# Patient Record
Sex: MALE | Race: WHITE | Employment: OTHER | URBAN - NONMETROPOLITAN AREA
[De-identification: names, ages, dates, MRNs, and addresses within clinical notes are randomized per-mention and may not be internally consistent; named-entity substitution may affect disease eponyms.]

---

## 2017-02-21 ENCOUNTER — OFFICE VISIT (OUTPATIENT)
Dept: CARDIOLOGY | Age: 67
End: 2017-02-21
Payer: COMMERCIAL

## 2017-02-21 VITALS
SYSTOLIC BLOOD PRESSURE: 102 MMHG | HEIGHT: 74 IN | WEIGHT: 244 LBS | DIASTOLIC BLOOD PRESSURE: 74 MMHG | BODY MASS INDEX: 31.32 KG/M2 | HEART RATE: 68 BPM

## 2017-02-21 DIAGNOSIS — I25.10 CORONARY ARTERY DISEASE INVOLVING NATIVE CORONARY ARTERY OF NATIVE HEART WITHOUT ANGINA PECTORIS: Primary | ICD-10-CM

## 2017-02-21 DIAGNOSIS — M79.604 PAIN IN BOTH LOWER EXTREMITIES: Primary | ICD-10-CM

## 2017-02-21 DIAGNOSIS — E78.2 MIXED HYPERLIPIDEMIA: ICD-10-CM

## 2017-02-21 DIAGNOSIS — M79.605 PAIN IN BOTH LOWER EXTREMITIES: Primary | ICD-10-CM

## 2017-02-21 DIAGNOSIS — I10 ESSENTIAL HYPERTENSION: ICD-10-CM

## 2017-02-21 PROCEDURE — 99212 OFFICE O/P EST SF 10 MIN: CPT | Performed by: INTERNAL MEDICINE

## 2017-02-21 RX ORDER — PREGABALIN 100 MG/1
100 CAPSULE ORAL DAILY
COMMUNITY
End: 2017-03-06 | Stop reason: ALTCHOICE

## 2017-03-06 ENCOUNTER — OFFICE VISIT (OUTPATIENT)
Dept: VASCULAR SURGERY | Age: 67
End: 2017-03-06
Payer: COMMERCIAL

## 2017-03-06 VITALS
TEMPERATURE: 98.2 F | OXYGEN SATURATION: 97 % | DIASTOLIC BLOOD PRESSURE: 75 MMHG | SYSTOLIC BLOOD PRESSURE: 110 MMHG | HEART RATE: 61 BPM | RESPIRATION RATE: 18 BRPM

## 2017-03-06 DIAGNOSIS — M79.605 PAIN IN BOTH LOWER EXTREMITIES: ICD-10-CM

## 2017-03-06 DIAGNOSIS — I83.813 VARICOSE VEINS OF BILATERAL LOWER EXTREMITIES WITH PAIN: Primary | ICD-10-CM

## 2017-03-06 DIAGNOSIS — M79.89 LEG SWELLING: ICD-10-CM

## 2017-03-06 DIAGNOSIS — M79.604 PAIN IN BOTH LOWER EXTREMITIES: ICD-10-CM

## 2017-03-06 DIAGNOSIS — I83.813 VARICOSE VEINS OF LEG WITH PAIN, BILATERAL: Primary | ICD-10-CM

## 2017-03-06 PROCEDURE — 99203 OFFICE O/P NEW LOW 30 MIN: CPT | Performed by: SURGERY

## 2017-03-06 RX ORDER — GABAPENTIN 300 MG/1
900 CAPSULE ORAL 2 TIMES DAILY
COMMUNITY
End: 2019-04-30 | Stop reason: DRUGHIGH

## 2017-03-22 ENCOUNTER — OFFICE VISIT (OUTPATIENT)
Dept: UROLOGY | Facility: CLINIC | Age: 67
End: 2017-03-22

## 2017-03-22 VITALS
DIASTOLIC BLOOD PRESSURE: 78 MMHG | BODY MASS INDEX: 31.06 KG/M2 | TEMPERATURE: 97.6 F | HEIGHT: 74 IN | WEIGHT: 242 LBS | SYSTOLIC BLOOD PRESSURE: 134 MMHG

## 2017-03-22 DIAGNOSIS — N13.8 BPH (BENIGN PROSTATIC HYPERTROPHY) WITH URINARY OBSTRUCTION: ICD-10-CM

## 2017-03-22 DIAGNOSIS — N52.9 IMPOTENCE OF ORGANIC ORIGIN: ICD-10-CM

## 2017-03-22 DIAGNOSIS — N40.1 BPH (BENIGN PROSTATIC HYPERTROPHY) WITH URINARY OBSTRUCTION: ICD-10-CM

## 2017-03-22 DIAGNOSIS — N50.819 TESTALGIA: Primary | ICD-10-CM

## 2017-03-22 LAB
BILIRUB BLD-MCNC: NEGATIVE MG/DL
CLARITY, POC: CLEAR
COLOR UR: YELLOW
GLUCOSE UR STRIP-MCNC: ABNORMAL MG/DL
KETONES UR QL: NEGATIVE
LEUKOCYTE EST, POC: NEGATIVE
NITRITE UR-MCNC: NEGATIVE MG/ML
PH UR: 7 [PH] (ref 5–8)
PROT UR STRIP-MCNC: NEGATIVE MG/DL
RBC # UR STRIP: NEGATIVE /UL
SP GR UR: 1.02 (ref 1–1.03)
UROBILINOGEN UR QL: NORMAL

## 2017-03-22 PROCEDURE — 99214 OFFICE O/P EST MOD 30 MIN: CPT | Performed by: UROLOGY

## 2017-03-22 PROCEDURE — 81003 URINALYSIS AUTO W/O SCOPE: CPT | Performed by: UROLOGY

## 2017-03-22 PROCEDURE — 51798 US URINE CAPACITY MEASURE: CPT | Performed by: UROLOGY

## 2017-03-22 NOTE — PROGRESS NOTES
Subjective    Mr. Kam is 66 y.o. male    Chief Complaint: BPH/ED    History of Present Illness     Benign Prostatic Hypertrophy  Patient complains of lower urinary tract symptoms. He reports frequency, incomplete emptying, intermittency, nocturia four times a night, straining and urgency. He denies none. Patient states symptoms are of severe severity. Onset of symptoms was several years ago and was gradual in onset. His AUA Symptom Score is, 14/35.He reports a history of no complicating symptoms. He denies flank pain, gross hematuria, kidney stones and recurrent UTI. Patient has tried TURP with improvement. Last PSA was unknown .       Erectile Dysfunction  Patient complains of erectile dysfunction. Onset of dysfunction was several years ago and was gradual in onset. Patient states the nature of difficulty is maintaining erection. Full erections occur never. Partial erections occur never. Libido is not affected. Risk factors for ED include cardiovascular disease. Patient denies history of pelvic radiation. Previous treatment of ED includes none.     Testalgia  Patient is here for evaluation of testalgia.  The onset of the testicular pain is gradual.  It has been occurring for several months.  Pt. Describes the pain as sharp.  Patient rates the pain as 8/10.  Pain is located in the right testicle.  Course has been worsening.  Aggravating factors include none.  Alleviating factors include none. Previous urologic workup includes scrotal us 10/16.  Previous treatment includes TURP.  Pt. did not experience relief from treatment.        The following portions of the patient's history were reviewed and updated as appropriate: allergies, current medications, past family history, past medical history, past social history, past surgical history and problem list.    Review of Systems   Constitutional: Negative for appetite change and fever.   HENT: Negative for hearing loss and sore throat.    Eyes: Negative for pain and  redness.   Respiratory: Negative for cough and shortness of breath.    Cardiovascular: Negative for chest pain and leg swelling.   Gastrointestinal: Negative for anal bleeding, nausea and vomiting.   Endocrine: Negative for cold intolerance and heat intolerance.   Genitourinary: Positive for decreased urine volume, scrotal swelling and testicular pain. Negative for dysuria, flank pain and hematuria.   Musculoskeletal: Negative for joint swelling and myalgias.   Skin: Negative for color change and rash.   Allergic/Immunologic: Negative for immunocompromised state.   Neurological: Negative for dizziness and speech difficulty.   Hematological: Negative for adenopathy. Does not bruise/bleed easily.   Psychiatric/Behavioral: Negative for dysphoric mood and suicidal ideas.         Current Outpatient Prescriptions:   •  albuterol (PROAIR HFA) 108 (90 BASE) MCG/ACT inhaler, Inhale 2 puffs Every 4 (Four) Hours As Needed for shortness of air., Disp: , Rfl:   •  aspirin 325 MG tablet, Take 325 mg by mouth daily, Disp: , Rfl:   •  atorvastatin (LIPITOR) 40 MG tablet, Take 1 tablet by mouth daily, Disp: , Rfl:   •  beclomethasone (QVAR) 80 MCG/ACT inhaler, Inhale 1 puff into the lungs 2 times daily., Disp: , Rfl:   •  esomeprazole (NexIUM) 10 MG packet, Take 40 mg by mouth daily , Disp: , Rfl:   •  fluticasone-salmeterol (ADVAIR DISKUS) 500-50 MCG/DOSE DISKUS, Every 12 (Twelve) Hours., Disp: , Rfl:   •  gabapentin (NEURONTIN) 300 MG capsule, take 3,000 mg of gabapentin daily, Disp: , Rfl:   •  HYDROcodone-acetaminophen (NORCO) 5-325 MG per tablet, Take 1 tablet by mouth Every 6 (Six) Hours As Needed for moderate pain (4-6) (LELG AND FEET PAIN)., Disp: 40 tablet, Rfl: 0  •  isosorbide mononitrate (IMDUR) 60 MG 24 hr tablet, Take 1 tablet by mouth daily, Disp: , Rfl:   •  metoprolol succinate XL (TOPROL XL) 25 MG 24 hr tablet, Take 1 tablet by mouth 2 times daily, Disp: , Rfl:   •  nitroglycerin (NITROLINGUAL) 0.4 MG/SPRAY spray,  Place 1 spray under the tongue every 5 minutes as needed for Chest pain, Disp: , Rfl:   •  phenazopyridine (PYRIDIUM) 100 MG tablet, Take 1 tablet by mouth 3 (Three) Times a Day As Needed for bladder spasms., Disp: 21 tablet, Rfl: 1  •  ranolazine (RANEXA) 500 MG 12 hr tablet, Take 1 tablet by mouth 2 times daily, Disp: , Rfl:   •  tiotropium (SPIRIVA HANDIHALER) 18 MCG per inhalation capsule, Inhale 18 mcg into the lungs daily., Disp: , Rfl:   •  topiramate (TOPAMAX) 100 MG tablet, Take 125 mg by mouth Daily. TAKES 125 MG DAILY DIVIDED OVER SEVERAL DOSES PER DAY, Disp: , Rfl:     Past Medical History:   Diagnosis Date   • Arthritis    • COPD (chronic obstructive pulmonary disease)    • Coronary artery disease    • Enlarged prostate    • Full dentures    • GERD (gastroesophageal reflux disease)    • Hearing loss    • Heart abnormality    • Hypertension    • Lung nodule     3 IN 1 LUNG AND 4 IN THE OTHER AND STATES IT IS SLOW GROWING   • Neuropathic pain    • Sleep apnea     DOES NOT WEAR C PAP       Past Surgical History:   Procedure Laterality Date   • APPENDECTOMY     • CATARACT EXTRACTION     • CYSTOSCOPY TRANSURETHRAL RESECTION OF PROSTATE N/A 11/8/2016    Procedure: CYSTOSCOPY TRANSURETHRAL RESECTION OF PROSTATE;  Surgeon: Brad Bal MD;  [facility]: [facility];  Service:    • SINUS SURGERY     • THYROIDECTOMY         Social History     Social History   • Marital status:      Spouse name: N/A   • Number of children: N/A   • Years of education: N/A     Social History Main Topics   • Smoking status: Current Some Day Smoker     Packs/day: 0.25     Types: Cigars   • Smokeless tobacco: Never Used   • Alcohol use 0.6 oz/week     1 Cans of beer per week   • Drug use: No   • Sexual activity: Defer     Other Topics Concern   • None     Social History Narrative       Family History   Problem Relation Age of Onset   • No Known Problems Father    • No Known Problems Mother        Objective    /78   "Temp 97.6 °F (36.4 °C)  Ht 74\" (188 cm)  Wt 242 lb (110 kg)  BMI 31.07 kg/m2    Physical Exam   Constitutional: He is oriented to person, place, and time. He appears well-developed and well-nourished.   Pulmonary/Chest: Effort normal.   Abdominal: Soft. He exhibits no distension and no mass. There is no tenderness. There is no rebound and no guarding. No hernia.   Genitourinary: Penis normal. Rectal exam shows no mass, no tenderness and anal tone normal. Enlarged: for the age of the patient. Right testis shows no mass, no swelling and no tenderness. Left testis shows no mass, no swelling and no tenderness. No hypospadias. No discharge found.   Genitourinary Comments:  The urethral meatus normal in position without evidence of stricture. Epididymis without mass or tenderness. Vas Deferens is palpably normal. Right testicle larger than left testicle.     Neurological: He is alert and oriented to person, place, and time.   Vitals reviewed.          Results for orders placed or performed in visit on 03/22/17   POC Urinalysis Dipstick, Automated   Result Value Ref Range    Color Yellow Yellow, Straw, Dark Yellow, Bing    Clarity, UA Clear Clear    Glucose,  mg/dL (A) Negative, 1000 mg/dL (3+) mg/dL    Bilirubin Negative Negative    Ketones, UA Negative Negative    Specific Gravity  1.025 1.005 - 1.030    Blood, UA Negative Negative    pH, Urine 7.0 5.0 - 8.0    Protein, POC Negative Negative mg/dL    Urobilinogen, UA Normal Normal    Leukocytes Negative Negative    Nitrite, UA Negative Negative     Microscopic Urinalysis  I inspected the urine myself based on the clinical situation including the dipstick urine. The urine is spun in a centrifuge for three minutes. The spun urine shows 3-6 rbc/hpf, 3-6 wbc/hpf, none epi/hpf, negative bacteria, negative crystals, and negative casts.     Estimation of residual urine via abdominal ultrasound  Residual Urine: 15 ml  Indication: weak stream  Position: " Supine  Examination: Incremental scanning of the suprapubic area using 3 MHz transducer using copious amounts of acoustic gel.   Findings: An anechoic area was demonstrated which represented the bladder, with measurement of residual urine as noted. I inspected this myself. In that the residual urine was stable or insignificant, no treatment will be necessary at this time.         Assessment and Plan    Luis was seen today for testicle pain and weak stream.    Diagnoses and all orders for this visit:    Testalgia  -     POC Urinalysis Dipstick, Automated  -     US Scrotum & Testicles; Future    BPH (benign prostatic hypertrophy) with urinary obstruction    Impotence of organic origin        Patient with continued testalgia he had imaging back in October 2016.  He has had worsening of his symptoms and repeat a scrotal ultrasound in 1 week and he will return to see me.

## 2017-06-07 ENCOUNTER — TELEPHONE (OUTPATIENT)
Dept: UROLOGY | Facility: CLINIC | Age: 67
End: 2017-06-07

## 2017-06-12 ENCOUNTER — HOSPITAL ENCOUNTER (OUTPATIENT)
Dept: VASCULAR LAB | Age: 67
Discharge: HOME | End: 2017-06-12
Payer: MEDICARE

## 2017-06-12 ENCOUNTER — OFFICE VISIT (OUTPATIENT)
Dept: VASCULAR SURGERY | Age: 67
End: 2017-06-12
Payer: MEDICARE

## 2017-06-12 VITALS
TEMPERATURE: 97.4 F | SYSTOLIC BLOOD PRESSURE: 109 MMHG | HEART RATE: 67 BPM | DIASTOLIC BLOOD PRESSURE: 65 MMHG | RESPIRATION RATE: 18 BRPM

## 2017-06-12 DIAGNOSIS — M79.89 LEG SWELLING: ICD-10-CM

## 2017-06-12 DIAGNOSIS — M79.605 PAIN IN BOTH LOWER EXTREMITIES: ICD-10-CM

## 2017-06-12 DIAGNOSIS — M79.604 PAIN IN BOTH LOWER EXTREMITIES: ICD-10-CM

## 2017-06-12 DIAGNOSIS — I83.813 VARICOSE VEINS OF LEG WITH PAIN, BILATERAL: ICD-10-CM

## 2017-06-12 DIAGNOSIS — I83.893 VARICOSE VEINS OF LEG WITH SWELLING, BILATERAL: Primary | ICD-10-CM

## 2017-06-12 PROCEDURE — 99213 OFFICE O/P EST LOW 20 MIN: CPT | Performed by: PHYSICIAN ASSISTANT

## 2017-06-12 PROCEDURE — 93970 EXTREMITY STUDY: CPT

## 2017-06-15 ENCOUNTER — HOSPITAL ENCOUNTER (OUTPATIENT)
Dept: GENERAL RADIOLOGY | Age: 67
Discharge: HOME | End: 2017-06-15
Payer: MEDICARE

## 2017-06-15 ENCOUNTER — OFFICE VISIT (OUTPATIENT)
Dept: URGENT CARE | Age: 67
End: 2017-06-15
Payer: COMMERCIAL

## 2017-06-15 VITALS
SYSTOLIC BLOOD PRESSURE: 121 MMHG | TEMPERATURE: 98.1 F | BODY MASS INDEX: 29.13 KG/M2 | OXYGEN SATURATION: 98 % | DIASTOLIC BLOOD PRESSURE: 74 MMHG | HEART RATE: 72 BPM | HEIGHT: 74 IN | WEIGHT: 227 LBS | RESPIRATION RATE: 18 BRPM

## 2017-06-15 DIAGNOSIS — R60.9 SWOLLEN: Primary | ICD-10-CM

## 2017-06-15 DIAGNOSIS — L03.113 CELLULITIS OF RIGHT UPPER EXTREMITY: ICD-10-CM

## 2017-06-15 DIAGNOSIS — R60.9 SWOLLEN: ICD-10-CM

## 2017-06-15 PROCEDURE — G8417 CALC BMI ABV UP PARAM F/U: HCPCS | Performed by: NURSE PRACTITIONER

## 2017-06-15 PROCEDURE — 4040F PNEUMOC VAC/ADMIN/RCVD: CPT | Performed by: NURSE PRACTITIONER

## 2017-06-15 PROCEDURE — 1036F TOBACCO NON-USER: CPT | Performed by: NURSE PRACTITIONER

## 2017-06-15 PROCEDURE — 3017F COLORECTAL CA SCREEN DOC REV: CPT | Performed by: NURSE PRACTITIONER

## 2017-06-15 PROCEDURE — G8428 CUR MEDS NOT DOCUMENT: HCPCS | Performed by: NURSE PRACTITIONER

## 2017-06-15 PROCEDURE — 99213 OFFICE O/P EST LOW 20 MIN: CPT | Performed by: NURSE PRACTITIONER

## 2017-06-15 PROCEDURE — 1123F ACP DISCUSS/DSCN MKR DOCD: CPT | Performed by: NURSE PRACTITIONER

## 2017-06-15 PROCEDURE — 73130 X-RAY EXAM OF HAND: CPT

## 2017-06-15 PROCEDURE — G8598 ASA/ANTIPLAT THER USED: HCPCS | Performed by: NURSE PRACTITIONER

## 2017-06-15 RX ORDER — PREDNISONE 10 MG/1
TABLET ORAL
Qty: 42 TABLET | Refills: 0 | Status: SHIPPED | OUTPATIENT
Start: 2017-06-15 | End: 2017-07-16

## 2017-06-15 RX ORDER — CLINDAMYCIN HYDROCHLORIDE 300 MG/1
300 CAPSULE ORAL 3 TIMES DAILY
Qty: 30 CAPSULE | Refills: 0 | Status: SHIPPED | OUTPATIENT
Start: 2017-06-15 | End: 2017-06-25

## 2017-06-15 ASSESSMENT — ENCOUNTER SYMPTOMS
COLOR CHANGE: 1
VOMITING: 0
DIARRHEA: 0

## 2017-06-16 RX ORDER — METOPROLOL SUCCINATE 25 MG/1
TABLET, EXTENDED RELEASE ORAL
Qty: 180 TABLET | Refills: 3 | Status: SHIPPED | OUTPATIENT
Start: 2017-06-16 | End: 2018-09-11 | Stop reason: SDUPTHER

## 2017-06-19 ENCOUNTER — RESULTS ENCOUNTER (OUTPATIENT)
Dept: UROLOGY | Facility: CLINIC | Age: 67
End: 2017-06-19

## 2017-06-19 DIAGNOSIS — N13.8 BPH (BENIGN PROSTATIC HYPERTROPHY) WITH URINARY OBSTRUCTION: ICD-10-CM

## 2017-06-19 DIAGNOSIS — N40.1 BPH (BENIGN PROSTATIC HYPERTROPHY) WITH URINARY OBSTRUCTION: ICD-10-CM

## 2017-07-03 ENCOUNTER — TELEPHONE (OUTPATIENT)
Dept: VASCULAR SURGERY | Age: 67
End: 2017-07-03

## 2017-07-05 ENCOUNTER — TELEPHONE (OUTPATIENT)
Dept: VASCULAR SURGERY | Age: 67
End: 2017-07-05

## 2017-07-05 RX ORDER — DIPHENHYDRAMINE HCL 50 MG
CAPSULE ORAL
Qty: 1 CAPSULE | Refills: 0 | Status: ON HOLD | OUTPATIENT
Start: 2017-07-05 | End: 2017-10-19 | Stop reason: ALTCHOICE

## 2017-07-05 RX ORDER — PREDNISONE 50 MG/1
TABLET ORAL
Qty: 3 TABLET | Refills: 0 | OUTPATIENT
Start: 2017-07-05 | End: 2017-07-16

## 2017-07-10 ENCOUNTER — PREP FOR PROCEDURE (OUTPATIENT)
Dept: VASCULAR SURGERY | Age: 67
End: 2017-07-10

## 2017-07-10 RX ORDER — SODIUM CHLORIDE 9 MG/ML
INJECTION, SOLUTION INTRAVENOUS CONTINUOUS
Status: CANCELLED | OUTPATIENT
Start: 2017-07-10

## 2017-07-10 RX ORDER — SODIUM CHLORIDE 0.9 % (FLUSH) 0.9 %
10 SYRINGE (ML) INJECTION PRN
Status: CANCELLED | OUTPATIENT
Start: 2017-07-10

## 2017-07-11 ENCOUNTER — HOSPITAL ENCOUNTER (OUTPATIENT)
Dept: INTERVENTIONAL RADIOLOGY/VASCULAR | Age: 67
Discharge: HOME | End: 2017-07-11
Payer: MEDICARE

## 2017-07-11 VITALS
OXYGEN SATURATION: 100 % | DIASTOLIC BLOOD PRESSURE: 64 MMHG | HEART RATE: 45 BPM | WEIGHT: 216 LBS | BODY MASS INDEX: 28.63 KG/M2 | RESPIRATION RATE: 14 BRPM | SYSTOLIC BLOOD PRESSURE: 101 MMHG | TEMPERATURE: 98 F | HEIGHT: 73 IN

## 2017-07-11 DIAGNOSIS — I87.2 VENOUS INSUFFICIENCY OF BOTH LOWER EXTREMITIES: ICD-10-CM

## 2017-07-11 DIAGNOSIS — M79.89 PAIN AND SWELLING OF RIGHT LOWER LEG: ICD-10-CM

## 2017-07-11 DIAGNOSIS — I73.9 PVD (PERIPHERAL VASCULAR DISEASE) (HCC): ICD-10-CM

## 2017-07-11 DIAGNOSIS — M79.661 PAIN AND SWELLING OF RIGHT LOWER LEG: ICD-10-CM

## 2017-07-11 DIAGNOSIS — M79.89 SWELLING OF BOTH LOWER EXTREMITIES: ICD-10-CM

## 2017-07-11 DIAGNOSIS — I83.813 VARICOSE VEINS OF BILATERAL LOWER EXTREMITIES WITH PAIN: ICD-10-CM

## 2017-07-11 LAB
ANION GAP SERPL CALCULATED.3IONS-SCNC: 9 MMOL/L (ref 7–19)
BUN BLDV-MCNC: 17 MG/DL (ref 8–23)
CALCIUM SERPL-MCNC: 8.9 MG/DL (ref 8.8–10.2)
CHLORIDE BLD-SCNC: 109 MMOL/L (ref 98–111)
CO2: 23 MMOL/L (ref 22–29)
CREAT SERPL-MCNC: 1 MG/DL (ref 0.5–1.2)
GFR NON-AFRICAN AMERICAN: >60
GLUCOSE BLD-MCNC: 121 MG/DL (ref 74–109)
HCT VFR BLD CALC: 38.8 % (ref 42–52)
HEMOGLOBIN: 13.1 G/DL (ref 14–18)
MCH RBC QN AUTO: 33.7 PG (ref 27–31)
MCHC RBC AUTO-ENTMCNC: 33.8 G/DL (ref 33–37)
MCV RBC AUTO: 99.7 FL (ref 80–94)
PDW BLD-RTO: 13.8 % (ref 11.5–14.5)
PLATELET # BLD: 186 K/UL (ref 130–400)
PMV BLD AUTO: 9.8 FL (ref 9.4–12.4)
POTASSIUM SERPL-SCNC: 4.3 MMOL/L (ref 3.5–5)
RBC # BLD: 3.89 M/UL (ref 4.7–6.1)
SODIUM BLD-SCNC: 141 MMOL/L (ref 136–145)
WBC # BLD: 7.3 K/UL (ref 4.8–10.8)

## 2017-07-11 PROCEDURE — 85027 COMPLETE CBC AUTOMATED: CPT

## 2017-07-11 PROCEDURE — 75822 VEIN X-RAY ARMS/LEGS: CPT | Performed by: SURGERY

## 2017-07-11 PROCEDURE — 2580000003 HC RX 258: Performed by: SURGERY

## 2017-07-11 PROCEDURE — 36005 INJECTION EXT VENOGRAPHY: CPT | Performed by: SURGERY

## 2017-07-11 PROCEDURE — 80048 BASIC METABOLIC PNL TOTAL CA: CPT

## 2017-07-11 PROCEDURE — C1769 GUIDE WIRE: HCPCS

## 2017-07-11 PROCEDURE — 6360000004 HC RX CONTRAST MEDICATION: Performed by: SURGERY

## 2017-07-11 PROCEDURE — 37252 INTRVASC US NONCORONARY 1ST: CPT | Performed by: SURGERY

## 2017-07-11 PROCEDURE — 6360000002 HC RX W HCPCS: Performed by: SURGERY

## 2017-07-11 PROCEDURE — 2500000003 HC RX 250 WO HCPCS: Performed by: SURGERY

## 2017-07-11 PROCEDURE — 37253 INTRVASC US NONCORONARY ADDL: CPT | Performed by: SURGERY

## 2017-07-11 PROCEDURE — 36415 COLL VENOUS BLD VENIPUNCTURE: CPT

## 2017-07-11 PROCEDURE — 36012 PLACE CATHETER IN VEIN: CPT | Performed by: SURGERY

## 2017-07-11 RX ORDER — ONDANSETRON 2 MG/ML
4 INJECTION INTRAMUSCULAR; INTRAVENOUS EVERY 8 HOURS PRN
Status: DISCONTINUED | OUTPATIENT
Start: 2017-07-11 | End: 2017-07-13 | Stop reason: HOSPADM

## 2017-07-11 RX ORDER — IODIXANOL 320 MG/ML
INJECTION, SOLUTION INTRAVASCULAR
Status: COMPLETED | OUTPATIENT
Start: 2017-07-11 | End: 2017-07-11

## 2017-07-11 RX ORDER — MIDAZOLAM HYDROCHLORIDE 1 MG/ML
INJECTION INTRAMUSCULAR; INTRAVENOUS
Status: COMPLETED | OUTPATIENT
Start: 2017-07-11 | End: 2017-07-11

## 2017-07-11 RX ORDER — HEPARIN SODIUM 5000 [USP'U]/ML
INJECTION, SOLUTION INTRAVENOUS; SUBCUTANEOUS
Status: COMPLETED | OUTPATIENT
Start: 2017-07-11 | End: 2017-07-11

## 2017-07-11 RX ORDER — METHYLPREDNISOLONE SODIUM SUCCINATE 125 MG/2ML
250 INJECTION, POWDER, LYOPHILIZED, FOR SOLUTION INTRAMUSCULAR; INTRAVENOUS ONCE
Status: COMPLETED | OUTPATIENT
Start: 2017-07-11 | End: 2017-07-11

## 2017-07-11 RX ORDER — DIPHENHYDRAMINE HYDROCHLORIDE 50 MG/ML
50 INJECTION INTRAMUSCULAR; INTRAVENOUS ONCE
Status: COMPLETED | OUTPATIENT
Start: 2017-07-11 | End: 2017-07-11

## 2017-07-11 RX ORDER — SODIUM CHLORIDE 9 MG/ML
INJECTION, SOLUTION INTRAVENOUS CONTINUOUS
Status: DISCONTINUED | OUTPATIENT
Start: 2017-07-11 | End: 2017-07-11 | Stop reason: SDUPTHER

## 2017-07-11 RX ORDER — SODIUM CHLORIDE 0.9 % (FLUSH) 0.9 %
10 SYRINGE (ML) INJECTION PRN
Status: DISCONTINUED | OUTPATIENT
Start: 2017-07-11 | End: 2017-07-13 | Stop reason: HOSPADM

## 2017-07-11 RX ORDER — LIDOCAINE HYDROCHLORIDE 20 MG/ML
INJECTION, SOLUTION INFILTRATION; PERINEURAL
Status: COMPLETED | OUTPATIENT
Start: 2017-07-11 | End: 2017-07-11

## 2017-07-11 RX ORDER — FENTANYL CITRATE 50 UG/ML
INJECTION, SOLUTION INTRAMUSCULAR; INTRAVENOUS
Status: COMPLETED | OUTPATIENT
Start: 2017-07-11 | End: 2017-07-11

## 2017-07-11 RX ORDER — SODIUM CHLORIDE 9 MG/ML
INJECTION, SOLUTION INTRAVENOUS CONTINUOUS
Status: DISCONTINUED | OUTPATIENT
Start: 2017-07-11 | End: 2017-07-13 | Stop reason: HOSPADM

## 2017-07-11 RX ORDER — HYDROCODONE BITARTRATE AND ACETAMINOPHEN 5; 325 MG/1; MG/1
1 TABLET ORAL EVERY 4 HOURS PRN
Status: DISCONTINUED | OUTPATIENT
Start: 2017-07-11 | End: 2017-07-13 | Stop reason: HOSPADM

## 2017-07-11 RX ORDER — ACETAMINOPHEN 325 MG/1
650 TABLET ORAL EVERY 4 HOURS PRN
Status: DISCONTINUED | OUTPATIENT
Start: 2017-07-11 | End: 2017-07-13 | Stop reason: HOSPADM

## 2017-07-11 RX ORDER — HYDROCODONE BITARTRATE AND ACETAMINOPHEN 5; 325 MG/1; MG/1
2 TABLET ORAL EVERY 4 HOURS PRN
Status: DISCONTINUED | OUTPATIENT
Start: 2017-07-11 | End: 2017-07-13 | Stop reason: HOSPADM

## 2017-07-11 RX ADMIN — MIDAZOLAM HYDROCHLORIDE 1 MG: 1 INJECTION, SOLUTION INTRAMUSCULAR; INTRAVENOUS at 09:46

## 2017-07-11 RX ADMIN — METHYLPREDNISOLONE SODIUM SUCCINATE 250 MG: 125 INJECTION, POWDER, FOR SOLUTION INTRAMUSCULAR; INTRAVENOUS at 09:30

## 2017-07-11 RX ADMIN — HEPARIN SODIUM 5000 UNITS: 5000 INJECTION, SOLUTION INTRAVENOUS; SUBCUTANEOUS at 09:45

## 2017-07-11 RX ADMIN — IODIXANOL 72 ML: 320 INJECTION, SOLUTION INTRAVASCULAR at 10:08

## 2017-07-11 RX ADMIN — FENTANYL CITRATE 25 MCG: 50 INJECTION INTRAMUSCULAR; INTRAVENOUS at 09:46

## 2017-07-11 RX ADMIN — DIPHENHYDRAMINE HYDROCHLORIDE 50 MG: 50 INJECTION, SOLUTION INTRAMUSCULAR; INTRAVENOUS at 09:30

## 2017-07-11 RX ADMIN — HEPARIN SODIUM 4000 UNITS: 5000 INJECTION, SOLUTION INTRAVENOUS; SUBCUTANEOUS at 09:53

## 2017-07-11 RX ADMIN — SODIUM CHLORIDE: 9 INJECTION, SOLUTION INTRAVENOUS at 08:37

## 2017-07-11 RX ADMIN — LIDOCAINE HYDROCHLORIDE 15 ML: 20 INJECTION, SOLUTION INFILTRATION; PERINEURAL at 09:51

## 2017-07-11 RX ADMIN — Medication 2 G: at 09:31

## 2017-07-13 ENCOUNTER — OFFICE VISIT (OUTPATIENT)
Dept: VASCULAR SURGERY | Age: 67
End: 2017-07-13
Payer: MEDICARE

## 2017-07-13 VITALS
TEMPERATURE: 98.4 F | DIASTOLIC BLOOD PRESSURE: 62 MMHG | RESPIRATION RATE: 18 BRPM | OXYGEN SATURATION: 98 % | SYSTOLIC BLOOD PRESSURE: 98 MMHG | HEART RATE: 58 BPM

## 2017-07-13 DIAGNOSIS — M79.89 SWELLING OF BOTH LOWER EXTREMITIES: ICD-10-CM

## 2017-07-13 DIAGNOSIS — I87.2 VENOUS INSUFFICIENCY OF BOTH LOWER EXTREMITIES: ICD-10-CM

## 2017-07-13 DIAGNOSIS — I83.813 VARICOSE VEINS OF BILATERAL LOWER EXTREMITIES WITH PAIN: Primary | ICD-10-CM

## 2017-07-13 PROCEDURE — 4040F PNEUMOC VAC/ADMIN/RCVD: CPT | Performed by: PHYSICIAN ASSISTANT

## 2017-07-13 PROCEDURE — G8427 DOCREV CUR MEDS BY ELIG CLIN: HCPCS | Performed by: PHYSICIAN ASSISTANT

## 2017-07-13 PROCEDURE — 1036F TOBACCO NON-USER: CPT | Performed by: PHYSICIAN ASSISTANT

## 2017-07-13 PROCEDURE — G8598 ASA/ANTIPLAT THER USED: HCPCS | Performed by: PHYSICIAN ASSISTANT

## 2017-07-13 PROCEDURE — 3017F COLORECTAL CA SCREEN DOC REV: CPT | Performed by: PHYSICIAN ASSISTANT

## 2017-07-13 PROCEDURE — 99212 OFFICE O/P EST SF 10 MIN: CPT | Performed by: PHYSICIAN ASSISTANT

## 2017-07-13 PROCEDURE — G8417 CALC BMI ABV UP PARAM F/U: HCPCS | Performed by: PHYSICIAN ASSISTANT

## 2017-07-13 PROCEDURE — 1123F ACP DISCUSS/DSCN MKR DOCD: CPT | Performed by: PHYSICIAN ASSISTANT

## 2017-07-16 ENCOUNTER — APPOINTMENT (OUTPATIENT)
Dept: GENERAL RADIOLOGY | Age: 67
End: 2017-07-16
Payer: MEDICARE

## 2017-07-16 ENCOUNTER — HOSPITAL ENCOUNTER (EMERGENCY)
Age: 67
Discharge: HOME | End: 2017-07-16
Attending: EMERGENCY MEDICINE
Payer: MEDICARE

## 2017-07-16 VITALS
HEART RATE: 76 BPM | HEIGHT: 73 IN | OXYGEN SATURATION: 98 % | TEMPERATURE: 98.2 F | BODY MASS INDEX: 28.49 KG/M2 | DIASTOLIC BLOOD PRESSURE: 72 MMHG | SYSTOLIC BLOOD PRESSURE: 100 MMHG | WEIGHT: 215 LBS | RESPIRATION RATE: 18 BRPM

## 2017-07-16 DIAGNOSIS — S61.411A HAND LACERATION, RIGHT, INITIAL ENCOUNTER: Primary | ICD-10-CM

## 2017-07-16 PROCEDURE — 96365 THER/PROPH/DIAG IV INF INIT: CPT

## 2017-07-16 PROCEDURE — 90471 IMMUNIZATION ADMIN: CPT | Performed by: EMERGENCY MEDICINE

## 2017-07-16 PROCEDURE — 90715 TDAP VACCINE 7 YRS/> IM: CPT | Performed by: EMERGENCY MEDICINE

## 2017-07-16 PROCEDURE — 29125 APPL SHORT ARM SPLINT STATIC: CPT | Performed by: NURSE PRACTITIONER

## 2017-07-16 PROCEDURE — 73130 X-RAY EXAM OF HAND: CPT

## 2017-07-16 PROCEDURE — 12002 RPR S/N/AX/GEN/TRNK2.6-7.5CM: CPT

## 2017-07-16 PROCEDURE — 12002 RPR S/N/AX/GEN/TRNK2.6-7.5CM: CPT | Performed by: NURSE PRACTITIONER

## 2017-07-16 PROCEDURE — 99283 EMERGENCY DEPT VISIT LOW MDM: CPT

## 2017-07-16 PROCEDURE — 96375 TX/PRO/DX INJ NEW DRUG ADDON: CPT

## 2017-07-16 PROCEDURE — 6360000002 HC RX W HCPCS: Performed by: EMERGENCY MEDICINE

## 2017-07-16 RX ORDER — LIDOCAINE HYDROCHLORIDE 10 MG/ML
INJECTION, SOLUTION INFILTRATION; PERINEURAL
Status: DISCONTINUED
Start: 2017-07-16 | End: 2017-07-16 | Stop reason: HOSPADM

## 2017-07-16 RX ORDER — HYDROCODONE BITARTRATE AND ACETAMINOPHEN 5; 325 MG/1; MG/1
2 TABLET ORAL EVERY 6 HOURS PRN
Qty: 10 TABLET | Refills: 0 | Status: SHIPPED | OUTPATIENT
Start: 2017-07-16 | End: 2017-07-23

## 2017-07-16 RX ORDER — CEPHALEXIN 500 MG/1
500 CAPSULE ORAL 4 TIMES DAILY
Qty: 20 CAPSULE | Refills: 0 | Status: SHIPPED | OUTPATIENT
Start: 2017-07-16 | End: 2017-07-21

## 2017-07-16 RX ADMIN — CEFAZOLIN SODIUM 1 G: 1 INJECTION, SOLUTION INTRAVENOUS at 18:04

## 2017-07-16 RX ADMIN — HYDROMORPHONE HYDROCHLORIDE 1 MG: 1 INJECTION, SOLUTION INTRAMUSCULAR; INTRAVENOUS; SUBCUTANEOUS at 17:46

## 2017-07-16 RX ADMIN — TETANUS TOXOID, REDUCED DIPHTHERIA TOXOID AND ACELLULAR PERTUSSIS VACCINE, ADSORBED 0.5 ML: 5; 2.5; 8; 8; 2.5 SUSPENSION INTRAMUSCULAR at 17:46

## 2017-07-16 ASSESSMENT — PAIN SCALES - GENERAL
PAINLEVEL_OUTOF10: 2
PAINLEVEL_OUTOF10: 2

## 2017-08-15 RX ORDER — DIAZEPAM 5 MG/1
TABLET ORAL
Qty: 1 TABLET | Refills: 0 | Status: ON HOLD | OUTPATIENT
Start: 2017-08-15 | End: 2017-09-21

## 2017-08-16 DIAGNOSIS — M79.605 LEFT LEG PAIN: ICD-10-CM

## 2017-08-16 DIAGNOSIS — I87.2 VENOUS INSUFFICIENCY OF BOTH LOWER EXTREMITIES: Primary | ICD-10-CM

## 2017-08-16 DIAGNOSIS — M79.89 LEFT LEG SWELLING: ICD-10-CM

## 2017-08-17 ENCOUNTER — PROCEDURE VISIT (OUTPATIENT)
Dept: VASCULAR SURGERY | Age: 67
End: 2017-08-17
Payer: MEDICARE

## 2017-08-17 VITALS
OXYGEN SATURATION: 99 % | SYSTOLIC BLOOD PRESSURE: 108 MMHG | DIASTOLIC BLOOD PRESSURE: 64 MMHG | RESPIRATION RATE: 18 BRPM | HEART RATE: 58 BPM | TEMPERATURE: 97.6 F

## 2017-08-17 DIAGNOSIS — M79.605 LEFT LEG PAIN: Primary | ICD-10-CM

## 2017-08-17 DIAGNOSIS — I87.2 CHRONIC VENOUS INSUFFICIENCY: ICD-10-CM

## 2017-08-17 DIAGNOSIS — M79.89 LEFT LEG SWELLING: ICD-10-CM

## 2017-08-17 PROCEDURE — 1036F TOBACCO NON-USER: CPT | Performed by: SURGERY

## 2017-08-17 PROCEDURE — 36475 ENDOVENOUS RF 1ST VEIN: CPT | Performed by: SURGERY

## 2017-08-17 RX ORDER — HYDROCODONE BITARTRATE AND ACETAMINOPHEN 5; 325 MG/1; MG/1
1 TABLET ORAL EVERY 6 HOURS PRN
COMMUNITY
End: 2018-01-23 | Stop reason: SDUPTHER

## 2017-08-22 ENCOUNTER — HOSPITAL ENCOUNTER (OUTPATIENT)
Dept: VASCULAR LAB | Age: 67
Discharge: HOME | End: 2017-08-22
Payer: MEDICARE

## 2017-08-22 ENCOUNTER — OFFICE VISIT (OUTPATIENT)
Dept: VASCULAR SURGERY | Age: 67
End: 2017-08-22
Payer: MEDICARE

## 2017-08-22 VITALS
DIASTOLIC BLOOD PRESSURE: 67 MMHG | HEART RATE: 48 BPM | BODY MASS INDEX: 26.82 KG/M2 | WEIGHT: 209 LBS | SYSTOLIC BLOOD PRESSURE: 100 MMHG | HEIGHT: 74 IN

## 2017-08-22 DIAGNOSIS — I87.2 VENOUS INSUFFICIENCY OF BOTH LOWER EXTREMITIES: ICD-10-CM

## 2017-08-22 DIAGNOSIS — M79.605 LEFT LEG PAIN: ICD-10-CM

## 2017-08-22 DIAGNOSIS — M79.89 SWELLING OF BOTH LOWER EXTREMITIES: ICD-10-CM

## 2017-08-22 DIAGNOSIS — M79.89 LEFT LEG SWELLING: ICD-10-CM

## 2017-08-22 DIAGNOSIS — I87.2 VENOUS INSUFFICIENCY OF BOTH LOWER EXTREMITIES: Primary | ICD-10-CM

## 2017-08-22 DIAGNOSIS — I83.813 VARICOSE VEINS OF BILATERAL LOWER EXTREMITIES WITH PAIN: ICD-10-CM

## 2017-08-22 PROCEDURE — G8598 ASA/ANTIPLAT THER USED: HCPCS | Performed by: PHYSICIAN ASSISTANT

## 2017-08-22 PROCEDURE — 3017F COLORECTAL CA SCREEN DOC REV: CPT | Performed by: PHYSICIAN ASSISTANT

## 2017-08-22 PROCEDURE — 4040F PNEUMOC VAC/ADMIN/RCVD: CPT | Performed by: PHYSICIAN ASSISTANT

## 2017-08-22 PROCEDURE — 93971 EXTREMITY STUDY: CPT

## 2017-08-22 PROCEDURE — 1036F TOBACCO NON-USER: CPT | Performed by: PHYSICIAN ASSISTANT

## 2017-08-22 PROCEDURE — 1123F ACP DISCUSS/DSCN MKR DOCD: CPT | Performed by: PHYSICIAN ASSISTANT

## 2017-08-22 PROCEDURE — G8417 CALC BMI ABV UP PARAM F/U: HCPCS | Performed by: PHYSICIAN ASSISTANT

## 2017-08-22 PROCEDURE — G8428 CUR MEDS NOT DOCUMENT: HCPCS | Performed by: PHYSICIAN ASSISTANT

## 2017-08-22 PROCEDURE — 99212 OFFICE O/P EST SF 10 MIN: CPT | Performed by: PHYSICIAN ASSISTANT

## 2017-08-23 ENCOUNTER — TRANSCRIBE ORDERS (OUTPATIENT)
Dept: ADMINISTRATIVE | Facility: HOSPITAL | Age: 67
End: 2017-08-23

## 2017-08-23 DIAGNOSIS — R63.4 LOSS OF WEIGHT: ICD-10-CM

## 2017-08-23 DIAGNOSIS — R10.10 UPPER ABDOMINAL PAIN: ICD-10-CM

## 2017-08-23 DIAGNOSIS — R91.1 PULMONARY NODULE: Primary | ICD-10-CM

## 2017-08-28 ENCOUNTER — HOSPITAL ENCOUNTER (OUTPATIENT)
Dept: CT IMAGING | Facility: HOSPITAL | Age: 67
Discharge: HOME | End: 2017-08-28
Attending: INTERNAL MEDICINE | Admitting: INTERNAL MEDICINE

## 2017-08-28 DIAGNOSIS — R10.10 UPPER ABDOMINAL PAIN: ICD-10-CM

## 2017-08-28 DIAGNOSIS — R91.1 PULMONARY NODULE: ICD-10-CM

## 2017-08-28 DIAGNOSIS — R63.4 LOSS OF WEIGHT: ICD-10-CM

## 2017-08-28 LAB — CREAT BLDA-MCNC: 1.1 MG/DL (ref 0.6–1.3)

## 2017-08-28 PROCEDURE — 74177 CT ABD & PELVIS W/CONTRAST: CPT

## 2017-08-28 PROCEDURE — 71260 CT THORAX DX C+: CPT

## 2017-08-28 PROCEDURE — 0 IOPAMIDOL 61 % SOLUTION: Performed by: INTERNAL MEDICINE

## 2017-08-28 PROCEDURE — 82565 ASSAY OF CREATININE: CPT

## 2017-08-28 RX ADMIN — IOPAMIDOL 100 ML: 612 INJECTION, SOLUTION INTRAVENOUS at 09:00

## 2017-08-29 ENCOUNTER — OFFICE VISIT (OUTPATIENT)
Dept: GASTROENTEROLOGY | Age: 67
End: 2017-08-29
Payer: MEDICARE

## 2017-08-29 VITALS
BODY MASS INDEX: 27.85 KG/M2 | SYSTOLIC BLOOD PRESSURE: 124 MMHG | HEIGHT: 75 IN | OXYGEN SATURATION: 98 % | DIASTOLIC BLOOD PRESSURE: 82 MMHG | HEART RATE: 57 BPM | WEIGHT: 224 LBS

## 2017-08-29 DIAGNOSIS — Z86.010 HISTORY OF ADENOMATOUS POLYP OF COLON: ICD-10-CM

## 2017-08-29 DIAGNOSIS — D64.9 ANEMIA, UNSPECIFIED: ICD-10-CM

## 2017-08-29 DIAGNOSIS — R19.4 CHANGE IN BOWEL HABIT: ICD-10-CM

## 2017-08-29 DIAGNOSIS — K62.5 RECTAL BLEEDING: Primary | ICD-10-CM

## 2017-08-29 DIAGNOSIS — R63.4 UNEXPLAINED WEIGHT LOSS: ICD-10-CM

## 2017-08-29 DIAGNOSIS — R14.3 EXCESSIVE GAS: ICD-10-CM

## 2017-08-29 DIAGNOSIS — R10.84 GENERALIZED ABDOMINAL PAIN: ICD-10-CM

## 2017-08-29 PROBLEM — Z86.0101 HISTORY OF ADENOMATOUS POLYP OF COLON: Status: ACTIVE | Noted: 2017-08-29

## 2017-08-29 PROCEDURE — G8417 CALC BMI ABV UP PARAM F/U: HCPCS | Performed by: NURSE PRACTITIONER

## 2017-08-29 PROCEDURE — 99214 OFFICE O/P EST MOD 30 MIN: CPT | Performed by: NURSE PRACTITIONER

## 2017-08-29 PROCEDURE — 3017F COLORECTAL CA SCREEN DOC REV: CPT | Performed by: NURSE PRACTITIONER

## 2017-08-29 PROCEDURE — 4040F PNEUMOC VAC/ADMIN/RCVD: CPT | Performed by: NURSE PRACTITIONER

## 2017-08-29 PROCEDURE — G8427 DOCREV CUR MEDS BY ELIG CLIN: HCPCS | Performed by: NURSE PRACTITIONER

## 2017-08-29 PROCEDURE — 1123F ACP DISCUSS/DSCN MKR DOCD: CPT | Performed by: NURSE PRACTITIONER

## 2017-08-29 PROCEDURE — 4004F PT TOBACCO SCREEN RCVD TLK: CPT | Performed by: NURSE PRACTITIONER

## 2017-08-29 PROCEDURE — G8598 ASA/ANTIPLAT THER USED: HCPCS | Performed by: NURSE PRACTITIONER

## 2017-08-29 ASSESSMENT — ENCOUNTER SYMPTOMS
SHORTNESS OF BREATH: 1
VOICE CHANGE: 0
COUGH: 0
BACK PAIN: 1
VOMITING: 0
SORE THROAT: 0
NAUSEA: 0
CONSTIPATION: 1
ABDOMINAL DISTENTION: 0
DIARRHEA: 0
CHEST TIGHTNESS: 0
BLOOD IN STOOL: 1
ABDOMINAL PAIN: 1
RECTAL PAIN: 0

## 2017-09-07 ENCOUNTER — OFFICE VISIT (OUTPATIENT)
Dept: CARDIOLOGY | Age: 67
End: 2017-09-07
Payer: MEDICARE

## 2017-09-07 VITALS
WEIGHT: 217 LBS | HEIGHT: 74 IN | BODY MASS INDEX: 27.85 KG/M2 | DIASTOLIC BLOOD PRESSURE: 60 MMHG | HEART RATE: 60 BPM | SYSTOLIC BLOOD PRESSURE: 100 MMHG

## 2017-09-07 DIAGNOSIS — E78.2 MIXED HYPERLIPIDEMIA: ICD-10-CM

## 2017-09-07 DIAGNOSIS — I10 ESSENTIAL HYPERTENSION: ICD-10-CM

## 2017-09-07 DIAGNOSIS — I25.10 CORONARY ARTERY DISEASE INVOLVING NATIVE CORONARY ARTERY OF NATIVE HEART WITHOUT ANGINA PECTORIS: Primary | ICD-10-CM

## 2017-09-07 PROCEDURE — 4040F PNEUMOC VAC/ADMIN/RCVD: CPT | Performed by: INTERNAL MEDICINE

## 2017-09-07 PROCEDURE — G8427 DOCREV CUR MEDS BY ELIG CLIN: HCPCS | Performed by: INTERNAL MEDICINE

## 2017-09-07 PROCEDURE — 3017F COLORECTAL CA SCREEN DOC REV: CPT | Performed by: INTERNAL MEDICINE

## 2017-09-07 PROCEDURE — 4004F PT TOBACCO SCREEN RCVD TLK: CPT | Performed by: INTERNAL MEDICINE

## 2017-09-07 PROCEDURE — 99213 OFFICE O/P EST LOW 20 MIN: CPT | Performed by: INTERNAL MEDICINE

## 2017-09-07 PROCEDURE — 1123F ACP DISCUSS/DSCN MKR DOCD: CPT | Performed by: INTERNAL MEDICINE

## 2017-09-07 PROCEDURE — G8598 ASA/ANTIPLAT THER USED: HCPCS | Performed by: INTERNAL MEDICINE

## 2017-09-07 PROCEDURE — G8417 CALC BMI ABV UP PARAM F/U: HCPCS | Performed by: INTERNAL MEDICINE

## 2017-09-21 ENCOUNTER — HOSPITAL ENCOUNTER (OUTPATIENT)
Age: 67
Setting detail: OUTPATIENT SURGERY
Discharge: HOME | End: 2017-09-21
Attending: INTERNAL MEDICINE | Admitting: INTERNAL MEDICINE
Payer: MEDICARE

## 2017-09-21 ENCOUNTER — ANESTHESIA EVENT (OUTPATIENT)
Dept: ENDOSCOPY | Age: 67
End: 2017-09-21
Payer: MEDICARE

## 2017-09-21 ENCOUNTER — ANESTHESIA (OUTPATIENT)
Dept: ENDOSCOPY | Age: 67
End: 2017-09-21
Payer: MEDICARE

## 2017-09-21 VITALS
OXYGEN SATURATION: 100 % | HEART RATE: 63 BPM | BODY MASS INDEX: 27.59 KG/M2 | RESPIRATION RATE: 16 BRPM | DIASTOLIC BLOOD PRESSURE: 55 MMHG | WEIGHT: 215 LBS | SYSTOLIC BLOOD PRESSURE: 107 MMHG | TEMPERATURE: 97.4 F | HEIGHT: 74 IN

## 2017-09-21 VITALS
RESPIRATION RATE: 16 BRPM | OXYGEN SATURATION: 98 % | DIASTOLIC BLOOD PRESSURE: 62 MMHG | SYSTOLIC BLOOD PRESSURE: 103 MMHG

## 2017-09-21 PROCEDURE — 7100000010 HC PHASE II RECOVERY - FIRST 15 MIN: Performed by: INTERNAL MEDICINE

## 2017-09-21 PROCEDURE — 88305 TISSUE EXAM BY PATHOLOGIST: CPT

## 2017-09-21 PROCEDURE — 2500000003 HC RX 250 WO HCPCS: Performed by: NURSE ANESTHETIST, CERTIFIED REGISTERED

## 2017-09-21 PROCEDURE — 45384 COLONOSCOPY W/LESION REMOVAL: CPT | Performed by: INTERNAL MEDICINE

## 2017-09-21 PROCEDURE — 3700000000 HC ANESTHESIA ATTENDED CARE: Performed by: INTERNAL MEDICINE

## 2017-09-21 PROCEDURE — 7100000011 HC PHASE II RECOVERY - ADDTL 15 MIN: Performed by: INTERNAL MEDICINE

## 2017-09-21 PROCEDURE — 6360000002 HC RX W HCPCS: Performed by: NURSE ANESTHETIST, CERTIFIED REGISTERED

## 2017-09-21 PROCEDURE — 3700000001 HC ADD 15 MINUTES (ANESTHESIA): Performed by: INTERNAL MEDICINE

## 2017-09-21 PROCEDURE — 3609010400 HC COLONOSCOPY POLYPECTOMY HOT BIOPSY: Performed by: INTERNAL MEDICINE

## 2017-09-21 PROCEDURE — 2580000003 HC RX 258: Performed by: INTERNAL MEDICINE

## 2017-09-21 RX ORDER — LIDOCAINE HYDROCHLORIDE 10 MG/ML
1 INJECTION, SOLUTION EPIDURAL; INFILTRATION; INTRACAUDAL; PERINEURAL ONCE
Status: DISCONTINUED | OUTPATIENT
Start: 2017-09-21 | End: 2017-09-22 | Stop reason: HOSPADM

## 2017-09-21 RX ORDER — LIDOCAINE HYDROCHLORIDE 20 MG/ML
INJECTION, SOLUTION INFILTRATION; PERINEURAL PRN
Status: DISCONTINUED | OUTPATIENT
Start: 2017-09-21 | End: 2017-09-21 | Stop reason: SDUPTHER

## 2017-09-21 RX ORDER — SODIUM CHLORIDE, SODIUM LACTATE, POTASSIUM CHLORIDE, CALCIUM CHLORIDE 600; 310; 30; 20 MG/100ML; MG/100ML; MG/100ML; MG/100ML
INJECTION, SOLUTION INTRAVENOUS CONTINUOUS
Status: DISCONTINUED | OUTPATIENT
Start: 2017-09-21 | End: 2017-09-22 | Stop reason: HOSPADM

## 2017-09-21 RX ORDER — PROPOFOL 10 MG/ML
INJECTION, EMULSION INTRAVENOUS PRN
Status: DISCONTINUED | OUTPATIENT
Start: 2017-09-21 | End: 2017-09-21 | Stop reason: SDUPTHER

## 2017-09-21 RX ORDER — MIDAZOLAM HYDROCHLORIDE 1 MG/ML
INJECTION INTRAMUSCULAR; INTRAVENOUS PRN
Status: DISCONTINUED | OUTPATIENT
Start: 2017-09-21 | End: 2017-09-21 | Stop reason: SDUPTHER

## 2017-09-21 RX ADMIN — PROPOFOL 440 MG: 10 INJECTION, EMULSION INTRAVENOUS at 12:51

## 2017-09-21 RX ADMIN — MIDAZOLAM HYDROCHLORIDE 2 MG: 1 INJECTION, SOLUTION INTRAMUSCULAR; INTRAVENOUS at 12:51

## 2017-09-21 RX ADMIN — SODIUM CHLORIDE, SODIUM LACTATE, POTASSIUM CHLORIDE, AND CALCIUM CHLORIDE: 600; 310; 30; 20 INJECTION, SOLUTION INTRAVENOUS at 11:28

## 2017-09-21 RX ADMIN — LIDOCAINE HYDROCHLORIDE 40 MG: 20 INJECTION, SOLUTION INFILTRATION; PERINEURAL at 12:51

## 2017-09-25 ENCOUNTER — OFFICE VISIT (OUTPATIENT)
Dept: GASTROENTEROLOGY | Age: 67
End: 2017-09-25
Payer: MEDICARE

## 2017-09-25 VITALS
HEART RATE: 68 BPM | DIASTOLIC BLOOD PRESSURE: 72 MMHG | WEIGHT: 224 LBS | BODY MASS INDEX: 28.75 KG/M2 | HEIGHT: 74 IN | SYSTOLIC BLOOD PRESSURE: 120 MMHG | OXYGEN SATURATION: 98 %

## 2017-09-25 DIAGNOSIS — K62.5 RECTAL BLEEDING: Primary | ICD-10-CM

## 2017-09-25 DIAGNOSIS — K64.8 INTERNAL HEMORRHOIDS: ICD-10-CM

## 2017-09-25 PROCEDURE — 4004F PT TOBACCO SCREEN RCVD TLK: CPT | Performed by: INTERNAL MEDICINE

## 2017-09-25 PROCEDURE — 46221 LIGATION OF HEMORRHOID(S): CPT | Performed by: INTERNAL MEDICINE

## 2017-10-03 DIAGNOSIS — M79.89 SWELLING OF BOTH LOWER EXTREMITIES: ICD-10-CM

## 2017-10-03 DIAGNOSIS — I83.813 VARICOSE VEINS OF BILATERAL LOWER EXTREMITIES WITH PAIN: Primary | ICD-10-CM

## 2017-10-03 DIAGNOSIS — I87.2 VENOUS INSUFFICIENCY OF BOTH LOWER EXTREMITIES: ICD-10-CM

## 2017-10-14 PROBLEM — R19.8 ALTERED BOWEL FUNCTION: Status: ACTIVE | Noted: 2017-08-29

## 2017-10-16 ENCOUNTER — OFFICE VISIT (OUTPATIENT)
Dept: GASTROENTEROLOGY | Age: 67
End: 2017-10-16
Payer: MEDICARE

## 2017-10-16 VITALS
HEART RATE: 59 BPM | OXYGEN SATURATION: 98 % | SYSTOLIC BLOOD PRESSURE: 110 MMHG | WEIGHT: 229.6 LBS | BODY MASS INDEX: 28.55 KG/M2 | DIASTOLIC BLOOD PRESSURE: 62 MMHG | HEIGHT: 75 IN

## 2017-10-16 DIAGNOSIS — K64.8 INTERNAL HEMORRHOIDS: Primary | ICD-10-CM

## 2017-10-16 PROCEDURE — 46221 LIGATION OF HEMORRHOID(S): CPT | Performed by: INTERNAL MEDICINE

## 2017-10-16 PROCEDURE — 4004F PT TOBACCO SCREEN RCVD TLK: CPT | Performed by: INTERNAL MEDICINE

## 2017-10-17 ENCOUNTER — HOSPITAL ENCOUNTER (OUTPATIENT)
Dept: VASCULAR LAB | Age: 67
Discharge: HOME | End: 2017-10-17
Payer: MEDICARE

## 2017-10-17 DIAGNOSIS — I83.813 VARICOSE VEINS OF BILATERAL LOWER EXTREMITIES WITH PAIN: ICD-10-CM

## 2017-10-17 DIAGNOSIS — I87.2 VENOUS INSUFFICIENCY OF BOTH LOWER EXTREMITIES: ICD-10-CM

## 2017-10-17 DIAGNOSIS — M79.89 SWELLING OF BOTH LOWER EXTREMITIES: ICD-10-CM

## 2017-10-17 PROCEDURE — 93970 EXTREMITY STUDY: CPT

## 2017-10-19 ENCOUNTER — ANESTHESIA EVENT (OUTPATIENT)
Dept: ENDOSCOPY | Age: 67
End: 2017-10-19
Payer: MEDICARE

## 2017-10-19 ENCOUNTER — ANESTHESIA (OUTPATIENT)
Dept: ENDOSCOPY | Age: 67
End: 2017-10-19
Payer: MEDICARE

## 2017-10-19 ENCOUNTER — APPOINTMENT (OUTPATIENT)
Dept: GENERAL RADIOLOGY | Age: 67
End: 2017-10-19
Attending: INTERNAL MEDICINE
Payer: MEDICARE

## 2017-10-19 ENCOUNTER — HOSPITAL ENCOUNTER (OUTPATIENT)
Age: 67
Setting detail: OUTPATIENT SURGERY
Discharge: HOME | End: 2017-10-19
Attending: INTERNAL MEDICINE | Admitting: INTERNAL MEDICINE
Payer: MEDICARE

## 2017-10-19 VITALS
SYSTOLIC BLOOD PRESSURE: 95 MMHG | HEIGHT: 75 IN | RESPIRATION RATE: 18 BRPM | OXYGEN SATURATION: 100 % | TEMPERATURE: 97 F | DIASTOLIC BLOOD PRESSURE: 61 MMHG | HEART RATE: 50 BPM

## 2017-10-19 VITALS
DIASTOLIC BLOOD PRESSURE: 66 MMHG | OXYGEN SATURATION: 99 % | RESPIRATION RATE: 19 BRPM | SYSTOLIC BLOOD PRESSURE: 97 MMHG

## 2017-10-19 PROCEDURE — 7100000010 HC PHASE II RECOVERY - FIRST 15 MIN: Performed by: INTERNAL MEDICINE

## 2017-10-19 PROCEDURE — 6360000002 HC RX W HCPCS: Performed by: NURSE ANESTHETIST, CERTIFIED REGISTERED

## 2017-10-19 PROCEDURE — 43239 EGD BIOPSY SINGLE/MULTIPLE: CPT | Performed by: INTERNAL MEDICINE

## 2017-10-19 PROCEDURE — 2500000003 HC RX 250 WO HCPCS: Performed by: NURSE ANESTHETIST, CERTIFIED REGISTERED

## 2017-10-19 PROCEDURE — 2580000003 HC RX 258: Performed by: INTERNAL MEDICINE

## 2017-10-19 PROCEDURE — 87077 CULTURE AEROBIC IDENTIFY: CPT

## 2017-10-19 PROCEDURE — 74220 X-RAY XM ESOPHAGUS 1CNTRST: CPT

## 2017-10-19 PROCEDURE — 7100000011 HC PHASE II RECOVERY - ADDTL 15 MIN: Performed by: INTERNAL MEDICINE

## 2017-10-19 PROCEDURE — 88305 TISSUE EXAM BY PATHOLOGIST: CPT

## 2017-10-19 PROCEDURE — 3609012400 HC EGD TRANSORAL BIOPSY SINGLE/MULTIPLE: Performed by: INTERNAL MEDICINE

## 2017-10-19 PROCEDURE — 2500000003 HC RX 250 WO HCPCS: Performed by: INTERNAL MEDICINE

## 2017-10-19 PROCEDURE — 3700000000 HC ANESTHESIA ATTENDED CARE: Performed by: INTERNAL MEDICINE

## 2017-10-19 RX ORDER — PROPOFOL 10 MG/ML
INJECTION, EMULSION INTRAVENOUS PRN
Status: DISCONTINUED | OUTPATIENT
Start: 2017-10-19 | End: 2017-10-19 | Stop reason: SDUPTHER

## 2017-10-19 RX ORDER — LIDOCAINE HYDROCHLORIDE 10 MG/ML
1 INJECTION, SOLUTION EPIDURAL; INFILTRATION; INTRACAUDAL; PERINEURAL ONCE
Status: COMPLETED | OUTPATIENT
Start: 2017-10-19 | End: 2017-10-19

## 2017-10-19 RX ORDER — SODIUM CHLORIDE, SODIUM LACTATE, POTASSIUM CHLORIDE, CALCIUM CHLORIDE 600; 310; 30; 20 MG/100ML; MG/100ML; MG/100ML; MG/100ML
INJECTION, SOLUTION INTRAVENOUS CONTINUOUS
Status: DISCONTINUED | OUTPATIENT
Start: 2017-10-19 | End: 2017-10-19 | Stop reason: HOSPADM

## 2017-10-19 RX ORDER — LIDOCAINE HYDROCHLORIDE 10 MG/ML
INJECTION, SOLUTION EPIDURAL; INFILTRATION; INTRACAUDAL; PERINEURAL PRN
Status: DISCONTINUED | OUTPATIENT
Start: 2017-10-19 | End: 2017-10-19 | Stop reason: SDUPTHER

## 2017-10-19 RX ADMIN — LIDOCAINE HYDROCHLORIDE 5 ML: 10 INJECTION, SOLUTION EPIDURAL; INFILTRATION; INTRACAUDAL; PERINEURAL at 10:05

## 2017-10-19 RX ADMIN — LIDOCAINE HYDROCHLORIDE 1 ML: 10 INJECTION, SOLUTION EPIDURAL; INFILTRATION; INTRACAUDAL; PERINEURAL at 09:14

## 2017-10-19 RX ADMIN — PROPOFOL 140 MG: 10 INJECTION, EMULSION INTRAVENOUS at 10:05

## 2017-10-19 RX ADMIN — SODIUM CHLORIDE, POTASSIUM CHLORIDE, SODIUM LACTATE AND CALCIUM CHLORIDE: 600; 310; 30; 20 INJECTION, SOLUTION INTRAVENOUS at 09:14

## 2017-10-19 NOTE — ANESTHESIA POSTPROCEDURE EVALUATION
Department of Anesthesiology  Postprocedure Note    Patient: Dakota Vail  MRN: [id number]  [place]: 1950  Date of evaluation: 10/19/2017  Time:  10:14 AM     Procedure Summary     Date:  10/19/17 Room / Location:  [facility] ENDO 11 / [facility]    Anesthesia Start:  1005 Anesthesia Stop:      Procedure:  EGD BIOPSY (N/A ) Diagnosis:  (WT LOSS, ANEMIA, GEN ABD PAIN, CHR GERD)    Surgeon:  Kirstin Gray DO Responsible Provider:  Ghulam Luevano CRNA    Anesthesia Type:  general, TIVA ASA Status:  3          Anesthesia Type: general, TIVA    Inderjit Phase I: Inderjit Score: 10    Inderjit Phase II:      Last vitals: Reviewed and per EMR flowsheets.        Anesthesia Post Evaluation    Patient location during evaluation: bedside  Patient participation: complete - patient participated  Level of consciousness: sleepy but conscious  Pain score: 0  Airway patency: patent  Nausea & Vomiting: no nausea and no vomiting  Complications: no  Cardiovascular status: hemodynamically stable and blood pressure returned to baseline  Respiratory status: acceptable and nasal cannula  Hydration status: stable

## 2017-10-19 NOTE — ANESTHESIA PRE PROCEDURE
Department of Anesthesiology  Preprocedure Note       Name:  Nicholas Mcclain   Age:  74 y.o.  :  1950                                          MRN:  [id number]         Date:  10/19/2017      Surgeon: Landon Parks):  Hal Shelton DO    Procedure: Procedure(s):  EGD    Medications prior to admission:   Prior to Admission medications    Medication Sig Start Date End Date Taking? Authorizing Provider   HYDROcodone-acetaminophen (NORCO) 5-325 MG per tablet Take 1 tablet by mouth every 6 hours as needed for Pain . Historical Provider, MD   diphenhydrAMINE (BENADRYL) 50 MG capsule Procedure scheduled for 17 at 9:30am. Benadryl 50mg one capsule by mouth 1 hour before procedure. 17   Rose Olivo PA-C   metoprolol succinate (TOPROL XL) 25 MG extended release tablet TAKE ONE TABLET BY MOUTH 2 TIMES A DAY 17   Chloe Burt MD   gabapentin (NEURONTIN) 300 MG capsule Take 300 mg by mouth Indications: 5 tab in the morining/ 5 tab at night    Historical Provider, MD   Esomeprazole Magnesium (NEXIUM PO) Take 40 mg by mouth daily  16   Historical Provider, MD   nitroGLYCERIN (NITROLINGUAL) 0.4 MG/SPRAY spray Place 1 spray under the tongue every 5 minutes as needed for Chest pain 12/8/15   MARGY Ogden   aspirin 325 MG tablet Take 325 mg by mouth daily    Historical Provider, MD   isosorbide mononitrate (IMDUR) 60 MG CR tablet Take 1 tablet by mouth daily 12/8/15   MARGY Ogden   ranolazine (RANEXA) 500 MG SR tablet Take 1 tablet by mouth 2 times daily 5/21/15   MARGY Ogden   Topiramate (TOPAMAX PO) Take 100 mg by mouth daily     Historical Provider, MD   beclomethasone (QVAR) 80 MCG/ACT inhaler Inhale 1 puff into the lungs 2 times daily. Historical Provider, MD   albuterol (PROAIR HFA) 108 (90 BASE) MCG/ACT inhaler Inhale 2 puffs into the lungs every 4 hours as needed.     Historical Provider, MD   tiotropium (SPIRIVA HANDIHALER) 18 MCG inhalation capsule Inhale 18 mcg into the Internal hemorrhoids K64.8       Past Medical History:        Diagnosis Date    Allergic rhinitis     Anemia     Asthma     CAD (coronary artery disease) 4/11/2014    Chest tightness, discomfort, or pressure 6/17/2013 6/17/2013  lexiscan Positive for inferior myocardial ischemia, EF 47%, 9% ischemic myocardium on stress, intermediate risk findings, AUC indication 16, AUC score 7     Chronic back pain     COPD (chronic obstructive pulmonary disease) ([facility] [address]     Ex-cigarette smoker 10/27/2015    Family history of early CAD 6/24/2013    Family history of premature CAD     GERD (gastroesophageal reflux disease)     History of hernia repair     Hyperlipidemia     [facility]    Hypertension     Multiple lung nodules     Neuropathy (HCC)     Nicotine abuse     Osteoarthritis     Peripheral vascular disease (HCC)     Restless legs syndrome     SOB (shortness of breath)     Thyroid nodule     Unspecified sleep apnea     can't wear cpap       Past Surgical History:        Procedure Laterality Date    APPENDECTOMY      CARDIAC CATHETERIZATION  6/24/2013  JDT    EF 50%    CARDIAC CATHETERIZATION  10/27/15  JDT    EF 50%    COLONOSCOPY  20 yrs ago    not sure who, thinks @ [facility]    COLONOSCOPY  09/2014    TAx2, HP, intern hemorrhoids (3 yr)   [facility]      with mesh-Dr Jessika Martínez    [place] COLSC FLX W/REMOVAL LESION BY HOT BX FORCEPS  9/21/2017    Dr Shelton-internal hemorrhoids, tubular AP (-) dysplasia--5 yr recall    THYROIDECTOMY      UPPER GASTROINTESTINAL ENDOSCOPY      thinks so, but a long time ago if so    UPPER GASTROINTESTINAL ENDOSCOPY  02/11/2013    Cat: Grade A esophagitis and esophageal stricture. Dilated 54fr    VASCULAR SURGERY  07/11/2017    SJS. Right IJV US 9f sheath.     VASCULAR SURGERY Left 08/17/2017    SJS-L GSV RFA       Social History:    Social History   Substance Use Topics    Smoking status: Current Every

## 2017-10-19 NOTE — H&P
Patient Name: Tiffany Hernandez  : 1950  MRN: [id number]    Allergies: Allergies   Allergen Reactions    Bactrim [Sulfamethoxazole-Trimethoprim] Nausea And Vomiting    Nsaids Anaphylaxis    Succinylcholine Other (See Comments)     Paralyze waist down     Betadine [Povidone Iodine] Hives         ENDOSCOPY / COLONOSCOPY / BRONCHOSCOPY      PRE-SEDATION ASSESSMENT      Procedure:    [] Colonoscopy     [x] Endoscopy      [] ERCP      [] Bronchoscopy      [] Other  [] History and Physical completed in chart for Inpatient or within 30 daysfrom office. I have examined the patient's status immediately prior to the procedure and:    [x] No interval change in patient status since H&P completed  [] Interval change in patient status (explained below)           BRIEF H&P    HPI/changes/indicators/diagnosis  Active Hospital Problems    Diagnosis Date Noted    Excessive gas [R14.3] 2017    Generalized abdominal pain [R10.84] 2017    Weight loss [R63.4] 2013       Medications:   Prior to Admission medications    Medication Sig Start Date End Date Taking? Authorizing Provider   beclomethasone (QVAR) 80 MCG/ACT inhaler Inhale 1 puff into the lungs 2 times daily    Yes Historical Provider, MD   albuterol (PROAIR HFA) 108 (90 BASE) MCG/ACT inhaler Inhale 2 puffs into the lungs every 4 hours as needed. Yes Historical Provider, MD   fluticasone-salmeterol (ADVAIR DISKUS) 500-50 MCG/DOSE diskus inhaler Inhale 1 puff into the lungs every 12 hours    Yes Historical Provider, MD   HYDROcodone-acetaminophen (NORCO) 5-325 MG per tablet Take 1 tablet by mouth every 6 hours as needed for Pain .     Historical Provider, MD   metoprolol succinate (TOPROL XL) 25 MG extended release tablet TAKE ONE TABLET BY MOUTH 2 TIMES A DAY 17   Neetu Garcia MD   gabapentin (NEURONTIN) 300 MG capsule Take 300 mg by mouth Indications: 5 tab in the morining/ 5 tab at night    Historical Provider, MD   Esomeprazole Magnesium (NEXIUM PO) Take 40 mg by mouth daily  9/29/16   Historical Provider, MD   nitroGLYCERIN (NITROLINGUAL) 0.4 MG/SPRAY spray Place 1 spray under the tongue every 5 minutes as needed for Chest pain 12/8/15   MARGY Sena   aspirin 325 MG tablet Take 325 mg by mouth daily    Historical Provider, MD   isosorbide mononitrate (IMDUR) 60 MG CR tablet Take 1 tablet by mouth daily 12/8/15   MARGY Sena   ranolazine (RANEXA) 500 MG SR tablet Take 1 tablet by mouth 2 times daily 5/21/15   MARGY Sena   Topiramate (TOPAMAX PO) Take 100 mg by mouth daily     Historical Provider, MD   tiotropium (SPIRIVA HANDIHALER) 18 MCG inhalation capsule Inhale 18 mcg into the lungs daily. Historical Provider, MD       Allergies:   is allergic to bactrim [sulfamethoxazole-trimethoprim]; nsaids; succinylcholine; and betadine [povidone iodine]. Vital Signs:   Vitals:    10/19/17 0903   BP: (!) 148/50   Pulse: 57   Resp: 18   Temp: 97.4 °F (36.3 °C)   SpO2: 97%       ROS:  Cardiac:  [x]WNL  []Comments:  Pulmonary:  [x]WNL   []Comments:  Neuro/Mental Status:  [x]WNL  []Comments:  Abdominal:                   Active Hospital Problems    Diagnosis Date Noted    Excessive gas [R14.3] 08/29/2017    Generalized abdominal pain [R10.84] 08/29/2017    Weight loss [R63.4] 01/25/2013        All other pertinent GI symptoms negative. Physical Exam:  Cardiac:  [x]WNL  []Comments:  Pulmonary:  [x]WNL   []Comments:  Neuro/Mental Status:  [x]WNL  []Comments:  Abdominal:  [x]WNL    []Comments:  Other:   []WNL  []Comments:    Informed Consent:  The risks and benefits of the procedure have been discussed with either the patient or if they cannot consent, their representative. Assessment:  Patient examined and appropriate for planned sedation and procedure. Plan:  Proceed with planned sedation and procedure as above.     Mason Shelton DO  10:06 AM

## 2017-10-20 LAB — CLOTEST: NEGATIVE

## 2017-10-23 ENCOUNTER — TELEPHONE (OUTPATIENT)
Dept: VASCULAR SURGERY | Age: 67
End: 2017-10-23

## 2017-10-24 DIAGNOSIS — R13.10 DYSPHAGIA, UNSPECIFIED TYPE: Primary | ICD-10-CM

## 2017-10-30 ENCOUNTER — OFFICE VISIT (OUTPATIENT)
Dept: GASTROENTEROLOGY | Age: 67
End: 2017-10-30
Payer: MEDICARE

## 2017-10-30 VITALS
HEIGHT: 74 IN | BODY MASS INDEX: 28.95 KG/M2 | SYSTOLIC BLOOD PRESSURE: 120 MMHG | OXYGEN SATURATION: 96 % | WEIGHT: 225.6 LBS | DIASTOLIC BLOOD PRESSURE: 72 MMHG | HEART RATE: 57 BPM

## 2017-10-30 DIAGNOSIS — K62.89 RECTAL PAIN: Primary | ICD-10-CM

## 2017-10-30 DIAGNOSIS — K64.8 INTERNAL HEMORRHOIDS: ICD-10-CM

## 2017-10-30 PROCEDURE — 4040F PNEUMOC VAC/ADMIN/RCVD: CPT | Performed by: INTERNAL MEDICINE

## 2017-10-30 PROCEDURE — G8598 ASA/ANTIPLAT THER USED: HCPCS | Performed by: INTERNAL MEDICINE

## 2017-10-30 PROCEDURE — 4004F PT TOBACCO SCREEN RCVD TLK: CPT | Performed by: INTERNAL MEDICINE

## 2017-10-30 PROCEDURE — 99212 OFFICE O/P EST SF 10 MIN: CPT | Performed by: INTERNAL MEDICINE

## 2017-10-30 PROCEDURE — G8484 FLU IMMUNIZE NO ADMIN: HCPCS | Performed by: INTERNAL MEDICINE

## 2017-10-30 PROCEDURE — G8417 CALC BMI ABV UP PARAM F/U: HCPCS | Performed by: INTERNAL MEDICINE

## 2017-10-30 PROCEDURE — G8427 DOCREV CUR MEDS BY ELIG CLIN: HCPCS | Performed by: INTERNAL MEDICINE

## 2017-10-30 PROCEDURE — 1123F ACP DISCUSS/DSCN MKR DOCD: CPT | Performed by: INTERNAL MEDICINE

## 2017-10-30 PROCEDURE — 3017F COLORECTAL CA SCREEN DOC REV: CPT | Performed by: INTERNAL MEDICINE

## 2017-10-30 NOTE — PROGRESS NOTES
Santiago Fregoso is a 74 y.o. male. HPI:  Patient with rectal bleeding. +Colonoscopy with evidence of internal hemorrhoids. male comes in today for f/u from hemorrhoidal banding. He had discomfort after his last banding. Resolved in 24 hours. Now feels better. OBJECTIVE:  /72   Pulse 57   Ht 6' 2\" (1.88 m)   Wt 225 lb 9.6 oz (102.3 kg)   SpO2 96%   BMI 28.97 kg/m²     PHYSICAL EXAM  Genitourinary:  Deferred. ASSESSMENT:  History of internal hemorrhoids    PLAN:  He is feeling better. No plans for banding today. F/u and repeat as needed. Discussion:  F/U as needed for repeat exam and banding.

## 2017-12-29 ENCOUNTER — IP HISTORICAL/CONVERTED ENCOUNTER (OUTPATIENT)
Dept: OTHER | Age: 67
End: 2017-12-29

## 2018-01-23 ENCOUNTER — HOSPITAL ENCOUNTER (EMERGENCY)
Age: 68
Discharge: HOME | End: 2018-01-23
Attending: EMERGENCY MEDICINE
Payer: MEDICARE

## 2018-01-23 ENCOUNTER — APPOINTMENT (OUTPATIENT)
Dept: GENERAL RADIOLOGY | Age: 68
End: 2018-01-23
Payer: MEDICARE

## 2018-01-23 VITALS
DIASTOLIC BLOOD PRESSURE: 70 MMHG | HEIGHT: 74 IN | OXYGEN SATURATION: 98 % | SYSTOLIC BLOOD PRESSURE: 110 MMHG | TEMPERATURE: 98.7 F | WEIGHT: 220 LBS | BODY MASS INDEX: 28.23 KG/M2 | RESPIRATION RATE: 18 BRPM | HEART RATE: 60 BPM

## 2018-01-23 DIAGNOSIS — M62.838 MUSCLE SPASM OF RIGHT LOWER EXTREMITY: Primary | ICD-10-CM

## 2018-01-23 DIAGNOSIS — M25.851 FEMOROACETABULAR IMPINGEMENT OF RIGHT HIP: ICD-10-CM

## 2018-01-23 LAB
ALBUMIN SERPL-MCNC: 3.9 G/DL (ref 3.5–5.2)
ALP BLD-CCNC: 63 U/L (ref 40–130)
ALT SERPL-CCNC: 12 U/L (ref 5–41)
ANION GAP SERPL CALCULATED.3IONS-SCNC: 11 MMOL/L (ref 7–19)
AST SERPL-CCNC: 18 U/L (ref 5–40)
BASOPHILS ABSOLUTE: 0 K/UL (ref 0–0.2)
BASOPHILS RELATIVE PERCENT: 0.6 % (ref 0–1)
BILIRUB SERPL-MCNC: <0.2 MG/DL (ref 0.2–1.2)
BUN BLDV-MCNC: 13 MG/DL (ref 8–23)
CALCIUM SERPL-MCNC: 8.7 MG/DL (ref 8.8–10.2)
CHLORIDE BLD-SCNC: 103 MMOL/L (ref 98–111)
CO2: 25 MMOL/L (ref 22–29)
CREAT SERPL-MCNC: 0.9 MG/DL (ref 0.5–1.2)
EOSINOPHILS ABSOLUTE: 0.3 K/UL (ref 0–0.6)
EOSINOPHILS RELATIVE PERCENT: 4.6 % (ref 0–5)
GFR NON-AFRICAN AMERICAN: >60
GLUCOSE BLD-MCNC: 95 MG/DL (ref 74–109)
HCT VFR BLD CALC: 41.3 % (ref 42–52)
HEMOGLOBIN: 13.9 G/DL (ref 14–18)
INR BLD: 0.92 (ref 0.88–1.18)
LYMPHOCYTES ABSOLUTE: 1.7 K/UL (ref 1.1–4.5)
LYMPHOCYTES RELATIVE PERCENT: 24.6 % (ref 20–40)
MCH RBC QN AUTO: 31.9 PG (ref 27–31)
MCHC RBC AUTO-ENTMCNC: 33.7 G/DL (ref 33–37)
MCV RBC AUTO: 94.7 FL (ref 80–94)
MONOCYTES ABSOLUTE: 0.7 K/UL (ref 0–0.9)
MONOCYTES RELATIVE PERCENT: 10 % (ref 0–10)
NEUTROPHILS ABSOLUTE: 4.2 K/UL (ref 1.5–7.5)
NEUTROPHILS RELATIVE PERCENT: 59.9 % (ref 50–65)
PDW BLD-RTO: 13.1 % (ref 11.5–14.5)
PLATELET # BLD: 191 K/UL (ref 130–400)
PMV BLD AUTO: 9.9 FL (ref 9.4–12.4)
POTASSIUM SERPL-SCNC: 4.2 MMOL/L (ref 3.5–5)
PROTHROMBIN TIME: 12.3 SEC (ref 12–14.6)
RBC # BLD: 4.36 M/UL (ref 4.7–6.1)
SODIUM BLD-SCNC: 139 MMOL/L (ref 136–145)
TOTAL CK: 237 U/L (ref 39–308)
TOTAL PROTEIN: 6.2 G/DL (ref 6.6–8.7)
WBC # BLD: 7 K/UL (ref 4.8–10.8)

## 2018-01-23 PROCEDURE — 6360000002 HC RX W HCPCS: Performed by: EMERGENCY MEDICINE

## 2018-01-23 PROCEDURE — 73552 X-RAY EXAM OF FEMUR 2/>: CPT

## 2018-01-23 PROCEDURE — 85610 PROTHROMBIN TIME: CPT

## 2018-01-23 PROCEDURE — 82550 ASSAY OF CK (CPK): CPT

## 2018-01-23 PROCEDURE — 85025 COMPLETE CBC W/AUTO DIFF WBC: CPT

## 2018-01-23 PROCEDURE — 6370000000 HC RX 637 (ALT 250 FOR IP): Performed by: EMERGENCY MEDICINE

## 2018-01-23 PROCEDURE — 99284 EMERGENCY DEPT VISIT MOD MDM: CPT

## 2018-01-23 PROCEDURE — 80053 COMPREHEN METABOLIC PANEL: CPT

## 2018-01-23 PROCEDURE — 93971 EXTREMITY STUDY: CPT

## 2018-01-23 PROCEDURE — 99283 EMERGENCY DEPT VISIT LOW MDM: CPT | Performed by: EMERGENCY MEDICINE

## 2018-01-23 PROCEDURE — 96374 THER/PROPH/DIAG INJ IV PUSH: CPT

## 2018-01-23 PROCEDURE — 96375 TX/PRO/DX INJ NEW DRUG ADDON: CPT

## 2018-01-23 PROCEDURE — 72170 X-RAY EXAM OF PELVIS: CPT

## 2018-01-23 PROCEDURE — 36415 COLL VENOUS BLD VENIPUNCTURE: CPT

## 2018-01-23 RX ORDER — LIDOCAINE 50 MG/G
1 PATCH TOPICAL DAILY
Status: DISCONTINUED | OUTPATIENT
Start: 2018-01-23 | End: 2018-01-23 | Stop reason: HOSPADM

## 2018-01-23 RX ORDER — DIAZEPAM 5 MG/1
5 TABLET ORAL EVERY 8 HOURS PRN
Qty: 10 TABLET | Refills: 0 | Status: SHIPPED | OUTPATIENT
Start: 2018-01-23 | End: 2018-02-02

## 2018-01-23 RX ORDER — HYDROCODONE BITARTRATE AND ACETAMINOPHEN 5; 325 MG/1; MG/1
1 TABLET ORAL EVERY 6 HOURS PRN
Qty: 12 TABLET | Refills: 0 | Status: SHIPPED | OUTPATIENT
Start: 2018-01-23 | End: 2018-01-26

## 2018-01-23 RX ORDER — MORPHINE SULFATE 4 MG/ML
4 INJECTION, SOLUTION INTRAMUSCULAR; INTRAVENOUS ONCE
Status: COMPLETED | OUTPATIENT
Start: 2018-01-23 | End: 2018-01-23

## 2018-01-23 RX ORDER — LIDOCAINE 50 MG/G
1 PATCH TOPICAL DAILY
Qty: 30 PATCH | Refills: 0 | Status: ON HOLD | OUTPATIENT
Start: 2018-01-23 | End: 2022-03-04 | Stop reason: HOSPADM

## 2018-01-23 RX ORDER — ONDANSETRON 2 MG/ML
4 INJECTION INTRAMUSCULAR; INTRAVENOUS ONCE
Status: COMPLETED | OUTPATIENT
Start: 2018-01-23 | End: 2018-01-23

## 2018-01-23 RX ORDER — DIAZEPAM 5 MG/1
5 TABLET ORAL ONCE
Status: COMPLETED | OUTPATIENT
Start: 2018-01-23 | End: 2018-01-23

## 2018-01-23 RX ADMIN — ONDANSETRON 4 MG: 2 INJECTION INTRAMUSCULAR; INTRAVENOUS at 17:44

## 2018-01-23 RX ADMIN — DIAZEPAM 5 MG: 5 TABLET ORAL at 18:41

## 2018-01-23 RX ADMIN — Medication 4 MG: at 17:44

## 2018-01-23 ASSESSMENT — PAIN SCALES - GENERAL
PAINLEVEL_OUTOF10: 10
PAINLEVEL_OUTOF10: 5
PAINLEVEL_OUTOF10: 10
PAINLEVEL_OUTOF10: 3

## 2018-01-23 ASSESSMENT — PAIN DESCRIPTION - PAIN TYPE: TYPE: ACUTE PAIN

## 2018-01-25 ASSESSMENT — ENCOUNTER SYMPTOMS
ABDOMINAL PAIN: 0
SHORTNESS OF BREATH: 0
VOMITING: 0
BACK PAIN: 0

## 2018-01-25 NOTE — ED PROVIDER NOTES
CAD (coronary artery disease) 4/11/2014    Chest tightness, discomfort, or pressure 6/17/2013 6/17/2013  lexiscan Positive for inferior myocardial ischemia, EF 47%, 9% ischemic myocardium on stress, intermediate risk findings, AUC indication 16, AUC score 7     Chronic back pain     COPD (chronic obstructive pulmonary disease) ([facility] [address]     Ex-cigarette smoker 10/27/2015    Family history of early CAD 6/24/2013    Family history of premature CAD     GERD (gastroesophageal reflux disease)     History of hernia repair     [facility]    Hypertension     Multiple lung nodules     Neuropathy (HCC)     Nicotine abuse     Osteoarthritis     Peripheral vascular disease (HCC)     Restless legs syndrome     SOB (shortness of breath)     Thyroid nodule     Unspecified sleep apnea     can't wear cpap         SURGICAL HISTORY       Past Surgical History:   Procedure Laterality Date    APPENDECTOMY      CARDIAC CATHETERIZATION  6/24/2013  JDT    EF 50%    CARDIAC CATHETERIZATION  10/27/15  JDT    EF 50%    COLONOSCOPY  20 yrs ago    not sure who, thinks @ [facility]    COLONOSCOPY  09/2014    TAx2, HP, intern hemorrhoids (3 yr)   [place]      with mesh-Dr Teri Gaston    [place] COLSC FLX W/REMOVAL LESION BY HOT BX FORCEPS  9/21/2017    Dr Shelton-internal hemorrhoids, tubular AP (-) dysplasia--5 yr recall    MA EGD TRANSORAL BIOPSY SINGLE/MULTIPLE N/A 10/19/2017    Dr Shelton-normal-Esme (-) chronic nonspecific inflammation    THYROIDECTOMY      UPPER GASTROINTESTINAL ENDOSCOPY      thinks so, but a long time ago if so    UPPER GASTROINTESTINAL ENDOSCOPY  02/11/2013    Cat: Grade A esophagitis and esophageal stricture. Dilated 54fr    VASCULAR SURGERY  07/11/2017    SJS. Right IJV US 9f sheath.     VASCULAR SURGERY Left 08/17/2017    SJS-L GSV RFA         CURRENT MEDICATIONS       Discharge Medication List as of 1/23/2018  7:48 PM dry. He is not diaphoretic. Psychiatric: He has a normal mood and affect. His behavior is normal.   Nursing note and vitals reviewed. DIAGNOSTIC RESULTS     EKG: All EKG's are interpreted by the Emergency Department Physician who either signs or Co-signs this chart in the absence of a cardiologist.        RADIOLOGY:   Non-plain film images such as CT, Ultrasound and MRI are read by the radiologist. Plain radiographic images are visualized and preliminarily interpreted by the emergency physician with the below findings:        Interpretation per the Radiologist below, if available at the time of this note:    VL Extremity Venous Right   Final Result      XR FEMUR RIGHT (MIN 2 VIEWS)   Final Result   1.. Osteoarthrosis of the right hip. 2. No evidence of fracture. Signed by Dr Mega Caro on 1/23/2018 6:56 PM      XR PELVIS (1-2 VIEWS)   Final Result   . 1. Morphology of the hips which has been described as associated with   femoral acetabular impingement. 2. No evidence of acute fracture. Signed by Dr Mega Caro on 1/23/2018 6:55 PM            ED BEDSIDE ULTRASOUND:   Performed by ED Physician - none    LABS:  Labs Reviewed   CBC WITH AUTO DIFFERENTIAL - Abnormal; Notable for the following:        Result Value    RBC 4.36 (*)     Hemoglobin 13.9 (*)     Hematocrit 41.3 (*)     MCV 94.7 (*)     MCH 31.9 (*)     All other components within normal limits   COMPREHENSIVE METABOLIC PANEL - Abnormal; Notable for the following:     Calcium 8.7 (*)     Total Protein 6.2 (*)     All other components within normal limits   PROTIME-INR   CK   URINALYSIS       All other labs were within normal range or not returned as of this dictation.     EMERGENCY DEPARTMENT COURSE and DIFFERENTIAL DIAGNOSIS/MDM:   Vitals:    Vitals:    01/23/18 1716 01/23/18 1802 01/23/18 1831 01/23/18 1956   BP: 130/86 107/67 (!) 102/57 110/70   Pulse:   50 60   Resp:   16 18   Temp:    98.7 °F (37.1 °C)   SpO2: 95% 96% 97% 98% Weight:       Height:           MDM  Number of Diagnoses or Management Options  Femoroacetabular impingement of right hip:   Muscle spasm of right lower extremity:   Diagnosis management comments: Ultrasound ruled out DVT this is not ischemic foot either clinically. The patient has muscle spasms. He got the most relief from placing lidocaine patch on his anterior thigh. I don't what he did to it but I think he partially tore something. And he has some active spasming. Discharge home on pain medication muscle relaxants. Anti-prematures. Follow up outpatient with or so as on the x-ray this possible impingement syndrome as well. No fracture seen on x-rays. Abdomen soft nontender. Patient comfortable a plan so his wife. Reassurance given no DVT. Amount and/or Complexity of Data Reviewed  Clinical lab tests: ordered and reviewed  Tests in the radiology section of CPT®: reviewed and ordered    Patient Progress  Patient progress: improved        CONSULTS:  None    PROCEDURES:  Unless otherwise noted below, none     Procedures    FINAL IMPRESSION      1. Muscle spasm of right lower extremity    2. Femoroacetabular impingement of right hip          DISPOSITION/PLAN   DISPOSITION Decision To Discharge 01/23/2018 07:43:08 PM      PATIENT REFERRED TO:  Daniel Olsen MD  [address] [address]  [phone]    Schedule an appointment as soon as possible for a visit in 1 week        DISCHARGE MEDICATIONS:  Discharge Medication List as of 1/23/2018  7:48 PM      START taking these medications    Details   lidocaine (LIDODERM) 5 % Place 1 patch onto the skin daily 12 hours on, 12 hours off., Disp-30 patch, R-0Print      diazepam (VALIUM) 5 MG tablet Take 1 tablet by mouth every 8 hours as needed for Anxiety for up to 10 days. , Disp-10 tablet, R-0Print           Attestation: The Prescription Monitoring Report for this patient was reviewed today.  ([id number]) Quoc Cantrell MD)  Documentation: Possible medication side effects, risk of tolerance and/or dependence, and alternative treatments discussed., No signs of potential drug abuse or diversion identified.  Jaqueline Ordoñez MD)    (Please note that portions of this note were completed with a voice recognition program.  Efforts were made to edit the dictations but occasionally words are mis-transcribed.)    Jaqueline Ordoñez MD (electronically signed)  Attending Emergency Physician         Jaqueline Ordoñez MD  01/25/18 1922

## 2018-05-29 ENCOUNTER — TELEPHONE (OUTPATIENT)
Dept: CARDIOLOGY | Age: 68
End: 2018-05-29

## 2018-05-29 ENCOUNTER — APPOINTMENT (OUTPATIENT)
Dept: GENERAL RADIOLOGY | Age: 68
End: 2018-05-29
Payer: MEDICARE

## 2018-05-29 ENCOUNTER — HOSPITAL ENCOUNTER (OUTPATIENT)
Age: 68
Setting detail: OBSERVATION
Discharge: HOME | End: 2018-05-30
Attending: EMERGENCY MEDICINE | Admitting: INTERNAL MEDICINE
Payer: MEDICARE

## 2018-05-29 DIAGNOSIS — R07.9 CHEST PAIN, UNSPECIFIED TYPE: Primary | ICD-10-CM

## 2018-05-29 LAB
ANION GAP SERPL CALCULATED.3IONS-SCNC: 10 MMOL/L (ref 7–19)
BUN BLDV-MCNC: 20 MG/DL (ref 8–23)
CALCIUM SERPL-MCNC: 8.7 MG/DL (ref 8.8–10.2)
CHLORIDE BLD-SCNC: 103 MMOL/L (ref 98–111)
CO2: 24 MMOL/L (ref 22–29)
CREAT SERPL-MCNC: 1.1 MG/DL (ref 0.5–1.2)
GFR NON-AFRICAN AMERICAN: >60
GLUCOSE BLD-MCNC: 96 MG/DL (ref 74–109)
HCT VFR BLD CALC: 39.6 % (ref 42–52)
HEMOGLOBIN: 13.3 G/DL (ref 14–18)
INR BLD: 0.96 (ref 0.88–1.18)
MCH RBC QN AUTO: 31.9 PG (ref 27–31)
MCHC RBC AUTO-ENTMCNC: 33.6 G/DL (ref 33–37)
MCV RBC AUTO: 95 FL (ref 80–94)
PDW BLD-RTO: 13.7 % (ref 11.5–14.5)
PERFORMED ON: NORMAL
PERFORMED ON: NORMAL
PLATELET # BLD: 204 K/UL (ref 130–400)
PMV BLD AUTO: 10 FL (ref 9.4–12.4)
POC TROPONIN I: 0 NG/ML (ref 0–0.08)
POC TROPONIN I: 0.01 NG/ML (ref 0–0.08)
POTASSIUM SERPL-SCNC: 4.6 MMOL/L (ref 3.5–5)
PROTHROMBIN TIME: 12.7 SEC (ref 12–14.6)
RBC # BLD: 4.17 M/UL (ref 4.7–6.1)
SODIUM BLD-SCNC: 137 MMOL/L (ref 136–145)
WBC # BLD: 7.3 K/UL (ref 4.8–10.8)

## 2018-05-29 PROCEDURE — 80048 BASIC METABOLIC PNL TOTAL CA: CPT

## 2018-05-29 PROCEDURE — 93005 ELECTROCARDIOGRAM TRACING: CPT

## 2018-05-29 PROCEDURE — 99285 EMERGENCY DEPT VISIT HI MDM: CPT | Performed by: EMERGENCY MEDICINE

## 2018-05-29 PROCEDURE — 85610 PROTHROMBIN TIME: CPT

## 2018-05-29 PROCEDURE — 84484 ASSAY OF TROPONIN QUANT: CPT

## 2018-05-29 PROCEDURE — 71046 X-RAY EXAM CHEST 2 VIEWS: CPT

## 2018-05-29 PROCEDURE — 36415 COLL VENOUS BLD VENIPUNCTURE: CPT

## 2018-05-29 PROCEDURE — 99220 PR INITIAL OBSERVATION CARE/DAY 70 MINUTES: CPT | Performed by: INTERNAL MEDICINE

## 2018-05-29 PROCEDURE — 2580000003 HC RX 258: Performed by: NURSE PRACTITIONER

## 2018-05-29 PROCEDURE — 85027 COMPLETE CBC AUTOMATED: CPT

## 2018-05-29 PROCEDURE — G0378 HOSPITAL OBSERVATION PER HR: HCPCS

## 2018-05-29 PROCEDURE — 99285 EMERGENCY DEPT VISIT HI MDM: CPT

## 2018-05-29 RX ORDER — ASPIRIN 325 MG
325 TABLET ORAL ONCE
Status: DISCONTINUED | OUTPATIENT
Start: 2018-05-29 | End: 2018-05-30 | Stop reason: HOSPADM

## 2018-05-29 RX ORDER — SODIUM CHLORIDE 9 MG/ML
INJECTION, SOLUTION INTRAVENOUS CONTINUOUS
Status: DISCONTINUED | OUTPATIENT
Start: 2018-05-29 | End: 2018-05-30 | Stop reason: HOSPADM

## 2018-05-29 RX ORDER — SODIUM CHLORIDE 0.9 % (FLUSH) 0.9 %
10 SYRINGE (ML) INJECTION EVERY 12 HOURS SCHEDULED
Status: DISCONTINUED | OUTPATIENT
Start: 2018-05-29 | End: 2018-05-30 | Stop reason: HOSPADM

## 2018-05-29 RX ORDER — SODIUM CHLORIDE 0.9 % (FLUSH) 0.9 %
10 SYRINGE (ML) INJECTION PRN
Status: DISCONTINUED | OUTPATIENT
Start: 2018-05-29 | End: 2018-05-30 | Stop reason: HOSPADM

## 2018-05-29 RX ADMIN — SODIUM CHLORIDE: 9 INJECTION, SOLUTION INTRAVENOUS at 19:14

## 2018-05-29 ASSESSMENT — PAIN SCALES - GENERAL
PAINLEVEL_OUTOF10: 0
PAINLEVEL_OUTOF10: 5
PAINLEVEL_OUTOF10: 0

## 2018-05-29 ASSESSMENT — ENCOUNTER SYMPTOMS
ABDOMINAL PAIN: 0
VOMITING: 0
SHORTNESS OF BREATH: 1
EYE PAIN: 0
DIARRHEA: 0

## 2018-05-30 VITALS
HEART RATE: 53 BPM | WEIGHT: 221.2 LBS | BODY MASS INDEX: 27.5 KG/M2 | HEIGHT: 75 IN | DIASTOLIC BLOOD PRESSURE: 62 MMHG | SYSTOLIC BLOOD PRESSURE: 105 MMHG | RESPIRATION RATE: 16 BRPM | TEMPERATURE: 97.9 F | OXYGEN SATURATION: 96 %

## 2018-05-30 LAB
EKG P AXIS: 43 DEGREES
EKG P AXIS: 64 DEGREES
EKG P-R INTERVAL: 144 MS
EKG P-R INTERVAL: 148 MS
EKG Q-T INTERVAL: 448 MS
EKG Q-T INTERVAL: 488 MS
EKG QRS DURATION: 118 MS
EKG QRS DURATION: 122 MS
EKG QTC CALCULATION (BAZETT): 434 MS
EKG QTC CALCULATION (BAZETT): 474 MS
EKG T AXIS: -6 DEGREES
EKG T AXIS: 10 DEGREES
TROPONIN: <0.01 NG/ML (ref 0–0.03)
TROPONIN: <0.01 NG/ML (ref 0–0.03)

## 2018-05-30 PROCEDURE — 93458 L HRT ARTERY/VENTRICLE ANGIO: CPT | Performed by: INTERNAL MEDICINE

## 2018-05-30 PROCEDURE — 93005 ELECTROCARDIOGRAM TRACING: CPT

## 2018-05-30 PROCEDURE — G0378 HOSPITAL OBSERVATION PER HR: HCPCS

## 2018-05-30 PROCEDURE — 36415 COLL VENOUS BLD VENIPUNCTURE: CPT

## 2018-05-30 PROCEDURE — 2709999900 HC NON-CHARGEABLE SUPPLY

## 2018-05-30 PROCEDURE — 6370000000 HC RX 637 (ALT 250 FOR IP): Performed by: NURSE PRACTITIONER

## 2018-05-30 PROCEDURE — 2720000001 HC MISC SURG SUPPLY STERILE $51-500

## 2018-05-30 PROCEDURE — 84484 ASSAY OF TROPONIN QUANT: CPT

## 2018-05-30 PROCEDURE — 6360000002 HC RX W HCPCS

## 2018-05-30 PROCEDURE — 99024 POSTOP FOLLOW-UP VISIT: CPT | Performed by: INTERNAL MEDICINE

## 2018-05-30 PROCEDURE — C1760 CLOSURE DEV, VASC: HCPCS

## 2018-05-30 PROCEDURE — C1894 INTRO/SHEATH, NON-LASER: HCPCS

## 2018-05-30 RX ORDER — SODIUM CHLORIDE 0.9 % (FLUSH) 0.9 %
10 SYRINGE (ML) INJECTION EVERY 12 HOURS SCHEDULED
Status: DISCONTINUED | OUTPATIENT
Start: 2018-05-30 | End: 2018-05-30 | Stop reason: HOSPADM

## 2018-05-30 RX ORDER — GABAPENTIN 300 MG/1
900 CAPSULE ORAL 2 TIMES DAILY
Status: DISCONTINUED | OUTPATIENT
Start: 2018-05-30 | End: 2018-05-30 | Stop reason: HOSPADM

## 2018-05-30 RX ORDER — ALBUTEROL SULFATE 90 UG/1
2 AEROSOL, METERED RESPIRATORY (INHALATION) EVERY 4 HOURS PRN
Status: DISCONTINUED | OUTPATIENT
Start: 2018-05-30 | End: 2018-05-30 | Stop reason: HOSPADM

## 2018-05-30 RX ORDER — RANOLAZINE 500 MG/1
500 TABLET, EXTENDED RELEASE ORAL 2 TIMES DAILY
Status: DISCONTINUED | OUTPATIENT
Start: 2018-05-30 | End: 2018-05-30 | Stop reason: HOSPADM

## 2018-05-30 RX ORDER — PANTOPRAZOLE SODIUM 40 MG/1
40 TABLET, DELAYED RELEASE ORAL DAILY
Status: DISCONTINUED | OUTPATIENT
Start: 2018-05-30 | End: 2018-05-30 | Stop reason: HOSPADM

## 2018-05-30 RX ORDER — GABAPENTIN 300 MG/1
600 CAPSULE ORAL 3 TIMES DAILY
Status: DISCONTINUED | OUTPATIENT
Start: 2018-05-30 | End: 2018-05-30

## 2018-05-30 RX ORDER — ISOSORBIDE MONONITRATE 60 MG/1
60 TABLET, EXTENDED RELEASE ORAL DAILY
Status: DISCONTINUED | OUTPATIENT
Start: 2018-05-30 | End: 2018-05-30 | Stop reason: HOSPADM

## 2018-05-30 RX ORDER — LIDOCAINE 50 MG/G
1 PATCH TOPICAL DAILY
Status: DISCONTINUED | OUTPATIENT
Start: 2018-05-30 | End: 2018-05-30 | Stop reason: HOSPADM

## 2018-05-30 RX ORDER — NITROGLYCERIN 0.4 MG/1
0.4 TABLET SUBLINGUAL EVERY 5 MIN PRN
Status: DISCONTINUED | OUTPATIENT
Start: 2018-05-30 | End: 2018-05-30 | Stop reason: HOSPADM

## 2018-05-30 RX ORDER — METOPROLOL SUCCINATE 25 MG/1
25 TABLET, EXTENDED RELEASE ORAL 2 TIMES DAILY
Status: DISCONTINUED | OUTPATIENT
Start: 2018-05-30 | End: 2018-05-30 | Stop reason: HOSPADM

## 2018-05-30 RX ORDER — SODIUM CHLORIDE 0.9 % (FLUSH) 0.9 %
10 SYRINGE (ML) INJECTION PRN
Status: DISCONTINUED | OUTPATIENT
Start: 2018-05-30 | End: 2018-05-30 | Stop reason: HOSPADM

## 2018-05-30 RX ORDER — TOPIRAMATE 25 MG/1
100 TABLET ORAL DAILY
Status: DISCONTINUED | OUTPATIENT
Start: 2018-05-30 | End: 2018-05-30 | Stop reason: HOSPADM

## 2018-05-30 RX ADMIN — METOPROLOL SUCCINATE 25 MG: 25 TABLET, EXTENDED RELEASE ORAL at 12:48

## 2018-05-30 RX ADMIN — ISOSORBIDE MONONITRATE 60 MG: 60 TABLET, EXTENDED RELEASE ORAL at 12:48

## 2018-05-30 RX ADMIN — RANOLAZINE 500 MG: 500 TABLET, FILM COATED, EXTENDED RELEASE ORAL at 12:48

## 2018-05-30 RX ADMIN — TIOTROPIUM BROMIDE 18 MCG: 18 CAPSULE ORAL; RESPIRATORY (INHALATION) at 12:48

## 2018-05-30 RX ADMIN — TOPIRAMATE 100 MG: 25 TABLET, FILM COATED ORAL at 12:47

## 2018-05-30 RX ADMIN — GABAPENTIN 900 MG: 300 CAPSULE ORAL at 15:42

## 2018-05-30 RX ADMIN — PANTOPRAZOLE SODIUM 40 MG: 40 TABLET, DELAYED RELEASE ORAL at 12:47

## 2018-05-30 ASSESSMENT — PAIN SCALES - GENERAL: PAINLEVEL_OUTOF10: 0

## 2018-05-31 ENCOUNTER — CARE COORDINATION (OUTPATIENT)
Dept: CASE MANAGEMENT | Age: 68
End: 2018-05-31

## 2018-05-31 LAB
EKG P AXIS: 55 DEGREES
EKG P-R INTERVAL: 152 MS
EKG Q-T INTERVAL: 434 MS
EKG QRS DURATION: 116 MS
EKG QTC CALCULATION (BAZETT): 433 MS
EKG T AXIS: 9 DEGREES
LV EF: 50 %
LVEF MODALITY: NORMAL

## 2018-06-01 ENCOUNTER — CARE COORDINATION (OUTPATIENT)
Dept: CASE MANAGEMENT | Age: 68
End: 2018-06-01

## 2018-06-04 ENCOUNTER — CARE COORDINATION (OUTPATIENT)
Dept: CASE MANAGEMENT | Age: 68
End: 2018-06-04

## 2018-07-02 ENCOUNTER — IP HISTORICAL/CONVERTED ENCOUNTER (OUTPATIENT)
Dept: OTHER | Age: 68
End: 2018-07-02

## 2018-07-16 ENCOUNTER — IP HISTORICAL/CONVERTED ENCOUNTER (OUTPATIENT)
Dept: OTHER | Age: 68
End: 2018-07-16

## 2018-09-11 ENCOUNTER — OFFICE VISIT (OUTPATIENT)
Dept: CARDIOLOGY | Age: 68
End: 2018-09-11
Payer: MEDICARE

## 2018-09-11 VITALS
WEIGHT: 246 LBS | BODY MASS INDEX: 30.59 KG/M2 | DIASTOLIC BLOOD PRESSURE: 66 MMHG | HEART RATE: 60 BPM | HEIGHT: 75 IN | SYSTOLIC BLOOD PRESSURE: 118 MMHG

## 2018-09-11 DIAGNOSIS — I10 ESSENTIAL HYPERTENSION: ICD-10-CM

## 2018-09-11 DIAGNOSIS — I25.10 MILD CAD: Primary | ICD-10-CM

## 2018-09-11 DIAGNOSIS — E78.2 MIXED HYPERLIPIDEMIA: ICD-10-CM

## 2018-09-11 DIAGNOSIS — F17.200 SMOKER: ICD-10-CM

## 2018-09-11 PROCEDURE — 3017F COLORECTAL CA SCREEN DOC REV: CPT | Performed by: NURSE PRACTITIONER

## 2018-09-11 PROCEDURE — G8427 DOCREV CUR MEDS BY ELIG CLIN: HCPCS | Performed by: NURSE PRACTITIONER

## 2018-09-11 PROCEDURE — 99214 OFFICE O/P EST MOD 30 MIN: CPT | Performed by: NURSE PRACTITIONER

## 2018-09-11 PROCEDURE — G8417 CALC BMI ABV UP PARAM F/U: HCPCS | Performed by: NURSE PRACTITIONER

## 2018-09-11 PROCEDURE — 1101F PT FALLS ASSESS-DOCD LE1/YR: CPT | Performed by: NURSE PRACTITIONER

## 2018-09-11 RX ORDER — METOPROLOL SUCCINATE 25 MG/1
TABLET, EXTENDED RELEASE ORAL
Qty: 180 TABLET | Refills: 3 | Status: SHIPPED | OUTPATIENT
Start: 2018-09-11 | End: 2019-03-01 | Stop reason: SDUPTHER

## 2018-09-11 NOTE — PROGRESS NOTES
insufficiency of both lower extremities I87.2    Swelling of both lower extremities M79.89    Rectal bleeding K62.5    Altered bowel function R19.8    Unexplained weight loss R63.4    Generalized abdominal pain R10.84    Excessive gas R14.3    History of adenomatous polyp of colon Z86.010    Anemia D64.9    Internal hemorrhoids K64.8    Chest pain R07.9    Smoker F17.200     Past Medical History:   Diagnosis Date    Allergic rhinitis     Anemia     Asthma     CAD (coronary artery disease) 4/11/2014    Chest tightness, discomfort, or pressure 6/17/2013 6/17/2013  lexiscan Positive for inferior myocardial ischemia, EF 47%, 9% ischemic myocardium on stress, intermediate risk findings, AUC indication 16, AUC score 7     Chronic back pain     COPD [facility]chronic obstructive pulmonary disease) ([facility] [address]     Ex-cigarette smoker 10/27/2015    Family history of early CAD 6/24/2013    Family history of premature CAD     GERD (gastroesophageal reflux disease)     History of hernia repair     [facility]    Hypertension     Multiple lung nodules     Neuropathy     Nicotine abuse     Nicotine abuse     Osteoarthritis     Peripheral vascular disease (HCC)     Restless legs syndrome     SOB (shortness of breath)     Thyroid nodule     Unspecified sleep apnea     can't wear cpap     Past Surgical History:   Procedure Laterality Date    APPENDECTOMY      CARDIAC CATHETERIZATION  6/24/2013  JDT    EF 50%    CARDIAC CATHETERIZATION  10/27/15  JDT    EF 50%    COLONOSCOPY  20 yrs ago    not sure who, thinks @ [facility]    COLONOSCOPY  09/2014    TAx2, HP, intern hemorrhoids (3 yr)   [place]      with mesh-Dr Saad Griffin    [place] COLSC FLX W/REMOVAL LESION BY HOT BX FORCEPS  9/21/2017    Dr Shelton-internal hemorrhoids, tubular AP (-) dysplasia--5 yr recall    [place] EGD TRANSORAL BIOPSY SINGLE/MULTIPLE N/A 10/19/2017     albuterol (PROAIR HFA) 108 (90 BASE) MCG/ACT inhaler Inhale 2 puffs into the lungs every 4 hours as needed.  tiotropium (SPIRIVA HANDIHALER) 18 MCG inhalation capsule Inhale 18 mcg into the lungs daily.  fluticasone-salmeterol (ADVAIR DISKUS) 500-50 MCG/DOSE diskus inhaler Inhale 1 puff into the lungs every 12 hours        No current facility-administered medications for this visit. Allergies: Bactrim [sulfamethoxazole-trimethoprim]; Nsaids; Succinylcholine; and Betadine [povidone iodine]    Review of Systems  Constitutional  no appetite change, or unexpected weight change. No fever, chills or diaphoresis. No significant change in activity level or new onset of fatigue. HEENT  no significant rhinorrhea or epistaxis. No tinnitus or significant hearing loss. Eyes  no sudden vision change or amaurosis. No corneal arcus, xantholasma, subconjunctival hemorrhage or discharge. Respiratory  no significant wheezing, stridor, apnea or cough. No dyspnea on exertion or shortness of air. Cardiovascular  no exertional chest pain to suggest myocardial ischemia. No orthopnea or PND. No sensation of sustained arrythmia. No occurrence of slow heart rate. No palpitations. No claudication. No leg edema. Gastrointestinal  no abdominal swelling or pain. No blood in stool. No severe constipation, diarrhea, nausea, or vomiting. Genitourinary  no dysuria, frequency, or urgency. No flank pain or hematuria. Musculoskeletal  no back pain or myalgia. No problems with gait. Extremities - no clubbing, cyanosis or edema. Skin  no color change or rash. No pallor. No new surgical incision. Neurologic  no speech difficulty, facial asymmetry or lateralizing weakness. No seizures, presyncope or syncope. No significant dizziness. Hematologic  no easy bruising or excessive bleeding. Psychiatric  no severe anxiety or insomnia. No confusion.    All other review of systems are Readings from Last 3 Encounters:   09/11/18 60   05/30/18 53   01/23/18 60        Wt Readings from Last 3 Encounters:   09/11/18 246 lb (111.6 kg)   05/30/18 221 lb 3.2 oz (100.3 kg)   01/23/18 220 lb (99.8 kg)     Plan  Previous cardiac history and records reviewed. Continue current medications as prescribed. Continue to follow up with primary care provider for non cardiac medical problems. Call the office with any problems, questions or concerns at [phone]. Follow up as scheduled with your cardiologist - Dr. Nila Rdz 6 months. The following educational material has been included in this after visit summary for your review: heart health; health benefits of smoking cessaton; QUIT NOW [place].     Additional instructions:  Coronary artery disease risk factors you can control: Smoking, high blood pressure, high cholesterol, diabetes, being overweight, lack of exercise and stress. Continue heart healthy diet. Take medications as directed. Exercise as tolerated. Strive for 15 minutes of exercise most days of the week. If asked to keep a blood pressure log, do so for 2 weeks. Call the office to report readings at [phone]. Blood pressure goal is 140/90 or less. If you are a diabetic, the goal is 130/80 or less. If you are taking cholesterol lowering medications, it is recommended that lab work be checked annually. Always keep a current medication list. Bring your medications to every office visit.       MARGY Ludwig

## 2018-09-11 NOTE — PATIENT INSTRUCTIONS
Continue current medications as prescribed. Continue to follow up with primary care provider for non cardiac medical problems. Call the office with any problems, questions or concerns at [phone]. Follow up as scheduled with your cardiologist - Dr. Jacqueline Guajardo 6 months. The following educational material has been included in this after visit summary for your review: heart health.     Additional instructions:  Coronary artery disease risk factors you can control: Smoking, high blood pressure, high cholesterol, diabetes, being overweight, lack of exercise and stress. Continue heart healthy diet. Take medications as directed. Exercise as tolerated. Strive for 15 minutes of exercise most days of the week. If asked to keep a blood pressure log, do so for 2 weeks. Call the office to report readings at [phone]. Blood pressure goal is 140/90 or less. If you are a diabetic, the goal is 130/80 or less. If you are taking cholesterol lowering medications, it is recommended that lab work be checked annually. Always keep a current medication list. Bring your medications to every office visit.        Patient Education        A Healthy Heart: Care Instructions  Your Care Instructions    Heart disease occurs when a substance called plaque builds up in the vessels that supply oxygen-rich blood to your heart. This can narrow the blood vessels and reduce blood flow. A heart attack happens when blood flow is completely blocked. A high-fat diet, smoking, and other factors increase the risk of heart disease. Your doctor has found that you have a chance of having heart disease. You can do lots of things to keep your heart healthy. It may not be easy, but you can change your diet, exercise more, and quit smoking. These steps really work to lower your chance of heart disease. Follow-up care is a key part of your treatment and safety.  Be sure to make and go to all appointments, and call your doctor if you are having may be the most important step you can take to protect your heart. It is never too late to quit. You will get health benefits right away.     · Limit alcohol to 2 drinks a day for men and 1 drink a day for women. Too much alcohol can cause health problems. Medicines    · Take your medicines exactly as prescribed. Call your doctor if you think you are having a problem with your medicine.     · If your doctor recommends aspirin, take the amount directed each day. Make sure you take aspirin and not another kind of pain reliever, such as acetaminophen (Tylenol). If you take ibuprofen (such as Advil or Motrin) for other problems, take aspirin at least 2 hours before taking ibuprofen. When should you call for help? Call 911 if you have symptoms of a heart attack. These may include:    · Chest pain or pressure, or a strange feeling in the chest.     · Sweating.     · Shortness of breath.     · Pain, pressure, or a strange feeling in the back, neck, jaw, or upper belly or in one or both shoulders or arms.     · Lightheadedness or sudden weakness.     · A fast or irregular heartbeat.    After you call 911, the  may tell you to chew 1 adult-strength or 2 to 4 low-dose aspirin. Wait for an ambulance. Do not try to drive yourself.   Watch closely for changes in your health, and be sure to contact your doctor if you have any problems. Where can you learn more? Go to https://CBIT A/SpemercedezewCoachMePlus.OneTwoSee. org and sign in to your [employer] account. Enter E482 in the [facility] box to learn more about \"A Healthy Heart: Care Instructions. \"     If you do not have an account, please click on the \"Sign Up Now\" link. Current as of: December 6, 2017  Content Version: 11.7  © 7573-0879 [employer], [employer]. Care instructions adapted under license by [facility] ([facility]).  If you have questions about a medical condition or this instruction, always ask your healthcare professional. [address] any warranty or liability for your use of this information. Patient Education        Learning About Benefits From Quitting Smoking  How does quitting smoking make you healthier? If you're thinking about quitting smoking, you may have a few reasons to be smoke-free. Your health may be one of them. · When you quit smoking, you lower your risks for cancer, lung disease, heart attack, stroke, blood vessel disease, and blindness from macular degeneration. · When you're smoke-free, you get sick less often, and you heal faster. You are less likely to get colds, flu, bronchitis, and pneumonia. · As a nonsmoker, you may find that your mood is better and you are less stressed. When and how will you feel healthier? Quitting has real health benefits that start from day 1 of being smoke-free. And the longer you stay smoke-free, the healthier you get and the better you feel. The first hours  · After just 20 minutes, your blood pressure and heart rate go down. That means there's less stress on your heart and blood vessels. · Within 12 hours, the level of carbon monoxide in your blood drops back to normal. That makes room for more oxygen. With more oxygen in your body, you may notice that you have more energy than when you smoked. After 2 weeks  · Your lungs start to work better. · Your risk of heart attack starts to drop. After 1 month  · When your lungs are clear, you cough less and breathe deeper, so it's easier to be active. · Your sense of taste and smell return. That means you can enjoy food more than you have since you started smoking. Over the years  · After 1 year, your risk of heart disease is half what it would be if you kept smoking. · After 5 years, your risk of stroke starts to shrink. Within a few years after that, it's about the same as if you'd never smoked. · After 10 years, your risk of dying from lung cancer is cut by about half. And your risk for many other types of cancer is lower too.   How

## 2018-09-25 ENCOUNTER — TELEPHONE (OUTPATIENT)
Dept: CARDIOLOGY | Age: 68
End: 2018-09-25

## 2018-10-17 ENCOUNTER — TELEPHONE (OUTPATIENT)
Dept: CARDIOLOGY | Age: 68
End: 2018-10-17

## 2019-01-10 NOTE — PROGRESS NOTES
Subjective    Mr. Kam is 68 y.o. male    Chief Complaint: BPH/ED    History of Present Illness     Benign Prostatic Hypertrophy  Patient complains of lower urinary tract symptoms. He reports frequency, incomplete emptying, intermittency, nocturia four times a night, straining and urgency. He denies none. Patient states symptoms are of severe severity. Onset of symptoms was several years ago and was gradual in onset. His AUA Symptom Score is, 14/35.He reports a history of no complicating symptoms. He denies flank pain, gross hematuria, kidney stones and recurrent UTI. Patient has tried TURP with improvement. Last PSA was unknown .       Erectile Dysfunction  Patient complains of erectile dysfunction. Onset of dysfunction was several years ago and was gradual in onset. Patient states the nature of difficulty is maintaining erection. Full erections occur never. Partial erections occur never. Libido is not affected. Risk factors for ED include cardiovascular disease. Patient denies history of pelvic radiation. Previous treatment of ED includes none.     Testalgia  Patient is here for evaluation of testalgia.  The onset of the testicular pain is gradual.  It has been occurring for several years.  Pt. Describes the pain as sharp.  Patient rates the pain as 8/10.  Pain is located in the right testicle.  Course has been worsening.  Aggravating factors include none.  Alleviating factors include none. Previous urologic workup includes scrotal us 10/16.  Previous treatment includes TURP.  Pt. did not experience relief from treatment.      The following portions of the patient's history were reviewed and updated as appropriate: allergies, current medications, past family history, past medical history, past social history, past surgical history and problem list.    Review of Systems   Constitutional: Negative for chills and fever.   Gastrointestinal: Negative for abdominal pain, anal bleeding and blood in stool.    Genitourinary: Positive for frequency, scrotal swelling and testicular pain. Negative for decreased urine volume, difficulty urinating, discharge, dysuria, enuresis, flank pain, genital sores, hematuria, penile pain, penile swelling and urgency.         Current Outpatient Medications:   •  albuterol (PROAIR HFA) 108 (90 BASE) MCG/ACT inhaler, Inhale 2 puffs Every 4 (Four) Hours As Needed for shortness of air., Disp: , Rfl:   •  aspirin 325 MG tablet, Take 325 mg by mouth daily, Disp: , Rfl:   •  atorvastatin (LIPITOR) 40 MG tablet, Take 1 tablet by mouth daily, Disp: , Rfl:   •  beclomethasone (QVAR) 80 MCG/ACT inhaler, Inhale 1 puff into the lungs 2 times daily., Disp: , Rfl:   •  esomeprazole (NexIUM) 10 MG packet, Take 40 mg by mouth daily , Disp: , Rfl:   •  fluticasone-salmeterol (ADVAIR DISKUS) 500-50 MCG/DOSE DISKUS, Every 12 (Twelve) Hours., Disp: , Rfl:   •  gabapentin (NEURONTIN) 300 MG capsule, take 3,000 mg of gabapentin daily, Disp: , Rfl:   •  HYDROcodone-acetaminophen (NORCO) 5-325 MG per tablet, Take 1 tablet by mouth Every 6 (Six) Hours As Needed for moderate pain (4-6) (LELG AND FEET PAIN)., Disp: 40 tablet, Rfl: 0  •  isosorbide mononitrate (IMDUR) 60 MG 24 hr tablet, Take 1 tablet by mouth daily, Disp: , Rfl:   •  metoprolol succinate XL (TOPROL XL) 25 MG 24 hr tablet, Take 1 tablet by mouth 2 times daily, Disp: , Rfl:   •  nitroglycerin (NITROLINGUAL) 0.4 MG/SPRAY spray, Place 1 spray under the tongue every 5 minutes as needed for Chest pain, Disp: , Rfl:   •  phenazopyridine (PYRIDIUM) 100 MG tablet, Take 1 tablet by mouth 3 (Three) Times a Day As Needed for bladder spasms., Disp: 21 tablet, Rfl: 1  •  ranolazine (RANEXA) 500 MG 12 hr tablet, Take 1 tablet by mouth 2 times daily, Disp: , Rfl:   •  tiotropium (SPIRIVA HANDIHALER) 18 MCG per inhalation capsule, Inhale 18 mcg into the lungs daily., Disp: , Rfl:   •  topiramate (TOPAMAX) 100 MG tablet, Take 125 mg by mouth Daily. TAKES 125 MG  "DAILY DIVIDED OVER SEVERAL DOSES PER DAY, Disp: , Rfl:   •  oxybutynin XL (DITROPAN-XL) 5 MG 24 hr tablet, Take 1 tablet by mouth Daily., Disp: 90 tablet, Rfl: 3    Past Medical History:   Diagnosis Date   • Arthritis    • COPD (chronic obstructive pulmonary disease) (CMS/HCC)    • Coronary artery disease    • Enlarged prostate    • Full dentures    • GERD (gastroesophageal reflux disease)    • Hearing loss    • Heart abnormality    • Hypertension    • Lung nodule     3 IN 1 LUNG AND 4 IN THE OTHER AND STATES IT IS SLOW GROWING   • Neuropathic pain    • Sleep apnea     DOES NOT WEAR C PAP       Past Surgical History:   Procedure Laterality Date   • APPENDECTOMY     • CATARACT EXTRACTION     • CYSTOSCOPY TRANSURETHRAL RESECTION OF PROSTATE N/A 11/8/2016    Procedure: CYSTOSCOPY TRANSURETHRAL RESECTION OF PROSTATE;  Surgeon: Brad Bal MD;  Location: [facility] OR;  Service:    • SINUS SURGERY     • THYROIDECTOMY         Social History     Socioeconomic History   • Marital status:      Spouse name: Not on file   • Number of children: Not on file   • Years of education: Not on file   • Highest education level: Not on file   Tobacco Use   • Smoking status: Current Some Day Smoker     Packs/day: 0.25     Types: Cigars   • Smokeless tobacco: Never Used   Substance and Sexual Activity   • Alcohol use: Yes     Alcohol/week: 0.6 oz     Types: 1 Cans of beer per week   • Drug use: No   • Sexual activity: Defer       Family History   Problem Relation Age of Onset   • No Known Problems Father    • No Known Problems Mother        Objective    Temp 97 °F (36.1 °C)   Ht 188 cm (74\")   Wt 112 kg (246 lb)   BMI 31.58 kg/m²     Physical Exam   Constitutional: He is oriented to person, place, and time. He appears well-developed and well-nourished.   Pulmonary/Chest: Effort normal.   Abdominal: Soft. He exhibits no distension and no mass. There is no tenderness. There is no rebound and no guarding. No hernia. "   Genitourinary: Penis normal. Rectal exam shows no mass, no tenderness and anal tone normal. Enlarged: for the age of the patient. Right testis shows no mass, no swelling and no tenderness. Left testis shows no mass, no swelling and no tenderness. No hypospadias. No discharge found.   Genitourinary Comments:  The urethral meatus normal in position without evidence of stricture. Epididymis without mass or tenderness.  Left testicle atrophied and tender on examination no testicular masses Vas Deferens is palpably normal.Anus and perineum without mass or tenderness. The prostate is approximately 30 ml. It is Symmetric, with a Soft consistency. There are no nodules present. . The seminal vesicles are Not palpable due to the size of the prostate.     Neurological: He is alert and oriented to person, place, and time.   Vitals reviewed.      Scrotal ultrasound independent review    The scrotal ultrasound is available for me to review.  Treatment recommendations require an independent review.  This film has been reviewed by the radiologist to determine any non urologic abnormalities that are presents. Results are as follows:    RIGHT    Testis:  Normal in size and echotexture, without focal lesion    Epididymis:  Normal in size and echotexture, without focal lesion    Hydrocele:  No evidence of hydrocele    Varicocele:  No evidence of varicocele      Left    Testis:  Atrophic testicle with heterogeneous echotexture some microcalcifications    Epididymis:  Normal in size and echotexture, without focal lesion    Hydrocele:  No evidence of hydrocele    Varicocele:  No evidence of varicocele          Results for orders placed or performed in visit on 01/11/19   POC Urinalysis Dipstick, Multipro   Result Value Ref Range    Color Yellow Yellow, Straw, Dark Yellow, Bing    Clarity, UA Clear Clear    Glucose, UA Negative Negative, 1000 mg/dL (3+) mg/dL    Bilirubin Negative Negative    Ketones, UA Negative Negative    Specific  Gravity  1.015 1.005 - 1.030    Blood, UA Negative Negative    pH, Urine 8.5 (A) 5.0 - 8.0    Protein, POC Negative Negative mg/dL    Urobilinogen, UA Normal Normal    Nitrite, UA Negative Negative    Leukocytes Negative Negative   Patient's Body mass index is 31.58 kg/m². BMI is above normal parameters. Recommendations include: educational material.    Assessment and Plan    Diagnoses and all orders for this visit:    Testalgia  -     POC Urinalysis Dipstick, Multipro    Urge incontinence of urine  -     oxybutynin XL (DITROPAN-XL) 5 MG 24 hr tablet; Take 1 tablet by mouth Daily.    BPH with urinary obstruction    Testicular lesion      Patient with continued testalgia he had imaging back in October 2016.  He had a [facility]  I personally reviewed these does have a heterogeneous left testicle.  Difficult to tell as this is a chronic finding in him.  I would not expect him to have a nonseminomatous germ cell tumor in this age group.  Certainly, there is a possibility of a seminomatous tumor that is slow growing.    I would have my partner review the images will come up with the plan.    Regarding his voiding symptoms he is undergone TURP in the past.  He is having urgency incontinence.  I will place   him on oxybutynin.

## 2019-01-11 ENCOUNTER — OFFICE VISIT (OUTPATIENT)
Dept: UROLOGY | Facility: CLINIC | Age: 69
End: 2019-01-11

## 2019-01-11 VITALS — WEIGHT: 246 LBS | BODY MASS INDEX: 31.57 KG/M2 | HEIGHT: 74 IN | TEMPERATURE: 97 F

## 2019-01-11 DIAGNOSIS — N39.41 URGE INCONTINENCE OF URINE: ICD-10-CM

## 2019-01-11 DIAGNOSIS — N40.1 BPH WITH URINARY OBSTRUCTION: ICD-10-CM

## 2019-01-11 DIAGNOSIS — N13.8 BPH WITH URINARY OBSTRUCTION: ICD-10-CM

## 2019-01-11 DIAGNOSIS — N50.9 TESTICULAR LESION: ICD-10-CM

## 2019-01-11 DIAGNOSIS — N50.819 TESTALGIA: Primary | ICD-10-CM

## 2019-01-11 LAB
BILIRUB BLD-MCNC: NEGATIVE MG/DL
CLARITY, POC: CLEAR
COLOR UR: YELLOW
GLUCOSE UR STRIP-MCNC: NEGATIVE MG/DL
KETONES UR QL: NEGATIVE
LEUKOCYTE EST, POC: NEGATIVE
NITRITE UR-MCNC: NEGATIVE MG/ML
PH UR: 8.5 [PH] (ref 5–8)
PROT UR STRIP-MCNC: NEGATIVE MG/DL
RBC # UR STRIP: NEGATIVE /UL
SP GR UR: 1.01 (ref 1–1.03)
UROBILINOGEN UR QL: NORMAL

## 2019-01-11 PROCEDURE — 81001 URINALYSIS AUTO W/SCOPE: CPT | Performed by: UROLOGY

## 2019-01-11 PROCEDURE — 99214 OFFICE O/P EST MOD 30 MIN: CPT | Performed by: UROLOGY

## 2019-01-11 RX ORDER — OXYBUTYNIN CHLORIDE 5 MG/1
5 TABLET, EXTENDED RELEASE ORAL DAILY
Qty: 90 TABLET | Refills: 3 | Status: SHIPPED | OUTPATIENT
Start: 2019-01-11

## 2019-01-11 NOTE — PATIENT INSTRUCTIONS

## 2019-01-25 ENCOUNTER — TELEPHONE (OUTPATIENT)
Dept: UROLOGY | Facility: CLINIC | Age: 69
End: 2019-01-25

## 2019-01-25 NOTE — TELEPHONE ENCOUNTER
Patient was calling to see if he needed to schedule a follow up. Patient was told that he would receive a phone letting him know if he needed to follow up.

## 2019-01-25 NOTE — TELEPHONE ENCOUNTER
DR. Bal didn't put it in his last note when he would like him to follow up will have to ask him when back in office.

## 2019-01-28 DIAGNOSIS — N50.819 TESTALGIA: Primary | ICD-10-CM

## 2019-02-11 ENCOUNTER — HOSPITAL ENCOUNTER (OUTPATIENT)
Dept: ULTRASOUND IMAGING | Facility: HOSPITAL | Age: 69
Discharge: HOME | End: 2019-02-11
Admitting: UROLOGY

## 2019-02-11 DIAGNOSIS — N50.819 TESTALGIA: ICD-10-CM

## 2019-02-11 PROCEDURE — 76870 US EXAM SCROTUM: CPT

## 2019-03-01 ENCOUNTER — TELEPHONE (OUTPATIENT)
Dept: CARDIOLOGY | Age: 69
End: 2019-03-01

## 2019-03-01 RX ORDER — METOPROLOL SUCCINATE 25 MG/1
TABLET, EXTENDED RELEASE ORAL
Qty: 180 TABLET | Refills: 3 | Status: SHIPPED | OUTPATIENT
Start: 2019-03-01 | End: 2019-05-15 | Stop reason: SDUPTHER

## 2019-03-22 NOTE — PROGRESS NOTES
Subjective    Mr. Kam is 68 y.o. male    Chief Complaint: BPH/ED    History of Present Illness       Benign Prostatic Hypertrophy  Patient complains of lower urinary tract symptoms. He reports frequency, incomplete emptying, intermittency, nocturia four times a night, straining and urgency. He denies none. Patient states symptoms are of severe severity. Onset of symptoms was several years ago and was gradual in onset. His AUA Symptom Score is, 28/35.He reports a history of no complicating symptoms. He denies flank pain, gross hematuria, kidney stones and recurrent UTI. Patient has tried TURP with improvement. Last PSA was unknown .       Erectile Dysfunction  Patient complains of erectile dysfunction. Onset of dysfunction was several years ago and was gradual in onset. Patient states the nature of difficulty is maintaining erection. Full erections occur never. Partial erections occur never. Libido is not affected. Risk factors for ED include cardiovascular disease. Patient denies history of pelvic radiation. Previous treatment of ED includes none.     Testalgia  Patient is here for evaluation of testalgia.  The onset of the testicular pain is gradual.  It has been occurring for several years.  Pt. Describes the pain as sharp.  Patient rates the pain as 8/10.  Pain is located in the right testicle.  Course has been worsening.  Aggravating factors include none.  Alleviating factors include none. Previous urologic workup includes scrotal us 10/16.  Previous treatment includes TURP.  Pt. did not experience relief from treatment.    The following portions of the patient's history were reviewed and updated as appropriate: allergies, current medications, past family history, past medical history, past social history, past surgical history and problem list.    Review of Systems   Constitutional: Negative for chills and fever.   Gastrointestinal: Negative for abdominal pain, anal bleeding and blood in stool.    Genitourinary: Positive for difficulty urinating (incontinence ). Negative for decreased urine volume, discharge, dysuria, enuresis, flank pain, frequency, genital sores, hematuria, penile pain, penile swelling, scrotal swelling, testicular pain and urgency.         Current Outpatient Medications:   •  albuterol (PROAIR HFA) 108 (90 BASE) MCG/ACT inhaler, Inhale 2 puffs Every 4 (Four) Hours As Needed for shortness of air., Disp: , Rfl:   •  aspirin 325 MG tablet, Take 325 mg by mouth daily, Disp: , Rfl:   •  atorvastatin (LIPITOR) 40 MG tablet, Take 1 tablet by mouth daily, Disp: , Rfl:   •  beclomethasone (QVAR) 80 MCG/ACT inhaler, Inhale 1 puff into the lungs 2 times daily., Disp: , Rfl:   •  esomeprazole (NexIUM) 10 MG packet, Take 40 mg by mouth daily , Disp: , Rfl:   •  fluticasone-salmeterol (ADVAIR DISKUS) 500-50 MCG/DOSE DISKUS, Every 12 (Twelve) Hours., Disp: , Rfl:   •  gabapentin (NEURONTIN) 300 MG capsule, take 3,000 mg of gabapentin daily, Disp: , Rfl:   •  HYDROcodone-acetaminophen (NORCO) 5-325 MG per tablet, Take 1 tablet by mouth Every 6 (Six) Hours As Needed for moderate pain (4-6) (LELG AND FEET PAIN)., Disp: 40 tablet, Rfl: 0  •  isosorbide mononitrate (IMDUR) 60 MG 24 hr tablet, Take 1 tablet by mouth daily, Disp: , Rfl:   •  metoprolol succinate XL (TOPROL XL) 25 MG 24 hr tablet, Take 1 tablet by mouth 2 times daily, Disp: , Rfl:   •  nitroglycerin (NITROLINGUAL) 0.4 MG/SPRAY spray, Place 1 spray under the tongue every 5 minutes as needed for Chest pain, Disp: , Rfl:   •  oxybutynin XL (DITROPAN-XL) 5 MG 24 hr tablet, Take 1 tablet by mouth Daily., Disp: 90 tablet, Rfl: 3  •  phenazopyridine (PYRIDIUM) 100 MG tablet, Take 1 tablet by mouth 3 (Three) Times a Day As Needed for bladder spasms., Disp: 21 tablet, Rfl: 1  •  ranolazine (RANEXA) 500 MG 12 hr tablet, Take 1 tablet by mouth 2 times daily, Disp: , Rfl:   •  tiotropium (SPIRIVA HANDIHALER) 18 MCG per inhalation capsule, Inhale 18 mcg  "into the lungs daily., Disp: , Rfl:   •  topiramate (TOPAMAX) 100 MG tablet, Take 125 mg by mouth Daily. TAKES 125 MG DAILY DIVIDED OVER SEVERAL DOSES PER DAY, Disp: , Rfl:     Past Medical History:   Diagnosis Date   • Arthritis    • COPD (chronic obstructive pulmonary disease) (CMS/HCC)    • Coronary artery disease    • Enlarged prostate    • Full dentures    • GERD (gastroesophageal reflux disease)    • Hearing loss    • Heart abnormality    • Hypertension    • Lung nodule     3 IN 1 LUNG AND 4 IN THE OTHER AND STATES IT IS SLOW GROWING   • Neuropathic pain    • Sleep apnea     DOES NOT WEAR C PAP       Past Surgical History:   Procedure Laterality Date   • APPENDECTOMY     • CATARACT EXTRACTION     • CYSTOSCOPY TRANSURETHRAL RESECTION OF PROSTATE N/A 11/8/2016    Procedure: CYSTOSCOPY TRANSURETHRAL RESECTION OF PROSTATE;  Surgeon: Brad Bal MD;  Location: [facility] OR;  Service:    • SINUS SURGERY     • THYROIDECTOMY         Social History     Socioeconomic History   • Marital status:      Spouse name: Not on file   • Number of children: Not on file   • Years of education: Not on file   • Highest education level: Not on file   Tobacco Use   • Smoking status: Current Some Day Smoker     Packs/day: 0.25     Types: Cigars   • Smokeless tobacco: Never Used   Substance and Sexual Activity   • Alcohol use: Yes     Alcohol/week: 0.6 oz     Types: 1 Cans of beer per week   • Drug use: No   • Sexual activity: Defer       Family History   Problem Relation Age of Onset   • No Known Problems Father    • No Known Problems Mother        Objective    Temp 97 °F (36.1 °C)   Ht 188 cm (74\")   Wt 109 kg (240 lb)   BMI 30.81 kg/m²     Physical Exam   Constitutional: He is oriented to person, place, and time. He appears well-developed and well-nourished. No distress.   Pulmonary/Chest: Effort normal.   Abdominal: Soft. He exhibits no distension and no mass. There is no tenderness. There is no rebound and no " guarding. No hernia.   Neurological: He is alert and oriented to person, place, and time.   Skin: Skin is warm and dry. He is not diaphoretic.   Psychiatric: He has a normal mood and affect.   Vitals reviewed.          Results for orders placed or performed in visit on 03/25/19   POC Urinalysis Dipstick, Multipro   Result Value Ref Range    Color Yellow Yellow, Straw, Dark Yellow, Bing    Clarity, UA Clear Clear    Glucose, UA Negative Negative, 1000 mg/dL (3+) mg/dL    Bilirubin Negative Negative    Ketones, UA Negative Negative    Specific Gravity  1.025 1.005 - 1.030    Blood, UA Negative Negative    pH, Urine 6.0 5.0 - 8.0    Protein, POC Negative Negative mg/dL    Urobilinogen, UA Normal Normal    Nitrite, UA Negative Negative    Leukocytes Negative Negative   Patient's Body mass index is 30.81 kg/m². BMI is above normal parameters. Recommendations include: educational material.    Assessment and Plan    Diagnoses and all orders for this visit:    BPH with urinary obstruction  -     POC Urinalysis Dipstick, Multipro    Urge incontinence of urine    Testalgia    Patient with continued testalgia he had imaging back in October 2016.  He had a [facility]  Repeat scrotal ultrasound shows stability which I think is consistent with previous trauma.    His voiding symptoms have worsened despite TURP and anticholinergic therapy.  Plan for urodynamics and cysto in near future.

## 2019-03-25 ENCOUNTER — OFFICE VISIT (OUTPATIENT)
Dept: UROLOGY | Facility: CLINIC | Age: 69
End: 2019-03-25

## 2019-03-25 VITALS — HEIGHT: 74 IN | BODY MASS INDEX: 30.8 KG/M2 | WEIGHT: 240 LBS | TEMPERATURE: 97 F

## 2019-03-25 DIAGNOSIS — N39.41 URGE INCONTINENCE OF URINE: ICD-10-CM

## 2019-03-25 DIAGNOSIS — N50.819 TESTALGIA: ICD-10-CM

## 2019-03-25 DIAGNOSIS — N40.1 BPH WITH URINARY OBSTRUCTION: Primary | ICD-10-CM

## 2019-03-25 DIAGNOSIS — N13.8 BPH WITH URINARY OBSTRUCTION: Primary | ICD-10-CM

## 2019-03-25 LAB
BILIRUB BLD-MCNC: NEGATIVE MG/DL
CLARITY, POC: CLEAR
COLOR UR: YELLOW
GLUCOSE UR STRIP-MCNC: NEGATIVE MG/DL
KETONES UR QL: NEGATIVE
LEUKOCYTE EST, POC: NEGATIVE
NITRITE UR-MCNC: NEGATIVE MG/ML
PH UR: 6 [PH] (ref 5–8)
PROT UR STRIP-MCNC: NEGATIVE MG/DL
RBC # UR STRIP: NEGATIVE /UL
SP GR UR: 1.02 (ref 1–1.03)
UROBILINOGEN UR QL: NORMAL

## 2019-03-25 PROCEDURE — 81001 URINALYSIS AUTO W/SCOPE: CPT | Performed by: UROLOGY

## 2019-03-25 PROCEDURE — 99214 OFFICE O/P EST MOD 30 MIN: CPT | Performed by: UROLOGY

## 2019-03-25 NOTE — PATIENT INSTRUCTIONS

## 2019-04-12 ENCOUNTER — PROCEDURE VISIT (OUTPATIENT)
Dept: UROLOGY | Facility: CLINIC | Age: 69
End: 2019-04-12

## 2019-04-12 DIAGNOSIS — N13.8 BPH WITH URINARY OBSTRUCTION: Primary | ICD-10-CM

## 2019-04-12 DIAGNOSIS — N40.1 BPH WITH URINARY OBSTRUCTION: Primary | ICD-10-CM

## 2019-04-12 LAB
BILIRUB BLD-MCNC: NEGATIVE MG/DL
CLARITY, POC: CLEAR
COLOR UR: YELLOW
GLUCOSE UR STRIP-MCNC: NEGATIVE MG/DL
KETONES UR QL: NEGATIVE
LEUKOCYTE EST, POC: NEGATIVE
NITRITE UR-MCNC: NEGATIVE MG/ML
PH UR: 5.5 [PH] (ref 5–8)
PROT UR STRIP-MCNC: NEGATIVE MG/DL
RBC # UR STRIP: NEGATIVE /UL
SP GR UR: 1.02 (ref 1–1.03)
UROBILINOGEN UR QL: NORMAL

## 2019-04-12 PROCEDURE — 81003 URINALYSIS AUTO W/O SCOPE: CPT | Performed by: UROLOGY

## 2019-04-12 PROCEDURE — 52000 CYSTOURETHROSCOPY: CPT | Performed by: UROLOGY

## 2019-04-12 NOTE — PROGRESS NOTES
Pre- operative diagnosis:  incontinence    Post operative diagnosis:  Same    Procedure:  The patient was prepped and draped in a normal sterile fashion.  The urethra was anesthetized with 2% lidocaine jelly.  A flexible cystoscope was introduced per urethra.      Urethra:  Normal    Bladder:  There is no evidence of a stone, foreign body or mass within the bladder.  The bladder is minimally trabeculated.  The bladder neck is without contracture.    Ureteral orifices:  Normal position bilaterally and Clear efflux bilaterally    Prostate:  Well resected prostatic urethra with some remaining apical tissue    Patient tolerated the procedure well    Complications: none    Blood loss: minimal    Follow up:    Schedule for urodynamics    Follow up after one week

## 2019-04-16 ENCOUNTER — PROCEDURE VISIT (OUTPATIENT)
Dept: UROLOGY | Facility: CLINIC | Age: 69
End: 2019-04-16

## 2019-04-16 DIAGNOSIS — N13.8 BPH WITH URINARY OBSTRUCTION: Primary | ICD-10-CM

## 2019-04-16 DIAGNOSIS — N39.41 URGE INCONTINENCE OF URINE: ICD-10-CM

## 2019-04-16 DIAGNOSIS — N40.1 BPH WITH URINARY OBSTRUCTION: Primary | ICD-10-CM

## 2019-04-16 PROCEDURE — 51784 ANAL/URINARY MUSCLE STUDY: CPT | Performed by: UROLOGY

## 2019-04-16 PROCEDURE — 51728 CYSTOMETROGRAM W/VP: CPT | Performed by: UROLOGY

## 2019-04-16 PROCEDURE — 51797 INTRAABDOMINAL PRESSURE TEST: CPT | Performed by: UROLOGY

## 2019-04-23 NOTE — PROGRESS NOTES
Subjective    Mr. Kam is 69 y.o. male    Chief Complaint: BPH/ED    History of Present Illness   Benign Prostatic Hypertrophy  Patient complains of lower urinary tract symptoms. He reports frequency, incomplete emptying, intermittency, nocturia four times a night, straining and urgency. He denies none. Patient states symptoms are of severe severity. Onset of symptoms was several years ago and was gradual in onset. His AUA Symptom Score is, 28/35.He reports a history of no complicating symptoms. He denies flank pain, gross hematuria, kidney stones and recurrent UTI. Patient has tried TURP with improvement. Last PSA was unknown .       Erectile Dysfunction  Patient complains of erectile dysfunction. Onset of dysfunction was several years ago and was gradual in onset. Patient states the nature of difficulty is maintaining erection. Full erections occur never. Partial erections occur never. Libido is not affected. Risk factors for ED include cardiovascular disease. Patient denies history of pelvic radiation. Previous treatment of ED includes none.     Testalgia  Patient is here for evaluation of testalgia.  The onset of the testicular pain is gradual.  It has been occurring for several years.  Pt. Describes the pain as sharp.  Patient rates the pain as 8/10.  Pain is located in the right testicle.  Course has been worsening.  Aggravating factors include none.  Alleviating factors include none. Previous urologic workup includes scrotal us 10/16.  Previous treatment includes TURP.  Pt. did not experience relief from treatment.        The following portions of the patient's history were reviewed and updated as appropriate: allergies, current medications, past family history, past medical history, past social history, past surgical history and problem list.    Review of Systems   Constitutional: Negative for chills and fever.   Gastrointestinal: Negative for abdominal pain, anal bleeding and blood in stool.    Genitourinary: Negative for decreased urine volume, difficulty urinating, discharge, dysuria, enuresis, flank pain, frequency, genital sores, hematuria, penile pain, penile swelling, scrotal swelling, testicular pain and urgency.         Current Outpatient Medications:   •  albuterol (PROAIR HFA) 108 (90 BASE) MCG/ACT inhaler, Inhale 2 puffs Every 4 (Four) Hours As Needed for shortness of air., Disp: , Rfl:   •  aspirin 325 MG tablet, Take 325 mg by mouth daily, Disp: , Rfl:   •  atorvastatin (LIPITOR) 40 MG tablet, Take 1 tablet by mouth daily, Disp: , Rfl:   •  beclomethasone (QVAR) 80 MCG/ACT inhaler, Inhale 1 puff into the lungs 2 times daily., Disp: , Rfl:   •  esomeprazole (NexIUM) 10 MG packet, Take 40 mg by mouth daily , Disp: , Rfl:   •  fluticasone-salmeterol (ADVAIR DISKUS) 500-50 MCG/DOSE DISKUS, Every 12 (Twelve) Hours., Disp: , Rfl:   •  gabapentin (NEURONTIN) 300 MG capsule, take 3,000 mg of gabapentin daily, Disp: , Rfl:   •  HYDROcodone-acetaminophen (NORCO) 5-325 MG per tablet, Take 1 tablet by mouth Every 6 (Six) Hours As Needed for moderate pain (4-6) (LELG AND FEET PAIN)., Disp: 40 tablet, Rfl: 0  •  isosorbide mononitrate (IMDUR) 60 MG 24 hr tablet, Take 1 tablet by mouth daily, Disp: , Rfl:   •  metoprolol succinate XL (TOPROL XL) 25 MG 24 hr tablet, Take 1 tablet by mouth 2 times daily, Disp: , Rfl:   •  nitroglycerin (NITROLINGUAL) 0.4 MG/SPRAY spray, Place 1 spray under the tongue every 5 minutes as needed for Chest pain, Disp: , Rfl:   •  oxybutynin XL (DITROPAN-XL) 5 MG 24 hr tablet, Take 1 tablet by mouth Daily., Disp: 90 tablet, Rfl: 3  •  phenazopyridine (PYRIDIUM) 100 MG tablet, Take 1 tablet by mouth 3 (Three) Times a Day As Needed for bladder spasms., Disp: 21 tablet, Rfl: 1  •  ranolazine (RANEXA) 500 MG 12 hr tablet, Take 1 tablet by mouth 2 times daily, Disp: , Rfl:   •  tiotropium (SPIRIVA HANDIHALER) 18 MCG per inhalation capsule, Inhale 18 mcg into the lungs daily., Disp: ,  "Rfl:   •  topiramate (TOPAMAX) 100 MG tablet, Take 125 mg by mouth Daily. TAKES 125 MG DAILY DIVIDED OVER SEVERAL DOSES PER DAY, Disp: , Rfl:     Past Medical History:   Diagnosis Date   • Arthritis    • COPD (chronic obstructive pulmonary disease) (CMS/HCC)    • Coronary artery disease    • Enlarged prostate    • Full dentures    • GERD (gastroesophageal reflux disease)    • Hearing loss    • Heart abnormality    • Hypertension    • Lung nodule     3 IN 1 LUNG AND 4 IN THE OTHER AND STATES IT IS SLOW GROWING   • Neuropathic pain    • Sleep apnea     DOES NOT WEAR C PAP       Past Surgical History:   Procedure Laterality Date   • APPENDECTOMY     • CATARACT EXTRACTION     • CYSTOSCOPY TRANSURETHRAL RESECTION OF PROSTATE N/A 11/8/2016    Procedure: CYSTOSCOPY TRANSURETHRAL RESECTION OF PROSTATE;  Surgeon: Brad Bal MD;  Location: [facility] OR;  Service:    • SINUS SURGERY     • THYROIDECTOMY         Social History     Socioeconomic History   • Marital status:      Spouse name: Not on file   • Number of children: Not on file   • Years of education: Not on file   • Highest education level: Not on file   Tobacco Use   • Smoking status: Current Some Day Smoker     Packs/day: 0.25     Types: Cigars   • Smokeless tobacco: Never Used   Substance and Sexual Activity   • Alcohol use: Yes     Alcohol/week: 0.6 oz     Types: 1 Cans of beer per week   • Drug use: No   • Sexual activity: Defer       Family History   Problem Relation Age of Onset   • No Known Problems Father    • No Known Problems Mother        Objective    Temp 97 °F (36.1 °C)   Ht 188 cm (74.02\")   Wt 109 kg (240 lb)   BMI 30.80 kg/m²     Physical Exam   Constitutional: He is oriented to person, place, and time. He appears well-developed and well-nourished. No distress.   Pulmonary/Chest: Effort normal.   Abdominal: Soft. He exhibits no distension and no mass. There is no tenderness. There is no rebound and no guarding. No hernia. "   Neurological: He is alert and oriented to person, place, and time.   Skin: Skin is warm and dry. He is not diaphoretic.   Psychiatric: He has a normal mood and affect.   Vitals reviewed.          Results for orders placed or performed in visit on 04/25/19   POC Urinalysis Dipstick, Multipro   Result Value Ref Range    Color Yellow Yellow, Straw, Dark Yellow, Bing    Clarity, UA Clear Clear    Glucose, UA Negative Negative, 1000 mg/dL (3+) mg/dL    Bilirubin Negative Negative    Ketones, UA Negative Negative    Specific Gravity  1.025 1.005 - 1.030    Blood, UA Trace (A) Negative    pH, Urine 5.5 5.0 - 8.0    Protein, POC Negative Negative mg/dL    Urobilinogen, UA Normal Normal    Nitrite, UA Negative Negative    Leukocytes Negative Negative   Patient's Body mass index is 30.8 kg/m². BMI is above normal parameters. Recommendations include: educational material.    Assessment and Plan    Diagnoses and all orders for this visit:    BPH with urinary obstruction  -     POC Urinalysis Dipstick, Multipro  -     Case Request; Standing  -     sodium chloride 0.9 % infusion  -     levoFLOXacin (LEVAQUIN) 500 mg in sodium chloride 0.9 % 150 mL IVPB  -     Case Request    Urge incontinence of urine    Testalgia    Other orders  -     Follow Anesthesia Guidelines / Standing Orders; Future  -     Obtain informed consent  -     Provide NPO Instructions to Patient; Future  -     Follow Anesthesia Guidelines / Standing Orders; Standing  -     Verify NPO Status; Standing  -     SCD (sequential compression device)- to be placed on patient in Pre-op; Standing  -     Verify / Perform Chlorhexidine Skin Prep if Indicated (If Not Already Completed); Standing       His voiding symptoms have worsened despite TURP and anticholinergic therapy.     I am going to schedule him for TURP based on the fact that he has some remaining apical tissue on cystoscopy.  Also he has obstruction on his voiding cystometrogram which I have reviewed with  him.  He has a maximum detrusor pressure of 80 cm of water with a mean flow of only 4 mL/s.    TURP of apical tissue in the near future.

## 2019-04-25 ENCOUNTER — OFFICE VISIT (OUTPATIENT)
Dept: UROLOGY | Facility: CLINIC | Age: 69
End: 2019-04-25

## 2019-04-25 VITALS — WEIGHT: 240 LBS | HEIGHT: 74 IN | TEMPERATURE: 97 F | BODY MASS INDEX: 30.8 KG/M2

## 2019-04-25 DIAGNOSIS — N50.819 TESTALGIA: ICD-10-CM

## 2019-04-25 DIAGNOSIS — N13.8 BPH WITH URINARY OBSTRUCTION: Primary | ICD-10-CM

## 2019-04-25 DIAGNOSIS — N40.1 BPH WITH URINARY OBSTRUCTION: Primary | ICD-10-CM

## 2019-04-25 DIAGNOSIS — N39.41 URGE INCONTINENCE OF URINE: ICD-10-CM

## 2019-04-25 LAB
BILIRUB BLD-MCNC: NEGATIVE MG/DL
CLARITY, POC: CLEAR
COLOR UR: YELLOW
GLUCOSE UR STRIP-MCNC: NEGATIVE MG/DL
KETONES UR QL: NEGATIVE
LEUKOCYTE EST, POC: NEGATIVE
NITRITE UR-MCNC: NEGATIVE MG/ML
PH UR: 5.5 [PH] (ref 5–8)
PROT UR STRIP-MCNC: NEGATIVE MG/DL
RBC # UR STRIP: ABNORMAL /UL
SP GR UR: 1.02 (ref 1–1.03)
UROBILINOGEN UR QL: NORMAL

## 2019-04-25 PROCEDURE — 99214 OFFICE O/P EST MOD 30 MIN: CPT | Performed by: UROLOGY

## 2019-04-25 PROCEDURE — 81001 URINALYSIS AUTO W/SCOPE: CPT | Performed by: UROLOGY

## 2019-04-25 RX ORDER — SODIUM CHLORIDE 9 MG/ML
100 INJECTION, SOLUTION INTRAVENOUS CONTINUOUS
Status: CANCELLED | OUTPATIENT
Start: 2019-04-25

## 2019-04-25 NOTE — PATIENT INSTRUCTIONS

## 2019-04-30 ENCOUNTER — OFFICE VISIT (OUTPATIENT)
Dept: CARDIOLOGY | Age: 69
End: 2019-04-30
Payer: COMMERCIAL

## 2019-04-30 VITALS
SYSTOLIC BLOOD PRESSURE: 118 MMHG | WEIGHT: 238 LBS | HEART RATE: 62 BPM | DIASTOLIC BLOOD PRESSURE: 84 MMHG | HEIGHT: 75 IN | BODY MASS INDEX: 29.59 KG/M2

## 2019-04-30 DIAGNOSIS — R07.9 CHEST PAIN, UNSPECIFIED TYPE: Primary | ICD-10-CM

## 2019-04-30 DIAGNOSIS — I25.10 MILD CAD: ICD-10-CM

## 2019-04-30 DIAGNOSIS — E78.2 MIXED HYPERLIPIDEMIA: ICD-10-CM

## 2019-04-30 DIAGNOSIS — I10 ESSENTIAL HYPERTENSION: ICD-10-CM

## 2019-04-30 PROCEDURE — 3017F COLORECTAL CA SCREEN DOC REV: CPT | Performed by: NURSE PRACTITIONER

## 2019-04-30 PROCEDURE — 4040F PNEUMOC VAC/ADMIN/RCVD: CPT | Performed by: NURSE PRACTITIONER

## 2019-04-30 PROCEDURE — 4004F PT TOBACCO SCREEN RCVD TLK: CPT | Performed by: NURSE PRACTITIONER

## 2019-04-30 PROCEDURE — G8417 CALC BMI ABV UP PARAM F/U: HCPCS | Performed by: NURSE PRACTITIONER

## 2019-04-30 PROCEDURE — G8427 DOCREV CUR MEDS BY ELIG CLIN: HCPCS | Performed by: NURSE PRACTITIONER

## 2019-04-30 PROCEDURE — 99214 OFFICE O/P EST MOD 30 MIN: CPT | Performed by: NURSE PRACTITIONER

## 2019-04-30 PROCEDURE — G8598 ASA/ANTIPLAT THER USED: HCPCS | Performed by: NURSE PRACTITIONER

## 2019-04-30 PROCEDURE — 93000 ELECTROCARDIOGRAM COMPLETE: CPT | Performed by: NURSE PRACTITIONER

## 2019-04-30 PROCEDURE — 1123F ACP DISCUSS/DSCN MKR DOCD: CPT | Performed by: NURSE PRACTITIONER

## 2019-04-30 RX ORDER — GABAPENTIN 300 MG/1
300 CAPSULE ORAL 2 TIMES DAILY
COMMUNITY

## 2019-04-30 RX ORDER — ISOSORBIDE MONONITRATE 30 MG/1
30 TABLET, EXTENDED RELEASE ORAL NIGHTLY
Qty: 30 TABLET | Refills: 5 | Status: SHIPPED | OUTPATIENT
Start: 2019-04-30 | End: 2019-05-15 | Stop reason: SDUPTHER

## 2019-04-30 NOTE — PROGRESS NOTES
Urodynamic Study Interpretation    Indications:    Urinary frequency, Urinary urgency and BPH with obstruction    Urinalysis:    Dip negative, patient denies symptoms    Patient and/or family expresses an understanding of need for procedure as well as risks.  Denies new symptoms.  Feeling reasonably well today    Technique    5 F dual lumen catheter placed per urethra  Rectal balloon catheter for abdominal pressure measurement  Gel Patch electrodes placed in perineal area  Connected to 1000cc sterile water and connected to both transducers for simultaneous rectal and detrussor pressure.  Equipment calibrated    Position    Sitting    Infusion rate    80 ml/min    Supervising physician    Dr. Brad Bal    Complex Uroflow    Voided volume :  99 ml  Average flow:  5 ml/sec  Maxium flox:  6 ml/sec  Shape of curve:  NR  Conclusion:  Difficult to determine due to low voided volume    Complex CMG    First sensation: 250 ml    First Urge: 285 ml    Strong Urge: 472 ml    Maximum Cystometric Capacity: 627 ml    Involuntary Bladder Contraction    none    Conclusion    Normal filling curve, bladder capacity, without involuntary bladder contractions     Leak point pressures    Abdominal leak point pressure (ALPP)    Patient did not leak during this study with cough/valsalva    None    Detrusor leak point pressure (DLPP)    Detrusor leak point pressure was not reached during this study    Leak point pressure conclusion    Normal    Pressure Flow Voiding Study    Patient was able to void during this study    Maximum flow rate: 7.3 ml/sec  Pressure at max flow: 71.5 cm H20  Voided volume: 494 ml  Post void residual: 129 ml  Ricci Buckner nomogram : obstructed    EMG    Relaxation of striated sphincter prior to voiding      Diagnosis    Bladder outlet obstruction

## 2019-04-30 NOTE — PROGRESS NOTES
[facility], [place]. [address] [address] [address] [facility]  [phone] office  [phone] fax      OFFICE VISIT:  2019    Corey Zimmerman - : 1950    Reason For Visit:  Jermaine Centeno is a 81 y.o. male who is here for 6 Month Follow-Up (Patient presents for cardiology follow up.); Coronary Artery Disease (mild); Hypertension; and Hyperlipidemia    The patient presents today for cardiology follow up. The patient reports exertional chest pain 1-2 times per week. He obtains relief with rest and takes NTG sl on occasion. He had cardiac cath last May which showed mild non occlusive CAD. He reports that chest pain has not changed whatsoever since that time. BP is well controlled on current regimen. The patient's PCP monitors cholesterol. The patient reports \"I light cigarettes but don't smoke them. \"    Subjective  Jermaine Centeno denies shortness of breath, orthopnea, paroxysmal nocturnal dyspnea, syncope, presyncope, sustained arrythmia, edema and fatigue. The patient denies numbness or weakness to suggest cerebrovascular accident or transient ischemic attack. + exertional chest pain 1-2 times per week. He obtains relief with rest and takes NTG sl on occasion. He had cardiac cath last May which showed mild non occlusive CAD.     Corey Zimmerman has the following history as recorded in [facility]:    Patient Active Problem List   Diagnosis Code    Abdominal pain, other specified site R10.9    Nausea R11.0    Dysphagia R13.10    Weight loss R63.4    Fatigue R53.83    Heartburn R12    Hoarseness R49.0    Regurgitation [id number]    Hyperlipidemia E78.5    Nicotine abuse Z72.0    Family history of premature CAD Z80.55    Other chest pain R07.89    Hypertension I10    Mild CAD I25.10    Change in bowel habits R19.4    Fecal incontinence R15.9    Rectal pain K62.89    Pelvic pain in male R10.2    Family history of colon cancer Z80.0    Ex-cigarette smoker Z87.891    Chest pressure R07.89    Varicose veins of bilateral lower extremities with pain I83.813    Venous insufficiency of both lower extremities I87.2    Swelling of both lower extremities M79.89    Rectal bleeding K62.5    Altered bowel function R19.8    Unexplained weight loss R63.4    Generalized abdominal pain R10.84    Excessive gas R14.3    History of adenomatous polyp of colon Z86.010    Anemia D64.9    Internal hemorrhoids K64.8    Chest pain R07.9    Smoker F17.200     Past Medical History:   Diagnosis Date    Allergic rhinitis     Anemia     Asthma     CAD (coronary artery disease) 4/11/2014    Chest tightness, discomfort, or pressure 6/17/2013 6/17/2013  lexiscan Positive for inferior myocardial ischemia, EF 47%, 9% ischemic myocardium on stress, intermediate risk findings, AUC indication 16, AUC score 7     Chronic back pain     COPD (chronic obstructive pulmonary disease) ([facility] [address]     Ex-cigarette smoker 10/27/2015    Family history of early CAD 6/24/2013    Family history of premature CAD     GERD (gastroesophageal reflux disease)     History of hernia repair     Hyperlipidemia     [facility]    Hypertension     Multiple lung nodules     Neuropathy     Nicotine abuse     Nicotine abuse     Osteoarthritis     Peripheral vascular disease (HCC)     Restless legs syndrome     SOB (shortness of breath)     Thyroid nodule     Unspecified sleep apnea     can't wear cpap     Past Surgical History:   Procedure Laterality Date    APPENDECTOMY      CARDIAC CATHETERIZATION  6/24/2013  JDT    EF 50%    CARDIAC CATHETERIZATION  10/27/15  JDT    EF 50%    COLONOSCOPY  20 yrs ago    not sure who, thinks @ [facility]    COLONOSCOPY  09/2014    TAx2, HP, intern hemorrhoids (3 yr)   [place]      with mesh-Dr Odalys Ackerman    AK COLSC FLX W/REMOVAL LESION BY HOT BX FORCEPS  9/21/2017 Dr Shelton-internal hemorrhoids, tubular AP (-) dysplasia--5 yr recall    LA EGD TRANSORAL BIOPSY SINGLE/MULTIPLE N/A 10/19/2017    Dr Yamileth Giles (-) chronic nonspecific inflammation    THYROIDECTOMY      UPPER GASTROINTESTINAL ENDOSCOPY      thinks so, but a long time ago if so    UPPER GASTROINTESTINAL ENDOSCOPY  02/11/2013    Cat: Grade A esophagitis and esophageal stricture. Dilated 54fr    VASCULAR SURGERY  07/11/2017    SJS. Right IJV US 9f sheath.  VASCULAR SURGERY Left 08/17/2017    SJS-L GSV RFA     Family History   Problem Relation Age of Onset    Cancer Mother         lung and brain    Coronary Art Dis Mother     Coronary Art Dis Sister     Colon Cancer Sister     Colon Polyps Sister     Coronary Art Dis Father     Colon Cancer Maternal Grandmother     Colon Polyps Maternal Grandmother     Colon Cancer Sister     Colon Polyps Sister     Cancer Sister         \"female cancer\"   Erle Baldev Cancer Sister         thryoid    Coronary Art Dis Brother     Heart Failure Brother     Esophageal Cancer Neg Hx     Liver Cancer Neg Hx     Stomach Cancer Neg Hx      Social History     Tobacco Use    Smoking status: Current Every Day Smoker     Packs/day: 0.50     Types: Cigarettes    Smokeless tobacco: Never Used   Substance Use Topics    Alcohol use: No     Alcohol/week: 0.0 oz      Current Outpatient Medications   Medication Sig Dispense Refill    gabapentin (NEURONTIN) 300 MG capsule Take 3 tablets twice daily      metoprolol succinate (TOPROL XL) 25 MG extended release tablet TAKE ONE TABLET BY MOUTH 2 TIMES A  tablet 3    lidocaine (LIDODERM) 5 % Place 1 patch onto the skin daily 12 hours on, 12 hours off.  30 patch 0    Esomeprazole Magnesium (NEXIUM PO) Take 40 mg by mouth daily       nitroGLYCERIN (NITROLINGUAL) 0.4 MG/SPRAY spray Place 1 spray under the tongue every 5 minutes as needed for Chest pain 1 Bottle 3    aspirin 325 MG tablet Take 325 mg by mouth daily      Topiramate (TOPAMAX PO) Take 100 mg by mouth daily       beclomethasone (QVAR) 80 MCG/ACT inhaler Inhale 1 puff into the lungs 2 times daily       albuterol (PROAIR HFA) 108 (90 BASE) MCG/ACT inhaler Inhale 2 puffs into the lungs every 4 hours as needed.  tiotropium (SPIRIVA HANDIHALER) 18 MCG inhalation capsule Inhale 18 mcg into the lungs daily.  fluticasone-salmeterol (ADVAIR DISKUS) 500-50 MCG/DOSE diskus inhaler Inhale 1 puff into the lungs every 12 hours        No current facility-administered medications for this visit. Allergies: Bactrim [sulfamethoxazole-trimethoprim]; Nsaids; Succinylcholine; and Betadine [povidone iodine]    Review of Systems  Constitutional - no appetite change, or unexpected weight change. No fever, chills or diaphoresis. No significant change in activity level or new onset of fatigue. HEENT - no significant rhinorrhea or epistaxis. No tinnitus or significant hearing loss. Eyes - no sudden vision change or amaurosis. No corneal arcus, xantholasma, subconjunctival hemorrhage or discharge. Respiratory - no significant wheezing, stridor, apnea or cough. No dyspnea on exertion or shortness of air. Cardiovascular -  No orthopnea or PND. No sensation of sustained arrythmia. No occurrence of slow heart rate. No palpitations. No claudication. No leg edema.+ exertional chest pain 1-2 times per week. He obtains relief with rest and takes NTG sl on occasion. He had cardiac cath last May which showed mild non occlusive CAD. Gastrointestinal - no abdominal swelling or pain. No blood in stool. No severe constipation, diarrhea, nausea, or vomiting. Genitourinary - no dysuria, frequency, or urgency. No flank pain or hematuria. Musculoskeletal - no back pain or myalgia. No problems with gait. Extremities - no clubbing, cyanosis or edema. Skin - no color change or rash. No pallor. No new surgical incision.    Neurologic - no speech difficulty, facial asymmetry or lateralizing weakness. No seizures, presyncope or syncope. No significant dizziness. Hematologic - no easy bruising or excessive bleeding. Psychiatric - no severe anxiety or insomnia. No confusion. All other review of systems are negative. Objective  Vital Signs - /84   Pulse 62   Ht 6' 3\" (1.905 m)   Wt 238 lb (108 kg)   BMI 29.75 kg/m²   General - Tono Simon is alert, cooperative, and pleasant. Well groomed. No acute distress. Body habitus - Body mass index is 29.75 kg/m². HEENT - Head is normocephalic. No circumoral cyanosis. Dentition is normal.  EYES -   Lids normal without ptosis. No discharge, edema or subconjunctival hemorrhage. Neck - Symmetrical without apparent mass or lymphadenopathy. Respiratory - Normal respiratory effort without use of accessory muscles. Ausculatation reveals vesicular breath sounds without crackles, wheezes, rub or rhonchi. Cardiovascular - No jugular venous distention. Auscultation reveals regular rate and rhythm. No audible clicks, gallop or rub. No murmur. No lower extremity varicosities. No carotid bruits. Abdominal -  No visible distention, mass or pulsations. Extremities - No clubbing or cyanosis. No statis dermatitis or ulcers. No edema. Musculoskeletal -   No Osler's nodes. No kyphosis or scoliosis. Gait is even and regular without limp or shuffle. Ambulates without assistance. Skin -  Warm and dry; no rash or pallor. No new surgical wound. Neurological - No focal neurological deficits. Thought processes coherent. No apparent tremor. Oriented to person, place and time. Psychiatric -  Appropriate affect and mood. Assessment:     Diagnosis Orders   1. Chest pain, unspecified type  EKG 12 lead   2. Mild CAD     3. Essential hypertension     4.  Mixed hyperlipidemia       Data reviewed:  5/29/18 cath  Angiography:  Coronary Arteries:  Left Main Coronary Artery:  A large vessel which arises from the left sinus of Valsalva.  It divides into the left anterior   descending coronary artery and the left circumflex.  There is   mild diffuse disease throughout the entire length of the vessel. Left Anterior Coronary Artery:  The LAD is a moderate sized   vessel with several diagonal branches.   There is mild diffuse   disease throughout the entire length of the vessel. Left Circumflex Coronary Artery:  The LCx is a moderate sized   vessel with several marginal branches.   There is mild diffuse   disease throughout the entire length of the vessel. Right Coronary Artery:  The RCA is a moderate sized dominant   vessel, with an anterior takeoff, which arises from the right   sinus of Valsalva.   There is mild diffuse disease throughout the   entire length of the vessel. Left Ventriculogram:  The left ventriculogram is obtained in the   right oblique projection.  There is mild anterior lateral   hypokinesis.  All other regional wall segments move   appropriately. The estimated visual ejection fraction is   approximately 50%.  There is no mitral regurgitation nor is there   a pull back gradient seen across the aortic valve.   Summary:    1.  Successful femoral artery ultrasound  2.  Successful femoral artery arteriogram  3.  Mild coronary artery disease  4.  Left ventricular function is slightly impaired in the   anterior lateral wall with a visually estimated ejection fraction   of approximately 50%  RECOMMENDATIONS:  1.  Reassurance  2.  Risk factor modification  3.  Evaluation of etiologies of non cardiac chest discomfort   should symptoms persist or progress  4.  Follow  Up with Primary care provider as arranged  5.  Follow up with Cardiology \"prn\"   Electronically signed by Maria T Urban MD on 5/30/18     Lab Results   Component Value Date    CHOL 113 (L) 09/29/2016    CHOL 167 10/26/2015    CHOL 166 10/26/2015     Lab Results   Component Value Date    TRIG 103 (L) 09/29/2016    TRIG 107 (L) 10/26/2015    TRIG 99 (L) the office to report readings at [phone]. Blood pressure goal  is less than 120/70. Elevated blood pressure at 120-129/80 or less. High blood pressure at 130-139/80-89. If you are taking cholesterol lowering medications, it is recommended that lab work be checked annually. Always keep a current medication list. Bring your medications to every office visit. Life simple 7  1) Manage blood pressure - high blood pressure is a major risk factor for heart disease and stroke. Keeping blood pressure in health range reduces strain on your heart, arteries and kidneys. 2) Control cholesterol - contributes to plaque, which can clog arteries and lead to heart disease and stroke. When you control your cholesterol you are giving your arteries their best chance to remain clear. 3) Reduce blood sugar - most of the food we eat is turning into glucose or blood sugar that our body uses for energy. Over time, high levels of blood sugar can damage your heart, kidneys, eyes and nerves. 4) Get active - living an active life is one of the most rewarding gifts you can give yourself and those you love. Simply put, daily physical activity increases your length and quality of life. 5)  Eat better - A healthy diet is one of your best weapons for fighting cardiovascular disease. When you eat a heart healthy diet, you improve your chances for feeling good and staying healthy for life. 6)  Lose weight - when you shed extra fat an unnecessary pounds, you reduce the burden on your hear, lungs, blood vessels and skeleton. You give yourself the gift of active living, you lower your blood pressure and help yourself feel better. 7) Stop smoking - cigarette smokers have a higher risk of developing cardiovascular disease. If  You smoke, quitting is the best thing you can do for your health.   Check American Heart Association on line for more information on Life's Simple 7 and tips for healthy living.     How to take: NITROGLYCERIN (Nitrostat) 0.4 mg tablets, sublingual.  Nitroglycerin is in a group of drugs called nitrates. Nitroglycerin dilates (widens) blood vessels, making it easier for blood to flow through them and easier for the heart to pump. Dosing Guidelines for Nitroglycerin Tablets  · At the start of an angina (chest pain) attack, place one tablet under the tongue or between the cheek and gum. Do not swallow or chew the tablet; let it dissolve on its own. If necessary, a second and third tablet may be used, with five minutes between using each tablet. If you use a third tablet and your chest pain continues, it is time to seek immediate medical attention. Call 911 immediately and have someone drive you to the emergency room. You may be having a heart attack or other serious heart problem. · To prevent angina from exercise or stress, use 1 tablet 5 to 10 minutes before the activity.      MARGY Clark

## 2019-04-30 NOTE — PATIENT INSTRUCTIONS
Start on Imdur - isosorbide 30 mg one tab at bedtime for chest pain. Continue current medications as prescribed. Continue to follow up with primary care provider for non cardiac medical problems. Call the office with any problems, questions or concerns at [phone]. Follow up as scheduled with your cardiologist. 2 week follow up after starting Imdur. The following educational material has been included in this after visit summary for your review: Life simple 7. Heart health.     Additional instructions:  Coronary artery disease risk factors you can control: Smoking, high blood pressure, high cholesterol, diabetes, being overweight, lack of exercise and stress. Continue heart healthy diet. Take medications as directed. Exercise as tolerated. Strive for 15 minutes of exercise most days of the week. If asked to keep a blood pressure log, do so for 2 weeks. Call the office to report readings at [phone]. Blood pressure goal  is less than 120/70. Elevated blood pressure at 120-129/80 or less. High blood pressure at 130-139/80-89. If you are taking cholesterol lowering medications, it is recommended that lab work be checked annually. Always keep a current medication list. Bring your medications to every office visit. Life simple 7  1) Manage blood pressure - high blood pressure is a major risk factor for heart disease and stroke. Keeping blood pressure in health range reduces strain on your heart, arteries and kidneys. 2) Control cholesterol - contributes to plaque, which can clog arteries and lead to heart disease and stroke. When you control your cholesterol you are giving your arteries their best chance to remain clear. 3) Reduce blood sugar - most of the food we eat is turning into glucose or blood sugar that our body uses for energy. Over time, high levels of blood sugar can damage your heart, kidneys, eyes and nerves.   4) Get active - living an active life is one of the most rewarding gifts you can give yourself and those you love. Simply put, daily physical activity increases your length and quality of life. 5)  Eat better - A healthy diet is one of your best weapons for fighting cardiovascular disease. When you eat a heart healthy diet, you improve your chances for feeling good and staying healthy for life. 6)  Lose weight - when you shed extra fat an unnecessary pounds, you reduce the burden on your hear, lungs, blood vessels and skeleton. You give yourself the gift of active living, you lower your blood pressure and help yourself feel better. 7) Stop smoking - cigarette smokers have a higher risk of developing cardiovascular disease. If  You smoke, quitting is the best thing you can do for your health. Check American Heart Association on line for more information on Life's Simple 7 and tips for healthy living.     How to take:  NITROGLYCERIN (Nitrostat) 0.4 mg tablets, sublingual.  Nitroglycerin is in a group of drugs called nitrates. Nitroglycerin dilates (widens) blood vessels, making it easier for blood to flow through them and easier for the heart to pump. Dosing Guidelines for Nitroglycerin Tablets  · At the start of an angina (chest pain) attack, place one tablet under the tongue or between the cheek and gum. Do not swallow or chew the tablet; let it dissolve on its own. If necessary, a second and third tablet may be used, with five minutes between using each tablet. If you use a third tablet and your chest pain continues, it is time to seek immediate medical attention. Call 911 immediately and have someone drive you to the emergency room. You may be having a heart attack or other serious heart problem. · To prevent angina from exercise or stress, use 1 tablet 5 to 10 minutes before the activity.           Patient Education        A Healthy Heart: Care Instructions  Your Care Instructions    Heart disease occurs when a substance called plaque builds up in the vessels that supply oxygen-rich blood to your heart. This can narrow the blood vessels and reduce blood flow. A heart attack happens when blood flow is completely blocked. A high-fat diet, smoking, and other factors increase the risk of heart disease. Your doctor has found that you have a chance of having heart disease. You can do lots of things to keep your heart healthy. It may not be easy, but you can change your diet, exercise more, and quit smoking. These steps really work to lower your chance of heart disease. Follow-up care is a key part of your treatment and safety. Be sure to make and go to all appointments, and call your doctor if you are having problems. It's also a good idea to know your test results and keep a list of the medicines you take. How can you care for yourself at home? Diet    · Use less salt when you cook and eat. This helps lower your blood pressure. Taste food before salting. Add only a little salt when you think you need it. With time, your taste buds will adjust to less salt.     · Eat fewer snack items, fast foods, canned soups, and other high-salt, high-fat, processed foods.     · Read food labels and try to avoid saturated and trans fats. They increase your risk of heart disease by raising cholesterol levels.     · Limit the amount of solid fat-butter, margarine, and shortening-you eat. Use olive, peanut, or canola oil when you cook. Bake, broil, and steam foods instead of frying them.     · Eating fish can lower your risk for heart disease. Eat at least 2 servings of fish a week. [place], mackerel, herring, sardines, and chunk light tuna are very good choices. These fish contain omega-3 fatty acids.     · Eat a variety of fruit and vegetables every day. Dark green, deep orange, red, or yellow fruits and vegetables are especially good for you.  Examples include spinach, carrots, peaches, and berries.     · Foods high in fiber can reduce your cholesterol and provide important vitamins and minerals. High-fiber foods include whole-grain cereals and breads, oatmeal, beans, brown rice, citrus fruits, and apples.     · Limit drinks and foods with added sugar. These include candy, desserts, and soda pop.    Lifestyle changes    · If your doctor recommends it, get more exercise. Walking is a good choice. Bit by bit, increase the amount you walk every day. Try for at least 30 minutes on most days of the week. You also may want to swim, bike, or do other activities.     · Do not smoke. If you need help quitting, talk to your doctor about stop-smoking programs and medicines. These can increase your chances of quitting for good. Quitting smoking may be the most important step you can take to protect your heart. It is never too late to quit. You will get health benefits right away.     · Limit alcohol to 2 drinks a day for men and 1 drink a day for women. Too much alcohol can cause health problems. Medicines    · Take your medicines exactly as prescribed. Call your doctor if you think you are having a problem with your medicine.     · If your doctor recommends aspirin, take the amount directed each day. Make sure you take aspirin and not another kind of pain reliever, such as acetaminophen (Tylenol). If you take ibuprofen (such as Advil or Motrin) for other problems, take aspirin at least 2 hours before taking ibuprofen. When should you call for help? Call 911 if you have symptoms of a heart attack. These may include:    · Chest pain or pressure, or a strange feeling in the chest.     · Sweating.     · Shortness of breath.     · Pain, pressure, or a strange feeling in the back, neck, jaw, or upper belly or in one or both shoulders or arms.     · Lightheadedness or sudden weakness.     · A fast or irregular heartbeat.    After you call 911, the  may tell you to chew 1 adult-strength or 2 to 4 low-dose aspirin. Wait for an ambulance.  Do not try to drive yourself.   Watch closely for changes in your health, and be sure to contact your doctor if you have any problems. Where can you learn more? Go to [email or web address] and sign in to your [employer] account. Enter N270 in the [employer] box to learn more about \"A Healthy Heart: Care Instructions. \"     If you do not have an account, please click on the \"Sign Up Now\" link. Current as of: July 22, 2018  Content Version: 11.9  © 5545-2803 [employer]. Care instructions adapted under license by [facility] ([facility]). If you have questions about a medical condition or this instruction, always ask your healthcare professional. [address] any warranty or liability for your use of this information.

## 2019-05-06 ENCOUNTER — TELEPHONE (OUTPATIENT)
Dept: UROLOGY | Facility: CLINIC | Age: 69
End: 2019-05-06

## 2019-05-07 ENCOUNTER — APPOINTMENT (OUTPATIENT)
Dept: PREADMISSION TESTING | Facility: HOSPITAL | Age: 69
End: 2019-05-07

## 2019-05-15 ENCOUNTER — OFFICE VISIT (OUTPATIENT)
Dept: CARDIOLOGY | Age: 69
End: 2019-05-15
Payer: MEDICARE

## 2019-05-15 VITALS
BODY MASS INDEX: 32.34 KG/M2 | DIASTOLIC BLOOD PRESSURE: 68 MMHG | WEIGHT: 252 LBS | HEIGHT: 74 IN | HEART RATE: 68 BPM | SYSTOLIC BLOOD PRESSURE: 110 MMHG

## 2019-05-15 DIAGNOSIS — I10 ESSENTIAL HYPERTENSION: ICD-10-CM

## 2019-05-15 DIAGNOSIS — I25.10 MILD CAD: Primary | ICD-10-CM

## 2019-05-15 PROCEDURE — 4004F PT TOBACCO SCREEN RCVD TLK: CPT | Performed by: NURSE PRACTITIONER

## 2019-05-15 PROCEDURE — 4040F PNEUMOC VAC/ADMIN/RCVD: CPT | Performed by: NURSE PRACTITIONER

## 2019-05-15 PROCEDURE — 1123F ACP DISCUSS/DSCN MKR DOCD: CPT | Performed by: NURSE PRACTITIONER

## 2019-05-15 PROCEDURE — G8417 CALC BMI ABV UP PARAM F/U: HCPCS | Performed by: NURSE PRACTITIONER

## 2019-05-15 PROCEDURE — 3017F COLORECTAL CA SCREEN DOC REV: CPT | Performed by: NURSE PRACTITIONER

## 2019-05-15 PROCEDURE — G8598 ASA/ANTIPLAT THER USED: HCPCS | Performed by: NURSE PRACTITIONER

## 2019-05-15 PROCEDURE — 99213 OFFICE O/P EST LOW 20 MIN: CPT | Performed by: NURSE PRACTITIONER

## 2019-05-15 PROCEDURE — G8427 DOCREV CUR MEDS BY ELIG CLIN: HCPCS | Performed by: NURSE PRACTITIONER

## 2019-05-15 RX ORDER — ISOSORBIDE MONONITRATE 30 MG/1
30 TABLET, EXTENDED RELEASE ORAL NIGHTLY
Qty: 90 TABLET | Refills: 3 | Status: SHIPPED | OUTPATIENT
Start: 2019-05-15 | End: 2022-06-15

## 2019-05-15 RX ORDER — METOPROLOL SUCCINATE 25 MG/1
TABLET, EXTENDED RELEASE ORAL
Qty: 180 TABLET | Refills: 3 | Status: SHIPPED | OUTPATIENT
Start: 2019-05-15

## 2019-05-15 RX ORDER — METOPROLOL SUCCINATE 25 MG/1
TABLET, EXTENDED RELEASE ORAL
Qty: 180 TABLET | Refills: 3 | Status: SHIPPED | OUTPATIENT
Start: 2019-05-15 | End: 2019-05-15 | Stop reason: SDUPTHER

## 2019-05-15 RX ORDER — ISOSORBIDE MONONITRATE 30 MG/1
30 TABLET, EXTENDED RELEASE ORAL NIGHTLY
Qty: 90 TABLET | Refills: 3 | Status: SHIPPED | OUTPATIENT
Start: 2019-05-15 | End: 2019-05-15 | Stop reason: SDUPTHER

## 2019-05-15 NOTE — PATIENT INSTRUCTIONS
increases your length and quality of life. 5)  Eat better - A healthy diet is one of your best weapons for fighting cardiovascular disease. When you eat a heart healthy diet, you improve your chances for feeling good and staying healthy for life. 6)  Lose weight - when you shed extra fat an unnecessary pounds, you reduce the burden on your hear, lungs, blood vessels and skeleton. You give yourself the gift of active living, you lower your blood pressure and help yourself feel better. 7) Stop smoking - cigarette smokers have a higher risk of developing cardiovascular disease. If  You smoke, quitting is the best thing you can do for your health. Check American Heart Association on line for more information on Life's Simple 7 and tips for healthy living.     How to take:  NITROGLYCERIN (Nitrostat) 0.4 mg tablets, sublingual.  Nitroglycerin is in a group of drugs called nitrates. Nitroglycerin dilates (widens) blood vessels, making it easier for blood to flow through them and easier for the heart to pump. Dosing Guidelines for Nitroglycerin Tablets  · At the start of an angina (chest pain) attack, place one tablet under the tongue or between the cheek and gum. Do not swallow or chew the tablet; let it dissolve on its own. If necessary, a second and third tablet may be used, with five minutes between using each tablet. If you use a third tablet and your chest pain continues, it is time to seek immediate medical attention. Call 911 immediately and have someone drive you to the emergency room. You may be having a heart attack or other serious heart problem. · To prevent angina from exercise or stress, use 1 tablet 5 to 10 minutes before the activity. Patient Education        A Healthy Heart: Care Instructions  Your Care Instructions    Heart disease occurs when a substance called plaque builds up in the vessels that supply oxygen-rich blood to your heart.  This can narrow the blood vessels and reduce blood flow. A heart attack happens when blood flow is completely blocked. A high-fat diet, smoking, and other factors increase the risk of heart disease. Your doctor has found that you have a chance of having heart disease. You can do lots of things to keep your heart healthy. It may not be easy, but you can change your diet, exercise more, and quit smoking. These steps really work to lower your chance of heart disease. Follow-up care is a key part of your treatment and safety. Be sure to make and go to all appointments, and call your doctor if you are having problems. It's also a good idea to know your test results and keep a list of the medicines you take. How can you care for yourself at home? Diet    · Use less salt when you cook and eat. This helps lower your blood pressure. Taste food before salting. Add only a little salt when you think you need it. With time, your taste buds will adjust to less salt.     · Eat fewer snack items, fast foods, canned soups, and other high-salt, high-fat, processed foods.     · Read food labels and try to avoid saturated and trans fats. They increase your risk of heart disease by raising cholesterol levels.     · Limit the amount of solid fat-butter, margarine, and shortening-you eat. Use olive, peanut, or canola oil when you cook. Bake, broil, and steam foods instead of frying them.     · Eating fish can lower your risk for heart disease. Eat at least 2 servings of fish a week. Jeff, mackerel, herring, sardines, and chunk light tuna are very good choices. These fish contain omega-3 fatty acids.     · Eat a variety of fruit and vegetables every day. Dark green, deep orange, red, or yellow fruits and vegetables are especially good for you. Examples include spinach, carrots, peaches, and berries.     · Foods high in fiber can reduce your cholesterol and provide important vitamins and minerals.  High-fiber foods include whole-grain cereals and breads, oatmeal, beans, learn more? Go to [email or web address].[email or web address] org and sign in to your [employer] account. Enter F496 in the [facility] box to learn more about \"A Healthy Heart: Care Instructions. \"     If you do not have an account, please click on the \"Sign Up Now\" link. Current as of: July 22, 2018  Content Version: 12.0  © 4817-4016 Healthwise, [employer]. Care instructions adapted under license by [facility] ([facility]). If you have questions about a medical condition or this instruction, always ask your healthcare professional. [place] [address] any warranty or liability for your use of this information.

## 2019-05-15 NOTE — PROGRESS NOTES
[facility], [place]. [address], [address] [facility]  [phone] office  [phone] fax      OFFICE VISIT:  5/15/2019    [address]: 1950    Reason For Visit:  Magdalena Croft is a 81 y.o. male who is here for Follow-up (no cardiac symptoms); Chest Pain; and Coronary Artery Disease    The patient presents today for cardiology follow up after starting Imdur 2 weeks ago for chest pain. Patient reports no additional chest pain. He continues to work construction. BP is well controlled on current regimen. The patient's PCP monitors cholesterol. Subjective  Magdalena Croft denies exertional chest pain, shortness of breath, orthopnea, paroxysmal nocturnal dyspnea, syncope, presyncope, sustained arrythmia, edema and fatigue. The patient denies numbness or weakness to suggest cerebrovascular accident or transient ischemic attack.       Lakeshia Abbott has the following history as recorded in [facility]:    Patient Active Problem List   Diagnosis Code    Abdominal pain, other specified site R10.9    Nausea R11.0    Dysphagia R13.10    Weight loss R63.4    Fatigue R53.83    Heartburn R12    Hoarseness R49.0    Regurgitation [id number]    Hyperlipidemia E78.5    Nicotine abuse Z72.0    Family history of premature CAD Z80.55    Other chest pain R07.89    Hypertension I10    Mild CAD I25.10    Change in bowel habits R19.4    Fecal incontinence R15.9    Rectal pain K62.89    Pelvic pain in male R10.2    Family history of colon cancer Z80.0    Ex-cigarette smoker Z87.891    Chest pressure R07.89    Varicose veins of bilateral lower extremities with pain I83.813    Venous insufficiency of both lower extremities I87.2    Swelling of both lower extremities M79.89    Rectal bleeding K62.5    Altered bowel function R19.8    Unexplained weight loss R63.4    Generalized abdominal pain R10.84    Excessive gas R14.3    History of adenomatous polyp of colon Z86.010    Anemia D64.9    Internal hemorrhoids K64.8    Chest pain R07.9    Smoker F17.200     Past Medical History:   Diagnosis Date    Allergic rhinitis     Anemia     Asthma     CAD (coronary artery disease) 4/11/2014    Chest tightness, discomfort, or pressure 6/17/2013 6/17/2013  lexiscan Positive for inferior myocardial ischemia, EF 47%, 9% ischemic myocardium on stress, intermediate risk findings, AUC indication 16, AUC score 7     Chronic back pain     COPD (chronic obstructive pulmonary disease) ([facility] [address]     Ex-cigarette smoker 10/27/2015    Family history of early CAD 6/24/2013    Family history of premature CAD     GERD (gastroesophageal reflux disease)     History of hernia repair     Hyperlipidemia     [facility]    Hypertension     Multiple lung nodules     Neuropathy     Nicotine abuse     Nicotine abuse     Osteoarthritis     Peripheral vascular disease (HCC)     Restless legs syndrome     SOB (shortness of breath)     Thyroid nodule     Unspecified sleep apnea     can't wear cpap     Past Surgical History:   Procedure Laterality Date    APPENDECTOMY      CARDIAC CATHETERIZATION  6/24/2013  JDT    EF 50%    CARDIAC CATHETERIZATION  10/27/15  JDT    EF 50%    COLONOSCOPY  20 yrs ago    not sure who, [facility] @ [facility]    COLONOSCOPY  09/2014    TAx2, HP, intern hemorrhoids (3 yr)   [place]      with mesh-Dr Pendleton Blades    [place] COLSC FLX W/REMOVAL LESION BY HOT BX FORCEPS  9/21/2017    Dr Shelton-internal hemorrhoids, tubular AP (-) dysplasia--5 yr recall    [place] EGD TRANSORAL BIOPSY SINGLE/MULTIPLE N/A 10/19/2017    Dr Shelton-normal-Esme (-) chronic nonspecific inflammation    THYROIDECTOMY      UPPER GASTROINTESTINAL ENDOSCOPY      thinks so, but a long time ago if so    UPPER GASTROINTESTINAL ENDOSCOPY  02/11/2013    Cat: Grade A esophagitis and esophageal stricture.  Dilated 54fr    VASCULAR SURGERY  07/11/2017    SJS. Right IJV US 9f sheath.  VASCULAR SURGERY Left 08/17/2017    SJS-L GSV RFA     Family History   Problem Relation Age of Onset    Cancer Mother         lung and brain    Coronary Art Dis Mother     Coronary Art Dis Sister     Colon Cancer Sister     Colon Polyps Sister     Coronary Art Dis Father     Colon Cancer Maternal Grandmother     Colon Polyps Maternal Grandmother     Colon Cancer Sister     Colon Polyps Sister    Aranza Willa Cancer Sister         \"female cancer\"   Aranza Willa Cancer Sister         thryoid    Coronary Art Dis Brother     Heart Failure Brother     Esophageal Cancer Neg Hx     Liver Cancer Neg Hx     Stomach Cancer Neg Hx      Social History     Tobacco Use    Smoking status: Current Some Day Smoker     Packs/day: 0.00     Types: Cigarettes    Smokeless tobacco: Never Used   Substance Use Topics    Alcohol use: No     Alcohol/week: 0.0 oz      Current Outpatient Medications   Medication Sig Dispense Refill    isosorbide mononitrate (IMDUR) 30 MG extended release tablet Take 1 tablet by mouth nightly 90 tablet 3    metoprolol succinate (TOPROL XL) 25 MG extended release tablet TAKE ONE TABLET BY MOUTH 2 TIMES A  tablet 3    gabapentin (NEURONTIN) 300 MG capsule Take 3 tablets twice daily      lidocaine (LIDODERM) 5 % Place 1 patch onto the skin daily 12 hours on, 12 hours off. 30 patch 0    esomeprazole (NEXIUM) 40 MG delayed release capsule Take 40 mg by mouth daily       nitroGLYCERIN (NITROLINGUAL) 0.4 MG/SPRAY spray Place 1 spray under the tongue every 5 minutes as needed for Chest pain 1 Bottle 3    aspirin 325 MG tablet Take 325 mg by mouth daily      topiramate (TOPAMAX) 100 MG tablet Take 100 mg by mouth daily       beclomethasone (QVAR) 80 MCG/ACT inhaler Inhale 1 puff into the lungs 2 times daily       albuterol (PROAIR HFA) 108 (90 BASE) MCG/ACT inhaler Inhale 2 puffs into the lungs every 4 hours as needed.       tiotropium (SPIRIVA HANDIHALER) 18 MCG inhalation capsule Inhale 18 mcg into the lungs daily.  fluticasone-salmeterol (ADVAIR DISKUS) 500-50 MCG/DOSE diskus inhaler Inhale 1 puff into the lungs every 12 hours        No current facility-administered medications for this visit. Allergies: Bactrim [sulfamethoxazole-trimethoprim]; Nsaids; Succinylcholine; and Betadine [povidone iodine]    Review of Systems  Constitutional - no appetite change, or unexpected weight change. No fever, chills or diaphoresis. No significant change in activity level or new onset of fatigue. HEENT - no significant rhinorrhea or epistaxis. No tinnitus or significant hearing loss. Eyes - no sudden vision change or amaurosis. No corneal arcus, xantholasma, subconjunctival hemorrhage or discharge. Respiratory - no significant wheezing, stridor, apnea or cough. No dyspnea on exertion or shortness of air. Cardiovascular - no exertional chest pain to suggest myocardial ischemia. No orthopnea or PND. No sensation of sustained arrythmia. No occurrence of slow heart rate. No palpitations. No claudication. No leg edema. Gastrointestinal - no abdominal swelling or pain. No blood in stool. No severe constipation, diarrhea, nausea, or vomiting. Genitourinary - no dysuria, frequency, or urgency. No flank pain or hematuria. Musculoskeletal - no back pain or myalgia. No problems with gait. Extremities - no clubbing, cyanosis or edema. Skin - no color change or rash. No pallor. No new surgical incision. Neurologic - no speech difficulty, facial asymmetry or lateralizing weakness. No seizures, presyncope or syncope. No significant dizziness. Hematologic - no easy bruising or excessive bleeding. Psychiatric - no severe anxiety or insomnia. No confusion. All other review of systems are negative.     Objective  Vital Signs - /68   Pulse 68   Ht 6' 2\" (1.88 m)   Wt 252 lb (114.3 kg)   BMI 32.35 kg/m²   General - Cate Godinez is as prescribed. Continue to follow up with primary care provider for non cardiac medical problems. Call the office with any problems, questions or concerns at [phone]. Follow up as scheduled with your cardiologist. Dr. Cloyde Spurling 2 months. The following educational material has been included in this after visit summary for your review: Life simple 7. Heart health.     Additional instructions:  Coronary artery disease risk factors you can control: Smoking, high blood pressure, high cholesterol, diabetes, being overweight, lack of exercise and stress. Continue heart healthy diet. Take medications as directed. Exercise as tolerated. Strive for 15 minutes of exercise most days of the week. If asked to keep a blood pressure log, do so for 2 weeks. Call the office to report readings at [phone]. Blood pressure goal  is less than 120/70. Elevated blood pressure at 120-129/80 or less. High blood pressure at 130-139/80-89. If you are taking cholesterol lowering medications, it is recommended that lab work be checked annually. Always keep a current medication list. Bring your medications to every office visit. Life simple 7  1) Manage blood pressure - high blood pressure is a major risk factor for heart disease and stroke. Keeping blood pressure in health range reduces strain on your heart, arteries and kidneys. 2) Control cholesterol - contributes to plaque, which can clog arteries and lead to heart disease and stroke. When you control your cholesterol you are giving your arteries their best chance to remain clear. 3) Reduce blood sugar - most of the food we eat is turning into glucose or blood sugar that our body uses for energy. Over time, high levels of blood sugar can damage your heart, kidneys, eyes and nerves. 4) Get active - living an active life is one of the most rewarding gifts you can give yourself and those you love.   Simply put, daily physical activity increases your length and quality of life. 5)  Eat better - A healthy diet is one of your best weapons for fighting cardiovascular disease. When you eat a heart healthy diet, you improve your chances for feeling good and staying healthy for life. 6)  Lose weight - when you shed extra fat an unnecessary pounds, you reduce the burden on your hear, lungs, blood vessels and skeleton. You give yourself the gift of active living, you lower your blood pressure and help yourself feel better. 7) Stop smoking - cigarette smokers have a higher risk of developing cardiovascular disease. If  You smoke, quitting is the best thing you can do for your health. Check American Heart Association on line for more information on Life's Simple 7 and tips for healthy living.     How to take:  NITROGLYCERIN (Nitrostat) 0.4 mg tablets, sublingual.  Nitroglycerin is in a group of drugs called nitrates. Nitroglycerin dilates (widens) blood vessels, making it easier for blood to flow through them and easier for the heart to pump. Dosing Guidelines for Nitroglycerin Tablets  · At the start of an angina (chest pain) attack, place one tablet under the tongue or between the cheek and gum. Do not swallow or chew the tablet; let it dissolve on its own. If necessary, a second and third tablet may be used, with five minutes between using each tablet. If you use a third tablet and your chest pain continues, it is time to seek immediate medical attention. Call 911 immediately and have someone drive you to the emergency room. You may be having a heart attack or other serious heart problem. · To prevent angina from exercise or stress, use 1 tablet 5 to 10 minutes before the activity.      MARGY Melvin

## 2019-10-31 ENCOUNTER — HOSPITAL ENCOUNTER (OUTPATIENT)
Dept: ULTRASOUND IMAGING | Age: 69
Discharge: HOME | End: 2019-10-31
Payer: COMMERCIAL

## 2019-10-31 DIAGNOSIS — R10.11 ABDOMINAL PAIN, RIGHT UPPER QUADRANT: ICD-10-CM

## 2019-10-31 LAB
ALBUMIN SERPL-MCNC: 4.1 G/DL (ref 3.5–5.2)
ALP BLD-CCNC: 65 U/L (ref 40–130)
ALT SERPL-CCNC: 11 U/L (ref 5–41)
AMYLASE: 37 U/L (ref 28–100)
ANION GAP SERPL CALCULATED.3IONS-SCNC: 14 MMOL/L (ref 7–19)
AST SERPL-CCNC: 16 U/L (ref 5–40)
BASOPHILS ABSOLUTE: 0.1 K/UL (ref 0–0.2)
BASOPHILS RELATIVE PERCENT: 0.7 % (ref 0–1)
BILIRUB SERPL-MCNC: 0.4 MG/DL (ref 0.2–1.2)
BUN BLDV-MCNC: 18 MG/DL (ref 8–23)
C-REACTIVE PROTEIN: 0.2 MG/DL (ref 0–0.5)
CALCIUM SERPL-MCNC: 9.5 MG/DL (ref 8.8–10.2)
CHLORIDE BLD-SCNC: 101 MMOL/L (ref 98–111)
CHOLESTEROL, TOTAL: 189 MG/DL (ref 160–199)
CO2: 25 MMOL/L (ref 22–29)
CREAT SERPL-MCNC: 0.8 MG/DL (ref 0.5–1.2)
EOSINOPHILS ABSOLUTE: 0.2 K/UL (ref 0–0.6)
EOSINOPHILS RELATIVE PERCENT: 2.4 % (ref 0–5)
GFR NON-AFRICAN AMERICAN: >60
GLUCOSE BLD-MCNC: 100 MG/DL (ref 74–109)
HCT VFR BLD CALC: 44.1 % (ref 42–52)
HDLC SERPL-MCNC: 46 MG/DL (ref 55–121)
HEMOGLOBIN: 14.4 G/DL (ref 14–18)
IMMATURE GRANULOCYTES #: 0 K/UL
LDL CHOLESTEROL CALCULATED: 117 MG/DL
LIPASE: 23 U/L (ref 13–60)
LYMPHOCYTES ABSOLUTE: 1.8 K/UL (ref 1.1–4.5)
LYMPHOCYTES RELATIVE PERCENT: 24.3 % (ref 20–40)
MCH RBC QN AUTO: 31.7 PG (ref 27–31)
MCHC RBC AUTO-ENTMCNC: 32.7 G/DL (ref 33–37)
MCV RBC AUTO: 97.1 FL (ref 80–94)
MONOCYTES ABSOLUTE: 0.9 K/UL (ref 0–0.9)
MONOCYTES RELATIVE PERCENT: 11.8 % (ref 0–10)
NEUTROPHILS ABSOLUTE: 4.5 K/UL (ref 1.5–7.5)
NEUTROPHILS RELATIVE PERCENT: 60.3 % (ref 50–65)
PDW BLD-RTO: 12.9 % (ref 11.5–14.5)
PLATELET # BLD: 243 K/UL (ref 130–400)
PMV BLD AUTO: 10.1 FL (ref 9.4–12.4)
POTASSIUM SERPL-SCNC: 4.5 MMOL/L (ref 3.5–5)
PROSTATE SPECIFIC ANTIGEN: 1.15 NG/ML (ref 0–4)
RBC # BLD: 4.54 M/UL (ref 4.7–6.1)
SEDIMENTATION RATE, ERYTHROCYTE: 4 MM/HR (ref 0–15)
SODIUM BLD-SCNC: 140 MMOL/L (ref 136–145)
TOTAL CK: 156 U/L (ref 39–308)
TOTAL PROTEIN: 6.7 G/DL (ref 6.6–8.7)
TRIGL SERPL-MCNC: 132 MG/DL (ref 0–149)
TSH SERPL DL<=0.05 MIU/L-ACNC: 1.22 UIU/ML (ref 0.27–4.2)
URIC ACID, SERUM: 3.5 MG/DL (ref 3.4–7)
WBC # BLD: 7.4 K/UL (ref 4.8–10.8)

## 2019-10-31 PROCEDURE — 76705 ECHO EXAM OF ABDOMEN: CPT

## 2019-11-18 ENCOUNTER — OFFICE VISIT (OUTPATIENT)
Dept: CARDIOLOGY | Age: 69
End: 2019-11-18
Payer: COMMERCIAL

## 2019-11-18 VITALS
HEART RATE: 80 BPM | HEIGHT: 74 IN | SYSTOLIC BLOOD PRESSURE: 116 MMHG | WEIGHT: 238 LBS | DIASTOLIC BLOOD PRESSURE: 76 MMHG | BODY MASS INDEX: 30.54 KG/M2

## 2019-11-18 DIAGNOSIS — I25.10 CORONARY ARTERY DISEASE INVOLVING NATIVE CORONARY ARTERY OF NATIVE HEART WITHOUT ANGINA PECTORIS: Primary | ICD-10-CM

## 2019-11-18 DIAGNOSIS — R07.89 ATYPICAL CHEST PAIN: ICD-10-CM

## 2019-11-18 DIAGNOSIS — I10 ESSENTIAL HYPERTENSION: ICD-10-CM

## 2019-11-18 DIAGNOSIS — F17.210 CIGARETTE NICOTINE DEPENDENCE WITHOUT COMPLICATION: ICD-10-CM

## 2019-11-18 PROCEDURE — G8427 DOCREV CUR MEDS BY ELIG CLIN: HCPCS | Performed by: CLINICAL NURSE SPECIALIST

## 2019-11-18 PROCEDURE — 99213 OFFICE O/P EST LOW 20 MIN: CPT | Performed by: CLINICAL NURSE SPECIALIST

## 2019-11-18 PROCEDURE — 3017F COLORECTAL CA SCREEN DOC REV: CPT | Performed by: CLINICAL NURSE SPECIALIST

## 2019-11-18 PROCEDURE — 4004F PT TOBACCO SCREEN RCVD TLK: CPT | Performed by: CLINICAL NURSE SPECIALIST

## 2019-11-18 PROCEDURE — 1123F ACP DISCUSS/DSCN MKR DOCD: CPT | Performed by: CLINICAL NURSE SPECIALIST

## 2019-11-18 PROCEDURE — 99406 BEHAV CHNG SMOKING 3-10 MIN: CPT | Performed by: CLINICAL NURSE SPECIALIST

## 2019-11-18 PROCEDURE — G8484 FLU IMMUNIZE NO ADMIN: HCPCS | Performed by: CLINICAL NURSE SPECIALIST

## 2019-11-18 PROCEDURE — G8598 ASA/ANTIPLAT THER USED: HCPCS | Performed by: CLINICAL NURSE SPECIALIST

## 2019-11-18 PROCEDURE — 4040F PNEUMOC VAC/ADMIN/RCVD: CPT | Performed by: CLINICAL NURSE SPECIALIST

## 2019-11-18 PROCEDURE — G8417 CALC BMI ABV UP PARAM F/U: HCPCS | Performed by: CLINICAL NURSE SPECIALIST

## 2019-11-18 ASSESSMENT — ENCOUNTER SYMPTOMS
CHEST TIGHTNESS: 1
FACIAL SWELLING: 0
COUGH: 0
SHORTNESS OF BREATH: 0
VOMITING: 0
ABDOMINAL PAIN: 0
NAUSEA: 0
WHEEZING: 0
EYE REDNESS: 0

## 2020-02-19 ENCOUNTER — OFFICE VISIT (OUTPATIENT)
Dept: NEUROLOGY | Facility: CLINIC | Age: 70
End: 2020-02-19

## 2020-02-19 ENCOUNTER — LAB (OUTPATIENT)
Dept: LAB | Facility: HOSPITAL | Age: 70
End: 2020-02-19

## 2020-02-19 VITALS
HEIGHT: 74 IN | HEART RATE: 61 BPM | OXYGEN SATURATION: 98 % | SYSTOLIC BLOOD PRESSURE: 98 MMHG | DIASTOLIC BLOOD PRESSURE: 60 MMHG | WEIGHT: 234 LBS | BODY MASS INDEX: 30.03 KG/M2

## 2020-02-19 DIAGNOSIS — M25.50 POLYARTHRALGIA: ICD-10-CM

## 2020-02-19 DIAGNOSIS — G95.9 MYELOPATHY (HCC): ICD-10-CM

## 2020-02-19 DIAGNOSIS — Z74.09 IMPAIRED FUNCTIONAL MOBILITY, BALANCE, GAIT, AND ENDURANCE: Primary | ICD-10-CM

## 2020-02-19 DIAGNOSIS — M62.522 ATROPHY OF MUSCLE OF LEFT UPPER ARM: ICD-10-CM

## 2020-02-19 DIAGNOSIS — G25.0 ESSENTIAL TREMOR: ICD-10-CM

## 2020-02-19 PROCEDURE — 85652 RBC SED RATE AUTOMATED: CPT | Performed by: PHYSICIAN ASSISTANT

## 2020-02-19 PROCEDURE — 86200 CCP ANTIBODY: CPT | Performed by: PHYSICIAN ASSISTANT

## 2020-02-19 PROCEDURE — 99204 OFFICE O/P NEW MOD 45 MIN: CPT | Performed by: PHYSICIAN ASSISTANT

## 2020-02-19 PROCEDURE — 36415 COLL VENOUS BLD VENIPUNCTURE: CPT

## 2020-02-19 PROCEDURE — 86431 RHEUMATOID FACTOR QUANT: CPT | Performed by: PHYSICIAN ASSISTANT

## 2020-02-19 RX ORDER — PRIMIDONE 50 MG/1
25 TABLET ORAL NIGHTLY
Qty: 30 TABLET | Refills: 1 | Status: SHIPPED | OUTPATIENT
Start: 2020-02-19 | End: 2021-02-18

## 2020-02-19 RX ORDER — TAMSULOSIN HYDROCHLORIDE 0.4 MG/1
1 CAPSULE ORAL DAILY
COMMUNITY
End: 2022-09-30

## 2020-02-19 NOTE — PROGRESS NOTES
Neurology Consult Note    Referring Provider: Josef Mac MD    Reason for Consultation:    Tremor  Frequent falls    Subjective     History of Present Illness:  This is a very pleasant 69-year-old right-hand-dominant male routinely cared for by Josef House MD and Orlando Patel MD, who is accompanied to the office today by his wife.  The patient has a longstanding history of tremor affecting primarily the right greater than left upper extremity.  The patient is retired from concrete construction work.  However, over the last several years, he has noticed increasing weakness of both upper extremities more so in the left shoulder region than anywhere else.  He has significant deformity of the hands, as detailed below, consistent with rheumatoid deformity, however, states he has never been evaluated for rheumatologic disease.    The patient states that he has difficulty with balance and coordination.  He has frequent falls because of weakness in his legs and inability to coordinate his gait.  Additionally, he has urinary incontinence, however, has been seeing a urological specialist in [place] for what was felt to be prostate disease.    The patient has never tried any medication for the tremor.  He has never had any other evaluation or any other neuroimaging.      Past Medical History:   Diagnosis Date   • Arthritis    • COPD (chronic obstructive pulmonary disease) (CMS/[facility])    • Coronary artery disease    • Enlarged prostate    • Full dentures    • GERD (gastroesophageal reflux disease)    • Hearing loss    • Heart abnormality    • Hypertension    • Lung nodule     3 IN 1 LUNG AND 4 IN THE OTHER AND STATES IT IS SLOW GROWING   • Neuropathic pain    • Sleep apnea     DOES NOT WEAR C PAP       Allergies   Allergen Reactions   • Bactrim [Sulfamethoxazole-Trimethoprim] GI Intolerance   • Nsaids Shortness Of Breath   • Betadine [Povidone Iodine] Hives   • Succinylcholine Other (See Comments)     PARALYSIS  STARTING AT FEET TO WAIST.HECTOR IN HOLDING NOTIFIED, KRISTIN IN SURGERY NOTIFIED     Current Outpatient Medications on File Prior to Visit   Medication Sig   • albuterol (PROAIR HFA) 108 (90 BASE) MCG/ACT inhaler Inhale 2 puffs Every 4 (Four) Hours As Needed for shortness of air.   • aspirin 325 MG tablet Take 325 mg by mouth daily   • atorvastatin (LIPITOR) 40 MG tablet Take 1 tablet by mouth daily   • beclomethasone (QVAR) 80 MCG/ACT inhaler Inhale 1 puff into the lungs 2 times daily.   • fluticasone-salmeterol (ADVAIR DISKUS) 500-50 MCG/DOSE DISKUS Every 12 (Twelve) Hours.   • gabapentin (NEURONTIN) 300 MG capsule Take 600 mg by mouth 3 (Three) Times a Day. 2 capsules BID and 3 capsules at bedtime   • isosorbide mononitrate (IMDUR) 60 MG 24 hr tablet Take 1 tablet by mouth daily   • metoprolol succinate XL (TOPROL XL) 50 MG 24 hr tablet Take 25 mg by mouth Daily.   • nitroglycerin (NITROLINGUAL) 0.4 MG/SPRAY spray Place 1 spray under the tongue every 5 minutes as needed for Chest pain   • tamsulosin (FLOMAX) 0.4 MG capsule 24 hr capsule Take 1 capsule by mouth Daily.   • tiotropium (SPIRIVA HANDIHALER) 18 MCG per inhalation capsule Inhale 18 mcg into the lungs daily.   • topiramate (TOPAMAX) 100 MG tablet Take 125 mg by mouth Daily. TAKES 125 MG DAILY DIVIDED OVER SEVERAL DOSES PER DAY   • HYDROcodone-acetaminophen (NORCO) 5-325 MG per tablet Take 1 tablet by mouth Every 6 (Six) Hours As Needed for moderate pain (4-6) (LELG AND FEET PAIN).   • oxybutynin XL (DITROPAN-XL) 5 MG 24 hr tablet Take 1 tablet by mouth Daily.   • [DISCONTINUED] esomeprazole (NexIUM) 10 MG packet Take 40 mg by mouth daily    • [DISCONTINUED] phenazopyridine (PYRIDIUM) 100 MG tablet Take 1 tablet by mouth 3 (Three) Times a Day As Needed for bladder spasms.   • [DISCONTINUED] ranolazine (RANEXA) 500 MG 12 hr tablet Take 1 tablet by mouth 2 times daily     No current facility-administered medications on file prior to visit.        Social  History     Socioeconomic History   • Marital status:      Spouse name: Not on file   • Number of children: Not on file   • Years of education: Not on file   • Highest education level: Not on file   Tobacco Use   • Smoking status: Current Some Day Smoker     Packs/day: 0.25     Types: Cigars   • Smokeless tobacco: Never Used   Substance and Sexual Activity   • Alcohol use: Yes     Alcohol/week: 1.0 standard drinks     Types: 1 Cans of beer per week   • Drug use: No   • Sexual activity: Defer     Family History   Problem Relation Age of Onset   • No Known Problems Father    • No Known Problems Mother        Review of Systems  A 14 point review of systems was reviewed and was negative.    Objective      Vital Signs  Heart Rate:  [61] 61  BP: (98)/(60) 98/60    General Exam:  Head:  Normocephalic, atraumatic.  HEENT: PERRLA.  Full EOM.  Neck:  No lymphadenopathy, thyromegaly or bruit.  Cardiac:  Regular rate and rhythm.  Normal S1, S2.  No murmur, rub or gallop.  Lungs:  Clear to auscultation bilaterally.  No wheeze, rales or rhonchi.  Abdomen:  Non-tender, Non-distended.  Bowel sounds normoactive.  Extremities: Full peripheral pulses.  Significant ulnar deviation of the digits of the right hand with what appears to be erosive deformity of the MCP joints of the 2nd-4th digits.  Significant deformity of the left hand as well in the fifth digit, however, some of this is from previous trauma.  Significant arthritic deformity of the bilateral wrists.  Fine essential tremor of both upper extremities more so on the right than the left.  It is relatively minimal and with little intentional component.  Skin: No ulceration, breakdown or rash.      Neurologic Exam:  Mental Status:    -Awake. Alert. Oriented to person, place & time.  -No word finding difficulties.  -No aphasia.  -No dysarthria.  -Follows simple commands.     CN II:  Full visual fields with confrontation.  Pupils equally reactive to light.  CN III, IV, VI:   Extraocular muscles function intact with no nystagmus.  CN V:  Facial sensory is symmetric.  CN VII:  Facial motor symmetric.  CN VIII:  Gross hearing intact bilaterally.  CN IX/X:  Palate elevates symmetrically.  CN XI:  Shoulder shrug symmetric.  CN XII:  Tongue is midline on protrusion.     Motor: (strength out of 5:  1= minimal movement, 2 = movement in plane of gravity, 3 = movement against gravity, 4 = movement against some resistance, 5 = full strength)     -4-/5 in bilateral biceps, triceps, brachioradialis, wrist extensors and intrinsic muscles of the hand.  Significant atrophy of the right deltoid and right pectoralis major and minor musculature.    -4-/5 in bilateral hip flexors, quadriceps, hamstrings, gastrocsoleus complex, anterior tibialis and extensor hallucis longus.       Deep Tendon Reflexes:  Deep tendon reflexes are diminished throughout the bilateral upper and lower extremities.  They are essentially imperceptible.  There is no Seo's or clonus in both upper or lower extremities, respectively.    Tone (Modified Ruth Scale):  No appreciable increase in tone or rigidity noted.     Sensory:  -Intact to light touch, pinprick BUE (C5-T1) and BLE (L2-S1).     Coordination:  -Finger to nose intact BUEs  -Heel to shin intact BLEs  -No ataxia     Gait  -No signs of ataxia  -Patient cannot walk heel-to-toe.  He is unable to balance.  His gait appears to be markedly myelopathic.  He walks in an awkward wide-stance gait with almost steppage of each extremity due to uncertainty of where his lower extremity is planted.  He stands with his arm spread outwards and in preparation of losing his balance or falling so that he can grab onto objects.    Results Review:  No components found for: A1C  Lab Results   Component Value Date    HDL 46 (L) 10/31/2019     10/31/2019     No components found for: B12  Lab Results   Component Value Date    TSH 1.220 10/31/2019       Assessment/Plan      Impression:  1.  Essential tremor  2.  Impaired gait & mobility  3.  Polyarthralgia (likely rheumatoid arthritis, undiagnosed)  4.  Myelopathic gait  5.  Left upper extremity atrophy  6.  Urinary incontinence      Plan:  1.  Mysoline 50 mg, half tablet nightly for tremor.  Follow-up with me in the next few weeks to evaluate initial tolerability and clinical response.  Discussed potential side effects of medication.  2.  To help save the patient a few steps, I am arranging for MRI of the cervical spine to rule out cervical spondylotic myelopathy as the patient has atrophy of the left upper extremity diffuse 4 extremity weakness as well as impaired gait, balance and what appears to be a myelopathic gait.  Although patient has no hyperreflexia, he has markedly diminished reflexes throughout and this may be due to cervical spine disease.  Also in the setting of rheumatoid arthritis which is presumed, a pannus deformity can also occur high in the cervical spine causing this to occur.  3.  Rheumatoid factor, anti-CCP, sed rate and CRP obtained.  4.  Referral for outpatient physical therapy for gait and balance training, decrease fall risk, upper and lower extremity strengthening, etc.  5.  Patient will follow-up with me after the above studies are completed which time we will discuss further available treatment options.  Most likely, the patient needs to be evaluated by rheumatology.  I do not see any signs that appear parkinsonian with the patient in terms of his tremor and falls.  I believe they are separate issues as detailed above.  His tremor should respond favorably with the Mysoline (primidone).  Hopefully we will see some minimal improvement with physical therapy and I will be anxious to see the findings of his MRI of the cervical spine.    We reviewed pertinent diagnostic studies and the potential risks and benefits of the prescribed regimen of treatment.    We discussed compliance of the prescribed treatment  regimen and instructions on medication, therapy, physical activity, etc. and potential for improvement and impact these have on their healing/recovery and risk reduction in the future.    I spent greater than 45 minutes in direct face to face contact with the patient with greater than 50% of the time being spent in education and counseling.    Thank you, Dr. Mac, for the referral and opportunity to participate the care of your patient.  Please call with any concerns or questions.    Hero Henderson PA-C  02/19/20  4:26 PM

## 2020-02-20 LAB
CHROMATIN AB SERPL-ACNC: 33.8 IU/ML (ref 0–14)
ERYTHROCYTE [SEDIMENTATION RATE] IN BLOOD: 3 MM/HR (ref 0–20)

## 2020-02-21 LAB — CCP IGA+IGG SERPL IA-ACNC: 8 UNITS (ref 0–19)

## 2020-03-02 ENCOUNTER — HOSPITAL ENCOUNTER (OUTPATIENT)
Dept: MRI IMAGING | Facility: HOSPITAL | Age: 70
Discharge: HOME | End: 2020-03-02
Admitting: PHYSICIAN ASSISTANT

## 2020-03-02 DIAGNOSIS — G25.0 ESSENTIAL TREMOR: ICD-10-CM

## 2020-03-02 DIAGNOSIS — Z74.09 IMPAIRED FUNCTIONAL MOBILITY, BALANCE, GAIT, AND ENDURANCE: ICD-10-CM

## 2020-03-02 DIAGNOSIS — M25.50 POLYARTHRALGIA: ICD-10-CM

## 2020-03-02 DIAGNOSIS — G95.9 MYELOPATHY (HCC): ICD-10-CM

## 2020-03-02 DIAGNOSIS — M62.522 ATROPHY OF MUSCLE OF LEFT UPPER ARM: ICD-10-CM

## 2020-03-02 PROCEDURE — 72141 MRI NECK SPINE W/O DYE: CPT

## 2020-03-03 ENCOUNTER — TELEPHONE (OUTPATIENT)
Dept: NEUROLOGY | Facility: CLINIC | Age: 70
End: 2020-03-03

## 2020-03-03 DIAGNOSIS — M50.30 DDD (DEGENERATIVE DISC DISEASE), CERVICAL: Primary | ICD-10-CM

## 2020-03-03 NOTE — TELEPHONE ENCOUNTER
Spoke with pt wife and advised of test results and referral to neurosurgery. Pt wife verbalizes understanding.

## 2020-03-03 NOTE — TELEPHONE ENCOUNTER
----- Message from Hero Henderson PA-C sent at 3/3/2020 11:33 AM CST -----  Please call the patient regarding his abnormal result.  Recommend referral to neurosurgery for consultation.  This demonstrates multilevel cervical degenerative disc disease which may be contributory to his upper extremity atrophy and weakness.  Also is likely contributory to his bilateral arm pain.  I do not believe it is paramount to have an EMG of the upper extremities at this time.  I doubt that he is going to be a surgical candidate given his other medical comorbidities, but would still like surgical opinion.

## 2020-05-08 ENCOUNTER — TELEPHONE (OUTPATIENT)
Dept: NEUROSURGERY | Facility: CLINIC | Age: 70
End: 2020-05-08

## 2020-05-11 NOTE — TELEPHONE ENCOUNTER
Dr Snowden reviewed MRI and states nothing emergent on the scan.  Please give patient the next available appointment with Dr Snowden and place him on the cancellation list.      zack saldaña CMA

## 2020-05-11 NOTE — TELEPHONE ENCOUNTER
Pt called back and is scheduled to see Dr Snowden on 7/16. Told him that is also on the cancellation list

## 2020-06-10 ENCOUNTER — TRANSCRIBE ORDERS (OUTPATIENT)
Dept: ADMINISTRATIVE | Facility: HOSPITAL | Age: 70
End: 2020-06-10

## 2020-06-10 DIAGNOSIS — R91.1 PULMONARY NODULE: Primary | ICD-10-CM

## 2020-06-15 ENCOUNTER — HOSPITAL ENCOUNTER (OUTPATIENT)
Dept: CT IMAGING | Facility: HOSPITAL | Age: 70
Discharge: HOME | End: 2020-06-15
Admitting: FAMILY MEDICINE

## 2020-06-15 ENCOUNTER — HOSPITAL ENCOUNTER (OUTPATIENT)
Dept: CT IMAGING | Facility: HOSPITAL | Age: 70
Discharge: HOME | End: 2020-06-15

## 2020-06-15 DIAGNOSIS — R91.1 PULMONARY NODULE: ICD-10-CM

## 2020-06-15 PROCEDURE — 0 FLUDEOXYGLUCOSE F18 SOLUTION: Performed by: FAMILY MEDICINE

## 2020-06-15 PROCEDURE — A9552 F18 FDG: HCPCS | Performed by: FAMILY MEDICINE

## 2020-06-15 PROCEDURE — 78815 PET IMAGE W/CT SKULL-THIGH: CPT

## 2020-06-15 RX ADMIN — FLUDEOXYGLUCOSE F18 1 DOSE: 300 INJECTION INTRAVENOUS at 08:41

## 2020-06-19 ENCOUNTER — OFFICE VISIT (OUTPATIENT)
Dept: NEUROLOGY | Facility: CLINIC | Age: 70
End: 2020-06-19

## 2020-06-19 VITALS
OXYGEN SATURATION: 99 % | SYSTOLIC BLOOD PRESSURE: 118 MMHG | BODY MASS INDEX: 27.85 KG/M2 | WEIGHT: 217 LBS | HEIGHT: 74 IN | HEART RATE: 64 BPM | DIASTOLIC BLOOD PRESSURE: 70 MMHG

## 2020-06-19 DIAGNOSIS — M54.42 CHRONIC BILATERAL LOW BACK PAIN WITH BILATERAL SCIATICA: ICD-10-CM

## 2020-06-19 DIAGNOSIS — G89.29 CHRONIC BILATERAL LOW BACK PAIN WITH BILATERAL SCIATICA: ICD-10-CM

## 2020-06-19 DIAGNOSIS — M50.30 DDD (DEGENERATIVE DISC DISEASE), CERVICAL: ICD-10-CM

## 2020-06-19 DIAGNOSIS — M54.41 CHRONIC BILATERAL LOW BACK PAIN WITH BILATERAL SCIATICA: ICD-10-CM

## 2020-06-19 DIAGNOSIS — M05.79 RHEUMATOID ARTHRITIS INVOLVING MULTIPLE SITES WITH POSITIVE RHEUMATOID FACTOR (HCC): Primary | ICD-10-CM

## 2020-06-19 DIAGNOSIS — M54.12 CERVICAL RADICULOPATHY: ICD-10-CM

## 2020-06-19 PROCEDURE — 99214 OFFICE O/P EST MOD 30 MIN: CPT | Performed by: PHYSICIAN ASSISTANT

## 2020-06-19 RX ORDER — DULOXETIN HYDROCHLORIDE 30 MG/1
30 CAPSULE, DELAYED RELEASE ORAL 2 TIMES DAILY
Qty: 60 CAPSULE | Refills: 1 | Status: SHIPPED | OUTPATIENT
Start: 2020-06-19 | End: 2021-06-19

## 2020-06-19 NOTE — PROGRESS NOTES
Neurology Progress Note      Chief Complaint:    Rheumatoid arthritis  Bilateral arm and leg pain  Essential tremor  Cervical radiculopathy  Lung nodules  Bladder cancer    Subjective     Subjective:  Patient returns to clinic today for follow-up evaluation after finally undergoing MRI of the cervical spine which demonstrated multilevel cervical degenerative disc disease with varying degrees of central and foraminal stenosis throughout the cervical spine.  Patient continues to have severe bilateral arm pain that is crippling.  He has bilateral arm weakness.  He also has severe joint pains and was found to have a positive rheumatoid factor at the previous encounter.  We referred him to rheumatology, however, patient never heard anything from any office for follow-up, but did not contact me to let me know.  The patient continues to have severe pain throughout his body in his joints as well as radicular pain into bilateral upper extremities and primarily into the right lower extremity.  I did not evaluate her images lumbar spine as he did not report this so much at the previous encounter.  Since last being seen, the patient was diagnosed with bladder cancer and found to have 2 lung nodules.  PET scan done recently did not demonstrate any enhancement of the lung nodules, however.  The patient is awaiting further evaluation and treatment options regarding these 2 issues.      Past Medical History:   Diagnosis Date   • Arthritis    • COPD (chronic obstructive pulmonary disease) (CMS/HCC)    • Coronary artery disease    • Enlarged prostate    • Full dentures    • GERD (gastroesophageal reflux disease)    • Hearing loss    • Heart abnormality    • Hypertension    • Lung nodule     3 IN 1 LUNG AND 4 IN THE OTHER AND STATES IT IS SLOW GROWING   • Neuropathic pain    • Sleep apnea     DOES NOT WEAR C PAP     Past Surgical History:   Procedure Laterality Date   • APPENDECTOMY     • CATARACT EXTRACTION     • CYSTOSCOPY  TRANSURETHRAL RESECTION OF PROSTATE N/A 11/8/2016    Procedure: CYSTOSCOPY TRANSURETHRAL RESECTION OF PROSTATE;  Surgeon: Brad Bal MD;  Location: [facility] OR;  Service:    • SINUS SURGERY     • THYROIDECTOMY       Family History   Problem Relation Age of Onset   • No Known Problems Father    • No Known Problems Mother      Social History     Tobacco Use   • Smoking status: Current Some Day Smoker     Packs/day: 0.25     Types: Cigars   • Smokeless tobacco: Never Used   Substance Use Topics   • Alcohol use: Yes     Alcohol/week: 1.0 standard drinks     Types: 1 Cans of beer per week   • Drug use: No       Medications:  Current Outpatient Medications   Medication Sig Dispense Refill   • albuterol (PROAIR HFA) 108 (90 BASE) MCG/ACT inhaler Inhale 2 puffs Every 4 (Four) Hours As Needed for shortness of air.     • aspirin 325 MG tablet Take 325 mg by mouth daily     • atorvastatin (LIPITOR) 40 MG tablet Take 1 tablet by mouth daily     • beclomethasone (QVAR) 80 MCG/ACT inhaler Inhale 1 puff into the lungs 2 times daily.     • fluticasone-salmeterol (ADVAIR DISKUS) 500-50 MCG/DOSE DISKUS Every 12 (Twelve) Hours.     • gabapentin (NEURONTIN) 300 MG capsule Take 600 mg by mouth 3 (Three) Times a Day. 2 capsules BID and 3 capsules at bedtime     • HYDROcodone-acetaminophen (NORCO) 5-325 MG per tablet Take 1 tablet by mouth Every 6 (Six) Hours As Needed for moderate pain (4-6) (LELG AND FEET PAIN). 40 tablet 0   • isosorbide mononitrate (IMDUR) 60 MG 24 hr tablet Take 1 tablet by mouth daily     • metoprolol succinate XL (TOPROL XL) 50 MG 24 hr tablet Take 25 mg by mouth Daily.     • nitroglycerin (NITROLINGUAL) 0.4 MG/SPRAY spray Place 1 spray under the tongue every 5 minutes as needed for Chest pain     • oxybutynin XL (DITROPAN-XL) 5 MG 24 hr tablet Take 1 tablet by mouth Daily. 90 tablet 3   • primidone (MYSOLINE) 50 MG tablet Take 0.5 tablets by mouth Every Night. 30 tablet 1   • tamsulosin (FLOMAX) 0.4 MG  capsule 24 hr capsule Take 1 capsule by mouth Daily.     • tiotropium (SPIRIVA HANDIHALER) 18 MCG per inhalation capsule Inhale 18 mcg into the lungs daily.     • topiramate (TOPAMAX) 100 MG tablet Take 125 mg by mouth Daily. TAKES 125 MG DAILY DIVIDED OVER SEVERAL DOSES PER DAY     • DULoxetine (Cymbalta) 30 MG capsule Take 1 capsule by mouth 2 (Two) Times a Day. Begin with 30 mg daily for the first week and then increase to 60 mg daily, thereafter. 60 capsule 1     No current facility-administered medications for this visit.        Allergies:    Bactrim [sulfamethoxazole-trimethoprim]; Nsaids; Betadine [povidone iodine]; Iodinated diagnostic agents; and Succinylcholine    Review of Systems:   -A 14 point review of systems is completed and is negative.      Objective      Vital Signs  Heart Rate:  [64] 64  BP: (118)/(70) 118/70     General Exam:  Head:  Normocephalic, atraumatic.  HEENT: PERRLA.  Full EOM.  Neck:  No lymphadenopathy, thyromegaly or bruit.  Cardiac:  Regular rate and rhythm.  Normal S1, S2.  No murmur, rub or gallop.  Lungs:  Clear to auscultation bilaterally.  No wheeze, rales or rhonchi.  Abdomen:  Non-tender, Non-distended.  Bowel sounds normoactive.  Extremities: Full peripheral pulses.  Significant ulnar deviation of the digits of the right hand with what appears to be erosive deformity of the MCP joints of the 2nd-4th digits.  Significant deformity of the left hand as well in the fifth digit, however, some of this is from previous trauma.  Significant arthritic deformity of the bilateral wrists.  Fine essential tremor of both upper extremities more so on the right than the left.  It is relatively minimal and with little intentional component.  Skin: No ulceration, breakdown or rash.      Neurologic Exam:  Mental Status:    -Awake. Alert. Oriented to person, place & time.  -No word finding difficulties.  -No aphasia.  -No dysarthria.  -Follows simple commands.     CN II:  Full visual fields  with confrontation.  Pupils equally reactive to light.  CN III, IV, VI:  Extraocular muscles function intact with no nystagmus.  CN V:  Facial sensory is symmetric.  CN VII:  Facial motor symmetric.  CN VIII:  Gross hearing intact bilaterally.  CN IX/X:  Palate elevates symmetrically.  CN XI:  Shoulder shrug symmetric.  CN XII:  Tongue is midline on protrusion.     Motor: (strength out of 5:  1= minimal movement, 2 = movement in plane of gravity, 3 = movement against gravity, 4 = movement against some resistance, 5 = full strength)     -4-/5 in bilateral biceps, triceps, brachioradialis, wrist extensors and intrinsic muscles of the hand.  Significant atrophy of the right deltoid and right pectoralis major and minor musculature.     -4-/5 in bilateral hip flexors, quadriceps, hamstrings, gastrocsoleus complex, anterior tibialis and extensor hallucis longus.        Deep Tendon Reflexes:  Deep tendon reflexes are diminished throughout the bilateral upper and lower extremities.  They are essentially imperceptible.  There is no Seo's or clonus in both upper or lower extremities, respectively.     Tone (Modified Ruth Scale):  No appreciable increase in tone or rigidity noted.     Sensory:  -Intact to light touch, pinprick BUE (C5-T1) and BLE (L2-S1).     Coordination:  -Finger to nose intact BUEs  -Heel to shin intact BLEs  -No ataxia     Gait  -No signs of ataxia  -Patient cannot walk heel-to-toe.  He is unable to balance.  His gait appears to be markedly myelopathic.  He walks in an awkward wide-stance gait with almost steppage of each extremity due to uncertainty of where his lower extremity is planted.  He stands with his arm spread outwards and in preparation of losing his balance or falling so that he can grab onto objects.       Results Review:    I reviewed the patient's new clinical results and findings.      No components found for: A1C  Lab Results   Component Value Date    HDL 46 (L) 10/31/2019    LDL  117 10/31/2019     No components found for: B12  Lab Results   Component Value Date    TSH 1.220 10/31/2019       Assessment/Plan     Impression:  1.  Essential tremor  2.  Rheumatoid arthritis with positive rheumatoid factor  3.  Impaired gait & mobility  4.  Myelopathic and antalgic gait  5.  Left upper extremity atrophy  6.  Urinary incontinence  7.  Bladder cancer  8.  Multiple lung nodules      Plan:  1.  Continue Mysoline 50 mg nightly for tremor.  We will transition management of this to primary care, hereafter.    2.  Referral to neurosurgery for evaluation of cervical radiculopathy and multilevel cervical degenerative disc disease with varying levels of mild to moderate central and foraminal stenosis.  He is likely not an operative candidate, however, I would make sure that he does not become severely myelopathic given his gait, imbalance and history of urinary incontinence.  For some reason, I could not access the images today for review and am only able to go off of the radiology report.    3.  Referral to rheumatology.  I made a referral several months ago, however, the patient never heard anything.  We will work on this today to confirm an appointment for him for evaluation treatment of his rheumatoid arthritis.  He may be a candidate for treatment such as methotrexate to start.    4.  Start duloxetine 30 mg daily for 1 week and increase to 60 mg daily, thereafter.  I will then have him follow-up with primary care to titrate this further.  We will use this as a non-narcotic means to help reduce his pain and possible radicular pain as well.    5.  I asked that he speak with his physician at the [facility] and consider titration of the gabapentin upward to 800 mg 3 or 4 times daily to see if this further reduces some of his radicular pain.    6.  At this time, the patient is in too much pain to benefit greatly from physical therapy.  He remains physically active and working as he has a disabled,  autistic child and feels that he did continue to work and provide for his family.    From a neurology standpoint, the patient will follow-up with me on an as-needed basis.  I believe that he has much more pressing issues and most of his issues other than tremor are non-neurologic.  He needs to continue to follow for surveillance of his lung nodules and bladder cancer and his essential tremor is less of an issue.  The patient can call at any time and I be happy to reevaluate in the future, however, he will see me as needed.  The patient voices understanding and agreement with the plan of care.  We spent greater than 25 minutes of a 30-minute encounter in direct face-to-face consultation and counseling with education regarding his neuroimaging, medications, mechanism of action, side effects and follow-up plan of care.          Hero Henderson PA-C  06/19/20  10:03

## 2020-07-15 ENCOUNTER — TELEPHONE (OUTPATIENT)
Dept: NEUROSURGERY | Facility: CLINIC | Age: 70
End: 2020-07-15

## 2020-07-15 NOTE — TELEPHONE ENCOUNTER
Called to confirm patient's appt with Dr Snowden on Thursday 7/16/20 - no answer so left a message to call me back (mobile); patient's home # would not allow a call to go through.    Called patient's wife's #, had to leave a voicemail asking for a call back      zack saldaña CMA

## 2020-07-16 ENCOUNTER — OFFICE VISIT (OUTPATIENT)
Dept: NEUROSURGERY | Facility: CLINIC | Age: 70
End: 2020-07-16

## 2020-07-16 VITALS
HEIGHT: 74 IN | WEIGHT: 222.2 LBS | SYSTOLIC BLOOD PRESSURE: 134 MMHG | BODY MASS INDEX: 28.52 KG/M2 | DIASTOLIC BLOOD PRESSURE: 72 MMHG

## 2020-07-16 DIAGNOSIS — F17.210 CIGARETTE SMOKER: ICD-10-CM

## 2020-07-16 DIAGNOSIS — R26.9 ABNORMALITY OF GAIT: ICD-10-CM

## 2020-07-16 DIAGNOSIS — M50.30 DEGENERATION OF CERVICAL INTERVERTEBRAL DISC: Primary | ICD-10-CM

## 2020-07-16 DIAGNOSIS — M79.604 RIGHT LEG PAIN: ICD-10-CM

## 2020-07-16 DIAGNOSIS — M54.9 MULTILEVEL SPINE PAIN: ICD-10-CM

## 2020-07-16 PROCEDURE — 99203 OFFICE O/P NEW LOW 30 MIN: CPT | Performed by: NEUROLOGICAL SURGERY

## 2020-07-16 NOTE — PROGRESS NOTES
Patient: Luis Kam  : 1950    Primary Care Provider: Orlando Patel MD    Requesting Provider: Hero Henderson PA-C        History    Chief Complaint: Spine pain  Chief Complaint   Patient presents with   • NECK & ARM PAIN     patient had imaging @ [facility]; patient having some gait abnormality.  Patient states he has pain everywhere.  Patient states he has pain for several years.       History of Present Illness: 70-year-old gentleman presents with, nonspecific complaint of total spine pain.  He also is complaining of right hip pain and right leg pain.  He is a very difficult historian.  He has been seen at the orthopedic [facility] who is treating him for the pain in his hip and his right knee.  He does admit to neck pain but he also admits to thoracic spine pain and lumbar spine pain.  He does not admit to any upper extremity pain numbness or tingling or radicular symptoms.  He is recently been worked up for bladder cancer but he cannot offer a lot of information regarding that.  He does not admit to any physical therapy chiropractic care injection therapy or pain medication.    Review of Systems   Musculoskeletal: Positive for arthralgias, back pain, gait problem, myalgias and neck pain.   All other systems reviewed and are negative.      Past Medical History:   Past Medical History:   Diagnosis Date   • Arthritis    • COPD (chronic obstructive pulmonary disease) (CMS/HCC)    • Coronary artery disease    • Enlarged prostate    • Full dentures    • GERD (gastroesophageal reflux disease)    • Hearing loss    • Heart abnormality    • Hypertension    • Lung nodule     3 IN 1 LUNG AND 4 IN THE OTHER AND STATES IT IS SLOW GROWING   • Neuropathic pain    • Sleep apnea     DOES NOT WEAR C PAP       Past Surgical History:   Past Surgical History:   Procedure Laterality Date   • APPENDECTOMY     • CATARACT EXTRACTION     • CYSTOSCOPY TRANSURETHRAL RESECTION OF PROSTATE N/A 2016    Procedure: CYSTOSCOPY  TRANSURETHRAL RESECTION OF PROSTATE;  Surgeon: Brad Bal MD;  Location: [facility];  Service:    • SINUS SURGERY     • THYROIDECTOMY         Family History: family history includes No Known Problems in his father and mother.    Social History:  reports that he has been smoking cigars. He has been smoking about 0.25 packs per day. He has never used smokeless tobacco. He reports that he drinks about 1.0 standard drinks of alcohol per week. He reports that he does not use drugs.    Medications:    (Not in a hospital admission)    Allergies:  Bactrim [sulfamethoxazole-trimethoprim]; Betadine [povidone iodine]; Nsaids; Succinylcholine; and Iodinated diagnostic agents    Physical Exam:     Physical Exam   Constitutional: He is oriented to person, place, and time. He appears well-developed and well-nourished.   Cardiovascular: Normal rate and regular rhythm.   Pulmonary/Chest: No respiratory distress.   Abdominal: Soft. He exhibits no distension.   Neurological: He is oriented to person, place, and time. He has normal strength. He has a normal Finger-Nose-Finger Test.   Psychiatric: His speech is normal.       Neurologic Exam     Mental Status   Oriented to person, place, and time.   Attention: normal.   Speech: speech is normal   Level of consciousness: alert  Knowledge: good.     Cranial Nerves   Cranial nerves II through XII intact.     Motor Exam   Muscle bulk: normal  Overall muscle tone: normal  Right arm pronator drift: absent  Left arm pronator drift: absent    Strength   Strength 5/5 throughout.     Sensory Exam   Light touch normal.   Pinprick normal.     Gait, Coordination, and Reflexes     Gait  Gait: (Walks with a limp due to right hip pain and at times is difficult with balance and coordination.  Not obviously myelopathic.)    Coordination   Finger to nose coordination: normal    Tremor   Resting tremor: absent  Intention tremor: absent  Action tremor: absent    Reflexes   Reflexes 2+ except as  noted.         Independent Review of Radiographic Studies:   MRI of the cervical spine shows moderate spondylosis throughout with foraminal stenosis extending from C3 4-5 6.  No cord compression no cord signal change.    ASSESSMENT/PLAN: The patient is a difficult historian and complains of total spine pain.  He does have some gait and coordination issues which could be related to a neurologic condition.  I would recommend an MRI of the thoracic and lumbar spine with and without contrast given his history of recent cancer diagnosis.  We would be happy to see him in follow-up once that is completed.  1. Degeneration of cervical intervertebral disc    2. Multilevel spine pain    3. Right leg pain          No follow-ups on file.      Eddy Snowden MD

## 2020-07-29 ENCOUNTER — APPOINTMENT (OUTPATIENT)
Dept: MRI IMAGING | Facility: HOSPITAL | Age: 70
End: 2020-07-29

## 2020-08-06 ENCOUNTER — TELEPHONE (OUTPATIENT)
Dept: CARDIOLOGY | Age: 70
End: 2020-08-06

## 2020-08-07 ENCOUNTER — HOSPITAL ENCOUNTER (OUTPATIENT)
Dept: MRI IMAGING | Facility: HOSPITAL | Age: 70
Discharge: HOME | End: 2020-08-07

## 2020-08-07 ENCOUNTER — HOSPITAL ENCOUNTER (OUTPATIENT)
Dept: MRI IMAGING | Facility: HOSPITAL | Age: 70
Discharge: HOME | End: 2020-08-07
Admitting: NEUROLOGICAL SURGERY

## 2020-08-07 DIAGNOSIS — M79.604 RIGHT LEG PAIN: ICD-10-CM

## 2020-08-07 DIAGNOSIS — R26.9 ABNORMALITY OF GAIT: ICD-10-CM

## 2020-08-07 DIAGNOSIS — M54.9 MULTILEVEL SPINE PAIN: ICD-10-CM

## 2020-08-07 LAB — CREAT BLDA-MCNC: 0.9 MG/DL (ref 0.6–1.3)

## 2020-08-07 PROCEDURE — 82565 ASSAY OF CREATININE: CPT

## 2020-08-07 PROCEDURE — 72158 MRI LUMBAR SPINE W/O & W/DYE: CPT

## 2020-08-07 PROCEDURE — 0 GADOBENATE DIMEGLUMINE 529 MG/ML SOLUTION: Performed by: NEUROLOGICAL SURGERY

## 2020-08-07 PROCEDURE — 72157 MRI CHEST SPINE W/O & W/DYE: CPT

## 2020-08-07 PROCEDURE — A9577 INJ MULTIHANCE: HCPCS | Performed by: NEUROLOGICAL SURGERY

## 2020-08-07 RX ADMIN — GADOBENATE DIMEGLUMINE 20 ML: 529 INJECTION, SOLUTION INTRAVENOUS at 10:05

## 2020-08-10 ENCOUNTER — TELEPHONE (OUTPATIENT)
Dept: CARDIOLOGY | Age: 70
End: 2020-08-10

## 2020-10-16 ENCOUNTER — OFFICE VISIT (OUTPATIENT)
Dept: NEUROSURGERY | Facility: CLINIC | Age: 70
End: 2020-10-16

## 2020-10-16 ENCOUNTER — TELEPHONE (OUTPATIENT)
Dept: NEUROSURGERY | Facility: CLINIC | Age: 70
End: 2020-10-16

## 2020-10-16 VITALS
DIASTOLIC BLOOD PRESSURE: 74 MMHG | HEIGHT: 74 IN | SYSTOLIC BLOOD PRESSURE: 130 MMHG | BODY MASS INDEX: 30.21 KG/M2 | WEIGHT: 235.4 LBS

## 2020-10-16 DIAGNOSIS — F17.210 CIGARETTE SMOKER: ICD-10-CM

## 2020-10-16 DIAGNOSIS — M51.37 DEGENERATION OF LUMBAR OR LUMBOSACRAL INTERVERTEBRAL DISC: ICD-10-CM

## 2020-10-16 DIAGNOSIS — R26.9 ABNORMALITY OF GAIT: ICD-10-CM

## 2020-10-16 DIAGNOSIS — M50.30 DEGENERATION OF CERVICAL INTERVERTEBRAL DISC: Primary | ICD-10-CM

## 2020-10-16 PROBLEM — M51.379 DEGENERATION OF LUMBAR OR LUMBOSACRAL INTERVERTEBRAL DISC: Status: ACTIVE | Noted: 2020-10-16

## 2020-10-16 PROCEDURE — 99214 OFFICE O/P EST MOD 30 MIN: CPT | Performed by: NEUROLOGICAL SURGERY

## 2020-10-16 NOTE — TELEPHONE ENCOUNTER
Patient was seen by Dr Snowden in clinictoday and advised he needs to be referred to pain management to discuss injections to help control his pain since he does not require a surgery at this time.    I called the [facility] office, spoke w/Pepper and she requested I fax something stating this to their office.    zack Snowden would like for the patient to see Dr Nathan Galvan @   [address] [place] [address], [address]  [facility]  [id number]  P: [phone]

## 2020-10-16 NOTE — PROGRESS NOTES
"SUBJECTIVE:  Patient ID: Luis Kam is a 70 y.o. male is here today for follow-up.    Chief Complaint: Back pain  Chief Complaint   Patient presents with   • NECK & ARM PAIN     patient is here to discuss his imaging results @ [facility] (MRI T & L spine); patient has gait abnormality and \"pain everywhere\".         HPI  70-year-old gentleman was seen in July for complaint of cervical thoracic and lumbar spine pain.  We sent him for imaging of his thoracic and lumbar spine is here to discuss the results.  The patient has  had  no physical therapy or chiropractic care for his spine and neck.  He does receive injections from the orthopedic [facility] for hip and knee pain.  He has worked with the eye [facility] in the past with good results for a variety of orthopedic issues.    The following portions of the patient's history were reviewed and updated as appropriate: allergies, current medications, past family history, past medical history, past social history, past surgical history and problem list.    OBJECTIVE:    Review of Systems   Musculoskeletal: Positive for arthralgias, back pain, gait problem, myalgias and neck pain.   All other systems reviewed and are negative.         Physical Exam  Eyes:      Extraocular Movements: EOM normal.      Pupils: Pupils are equal, round, and reactive to light.   Neurological:      Mental Status: He is oriented to person, place, and time.      Coordination: Finger-Nose-Finger Test normal.      Gait: Gait is intact.      Deep Tendon Reflexes: Strength normal.   Psychiatric:         Speech: Speech normal.         Neurologic Exam     Mental Status   Oriented to person, place, and time.   Attention: normal.   Speech: speech is normal   Level of consciousness: alert  Knowledge: good.     Cranial Nerves     CN II   Visual fields full to confrontation.     CN III, IV, VI   Pupils are equal, round, and reactive to light.  Extraocular motions are normal.     CN V   Facial sensation intact. "     CN VII   Facial expression full, symmetric.     CN VIII   CN VIII normal.     CN IX, X   CN IX normal.   CN X normal.     CN XI   CN XI normal.     CN XII   CN XII normal.     Motor Exam   Muscle bulk: normal  Overall muscle tone: normal  Right arm pronator drift: absent  Left arm pronator drift: absent    Strength   Strength 5/5 throughout.     Sensory Exam   Light touch normal.   Pinprick normal.     Gait, Coordination, and Reflexes     Gait  Gait: normal    Coordination   Finger to nose coordination: normal    Tremor   Resting tremor: absent  Intention tremor: absent  Action tremor: absent    Reflexes   Reflexes 2+ except as noted.       Independent Review of Radiographic Studies:   MRI of the cervical thoracic and lumbar spine shows mild to moderate spondylosis throughout with no significant neuroforaminal compromise or compression no significant degenerative changes    ASSESSMENT/PLAN:  The patient is very likely suffering from a diffuse myofascial pain syndrome.  Does not require any surgical intervention either his neck thoracic spine or lumbar spine.  I would recommend physical therapy followed by or concurrently with pain management treatments to control symptoms.      No diagnosis found.        No follow-ups on file.      Eddy Snowden MD

## 2020-10-16 NOTE — PATIENT INSTRUCTIONS
PATIENT TO CONTINUE TO FOLLOW UP WITH HIS PRIMARY CARE PROVIDER FOR YEARLY PHYSICAL EXAMS TO ENSURE COMPLETE HEALTH MAINTENANCE        Steps to Quit Smoking  Smoking tobacco is the leading cause of preventable death. It can affect almost every organ in the body. Smoking puts you and those around you at risk for developing many serious chronic diseases. Quitting smoking can be difficult, but it is one of the best things that you can do for your health. It is never too late to quit.  How do I get ready to quit?  When you decide to quit smoking, create a plan to help you succeed. Before you quit:  · Pick a date to quit. Set a date within the next 2 weeks to give you time to prepare.  · Write down the reasons why you are quitting. Keep this list in places where you will see it often.  · Tell your family, friends, and co-workers that you are quitting. Support from your loved ones can make quitting easier.  · Talk with your health care provider about your options for quitting smoking.  · Find out what treatment options are covered by your health insurance.  · Identify people, places, things, and activities that make you want to smoke (triggers). Avoid them.  What first steps can I take to quit smoking?  · Throw away all cigarettes at home, at work, and in your car.  · Throw away smoking accessories, such as ashtrays and lighters.  · Clean your car. Make sure to empty the ashtray.  · Clean your home, including curtains and carpets.  What strategies can I use to quit smoking?  Talk with your health care provider about combining strategies, such as taking medicines while you are also receiving in-person counseling. Using these two strategies together makes you more likely to succeed in quitting than if you used either strategy on its own.  · If you are pregnant or breastfeeding, talk with your health care provider about finding counseling or other support strategies to quit smoking. Do not take medicine to help you quit  smoking unless your health care provider tells you to do so.  To quit smoking:  Quit right away  · Quit smoking completely, instead of gradually reducing how much you smoke over a period of time. Research shows that stopping smoking right away is more successful than gradually quitting.  · Attend in-person counseling to help you build problem-solving skills. You are more likely to succeed in quitting if you attend counseling sessions regularly. Even short sessions of 10 minutes can be effective.  Take medicine  You may take medicines to help you quit smoking. Some medicines require a prescription and some you can purchase over-the-counter. Medicines may have nicotine in them to replace the nicotine in cigarettes. Medicines may:  · Help to stop cravings.  · Help to relieve withdrawal symptoms.  Your health care provider may recommend:  · Nicotine patches, gum, or lozenges.  · Nicotine inhalers or sprays.  · Non-nicotine medicine that is taken by mouth.  Find resources  Find resources and support systems that can help you to quit smoking and remain smoke-free after you quit. These resources are most helpful when you use them often. They include:  · Online chats with a counselor.  · Telephone quitlines.  · Printed self-help materials.  · Support groups or group counseling.  · Text messaging programs.  · Mobile phone apps or applications. Use apps that can help you stick to your quit plan by providing reminders, tips, and encouragement. There are many free apps for mobile devices as well as websites. Examples include Quit Guide from the CDC and smokefree.gov  What things can I do to make it easier to quit?    · Reach out to your family and friends for support and encouragement. Call telephone quitlines (1-800-QUIT-NOW), reach out to support groups, or work with a counselor for support.  · Ask people who smoke to avoid smoking around you.  · Avoid places that trigger you to smoke, such as bars, parties, or smoke-break  areas at work.  · Spend time with people who do not smoke.  · Lessen the stress in your life. Stress can be a smoking trigger for some people. To lessen stress, try:  ? Exercising regularly.  ? Doing deep-breathing exercises.  ? Doing yoga.  ? Meditating.  ? Performing a body scan. This involves closing your eyes, scanning your body from head to toe, and noticing which parts of your body are particularly tense. Try to relax the muscles in those areas.  How will I feel when I quit smoking?  Day 1 to 3 weeks  Within the first 24 hours of quitting smoking, you may start to feel withdrawal symptoms. These symptoms are usually most noticeable 2-3 days after quitting, but they usually do not last for more than 2-3 weeks. You may experience these symptoms:  · Mood swings.  · Restlessness, anxiety, or irritability.  · Trouble concentrating.  · Dizziness.  · Strong cravings for sugary foods and nicotine.  · Mild weight gain.  · Constipation.  · Nausea.  · Coughing or a sore throat.  · Changes in how the medicines that you take for unrelated issues work in your body.  · Depression.  · Trouble sleeping (insomnia).  Week 3 and afterward  After the first 2-3 weeks of quitting, you may start to notice more positive results, such as:  · Improved sense of smell and taste.  · Decreased coughing and sore throat.  · Slower heart rate.  · Lower blood pressure.  · Clearer skin.  · The ability to breathe more easily.  · Fewer sick days.  Quitting smoking can be very challenging. Do not get discouraged if you are not successful the first time. Some people need to make many attempts to quit before they achieve long-term success. Do your best to stick to your quit plan, and talk with your health care provider if you have any questions or concerns.  Summary  · Smoking tobacco is the leading cause of preventable death. Quitting smoking is one of the best things that you can do for your health.  · When you decide to quit smoking, create a plan  to help you succeed.  · Quit smoking right away, not slowly over a period of time.  · When you start quitting, seek help from your health care provider, family, or friends.  This information is not intended to replace advice given to you by your health care provider. Make sure you discuss any questions you have with your health care provider.  Document Released: 12/12/2002 Document Revised: 09/11/2020 Document Reviewed: 03/07/2020  [employer] Patient Education © 2020 [employer]        BMI for Adults  What is BMI?  Body mass index (BMI) is a number that is calculated from a person's weight and height. BMI can help estimate how much of a person's weight is composed of fat. BMI does not measure body fat directly. Rather, it is an alternative to procedures that directly measure body fat, which can be difficult and expensive.  BMI can help identify people who may be at higher risk for certain medical problems.  What are BMI measurements used for?  BMI is used as a screening tool to identify possible weight problems. It helps determine whether a person is obese, overweight, a healthy weight, or underweight.  BMI is useful for:  · Identifying a weight problem that may be related to a medical condition or may increase the risk for medical problems.  · Promoting changes, such as changes in diet and exercise, to help reach a healthy weight. BMI screening can be repeated to see if these changes are working.  How is BMI calculated?  BMI involves measuring your weight in relation to your height. Both height and weight are measured, and the BMI is calculated from those numbers. This can be done either in English (U.S[place] or metric measurements. Note that charts and online BMI calculators are available to help you find your BMI quickly and easily without having to do these calculations yourself.  To calculate your BMI in English (U.S[place] measurements:    1. Measure your weight in pounds (lb).  2. Multiply the number of pounds by  "703.  ? For example, for a person who weighs 180 lb, multiply that number by 703, which equals 126,540.  3. Measure your height in inches. Then multiply that number by itself to get a measurement called \"inches squared.\"  ? For example, for a person who is 70 inches tall, the \"inches squared\" measurement is 70 inches x 70 inches, which equals 4,900 inches squared.  4. Divide the total from step 2 (number of lb x 703) by the total from step 3 (inches squared): 126,540 ÷ 4,900 = 25.8. This is your BMI.  To calculate your BMI in metric measurements:  1. Measure your weight in kilograms (kg).  2. Measure your height in meters (m). Then multiply that number by itself to get a measurement called \"meters squared.\"  ? For example, for a person who is 1.75 m tall, the \"meters squared\" measurement is 1.75 m x 1.75 m, which is equal to 3.1 meters squared.  3. Divide the number of kilograms (your weight) by the meters squared number. In this example: 70 ÷ 3.1 = 22.6. This is your BMI.  What do the results mean?  BMI charts are used to identify whether you are underweight, normal weight, overweight, or obese. The following guidelines will be used:  · Underweight: BMI less than 18.5.  · Normal weight: BMI between 18.5 and 24.9.  · Overweight: BMI between 25 and 29.9.  · Obese: BMI of 30 or above.  Keep these notes in mind:  · Weight includes both fat and muscle, so someone with a muscular build, such as an athlete, may have a BMI that is higher than 24.9. In cases like these, BMI is not an accurate measure of body fat.  · To determine if excess body fat is the cause of a BMI of 25 or higher, further assessments may need to be done by a health care provider.  · BMI is usually interpreted in the same way for men and women.  Where to find more information  For more information about BMI, including tools to quickly calculate your BMI, go to these websites:  · Centers for Disease Control and Prevention: www.cdc.gov  · American Heart " Association: www.heart.org  · National Heart, Lung, and Blood [place]: www.nhlbi.nih.gov  Summary  · Body mass index (BMI) is a number that is calculated from a person's weight and height.  · BMI may help estimate how much of a person's weight is composed of fat. BMI can help identify those who may be at higher risk for certain medical problems.  · BMI can be measured using English measurements or metric measurements.  · BMI charts are used to identify whether you are underweight, normal weight, overweight, or obese.  This information is not intended to replace advice given to you by your health care provider. Make sure you discuss any questions you have with your health care provider.  Document Released: 08/29/2005 Document Revised: 09/09/2020 Document Reviewed: 07/17/2020  [employer] Patient Education © 2020 [employer]

## 2020-10-29 ENCOUNTER — APPOINTMENT (OUTPATIENT)
Dept: GENERAL RADIOLOGY | Age: 70
DRG: 870 | End: 2020-10-29
Attending: EMERGENCY MEDICINE
Payer: MEDICARE

## 2020-10-29 ENCOUNTER — HOSPITAL ENCOUNTER (INPATIENT)
Age: 70
LOS: 10 days | Discharge: NURSING, REHAB OR LONG-TERM CARE | DRG: 870 | End: 2020-11-09
Attending: EMERGENCY MEDICINE | Admitting: HOSPITALIST
Payer: MEDICARE

## 2020-10-29 ENCOUNTER — APPOINTMENT (OUTPATIENT)
Dept: CT IMAGING | Age: 70
DRG: 870 | End: 2020-10-29
Attending: EMERGENCY MEDICINE
Payer: MEDICARE

## 2020-10-29 DIAGNOSIS — I77.810 ASCENDING AORTA DILATATION (HCC): ICD-10-CM

## 2020-10-29 DIAGNOSIS — I48.91 RAPID ATRIAL FIBRILLATION (HCC): Primary | ICD-10-CM

## 2020-10-29 DIAGNOSIS — I21.4 NSTEMI (NON-ST ELEVATED MYOCARDIAL INFARCTION) (HCC): ICD-10-CM

## 2020-10-29 DIAGNOSIS — R09.02 HYPOXIA: ICD-10-CM

## 2020-10-29 DIAGNOSIS — E87.29 INCREASED ANION GAP METABOLIC ACIDOSIS: ICD-10-CM

## 2020-10-29 LAB
ALBUMIN SERPL-MCNC: 3.8 G/DL (ref 3.5–5)
ALBUMIN/GLOB SERPL: 0.8 {RATIO} (ref 1.1–2.2)
ALP SERPL-CCNC: 107 U/L (ref 45–117)
ALT SERPL-CCNC: 48 U/L (ref 12–78)
ANION GAP SERPL CALC-SCNC: 18 MMOL/L (ref 5–15)
AST SERPL W P-5'-P-CCNC: 73 U/L (ref 15–37)
BASOPHILS # BLD: 0 K/UL (ref 0–0.1)
BASOPHILS NFR BLD: 0 % (ref 0–1)
BILIRUB SERPL-MCNC: 2 MG/DL (ref 0.2–1)
BNP SERPL-MCNC: 1281 PG/ML
BUN SERPL-MCNC: 11 MG/DL (ref 6–20)
BUN/CREAT SERPL: 12 (ref 12–20)
CA-I BLD-MCNC: 9.4 MG/DL (ref 8.5–10.1)
CHLORIDE SERPL-SCNC: 102 MMOL/L (ref 97–108)
CO2 SERPL-SCNC: 16 MMOL/L (ref 21–32)
CREAT SERPL-MCNC: 0.91 MG/DL (ref 0.7–1.3)
DIFFERENTIAL METHOD BLD: ABNORMAL
EOSINOPHIL # BLD: 0 K/UL (ref 0–0.4)
EOSINOPHIL NFR BLD: 0 % (ref 0–7)
ERYTHROCYTE [DISTWIDTH] IN BLOOD BY AUTOMATED COUNT: 14.3 % (ref 11.5–14.5)
FLUAV AG NPH QL IA: NEGATIVE
FLUBV AG NOSE QL IA: NEGATIVE
GLOBULIN SER CALC-MCNC: 4.7 G/DL (ref 2–4)
GLUCOSE SERPL-MCNC: 117 MG/DL (ref 65–100)
HCT VFR BLD AUTO: 38.9 % (ref 36.6–50.3)
HGB BLD-MCNC: 13.4 G/DL (ref 12.1–17)
IMM GRANULOCYTES # BLD AUTO: 0 K/UL (ref 0–0.04)
IMM GRANULOCYTES NFR BLD AUTO: 0 % (ref 0–0.5)
LYMPHOCYTES # BLD: 0.2 K/UL (ref 0.8–3.5)
LYMPHOCYTES NFR BLD: 3 % (ref 12–49)
MCH RBC QN AUTO: 35.1 PG (ref 26–34)
MCHC RBC AUTO-ENTMCNC: 34.4 G/DL (ref 30–36.5)
MCV RBC AUTO: 101.8 FL (ref 80–99)
MONOCYTES # BLD: 0.4 K/UL (ref 0–1)
MONOCYTES NFR BLD: 5 % (ref 5–13)
NEUTS SEG # BLD: 6.4 K/UL (ref 1.8–8)
NEUTS SEG NFR BLD: 92 % (ref 32–75)
PLATELET # BLD AUTO: 72 K/UL (ref 150–400)
PMV BLD AUTO: 12.4 FL (ref 8.9–12.9)
POTASSIUM SERPL-SCNC: 3.4 MMOL/L (ref 3.5–5.1)
PROT SERPL-MCNC: 8.5 G/DL (ref 6.4–8.2)
RBC # BLD AUTO: 3.82 M/UL (ref 4.1–5.7)
SODIUM SERPL-SCNC: 136 MMOL/L (ref 136–145)
TROPONIN I SERPL-MCNC: 0.13 NG/ML
WBC # BLD AUTO: 6.9 K/UL (ref 4.1–11.1)

## 2020-10-29 PROCEDURE — 74177 CT ABD & PELVIS W/CONTRAST: CPT

## 2020-10-29 PROCEDURE — 96366 THER/PROPH/DIAG IV INF ADDON: CPT

## 2020-10-29 PROCEDURE — 87804 INFLUENZA ASSAY W/OPTIC: CPT

## 2020-10-29 PROCEDURE — 71275 CT ANGIOGRAPHY CHEST: CPT

## 2020-10-29 PROCEDURE — 96375 TX/PRO/DX INJ NEW DRUG ADDON: CPT

## 2020-10-29 PROCEDURE — 74011250636 HC RX REV CODE- 250/636: Performed by: EMERGENCY MEDICINE

## 2020-10-29 PROCEDURE — 94762 N-INVAS EAR/PLS OXIMTRY CONT: CPT

## 2020-10-29 PROCEDURE — 84484 ASSAY OF TROPONIN QUANT: CPT

## 2020-10-29 PROCEDURE — 80053 COMPREHEN METABOLIC PANEL: CPT

## 2020-10-29 PROCEDURE — 74011000250 HC RX REV CODE- 250: Performed by: EMERGENCY MEDICINE

## 2020-10-29 PROCEDURE — 31500 INSERT EMERGENCY AIRWAY: CPT

## 2020-10-29 PROCEDURE — 83880 ASSAY OF NATRIURETIC PEPTIDE: CPT

## 2020-10-29 PROCEDURE — 36415 COLL VENOUS BLD VENIPUNCTURE: CPT

## 2020-10-29 PROCEDURE — 75810000455 HC PLCMT CENT VENOUS CATH LVL 2 5182

## 2020-10-29 PROCEDURE — 93005 ELECTROCARDIOGRAM TRACING: CPT

## 2020-10-29 PROCEDURE — 99285 EMERGENCY DEPT VISIT HI MDM: CPT

## 2020-10-29 PROCEDURE — 96365 THER/PROPH/DIAG IV INF INIT: CPT

## 2020-10-29 PROCEDURE — 74011250637 HC RX REV CODE- 250/637: Performed by: EMERGENCY MEDICINE

## 2020-10-29 PROCEDURE — 71045 X-RAY EXAM CHEST 1 VIEW: CPT

## 2020-10-29 PROCEDURE — 87635 SARS-COV-2 COVID-19 AMP PRB: CPT

## 2020-10-29 PROCEDURE — 85025 COMPLETE CBC W/AUTO DIFF WBC: CPT

## 2020-10-29 PROCEDURE — 74011000258 HC RX REV CODE- 258: Performed by: EMERGENCY MEDICINE

## 2020-10-29 PROCEDURE — 84443 ASSAY THYROID STIM HORMONE: CPT

## 2020-10-29 RX ORDER — ACETAMINOPHEN 500 MG
1000 TABLET ORAL ONCE
Status: COMPLETED | OUTPATIENT
Start: 2020-10-29 | End: 2020-10-29

## 2020-10-29 RX ORDER — DILTIAZEM HYDROCHLORIDE 5 MG/ML
10 INJECTION INTRAVENOUS
Status: COMPLETED | OUTPATIENT
Start: 2020-10-29 | End: 2020-10-29

## 2020-10-29 RX ADMIN — SODIUM CHLORIDE 1000 ML: 9 INJECTION, SOLUTION INTRAVENOUS at 22:03

## 2020-10-29 RX ADMIN — DILTIAZEM HYDROCHLORIDE 10 MG: 5 INJECTION INTRAVENOUS at 22:03

## 2020-10-29 RX ADMIN — AZITHROMYCIN MONOHYDRATE 500 MG: 500 INJECTION, POWDER, LYOPHILIZED, FOR SOLUTION INTRAVENOUS at 22:02

## 2020-10-29 RX ADMIN — CEFTRIAXONE 1 G: 1 INJECTION, POWDER, FOR SOLUTION INTRAMUSCULAR; INTRAVENOUS at 21:36

## 2020-10-29 RX ADMIN — DILTIAZEM HYDROCHLORIDE 10 MG: 5 INJECTION INTRAVENOUS at 21:35

## 2020-10-29 RX ADMIN — ACETAMINOPHEN 1000 MG: 500 TABLET, FILM COATED ORAL at 20:38

## 2020-10-30 ENCOUNTER — APPOINTMENT (OUTPATIENT)
Dept: NON INVASIVE DIAGNOSTICS | Age: 70
DRG: 870 | End: 2020-10-30
Attending: HOSPITALIST
Payer: MEDICARE

## 2020-10-30 ENCOUNTER — APPOINTMENT (OUTPATIENT)
Dept: GENERAL RADIOLOGY | Age: 70
DRG: 870 | End: 2020-10-30
Attending: HOSPITALIST
Payer: MEDICARE

## 2020-10-30 PROBLEM — I48.91 ATRIAL FIBRILLATION (HCC): Status: ACTIVE | Noted: 2020-10-30

## 2020-10-30 LAB
ALBUMIN SERPL-MCNC: 3.6 G/DL (ref 3.5–5)
ANION GAP SERPL CALC-SCNC: 12 MMOL/L (ref 5–15)
APPEARANCE UR: CLEAR
ARTERIAL PATENCY WRIST A: ABNORMAL
ATRIAL RATE: 110 BPM
ATRIAL RATE: 120 BPM
ATRIAL RATE: 141 BPM
BACTERIA URNS QL MICRO: NEGATIVE /HPF
BASE DEFICIT BLDA-SCNC: 5 MMOL/L (ref 0–2)
BDY SITE: ABNORMAL
BILIRUB UR QL: NEGATIVE
BUN SERPL-MCNC: 16 MG/DL (ref 6–20)
BUN/CREAT SERPL: 10 (ref 12–20)
CA-I BLD-MCNC: 8.6 MG/DL (ref 8.5–10.1)
CALCULATED R AXIS, ECG10: 53 DEGREES
CALCULATED R AXIS, ECG10: 75 DEGREES
CALCULATED R AXIS, ECG10: 86 DEGREES
CALCULATED T AXIS, ECG11: 108 DEGREES
CALCULATED T AXIS, ECG11: 115 DEGREES
CALCULATED T AXIS, ECG11: 149 DEGREES
CHLORIDE SERPL-SCNC: 101 MMOL/L (ref 97–108)
CO2 SERPL-SCNC: 22 MMOL/L (ref 21–32)
COLOR UR: ABNORMAL
CREAT SERPL-MCNC: 1.61 MG/DL (ref 0.7–1.3)
CRP SERPL-MCNC: 7.9 MG/DL (ref 0–0.6)
DIAGNOSIS, 93000: NORMAL
ECHO AV AREA PEAK VELOCITY: 2.07 CM2
ECHO AV AREA/BSA PEAK VELOCITY: 0.9 CM2/M2
ECHO AV PEAK GRADIENT: 2 MMHG
ECHO EST RA PRESSURE: 15 MMHG
ECHO LV E' SEPTAL VELOCITY: 10.9 CM/S
ECHO LV EDV A2C: 245 CM3
ECHO LV EJECTION FRACTION A2C: 11 %
ECHO LV EJECTION FRACTION A2C: 16 %
ECHO LV EJECTION FRACTION BIPLANE: 23.8 % (ref 55–100)
ECHO LV EJECTION FRACTION BIPLANE: 39.6 % (ref 55–100)
ECHO LV ESV A2C: 148 CM3
ECHO LV ESV A2C: 157 CM3
ECHO LV INTERNAL DIMENSION DIASTOLIC MMODE: 6.36 CM
ECHO LV INTERNAL DIMENSION DIASTOLIC: 6.26 CM (ref 4.2–5.9)
ECHO LV INTERNAL DIMENSION SYSTOLIC: 5.29 CM
ECHO LV INTERNAL DIMENSION SYSTOLIC: 5.65 CM
ECHO LV IVSD: 0.73 CM (ref 0.6–1)
ECHO LV IVSD: 0.85 CM (ref 0.6–1)
ECHO LV POSTERIOR WALL DIASTOLIC: 0.65 CM (ref 0.6–1)
ECHO LV POSTERIOR WALL DIASTOLIC: 1.03 CM (ref 0.6–1)
ECHO LVOT DIAM: 1.8 CM
ECHO LVOT PEAK GRADIENT: 1 MMHG
ECHO LVOT SV: 77 CM3
ECHO MV A VELOCITY: 30.8 CM/S
ECHO MV E DECELERATION TIME (DT): 109 MS
ECHO MV E VELOCITY: 119 CM/S
ECHO MV E/A RATIO: 3.86
ECHO MV E/E' SEPTAL: 10.92
ECHO PV PEAK INSTANTANEOUS GRADIENT SYSTOLIC: 1 MMHG
ECHO PV PEAK INSTANTANEOUS GRADIENT SYSTOLIC: 2 MMHG
ECHO PV REGURGITANT MAX VELOCITY: 52.8 CM/S
ECHO PV REGURGITANT MAX VELOCITY: 55 CM/S
ECHO PV REGURGITANT MAX VELOCITY: 67.6 CM/S
ECHO PV REGURGITANT MAX VELOCITY: 72.4 CM/S
ECHO RIGHT VENTRICULAR SYSTOLIC PRESSURE (RVSP): 42 MMHG
ECHO RV INTERNAL DIMENSION: 3.45 CM
ECHO RV INTERNAL DIMENSION: 3.91 CM
ECHO RV TAPSE: 0.5 CM (ref 1.5–2)
ECHO TV MAX VELOCITY: 261 CM/S
ECHO TV REGURGITANT PEAK GRADIENT: 27 MMHG
EPAP/CPAP/PEEP, PAPEEP: 8
FERRITIN SERPL-MCNC: 9350 NG/ML (ref 26–388)
FIO2 ON VENT: 100 %
GAS FLOW.O2 SETTING OXYMISER: 16 L/MIN
GLUCOSE BLD STRIP.AUTO-MCNC: 124 MG/DL (ref 65–100)
GLUCOSE SERPL-MCNC: 169 MG/DL (ref 65–100)
GLUCOSE UR STRIP.AUTO-MCNC: NEGATIVE MG/DL
HCO3 BLDA-SCNC: 20 MMOL/L (ref 22–26)
HGB UR QL STRIP: ABNORMAL
KETONES UR QL STRIP.AUTO: 5 MG/DL
LDH SERPL L TO P-CCNC: 1285 U/L (ref 85–241)
LEUKOCYTE ESTERASE UR QL STRIP.AUTO: NEGATIVE
MUCOUS THREADS URNS QL MICRO: ABNORMAL /LPF
NITRITE UR QL STRIP.AUTO: NEGATIVE
PCO2 BLDA: 25 MMHG (ref 35–45)
PERFORMED BY, TECHID: ABNORMAL
PH BLDA: 7.44 [PH] (ref 7.35–7.45)
PH UR STRIP: 5 [PH] (ref 5–8)
PHOSPHATE SERPL-MCNC: 5.7 MG/DL (ref 2.6–4.7)
PO2 BLDA: 219 MMHG (ref 75–100)
POTASSIUM SERPL-SCNC: 5.3 MMOL/L (ref 3.5–5.1)
PROCALCITONIN SERPL-MCNC: 6.28 NG/ML
PROT UR STRIP-MCNC: 30 MG/DL
Q-T INTERVAL, ECG07: 344 MS
Q-T INTERVAL, ECG07: 378 MS
Q-T INTERVAL, ECG07: 438 MS
QRS DURATION, ECG06: 94 MS
QRS DURATION, ECG06: 94 MS
QRS DURATION, ECG06: 98 MS
QTC CALCULATION (BEZET), ECG08: 517 MS
QTC CALCULATION (BEZET), ECG08: 531 MS
QTC CALCULATION (BEZET), ECG08: 538 MS
RBC #/AREA URNS HPF: ABNORMAL /HPF (ref 0–5)
SAO2 % BLD: 101 %
SAO2% DEVICE SAO2% SENSOR NAME: ABNORMAL
SARS-COV-2, COV2: NORMAL
SODIUM SERPL-SCNC: 135 MMOL/L (ref 136–145)
SP GR UR REFRACTOMETRY: >1.03 (ref 1–1.03)
TSH SERPL DL<=0.05 MIU/L-ACNC: 11.8 UIU/ML (ref 0.36–3.74)
UA: UC IF INDICATED,UAUC: ABNORMAL
UROBILINOGEN UR QL STRIP.AUTO: 0.1 EU/DL (ref 0.1–1)
VENTILATION MODE VENT: ABNORMAL
VENTRICULAR RATE, ECG03: 119 BPM
VENTRICULAR RATE, ECG03: 147 BPM
VENTRICULAR RATE, ECG03: 84 BPM
WBC URNS QL MICRO: ABNORMAL /HPF (ref 0–4)

## 2020-10-30 PROCEDURE — 74011250637 HC RX REV CODE- 250/637: Performed by: EMERGENCY MEDICINE

## 2020-10-30 PROCEDURE — 71045 X-RAY EXAM CHEST 1 VIEW: CPT

## 2020-10-30 PROCEDURE — 74011000250 HC RX REV CODE- 250: Performed by: HOSPITALIST

## 2020-10-30 PROCEDURE — 82728 ASSAY OF FERRITIN: CPT

## 2020-10-30 PROCEDURE — 74011250637 HC RX REV CODE- 250/637: Performed by: HOSPITALIST

## 2020-10-30 PROCEDURE — 31500 INSERT EMERGENCY AIRWAY: CPT

## 2020-10-30 PROCEDURE — 94003 VENT MGMT INPAT SUBQ DAY: CPT

## 2020-10-30 PROCEDURE — 82962 GLUCOSE BLOOD TEST: CPT

## 2020-10-30 PROCEDURE — 93306 TTE W/DOPPLER COMPLETE: CPT

## 2020-10-30 PROCEDURE — 83615 LACTATE (LD) (LDH) ENZYME: CPT

## 2020-10-30 PROCEDURE — 94002 VENT MGMT INPAT INIT DAY: CPT

## 2020-10-30 PROCEDURE — 65610000006 HC RM INTENSIVE CARE

## 2020-10-30 PROCEDURE — 0BH17EZ INSERTION OF ENDOTRACHEAL AIRWAY INTO TRACHEA, VIA NATURAL OR ARTIFICIAL OPENING: ICD-10-PCS | Performed by: INTERNAL MEDICINE

## 2020-10-30 PROCEDURE — 84145 PROCALCITONIN (PCT): CPT

## 2020-10-30 PROCEDURE — 36415 COLL VENOUS BLD VENIPUNCTURE: CPT

## 2020-10-30 PROCEDURE — 94400 HC END TIDAL CO2 RESPONSE CURVE: CPT

## 2020-10-30 PROCEDURE — 80069 RENAL FUNCTION PANEL: CPT

## 2020-10-30 PROCEDURE — 74011250636 HC RX REV CODE- 250/636: Performed by: INTERNAL MEDICINE

## 2020-10-30 PROCEDURE — 87040 BLOOD CULTURE FOR BACTERIA: CPT

## 2020-10-30 PROCEDURE — 74011000258 HC RX REV CODE- 258: Performed by: HOSPITALIST

## 2020-10-30 PROCEDURE — 86140 C-REACTIVE PROTEIN: CPT

## 2020-10-30 PROCEDURE — 82803 BLOOD GASES ANY COMBINATION: CPT

## 2020-10-30 PROCEDURE — 5A1955Z RESPIRATORY VENTILATION, GREATER THAN 96 CONSECUTIVE HOURS: ICD-10-PCS | Performed by: INTERNAL MEDICINE

## 2020-10-30 PROCEDURE — 75810000455 HC PLCMT CENT VENOUS CATH LVL 2 5182

## 2020-10-30 PROCEDURE — 81001 URINALYSIS AUTO W/SCOPE: CPT

## 2020-10-30 PROCEDURE — 74011000258 HC RX REV CODE- 258: Performed by: INTERNAL MEDICINE

## 2020-10-30 PROCEDURE — 96375 TX/PRO/DX INJ NEW DRUG ADDON: CPT

## 2020-10-30 PROCEDURE — 74011250636 HC RX REV CODE- 250/636: Performed by: EMERGENCY MEDICINE

## 2020-10-30 PROCEDURE — 93005 ELECTROCARDIOGRAM TRACING: CPT

## 2020-10-30 PROCEDURE — 74011000636 HC RX REV CODE- 636: Performed by: EMERGENCY MEDICINE

## 2020-10-30 PROCEDURE — 74011250637 HC RX REV CODE- 250/637: Performed by: INTERNAL MEDICINE

## 2020-10-30 PROCEDURE — 74011250636 HC RX REV CODE- 250/636: Performed by: HOSPITALIST

## 2020-10-30 RX ORDER — METOPROLOL TARTRATE 5 MG/5ML
5 INJECTION INTRAVENOUS ONCE
Status: COMPLETED | OUTPATIENT
Start: 2020-10-30 | End: 2020-10-30

## 2020-10-30 RX ORDER — MIDAZOLAM HYDROCHLORIDE 1 MG/ML
INJECTION, SOLUTION INTRAMUSCULAR; INTRAVENOUS
Status: DISPENSED
Start: 2020-10-30 | End: 2020-10-30

## 2020-10-30 RX ORDER — FUROSEMIDE 10 MG/ML
20 INJECTION INTRAMUSCULAR; INTRAVENOUS
Status: COMPLETED | OUTPATIENT
Start: 2020-10-30 | End: 2020-10-30

## 2020-10-30 RX ORDER — METOPROLOL TARTRATE 5 MG/5ML
5 INJECTION INTRAVENOUS ONCE
Status: DISPENSED | OUTPATIENT
Start: 2020-10-30 | End: 2020-10-30

## 2020-10-30 RX ORDER — SODIUM CHLORIDE 0.9 % (FLUSH) 0.9 %
5-40 SYRINGE (ML) INJECTION EVERY 8 HOURS
Status: DISCONTINUED | OUTPATIENT
Start: 2020-10-30 | End: 2020-10-30

## 2020-10-30 RX ORDER — FUROSEMIDE 10 MG/ML
20 INJECTION INTRAMUSCULAR; INTRAVENOUS ONCE
Status: COMPLETED | OUTPATIENT
Start: 2020-10-30 | End: 2020-10-30

## 2020-10-30 RX ORDER — PROPOFOL 10 MG/ML
20 INJECTION, EMULSION INTRAVENOUS
Status: COMPLETED | OUTPATIENT
Start: 2020-10-30 | End: 2020-10-30

## 2020-10-30 RX ORDER — MIDAZOLAM IN 0.9 % SOD.CHLORID 1 MG/ML
0-10 PLASTIC BAG, INJECTION (ML) INTRAVENOUS
Status: DISCONTINUED | OUTPATIENT
Start: 2020-10-30 | End: 2020-11-03

## 2020-10-30 RX ORDER — MIDAZOLAM HYDROCHLORIDE 5 MG/ML
4 INJECTION, SOLUTION INTRAMUSCULAR; INTRAVENOUS ONCE
Status: COMPLETED | OUTPATIENT
Start: 2020-10-30 | End: 2020-10-30

## 2020-10-30 RX ORDER — SODIUM CHLORIDE 0.9 % (FLUSH) 0.9 %
5-40 SYRINGE (ML) INJECTION AS NEEDED
Status: DISCONTINUED | OUTPATIENT
Start: 2020-10-30 | End: 2020-10-30

## 2020-10-30 RX ORDER — DOPAMINE HYDROCHLORIDE 160 MG/100ML
INJECTION, SOLUTION INTRAVENOUS
Status: DISCONTINUED
Start: 2020-10-30 | End: 2020-10-30 | Stop reason: WASHOUT

## 2020-10-30 RX ORDER — SUCCINYLCHOLINE CHLORIDE 20 MG/ML INJECTION SOLUTION
150 SOLUTION
Status: COMPLETED | OUTPATIENT
Start: 2020-10-30 | End: 2020-10-30

## 2020-10-30 RX ORDER — MAGNESIUM SULFATE HEPTAHYDRATE 40 MG/ML
2 INJECTION, SOLUTION INTRAVENOUS
Status: COMPLETED | OUTPATIENT
Start: 2020-10-30 | End: 2020-10-30

## 2020-10-30 RX ORDER — POTASSIUM CHLORIDE 1.5 G/1.77G
40 POWDER, FOR SOLUTION ORAL
Status: COMPLETED | OUTPATIENT
Start: 2020-10-30 | End: 2020-10-30

## 2020-10-30 RX ORDER — ETOMIDATE 2 MG/ML
150 INJECTION INTRAVENOUS ONCE
Status: DISCONTINUED | OUTPATIENT
Start: 2020-10-30 | End: 2020-10-30

## 2020-10-30 RX ORDER — ETOMIDATE 2 MG/ML
20 INJECTION INTRAVENOUS ONCE
Status: COMPLETED | OUTPATIENT
Start: 2020-10-30 | End: 2020-10-30

## 2020-10-30 RX ORDER — METOPROLOL TARTRATE 50 MG/1
50 TABLET ORAL EVERY 12 HOURS
Status: DISCONTINUED | OUTPATIENT
Start: 2020-10-30 | End: 2020-11-04

## 2020-10-30 RX ORDER — ETOMIDATE 2 MG/ML
INJECTION INTRAVENOUS
Status: DISPENSED
Start: 2020-10-30 | End: 2020-10-30

## 2020-10-30 RX ORDER — ACETAMINOPHEN 325 MG/1
650 TABLET ORAL
Status: DISCONTINUED | OUTPATIENT
Start: 2020-10-30 | End: 2020-10-30

## 2020-10-30 RX ORDER — ENOXAPARIN SODIUM 100 MG/ML
40 INJECTION SUBCUTANEOUS EVERY 24 HOURS
Status: DISCONTINUED | OUTPATIENT
Start: 2020-10-30 | End: 2020-11-02

## 2020-10-30 RX ORDER — NOREPINEPHRINE BITARTRATE/D5W 8 MG/250ML
.5-16 PLASTIC BAG, INJECTION (ML) INTRAVENOUS
Status: DISCONTINUED | OUTPATIENT
Start: 2020-10-30 | End: 2020-11-03

## 2020-10-30 RX ORDER — SUCCINYLCHOLINE CHLORIDE 20 MG/ML INJECTION SOLUTION
20 SOLUTION
Status: DISCONTINUED | OUTPATIENT
Start: 2020-10-30 | End: 2020-10-30

## 2020-10-30 RX ADMIN — METOPROLOL TARTRATE 5 MG: 1 INJECTION, SOLUTION INTRAVENOUS at 02:30

## 2020-10-30 RX ADMIN — MIDAZOLAM 1 MG/HR: 5 INJECTION INTRAMUSCULAR; INTRAVENOUS at 21:31

## 2020-10-30 RX ADMIN — Medication 25 MCG/MIN: at 10:44

## 2020-10-30 RX ADMIN — FUROSEMIDE 20 MG: 10 INJECTION, SOLUTION INTRAMUSCULAR; INTRAVENOUS at 00:29

## 2020-10-30 RX ADMIN — MAGNESIUM SULFATE HEPTAHYDRATE 2 G: 40 INJECTION, SOLUTION INTRAVENOUS at 04:41

## 2020-10-30 RX ADMIN — MIDAZOLAM 2 MG/HR: 5 INJECTION INTRAMUSCULAR; INTRAVENOUS at 06:42

## 2020-10-30 RX ADMIN — SUCCINYLCHOLINE CHLORIDE 150 MG: 20 INJECTION, SOLUTION INTRAMUSCULAR; INTRAVENOUS at 04:29

## 2020-10-30 RX ADMIN — DEXMEDETOMIDINE HYDROCHLORIDE 0.2 MCG/KG/HR: 100 INJECTION, SOLUTION, CONCENTRATE INTRAVENOUS at 05:41

## 2020-10-30 RX ADMIN — METOPROLOL TARTRATE 50 MG: 50 TABLET, FILM COATED ORAL at 09:00

## 2020-10-30 RX ADMIN — ENOXAPARIN SODIUM 40 MG: 40 INJECTION SUBCUTANEOUS at 02:31

## 2020-10-30 RX ADMIN — POTASSIUM CHLORIDE 40 MEQ: 1.5 FOR SOLUTION ORAL at 02:30

## 2020-10-30 RX ADMIN — PHENYLEPHRINE HYDROCHLORIDE 100 MCG/MIN: 10 INJECTION INTRAVENOUS at 10:44

## 2020-10-30 RX ADMIN — PIPERACILLIN SODIUM AND TAZOBACTAM SODIUM 3.38 G: 3; .375 INJECTION, POWDER, LYOPHILIZED, FOR SOLUTION INTRAVENOUS at 17:00

## 2020-10-30 RX ADMIN — PROPOFOL 20 MG: 10 INJECTION, EMULSION INTRAVENOUS at 05:40

## 2020-10-30 RX ADMIN — PIPERACILLIN SODIUM AND TAZOBACTAM SODIUM 3.38 G: 3; .375 INJECTION, POWDER, LYOPHILIZED, FOR SOLUTION INTRAVENOUS at 10:43

## 2020-10-30 RX ADMIN — DEXMEDETOMIDINE HYDROCHLORIDE 1 MCG/KG/HR: 100 INJECTION, SOLUTION, CONCENTRATE INTRAVENOUS at 16:33

## 2020-10-30 RX ADMIN — PROPOFOL 20 MG: 10 INJECTION, EMULSION INTRAVENOUS at 06:01

## 2020-10-30 RX ADMIN — DEXMEDETOMIDINE HYDROCHLORIDE 1 MCG/KG/HR: 100 INJECTION, SOLUTION, CONCENTRATE INTRAVENOUS at 10:43

## 2020-10-30 RX ADMIN — IOPAMIDOL 100 ML: 755 INJECTION, SOLUTION INTRAVENOUS at 00:11

## 2020-10-30 RX ADMIN — ETOMIDATE 20 MG: 2 INJECTION INTRAVENOUS at 04:29

## 2020-10-30 RX ADMIN — FUROSEMIDE 20 MG: 10 INJECTION, SOLUTION INTRAMUSCULAR; INTRAVENOUS at 02:30

## 2020-10-30 RX ADMIN — Medication 7.5 MCG/MIN: at 21:32

## 2020-10-30 RX ADMIN — METOPROLOL TARTRATE 50 MG: 50 TABLET, FILM COATED ORAL at 21:33

## 2020-10-30 RX ADMIN — Medication 7.5 MCG/MIN: at 06:43

## 2020-10-30 RX ADMIN — METOPROLOL TARTRATE 50 MG: 50 TABLET, FILM COATED ORAL at 02:30

## 2020-10-30 RX ADMIN — DEXMEDETOMIDINE HYDROCHLORIDE 0.6 MCG/KG/HR: 100 INJECTION, SOLUTION, CONCENTRATE INTRAVENOUS at 06:41

## 2020-10-30 RX ADMIN — MIDAZOLAM HYDROCHLORIDE 4 MG: 5 INJECTION, SOLUTION INTRAMUSCULAR; INTRAVENOUS at 04:51

## 2020-10-30 RX ADMIN — Medication 5 MCG/MIN: at 04:20

## 2020-10-30 RX ADMIN — ACETAMINOPHEN ORAL SOLUTION 650 MG: 650 SOLUTION ORAL at 12:41

## 2020-10-30 RX ADMIN — ACETAMINOPHEN ORAL SOLUTION 650 MG: 650 SOLUTION ORAL at 16:52

## 2020-10-30 RX ADMIN — PHENYLEPHRINE HYDROCHLORIDE 50 MCG/MIN: 10 INJECTION INTRAVENOUS at 07:55

## 2020-10-30 RX ADMIN — DEXMEDETOMIDINE HYDROCHLORIDE 1 MCG/KG/HR: 100 INJECTION, SOLUTION, CONCENTRATE INTRAVENOUS at 21:19

## 2020-10-30 NOTE — ED NOTES
Spoke with dr Collette Fountain about nursing concerns of coarse crackles and HR in the 140's still in Afib RVR. Pt tachypnic. Order received will continue to monitor.

## 2020-10-30 NOTE — ROUTINE PROCESS
TRANSFER - OUT REPORT: 
 
Verbal report given to flor(name) on Alejandro Chatman  being transferred to [facility](unit) for routine progression of care Report consisted of patients Situation, Background, Assessment and  
Recommendations(SBAR). Information from the following report(s) SBAR, ED Summary and MAR was reviewed with the receiving nurse. Lines:  
Triple Lumen 10/30/20 Left Femoral (Active) Site Assessment Clean, dry, & intact 10/30/20 0448 Infiltration Assessment 0 10/30/20 0448 Dressing Status Clean, dry, & intact 10/30/20 0448 Dressing Type Transparent 10/30/20 0448 Positive Blood Return (Medial Site) Yes 10/30/20 0448 Positive Blood Return (Lateral Site) Yes 10/30/20 0448 Positive Blood Return (Site #3) Yes 10/30/20 0448 Peripheral IV 10/29/20 Left Antecubital (Active) Site Assessment Clean, dry, & intact 10/29/20 2342 Phlebitis Assessment 0 10/29/20 2342 Infiltration Assessment 0 10/29/20 2342 Dressing Status Clean, dry, & intact 10/29/20 2342 Dressing Type Transparent 10/29/20 2342 Hub Color/Line Status Green;Flushed;Patent 10/29/20 2342 Peripheral IV 10/30/20 Posterior;Right Hand (Active) Site Assessment Clean, dry, & intact 10/30/20 0325 Phlebitis Assessment 0 10/30/20 0325 Infiltration Assessment 0 10/30/20 0325 Dressing Status Clean, dry, & intact 10/30/20 0325 Dressing Type Transparent; Other (comments) 10/30/20 3690 Hub Color/Line Status Pink;Flushed;Patent 10/30/20 0325 Opportunity for questions and clarification was provided. Patient transported with: 
 Registered Nurse

## 2020-10-30 NOTE — PROGRESS NOTES
Patient arrived to  at [phone] with cardiac monitoring and ED RN's. ICU monitor attached to patient and four eyes skin assessment completed. Patient has mottling on toes and fingers and scattered bruising on bilateral arms. Sacrum is intact, mepilex applied for prophylaxis.

## 2020-10-30 NOTE — PROGRESS NOTES
Hospitalist Progress Note               Daily Progress Note: 10/30/2020      Subjective: The patient is seen for follow  up. 19-year-old male with a history of atrial fibrillation and hypertension who presented ED last night with shortness of breath for 48 hours. He had a nonproductive cough but denied any fever. Although he has a history of atrial fibrillation, he stopped all his medications because he was feeling well. In the ED he had rapid atrial fibrillation. Labs Showed a BNP of 1200, troponin 0.1, WBC 6.9 with lymphocytopenia. Platelets were 72    CT of the chest and abdomen demonstrated small focal lucencies throughout the axial and appendicular spine and were worrisome for metastatic disease or metabolic bone disease. Chest showed pulmonary edema with interstitial facial edema. No PE was identified. Patient has a history of melanoma 18 years ago. He was admitted with acute heart failure and rapid atrial fibrillation. During the night he decompensated, went into respiratory distress and was intubated around 4 AM    He became hypotensive and was started on pressors.   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      Problem List:  Problem List as of 10/30/2020 Date Reviewed: 10/30/2020          Codes Class Noted - Resolved    Atrial fibrillation [facility]) ICD-10-CM: I48.91  ICD-9-CM: 427.31  10/30/2020 - Present              Medications reviewed  Current Facility-Administered Medications   Medication Dose Route Frequency    metoprolol tartrate (LOPRESSOR) tablet 50 mg  50 mg Oral Q12H    sodium chloride (NS) flush 5-40 mL  5-40 mL IntraVENous Q8H    sodium chloride (NS) flush 5-40 mL  5-40 mL IntraVENous PRN    acetaminophen (TYLENOL) tablet 650 mg  650 mg Oral Q4H PRN    enoxaparin (LOVENOX) injection 40 mg  40 mg SubCUTAneous Q24H    metoprolol (LOPRESSOR) injection 5 mg  5 mg IntraVENous ONCE    NOREPINephrine (LEVOPHED) 8 mg in 5% dextrose 250mL (32 mcg/mL) infusion  0.5-16 mcg/min IntraVENous TITRATE    dexmedeTOMidine (PRECEDEX) 400 mcg in 0.9% sodium chloride 104 mL infusion  0.1-1.5 mcg/kg/hr IntraVENous TITRATE    midazolam (PF) (VERSED) 1 mg/mL injection        midazolam in normal saline (VERSED) 1 mg/mL infusion  0-10 mg/hr IntraVENous TITRATE    PHENYLephrine (BUBBA-SYNEPHRINE) 30 mg in 0.9% sodium chloride 250 mL infusion   mcg/min IntraVENous TITRATE       Review of Systems:   Review of systems not obtained due to patient factors. Objective:   Physical Exam:     Visit Vitals  BP (!) (P) 77/65   Pulse (P) 84   Temp 99.1 °F (37.3 °C)   Resp 17   Ht 6' 2\" (1.88 m)   Wt 102.1 kg (225 lb)   SpO2 98%   BMI 28.89 kg/m²    O2 Flow Rate (L/min): 10 l/min O2 Device: Ventilator    Temp (24hrs), Av.4 °F (37.4 °C), Min:98.7 °F (37.1 °C), Max:100.5 °F (38.1 °C)    No intake/output data recorded. 10/28 1901 - 10/30 0700  In: 3216.7 [I.V.:3216.7]  Out: -     General:   Intubated, unresponsive. Right pupil pinpoint, left pupil 5 mm and reactive   Lungs:   Clear to auscultation bilaterally. Chest wall:  No tenderness or deformity. Heart:  Regular rate and rhythm, S1, S2 normal, no murmur, click, rub or gallop. Abdomen:   Soft, non-tender. Bowel sounds normal. No masses,  No organomegaly. Extremities: Extremities normal, atraumatic, no cyanosis or edema. Pulses: 2+ and symmetric all extremities. Skin: Skin color, texture, turgor normal. No rashes or lesions   Neurologic: CNII-XII intact.  No obvious motor deficits     Data Review:       Recent Days:  Recent Labs     10/29/20  2040   WBC 6.9   HGB 13.4   HCT 38.9   PLT 72*     Recent Labs     10/30/20  0600 10/29/20  2040   * 136   K 5.3* 3.4*    102   CO2 22 16*   * 117*   BUN 16 11   CREA 1.61* 0.91   CA 8.6 9.4   PHOS 5.7*  --    ALB 3.6 3.8 TBILI  --  2.0*   ALT  --  48     Recent Labs     10/30/20  0715   PH 7.444   PCO2 25*   PO2 219*   HCO3 20*   FIO2 100.0       24 Hour Results:  Recent Results (from the past 24 hour(s))   EKG, 12 LEAD, INITIAL    Collection Time: 10/29/20  8:24 PM   Result Value Ref Range    Ventricular Rate 147 BPM    Atrial Rate 141 BPM    QRS Duration 94 ms    Q-T Interval 344 ms    QTC Calculation (Bezet) 538 ms    Calculated R Axis 75 degrees    Calculated T Axis 149 degrees    Diagnosis       Atrial fibrillation with rapid ventricular response  Anterior infarct , age undetermined  Abnormal ECG  When compared with ECG of 19-JUL-2018 09:49,  Atrial fibrillation has replaced Sinus rhythm  Vent. rate has increased BY  73 BPM  Anterior infarct is now Present  Nonspecific T wave abnormality now evident in Lateral leads  Confirmed by Sampson Roldan ([id number]) on 10/30/2020 7:35:57 AM     INFLUENZA A & B AG (RAPID TEST)    Collection Time: 10/29/20  8:40 PM   Result Value Ref Range    Influenza A Antigen Negative Negative      Influenza B Antigen Negative Negative     TROPONIN I    Collection Time: 10/29/20  8:40 PM   Result Value Ref Range    Troponin-I, Qt. 0.13 (H) <0.05 ng/mL   CBC WITH AUTOMATED DIFF    Collection Time: 10/29/20  8:40 PM   Result Value Ref Range    WBC 6.9 4.1 - 11.1 K/uL    RBC 3.82 (L) 4.10 - 5.70 M/uL    HGB 13.4 12.1 - 17.0 g/dL    HCT 38.9 36.6 - 50.3 %    .8 (H) 80.0 - 99.0 FL    MCH 35.1 (H) 26.0 - 34.0 PG    MCHC 34.4 30.0 - 36.5 g/dL    RDW 14.3 11.5 - 14.5 %    PLATELET 72 (L) 969 - 400 K/uL    MPV 12.4 8.9 - 12.9 FL    NEUTROPHILS 92 (H) 32 - 75 %    LYMPHOCYTES 3 (L) 12 - 49 %    MONOCYTES 5 5 - 13 %    EOSINOPHILS 0 0 - 7 %    BASOPHILS 0 0 - 1 %    IMMATURE GRANULOCYTES 0 0.0 - 0.5 %    ABS. NEUTROPHILS 6.4 1.8 - 8.0 K/UL    ABS. LYMPHOCYTES 0.2 (L) 0.8 - 3.5 K/UL    ABS. MONOCYTES 0.4 0.0 - 1.0 K/UL    ABS. EOSINOPHILS 0.0 0.0 - 0.4 K/UL    ABS. BASOPHILS 0.0 0.0 - 0.1 K/UL    ABS. IMM. GRANS. 0.0 0.00 - 0.04 K/UL    DF AUTOMATED     METABOLIC PANEL, COMPREHENSIVE    Collection Time: 10/29/20  8:40 PM   Result Value Ref Range    Sodium 136 136 - 145 mmol/L    Potassium 3.4 (L) 3.5 - 5.1 mmol/L    Chloride 102 97 - 108 mmol/L    CO2 16 (L) 21 - 32 mmol/L    Anion gap 18 (H) 5 - 15 mmol/L    Glucose 117 (H) 65 - 100 mg/dL    BUN 11 6 - 20 mg/dL    Creatinine 0.91 0.70 - 1.30 mg/dL    BUN/Creatinine ratio 12 12 - 20      GFR est AA >60 >60 ml/min/1.73m2    GFR est non-AA >60 >60 ml/min/1.73m2    Calcium 9.4 8.5 - 10.1 mg/dL    Bilirubin, total 2.0 (H) 0.2 - 1.0 mg/dL    AST (SGOT) 73 (H) 15 - 37 U/L    ALT (SGPT) 48 12 - 78 U/L    Alk.  phosphatase 107 45 - 117 U/L    Protein, total 8.5 (H) 6.4 - 8.2 g/dL    Albumin 3.8 3.5 - 5.0 g/dL    Globulin 4.7 (H) 2.0 - 4.0 g/dL    A-G Ratio 0.8 (L) 1.1 - 2.2     BNP    Collection Time: 10/29/20  8:40 PM   Result Value Ref Range    NT pro-BNP 1,281 (H) <125 pg/mL   SARS-COV-2    Collection Time: 10/29/20  9:45 PM   Result Value Ref Range    SARS-CoV-2 Please find results under separate order     EKG, 12 LEAD, SUBSEQUENT    Collection Time: 10/29/20 10:48 PM   Result Value Ref Range    Ventricular Rate 119 BPM    Atrial Rate 120 BPM    QRS Duration 98 ms    Q-T Interval 378 ms    QTC Calculation (Bezet) 531 ms    Calculated R Axis 53 degrees    Calculated T Axis 108 degrees    Diagnosis       Atrial fibrillation with rapid ventricular response  Possible Anterior infarct (cited on or before 29-OCT-2020)  Prolonged QT  Abnormal ECG  When compared with ECG of 29-OCT-2020 20:24, (Unconfirmed)  No significant change was found  Confirmed by Sampson Roldan ([id number]) on 10/30/2020 7:35:53 AM     URINALYSIS W/ REFLEX CULTURE    Collection Time: 10/30/20  6:00 AM    Specimen: Urine   Result Value Ref Range    Color Yellow/Straw      Appearance Clear Clear      Specific gravity >1.030 (H) 1.003 - 1.030    pH (UA) 5.0 5.0 - 8.0      Protein 30 (A) Negative mg/dL    Glucose Negative Negative mg/dL    Ketone 5 (A) Negative mg/dL    Bilirubin Negative Negative      Blood Small (A) Negative      Urobilinogen 0.1 0.1 - 1.0 EU/dL    Nitrites Negative Negative      Leukocyte Esterase Negative Negative      UA:UC IF INDICATED Culture not indicated by UA result Culture not indicated by UA result      WBC 0-4 0 - 4 /hpf    RBC 0-5 0 - 5 /hpf    Bacteria Negative Negative /hpf    Mucus Trace /lpf   RENAL FUNCTION PANEL    Collection Time: 10/30/20  6:00 AM   Result Value Ref Range    Sodium 135 (L) 136 - 145 mmol/L    Potassium 5.3 (H) 3.5 - 5.1 mmol/L    Chloride 101 97 - 108 mmol/L    CO2 22 21 - 32 mmol/L    Anion gap 12 5 - 15 mmol/L    Glucose 169 (H) 65 - 100 mg/dL    BUN 16 6 - 20 mg/dL    Creatinine 1.61 (H) 0.70 - 1.30 mg/dL    BUN/Creatinine ratio 10 (L) 12 - 20      GFR est AA 52 (L) >60 ml/min/1.73m2    GFR est non-AA 43 (L) >60 ml/min/1.73m2    Calcium 8.6 8.5 - 10.1 mg/dL    Phosphorus 5.7 (H) 2.6 - 4.7 mg/dL    Albumin 3.6 3.5 - 5.0 g/dL   BLOOD GAS, ARTERIAL    Collection Time: 10/30/20  7:15 AM   Result Value Ref Range    pH 7.444 7.35 - 7.45      PCO2 25 (L) 35 - 45 mmHg    PO2 219 (H) 75 - 100 mmHg    O2  >95 %    BICARBONATE 20 (L) 22 - 26 mmol/L    BASE DEFICIT 5.0 (H) 0 - 2 mmol/L    O2 METHOD VENT      FIO2 100.0 %    MODE PCV      SET RATE 16      EPAP/CPAP/PEEP 8      SITE Right Radial      SONA'S TEST PASS         XR CHEST PORT   Final Result   IMPRESSION: Findings would suggest early changes of CHF/pulmonary edema with   vascular congestion, axial interstitial edema and mild perihilar parenchymal   interstitial edema. CTA CHEST W OR W WO CONT   Final Result   IMPRESSION: Normal CTA of the chest with no pulmonary embolism or acute aortic   abnormalities identified; dilated ascending aorta is noted and will require   continued surveillance.  Findings suggestive of CHF/pulmonary edema with   bronchial wall thickening that may suggest axial interstitial edema and with   diffuse parenchymal edema throughout and with associated small bilateral pleural   effusions. HISTORY:  Abdominal distention, fever. Dose reduction technique: All CT scans at this facility are performed using dose reduction optimization   technique as appropriate on the exam including the following: Automated exposure   control, adjustment of the MA and/or KV according to patient size of use of   iterative reconstructive technique. .      TECHNIQUE:  CT ABD PELV W CONT                            100 cc Isovue-370 injected. COMPARISON: None   LIMITATIONS: None      CHEST: See above      LIVER: Normal   GALLBLADDER: Normal   BILIARY TREE: Normal   PANCREAS: Normal   SPLEEN: Normal   ADRENAL GLANDS: Normal   KIDNEYS/URETERS/BLADDER: Kidneys and ureters appear normal. Bladder wall   thickening   AORTA/RETROPERITONEUM: Normal   BOWEL/MESENTERY: Normal   APPENDIX: Identified and normal   PERITONEAL CAVITY: Mild diffuse abdominal pelvic ascites. No free   intraperitoneal air or abdominal pelvic abscess. REPRODUCTIVE ORGANS: Normal   BONE/TISSUES: Heterogeneous mineralization of the osseous structures bodies with   multiple areas of lucency identified throughout throughout spine, pelvis and   femurs. Moderate disc protrusions L2-3, L3-4, L4-5       OTHER: None      IMPRESSION: Bladder wall thickening is noted and may indicate cystitis and/or   chronic change. Abdominopelvic ascites. Gastrohepatic adenopathy is noted and   could be due to increased fluid resorption due to ascites but is nonspecific and   may require follow-up. Minimal cholelithiasis. . Abnormal mineralization:   Multiple focal lucencies throughout the axial and appendicular spine as   described and of uncertain etiology; evaluation for metastatic disease, myeloma,   metabolic bone disease recommended. Disc disease as described. The ER physician was unable to take my call at the time of this interpretation.    The findings were conveyed to the ER staff and they will have the physician call   me back with any questions. Findings were conveyed to staff member Kerline Bazan. CT ABD PELV W CONT   Final Result   IMPRESSION: Normal CTA of the chest with no pulmonary embolism or acute aortic   abnormalities identified; dilated ascending aorta is noted and will require   continued surveillance. Findings suggestive of CHF/pulmonary edema with   bronchial wall thickening that may suggest axial interstitial edema and with   diffuse parenchymal edema throughout and with associated small bilateral pleural   effusions. HISTORY:  Abdominal distention, fever. Dose reduction technique: All CT scans at this facility are performed using dose reduction optimization   technique as appropriate on the exam including the following: Automated exposure   control, adjustment of the MA and/or KV according to patient size of use of   iterative reconstructive technique. .      TECHNIQUE:  CT ABD PELV W CONT                            100 cc Isovue-370 injected. COMPARISON: None   LIMITATIONS: None      CHEST: See above      LIVER: Normal   GALLBLADDER: Normal   BILIARY TREE: Normal   PANCREAS: Normal   SPLEEN: Normal   ADRENAL GLANDS: Normal   KIDNEYS/URETERS/BLADDER: Kidneys and ureters appear normal. Bladder wall   thickening   AORTA/RETROPERITONEUM: Normal   BOWEL/MESENTERY: Normal   APPENDIX: Identified and normal   PERITONEAL CAVITY: Mild diffuse abdominal pelvic ascites. No free   intraperitoneal air or abdominal pelvic abscess. REPRODUCTIVE ORGANS: Normal   BONE/TISSUES: Heterogeneous mineralization of the osseous structures bodies with   multiple areas of lucency identified throughout throughout spine, pelvis and   femurs. Moderate disc protrusions L2-3, L3-4, L4-5       OTHER: None      IMPRESSION: Bladder wall thickening is noted and may indicate cystitis and/or   chronic change. Abdominopelvic ascites.  Gastrohepatic adenopathy is noted and   could be due to increased fluid resorption due to ascites but is nonspecific and   may require follow-up. Minimal cholelithiasis. . Abnormal mineralization:   Multiple focal lucencies throughout the axial and appendicular spine as   described and of uncertain etiology; evaluation for metastatic disease, myeloma,   metabolic bone disease recommended. Disc disease as described. The ER physician was unable to take my call at the time of this interpretation. The findings were conveyed to the ER staff and they will have the physician call   me back with any questions. Findings were conveyed to staff member Kerline Bazan. XR CHEST SNGL V   Final Result   IMPRESSION: No significant interval change. Cardiomegaly which may be   contributing to the left lower lobe opacity although a component of retrocardiac   atelectasis, airspace disease and/or effusion could cause a similar appearance. XR CHEST SNGL V   Final Result   IMPRESSION:   1. Suboptimal patient positioning without definite acute cardiopulmonary   abnormality evident. 2. Cardiac silhouette is enlarged, more prominent than prior, though likely at   least partially accentuated by technique. Assessment:  Acute respiratory failure with hypoxia, requiring mechanical ventilation    Cough, lymphocytopenia, hypoxia, rule out COVID-19    Acute congestive heart failure, unspecified    Paroxysmal atrial fibrillation with RVR, now with controlled rate    Acute kidney injury possible ATN. Creat 0.91 -->1.61    Likely severe sepsis/septic shock, possibly due to aspiration    Blindness right eye with pinpoint pupil    Suspected metastatic disease versus myeloma versus metabolic bone disease on CT.     History of melanoma 18 years ago    Thrombocytopenia      Plan:  Continue mechanical ventilation and pressors  Add IV Zosyn  Check procalcitonin, blood cultures, CRP, ferritin, LDH  COVID-19 testing has been sent  Consult pulmonologist and oncology  Cardiology has already evaluated the patient  Critical care time 30 minutes    Care Plan discussed with: Patient/Family and Nurse    Total time spent with patient: 30 minutes.     Ale Laguerre MD

## 2020-10-30 NOTE — ED NOTES
Nurse to bedside and noted pt bilateral feet were purple with + 2 pulses and  pt was extremely diaphoretic nurse notified dr Akila Covarrubias.  Dr. Akila Covarrubias at bedside with Dr. Brenda Guevara

## 2020-10-30 NOTE — CONSULTS
Consult    Patient: Juan Manuel Miramontes MRN: [id number]  SSN: [id number]    YOB: 1950  Age: 78 y.o. Sex: male      Subjective:      Juan Manuel Miramontes is a 78 y.o. male who is being seen for atrial fibrillation. Patient with history of atrial fibrillation, nonischemic cardiomyopathy, status post pacemaker for sick sinus syndrome, who was seen by EP several years ago and is being managed medically. Patient currently intubated and seen in the ICU. He is currently on Levophed and Jian-Synephrine. Consult was requested for atrial fibrillation with RVR. Presented to the emergency room with worsening shortness of breath for the last 2 days. History is taken from chart review and further discussion with other healthcare personnel. Patient is currently intubated as mentioned above. Complaining of cough. Found in atrial fibrillation with RVR received IV Cardizem. There is reports that of some chest pain and palpitation. Temperature 100.5.   He is being ruled out for COVID-19    Past Medical History:   Diagnosis Date    A-fib ([address]     HTN (hypertension)     Pacemaker     Skin cancer      Past Surgical History:   Procedure Laterality Date    HX PACEMAKER PLACEMENT        Family History   Problem Relation Age of Onset    Asthma Mother     Heart Disease Mother      Social History     Tobacco Use    Smoking status: Never Smoker    Smokeless tobacco: Never Used   Substance Use Topics    Alcohol use: Yes     Frequency: 2-3 times a week      Current Facility-Administered Medications   Medication Dose Route Frequency Provider Last Rate Last Dose    metoprolol tartrate (LOPRESSOR) tablet 50 mg  50 mg Oral Q12H Gabriela Beverly MD   50 mg at 10/30/20 0230    sodium chloride (NS) flush 5-40 mL  5-40 mL IntraVENous Q8H Gabriela Beverly MD        sodium chloride (NS) flush 5-40 mL  5-40 mL IntraVENous PRN Gabriela Beverly MD        acetaminophen (TYLENOL) tablet 650 mg  650 mg Oral Q4H PRN Horacio Crowell MD        enoxaparin (LOVENOX) injection 40 mg  40 mg SubCUTAneous Q24H Horacio Crowell MD   40 mg at 10/30/20 0231    metoprolol (LOPRESSOR) injection 5 mg  5 mg IntraVENous Amber Del Cid MD        NOREPINephrine (LEVOPHED) 8 mg in 5% dextrose 250mL (32 mcg/mL) infusion  0.5-16 mcg/min IntraVENous TITRATE Horacio Crowell MD 14.1 mL/hr at 10/30/20 0643 7.5 mcg/min at 10/30/20 0643    dexmedeTOMidine (PRECEDEX) 400 mcg in 0.9% sodium chloride 104 mL infusion  0.1-1.5 mcg/kg/hr IntraVENous TITRATE Horacio Crowell MD 15.9 mL/hr at 10/30/20 0641 0.6 mcg/kg/hr at 10/30/20 0641    midazolam (PF) (VERSED) 1 mg/mL injection        Stopped at 10/30/20 0454    midazolam in normal saline (VERSED) 1 mg/mL infusion  0-10 mg/hr IntraVENous TITRATE Horacio Crowell MD 2 mL/hr at 10/30/20 0642 2 mg/hr at 10/30/20 0642    PHENYLephrine (BUBBA-SYNEPHRINE) 30 mg in 0.9% sodium chloride 250 mL infusion   mcg/min IntraVENous TITRATE Monie VERMA MD 25 mL/hr at 10/30/20 0755 50 mcg/min at 10/30/20 0755        No Known Allergies    Review of Systems:  Unable to obtain. Currently intubated    Objective:     Vitals:    10/30/20 0730 10/30/20 0754 10/30/20 0756 10/30/20 0757   BP: (!) 64/55 (!) 45/19 (!) 67/52 (!) (P) 77/65   Pulse:    (P) 84   Resp:       Temp:       SpO2:  98% 98%    Weight:       Height:            Physical Exam:  General:   Intubated. Eyes:  Conjunctivae/corneas clear. PERRL, EOMs intact. Fundi benign   Ears:  Normal TMs and external ear canals both ears. Nose: Nares normal. Septum midline. Mucosa normal. No drainage or sinus tenderness. Mouth/Throat: Lips, mucosa, and tongue normal. Teeth and gums normal.   Neck: Supple, symmetrical, trachea midline, no adenopathy, thyroid: no enlargment/tenderness/nodules, no carotid bruit and no JVD. Back:   Symmetric, no curvature. ROM normal. No CVA tenderness.    Lungs:    Coarse breath sounds   Heart:   irRegular, variable S1   Abdomen:   Soft, non-tender. Bowel sounds normal. No masses,  No organomegaly. Extremities: Extremities normal, atraumatic, no cyanosis or edema. Pulses: 2+ and symmetric all extremities. Skin: Skin color, texture, turgor normal. No rashes or lesions   Lymph nodes: Cervical, supraclavicular, and axillary nodes normal.   Neurologic:  Intubated         Assessment:     Hospital Problems  Date Reviewed: 10/30/2020          Codes Class Noted POA    Atrial fibrillation [facility]) ICD-10-CM: I48.91  ICD-9-CM: 427.31  10/30/2020 Yes          Mr. Talia Ledezma is a 22-year-old white male with:  1. Heart failure decompensation  2. Atrial fibrillation with RVR  3.  Status post pacemaker  4. Mild to moderate MR  5. Heart failure with reduced ejection fraction, EF 45 to 50%  6. Hypertension  7. Hyperlipidemia  8. Previous history of alcohol abuse  9. Cholelithiasis  10. Ascites  11. Rule out multiple myeloma versus metastatic disease due to abnormal mineralization of multiple focal loose entities of the spine. 12.  Dilated ascending aorta 4.4 cm. 13.  Acute kidney injury  14. Hyperkalemia  15. Thrombocytopenia  16. History of melanoma  Plan:   Per our records patient had an echo in 2019 that showed EF of 45 to 50% with mild biatrial enlargement, mild to moderate MR. Troponin were in the indeterminate range. CT scan of the abdomen showed possible cystitis, ascites, gastrohepatic adenopathy, cholelithiasis. His potassium has increased to 5.3 and his creatinine has increased from 0.9-1.6. Will repeat BMP stat. Patient's platelet count is 77,976. We will repeat echocardiogram.  Currently on prophylactic dose of enoxaparin. He is currently on metoprolol. Currently intubated. We will obtain another echocardiogram when his results of COVID-19 come back. Levophed and Jian-Synephrine for blood pressure support.   If needed we will consider digoxin versus amiodarone if his blood pressure continue to be on the lower side. His acute kidney injury is probably related to the hypotension. Kidney function closely. Thank you  For involving me in Mr. Eduin fuentes. I will follow.     Signed By: Octavio Foreman MD     October 30, 2020

## 2020-10-30 NOTE — CONSULTS
PULMONARY CONSULT  [facility]    Name: Kamar Tee MRN: [id number]   : 1950 Hospital: [address] [place] [address]   Date: 10/30/2020  Admission date: 10/29/2020 Hospital Day: 2       HPI:     Hospital Problems  Date Reviewed: 10/30/2020          Codes Class Noted POA    Atrial fibrillation [facility]) ICD-10-CM: I48.91  ICD-9-CM: 427.31  10/30/2020 Yes                   [x] High complexity decision making was performed  [x] See my orders for details      Subjective/Initial History:     I was asked by Severo Montane, MD to see Kamar Tee  a 78 y.o.  male in consultation     Excerpts from admission 10/29/2020 or consult notes as follows:   22-year-old male came in because of shortness of breath,. Patient condition got worse in the emergency room he was found to be in atrial fibrillation started on IV Cardizem did not seem to help his condition got worse he was put on BiPAP machine he continues to have increased work of breathing become diaphoretic pressure was low in 70s subsequently got intubated now he is on a ventilator, he is also hypotensive hemodynamically unstable on 2 vasopressors. He is a history of atrial fibrillation but he is not taking any medication for it he took it off by himself because he does not feel like taking them.   He is not on any anticoagulation unable to get much history out of the patient so admitted and critical care consult was called for further evaluation      No Known Allergies     MAR reviewed and pertinent medications noted or modified as needed     Current Facility-Administered Medications   Medication    metoprolol tartrate (LOPRESSOR) tablet 50 mg    sodium chloride (NS) flush 5-40 mL    sodium chloride (NS) flush 5-40 mL    acetaminophen (TYLENOL) tablet 650 mg    enoxaparin (LOVENOX) injection 40 mg    metoprolol (LOPRESSOR) injection 5 mg    NOREPINephrine (LEVOPHED) 8 mg in 5% dextrose 250mL (32 mcg/mL) infusion    dexmedeTOMidine (PRECEDEX) 400 mcg in 0.9% sodium chloride 104 mL infusion    midazolam (PF) (VERSED) 1 mg/mL injection    midazolam in normal saline (VERSED) 1 mg/mL infusion    PHENYLephrine (BUBBA-SYNEPHRINE) 30 mg in 0.9% sodium chloride 250 mL infusion      Patient PCP: Dashawn Muller MD  PMH:  has a past medical history of A-fib ([address] HTN (hypertension), Pacemaker, and Skin cancer. PSH:   has a past surgical history that includes hx pacemaker placement. FHX: family history includes Asthma in his mother; Heart Disease in his mother. SHX:  reports that he has never smoked. He has never used smokeless tobacco. He reports current alcohol use. He reports that he does not use drugs. ROS:  Unable to obtain      Objective:     Vital Signs: Telemetry:    AFIB Intake/Output:   Visit Vitals  BP (!) (P) 77/65   Pulse (P) 84   Temp 99.1 °F (37.3 °C)   Resp 17   Ht 6' 2\" (1.88 m)   Wt 102.1 kg (225 lb)   SpO2 98%   BMI 28.89 kg/m²       Temp (24hrs), Av.4 °F (37.4 °C), Min:98.7 °F (37.1 °C), Max:100.5 °F (38.1 °C)        O2 Device: Ventilator O2 Flow Rate (L/min): 10 l/min       Wt Readings from Last 4 Encounters:   10/29/20 102.1 kg (225 lb)          Intake/Output Summary (Last 24 hours) at 10/30/2020 0852  Last data filed at 10/29/2020 2338  Gross per 24 hour   Intake 3216.67 ml   Output    Net 3216.67 ml       Last shift:      No intake/output data recorded. Last 3 shifts: 10/28 1901 - 10/30 0700  In: 3216.7 [I.V.:3216.7]  Out: -        Physical Exam:     Physical Exam   HENT:   Head: Normocephalic and atraumatic. Eyes: Pupils are equal, round, and reactive to light. Neck: Normal range of motion. Neck supple. Cardiovascular: Normal rate. Pulmonary/Chest: He is in respiratory distress. He has rales. Abdominal: Soft. Musculoskeletal: Normal range of motion. Neurological:   Sedated intubated on ventilator   Skin: He is diaphoretic.         Labs:    Recent Labs     10/29/20  2040   WBC 6.9   HGB 13.4   PLT 72*     Recent Labs     10/30/20  0600 10/29/20  2040   * 136   K 5.3* 3.4*    102   CO2 22 16*   * 117*   BUN 16 11   CREA 1.61* 0.91   CA 8.6 9.4   PHOS 5.7*  --    ALB 3.6 3.8   ALT  --  48     Recent Labs     10/30/20  0715   PH 7.444   PCO2 25*   PO2 219*   HCO3 20*   FIO2 100.0     Recent Labs     10/29/20  2040   TROIQ 0.13*     No results found for: BNPP, BNP   No results found for: CULTNo results found for: TSH, TSHEXT    Imaging:    CXR Results  (Last 48 hours)               10/30/20 0501  XR CHEST PORT Final result    Impression:  IMPRESSION: Findings would suggest early changes of CHF/pulmonary edema with   vascular congestion, axial interstitial edema and mild perihilar parenchymal   interstitial edema. Narrative:  HISTORY:  intubation       TECHNIQUE:  AP chest radiograph       COMPARISON: 10/29/2020   LIMITATIONS: None       TUBES/LINES: Endotracheal tube tip 2.7 cm above kenisha left chest wall   single-lead ventricular AICD identified       LUNG PARENCHYMA: Mild perihilar predominant interstitial marking prominence   which has increased since the prior exam. Left lung base opacity with   nonvisualization of the left hemidiaphragm persists   TRACHEA/BRONCHI: Bronchial wall thickening   PULMONARY VESSELS: Prominent and indistinct margins   PLEURA: Cannot exclude left pleural effusion. HEART: Enlarged   AORTIC SHADOW:Normal.     MEDIASTINUM: Normal   BONE/SOFT TISSUES: No acute abnormality. OTHER: None           10/29/20 2313  XR CHEST SNGL V Final result    Impression:  IMPRESSION: No significant interval change. Cardiomegaly which may be   contributing to the left lower lobe opacity although a component of retrocardiac   atelectasis, airspace disease and/or effusion could cause a similar appearance.        Narrative:  HISTORY:  pt having more resp distress       TECHNIQUE:  AP chest radiograph       COMPARISON: 10/29/2020   LIMITATIONS: None TUBES/LINES: Single lead left chest wall AICD remains in place       LUNG PARENCHYMA: Left hemidiaphragm not visualized. Suspect mild right lung base   atelectasis. The lungs remain hypoinflated   TRACHEA/BRONCHI: Normal   PULMONARY VESSELS: Normal   PLEURA: Cannot exclude left pleural effusion   HEART: Cardiomegaly is noted and probably contributes to obscuration of left   hemidiaphragm which is stable compared to prior AORTIC SHADOW:Normal.     MEDIASTINUM: Normal   BONE/SOFT TISSUES: No acute abnormality. OTHER: None           10/29/20 2106  XR CHEST SNGL V Final result    Impression:  IMPRESSION:   1. Suboptimal patient positioning without definite acute cardiopulmonary   abnormality evident. 2. Cardiac silhouette is enlarged, more prominent than prior, though likely at   least partially accentuated by technique. Narrative:  Examination: XR CHEST SNGL V        History: shortness of breath       Comparison: Chest radiograph July 16, 2018       FINDINGS:       Single frontal portable view of the chest. Please note that the left   costophrenic angle is excluded from view. Within exam limitations there is no   definite focal airspace consolidation or pneumothorax. No large pleural effusion   is evident. A single lead cardiac pacemaker is in place. The cardiac silhouette   appears enlarged, likely accentuated by technique. There is atherosclerosis of   the aorta. No findings of acute or aggressive osseous abnormality.                Results from [place] encounter on 10/29/20   XR CHEST PORT    Narrative HISTORY:  intubation    TECHNIQUE:  AP chest radiograph    COMPARISON: 10/29/2020  LIMITATIONS: None    TUBES/LINES: Endotracheal tube tip 2.7 cm above kenisha left chest wall  single-lead ventricular AICD identified    LUNG PARENCHYMA: Mild perihilar predominant interstitial marking prominence  which has increased since the prior exam. Left lung base opacity with  nonvisualization of the left hemidiaphragm persists  TRACHEA/BRONCHI: Bronchial wall thickening  PULMONARY VESSELS: Prominent and indistinct margins  PLEURA: Cannot exclude left pleural effusion. HEART: Enlarged  AORTIC SHADOW:Normal.    MEDIASTINUM: Normal  BONE/SOFT TISSUES: No acute abnormality. OTHER: None      Impression IMPRESSION: Findings would suggest early changes of CHF/pulmonary edema with  vascular congestion, axial interstitial edema and mild perihilar parenchymal  interstitial edema. XR CHEST SNGL V    Narrative HISTORY:  pt having more resp distress    TECHNIQUE:  AP chest radiograph    COMPARISON: 10/29/2020  LIMITATIONS: None    TUBES/LINES: Single lead left chest wall AICD remains in place    LUNG PARENCHYMA: Left hemidiaphragm not visualized. Suspect mild right lung base  atelectasis. The lungs remain hypoinflated  TRACHEA/BRONCHI: Normal  PULMONARY VESSELS: Normal  PLEURA: Cannot exclude left pleural effusion  HEART: Cardiomegaly is noted and probably contributes to obscuration of left  hemidiaphragm which is stable compared to prior AORTIC SHADOW:Normal.    MEDIASTINUM: Normal  BONE/SOFT TISSUES: No acute abnormality. OTHER: None      Impression IMPRESSION: No significant interval change. Cardiomegaly which may be  contributing to the left lower lobe opacity although a component of retrocardiac  atelectasis, airspace disease and/or effusion could cause a similar appearance. XR CHEST SNGL V    Narrative Examination: XR CHEST SNGL V     History: shortness of breath    Comparison: Chest radiograph July 16, 2018    FINDINGS:    Single frontal portable view of the chest. Please note that the left  costophrenic angle is excluded from view. Within exam limitations there is no  definite focal airspace consolidation or pneumothorax. No large pleural effusion  is evident. A single lead cardiac pacemaker is in place. The cardiac silhouette  appears enlarged, likely accentuated by technique.  There is atherosclerosis of  the aorta. No findings of acute or aggressive osseous abnormality. Impression IMPRESSION:  1. Suboptimal patient positioning without definite acute cardiopulmonary  abnormality evident. 2. Cardiac silhouette is enlarged, more prominent than prior, though likely at  least partially accentuated by technique. Results from [place] encounter on 10/29/20   CT ABD PELV W CONT    Narrative HISTORY:  SOB, afib, eval for PE  Dose reduction technique: All CT scans at this facility are performed using dose reduction optimization  technique as appropriate on the exam including the following: Automated exposure  control, adjustment of the MA and/or KV according to patient size of use of  iterative reconstructive technique. .    TECHNIQUE: Postcontrast CT angiogram of the chest is performed with maximum  intensity projection images (MIPS) generated. 100 cc Isovue-370 injected. COMPARISON: 1/at 7/1/2018 CTA the chest available by report only; not all images  were available. LIMITATIONS: None    LINES/TUBES/MEDICAL SUPPORT DEVICES:  Left chest wall AICD    LUNG PARENCHYMA: Mild diffuse septal thickening  TRACHEA/BRONCHI: Bronchial wall thickening throughout. PULMONARY VESSELS: Normal. No definitive persistent central filling defects  identified on multiple contiguous images to diagnose pulmonary embolism. PLEURA: Small bilateral pleural effusions  MEDIASTINUM: Normal  HEART: Multichamber cardiomegaly with persistent prominent  AORTA/GREAT VESSELS: Dilated ascending aorta 4.4 cm. No darrell aortic aneurysm or  aortic dissection  ESOPHAGUS: Normal  AXILLAE: Normal  BONES/TISSUES: No acute abnormality    UPPER ABDOMEN: See below  OTHER: None      Impression IMPRESSION: Normal CTA of the chest with no pulmonary embolism or acute aortic  abnormalities identified; dilated ascending aorta is noted and will require  continued surveillance.  Findings suggestive of CHF/pulmonary edema with  bronchial wall thickening that may suggest axial interstitial edema and with  diffuse parenchymal edema throughout and with associated small bilateral pleural  effusions. HISTORY:  Abdominal distention, fever. Dose reduction technique: All CT scans at this facility are performed using dose reduction optimization  technique as appropriate on the exam including the following: Automated exposure  control, adjustment of the MA and/or KV according to patient size of use of  iterative reconstructive technique. .    TECHNIQUE:  CT ABD PELV W CONT                           100 cc Isovue-370 injected. COMPARISON: None  LIMITATIONS: None    CHEST: See above    LIVER: Normal  GALLBLADDER: Normal  BILIARY TREE: Normal  PANCREAS: Normal  SPLEEN: Normal  ADRENAL GLANDS: Normal  KIDNEYS/URETERS/BLADDER: Kidneys and ureters appear normal. Bladder wall  thickening  AORTA/RETROPERITONEUM: Normal  BOWEL/MESENTERY: Normal  APPENDIX: Identified and normal  PERITONEAL CAVITY: Mild diffuse abdominal pelvic ascites. No free  intraperitoneal air or abdominal pelvic abscess. REPRODUCTIVE ORGANS: Normal  BONE/TISSUES: Heterogeneous mineralization of the osseous structures bodies with  multiple areas of lucency identified throughout throughout spine, pelvis and  femurs. Moderate disc protrusions L2-3, L3-4, L4-5     OTHER: None    IMPRESSION: Bladder wall thickening is noted and may indicate cystitis and/or  chronic change. Abdominopelvic ascites. Gastrohepatic adenopathy is noted and  could be due to increased fluid resorption due to ascites but is nonspecific and  may require follow-up. Minimal cholelithiasis. . Abnormal mineralization:  Multiple focal lucencies throughout the axial and appendicular spine as  described and of uncertain etiology; evaluation for metastatic disease, myeloma,  metabolic bone disease recommended. Disc disease as described.     The ER physician was unable to take my call at the time of this interpretation. The findings were conveyed to the ER staff and they will have the physician call  me back with any questions. Findings were conveyed to staff member Kerline Bazan. IMPRESSION:   1. Acute hypoxic respiratory failure  2. Decompensated congestive heart failure. Pacemaker in place  3. Atrial fibrillation with RVR  4. Shock most likely cardiogenic  5. Rule out non-STEMI  6. History of melanoma possible metastatic disease to spine  7. Thrombocytopenia  8. Acute kidney injury  9. Hyperkalemia  10. Pt is requiring Drug therapy requiring intensive monitoring for toxicity  11. Pt is unstable, unpredictable needing inpatient monitoring; is acutely ill and at high risk of sudden decline and decompensation with severe consequenses and continued end organ dysfunction and failure  12. Prognosis guarded       RECOMMENDATIONS/PLAN:     1. Patient is intubated on ventilator ABG acceptable will change to vent setting as PO2 is 215 PCO2 is 25 we will decrease the rate he is on 50% FiO2, patient is on 2 sedation will discontinue Precedex  2. White count is normal no fever we will continue to watch  3. Suspected COVID-19 pneumonia which I doubted  4. Cardiology consult elevated troponin 2D echo  5. CAT scan shows prominent vascular marking congestive changes pleural effusion once stable will start patient on Lasix watch BUN and creatinine,  6. Supplemental O2 to keep sats > 93%  7. Aspiration precautions  8. Labs to follow electrolytes, renal function and and blood counts  9. Glucose monitoring and SSI  10. Bronchial hygiene with respiratory therapy techniques, bronchodilators  11.  DVT, SUP prophylaxis         This care involved high complexity medical decision making: I personally:  · Reviewed the flowsheet and previous days notes  · Reviewed and summarized records or history from previous days note or discussions with staff, family  · High Risk Drug therapy requiring intensive monitoring for toxicity: eg steroids, pressors, antibiotics  · Reviewed and/or ordered Clinical lab tests  · Reviewed images and/or ordered Radiology tests  · Reviewed the patients ECG / Telemetry  · Reviewed and/or adjusted NiPPV settings  · Called and arranged for Radiologic procedures or interventions  · performed or ordered Diagnostic endoscopies with identified risk factors.   · discussed my assessment/management with : Nursing, Hospitalist of coordination of care        Time spent 55 min        Jolene Galvez MD

## 2020-10-30 NOTE — ED NOTES
Called to bedside by the hospitalist dr Ez Wolff who is admitting the patient. Patient is tachypneic mottled failing modified BiPAP with increased work of breathing diaphoresis. Initial EKG is a paced rhythm. Patient intubated by ER physician. Please see note. Patient's blood pressure still remains low initially 90s then 70s. Central line placed. Please see note below. Patient started on a pressor. Hospitalist at the bedside who is reviewing scans lab work. Patient has a bed in the ICU    Critical care time32 min  Time spent at the bedside discussing the patient with the hospitalist.  Reviewing blood pressure O2 saturation. Reviewing hypotension sedation pressors. Intubation note  Patient intubated by ER physician. Etomidate and succinylcholine utilized. Initial O2 saturation 99%. Postintubation sats 96%. Glide scope with a 7.0 ET tube utilized. Immediately visualized the cords. Condensation in the tube. Good capnography. Bilateral breath sounds wet but present. Patient tolerated well and with appropriate vent settings O2 saturation improved    Central line placement  Patient is hypotensive requiring a pressor. Left femoral artery palpated the medial approach using landmarks first puncture showed venous flow. Guidewire advanced. Triple-lumen placed. Patient tolerated the procedure well. Minimal bleeding. Good flow.   No complication    Laney Tran MD

## 2020-10-30 NOTE — ED PROVIDER NOTES
HPI   Chief Complaint   Patient presents with    Irregular Heart Beat    Shortness of Breath   70yoM with hx Afib presents with SOB and palpitations. Patient reports that he has had years of paroxysmal A. Fib, but earlier this year he took himself off his rate control medicine because he did not feel like taking them. He also is not on any anticoagulation. Patient reports today acute onset shortness of breath, palpitations, no chest pain. He also reports a couple days of cough with small phlegm. He does not know if he has fevers or Covid contacts. Denies alcohol, caffeine and drug use. Past Medical History:   Diagnosis Date    A-fib [facility])     Pacemaker        No past surgical history on file. No family history on file.     Social History     Socioeconomic History    Marital status:      Spouse name: Not on file    Number of children: Not on file    Years of education: Not on file    Highest education level: Not on file   Occupational History    Not on file   Social Needs    Financial resource strain: Not on file    Food insecurity     Worry: Not on file     Inability: Not on file    Transportation needs     Medical: Not on file     Non-medical: Not on file   Tobacco Use    Smoking status: Not on file   Substance and Sexual Activity    Alcohol use: Not on file    Drug use: Not on file    Sexual activity: Not on file   Lifestyle    Physical activity     Days per week: Not on file     Minutes per session: Not on file    Stress: Not on file   Relationships    Social connections     Talks on phone: Not on file     Gets together: Not on file     Attends Islam service: Not on file     Active member of club or organization: Not on file     Attends meetings of clubs or organizations: Not on file     Relationship status: Not on file    Intimate partner violence     Fear of current or ex partner: Not on file     Emotionally abused: Not on file     Physically abused: Not on file Forced sexual activity: Not on file   Other Topics Concern    Not on file   Social History Narrative    Not on file         ALLERGIES: Patient has no known allergies. Review of Systems   Respiratory: Positive for cough and shortness of breath. Cardiovascular: Positive for palpitations. All other systems reviewed and are negative. Vitals:    10/29/20 2023 10/29/20 2025   BP:  (!) 122/95   Pulse: (!) 154    Resp: 22    Temp: (!) 100.5 °F (38.1 °C)    SpO2: 97%    Weight: 102.1 kg (225 lb)    Height: 6' 2\" (1.88 m)             Physical Exam   Patient Vitals for the past 8 hrs:   Temp Pulse Resp BP SpO2   10/29/20 2149  (!) 126 27 126/80    10/29/20 2140  (!) 109      10/29/20 2139 98.7 °F (37.1 °C) (!) 120 24 (!) 132/95 97 %   10/29/20 2135  (!) 152  (!) 132/95    10/29/20 2025    (!) 122/95    10/29/20 2023 (!) 100.5 °F (38.1 °C) (!) 154 22  97 %        Nursing note and vitals reviewed. Constitutional: Moderate distress. Diaphoretic. Head: Normocephalic and atraumatic. Mouth/Throat: Airway patent. Dry mucous membranes. Eyes: EOMI. No scleral icterus. Neck: Neck supple. Cardiovascular: Rapid rate, irregular rhythm. Normal heart sounds. No murmur, rub, or gallop. Good pulses throughout. Pulmonary/Chest: Mildly tachypenic. Clear to auscultation bilaterally. No wheezes, rales, or rhonchi. Abdominal/GI: BS normal, Soft, non-tender, mildly distended. No rebound or guarding. Musculoskeletal: No gross injuries or deformities. No edema. Neurological: Alert and oriented to person, place, and time. Cranial Nerves 2-12 intact. Moving all extremities. No gross deficits. Psych: Pleasant, cooperative. Anxious affect. Skin: Skin is warm and dry. No rash noted. MDM   Ddx = sepsis (pna, covid-19, UTI), afib RVR, heart failure, ACS, PE, thyroid abnormality. EKG read by me at 20:24 showing afib rapid rate 147, no STEMI. QTc 538.   Pt given tylenol, 1L IVF bolus, ceftriaxone and azithromycin. Given 20mg diltiazem with HR going down from 150s to 120s. Flu negative. CXR clear. On re-assessment at 22:30pm hemodynamically stable however now requiring 3L NC, still c/o SOB, very diaphoretic, mild resp distress, now c/o abdominal distension, diffuse mild abdominal tenderness on exam.   Repeat EKG read by me at 22:48 showing rapid afib rate 119, no STEMI, QTc 531. Ordered CTA chest to eval PE, CT a/p with contrast to eval for infectious etiology in abdomen. These studies reveal no PE, do reveal fluid overload and incidentally found 4.4cm ascending aorta. Giving lasix IV and potassium repletions. I discussed with Dr Marquis Rojas, admitting to her for further workup. Labs Reviewed   TROPONIN I - Abnormal; Notable for the following components:       Result Value    Troponin-I, Qt. 0.13 (*)     All other components within normal limits   CBC WITH AUTOMATED DIFF - Abnormal; Notable for the following components:    RBC 3.82 (*)     .8 (*)     MCH 35.1 (*)     PLATELET 72 (*)     NEUTROPHILS 92 (*)     LYMPHOCYTES 3 (*)     ABS. LYMPHOCYTES 0.2 (*)     All other components within normal limits   METABOLIC PANEL, COMPREHENSIVE - Abnormal; Notable for the following components:    Potassium 3.4 (*)     CO2 16 (*)     Anion gap 18 (*)     Glucose 117 (*)     Bilirubin, total 2.0 (*)     AST (SGOT) 73 (*)     Protein, total 8.5 (*)     Globulin 4.7 (*)     A-G Ratio 0.8 (*)     All other components within normal limits   BNP - Abnormal; Notable for the following components:    NT pro-BNP 1,281 (*)     All other components within normal limits   INFLUENZA A & B AG (RAPID TEST)   TSH 3RD GENERATION   SARS-COV-2     XR CHEST SNGL V   Final Result   IMPRESSION:   1. Suboptimal patient positioning without definite acute cardiopulmonary   abnormality evident. 2. Cardiac silhouette is enlarged, more prominent than prior, though likely at   least partially accentuated by technique. Medications   azithromycin (ZITHROMAX) 500 mg in 0.9% sodium chloride 250 mL (VIAL-MATE) (500 mg IntraVENous Given 10/29/20 2202)   acetaminophen (TYLENOL) tablet 1,000 mg (1,000 mg Oral Given 10/29/20 2038)   cefTRIAXone (ROCEPHIN) 1 g in 0.9% sodium chloride (MBP/ADV) 50 mL MBP (1 g IntraVENous New Bag 10/29/20 2136)   sodium chloride 0.9 % bolus infusion 1,000 mL (1,000 mL IntraVENous New Bag 10/29/20 2203)   dilTIAZem (CARDIZEM) injection 10 mg (10 mg IntraVENous Given 10/29/20 2135)   dilTIAZem (CARDIZEM) injection 10 mg (10 mg IntraVENous Given 10/29/20 2203)     I, Chestine Cranker, MD, am  the first and primary ED provider for this patient. Critical Care  Performed by: Ranjeet Cramer MD  Authorized by: Ranjeet Cramer MD     Critical care provider statement:     Critical care time (minutes):  45    Critical care time was exclusive of:  Separately billable procedures and treating other patients and teaching time    Critical care was necessary to treat or prevent imminent or life-threatening deterioration of the following conditions: rapid Afib, hypoxia.     Critical care was time spent personally by me on the following activities:  Development of treatment plan with patient or surrogate, discussions with consultants, evaluation of patient's response to treatment, examination of patient, obtaining history from patient or surrogate, re-evaluation of patient's condition, pulse oximetry, ordering and review of radiographic studies, ordering and performing treatments and interventions and ordering and review of laboratory studies

## 2020-10-30 NOTE — PROGRESS NOTES
Comprehensive Nutrition Assessment    Type and Reason for Visit: Initial    Nutrition Recommendations/Plan:   As g-tube available for use and patient stable, rec'd initiation of TF of TwoCal HN at 30mL/hr  Increase by 20mL q4hr to goal rate 50mL/hr, continuous  Add 2 pkt Prosource daily (120kcal, 30g protein)  Flush with 175mL free water q4hr  Goal feeds provide 2560kcal, 130g protein, 1890mL fluid (107%kcal, 99%pro, 92% fluid needs)    Document TF rate, protein modular provision, water flushes, and GRVs in EMR. Obtain updated BW; document in EMR    Nutrition Assessment:  Worsening SOB pta. CT chest showing pulm vasc congestion. COVID r/o pending. Noted bilat feet purple, +2 pulses. Intubated and pressors initiated in ED. Remains intubated, sedated on precedex, on 2x pressors. No G-tube documentation available, RD to provide TF recs for when appropriate. Noted current BW stated, and borderline obese. RD advised RN to obtain updated BW as appropriate, as pt may be obese and would be overfed on current TF recommendations. PO diet ordered, RD to d/c. Labs: Na 35, K 5.3, BUN 16, Cr 1.61, Phos 5.7. Meds: precedex, norepi, phenylepi, midazolam, metroprolol. Malnutrition Assessment:  Malnutrition Status:  No malnutrition      Estimated Daily Nutrient Needs:  Energy (kcal): 2375kcal(PSU 2003b); Weight Used for Energy Requirements: Current(stated)  Protein (g): 133g(1.3g/kg); Weight Used for Protein Requirements: Current(stated)  Fluid (ml/day): 2050mL; Weight Used for Fluid Requirements: Current(stated)      Nutrition Related Findings:  Unable to perform NFPE 2/2 COVID r/o. Pt appears nourished on visual assessment. No G tube yet in place. No BM documented. No edema documented, however pt 'does not appear fluid overloaded' per H&P note.       Wounds:    None(no documentation)       Current Nutrition Therapies:  DIET CARDIAC Regular  Current Tube Feeding (TF) Orders:   · Goal TF & Flush Orders Provides: As g-tube available for use and patient stable, rec'd initiation of TwoCal HN at 50mL/hr continuous, Add 1 pkt Prosource daily, Flush with 175mL free water q4hr; Providing 2560kcal, 130g protein, 1890mL fluid      Anthropometric Measures:  · Height:  6' 2\" (188 cm)  · Current Body Wt:  102.1 kg (225 lb 1.4 oz)(10/30- stated)   · Ideal Body Wt:  190 lbs:  118.5 %   · BMI Category: Overweight (BMI 25.0-29. 9)     Current BW stated; rec'd obtain updated wt. Nutrition Diagnosis:   · Inadequate oral intake related to impaired respiratory function as evidenced by intubation      Nutrition Interventions:   Food and/or Nutrient Delivery: Continue NPO, Start tube feeding  Nutrition Education and Counseling: No recommendations at this time  Coordination of Nutrition Care: Continue to monitor while inpatient    Goals:  Meet >75% EENs in 7 days, Wt maintenance +/- 0.5kg per week, Lytes wnl       Nutrition Monitoring and Evaluation:   Behavioral-Environmental Outcomes: None identified  Food/Nutrient Intake Outcomes: Enteral nutrition intake/tolerance  Physical Signs/Symptoms Outcomes: Biochemical data, Weight    Discharge Planning:     Too soon to determine     Electronically signed by Beny Lugo on 10/30/2020 at 9:57 AM    Contact:

## 2020-10-30 NOTE — H&P
History and Physical              Subjective :   Chief Complaint : Shortness of breath    Source of information : Patient, previous admission records. History of present illness:   78 y.o. male with history of atrial fibrillation and hypertension presents to the emergency room complaining of significant shortness of breath started 2 days ago. Got worse and inability to breathe today so presented to the emergency room. Denies anything in particular trigger. Rest is little improvement even though he still very short of breath but activity make it worse. Having trouble breathing, mild nonproductive cough. Denies any fever or chills. States stopped taking medications as he felt he is improved from his atrial fibrillation. Currently he is not on any medications. Found with atrial fibrillation with rapid ventricular rate, given IV diltiazem twice in the emergency room with not much improvement. Patient appears to be in heart failure for CT scan of the chest, but the urine he is making looks very dark, also he does not look fluid overloaded general body. Her chest x-ray report and CT report shows pulmonary vascular congestion. He still with the tachycardia not controlled heart rate. Past Medical History:   Diagnosis Date    A-fib ([address]     HTN (hypertension)     Pacemaker     Skin cancer      Past Surgical History:   Procedure Laterality Date    HX PACEMAKER PLACEMENT       Family History   Problem Relation Age of Onset    Asthma Mother     Heart Disease Mother       Social History     Tobacco Use    Smoking status: Never Smoker    Smokeless tobacco: Never Used   Substance Use Topics    Alcohol use: Yes     Frequency: 2-3 times a week       Prior to Admission medications    Not on File     No Known Allergies          Review of Systems:  Constitutional: Usually appetite is good, denies weight loss, no fever, no chills, no night sweats.   Eye: No recent visual disturbances, no discharge, no double vision. Ear/nose/mouth/throat : No hearing disturbance, no ear pain, no nasal congestion, no sore throat, no trouble swallowing. Respiratory : +++ trouble breathing, no cough, ++++ shortness of breath, no hemoptysis, no wheezing. Cardiovascular : ++ chest pain, ++++ palpitation, no racing of heart, ++ orthopnea, no paroxysmal nocturnal dyspnea, no peripheral edema. Gastrointestinal : No nausea, no vomiting, no diarrhea, No constipation, No heartburn, No abdominal pain. Genitourinary : Denies any weak stream, no urinary retention, no nocturia. Denies dysuria. Lymphatics : No swollen glands -Neck, axillary, inguinal.  Endocrine : No excessive thirst, No polyuria No cold intolerance, No heat intolerance. Immunologic : No hives, urticaria, No seasonal allergies. Musculoskeletal : No joint swelling, No pain, No effusion,  No back pain, No neck pain. Integumentary : No rash, No pruritus, No ecchymosis. Hematology : No petechiae, No easy bruising,  No tendency to bleed easy. Neurology : Denies change in mental status, No abnormal balance, No headache, No confusion, No numbness or tingling. Psychiatric : No mood swings, + anxiety, No depression. Vitals:     Patient Vitals for the past 12 hrs:   Temp Pulse Resp BP SpO2   10/30/20 0029  (!) 132  (!) 125/103    10/29/20 2336  (!) 139 23 (!) 103/90 99 %   10/29/20 2307  (!) 123 28 110/82 99 %   10/29/20 2217  (!) 111   97 %   10/29/20 2206  (!) 117 24 109/86 97 %   10/29/20 2149  (!) 126 27 126/80    10/29/20 2140  (!) 109      10/29/20 2139 98.7 °F (37.1 °C) (!) 120 24 (!) 132/95 97 %   10/29/20 2135  (!) 152  (!) 132/95    10/29/20 2025    (!) 122/95    10/29/20 2023 (!) 100.5 °F (38.1 °C) (!) 154 22  97 %       Physical Exam:   General : Looks very uncomfortable and in significant respiratory distress. Has he was becoming tachypneic started on BiPAP. Looks very uncomfortable.   Monitor showing atrial fibrillation with rapid ventricular rate  HEENT : PERRLA, normal oral mucosa, atraumatic normocephalic, Normal ear and nose. Neck : Supple, no JVD, no masses noted, no carotid bruit. Lungs : Breath sounds with moderate air entry bilaterally, no expiratory wheezes. Rales present throughout the chest.  Significant accessory muscle use with tachypnea. CVS : Rhythm rate regular, monitor showing atrial fibrillation. Tachycardia. .  Abdomen : Soft, nontender, obese, bowel sounds active. Extremities : No edema noted,  pedal pulses palpable. Musculoskeletal : Fair range of motion, no joint swelling or effusion, muscle tone and power appears normal.   Skin : Moist, warm, skin turgor, no pathological rash. Lymphatic : No cervical lymphadenopathy. Neurological : Awake, alert, oriented to time place person. No neurological deficits. Psychiatric : Mood and affect appears appropriate to the situation. Data Review:   Recent Results (from the past 24 hour(s))   INFLUENZA A & B AG (RAPID TEST)    Collection Time: 10/29/20  8:40 PM   Result Value Ref Range    Influenza A Antigen Negative Negative      Influenza B Antigen Negative Negative     TROPONIN I    Collection Time: 10/29/20  8:40 PM   Result Value Ref Range    Troponin-I, Qt. 0.13 (H) <0.05 ng/mL   CBC WITH AUTOMATED DIFF    Collection Time: 10/29/20  8:40 PM   Result Value Ref Range    WBC 6.9 4.1 - 11.1 K/uL    RBC 3.82 (L) 4.10 - 5.70 M/uL    HGB 13.4 12.1 - 17.0 g/dL    HCT 38.9 36.6 - 50.3 %    .8 (H) 80.0 - 99.0 FL    MCH 35.1 (H) 26.0 - 34.0 PG    MCHC 34.4 30.0 - 36.5 g/dL    RDW 14.3 11.5 - 14.5 %    PLATELET 72 (L) 515 - 400 K/uL    MPV 12.4 8.9 - 12.9 FL    NEUTROPHILS 92 (H) 32 - 75 %    LYMPHOCYTES 3 (L) 12 - 49 %    MONOCYTES 5 5 - 13 %    EOSINOPHILS 0 0 - 7 %    BASOPHILS 0 0 - 1 %    IMMATURE GRANULOCYTES 0 0.0 - 0.5 %    ABS. NEUTROPHILS 6.4 1.8 - 8.0 K/UL    ABS. LYMPHOCYTES 0.2 (L) 0.8 - 3.5 K/UL    ABS. MONOCYTES 0.4 0.0 - 1.0 K/UL    ABS.  EOSINOPHILS 0.0 0.0 - 0.4 K/UL    ABS. BASOPHILS 0.0 0.0 - 0.1 K/UL    ABS. IMM. GRANS. 0.0 0.00 - 0.04 K/UL    DF AUTOMATED     METABOLIC PANEL, COMPREHENSIVE    Collection Time: 10/29/20  8:40 PM   Result Value Ref Range    Sodium 136 136 - 145 mmol/L    Potassium 3.4 (L) 3.5 - 5.1 mmol/L    Chloride 102 97 - 108 mmol/L    CO2 16 (L) 21 - 32 mmol/L    Anion gap 18 (H) 5 - 15 mmol/L    Glucose 117 (H) 65 - 100 mg/dL    BUN 11 6 - 20 mg/dL    Creatinine 0.91 0.70 - 1.30 mg/dL    BUN/Creatinine ratio 12 12 - 20      GFR est AA >60 >60 ml/min/1.73m2    GFR est non-AA >60 >60 ml/min/1.73m2    Calcium 9.4 8.5 - 10.1 mg/dL    Bilirubin, total 2.0 (H) 0.2 - 1.0 mg/dL    AST (SGOT) 73 (H) 15 - 37 U/L    ALT (SGPT) 48 12 - 78 U/L    Alk. phosphatase 107 45 - 117 U/L    Protein, total 8.5 (H) 6.4 - 8.2 g/dL    Albumin 3.8 3.5 - 5.0 g/dL    Globulin 4.7 (H) 2.0 - 4.0 g/dL    A-G Ratio 0.8 (L) 1.1 - 2.2     BNP    Collection Time: 10/29/20  8:40 PM   Result Value Ref Range    NT pro-BNP 1,281 (H) <125 pg/mL       Radiologic Studies :   CXR :   IMPRESSION:   1. Suboptimal patient positioning without definite acute cardiopulmonary   abnormality evident. 2. Cardiac silhouette is enlarged, more prominent than prior, though likely at   least partially accentuated by technique. CTA Chest : IMPRESSION  IMPRESSION: Normal CTA of the chest with no pulmonary embolism or acute aortic  abnormalities identified; dilated ascending aorta is noted and will require  continued surveillance. Findings suggestive of CHF/pulmonary edema with  bronchial wall thickening that may suggest axial interstitial edema and with  diffuse parenchymal edema throughout and with associated small bilateral pleural  Effusions. CT abdomen : IMPRESSION: Bladder wall thickening is noted and may indicate cystitis and/or chronic change. Abdominopelvic ascites.  Gastrohepatic adenopathy is noted and could be due to increased fluid resorption due to ascites but is nonspecific and  may require follow-up. Minimal cholelithiasis. . Abnormal mineralization:  Multiple focal lucencies throughout the axial and appendicular spine as  described and of uncertain etiology; evaluation for metastatic disease, myeloma,  metabolic bone disease recommended. Disc disease as described. Assessment and Plan :   Acute respiratory failure: Secondary to fluid overload from atrial fibrillation    Acute heart failure: It looks secondary to his atrial fibrillation with rapid ventricular rate rather than primary heart failure. Paroxysmal atrial fibrillation: Has history and treatment but stopped taking medications. Now with exacerbation and tachycardia. We will start on metoprolol IV and also oral medication, and will adjust dose as heart rate controlled. Benign essential hypertension: Not taking any medications, we will continue and adjust medications    History of melanoma 18 years ago, one spot and he had surgery done. States he is on regular follow-up with the oncology    Suspected metastatic disease versus myeloma with metabolic bone disease and CT of the abdomen and chest.  We will consult oncology for further evaluation and management of this. Admitted to ICU, full CODE STATUS, he is not taking any home medications at this time. Has no advance medical directives. CC : Raymond Harris MD  Signed By: Augustus Lesches, MD     October 30, 2020      This dictation was done by dragon, computer voice recognition software. Often unanticipated grammatical, syntax, [place] phones and other interpretive errors are inadvertently transcribed. Please excuse errors that have escaped final proofreading.

## 2020-10-31 ENCOUNTER — APPOINTMENT (OUTPATIENT)
Dept: GENERAL RADIOLOGY | Age: 70
DRG: 870 | End: 2020-10-31
Attending: INTERNAL MEDICINE
Payer: MEDICARE

## 2020-10-31 LAB
ALBUMIN SERPL-MCNC: 2.6 G/DL (ref 3.5–5)
ALBUMIN SERPL-MCNC: 2.9 G/DL (ref 3.5–5)
ALBUMIN SERPL-MCNC: 3 G/DL (ref 3.5–5)
ALBUMIN/GLOB SERPL: 0.6 {RATIO} (ref 1.1–2.2)
ALBUMIN/GLOB SERPL: 0.7 {RATIO} (ref 1.1–2.2)
ALP SERPL-CCNC: 66 U/L (ref 45–117)
ALP SERPL-CCNC: 73 U/L (ref 45–117)
ALT SERPL-CCNC: 1318 U/L (ref 12–78)
ALT SERPL-CCNC: 1463 U/L (ref 12–78)
ANION GAP SERPL CALC-SCNC: 8 MMOL/L (ref 5–15)
ANION GAP SERPL CALC-SCNC: 8 MMOL/L (ref 5–15)
APPEARANCE UR: ABNORMAL
ARTERIAL PATENCY WRIST A: ABNORMAL
AST SERPL W P-5'-P-CCNC: 1642 U/L (ref 15–37)
AST SERPL W P-5'-P-CCNC: 1968 U/L (ref 15–37)
BACTERIA URNS QL MICRO: ABNORMAL /HPF
BASE DEFICIT BLDA-SCNC: 3.4 MMOL/L (ref 0–2)
BASOPHILS # BLD: 0 K/UL (ref 0–0.1)
BASOPHILS NFR BLD: 1 % (ref 0–1)
BDY SITE: ABNORMAL
BILIRUB DIRECT SERPL-MCNC: 1.2 MG/DL (ref 0–0.2)
BILIRUB SERPL-MCNC: 2 MG/DL (ref 0.2–1)
BILIRUB SERPL-MCNC: 2.2 MG/DL (ref 0.2–1)
BILIRUB UR QL: NEGATIVE
BUN SERPL-MCNC: 24 MG/DL (ref 6–20)
BUN SERPL-MCNC: 25 MG/DL (ref 6–20)
BUN/CREAT SERPL: 22 (ref 12–20)
BUN/CREAT SERPL: 25 (ref 12–20)
CA-I BLD-MCNC: 8.8 MG/DL (ref 8.5–10.1)
CA-I BLD-MCNC: 8.9 MG/DL (ref 8.5–10.1)
CEA SERPL-MCNC: 2.8 NG/ML
CHLORIDE SERPL-SCNC: 107 MMOL/L (ref 97–108)
CHLORIDE SERPL-SCNC: 108 MMOL/L (ref 97–108)
CK SERPL-CCNC: 205 U/L (ref 39–308)
CO2 SERPL-SCNC: 21 MMOL/L (ref 21–32)
CO2 SERPL-SCNC: 25 MMOL/L (ref 21–32)
COLOR UR: ABNORMAL
CREAT SERPL-MCNC: 1.02 MG/DL (ref 0.7–1.3)
CREAT SERPL-MCNC: 1.09 MG/DL (ref 0.7–1.3)
DIFFERENTIAL METHOD BLD: ABNORMAL
EOSINOPHIL # BLD: 0 K/UL (ref 0–0.4)
EOSINOPHIL NFR BLD: 1 % (ref 0–7)
EPAP/CPAP/PEEP, PAPEEP: 8
ERYTHROCYTE [DISTWIDTH] IN BLOOD BY AUTOMATED COUNT: 15.4 % (ref 11.5–14.5)
ERYTHROCYTE [SEDIMENTATION RATE] IN BLOOD: 108 MM/HR
FIO2 ON VENT: 50 %
FOLATE SERPL-MCNC: 2 NG/ML (ref 5–21)
GAS FLOW.O2 SETTING OXYMISER: 16 L/MIN
GLOBULIN SER CALC-MCNC: 4.2 G/DL (ref 2–4)
GLOBULIN SER CALC-MCNC: 4.5 G/DL (ref 2–4)
GLUCOSE SERPL-MCNC: 122 MG/DL (ref 65–100)
GLUCOSE SERPL-MCNC: 126 MG/DL (ref 65–100)
GLUCOSE UR STRIP.AUTO-MCNC: NEGATIVE MG/DL
HCO3 BLDA-SCNC: 22 MMOL/L (ref 22–26)
HCT VFR BLD AUTO: 41.8 % (ref 36.6–50.3)
HGB BLD-MCNC: 14.7 G/DL (ref 12.1–17)
HGB UR QL STRIP: ABNORMAL
IMM GRANULOCYTES # BLD AUTO: 0 K/UL (ref 0–0.04)
IMM GRANULOCYTES NFR BLD AUTO: 0 % (ref 0–0.5)
IPAP/PIP, IPAPIP: 15
KETONES UR QL STRIP.AUTO: NEGATIVE MG/DL
LDH SERPL L TO P-CCNC: 632 U/L (ref 85–241)
LEUKOCYTE ESTERASE UR QL STRIP.AUTO: NEGATIVE
LYMPHOCYTES # BLD: 0.5 K/UL (ref 0.8–3.5)
LYMPHOCYTES NFR BLD: 11 % (ref 12–49)
MCH RBC QN AUTO: 35.9 PG (ref 26–34)
MCHC RBC AUTO-ENTMCNC: 35.2 G/DL (ref 30–36.5)
MCV RBC AUTO: 102 FL (ref 80–99)
MONOCYTES # BLD: 0.2 K/UL (ref 0–1)
MONOCYTES NFR BLD: 4 % (ref 5–13)
NEUTS SEG # BLD: 3.7 K/UL (ref 1.8–8)
NEUTS SEG NFR BLD: 83 % (ref 32–75)
NITRITE UR QL STRIP.AUTO: NEGATIVE
PCO2 BLDA: 34 MMHG (ref 35–45)
PH BLDA: 7.39 [PH] (ref 7.35–7.45)
PH UR STRIP: 5 [PH] (ref 5–8)
PHOSPHATE SERPL-MCNC: 2.6 MG/DL (ref 2.6–4.7)
PLATELET # BLD AUTO: 172 K/UL (ref 150–400)
PO2 BLDA: 139 MMHG (ref 75–100)
POTASSIUM SERPL-SCNC: 3.9 MMOL/L (ref 3.5–5.1)
POTASSIUM SERPL-SCNC: 4.2 MMOL/L (ref 3.5–5.1)
PROT SERPL-MCNC: 6.8 G/DL (ref 6.4–8.2)
PROT SERPL-MCNC: 7.5 G/DL (ref 6.4–8.2)
PROT UR STRIP-MCNC: 100 MG/DL
PSA SERPL-MCNC: 46 NG/ML (ref 0.01–4)
RBC # BLD AUTO: 4.1 M/UL (ref 4.1–5.7)
RBC #/AREA URNS HPF: ABNORMAL /HPF (ref 0–5)
RETICS # AUTO: 0.07 M/UL (ref 0.06–0.1)
RETICS/RBC NFR AUTO: 1.9 % (ref 0.7–2.1)
SAO2 % BLD: 100 %
SAO2% DEVICE SAO2% SENSOR NAME: ABNORMAL
SODIUM SERPL-SCNC: 137 MMOL/L (ref 136–145)
SODIUM SERPL-SCNC: 140 MMOL/L (ref 136–145)
SP GR UR REFRACTOMETRY: >1.03 (ref 1–1.03)
UROBILINOGEN UR QL STRIP.AUTO: 0.1 EU/DL (ref 0.1–1)
VENTILATION MODE VENT: ABNORMAL
VIT B12 SERPL-MCNC: 520 PG/ML (ref 193–986)
WBC # BLD AUTO: 4.4 K/UL (ref 4.1–11.1)
WBC URNS QL MICRO: ABNORMAL /HPF (ref 0–4)

## 2020-10-31 PROCEDURE — 84153 ASSAY OF PSA TOTAL: CPT

## 2020-10-31 PROCEDURE — 36600 WITHDRAWAL OF ARTERIAL BLOOD: CPT

## 2020-10-31 PROCEDURE — 94400 HC END TIDAL CO2 RESPONSE CURVE: CPT

## 2020-10-31 PROCEDURE — 80076 HEPATIC FUNCTION PANEL: CPT

## 2020-10-31 PROCEDURE — 80053 COMPREHEN METABOLIC PANEL: CPT

## 2020-10-31 PROCEDURE — 85045 AUTOMATED RETICULOCYTE COUNT: CPT

## 2020-10-31 PROCEDURE — 36415 COLL VENOUS BLD VENIPUNCTURE: CPT

## 2020-10-31 PROCEDURE — 74011000258 HC RX REV CODE- 258: Performed by: INTERNAL MEDICINE

## 2020-10-31 PROCEDURE — 82607 VITAMIN B-12: CPT

## 2020-10-31 PROCEDURE — 74011250636 HC RX REV CODE- 250/636: Performed by: INTERNAL MEDICINE

## 2020-10-31 PROCEDURE — 82803 BLOOD GASES ANY COMBINATION: CPT

## 2020-10-31 PROCEDURE — 84165 PROTEIN E-PHORESIS SERUM: CPT

## 2020-10-31 PROCEDURE — 74011250636 HC RX REV CODE- 250/636: Performed by: HOSPITALIST

## 2020-10-31 PROCEDURE — 77010033678 HC OXYGEN DAILY

## 2020-10-31 PROCEDURE — 82306 VITAMIN D 25 HYDROXY: CPT

## 2020-10-31 PROCEDURE — 86335 IMMUNFIX E-PHORSIS/URINE/CSF: CPT

## 2020-10-31 PROCEDURE — 85025 COMPLETE CBC W/AUTO DIFF WBC: CPT

## 2020-10-31 PROCEDURE — 74011000258 HC RX REV CODE- 258: Performed by: HOSPITALIST

## 2020-10-31 PROCEDURE — 65610000006 HC RM INTENSIVE CARE

## 2020-10-31 PROCEDURE — 82378 CARCINOEMBRYONIC ANTIGEN: CPT

## 2020-10-31 PROCEDURE — 82550 ASSAY OF CK (CPK): CPT

## 2020-10-31 PROCEDURE — 74011000250 HC RX REV CODE- 250: Performed by: HOSPITALIST

## 2020-10-31 PROCEDURE — 80069 RENAL FUNCTION PANEL: CPT

## 2020-10-31 PROCEDURE — 83615 LACTATE (LD) (LDH) ENZYME: CPT

## 2020-10-31 PROCEDURE — 74011250637 HC RX REV CODE- 250/637: Performed by: INTERNAL MEDICINE

## 2020-10-31 PROCEDURE — 85652 RBC SED RATE AUTOMATED: CPT

## 2020-10-31 PROCEDURE — 81001 URINALYSIS AUTO W/SCOPE: CPT

## 2020-10-31 PROCEDURE — 94003 VENT MGMT INPAT SUBQ DAY: CPT

## 2020-10-31 PROCEDURE — 74011250637 HC RX REV CODE- 250/637: Performed by: HOSPITALIST

## 2020-10-31 PROCEDURE — 71045 X-RAY EXAM CHEST 1 VIEW: CPT

## 2020-10-31 PROCEDURE — 83883 ASSAY NEPHELOMETRY NOT SPEC: CPT

## 2020-10-31 RX ADMIN — PIPERACILLIN SODIUM AND TAZOBACTAM SODIUM 3.38 G: 3; .375 INJECTION, POWDER, LYOPHILIZED, FOR SOLUTION INTRAVENOUS at 15:50

## 2020-10-31 RX ADMIN — DEXMEDETOMIDINE HYDROCHLORIDE 1 MCG/KG/HR: 100 INJECTION, SOLUTION, CONCENTRATE INTRAVENOUS at 03:42

## 2020-10-31 RX ADMIN — MIDAZOLAM 4 MG/HR: 5 INJECTION INTRAMUSCULAR; INTRAVENOUS at 15:50

## 2020-10-31 RX ADMIN — DEXMEDETOMIDINE HYDROCHLORIDE 0.8 MCG/KG/HR: 100 INJECTION, SOLUTION, CONCENTRATE INTRAVENOUS at 20:43

## 2020-10-31 RX ADMIN — Medication 5 MCG/MIN: at 15:52

## 2020-10-31 RX ADMIN — PIPERACILLIN SODIUM AND TAZOBACTAM SODIUM 3.38 G: 3; .375 INJECTION, POWDER, LYOPHILIZED, FOR SOLUTION INTRAVENOUS at 08:39

## 2020-10-31 RX ADMIN — METOPROLOL TARTRATE 50 MG: 50 TABLET, FILM COATED ORAL at 15:52

## 2020-10-31 RX ADMIN — Medication 2.5 MCG/MIN: at 07:53

## 2020-10-31 RX ADMIN — METOPROLOL TARTRATE 50 MG: 50 TABLET, FILM COATED ORAL at 21:07

## 2020-10-31 RX ADMIN — ACETAMINOPHEN ORAL SOLUTION 650 MG: 650 SOLUTION ORAL at 19:21

## 2020-10-31 RX ADMIN — DEXMEDETOMIDINE HYDROCHLORIDE 0.2 MCG/KG/HR: 100 INJECTION, SOLUTION, CONCENTRATE INTRAVENOUS at 16:28

## 2020-10-31 NOTE — CONSULTS
Consult    Subjective:     Chichi Tiwari is a 78 y.o.  male who is being seen for multiple bone lucency of spine,femurs and pelvis. Pt admitted with increased SOB and afib. Pt  Intubated and on ventilator. H/o melanoma many yrs back. pt looks comfortable.     Past Medical History:   Diagnosis Date    A-fib ([address]     HTN (hypertension)     Pacemaker     Skin cancer       Past Surgical History:   Procedure Laterality Date    HX PACEMAKER PLACEMENT       Family History   Problem Relation Age of Onset    Asthma Mother     Heart Disease Mother       Social History     Tobacco Use    Smoking status: Never Smoker    Smokeless tobacco: Never Used   Substance Use Topics    Alcohol use: Yes     Frequency: 2-3 times a week       Current Facility-Administered Medications   Medication Dose Route Frequency Provider Last Rate Last Dose    influenza vaccine 2020-21 (4 yrs+)(PF) (FLUCELVAX QUAD) injection 0.5 mL  0.5 mL IntraMUSCular PRIOR TO DISCHARGE Adi Harris MD        metoprolol tartrate (LOPRESSOR) tablet 50 mg  50 mg Oral Q12H Ana Keith MD   50 mg at 10/30/20 2133    enoxaparin (LOVENOX) injection 40 mg  40 mg SubCUTAneous Q24H Ana Keith MD   40 mg at 10/30/20 0231    NOREPINephrine (LEVOPHED) 8 mg in 5% dextrose 250mL (32 mcg/mL) infusion  0.5-16 mcg/min IntraVENous TITRATE Ana Keith MD 4.7 mL/hr at 10/31/20 0753 2.5 mcg/min at 10/31/20 0753    dexmedeTOMidine (PRECEDEX) 400 mcg in 0.9% sodium chloride 104 mL infusion  0.1-1.5 mcg/kg/hr IntraVENous TITRATE Ana Keith MD   Stopped at 10/31/20 0700    midazolam in normal saline (VERSED) 1 mg/mL infusion  0-10 mg/hr IntraVENous TITRATE Ana Keith MD 2 mL/hr at 10/31/20 0343 2 mg/hr at 10/31/20 0343    PHENYLephrine (BUBBA-SYNEPHRINE) 30 mg in 0.9% sodium chloride 250 mL infusion   mcg/min IntraVENous TITRATE Gloria VEMRA MD 50 mL/hr at 10/30/20 1044 100 mcg/min at 10/30/20 1044    piperacillin-tazobactam (ZOSYN) 3.375 g in 0.9% sodium chloride (MBP/ADV) 100 mL MBP  3.375 g IntraVENous Q8H Mckayla Navarro MD 25 mL/hr at 10/31/20 0839 3.375 g at 10/31/20 0839    acetaminophen (TYLENOL) solution 650 mg  650 mg Per NG tube Q4H PRN Mckayla Navarro MD   650 mg at 10/30/20 1652        No Known Allergies     Review of Systems:  Not obtainable    Objective: Intake and Output:    10/31 0701 - 10/31 1900  In: -   Out: 93 [Urine:93]  10/29 1901 - 10/31 0700  In: 3216.7 [I.V.:3216.7]  Out: 350 [Urine:350]  Blood pressure 90/71, pulse 67, temperature 98.1 °F (36.7 °C), resp. rate 15, height 6' 2.02\" (1.88 m), weight 102.1 kg (225 lb 1.4 oz), SpO2 100 %. Physical Exam:   General:  Sedated. Pt on ventilator. .   Lungs: Few rhonchi bl   Chest wall:  No tenderness or deformity. Heart:  irregular   Abdomen:   Soft, non-tender. Bowel sounds normal. No masses,  No organomegaly. Extremities: Extremities normal, atraumatic, no cyanosis or edema. Pulses: 2+ and symmetric all extremities. Skin: Skin color, texture, turgor normal. No rashes or lesions   Neurologic: CNII-XII intact. No gross sensory or motor deficits       Images:   XR CHEST PORT   Final Result   FINDINGS/IMPRESSION:   Radiograph is limited by patient's body habitus and AP portable technique. Support lines and tubes are appropriate in radiographic position. Lungs remain hypoinflated with bibasilar atelectasis and bilateral pleural   effusions, left greater than right. Cardiac silhouette is enlarged, stable. Interval reduction of patchy bilateral   airspace opacity, possibly resolving alveolar edema. No radiographically apparent pneumothorax. XR CHEST PORT   Final Result   IMPRESSION: Findings would suggest early changes of CHF/pulmonary edema with   vascular congestion, axial interstitial edema and mild perihilar parenchymal   interstitial edema.       CTA CHEST W OR W WO CONT   Final Result   IMPRESSION: Normal CTA of the chest with no pulmonary embolism or acute aortic   abnormalities identified; dilated ascending aorta is noted and will require   continued surveillance. Findings suggestive of CHF/pulmonary edema with   bronchial wall thickening that may suggest axial interstitial edema and with   diffuse parenchymal edema throughout and with associated small bilateral pleural   effusions. HISTORY:  Abdominal distention, fever. Dose reduction technique: All CT scans at this facility are performed using dose reduction optimization   technique as appropriate on the exam including the following: Automated exposure   control, adjustment of the MA and/or KV according to patient size of use of   iterative reconstructive technique. .      TECHNIQUE:  CT ABD PELV W CONT                            100 cc Isovue-370 injected. COMPARISON: None   LIMITATIONS: None      CHEST: See above      LIVER: Normal   GALLBLADDER: Normal   BILIARY TREE: Normal   PANCREAS: Normal   SPLEEN: Normal   ADRENAL GLANDS: Normal   KIDNEYS/URETERS/BLADDER: Kidneys and ureters appear normal. Bladder wall   thickening   AORTA/RETROPERITONEUM: Normal   BOWEL/MESENTERY: Normal   APPENDIX: Identified and normal   PERITONEAL CAVITY: Mild diffuse abdominal pelvic ascites. No free   intraperitoneal air or abdominal pelvic abscess. REPRODUCTIVE ORGANS: Normal   BONE/TISSUES: Heterogeneous mineralization of the osseous structures bodies with   multiple areas of lucency identified throughout throughout spine, pelvis and   femurs. Moderate disc protrusions L2-3, L3-4, L4-5       OTHER: None      IMPRESSION: Bladder wall thickening is noted and may indicate cystitis and/or   chronic change. Abdominopelvic ascites. Gastrohepatic adenopathy is noted and   could be due to increased fluid resorption due to ascites but is nonspecific and   may require follow-up. Minimal cholelithiasis. . Abnormal mineralization:   Multiple focal lucencies throughout the axial and appendicular spine as   described and of uncertain etiology; evaluation for metastatic disease, myeloma,   metabolic bone disease recommended. Disc disease as described. The ER physician was unable to take my call at the time of this interpretation. The findings were conveyed to the ER staff and they will have the physician call   me back with any questions. Findings were conveyed to staff member Kerline Bazan. CT ABD PELV W CONT   Final Result   IMPRESSION: Normal CTA of the chest with no pulmonary embolism or acute aortic   abnormalities identified; dilated ascending aorta is noted and will require   continued surveillance. Findings suggestive of CHF/pulmonary edema with   bronchial wall thickening that may suggest axial interstitial edema and with   diffuse parenchymal edema throughout and with associated small bilateral pleural   effusions. HISTORY:  Abdominal distention, fever. Dose reduction technique: All CT scans at this facility are performed using dose reduction optimization   technique as appropriate on the exam including the following: Automated exposure   control, adjustment of the MA and/or KV according to patient size of use of   iterative reconstructive technique. .      TECHNIQUE:  CT ABD PELV W CONT                            100 cc Isovue-370 injected. COMPARISON: None   LIMITATIONS: None      CHEST: See above      LIVER: Normal   GALLBLADDER: Normal   BILIARY TREE: Normal   PANCREAS: Normal   SPLEEN: Normal   ADRENAL GLANDS: Normal   KIDNEYS/URETERS/BLADDER: Kidneys and ureters appear normal. Bladder wall   thickening   AORTA/RETROPERITONEUM: Normal   BOWEL/MESENTERY: Normal   APPENDIX: Identified and normal   PERITONEAL CAVITY: Mild diffuse abdominal pelvic ascites. No free   intraperitoneal air or abdominal pelvic abscess.    REPRODUCTIVE ORGANS: Normal   BONE/TISSUES: Heterogeneous mineralization of the osseous structures bodies with multiple areas of lucency identified throughout throughout spine, pelvis and   femurs. Moderate disc protrusions L2-3, L3-4, L4-5       OTHER: None      IMPRESSION: Bladder wall thickening is noted and may indicate cystitis and/or   chronic change. Abdominopelvic ascites. Gastrohepatic adenopathy is noted and   could be due to increased fluid resorption due to ascites but is nonspecific and   may require follow-up. Minimal cholelithiasis. . Abnormal mineralization:   Multiple focal lucencies throughout the axial and appendicular spine as   described and of uncertain etiology; evaluation for metastatic disease, myeloma,   metabolic bone disease recommended. Disc disease as described. The ER physician was unable to take my call at the time of this interpretation. The findings were conveyed to the ER staff and they will have the physician call   me back with any questions. Findings were conveyed to staff member Kerline Bazan. XR CHEST SNGL V   Final Result   IMPRESSION: No significant interval change. Cardiomegaly which may be   contributing to the left lower lobe opacity although a component of retrocardiac   atelectasis, airspace disease and/or effusion could cause a similar appearance. XR CHEST SNGL V   Final Result   IMPRESSION:   1. Suboptimal patient positioning without definite acute cardiopulmonary   abnormality evident. 2. Cardiac silhouette is enlarged, more prominent than prior, though likely at   least partially accentuated by technique.       XR CHEST PORT    (Results Pending)            Data Review:   Recent Results (from the past 24 hour(s))   BLOOD GAS, ARTERIAL    Collection Time: 10/31/20  3:00 AM   Result Value Ref Range    pH 7.395 7.35 - 7.45      PCO2 34 (L) 35 - 45 mmHg    PO2 139 (H) 75 - 100 mmHg    O2  >95 %    BICARBONATE 22 22 - 26 mmol/L    BASE DEFICIT 3.4 (H) 0 - 2 mmol/L    O2 METHOD VENT      FIO2 50.0 %    MODE AssistControl/Pressure Control      SET RATE 16 IPAP/PIP 15      EPAP/CPAP/PEEP 8.0      SITE Right Brachial      SONA'S TEST PASS     CK    Collection Time: 10/31/20  6:30 AM   Result Value Ref Range     39 - 025 U/L   METABOLIC PANEL, COMPREHENSIVE    Collection Time: 10/31/20  6:30 AM   Result Value Ref Range    Sodium 140 136 - 145 mmol/L    Potassium 3.9 3.5 - 5.1 mmol/L    Chloride 107 97 - 108 mmol/L    CO2 25 21 - 32 mmol/L    Anion gap 8 5 - 15 mmol/L    Glucose 126 (H) 65 - 100 mg/dL    BUN 24 (H) 6 - 20 mg/dL    Creatinine 1.09 0.70 - 1.30 mg/dL    BUN/Creatinine ratio 22 (H) 12 - 20      GFR est AA >60 >60 ml/min/1.73m2    GFR est non-AA >60 >60 ml/min/1.73m2    Calcium 8.9 8.5 - 10.1 mg/dL    Bilirubin, total 2.2 (H) 0.2 - 1.0 mg/dL    AST (SGOT) 1,968 (H) 15 - 37 U/L    ALT (SGPT) 1,463 (H) 12 - 78 U/L    Alk.  phosphatase 73 45 - 117 U/L    Protein, total 7.5 6.4 - 8.2 g/dL    Albumin 3.0 (L) 3.5 - 5.0 g/dL    Globulin 4.5 (H) 2.0 - 4.0 g/dL    A-G Ratio 0.7 (L) 1.1 - 2.2     RENAL FUNCTION PANEL    Collection Time: 10/31/20  6:30 AM   Result Value Ref Range    Sodium 137 136 - 145 mmol/L    Potassium 4.2 3.5 - 5.1 mmol/L    Chloride 108 97 - 108 mmol/L    CO2 21 21 - 32 mmol/L    Anion gap 8 5 - 15 mmol/L    Glucose 122 (H) 65 - 100 mg/dL    BUN 25 (H) 6 - 20 mg/dL    Creatinine 1.02 0.70 - 1.30 mg/dL    BUN/Creatinine ratio 25 (H) 12 - 20      GFR est AA >60 >60 ml/min/1.73m2    GFR est non-AA >60 >60 ml/min/1.73m2    Calcium 8.8 8.5 - 10.1 mg/dL    Phosphorus 2.6 2.6 - 4.7 mg/dL    Albumin 2.9 (L) 3.5 - 5.0 g/dL   URINALYSIS W/MICROSCOPIC    Collection Time: 10/31/20  7:10 AM   Result Value Ref Range    Color Yellow/Straw      Appearance Turbid (A) Clear      Specific gravity >1.030 (H) 1.003 - 1.030    pH (UA) 5.0 5.0 - 8.0      Protein 100 (A) Negative mg/dL    Glucose Negative Negative mg/dL    Ketone Negative Negative mg/dL    Bilirubin Negative Negative      Blood Moderate (A) Negative      Urobilinogen 0.1 0.1 - 1.0 EU/dL Nitrites Negative Negative      Leukocyte Esterase Negative Negative      WBC 5-10 0 - 4 /hpf    RBC 10-20 0 - 5 /hpf    Bacteria 1+ (A) Negative /hpf   HEPATIC FUNCTION PANEL    Collection Time: 10/31/20  9:29 AM   Result Value Ref Range    Protein, total 6.8 6.4 - 8.2 g/dL    Albumin 2.6 (L) 3.5 - 5.0 g/dL    Globulin 4.2 (H) 2.0 - 4.0 g/dL    A-G Ratio 0.6 (L) 1.1 - 2.2      Bilirubin, total 2.0 (H) 0.2 - 1.0 mg/dL    Bilirubin, direct 1.2 (H) 0.0 - 0.2 mg/dL    Alk. phosphatase 66 45 - 117 U/L    AST (SGOT) 1,642 (H) 15 - 37 U/L    ALT (SGPT) 1,318 (H) 12 - 78 U/L        Assessment:     Multiple bone lucencies of spine,femurs and pelvis:Non specific. R/o bone metastases. R/o multiple myeloma. R/o paget's disease. H/o melanoma 18 yrs back. Gastrohepatic LAD:nonspecific. Monitor. Thrombocytopenia:R/o due to sepsis. Check peripheral smear. Repeat platelets normal.  ? lab error. No active bleeding. Respiratory failure; Pt on ventilator. pulmonary following. Atrial fibrillation:cardiology following/  Elevated LFTS:due to possible shock liver. LDH high most likely due to liver dysfunction. LDH improving. Plan:   Check bone scan after pt out of ICU. Check SPEP,PSA,CEA and Hb electrophoresis. Will discuss with pt family. Thank you for the consult. Thank you for the consult.

## 2020-10-31 NOTE — CONSULTS
PULMONARY NOTE  [facility]    Name: Aidan Kelley MRN: [id number]   : 1950 Hospital: [address] [place] [address]   Date: 10/31/2020  Admission date: 10/29/2020 Hospital Day: 3       HPI:     Hospital Problems  Date Reviewed: 10/30/2020          Codes Class Noted POA    Atrial fibrillation [facility]) ICD-10-CM: I48.91  ICD-9-CM: 427.31  10/30/2020 Yes                   [x] High complexity decision making was performed  [x] See my orders for details      Subjective/Initial History:     I was asked by Benoit Navarro MD to see Aidan Kelley  a 78 y.o.  male in consultation     Excerpts from admission 10/29/2020 or consult notes as follows:   62-year-old male came in because of shortness of breath,. Patient condition got worse in the emergency room he was found to be in atrial fibrillation started on IV Cardizem did not seem to help his condition got worse he was put on BiPAP machine he continues to have increased work of breathing become diaphoretic pressure was low in 70s subsequently got intubated now he is on a ventilator, he is also hypotensive hemodynamically unstable on 2 vasopressors. He is a history of atrial fibrillation but he is not taking any medication for it he took it off by himself because he does not feel like taking them.   He is not on any anticoagulation unable to get much history out of the patient so admitted and critical care consult was called for further evaluation      No Known Allergies     MAR reviewed and pertinent medications noted or modified as needed     Current Facility-Administered Medications   Medication    metoprolol tartrate (LOPRESSOR) tablet 50 mg    enoxaparin (LOVENOX) injection 40 mg    NOREPINephrine (LEVOPHED) 8 mg in 5% dextrose 250mL (32 mcg/mL) infusion    dexmedeTOMidine (PRECEDEX) 400 mcg in 0.9% sodium chloride 104 mL infusion    midazolam in normal saline (VERSED) 1 mg/mL infusion    PHENYLephrine (BUBBA-SYNEPHRINE) 30 mg in 0.9% sodium chloride 250 mL infusion    piperacillin-tazobactam (ZOSYN) 3.375 g in 0.9% sodium chloride (MBP/ADV) 100 mL MBP    acetaminophen (TYLENOL) solution 650 mg      Patient PCP: Eloisa Beasley MD  PMH:  has a past medical history of A-fib ([address] HTN (hypertension), Pacemaker, and Skin cancer. PSH:   has a past surgical history that includes hx pacemaker placement. FHX: family history includes Asthma in his mother; Heart Disease in his mother. SHX:  reports that he has never smoked. He has never used smokeless tobacco. He reports current alcohol use. He reports that he does not use drugs. ROS:  Unable to obtain      Objective:     Vital Signs: Telemetry:    AFIB Intake/Output:   Visit Vitals  BP (!) 121/96 (BP 1 Location: Right arm, BP Patient Position: At rest)   Pulse 71   Temp (!) 95.4 °F (35.2 °C)   Resp 22   Ht 6' 2.02\" (1.88 m)   Wt 102.1 kg (225 lb 1.4 oz)   SpO2 100%   BMI 28.89 kg/m²       Temp (24hrs), Av.5 °F (38.6 °C), Min:95.4 °F (35.2 °C), Max:103.5 °F (39.7 °C)        O2 Device: Ventilator O2 Flow Rate (L/min): 10 l/min       Wt Readings from Last 4 Encounters:   10/30/20 102.1 kg (225 lb 1.4 oz)          Intake/Output Summary (Last 24 hours) at 10/31/2020 0901  Last data filed at 10/31/2020 0700  Gross per 24 hour   Intake    Output 350 ml   Net -350 ml       Last shift:      No intake/output data recorded. Last 3 shifts: 10/29 190 - 10/31 0700  In: 3216.7 [I.V.:3216.7]  Out: 350 [Urine:350]       Physical Exam:     Physical Exam   HENT:   Head: Normocephalic and atraumatic. Eyes: Pupils are equal, round, and reactive to light. Neck: Normal range of motion. Neck supple. Cardiovascular: Normal rate. Pulmonary/Chest: He is in respiratory distress. He has rales. Abdominal: Soft. Musculoskeletal: Normal range of motion. Neurological:   Sedated intubated on ventilator   Skin: He is diaphoretic.         Labs:    Recent Labs     10/29/20  2040   WBC 6.9   HGB 13.4   PLT 72*     Recent Labs     10/31/20  0630 10/30/20  0600 10/29/20  2040     137 135* 136   K 3.9  4.2 5.3* 3.4*     108 101 102   CO2 25  21 22 16*   *  122* 169* 117*   BUN 24*  25* 16 11   CREA 1.09  1.02 1.61* 0.91   CA 8.9  8.8 8.6 9.4   PHOS 2.6 5.7*  --    ALB 3.0*  2.9* 3.6 3.8   ALT 1,463*  --  48     Recent Labs     10/31/20  0300 10/30/20  0715   PH 7.395 7.444   PCO2 34* 25*   PO2 139* 219*   HCO3 22 20*   FIO2 50.0 100.0     Recent Labs     10/31/20  0630 10/29/20  2040     --    TROIQ  --  0.13*     No results found for: BNPP, BNP   No results found for: CULT  Lab Results   Component Value Date/Time    TSH 11.80 (H) 10/29/2020 06:30 AM       Imaging:    CXR Results  (Last 48 hours)               10/31/20 0350  XR CHEST PORT Final result    Impression:  FINDINGS/IMPRESSION:   Radiograph is limited by patient's body habitus and AP portable technique. Support lines and tubes are appropriate in radiographic position. Lungs remain hypoinflated with bibasilar atelectasis and bilateral pleural   effusions, left greater than right. Cardiac silhouette is enlarged, stable. Interval reduction of patchy bilateral   airspace opacity, possibly resolving alveolar edema. No radiographically apparent pneumothorax. Narrative:  XR CHEST PORT       Comparison: Chest radiograph dated October 30, 2020           10/30/20 0501  XR CHEST PORT Final result    Impression:  IMPRESSION: Findings would suggest early changes of CHF/pulmonary edema with   vascular congestion, axial interstitial edema and mild perihilar parenchymal   interstitial edema.        Narrative:  HISTORY:  intubation       TECHNIQUE:  AP chest radiograph       COMPARISON: 10/29/2020   LIMITATIONS: None       TUBES/LINES: Endotracheal tube tip 2.7 cm above kenisha left chest wall   single-lead ventricular AICD identified       LUNG PARENCHYMA: Mild perihilar predominant interstitial marking prominence which has increased since the prior exam. Left lung base opacity with   nonvisualization of the left hemidiaphragm persists   TRACHEA/BRONCHI: Bronchial wall thickening   PULMONARY VESSELS: Prominent and indistinct margins   PLEURA: Cannot exclude left pleural effusion. HEART: Enlarged   AORTIC SHADOW:Normal.     MEDIASTINUM: Normal   BONE/SOFT TISSUES: No acute abnormality. OTHER: None           10/29/20 2313  XR CHEST SNGL V Final result    Impression:  IMPRESSION: No significant interval change. Cardiomegaly which may be   contributing to the left lower lobe opacity although a component of retrocardiac   atelectasis, airspace disease and/or effusion could cause a similar appearance. Narrative:  HISTORY:  pt having more resp distress       TECHNIQUE:  AP chest radiograph       COMPARISON: 10/29/2020   LIMITATIONS: None       TUBES/LINES: Single lead left chest wall AICD remains in place       LUNG PARENCHYMA: Left hemidiaphragm not visualized. Suspect mild right lung base   atelectasis. The lungs remain hypoinflated   TRACHEA/BRONCHI: Normal   PULMONARY VESSELS: Normal   PLEURA: Cannot exclude left pleural effusion   HEART: Cardiomegaly is noted and probably contributes to obscuration of left   hemidiaphragm which is stable compared to prior AORTIC SHADOW:Normal.     MEDIASTINUM: Normal   BONE/SOFT TISSUES: No acute abnormality. OTHER: None           10/29/20 2106  XR CHEST SNGL V Final result    Impression:  IMPRESSION:   1. Suboptimal patient positioning without definite acute cardiopulmonary   abnormality evident. 2. Cardiac silhouette is enlarged, more prominent than prior, though likely at   least partially accentuated by technique.        Narrative:  Examination: XR CHEST SNGL V        History: shortness of breath       Comparison: Chest radiograph July 16, 2018       FINDINGS:       Single frontal portable view of the chest. Please note that the left   costophrenic angle is excluded from view. Within exam limitations there is no   definite focal airspace consolidation or pneumothorax. No large pleural effusion   is evident. A single lead cardiac pacemaker is in place. The cardiac silhouette   appears enlarged, likely accentuated by technique. There is atherosclerosis of   the aorta. No findings of acute or aggressive osseous abnormality. Results from Hospital Encounter encounter on 10/29/20   XR CHEST PORT    Narrative XR CHEST PORT    Comparison: Chest radiograph dated October 30, 2020      Impression FINDINGS/IMPRESSION:  Radiograph is limited by patient's body habitus and AP portable technique. Support lines and tubes are appropriate in radiographic position. Lungs remain hypoinflated with bibasilar atelectasis and bilateral pleural  effusions, left greater than right. Cardiac silhouette is enlarged, stable. Interval reduction of patchy bilateral  airspace opacity, possibly resolving alveolar edema. No radiographically apparent pneumothorax. XR CHEST PORT    Narrative HISTORY:  intubation    TECHNIQUE:  AP chest radiograph    COMPARISON: 10/29/2020  LIMITATIONS: None    TUBES/LINES: Endotracheal tube tip 2.7 cm above kenisha left chest wall  single-lead ventricular AICD identified    LUNG PARENCHYMA: Mild perihilar predominant interstitial marking prominence  which has increased since the prior exam. Left lung base opacity with  nonvisualization of the left hemidiaphragm persists  TRACHEA/BRONCHI: Bronchial wall thickening  PULMONARY VESSELS: Prominent and indistinct margins  PLEURA: Cannot exclude left pleural effusion. HEART: Enlarged  AORTIC SHADOW:Normal.    MEDIASTINUM: Normal  BONE/SOFT TISSUES: No acute abnormality. OTHER: None      Impression IMPRESSION: Findings would suggest early changes of CHF/pulmonary edema with  vascular congestion, axial interstitial edema and mild perihilar parenchymal  interstitial edema.    XR CHEST SNGL V Narrative HISTORY:  pt having more resp distress    TECHNIQUE:  AP chest radiograph    COMPARISON: 10/29/2020  LIMITATIONS: None    TUBES/LINES: Single lead left chest wall AICD remains in place    LUNG PARENCHYMA: Left hemidiaphragm not visualized. Suspect mild right lung base  atelectasis. The lungs remain hypoinflated  TRACHEA/BRONCHI: Normal  PULMONARY VESSELS: Normal  PLEURA: Cannot exclude left pleural effusion  HEART: Cardiomegaly is noted and probably contributes to obscuration of left  hemidiaphragm which is stable compared to prior AORTIC SHADOW:Normal.    MEDIASTINUM: Normal  BONE/SOFT TISSUES: No acute abnormality. OTHER: None      Impression IMPRESSION: No significant interval change. Cardiomegaly which may be  contributing to the left lower lobe opacity although a component of retrocardiac  atelectasis, airspace disease and/or effusion could cause a similar appearance. Results from [place] encounter on 10/29/20   CT ABD PELV W CONT    Narrative HISTORY:  SOB, afib, eval for PE  Dose reduction technique: All CT scans at this facility are performed using dose reduction optimization  technique as appropriate on the exam including the following: Automated exposure  control, adjustment of the MA and/or KV according to patient size of use of  iterative reconstructive technique. .    TECHNIQUE: Postcontrast CT angiogram of the chest is performed with maximum  intensity projection images (MIPS) generated. 100 cc Isovue-370 injected. COMPARISON: 1/at 7/1/2018 CTA the chest available by report only; not all images  were available. LIMITATIONS: None    LINES/TUBES/MEDICAL SUPPORT DEVICES:  Left chest wall AICD    LUNG PARENCHYMA: Mild diffuse septal thickening  TRACHEA/BRONCHI: Bronchial wall thickening throughout.   PULMONARY VESSELS: Normal. No definitive persistent central filling defects  identified on multiple contiguous images to diagnose pulmonary embolism. PLEURA: Small bilateral pleural effusions  MEDIASTINUM: Normal  HEART: Multichamber cardiomegaly with persistent prominent  AORTA/GREAT VESSELS: Dilated ascending aorta 4.4 cm. No darrell aortic aneurysm or  aortic dissection  ESOPHAGUS: Normal  AXILLAE: Normal  BONES/TISSUES: No acute abnormality    UPPER ABDOMEN: See below  OTHER: None      Impression IMPRESSION: Normal CTA of the chest with no pulmonary embolism or acute aortic  abnormalities identified; dilated ascending aorta is noted and will require  continued surveillance. Findings suggestive of CHF/pulmonary edema with  bronchial wall thickening that may suggest axial interstitial edema and with  diffuse parenchymal edema throughout and with associated small bilateral pleural  effusions. HISTORY:  Abdominal distention, fever. Dose reduction technique: All CT scans at this facility are performed using dose reduction optimization  technique as appropriate on the exam including the following: Automated exposure  control, adjustment of the MA and/or KV according to patient size of use of  iterative reconstructive technique. .    TECHNIQUE:  CT ABD PELV W CONT                           100 cc Isovue-370 injected. COMPARISON: None  LIMITATIONS: None    CHEST: See above    LIVER: Normal  GALLBLADDER: Normal  BILIARY TREE: Normal  PANCREAS: Normal  SPLEEN: Normal  ADRENAL GLANDS: Normal  KIDNEYS/URETERS/BLADDER: Kidneys and ureters appear normal. Bladder wall  thickening  AORTA/RETROPERITONEUM: Normal  BOWEL/MESENTERY: Normal  APPENDIX: Identified and normal  PERITONEAL CAVITY: Mild diffuse abdominal pelvic ascites. No free  intraperitoneal air or abdominal pelvic abscess. REPRODUCTIVE ORGANS: Normal  BONE/TISSUES: Heterogeneous mineralization of the osseous structures bodies with  multiple areas of lucency identified throughout throughout spine, pelvis and  femurs.  Moderate disc protrusions L2-3, L3-4, L4-5     OTHER: None    IMPRESSION: Bladder wall thickening is noted and may indicate cystitis and/or  chronic change. Abdominopelvic ascites. Gastrohepatic adenopathy is noted and  could be due to increased fluid resorption due to ascites but is nonspecific and  may require follow-up. Minimal cholelithiasis. . Abnormal mineralization:  Multiple focal lucencies throughout the axial and appendicular spine as  described and of uncertain etiology; evaluation for metastatic disease, myeloma,  metabolic bone disease recommended. Disc disease as described. The ER physician was unable to take my call at the time of this interpretation. The findings were conveyed to the ER staff and they will have the physician call  me back with any questions. Findings were conveyed to staff member Kerline Bazan. IMPRESSION:   1. Acute hypoxic respiratory failure  2. Decompensated congestive heart failure. Pacemaker in place  3. Atrial fibrillation with RVR  4. Shock cardiogenic shock  5. Rule out non-STEMI  6. Moderate pulmonary hypertension  7. History of melanoma possible metastatic disease to spine  8. Thrombocytopenia  9. Acute kidney injury  10. Hyperkalemia  11. Pt is requiring Drug therapy requiring intensive monitoring for toxicity  12. Pt is unstable, unpredictable needing inpatient monitoring; is acutely ill and at high risk of sudden decline and decompensation with severe consequenses and continued end organ dysfunction and failure  13. Prognosis guarded       RECOMMENDATIONS/PLAN:     1. Patient is intubated on ventilator ABG acceptable will change to vent setting as PO2 is 139 PCO2 is 34 we will decrease the rate he is on 50% FiO2, will give a drug holiday start weaning when more hemodynamically stable  2. White count is normal no fever we will continue to watch on Zosyn  3. Suspected COVID-19 pneumonia which I doubted  4. Severe cardiomyopathy EF about 20%  5. Pulmonary hypertension WHO group 2 secondary to left heart failure  6.  CAT scan shows prominent vascular marking congestive changes pleural effusion once stable  patient on Lasix watch BUN and creatinine,  7. 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 Supplemental O2 to keep sats > 93%  9. Aspiration precautions  10. Labs to follow electrolytes, renal function and and blood counts  11. Glucose monitoring and SSI  12. Bronchial hygiene with respiratory therapy techniques, bronchodilators  13. DVT, SUP prophylaxis         This care involved high complexity medical decision making: I personally:  · Reviewed the flowsheet and previous days notes  · Reviewed and summarized records or history from previous days note or discussions with staff, family  · High Risk Drug therapy requiring intensive monitoring for toxicity: eg steroids, pressors, antibiotics  · Reviewed and/or ordered Clinical lab tests  · Reviewed images and/or ordered Radiology tests  · Reviewed the patients ECG / Telemetry  · Called and arranged for Radiologic procedures or interventions  · performed or ordered Diagnostic endoscopies with identified risk factors.   · discussed my assessment/management with : Nursing, Hospitalist of coordination of care        Time spent 55 min        Daryl Dewitt MD

## 2020-10-31 NOTE — PROGRESS NOTES
Hospitalist Progress Note               Daily Progress Note: 10/31/2020      Subjective: The patient is seen for follow up. He continues on mechanical ventilation. Transaminases are in the 152000 range consistent with shock liver. LDH is 1200, ferritin greater than 9000. CRP 7.9. Norepinephrine has been weaned down to 2.5 mcg    Echo shows an EF of only 20% and moderate pulmonary hypertension, moderate mitral insufficiency and moderate tricuspid insufficiency    Problem List:  Problem List as of 10/31/2020 Date Reviewed: 10/30/2020          Codes Class Noted - Resolved    Atrial fibrillation [facility]) ICD-10-CM: I48.91  ICD-9-CM: 427.31  10/30/2020 - Present              Medications reviewed  Current Facility-Administered Medications   Medication Dose Route Frequency    metoprolol tartrate (LOPRESSOR) tablet 50 mg  50 mg Oral Q12H    enoxaparin (LOVENOX) injection 40 mg  40 mg SubCUTAneous Q24H    NOREPINephrine (LEVOPHED) 8 mg in 5% dextrose 250mL (32 mcg/mL) infusion  0.5-16 mcg/min IntraVENous TITRATE    dexmedeTOMidine (PRECEDEX) 400 mcg in 0.9% sodium chloride 104 mL infusion  0.1-1.5 mcg/kg/hr IntraVENous TITRATE    midazolam in normal saline (VERSED) 1 mg/mL infusion  0-10 mg/hr IntraVENous TITRATE    PHENYLephrine (BUBBA-SYNEPHRINE) 30 mg in 0.9% sodium chloride 250 mL infusion   mcg/min IntraVENous TITRATE    piperacillin-tazobactam (ZOSYN) 3.375 g in 0.9% sodium chloride (MBP/ADV) 100 mL MBP  3.375 g IntraVENous Q8H    acetaminophen (TYLENOL) solution 650 mg  650 mg Per NG tube Q4H PRN       Review of Systems:   Review of systems not obtained due to patient factors.     Objective:   Physical Exam:     Visit Vitals  BP (!) 121/96 (BP 1 Location: Right arm, BP Patient Position: At rest)   Pulse 80   Temp (!) 95.4 °F (35.2 °C)   Resp 22   Ht 6' 2.02\" (1.88 m)   Wt 102.1 kg (225 lb 1.4 oz)   SpO2 100%   BMI 28.89 kg/m²    O2 Flow Rate (L/min): 10 l/min O2 Device: Ventilator    Temp (24hrs), Av.9 °F (38.3 °C), Min:95.4 °F (35.2 °C), Max:103.5 °F (39.7 °C)    No intake/output data recorded. 10/29 1901 - 10/31 0700  In: 3216.7 [I.V.:3216.7]  Out: 350 [Urine:350]    General:   Intubated, unresponsive. Right pupil pinpoint, left pupil 5 mm and reactive   Lungs:   Clear to auscultation bilaterally. Chest wall:  No tenderness or deformity. Heart:  Regular rate and rhythm, S1, S2 normal, no murmur, click, rub or gallop. Abdomen:   Soft, non-tender. Bowel sounds normal. No masses,  No organomegaly. Extremities: Extremities normal, atraumatic, no cyanosis or edema. Pulses: 2+ and symmetric all extremities. Skin: Skin color, texture, turgor normal. No rashes or lesions   Neurologic: CNII-XII intact.  No obvious motor deficits     Data Review:       Recent Days:  Recent Labs     10/29/20  2040   WBC 6.9   HGB 13.4   HCT 38.9   PLT 72*     Recent Labs     10/31/20  0630 10/30/20  0600 10/29/20  2040     137 135* 136   K 3.9  4.2 5.3* 3.4*     108 101 102   CO2 25  21 22 16*   *  122* 169* 117*   BUN 24*  25* 16 11   CREA 1.09  1.02 1.61* 0.91   CA 8.9  8.8 8.6 9.4   PHOS 2.6 5.7*  --    ALB 3.0*  2.9* 3.6 3.8   TBILI 2.2*  --  2.0*   ALT 1,463*  --  48     Recent Labs     10/31/20  0300 10/30/20  0715   PH 7.395 7.444   PCO2 34* 25*   PO2 139* 219*   HCO3 22 20*   FIO2 50.0 100.0       24 Hour Results:  Recent Results (from the past 24 hour(s))   EKG, 12 LEAD, INITIAL    Collection Time: 10/30/20  9:18 AM   Result Value Ref Range    Ventricular Rate 84 BPM    Atrial Rate 110 BPM    QRS Duration 94 ms    Q-T Interval 438 ms    QTC Calculation (Bezet) 517 ms    Calculated R Axis 86 degrees    Calculated T Axis 115 degrees    Diagnosis       Atrial fibrillation  Prolonged QT  Abnormal ECG  When compared with ECG of 29-OCT-2020 22:48,  Inverted T waves have replaced nonspecific T wave abnormality in Lateral   leads  Confirmed by Sampson Roldan ([id number]) on 10/30/2020 1:59:40 PM     PROCALCITONIN    Collection Time: 10/30/20  9:30 AM   Result Value Ref Range    Procalcitonin 6.28 (H) 0 ng/mL   LD    Collection Time: 10/30/20  9:30 AM   Result Value Ref Range    LD 1,285 (H) 85 - 241 U/L   FERRITIN    Collection Time: 10/30/20  9:30 AM   Result Value Ref Range    Ferritin 9,350 (H) 26 - 388 ng/mL   C REACTIVE PROTEIN, QT    Collection Time: 10/30/20  9:30 AM   Result Value Ref Range    C-Reactive protein 7.90 (H) 0.00 - 0.60 mg/dL   GLUCOSE, POC    Collection Time: 10/30/20  9:31 AM   Result Value Ref Range    Glucose (POC) 124 (H) 65 - 100 mg/dL    Performed by MIGUEL ANGEL MARCUM    ECHO ADULT COMPLETE    Collection Time: 10/30/20 11:40 AM   Result Value Ref Range    Pulmonic Regurgitant End Max Velocity 67.60 cm/s    AoV PG 2.00 mmHg    Aortic Valve Area by Continuity of Peak Velocity 2.07 cm2    IVSd 0.73 0.6 - 1.0 cm    IVSd 0.85 0.6 - 1.0 cm    LVIDd 6.26 (A) 4.2 - 5.9 cm    LVIDd (M-mode) 6.36 (A) cm    LVIDs 5.29 cm    LVIDs 5.65 cm    LVOT d 1.80 cm    Pulmonic Regurgitant End Max Velocity 55.00 cm/s    LVOT Peak Gradient 1.00 mmHg    LVPWd 0.65 0.6 - 1.0 cm    LVPWd 1.03 (A) 0.6 - 1.0 cm    LV E' Septal Velocity 10.90 cm/s    LV ED Vol A2C 245.00 cm3    BP EF 39.6 (A) 55 - 100 %    BP EF 23.8 (A) 55 - 100 %    LV ES Vol A2C 148.00 cm3    LV ES Vol A2C 157.00 cm3    E/E' septal 10.92     LV Ejection Fraction MOD 2C 16 %    LV Ejection Fraction MOD 2C 11 %    LVOT SV 77.0 cm3    Mitral Valve E Wave Deceleration Time 109.00 ms    MV A Cody 30.80 cm/s    MV E Cody 119.00 cm/s    MV E/A 3.86     Pulmonic Regurgitant End Max Velocity 72.40 cm/s    Pulmonic Valve Systolic Peak Instantaneous Gradient 2.00 mmHg    Pulmonic Regurgitant End Max Velocity 52.80 cm/s    Pulmonic Valve Systolic Peak Instantaneous Gradient 1.00 mmHg    Est. RA Pressure 15.00 mmHg    RVIDd 3.91 cm    RVIDd 3.45 cm    RVSP 42.00 mmHg    Tricuspid Valve Max Velocity 261.00 cm/s    Triscuspid Valve Regurgitation Peak Gradient 27.00 mmHg    Tapse 0.50 (A) 1.5 - 2.0 cm    KELTON/BSA Pk Cody 0.9 cm2/m2   BLOOD GAS, ARTERIAL    Collection Time: 10/31/20  3:00 AM   Result Value Ref Range    pH 7.395 7.35 - 7.45      PCO2 34 (L) 35 - 45 mmHg    PO2 139 (H) 75 - 100 mmHg    O2  >95 %    BICARBONATE 22 22 - 26 mmol/L    BASE DEFICIT 3.4 (H) 0 - 2 mmol/L    O2 METHOD VENT      FIO2 50.0 %    MODE AssistControl/Pressure Control      SET RATE 16      IPAP/PIP 15      EPAP/CPAP/PEEP 8.0      SITE Right Brachial      SONA'S TEST PASS     CK    Collection Time: 10/31/20  6:30 AM   Result Value Ref Range     39 - 551 U/L   METABOLIC PANEL, COMPREHENSIVE    Collection Time: 10/31/20  6:30 AM   Result Value Ref Range    Sodium 140 136 - 145 mmol/L    Potassium 3.9 3.5 - 5.1 mmol/L    Chloride 107 97 - 108 mmol/L    CO2 25 21 - 32 mmol/L    Anion gap 8 5 - 15 mmol/L    Glucose 126 (H) 65 - 100 mg/dL    BUN 24 (H) 6 - 20 mg/dL    Creatinine 1.09 0.70 - 1.30 mg/dL    BUN/Creatinine ratio 22 (H) 12 - 20      GFR est AA >60 >60 ml/min/1.73m2    GFR est non-AA >60 >60 ml/min/1.73m2    Calcium 8.9 8.5 - 10.1 mg/dL    Bilirubin, total 2.2 (H) 0.2 - 1.0 mg/dL    AST (SGOT) 1,968 (H) 15 - 37 U/L    ALT (SGPT) 1,463 (H) 12 - 78 U/L    Alk.  phosphatase 73 45 - 117 U/L    Protein, total 7.5 6.4 - 8.2 g/dL    Albumin 3.0 (L) 3.5 - 5.0 g/dL    Globulin 4.5 (H) 2.0 - 4.0 g/dL    A-G Ratio 0.7 (L) 1.1 - 2.2     RENAL FUNCTION PANEL    Collection Time: 10/31/20  6:30 AM   Result Value Ref Range    Sodium 137 136 - 145 mmol/L    Potassium 4.2 3.5 - 5.1 mmol/L    Chloride 108 97 - 108 mmol/L    CO2 21 21 - 32 mmol/L    Anion gap 8 5 - 15 mmol/L    Glucose 122 (H) 65 - 100 mg/dL    BUN 25 (H) 6 - 20 mg/dL    Creatinine 1.02 0.70 - 1.30 mg/dL    BUN/Creatinine ratio 25 (H) 12 - 20      GFR est AA >60 >60 ml/min/1.73m2    GFR est non-AA >60 >60 ml/min/1.73m2    Calcium 8.8 8.5 - 10.1 mg/dL    Phosphorus 2.6 2.6 - 4.7 mg/dL    Albumin 2.9 (L) 3.5 - 5.0 g/dL       XR CHEST PORT   Final Result   IMPRESSION: Findings would suggest early changes of CHF/pulmonary edema with   vascular congestion, axial interstitial edema and mild perihilar parenchymal   interstitial edema. CTA CHEST W OR W WO CONT   Final Result   IMPRESSION: Normal CTA of the chest with no pulmonary embolism or acute aortic   abnormalities identified; dilated ascending aorta is noted and will require   continued surveillance. Findings suggestive of CHF/pulmonary edema with   bronchial wall thickening that may suggest axial interstitial edema and with   diffuse parenchymal edema throughout and with associated small bilateral pleural   effusions. HISTORY:  Abdominal distention, fever. Dose reduction technique: All CT scans at this facility are performed using dose reduction optimization   technique as appropriate on the exam including the following: Automated exposure   control, adjustment of the MA and/or KV according to patient size of use of   iterative reconstructive technique. .      TECHNIQUE:  CT ABD PELV W CONT                            100 cc Isovue-370 injected. COMPARISON: None   LIMITATIONS: None      CHEST: See above      LIVER: Normal   GALLBLADDER: Normal   BILIARY TREE: Normal   PANCREAS: Normal   SPLEEN: Normal   ADRENAL GLANDS: Normal   KIDNEYS/URETERS/BLADDER: Kidneys and ureters appear normal. Bladder wall   thickening   AORTA/RETROPERITONEUM: Normal   BOWEL/MESENTERY: Normal   APPENDIX: Identified and normal   PERITONEAL CAVITY: Mild diffuse abdominal pelvic ascites. No free   intraperitoneal air or abdominal pelvic abscess. REPRODUCTIVE ORGANS: Normal   BONE/TISSUES: Heterogeneous mineralization of the osseous structures bodies with   multiple areas of lucency identified throughout throughout spine, pelvis and   femurs.  Moderate disc protrusions L2-3, L3-4, L4-5       OTHER: None      IMPRESSION: Bladder wall thickening is noted and may indicate cystitis and/or   chronic change. Abdominopelvic ascites. Gastrohepatic adenopathy is noted and   could be due to increased fluid resorption due to ascites but is nonspecific and   may require follow-up. Minimal cholelithiasis. . Abnormal mineralization:   Multiple focal lucencies throughout the axial and appendicular spine as   described and of uncertain etiology; evaluation for metastatic disease, myeloma,   metabolic bone disease recommended. Disc disease as described. The ER physician was unable to take my call at the time of this interpretation. The findings were conveyed to the ER staff and they will have the physician call   me back with any questions. Findings were conveyed to staff member Kerline Bazan. CT ABD PELV W CONT   Final Result   IMPRESSION: Normal CTA of the chest with no pulmonary embolism or acute aortic   abnormalities identified; dilated ascending aorta is noted and will require   continued surveillance. Findings suggestive of CHF/pulmonary edema with   bronchial wall thickening that may suggest axial interstitial edema and with   diffuse parenchymal edema throughout and with associated small bilateral pleural   effusions. HISTORY:  Abdominal distention, fever. Dose reduction technique: All CT scans at this facility are performed using dose reduction optimization   technique as appropriate on the exam including the following: Automated exposure   control, adjustment of the MA and/or KV according to patient size of use of   iterative reconstructive technique. .      TECHNIQUE:  CT ABD PELV W CONT                            100 cc Isovue-370 injected.        COMPARISON: None   LIMITATIONS: None      CHEST: See above      LIVER: Normal   GALLBLADDER: Normal   BILIARY TREE: Normal   PANCREAS: Normal   SPLEEN: Normal   ADRENAL GLANDS: Normal   KIDNEYS/URETERS/BLADDER: Kidneys and ureters appear normal. Bladder wall   thickening   AORTA/RETROPERITONEUM: Normal BOWEL/MESENTERY: Normal   APPENDIX: Identified and normal   PERITONEAL CAVITY: Mild diffuse abdominal pelvic ascites. No free   intraperitoneal air or abdominal pelvic abscess. REPRODUCTIVE ORGANS: Normal   BONE/TISSUES: Heterogeneous mineralization of the osseous structures bodies with   multiple areas of lucency identified throughout throughout spine, pelvis and   femurs. Moderate disc protrusions L2-3, L3-4, L4-5       OTHER: None      IMPRESSION: Bladder wall thickening is noted and may indicate cystitis and/or   chronic change. Abdominopelvic ascites. Gastrohepatic adenopathy is noted and   could be due to increased fluid resorption due to ascites but is nonspecific and   may require follow-up. Minimal cholelithiasis. . Abnormal mineralization:   Multiple focal lucencies throughout the axial and appendicular spine as   described and of uncertain etiology; evaluation for metastatic disease, myeloma,   metabolic bone disease recommended. Disc disease as described. The ER physician was unable to take my call at the time of this interpretation. The findings were conveyed to the ER staff and they will have the physician call   me back with any questions. Findings were conveyed to staff member Kerline Bazan. XR CHEST SNGL V   Final Result   IMPRESSION: No significant interval change. Cardiomegaly which may be   contributing to the left lower lobe opacity although a component of retrocardiac   atelectasis, airspace disease and/or effusion could cause a similar appearance. XR CHEST SNGL V   Final Result   IMPRESSION:   1. Suboptimal patient positioning without definite acute cardiopulmonary   abnormality evident. 2. Cardiac silhouette is enlarged, more prominent than prior, though likely at   least partially accentuated by technique.       XR CHEST PORT    (Results Pending)        Assessment:  Acute respiratory failure with hypoxia, requiring mechanical ventilation    Cough, lymphocytopenia, hypoxia, currently elevated ferritin and LDH. Possible COVID-19 infection    Acute systolic heart failure, EF 20%    Paroxysmal atrial fibrillation with RVR, now with controlled rate    Acute kidney injury possible ATN. Creat 0.91 -->1.61    Moderate pulmonary hypertension    Likely severe sepsis/septic shock, possibly due to aspiration    Blindness right eye with pinpoint pupil    Suspected metastatic disease versus myeloma versus metabolic bone disease on CT. History of melanoma 18 years ago    Thrombocytopenia      Plan:  Continue IV antibiotics and diuresis  Await COVID-19 test  Continue mechanical ventilation  Start tube feeds   check PSA, CEA, Serum protein electrophoresis, urine electrophoresis and light chain assay  Awaiting oncology input    Care Plan discussed with: Patient/Family and Nurse    Total time spent with patient: 30 minutes.     Vickie Gill MD

## 2020-10-31 NOTE — PROGRESS NOTES
Progress Note      10/31/2020 6:03 AM  NAME: Socorro Contreras   MRN:  [id number]   Admit Diagnosis: Atrial fibrillation ([facility] [address] [I48.91]      Problem List:   1. COVID-19 status unknown  2. Acute hypoxic respiratory failure requiring intubation  3. Severe sepsis/septic shock  4. Possible aspiration pneumonia  5. Suspected metastatic disease versus myeloma, versus metabolic bone disease on CT  6. History of melanoma  7. Previous history of alcohol abuse  8. Cholelithiasis  9. Ascites  10. Dilated ascending aorta (4.4 cm)    11. Heart failure with reduced ejection fraction (EF 45-50%)  12. Paroxysmal atrial fibrillation with rapid ventricular response  13. Status post pacemaker  14. Mild to moderate mitral regurgitation (MR)  15. Hypertension  16. Hyperlipidemia  17. Abnormal cardiac enzymes in the indeterminate range  18. Acute kidney injury  19. Hyperkalemia  20. Thrombocytopenia       Subjective: The patient is seen and examined in room 284. There were no acute cardiovascular events reported overnight. He remains intubated/sedated. His inotropic support is decreasing.     Medications Personally Reviewed:    Current Facility-Administered Medications   Medication Dose Route Frequency    metoprolol tartrate (LOPRESSOR) tablet 50 mg  50 mg Oral Q12H    enoxaparin (LOVENOX) injection 40 mg  40 mg SubCUTAneous Q24H    NOREPINephrine (LEVOPHED) 8 mg in 5% dextrose 250mL (32 mcg/mL) infusion  0.5-16 mcg/min IntraVENous TITRATE    dexmedeTOMidine (PRECEDEX) 400 mcg in 0.9% sodium chloride 104 mL infusion  0.1-1.5 mcg/kg/hr IntraVENous TITRATE    midazolam in normal saline (VERSED) 1 mg/mL infusion  0-10 mg/hr IntraVENous TITRATE    PHENYLephrine (BUBBA-SYNEPHRINE) 30 mg in 0.9% sodium chloride 250 mL infusion   mcg/min IntraVENous TITRATE    piperacillin-tazobactam (ZOSYN) 3.375 g in 0.9% sodium chloride (MBP/ADV) 100 mL MBP  3.375 g IntraVENous Q8H    acetaminophen (TYLENOL) solution 650 mg  650 mg Per NG tube Q4H PRN           Objective:      Physical Exam:  Last 24hrs VS reviewed since prior progress note. Most recent are:    Visit Vitals  /85 (BP 1 Location: Right arm, BP Patient Position: At rest)   Pulse 77   Temp (!) 102 °F (38.9 °C)   Resp 20   Ht 6' 2.02\" (1.88 m)   Wt 102.1 kg (225 lb 1.4 oz)   SpO2 100%   BMI 28.89 kg/m²       Intake/Output Summary (Last 24 hours) at 10/31/2020 0603  Last data filed at 10/30/2020 1824  Gross per 24 hour   Intake    Output 300 ml   Net -300 ml        General Appearance: Well developed, obese, intubated. Chest: Lungs clear to auscultation bilaterally. Cardiovascular: JVP is not elevated, PMI is not displaced, normal intensity S1, with variable S2, and occasional ectopic beats. Abdomen: Soft, non-tender, bowel sounds are active. Extremities: No edema bilaterally. Data Review    Telemetry: Atrial fibrillation with controlled ventricular response, and occasional premature ventricular complex    EKG:   [x]  No new EKG for review    Lab Data Personally Reviewed:    Recent Labs     10/29/20  2040   WBC 6.9   HGB 13.4   HCT 38.9   PLT 72*     No results for input(s): INR, PTP, APTT, INREXT in the last 72 hours. Recent Labs     10/30/20  0600 10/29/20  2040   * 136   K 5.3* 3.4*    102   CO2 22 16*   BUN 16 11   CREA 1.61* 0.91   * 117*   CA 8.6 9.4     Recent Labs     10/29/20  2040   TROIQ 0.13*     No results found for: CHOL, CHOLX, CHLST, CHOLV, HDL, HDLP, LDL, LDLC, DLDLP, TGLX, TRIGL, TRIGP, CHHD, CHHDX    Recent Labs     10/30/20  0600 10/29/20  2040   AP  --  107   TP  --  8.5*   ALB 3.6 3.8   GLOB  --  4.7*     Recent Labs     10/31/20  0300 10/30/20  0715   PH 7.395 7.444   PCO2 34* 25*   PO2 139* 219*           Assessment/Plan:   1. Continue ICU care  2. Continue to monitor serum electrolytes, and renal function  3. Continue to monitor hemoglobin/hematocrit  4.   Continue current cardiovascular medications including an occipital, and metoprolol  5.   Continue to wean IV norepinephrine     Higinio Sandifer, MD

## 2020-11-01 ENCOUNTER — APPOINTMENT (OUTPATIENT)
Dept: GENERAL RADIOLOGY | Age: 70
DRG: 870 | End: 2020-11-01
Attending: INTERNAL MEDICINE
Payer: MEDICARE

## 2020-11-01 LAB
25(OH)D3 SERPL-MCNC: 16.6 NG/ML (ref 30–100)
ABO + RH BLD: NORMAL
ALBUMIN SERPL-MCNC: 2.8 G/DL (ref 3.5–5)
ANION GAP SERPL CALC-SCNC: 6 MMOL/L (ref 5–15)
ARTERIAL PATENCY WRIST A: YES
BASE DEFICIT BLDA-SCNC: 0.3 MMOL/L (ref 0–2)
BASOPHILS # BLD: 0 K/UL (ref 0–0.1)
BASOPHILS NFR BLD: 1 % (ref 0–1)
BDY SITE: ABNORMAL
BLOOD GROUP ANTIBODIES SERPL: NORMAL
BLOOD GROUP ANTIBODIES SERPL: POSITIVE
BUN SERPL-MCNC: 22 MG/DL (ref 6–20)
BUN/CREAT SERPL: 23 (ref 12–20)
CA-I BLD-MCNC: 8.8 MG/DL (ref 8.5–10.1)
CHLORIDE SERPL-SCNC: 108 MMOL/L (ref 97–108)
CO2 SERPL-SCNC: 26 MMOL/L (ref 21–32)
CREAT SERPL-MCNC: 0.95 MG/DL (ref 0.7–1.3)
DIFFERENTIAL METHOD BLD: ABNORMAL
EOSINOPHIL # BLD: 0 K/UL (ref 0–0.4)
EOSINOPHIL NFR BLD: 1 % (ref 0–7)
EPAP/CPAP/PEEP, PAPEEP: 8
ERYTHROCYTE [DISTWIDTH] IN BLOOD BY AUTOMATED COUNT: 14.7 % (ref 11.5–14.5)
FIO2 ON VENT: 40 %
GAS FLOW.O2 SETTING OXYMISER: 16 L/MIN
GLUCOSE BLD STRIP.AUTO-MCNC: 114 MG/DL (ref 65–100)
GLUCOSE SERPL-MCNC: 134 MG/DL (ref 65–100)
HCO3 BLDA-SCNC: 24 MMOL/L (ref 22–26)
HCT VFR BLD AUTO: 40 % (ref 36.6–50.3)
HCT VFR BLD AUTO: 42.3 % (ref 36.6–50.3)
HCT VFR BLD AUTO: 42.7 % (ref 36.6–50.3)
HGB BLD-MCNC: 14.1 G/DL (ref 12.1–17)
HGB BLD-MCNC: 14.4 G/DL (ref 12.1–17)
HGB BLD-MCNC: 14.7 G/DL (ref 12.1–17)
IMM GRANULOCYTES # BLD AUTO: 0 K/UL (ref 0–0.04)
IMM GRANULOCYTES NFR BLD AUTO: 0 % (ref 0–0.5)
LDH SERPL L TO P-CCNC: 406 U/L (ref 85–241)
LYMPHOCYTES # BLD: 0.9 K/UL (ref 0.8–3.5)
LYMPHOCYTES NFR BLD: 19 % (ref 12–49)
MCH RBC QN AUTO: 35.3 PG (ref 26–34)
MCHC RBC AUTO-ENTMCNC: 33.7 G/DL (ref 30–36.5)
MCV RBC AUTO: 104.7 FL (ref 80–99)
MONOCYTES # BLD: 0.3 K/UL (ref 0–1)
MONOCYTES NFR BLD: 6 % (ref 5–13)
NEUTS SEG # BLD: 3.6 K/UL (ref 1.8–8)
NEUTS SEG NFR BLD: 73 % (ref 32–75)
PCO2 BLDA: 35 MMHG (ref 35–45)
PERFORMED BY, TECHID: ABNORMAL
PH BLDA: 7.43 [PH] (ref 7.35–7.45)
PHOSPHATE SERPL-MCNC: 1.7 MG/DL (ref 2.6–4.7)
PLATELET # BLD AUTO: 91 K/UL (ref 150–400)
PMV BLD AUTO: 13 FL (ref 8.9–12.9)
PO2 BLDA: 126 MMHG (ref 75–100)
POTASSIUM SERPL-SCNC: 3.8 MMOL/L (ref 3.5–5.1)
PRESSURE SUPPORT SETTING VENT: 15 CM[H2O]
PROCALCITONIN SERPL-MCNC: 4.13 NG/ML
RBC # BLD AUTO: 4.08 M/UL (ref 4.1–5.7)
RETICS # AUTO: 0.05 M/UL (ref 0.06–0.1)
RETICS/RBC NFR AUTO: 1.1 % (ref 0.7–2.1)
SAO2 % BLD: 99 %
SAO2% DEVICE SAO2% SENSOR NAME: ABNORMAL
SARS-COV-2, COV2NT: NOT DETECTED
SODIUM SERPL-SCNC: 140 MMOL/L (ref 136–145)
SPECIMEN EXP DATE BLD: NORMAL
SPECIMEN SITE: ABNORMAL
VENTILATION MODE VENT: ABNORMAL
WBC # BLD AUTO: 4.9 K/UL (ref 4.1–11.1)
XXAUTO CONTROL: NEGATIVE

## 2020-11-01 PROCEDURE — 74011250637 HC RX REV CODE- 250/637: Performed by: INTERNAL MEDICINE

## 2020-11-01 PROCEDURE — 85018 HEMOGLOBIN: CPT

## 2020-11-01 PROCEDURE — 74011000258 HC RX REV CODE- 258: Performed by: INTERNAL MEDICINE

## 2020-11-01 PROCEDURE — 74011250637 HC RX REV CODE- 250/637: Performed by: HOSPITALIST

## 2020-11-01 PROCEDURE — 80069 RENAL FUNCTION PANEL: CPT

## 2020-11-01 PROCEDURE — 87070 CULTURE OTHR SPECIMN AEROBIC: CPT

## 2020-11-01 PROCEDURE — 84165 PROTEIN E-PHORESIS SERUM: CPT

## 2020-11-01 PROCEDURE — 94003 VENT MGMT INPAT SUBQ DAY: CPT

## 2020-11-01 PROCEDURE — 74011250636 HC RX REV CODE- 250/636: Performed by: INTERNAL MEDICINE

## 2020-11-01 PROCEDURE — 82607 VITAMIN B-12: CPT

## 2020-11-01 PROCEDURE — 86870 RBC ANTIBODY IDENTIFICATION: CPT

## 2020-11-01 PROCEDURE — 85025 COMPLETE CBC W/AUTO DIFF WBC: CPT

## 2020-11-01 PROCEDURE — 74011250636 HC RX REV CODE- 250/636: Performed by: HOSPITALIST

## 2020-11-01 PROCEDURE — 82962 GLUCOSE BLOOD TEST: CPT

## 2020-11-01 PROCEDURE — 83021 HEMOGLOBIN CHROMOTOGRAPHY: CPT

## 2020-11-01 PROCEDURE — 83615 LACTATE (LD) (LDH) ENZYME: CPT

## 2020-11-01 PROCEDURE — 74011000250 HC RX REV CODE- 250: Performed by: HOSPITALIST

## 2020-11-01 PROCEDURE — 84145 PROCALCITONIN (PCT): CPT

## 2020-11-01 PROCEDURE — 71045 X-RAY EXAM CHEST 1 VIEW: CPT

## 2020-11-01 PROCEDURE — 86900 BLOOD TYPING SEROLOGIC ABO: CPT

## 2020-11-01 PROCEDURE — 85045 AUTOMATED RETICULOCYTE COUNT: CPT

## 2020-11-01 PROCEDURE — 82803 BLOOD GASES ANY COMBINATION: CPT

## 2020-11-01 PROCEDURE — 36415 COLL VENOUS BLD VENIPUNCTURE: CPT

## 2020-11-01 PROCEDURE — 74011000258 HC RX REV CODE- 258: Performed by: HOSPITALIST

## 2020-11-01 PROCEDURE — 65610000006 HC RM INTENSIVE CARE

## 2020-11-01 PROCEDURE — 74011000250 HC RX REV CODE- 250: Performed by: INTERNAL MEDICINE

## 2020-11-01 PROCEDURE — 77010033678 HC OXYGEN DAILY

## 2020-11-01 RX ORDER — LEVOFLOXACIN 5 MG/ML
750 INJECTION, SOLUTION INTRAVENOUS EVERY 24 HOURS
Status: DISCONTINUED | OUTPATIENT
Start: 2020-11-01 | End: 2020-11-04

## 2020-11-01 RX ADMIN — MIDAZOLAM 4 MG/HR: 5 INJECTION INTRAMUSCULAR; INTRAVENOUS at 21:12

## 2020-11-01 RX ADMIN — METOPROLOL TARTRATE 50 MG: 50 TABLET, FILM COATED ORAL at 09:45

## 2020-11-01 RX ADMIN — LEVOFLOXACIN 750 MG: 5 INJECTION, SOLUTION INTRAVENOUS at 09:44

## 2020-11-01 RX ADMIN — FAMOTIDINE 20 MG: 10 INJECTION, SOLUTION INTRAVENOUS at 09:45

## 2020-11-01 RX ADMIN — PIPERACILLIN SODIUM AND TAZOBACTAM SODIUM 3.38 G: 3; .375 INJECTION, POWDER, LYOPHILIZED, FOR SOLUTION INTRAVENOUS at 00:00

## 2020-11-01 RX ADMIN — ACETAMINOPHEN ORAL SOLUTION 650 MG: 650 SOLUTION ORAL at 05:46

## 2020-11-01 RX ADMIN — MIDAZOLAM 4 MG/HR: 5 INJECTION INTRAMUSCULAR; INTRAVENOUS at 06:00

## 2020-11-01 RX ADMIN — DEXMEDETOMIDINE HYDROCHLORIDE 1 MCG/KG/HR: 100 INJECTION, SOLUTION, CONCENTRATE INTRAVENOUS at 06:12

## 2020-11-01 RX ADMIN — METOPROLOL TARTRATE 50 MG: 50 TABLET, FILM COATED ORAL at 21:12

## 2020-11-01 RX ADMIN — FAMOTIDINE 20 MG: 10 INJECTION, SOLUTION INTRAVENOUS at 05:46

## 2020-11-01 RX ADMIN — PIPERACILLIN SODIUM AND TAZOBACTAM SODIUM 3.38 G: 3; .375 INJECTION, POWDER, LYOPHILIZED, FOR SOLUTION INTRAVENOUS at 15:13

## 2020-11-01 RX ADMIN — PIPERACILLIN SODIUM AND TAZOBACTAM SODIUM 3.38 G: 3; .375 INJECTION, POWDER, LYOPHILIZED, FOR SOLUTION INTRAVENOUS at 09:44

## 2020-11-01 RX ADMIN — PIPERACILLIN SODIUM AND TAZOBACTAM SODIUM 3.38 G: 3; .375 INJECTION, POWDER, LYOPHILIZED, FOR SOLUTION INTRAVENOUS at 23:17

## 2020-11-01 RX ADMIN — DEXMEDETOMIDINE HYDROCHLORIDE 0.8 MCG/KG/HR: 100 INJECTION, SOLUTION, CONCENTRATE INTRAVENOUS at 01:50

## 2020-11-01 NOTE — PROGRESS NOTES
Progress Note      11/1/2020 5:29 AM  NAME: Anamaria Prescott   MRN:  [id number]   Admit Diagnosis: Atrial fibrillation ([facility] [address] [I48.91]      Problem List: .  1. COVID-19 status unknown  2. Acute hypoxic respiratory failure requiring intubation  3. Severe sepsis/septic shock  4. Possible aspiration pneumonia  5. Suspected metastatic disease versus myeloma, versus metabolic bone disease on CT  6.  History of melanoma  7.  Previous history of alcohol abuse  8.  Cholelithiasis  9.  Ascites  10.  Dilated ascending aorta (4.4 cm)     11.  Heart failure with reduced ejection fraction (EF 20-25%)   12. Mild pulmonary hypertension (RVSP =41 mmHg)  13. Paroxysmal atrial fibrillation with rapid ventricular response  14.  Status post pacemaker  15. Valvular heart disease        15a. Moderate tricuspid regurgitation        15b. Moderate mitral regurgitation  16.  Hypertension  17.  Hyperlipidemia  18. Abnormal cardiac enzymes in the indeterminate range  19.  Acute kidney injury  20.  Hyperkalemia  21.  Thrombocytopenia       Subjective: The patient is seen and examined in room 284. There were no acute cardiovascular events reported overnight.       Medications Personally Reviewed:    Current Facility-Administered Medications   Medication Dose Route Frequency    famotidine (PF) (PEPCID) 20 mg in 0.9% sodium chloride 10 mL injection  20 mg IntraVENous ACB&D    influenza vaccine 2020-21 (4 yrs+)(PF) (FLUCELVAX QUAD) injection 0.5 mL  0.5 mL IntraMUSCular PRIOR TO DISCHARGE    metoprolol tartrate (LOPRESSOR) tablet 50 mg  50 mg Oral Q12H    enoxaparin (LOVENOX) injection 40 mg  40 mg SubCUTAneous Q24H    NOREPINephrine (LEVOPHED) 8 mg in 5% dextrose 250mL (32 mcg/mL) infusion  0.5-16 mcg/min IntraVENous TITRATE    dexmedeTOMidine (PRECEDEX) 400 mcg in 0.9% sodium chloride 104 mL infusion  0.1-1.5 mcg/kg/hr IntraVENous TITRATE    midazolam in normal saline (VERSED) 1 mg/mL infusion  0-10 mg/hr IntraVENous TITRATE    PHENYLephrine (BUBBA-SYNEPHRINE) 30 mg in 0.9% sodium chloride 250 mL infusion   mcg/min IntraVENous TITRATE    piperacillin-tazobactam (ZOSYN) 3.375 g in 0.9% sodium chloride (MBP/ADV) 100 mL MBP  3.375 g IntraVENous Q8H    acetaminophen (TYLENOL) solution 650 mg  650 mg Per NG tube Q4H PRN           Objective:     Physical Exam:  Last 24hrs VS reviewed since prior progress note. Most recent are:    Visit Vitals  BP (!) 130/94 (BP 1 Location: Right arm, BP Patient Position: At rest)   Pulse 96   Temp (!) 100.8 °F (38.2 °C)   Resp 16   Ht 6' 2.02\" (1.88 m)   Wt 102.1 kg (225 lb 1.4 oz)   SpO2 100%   BMI 28.89 kg/m²       Intake/Output Summary (Last 24 hours) at 11/1/2020 0529  Last data filed at 10/31/2020 2330  Gross per 24 hour   Intake    Output 1368 ml   Net -1368 ml        General Appearance: Well developed, intubated. Chest: Lungs clear to auscultation bilaterally. Cardiovascular: JVP is not elevated, PMI is not displaced, normal intensity S1 and S2, without S3. Abdomen: Soft, non-tender, abdominal distention. Extremities: No edema bilaterally. Data Review    Telemetry: Atrial fibrillation with controlled ventricular response, and occasional premature ventricular complexes  EKG:   [x]  No new EKG for review    Lab Data Personally Reviewed:    Recent Labs     11/01/20  0400 11/01/20  0000 10/31/20  1325   WBC 4.9  --  4.4   HGB 14.4 14.7 14.7   HCT 42.7 40.0 41.8   PLT 91*  --  172     No results for input(s): INR, PTP, APTT, INREXT in the last 72 hours.    Recent Labs     11/01/20  0405 10/31/20  0630 10/30/20  0600    140  137 135*   K 3.8 3.9  4.2 5.3*    107  108 101   CO2 26 25  21 22   BUN 22* 24*  25* 16   CREA 0.95 1.09  1.02 1.61*   * 126*  122* 169*   CA 8.8 8.9  8.8 8.6     Recent Labs     10/31/20  0630 10/29/20  2040     --    TROIQ  --  0.13*     No results found for: CHOL, CHOLX, CHLST, CHOLV, HDL, HDLP, LDL, LDLC, DLDLP, TGLX, TRIGL, Kaley Lassiter, [facility]    Recent Labs     11/01/20  0405 10/31/20  0929 10/31/20  0630  10/29/20  2040   AP  --  66 73  --  107   TP  --  6.8 7.5  --  8.5*   ALB 2.8* 2.6* 3.0*  2.9*   < > 3.8   GLOB  --  4.2* 4.5*  --  4.7*    < > = values in this interval not displayed. Recent Labs     11/01/20  0345 10/31/20  0300   PH 7.43 7.395   PCO2 35 34*   PO2 126* 139*           Assessment/Plan:   1. Continue ICU care  2. Continue to monitor serum electrolytes, and renal function  3. Continue to monitor hemoglobin/hematocrit  4. Continue current cardiovascular medications including an enoxaparin, and metoprolol  5.   Continue to wean IV norepinephrine     Jamari Wahl MD

## 2020-11-01 NOTE — PROGRESS NOTES
Hospitalist Progress Note               Daily Progress Note: 11/1/2020      Subjective: The patient is seen for follow up. He continues on mechanical ventilation. Patient was hypothermic yesterday afternoon but overnight has been febrile up to 101.8    Chest x-ray shows left basilar atelectasis/infiltrate    PSA elevated at 46. CEA is 2.8. Covid testing is negative. Problem List:  Problem List as of 11/1/2020 Date Reviewed: 10/30/2020          Codes Class Noted - Resolved    Atrial fibrillation [facility]) ICD-10-CM: I48.91  ICD-9-CM: 427.31  10/30/2020 - Present              Medications reviewed  Current Facility-Administered Medications   Medication Dose Route Frequency    famotidine (PF) (PEPCID) 20 mg in 0.9% sodium chloride 10 mL injection  20 mg IntraVENous ACB&D    influenza vaccine 2020-21 (4 yrs+)(PF) (FLUCELVAX QUAD) injection 0.5 mL  0.5 mL IntraMUSCular PRIOR TO DISCHARGE    metoprolol tartrate (LOPRESSOR) tablet 50 mg  50 mg Oral Q12H    enoxaparin (LOVENOX) injection 40 mg  40 mg SubCUTAneous Q24H    NOREPINephrine (LEVOPHED) 8 mg in 5% dextrose 250mL (32 mcg/mL) infusion  0.5-16 mcg/min IntraVENous TITRATE    dexmedeTOMidine (PRECEDEX) 400 mcg in 0.9% sodium chloride 104 mL infusion  0.1-1.5 mcg/kg/hr IntraVENous TITRATE    midazolam in normal saline (VERSED) 1 mg/mL infusion  0-10 mg/hr IntraVENous TITRATE    PHENYLephrine (BUBBA-SYNEPHRINE) 30 mg in 0.9% sodium chloride 250 mL infusion   mcg/min IntraVENous TITRATE    piperacillin-tazobactam (ZOSYN) 3.375 g in 0.9% sodium chloride (MBP/ADV) 100 mL MBP  3.375 g IntraVENous Q8H    acetaminophen (TYLENOL) solution 650 mg  650 mg Per NG tube Q4H PRN       Review of Systems:   Review of systems not obtained due to patient factors.     Objective:   Physical Exam:     Visit Vitals  BP (!) 130/94 (BP 1 Location: Right arm, BP Patient Position: At rest)   Pulse 96   Temp (!) 101.8 °F (38.8 °C)   Resp 16   Ht 6' 2.02\" (1.88 m)   Wt 102.1 kg (225 lb 1.4 oz)   SpO2 100%   BMI 28.89 kg/m²    O2 Flow Rate (L/min): 10 l/min O2 Device: Ventilator    Temp (24hrs), Av.3 °F (37.4 °C), Min:96.4 °F (35.8 °C), Max:101.8 °F (38.8 °C)    No intake/output data recorded. 10/30 190 -  0700  In: -   Out: 6513 [Urine:568]    General:   Intubated, unresponsive. Right pupil pinpoint, left pupil 5 mm and reactive   Lungs:   Clear to auscultation bilaterally. Chest wall:  No tenderness or deformity. Heart:  Regular rate and rhythm, S1, S2 normal, no murmur, click, rub or gallop. Abdomen:   Soft, non-tender. Bowel sounds normal. No masses,  No organomegaly. Extremities: Extremities normal, atraumatic, no cyanosis or edema. Pulses: 2+ and symmetric all extremities. Skin: Skin color, texture, turgor normal. No rashes or lesions   Neurologic: CNII-XII intact.  No obvious motor deficits     Data Review:       Recent Days:  Recent Labs     20  0400 20  0000 10/31/20  1325 10/29/20  2040   WBC 4.9  --  4.4 6.9   HGB 14.4 14.7 14.7 13.4   HCT 42.7 40.0 41.8 38.9   PLT 91*  --  172 72*     Recent Labs     20  0405 10/31/20  0929 10/31/20  0630 10/30/20  0600 10/29/20  2040     --  140  137 135* 136   K 3.8  --  3.9  4.2 5.3* 3.4*     --  107  108 101 102   CO2 26  --  25  21 22 16*   *  --  126*  122* 169* 117*   BUN 22*  --  24*  25* 16 11   CREA 0.95  --  1.09  1.02 1.61* 0.91   CA 8.8  --  8.9  8.8 8.6 9.4   PHOS 1.7*  --  2.6 5.7*  --    ALB 2.8* 2.6* 3.0*  2.9* 3.6 3.8   TBILI  --  2.0* 2.2*  --  2.0*   ALT  --  1,318* 1,463*  --  48     Recent Labs     20  0345 10/31/20  0300 10/30/20  0715   PH 7.43 7.395 7.444   PCO2 35 34* 25*   PO2 126* 139* 219*   HCO3 24 22 20*   FIO2 40 50.0 100.0       24 Hour Results:  Recent Results (from the past 24 hour(s))   PSA SCREENING (SCREENING)    Collection Time: 10/31/20  9:29 AM   Result Value Ref Range    Prostate Specific Ag 46.0 (H) 0.01 - 4.0 ng/mL CEA    Collection Time: 10/31/20  9:29 AM   Result Value Ref Range    CEA 2.8 ng/mL   HEPATIC FUNCTION PANEL    Collection Time: 10/31/20  9:29 AM   Result Value Ref Range    Protein, total 6.8 6.4 - 8.2 g/dL    Albumin 2.6 (L) 3.5 - 5.0 g/dL    Globulin 4.2 (H) 2.0 - 4.0 g/dL    A-G Ratio 0.6 (L) 1.1 - 2.2      Bilirubin, total 2.0 (H) 0.2 - 1.0 mg/dL    Bilirubin, direct 1.2 (H) 0.0 - 0.2 mg/dL    Alk. phosphatase 66 45 - 117 U/L    AST (SGOT) 1,642 (H) 15 - 37 U/L    ALT (SGPT) 1,318 (H) 12 - 78 U/L   CBC WITH AUTOMATED DIFF    Collection Time: 10/31/20  1:25 PM   Result Value Ref Range    WBC 4.4 4.1 - 11.1 K/uL    RBC 4.10 4. 10 - 5.70 M/uL    HGB 14.7 12.1 - 17.0 g/dL    HCT 41.8 36.6 - 50.3 %    .0 (H) 80.0 - 99.0 FL    MCH 35.9 (H) 26.0 - 34.0 PG    MCHC 35.2 30.0 - 36.5 g/dL    RDW 15.4 (H) 11.5 - 14.5 %    PLATELET 176 926 - 326 K/uL    NEUTROPHILS 83 (H) 32 - 75 %    LYMPHOCYTES 11 (L) 12 - 49 %    MONOCYTES 4 (L) 5 - 13 %    EOSINOPHILS 1 0 - 7 %    BASOPHILS 1 0 - 1 %    IMMATURE GRANULOCYTES 0 0.0 - 0.5 %    ABS. NEUTROPHILS 3.7 1.8 - 8.0 K/UL    ABS. LYMPHOCYTES 0.5 (L) 0.8 - 3.5 K/UL    ABS. MONOCYTES 0.2 0.0 - 1.0 K/UL    ABS. EOSINOPHILS 0.0 0.0 - 0.4 K/UL    ABS. BASOPHILS 0.0 0.0 - 0.1 K/UL    ABS. IMM.  GRANS. 0.0 0.00 - 0.04 K/UL    DF AUTOMATED     RETICULOCYTE COUNT    Collection Time: 10/31/20  1:25 PM   Result Value Ref Range    Reticulocyte count 1.9 0.7 - 2.1 %    Absolute Retic Cnt. 0.0664 0.0620 - 0.0950 M/ul   LD    Collection Time: 10/31/20  1:25 PM   Result Value Ref Range     (H) 85 - 241 U/L   SED RATE, AUTOMATED    Collection Time: 10/31/20  1:25 PM   Result Value Ref Range    Sed rate, automated 108 mm/hr   HGB & HCT    Collection Time: 11/01/20 12:00 AM   Result Value Ref Range    HGB 14.7 12.1 - 17.0 g/dL    HCT 40.0 36.6 - 50.3 %   BLOOD GAS, ARTERIAL    Collection Time: 11/01/20  3:45 AM   Result Value Ref Range    pH 7.43 7.35 - 7.45      PCO2 35 35 - 45 mmHg PO2 126 (H) 75 - 100 mmHg    O2 SAT 99 >95 %    BICARBONATE 24 22 - 26 mmol/L    BASE DEFICIT 0.3 0 - 2 mmol/L    O2 METHOD VENT      FIO2 40 %    MODE AssistControl/Pressure Control      SET RATE 16      PRESSURE SUPPORT 15      EPAP/CPAP/PEEP 8      Sample source Arterial      SITE Right Radial      SONA'S TEST YES     CBC WITH AUTOMATED DIFF    Collection Time: 11/01/20  4:00 AM   Result Value Ref Range    WBC 4.9 4.1 - 11.1 K/uL    RBC 4.08 (L) 4.10 - 5.70 M/uL    HGB 14.4 12.1 - 17.0 g/dL    HCT 42.7 36.6 - 50.3 %    .7 (H) 80.0 - 99.0 FL    MCH 35.3 (H) 26.0 - 34.0 PG    MCHC 33.7 30.0 - 36.5 g/dL    RDW 14.7 (H) 11.5 - 14.5 %    PLATELET 91 (L) 976 - 400 K/uL    MPV 13.0 (H) 8.9 - 12.9 FL    NEUTROPHILS 73 32 - 75 %    LYMPHOCYTES 19 12 - 49 %    MONOCYTES 6 5 - 13 %    EOSINOPHILS 1 0 - 7 %    BASOPHILS 1 0 - 1 %    IMMATURE GRANULOCYTES 0 0.0 - 0.5 %    ABS. NEUTROPHILS 3.6 1.8 - 8.0 K/UL    ABS. LYMPHOCYTES 0.9 0.8 - 3.5 K/UL    ABS. MONOCYTES 0.3 0.0 - 1.0 K/UL    ABS. EOSINOPHILS 0.0 0.0 - 0.4 K/UL    ABS. BASOPHILS 0.0 0.0 - 0.1 K/UL    ABS. IMM. GRANS. 0.0 0.00 - 0.04 K/UL    DF AUTOMATED     RENAL FUNCTION PANEL    Collection Time: 11/01/20  4:05 AM   Result Value Ref Range    Sodium 140 136 - 145 mmol/L    Potassium 3.8 3.5 - 5.1 mmol/L    Chloride 108 97 - 108 mmol/L    CO2 26 21 - 32 mmol/L    Anion gap 6 5 - 15 mmol/L    Glucose 134 (H) 65 - 100 mg/dL    BUN 22 (H) 6 - 20 mg/dL    Creatinine 0.95 0.70 - 1.30 mg/dL    BUN/Creatinine ratio 23 (H) 12 - 20      GFR est AA >60 >60 ml/min/1.73m2    GFR est non-AA >60 >60 ml/min/1.73m2    Calcium 8.8 8.5 - 10.1 mg/dL    Phosphorus 1.7 (L) 2.6 - 4.7 mg/dL    Albumin 2.8 (L) 3.5 - 5.0 g/dL       XR CHEST PORT   Final Result   FINDINGS/IMPRESSION:   Radiograph is limited by patient's body habitus and AP portable technique. Support lines and tubes are appropriate in radiographic position.       Lungs remain hypoinflated with bibasilar atelectasis and bilateral pleural   effusions, left greater than right. Cardiac silhouette is enlarged, stable. Interval reduction of patchy bilateral   airspace opacity, possibly resolving alveolar edema. No radiographically apparent pneumothorax. XR CHEST PORT   Final Result   IMPRESSION: Findings would suggest early changes of CHF/pulmonary edema with   vascular congestion, axial interstitial edema and mild perihilar parenchymal   interstitial edema. CTA CHEST W OR W WO CONT   Final Result   IMPRESSION: Normal CTA of the chest with no pulmonary embolism or acute aortic   abnormalities identified; dilated ascending aorta is noted and will require   continued surveillance. Findings suggestive of CHF/pulmonary edema with   bronchial wall thickening that may suggest axial interstitial edema and with   diffuse parenchymal edema throughout and with associated small bilateral pleural   effusions. HISTORY:  Abdominal distention, fever. Dose reduction technique: All CT scans at this facility are performed using dose reduction optimization   technique as appropriate on the exam including the following: Automated exposure   control, adjustment of the MA and/or KV according to patient size of use of   iterative reconstructive technique. .      TECHNIQUE:  CT ABD PELV W CONT                            100 cc Isovue-370 injected. COMPARISON: None   LIMITATIONS: None      CHEST: See above      LIVER: Normal   GALLBLADDER: Normal   BILIARY TREE: Normal   PANCREAS: Normal   SPLEEN: Normal   ADRENAL GLANDS: Normal   KIDNEYS/URETERS/BLADDER: Kidneys and ureters appear normal. Bladder wall   thickening   AORTA/RETROPERITONEUM: Normal   BOWEL/MESENTERY: Normal   APPENDIX: Identified and normal   PERITONEAL CAVITY: Mild diffuse abdominal pelvic ascites. No free   intraperitoneal air or abdominal pelvic abscess.    REPRODUCTIVE ORGANS: Normal   BONE/TISSUES: Heterogeneous mineralization of the osseous structures bodies with   multiple areas of lucency identified throughout throughout spine, pelvis and   femurs. Moderate disc protrusions L2-3, L3-4, L4-5       OTHER: None      IMPRESSION: Bladder wall thickening is noted and may indicate cystitis and/or   chronic change. Abdominopelvic ascites. Gastrohepatic adenopathy is noted and   could be due to increased fluid resorption due to ascites but is nonspecific and   may require follow-up. Minimal cholelithiasis. . Abnormal mineralization:   Multiple focal lucencies throughout the axial and appendicular spine as   described and of uncertain etiology; evaluation for metastatic disease, myeloma,   metabolic bone disease recommended. Disc disease as described. The ER physician was unable to take my call at the time of this interpretation. The findings were conveyed to the ER staff and they will have the physician call   me back with any questions. Findings were conveyed to staff member Kerline Bazan. CT ABD PELV W CONT   Final Result   IMPRESSION: Normal CTA of the chest with no pulmonary embolism or acute aortic   abnormalities identified; dilated ascending aorta is noted and will require   continued surveillance. Findings suggestive of CHF/pulmonary edema with   bronchial wall thickening that may suggest axial interstitial edema and with   diffuse parenchymal edema throughout and with associated small bilateral pleural   effusions. HISTORY:  Abdominal distention, fever. Dose reduction technique: All CT scans at this facility are performed using dose reduction optimization   technique as appropriate on the exam including the following: Automated exposure   control, adjustment of the MA and/or KV according to patient size of use of   iterative reconstructive technique. .      TECHNIQUE:  CT ABD PELV W CONT                            100 cc Isovue-370 injected.        COMPARISON: None   LIMITATIONS: None      CHEST: See above      LIVER: Normal GALLBLADDER: Normal   BILIARY TREE: Normal   PANCREAS: Normal   SPLEEN: Normal   ADRENAL GLANDS: Normal   KIDNEYS/URETERS/BLADDER: Kidneys and ureters appear normal. Bladder wall   thickening   AORTA/RETROPERITONEUM: Normal   BOWEL/MESENTERY: Normal   APPENDIX: Identified and normal   PERITONEAL CAVITY: Mild diffuse abdominal pelvic ascites. No free   intraperitoneal air or abdominal pelvic abscess. REPRODUCTIVE ORGANS: Normal   BONE/TISSUES: Heterogeneous mineralization of the osseous structures bodies with   multiple areas of lucency identified throughout throughout spine, pelvis and   femurs. Moderate disc protrusions L2-3, L3-4, L4-5       OTHER: None      IMPRESSION: Bladder wall thickening is noted and may indicate cystitis and/or   chronic change. Abdominopelvic ascites. Gastrohepatic adenopathy is noted and   could be due to increased fluid resorption due to ascites but is nonspecific and   may require follow-up. Minimal cholelithiasis. . Abnormal mineralization:   Multiple focal lucencies throughout the axial and appendicular spine as   described and of uncertain etiology; evaluation for metastatic disease, myeloma,   metabolic bone disease recommended. Disc disease as described. The ER physician was unable to take my call at the time of this interpretation. The findings were conveyed to the ER staff and they will have the physician call   me back with any questions. Findings were conveyed to staff member Kerline Bazan. XR CHEST SNGL V   Final Result   IMPRESSION: No significant interval change. Cardiomegaly which may be   contributing to the left lower lobe opacity although a component of retrocardiac   atelectasis, airspace disease and/or effusion could cause a similar appearance. XR CHEST SNGL V   Final Result   IMPRESSION:   1. Suboptimal patient positioning without definite acute cardiopulmonary   abnormality evident.    2. Cardiac silhouette is enlarged, more prominent than prior, though likely at   least partially accentuated by technique. XR CHEST PORT    (Results Pending)        Assessment:  Acute respiratory failure with hypoxia, requiring mechanical ventilation    COVID-19 ruled out    Acute systolic heart failure, EF 20%    Aspiration pneumonia    Paroxysmal atrial fibrillation with RVR, now with controlled rate    Acute kidney injury possible ATN. Resolved    Moderate pulmonary hypertension    Likely severe sepsis/septic shock, possibly due to aspiration. Now with fever    Blindness right eye with pinpoint pupil    Suspected metastatic disease versus myeloma versus metabolic bone disease on CT. elevated PSA suggest possible metastatic prostate cancer as the etiology. Could also be melanoma    History of melanoma 18 years ago    Thrombocytopenia likely due to sepsis    Intellectual disability    Plan:  Continue IV Zosyn and levofloxacin  Start tube feeds  Repeat procalcitonin  Continue mechanical ventilation  He will need a nuclear medicine bone scan at some point  Awaiting SPEP and urine electrophoresis    Care Plan discussed with: Consultant and nurse, patient's wife    Total time spent with patient: 30 minutes.     Keenan Nevarez MD

## 2020-11-01 NOTE — PROGRESS NOTES
From NG tube hooked up to intermittent low wall suction.  Suction turned on at 2130 and at 2330 , 2 hours later has 800 mL  Of sanguinous looking fluid

## 2020-11-01 NOTE — PROGRESS NOTES
Pt temp 100.4 via temp sensing jorje. Cooling blanket restarted to assists. Will continue to monitor.

## 2020-11-01 NOTE — CONSULTS
PULMONARY NOTE  [facility]    Name: Arron Lucas MRN: [id number]   : 1950 Hospital: [facility]   Date: 2020  Admission date: 10/29/2020 Hospital Day: 4       HPI:     Hospital Problems  Date Reviewed: 10/30/2020          Codes Class Noted POA    Atrial fibrillation [facility]) ICD-10-CM: I48.91  ICD-9-CM: 427.31  10/30/2020 Yes                   [x] High complexity decision making was performed  [x] See my orders for details      Subjective/Initial History:     I was asked by Christin Ken MD to see Arron Lucas  a 78 y.o.  male in consultation     Excerpts from admission 10/29/2020 or consult notes as follows:   44-year-old male came in because of shortness of breath,. Patient condition got worse in the emergency room he was found to be in atrial fibrillation started on IV Cardizem did not seem to help his condition got worse he was put on BiPAP machine he continues to have increased work of breathing become diaphoretic pressure was low in 70s subsequently got intubated now he is on a ventilator, he is also hypotensive hemodynamically unstable on 2 vasopressors. He is a history of atrial fibrillation but he is not taking any medication for it he took it off by himself because he does not feel like taking them.   He is not on any anticoagulation unable to get much history out of the patient so admitted and critical care consult was called for further evaluation     patient is febrile overnight he is on antibiotic Zosyn and intubated on ventilator  No Known Allergies     MAR reviewed and pertinent medications noted or modified as needed     Current Facility-Administered Medications   Medication    famotidine (PF) (PEPCID) 20 mg in 0.9% sodium chloride 10 mL injection    levoFLOXacin (LEVAQUIN) 750 mg in D5W IVPB    influenza vaccine  (4 yrs+)(PF) (FLUCELVAX QUAD) injection 0.5 mL    metoprolol tartrate (LOPRESSOR) tablet 50 mg    enoxaparin (LOVENOX) injection 40 mg    NOREPINephrine (LEVOPHED) 8 mg in 5% dextrose 250mL (32 mcg/mL) infusion    dexmedeTOMidine (PRECEDEX) 400 mcg in 0.9% sodium chloride 104 mL infusion    midazolam in normal saline (VERSED) 1 mg/mL infusion    PHENYLephrine (BUBBA-SYNEPHRINE) 30 mg in 0.9% sodium chloride 250 mL infusion    piperacillin-tazobactam (ZOSYN) 3.375 g in 0.9% sodium chloride (MBP/ADV) 100 mL MBP    acetaminophen (TYLENOL) solution 650 mg      Patient PCP: Melanie Barrett MD  PMH:  has a past medical history of A-fib ([address] HTN (hypertension), Pacemaker, and Skin cancer. PSH:   has a past surgical history that includes hx pacemaker placement. FHX: family history includes Asthma in his mother; Heart Disease in his mother. SHX:  reports that he has never smoked. He has never used smokeless tobacco. He reports current alcohol use. He reports that he does not use drugs. ROS:  Unable to obtain      Objective:     Vital Signs: Telemetry:    AFIB Intake/Output:   Visit Vitals  BP (!) 130/94 (BP 1 Location: Right arm, BP Patient Position: At rest)   Pulse 96   Temp (!) 101.8 °F (38.8 °C)   Resp 16   Ht 6' 2.02\" (1.88 m)   Wt 102.1 kg (225 lb 1.4 oz)   SpO2 100%   BMI 28.89 kg/m²       Temp (24hrs), Av.6 °F (37.6 °C), Min:96.8 °F (36 °C), Max:101.8 °F (38.8 °C)        O2 Device: Ventilator O2 Flow Rate (L/min): 10 l/min       Wt Readings from Last 4 Encounters:   10/30/20 102.1 kg (225 lb 1.4 oz)          Intake/Output Summary (Last 24 hours) at 2020 0857  Last data filed at 10/31/2020 2330  Gross per 24 hour   Intake    Output 1318 ml   Net -1318 ml       Last shift:      No intake/output data recorded. Last 3 shifts: 10/30 1901 -  0700  In: -   Out: 6411 [Urine:568]       Physical Exam:     Physical Exam   HENT:   Head: Normocephalic and atraumatic. Eyes: Pupils are equal, round, and reactive to light. Neck: Normal range of motion. Neck supple. Cardiovascular: Normal rate. Pulmonary/Chest: He is in respiratory distress. He has rales. Abdominal: Soft. Musculoskeletal: Normal range of motion. Neurological:   Sedated intubated on ventilator   Skin: Skin is warm. He is diaphoretic. Labs:    Recent Labs     11/01/20  0400 11/01/20  0000 10/31/20  1325 10/29/20  2040   WBC 4.9  --  4.4 6.9   HGB 14.4 14.7 14.7 13.4   PLT 91*  --  172 72*     Recent Labs     11/01/20  0405 10/31/20  0929 10/31/20  0630 10/30/20  0600 10/29/20  2040     --  140  137 135* 136   K 3.8  --  3.9  4.2 5.3* 3.4*     --  107  108 101 102   CO2 26  --  25  21 22 16*   *  --  126*  122* 169* 117*   BUN 22*  --  24*  25* 16 11   CREA 0.95  --  1.09  1.02 1.61* 0.91   CA 8.8  --  8.9  8.8 8.6 9.4   PHOS 1.7*  --  2.6 5.7*  --    ALB 2.8* 2.6* 3.0*  2.9* 3.6 3.8   ALT  --  1,318* 1,463*  --  48     Recent Labs     11/01/20  0345 10/31/20  0300 10/30/20  0715   PH 7.43 7.395 7.444   PCO2 35 34* 25*   PO2 126* 139* 219*   HCO3 24 22 20*   FIO2 40 50.0 100.0     Recent Labs     10/31/20  0630 10/29/20  2040     --    TROIQ  --  0.13*     No results found for: BNPP, BNP   Lab Results   Component Value Date/Time    Culture result: No growth 1 day 10/30/2020 09:30 AM     Lab Results   Component Value Date/Time    TSH 11.80 (H) 10/29/2020 06:30 AM       Imaging:    CXR Results  (Last 48 hours)               11/01/20 0450  XR CHEST PORT Final result    Impression:  FINDINGS/IMPRESSION:   Radiograph is limited by patient's body habitus and AP portable technique. Support lines and tubes are appropriate in radiographic position. Left chest   wall pacemaker. Lungs similarly inflated with left basilar atelectasis with ipsilateral pleural   effusion. Cardiac silhouette stable in size. Hemodynamic status stable. No radiographically apparent pneumothorax.         Narrative:  XR CHEST PORT       Comparison: Chest radiograph dated October 31, 2020           10/31/20 0745 XR CHEST PORT Final result    Impression:  FINDINGS/IMPRESSION:   Radiograph is limited by patient's body habitus and AP portable technique. Support lines and tubes are appropriate in radiographic position. Lungs remain hypoinflated with bibasilar atelectasis and bilateral pleural   effusions, left greater than right. Cardiac silhouette is enlarged, stable. Interval reduction of patchy bilateral   airspace opacity, possibly resolving alveolar edema. No radiographically apparent pneumothorax. Narrative:  XR CHEST PORT       Comparison: Chest radiograph dated October 30, 2020               Results from Hospital Encounter encounter on 10/29/20   XR CHEST PORT    Narrative XR CHEST PORT    Comparison: Chest radiograph dated October 31, 2020      Impression FINDINGS/IMPRESSION:  Radiograph is limited by patient's body habitus and AP portable technique. Support lines and tubes are appropriate in radiographic position. Left chest  wall pacemaker. Lungs similarly inflated with left basilar atelectasis with ipsilateral pleural  effusion. Cardiac silhouette stable in size. Hemodynamic status stable. No radiographically apparent pneumothorax. XR CHEST PORT    Narrative XR CHEST PORT    Comparison: Chest radiograph dated October 30, 2020      Impression FINDINGS/IMPRESSION:  Radiograph is limited by patient's body habitus and AP portable technique. Support lines and tubes are appropriate in radiographic position. Lungs remain hypoinflated with bibasilar atelectasis and bilateral pleural  effusions, left greater than right. Cardiac silhouette is enlarged, stable. Interval reduction of patchy bilateral  airspace opacity, possibly resolving alveolar edema. No radiographically apparent pneumothorax.     XR CHEST PORT    Narrative HISTORY:  intubation    TECHNIQUE:  AP chest radiograph    COMPARISON: 10/29/2020  LIMITATIONS: None    TUBES/LINES: Endotracheal tube tip 2.7 cm above kenisha left chest wall  single-lead ventricular AICD identified    LUNG PARENCHYMA: Mild perihilar predominant interstitial marking prominence  which has increased since the prior exam. Left lung base opacity with  nonvisualization of the left hemidiaphragm persists  TRACHEA/BRONCHI: Bronchial wall thickening  PULMONARY VESSELS: Prominent and indistinct margins  PLEURA: Cannot exclude left pleural effusion. HEART: Enlarged  AORTIC SHADOW:Normal.    MEDIASTINUM: Normal  BONE/SOFT TISSUES: No acute abnormality. OTHER: None      Impression IMPRESSION: Findings would suggest early changes of CHF/pulmonary edema with  vascular congestion, axial interstitial edema and mild perihilar parenchymal  interstitial edema. Results from [place] encounter on 10/29/20   CT ABD PELV W CONT    Narrative HISTORY:  SOB, afib, eval for PE  Dose reduction technique: All CT scans at this facility are performed using dose reduction optimization  technique as appropriate on the exam including the following: Automated exposure  control, adjustment of the MA and/or KV according to patient size of use of  iterative reconstructive technique. .    TECHNIQUE: Postcontrast CT angiogram of the chest is performed with maximum  intensity projection images (MIPS) generated. 100 cc Isovue-370 injected. COMPARISON: 1/at 7/1/2018 CTA the chest available by report only; not all images  were available. LIMITATIONS: None    LINES/TUBES/MEDICAL SUPPORT DEVICES:  Left chest wall AICD    LUNG PARENCHYMA: Mild diffuse septal thickening  TRACHEA/BRONCHI: Bronchial wall thickening throughout. PULMONARY VESSELS: Normal. No definitive persistent central filling defects  identified on multiple contiguous images to diagnose pulmonary embolism.   PLEURA: Small bilateral pleural effusions  MEDIASTINUM: Normal  HEART: Multichamber cardiomegaly with persistent prominent  AORTA/GREAT VESSELS: Dilated ascending aorta 4.4 cm. No darrell aortic aneurysm or  aortic dissection  ESOPHAGUS: Normal  AXILLAE: Normal  BONES/TISSUES: No acute abnormality    UPPER ABDOMEN: See below  OTHER: None      Impression IMPRESSION: Normal CTA of the chest with no pulmonary embolism or acute aortic  abnormalities identified; dilated ascending aorta is noted and will require  continued surveillance. Findings suggestive of CHF/pulmonary edema with  bronchial wall thickening that may suggest axial interstitial edema and with  diffuse parenchymal edema throughout and with associated small bilateral pleural  effusions. HISTORY:  Abdominal distention, fever. Dose reduction technique: All CT scans at this facility are performed using dose reduction optimization  technique as appropriate on the exam including the following: Automated exposure  control, adjustment of the MA and/or KV according to patient size of use of  iterative reconstructive technique. .    TECHNIQUE:  CT ABD PELV W CONT                           100 cc Isovue-370 injected. COMPARISON: None  LIMITATIONS: None    CHEST: See above    LIVER: Normal  GALLBLADDER: Normal  BILIARY TREE: Normal  PANCREAS: Normal  SPLEEN: Normal  ADRENAL GLANDS: Normal  KIDNEYS/URETERS/BLADDER: Kidneys and ureters appear normal. Bladder wall  thickening  AORTA/RETROPERITONEUM: Normal  BOWEL/MESENTERY: Normal  APPENDIX: Identified and normal  PERITONEAL CAVITY: Mild diffuse abdominal pelvic ascites. No free  intraperitoneal air or abdominal pelvic abscess. REPRODUCTIVE ORGANS: Normal  BONE/TISSUES: Heterogeneous mineralization of the osseous structures bodies with  multiple areas of lucency identified throughout throughout spine, pelvis and  femurs. Moderate disc protrusions L2-3, L3-4, L4-5     OTHER: None    IMPRESSION: Bladder wall thickening is noted and may indicate cystitis and/or  chronic change. Abdominopelvic ascites.  Gastrohepatic adenopathy is noted and  could be due to increased fluid resorption due to ascites but is nonspecific and  may require follow-up. Minimal cholelithiasis. . Abnormal mineralization:  Multiple focal lucencies throughout the axial and appendicular spine as  described and of uncertain etiology; evaluation for metastatic disease, myeloma,  metabolic bone disease recommended. Disc disease as described. The ER physician was unable to take my call at the time of this interpretation. The findings were conveyed to the ER staff and they will have the physician call  me back with any questions. Findings were conveyed to staff member Kerline Bazan. IMPRESSION:   1. Acute hypoxic respiratory failure  2. Decompensated congestive heart failure. Pacemaker in place  3. Atrial fibrillation with RVR  4. Shock cardiogenic shock  5. Rule out non-STEMI  6. Fever  7. Moderate pulmonary hypertension  8. History of melanoma possible metastatic disease to spine  9. Thrombocytopenia  10. Acute kidney injury  11. Hyperkalemia  12. Pt is requiring Drug therapy requiring intensive monitoring for toxicity  13. Pt is unstable, unpredictable needing inpatient monitoring; is acutely ill and at high risk of sudden decline and decompensation with severe consequenses and continued end organ dysfunction and failure  14. Prognosis guarded       RECOMMENDATIONS/PLAN:     1. Patient is intubated on ventilator ABG acceptable will change to vent setting as PO2 is 126  PCO2 is 34 we will decrease the rate he is on 50% FiO2, will decrease FiO2 to 35%  2. White count is normal no fever we will continue to watch on Zosyn  3. Suspected COVID-19 pneumonia which I doubted  4. Severe cardiomyopathy EF about 20%  5. Culture so far negative he is on Zosyn added Levaquin  6. Pulmonary hypertension WHO group 2 secondary to left heart failure  7. CAT scan shows prominent vascular marking congestive changes pleural effusion  8. 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  Supplemental O2 to keep sats > 93%  10. Aspiration precautions  11. Labs to follow electrolytes, renal function and and blood counts  12. Glucose monitoring and SSI  13. Bronchial hygiene with respiratory therapy techniques, bronchodilators  14.  DVT, SUP prophylaxis         This care involved high complexity medical decision making: I personally:  · Reviewed the flowsheet and previous days notes  · Reviewed and summarized records or history from previous days note or discussions with staff, family  · High Risk Drug therapy requiring intensive monitoring for toxicity: eg steroids, pressors, antibiotics  · Reviewed and/or ordered Clinical lab tests  · Reviewed images and/or ordered Radiology tests  · discussed my assessment/management with : Nursing, Hospitalist of coordination of care        Time spent 30 min        Alena Lennox, MD

## 2020-11-01 NOTE — PROGRESS NOTES
Spiritual Care Assessment/Progress Note  [facility]      NAME: Janet Beltre      MRN: [id number]  AGE: 78 y.o. SEX: male  [place] Affiliation: No Protestant   Language: English     11/1/2020     Total Time (in minutes): 20     Spiritual Assessment begun in [facility] through conversation with:         [x]Patient        [x] Family    [] Friend(s)        Reason for Consult: Initial/Spiritual assessment, critical care     Spiritual beliefs: (Please include comment if needed)     [] Identifies with a germaine tradition:         [] Supported by a germaine community:            [] Claims no spiritual orientation:           [] Seeking spiritual identity:                [] Adheres to an individual form of spirituality:           [x] Not able to assess:                           Identified resources for coping:      [x] Prayer                               [] Music                  [] Guided Imagery     [x] Family/friends                 [] Pet visits     [] Devotional reading                         [] Unknown     [] Other:                                         Interventions offered during this visit: (See comments for more details)    Patient Interventions: Initial/Spiritual assessment, Critical care     Family/Friend(s):  Affirmation of emotions/emotional suffering, Catharsis/review of pertinent events in supportive environment, Bridging, End of life issues discussed, Life review/legacy, Initial Assessment, Normalization of emotional/spiritual concerns, Prayer (assurance of)     Plan of Care:     [] Support spiritual and/or cultural needs    [] Support AMD and/or advance care planning process      [] Support grieving process   [] Coordinate Rites and/or Rituals    [] Coordination with community clergy   [] No spiritual needs identified at this time   [] Detailed Plan of Care below (See Comments)  [] Make referral to Music Therapy  [] Make referral to Pet Therapy     [] Make referral to Addiction services  [] Make referral to [facility]  [] Make referral to Spiritual Care Partner  [] No future visits requested        [x] Follow up visits as needed     Comments:  visited to engage in initial Spiritual Care assessment, patient is intubated, non engaging.  facilitated storytelling with patient's wife Rodney Gauthier, reflected on her tearfulness and a sense of worry re patient's current status.  and patient's wife explored their story of 52 yrs of marriage, their good life of enjoying one another, their children and grand children, and traveling.  and Rodney Gauthier reflected on the support of family and family's prayerfulness.  and patient's wife reflected on anticipatory grief.  assured wife of continued Spiritual Care support as needed.  also accompanied patient's daughter to room. , reflected on feelings of worry for her father and mother.  advised of availability of chaplains.      Visited by Attila Verde, [facility]

## 2020-11-02 ENCOUNTER — APPOINTMENT (OUTPATIENT)
Dept: GENERAL RADIOLOGY | Age: 70
DRG: 870 | End: 2020-11-02
Attending: INTERNAL MEDICINE
Payer: MEDICARE

## 2020-11-02 LAB
ALBUMIN SERPL ELPH-MCNC: 2.9 G/DL
ALBUMIN/GLOB SERPL: 0.8 {RATIO}
ALPHA1 GLOB SERPL ELPH-MCNC: 0.3 G/DL
ALPHA2 GLOB SERPL ELPH-MCNC: 0.7 G/DL
ARTERIAL PATENCY WRIST A: YES
B-GLOBULIN SERPL ELPH-MCNC: 1 G/DL
BASE DEFICIT BLDA-SCNC: 0.2 MMOL/L (ref 0–2)
BASOPHILS # BLD: 0 K/UL (ref 0–0.1)
BASOPHILS NFR BLD: 0 % (ref 0–1)
BDY SITE: ABNORMAL
DIFFERENTIAL METHOD BLD: ABNORMAL
EOSINOPHIL # BLD: 0 K/UL (ref 0–0.4)
EOSINOPHIL NFR BLD: 1 % (ref 0–7)
EPAP/CPAP/PEEP, PAPEEP: 7
ERYTHROCYTE [DISTWIDTH] IN BLOOD BY AUTOMATED COUNT: 14.8 % (ref 11.5–14.5)
FIO2 ON VENT: 35 %
GAMMA GLOB SERPL ELPH-MCNC: 1.5 G/DL
GAS FLOW.O2 SETTING OXYMISER: 16 L/MIN
GLOBULIN SER CALC-MCNC: 3.5 G/DL
HCO3 BLDA-SCNC: 24 MMOL/L (ref 22–26)
HCT VFR BLD AUTO: 29.4 % (ref 36.6–50.3)
HCT VFR BLD AUTO: 35.9 % (ref 36.6–50.3)
HCT VFR BLD AUTO: 44.8 % (ref 36.6–50.3)
HGB BLD-MCNC: 12 G/DL (ref 12.1–17)
HGB BLD-MCNC: 15.3 G/DL (ref 12.1–17)
HGB BLD-MCNC: 8.9 G/DL (ref 12.1–17)
IMM GRANULOCYTES # BLD AUTO: 0 K/UL (ref 0–0.04)
IMM GRANULOCYTES NFR BLD AUTO: 1 % (ref 0–0.5)
IPAP/PIP, IPAPIP: 15
KAPPA LC FREE SER-MCNC: 45.2 MG/L
KAPPA LC FREE/LAMBDA FREE SER: 3.93 {RATIO}
LAMBDA LC FREE SERPL-MCNC: 11.5 MG/L
LYMPHOCYTES # BLD: 0.5 K/UL (ref 0.8–3.5)
LYMPHOCYTES NFR BLD: 13 % (ref 12–49)
M PROTEIN SERPL ELPH-MCNC: 0.8 G/DL
MCH RBC QN AUTO: 34.8 PG (ref 26–34)
MCHC RBC AUTO-ENTMCNC: 33.4 G/DL (ref 30–36.5)
MCV RBC AUTO: 104.1 FL (ref 80–99)
MONOCYTES # BLD: 0.3 K/UL (ref 0–1)
MONOCYTES NFR BLD: 8 % (ref 5–13)
NEUTS SEG # BLD: 3.1 K/UL (ref 1.8–8)
NEUTS SEG NFR BLD: 77 % (ref 32–75)
PCO2 BLDA: 34 MMHG (ref 35–45)
PH BLDA: 7.44 [PH] (ref 7.35–7.45)
PLATELET # BLD AUTO: 74 K/UL (ref 150–400)
PLEASE NOTE, 011150: ABNORMAL
PMV BLD AUTO: 13.3 FL (ref 8.9–12.9)
PO2 BLDA: 52 MMHG (ref 75–100)
PROT PATTERN SERPL ELPH-IMP: ABNORMAL
PROT SERPL-MCNC: 6.4 G/DL
RBC # BLD AUTO: 3.45 M/UL (ref 4.1–5.7)
SAO2 % BLD: 89 %
SAO2% DEVICE SAO2% SENSOR NAME: ABNORMAL
SERVICE CMNT-IMP: ABNORMAL
SPECIMEN SITE: ABNORMAL
VENTILATION MODE VENT: ABNORMAL
WBC # BLD AUTO: 3.9 K/UL (ref 4.1–11.1)

## 2020-11-02 PROCEDURE — 85018 HEMOGLOBIN: CPT

## 2020-11-02 PROCEDURE — 74011000250 HC RX REV CODE- 250: Performed by: HOSPITALIST

## 2020-11-02 PROCEDURE — 74011250637 HC RX REV CODE- 250/637: Performed by: HOSPITALIST

## 2020-11-02 PROCEDURE — 74011000258 HC RX REV CODE- 258: Performed by: HOSPITALIST

## 2020-11-02 PROCEDURE — 82803 BLOOD GASES ANY COMBINATION: CPT

## 2020-11-02 PROCEDURE — 71045 X-RAY EXAM CHEST 1 VIEW: CPT

## 2020-11-02 PROCEDURE — 77010033678 HC OXYGEN DAILY

## 2020-11-02 PROCEDURE — 74011000258 HC RX REV CODE- 258: Performed by: INTERNAL MEDICINE

## 2020-11-02 PROCEDURE — 74011250636 HC RX REV CODE- 250/636: Performed by: INTERNAL MEDICINE

## 2020-11-02 PROCEDURE — 36415 COLL VENOUS BLD VENIPUNCTURE: CPT

## 2020-11-02 PROCEDURE — 94003 VENT MGMT INPAT SUBQ DAY: CPT

## 2020-11-02 PROCEDURE — 85025 COMPLETE CBC W/AUTO DIFF WBC: CPT

## 2020-11-02 PROCEDURE — 74011250636 HC RX REV CODE- 250/636: Performed by: HOSPITALIST

## 2020-11-02 PROCEDURE — 65610000006 HC RM INTENSIVE CARE

## 2020-11-02 PROCEDURE — 82607 VITAMIN B-12: CPT

## 2020-11-02 PROCEDURE — 94400 HC END TIDAL CO2 RESPONSE CURVE: CPT

## 2020-11-02 RX ORDER — DIGOXIN 0.25 MG/ML
250 INJECTION INTRAMUSCULAR; INTRAVENOUS EVERY 6 HOURS
Status: DISPENSED | OUTPATIENT
Start: 2020-11-02 | End: 2020-11-03

## 2020-11-02 RX ADMIN — DIGOXIN 250 MCG: 250 INJECTION, SOLUTION INTRAMUSCULAR; INTRAVENOUS; PARENTERAL at 17:06

## 2020-11-02 RX ADMIN — PIPERACILLIN SODIUM AND TAZOBACTAM SODIUM 3.38 G: 3; .375 INJECTION, POWDER, LYOPHILIZED, FOR SOLUTION INTRAVENOUS at 17:05

## 2020-11-02 RX ADMIN — FAMOTIDINE 20 MG: 10 INJECTION, SOLUTION INTRAVENOUS at 17:05

## 2020-11-02 RX ADMIN — PIPERACILLIN SODIUM AND TAZOBACTAM SODIUM 3.38 G: 3; .375 INJECTION, POWDER, LYOPHILIZED, FOR SOLUTION INTRAVENOUS at 08:13

## 2020-11-02 RX ADMIN — AMIODARONE HYDROCHLORIDE 1 MG/MIN: 50 INJECTION, SOLUTION INTRAVENOUS at 21:26

## 2020-11-02 RX ADMIN — METOPROLOL TARTRATE 50 MG: 50 TABLET, FILM COATED ORAL at 08:11

## 2020-11-02 RX ADMIN — Medication 5 MCG/MIN: at 18:49

## 2020-11-02 RX ADMIN — MIDAZOLAM 4 MG/HR: 5 INJECTION INTRAMUSCULAR; INTRAVENOUS at 17:05

## 2020-11-02 RX ADMIN — FAMOTIDINE 20 MG: 10 INJECTION, SOLUTION INTRAVENOUS at 08:13

## 2020-11-02 RX ADMIN — LEVOFLOXACIN 750 MG: 5 INJECTION, SOLUTION INTRAVENOUS at 08:13

## 2020-11-02 NOTE — CONSULTS
PULMONARY NOTE  [facility]    Name: Cici Hagan MRN: [id number]   : 1950 Hospital: [address] [place] [address]   Date: 2020  Admission date: 10/29/2020 Hospital Day: 5       HPI:     Hospital Problems  Date Reviewed: 10/30/2020          Codes Class Noted POA    Atrial fibrillation [facility]) ICD-10-CM: I48.91  ICD-9-CM: 427.31  10/30/2020 Yes                   [x] High complexity decision making was performed  [x] See my orders for details      Subjective/Initial History:     I was asked by Roula Fay MD to see Cici Hagan  a 78 y.o.  male in consultation     Excerpts from admission 10/29/2020 or consult notes as follows:   19-year-old male came in because of shortness of breath,. Patient condition got worse in the emergency room he was found to be in atrial fibrillation started on IV Cardizem did not seem to help his condition got worse he was put on BiPAP machine he continues to have increased work of breathing become diaphoretic pressure was low in 70s subsequently got intubated now he is on a ventilator, he is also hypotensive hemodynamically unstable on 2 vasopressors. He is a history of atrial fibrillation but he is not taking any medication for it he took it off by himself because he does not feel like taking them.   He is not on any anticoagulation unable to get much history out of the patient so admitted and critical care consult was called for further evaluation     patient is febrile overnight he is on antibiotic Zosyn and intubated on ventilator  No Known Allergies     MAR reviewed and pertinent medications noted or modified as needed     Current Facility-Administered Medications   Medication    famotidine (PF) (PEPCID) 20 mg in 0.9% sodium chloride 10 mL injection    levoFLOXacin (LEVAQUIN) 750 mg in D5W IVPB    influenza vaccine  (4 yrs+)(PF) (FLUCELVAX QUAD) injection 0.5 mL    metoprolol tartrate (LOPRESSOR) tablet 50 mg    NOREPINephrine (LEVOPHED) 8 mg in 5% dextrose 250mL (32 mcg/mL) infusion    dexmedeTOMidine (PRECEDEX) 400 mcg in 0.9% sodium chloride 104 mL infusion    midazolam in normal saline (VERSED) 1 mg/mL infusion    PHENYLephrine (BUBBA-SYNEPHRINE) 30 mg in 0.9% sodium chloride 250 mL infusion    piperacillin-tazobactam (ZOSYN) 3.375 g in 0.9% sodium chloride (MBP/ADV) 100 mL MBP    acetaminophen (TYLENOL) solution 650 mg      Patient PCP: Fish Lockwood MD  PMH:  has a past medical history of A-fib ([address] HTN (hypertension), Pacemaker, and Skin cancer. PSH:   has a past surgical history that includes hx pacemaker placement. FHX: family history includes Asthma in his mother; Heart Disease in his mother. SHX:  reports that he has never smoked. He has never used smokeless tobacco. He reports current alcohol use. He reports that he does not use drugs. ROS:  Unable to obtain      Objective:     Vital Signs: Telemetry:    AFIB Intake/Output:   Visit Vitals  /76   Pulse 95   Temp 99.5 °F (37.5 °C)   Resp 17   Ht 6' 2.02\" (1.88 m)   Wt 102.1 kg (225 lb 1.4 oz)   SpO2 98%   BMI 28.89 kg/m²       Temp (24hrs), Av.7 °F (37.6 °C), Min:98.6 °F (37 °C), Max:100.8 °F (38.2 °C)        O2 Device: Ventilator O2 Flow Rate (L/min): 10 l/min       Wt Readings from Last 4 Encounters:   10/30/20 102.1 kg (225 lb 1.4 oz)          Intake/Output Summary (Last 24 hours) at 2020 0930  Last data filed at 2020 1600  Gross per 24 hour   Intake    Output 1300 ml   Net -1300 ml       Last shift:      No intake/output data recorded. Last 3 shifts: 10/31 1901 -  0700  In: -   Out: 2100 [Urine:1300]       Physical Exam:     Physical Exam   HENT:   Head: Normocephalic and atraumatic. Eyes: Pupils are equal, round, and reactive to light. Neck: Normal range of motion. Neck supple. Cardiovascular: Normal rate. Pulmonary/Chest: He is in respiratory distress. He has rales. Abdominal: Soft.    Musculoskeletal: Normal range of motion. Neurological:   Sedated intubated on ventilator   Skin: Skin is warm. He is diaphoretic. Labs:    Recent Labs     11/02/20  0840 11/02/20  0520 11/01/20  1335 11/01/20  0400  10/31/20  1325   WBC  --  3.9*  --  4.9  --  4.4   HGB 8.9* 12.0* 14.1 14.4   < > 14.7   PLT  --  74*  --  91*  --  172    < > = values in this interval not displayed. Recent Labs     11/01/20  0405 10/31/20  0929 10/31/20  0630     --  140  137   K 3.8  --  3.9  4.2     --  107  108   CO2 26  --  25  21   *  --  126*  122*   BUN 22*  --  24*  25*   CREA 0.95  --  1.09  1.02   CA 8.8  --  8.9  8.8   PHOS 1.7*  --  2.6   ALB 2.8* 2.6* 3.0*  2.9*   ALT  --  1,318* 1,463*     Recent Labs     11/02/20  0530 11/01/20  0345 10/31/20  0300   PH 7.44 7.43 7.395   PCO2 34* 35 34*   PO2 52* 126* 139*   HCO3 24 24 22   FIO2 35 40 50.0     Recent Labs     10/31/20  0630        No results found for: BNPP, BNP   Lab Results   Component Value Date/Time    Culture result: PENDING 11/01/2020 09:15 AM    Culture result: No growth 1 day 10/30/2020 09:30 AM     Lab Results   Component Value Date/Time    TSH 11.80 (H) 10/29/2020 06:30 AM       Imaging:    CXR Results  (Last 48 hours)               11/02/20 0450  XR CHEST PORT Final result    Impression:  Impression: Underexpanded lungs with bibasilar atelectasis, left greater than   right. Left pleural effusion. Possible right pleural effusion. Possible   hydrostatic edema. Narrative:  Chest, frontal view, 11/2/2020       History: CHF. Comparison: Including chest 11/1/2020. Findings: The endotracheal tube tip is 6.2 cm from the kenisha. Left-sided   pacemaker device and single lead are in place. Feeding tube side-port is at the   GE junction; the tip is excluded from the field-of-view. The cardiac silhouette   is stable. The lungs are underexpanded with bibasilar atelectasis, left greater   than right.   Small appearing left pleural effusion is noted. Right pleural   effusion is possible. Hydrostatic edema is possible. No pneumothorax is   identified. The osseous structures are stable. 11/01/20 0450  XR CHEST PORT Final result    Impression:  FINDINGS/IMPRESSION:   Radiograph is limited by patient's body habitus and AP portable technique. Support lines and tubes are appropriate in radiographic position. Left chest   wall pacemaker. Lungs similarly inflated with left basilar atelectasis with ipsilateral pleural   effusion. Cardiac silhouette stable in size. Hemodynamic status stable. No radiographically apparent pneumothorax. Narrative:  XR CHEST PORT       Comparison: Chest radiograph dated October 31, 2020               Results from Hospital Encounter encounter on 10/29/20   XR CHEST PORT    Narrative Chest, frontal view, 11/2/2020    History: CHF. Comparison: Including chest 11/1/2020. Findings: The endotracheal tube tip is 6.2 cm from the kenisha. Left-sided  pacemaker device and single lead are in place. Feeding tube side-port is at the  GE junction; the tip is excluded from the field-of-view. The cardiac silhouette  is stable. The lungs are underexpanded with bibasilar atelectasis, left greater  than right. Small appearing left pleural effusion is noted. Right pleural  effusion is possible. Hydrostatic edema is possible. No pneumothorax is  identified. The osseous structures are stable. Impression Impression: Underexpanded lungs with bibasilar atelectasis, left greater than  right. Left pleural effusion. Possible right pleural effusion. Possible  hydrostatic edema. XR CHEST PORT    Narrative XR CHEST PORT    Comparison: Chest radiograph dated October 31, 2020      Impression FINDINGS/IMPRESSION:  Radiograph is limited by patient's body habitus and AP portable technique. Support lines and tubes are appropriate in radiographic position.  Left chest  wall pacemaker. Lungs similarly inflated with left basilar atelectasis with ipsilateral pleural  effusion. Cardiac silhouette stable in size. Hemodynamic status stable. No radiographically apparent pneumothorax. XR CHEST PORT    Narrative XR CHEST PORT    Comparison: Chest radiograph dated October 30, 2020      Impression FINDINGS/IMPRESSION:  Radiograph is limited by patient's body habitus and AP portable technique. Support lines and tubes are appropriate in radiographic position. Lungs remain hypoinflated with bibasilar atelectasis and bilateral pleural  effusions, left greater than right. Cardiac silhouette is enlarged, stable. Interval reduction of patchy bilateral  airspace opacity, possibly resolving alveolar edema. No radiographically apparent pneumothorax. Results from [place] encounter on 10/29/20   CT ABD PELV W CONT    Narrative HISTORY:  SOB, afib, eval for PE  Dose reduction technique: All CT scans at this facility are performed using dose reduction optimization  technique as appropriate on the exam including the following: Automated exposure  control, adjustment of the MA and/or KV according to patient size of use of  iterative reconstructive technique. .    TECHNIQUE: Postcontrast CT angiogram of the chest is performed with maximum  intensity projection images (MIPS) generated. 100 cc Isovue-370 injected. COMPARISON: 1/at 7/1/2018 CTA the chest available by report only; not all images  were available. LIMITATIONS: None    LINES/TUBES/MEDICAL SUPPORT DEVICES:  Left chest wall AICD    LUNG PARENCHYMA: Mild diffuse septal thickening  TRACHEA/BRONCHI: Bronchial wall thickening throughout. PULMONARY VESSELS: Normal. No definitive persistent central filling defects  identified on multiple contiguous images to diagnose pulmonary embolism.   PLEURA: Small bilateral pleural effusions  MEDIASTINUM: Normal  HEART: Multichamber cardiomegaly with persistent prominent  AORTA/GREAT VESSELS: Dilated ascending aorta 4.4 cm. No darrell aortic aneurysm or  aortic dissection  ESOPHAGUS: Normal  AXILLAE: Normal  BONES/TISSUES: No acute abnormality    UPPER ABDOMEN: See below  OTHER: None      Impression IMPRESSION: Normal CTA of the chest with no pulmonary embolism or acute aortic  abnormalities identified; dilated ascending aorta is noted and will require  continued surveillance. Findings suggestive of CHF/pulmonary edema with  bronchial wall thickening that may suggest axial interstitial edema and with  diffuse parenchymal edema throughout and with associated small bilateral pleural  effusions. HISTORY:  Abdominal distention, fever. Dose reduction technique: All CT scans at this facility are performed using dose reduction optimization  technique as appropriate on the exam including the following: Automated exposure  control, adjustment of the MA and/or KV according to patient size of use of  iterative reconstructive technique. .    TECHNIQUE:  CT ABD PELV W CONT                           100 cc Isovue-370 injected. COMPARISON: None  LIMITATIONS: None    CHEST: See above    LIVER: Normal  GALLBLADDER: Normal  BILIARY TREE: Normal  PANCREAS: Normal  SPLEEN: Normal  ADRENAL GLANDS: Normal  KIDNEYS/URETERS/BLADDER: Kidneys and ureters appear normal. Bladder wall  thickening  AORTA/RETROPERITONEUM: Normal  BOWEL/MESENTERY: Normal  APPENDIX: Identified and normal  PERITONEAL CAVITY: Mild diffuse abdominal pelvic ascites. No free  intraperitoneal air or abdominal pelvic abscess. REPRODUCTIVE ORGANS: Normal  BONE/TISSUES: Heterogeneous mineralization of the osseous structures bodies with  multiple areas of lucency identified throughout throughout spine, pelvis and  femurs. Moderate disc protrusions L2-3, L3-4, L4-5     OTHER: None    IMPRESSION: Bladder wall thickening is noted and may indicate cystitis and/or  chronic change. Abdominopelvic ascites. Gastrohepatic adenopathy is noted and  could be due to increased fluid resorption due to ascites but is nonspecific and  may require follow-up. Minimal cholelithiasis. . Abnormal mineralization:  Multiple focal lucencies throughout the axial and appendicular spine as  described and of uncertain etiology; evaluation for metastatic disease, myeloma,  metabolic bone disease recommended. Disc disease as described. The ER physician was unable to take my call at the time of this interpretation. The findings were conveyed to the ER staff and they will have the physician call  me back with any questions. Findings were conveyed to staff member Kerline Bazan. IMPRESSION:   1. Acute hypoxic respiratory failure  2. Decompensated congestive heart failure. Pacemaker in place  3. Atrial fibrillation with RVR  4. Shock cardiogenic shock  5. Rule out non-STEMI  6. Fever  7. Moderate pulmonary hypertension  8. History of melanoma possible metastatic disease to spine  9. Thrombocytopenia  10. Acute kidney injury  11. Hyperkalemia  12. Pt is requiring Drug therapy requiring intensive monitoring for toxicity  13. Pt is unstable, unpredictable needing inpatient monitoring; is acutely ill and at high risk of sudden decline and decompensation with severe consequenses and continued end organ dysfunction and failure  14. Prognosis guarded       RECOMMENDATIONS/PLAN:     1. Patient is intubated on ventilator ABG acceptable will change to vent setting to spontaneous once off of Versed drip PO2 is 52 and 35% FiO2 will increase FiO2  2. COVID-19 negative   3. Severe cardiomyopathy EF about 20%  4. Culture so far negative he is on Zosyn added Levaquin  5. Pulmonary hypertension WHO group 2 secondary to left heart failure  6. CAT scan shows prominent vascular marking congestive changes pleural effusion  7. 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 Supplemental O2 to keep sats > 93%  9. Aspiration precautions  10.  Labs to follow electrolytes, renal function and and blood counts  11. Glucose monitoring and SSI  12. Bronchial hygiene with respiratory therapy techniques, bronchodilators  13.  DVT, SUP prophylaxis         This care involved high complexity medical decision making: I personally:  · Reviewed the flowsheet and previous days notes  · Reviewed and summarized records or history from previous days note or discussions with staff, family  · High Risk Drug therapy requiring intensive monitoring for toxicity: eg steroids, pressors, antibiotics  · Reviewed and/or ordered Clinical lab tests  · Reviewed images and/or ordered Radiology tests  · discussed my assessment/management with : Nursing, Hospitalist of coordination of care        Time spent 30 min                   Discussed with daughter and also wife regarding his condition      Nusrat Christopher MD

## 2020-11-02 NOTE — PROGRESS NOTES
Reason for Admission:   A-trial Fib                   RUR Score:    13% Low                 Plan for utilizing home health:    Prior [facility] in the past.  Not currently open w/any [facility] agency. PCP: First and Last name:  Judy Mason MD   Name of Practice:    Are you a current patient: Yes/No:  Yes   Approximate date of last visit: Jan-Feb 2020   Can you participate in a virtual visit with your PCP: No                    Current Advanced Directive/Advance Care Plan: FULL Code. Neptali Patel (spouse) is the primary decision maker, and can be reached @ [phone] (cellular) and [phone] (home). Daughter is the secondary decision maker. Transition of Care Plan:                      -d/c to home w/HH vs SNF vs IRF?  -PCP follow up      Lives w/spouse in a two story home w/steps to enter the front door. There are stairs (12) on the inside between the first and second level. \"I'm concerned about him coming home, and going up/down the stairs. Our bedroom and main bathroom are all on the second level. \"  SW acknowledged understanding. Verbalized her  has to be evaluated by PT/OT, and they will make discharge recommendations for the next level of care. Spouse acknowledged understanding. Last seen in PCP office btwn Jan/Feb 2020. Has no upcoming scheduled appointment w/PCP. [facility]). Prior to inpatient admission patient was independent w/all ADL's & IADL's and required no assistance. Patient drives himself to all medical appointment's. Full Code. Spouse Neptali Patel) is the primary decision maker. Spouse can be reached at @ [phone] (cell) and [phone] (home). Daughter is the secondary decision maker. SW to meet w/spouse to provide [facility] SNF listing re: discharge disposition. Will continue to monitor and follow. Care Management Interventions  PCP Verified by CM: Yes(Jan-Feb 2020)  Mode of Transport at Discharge:  Other (see comment)(Transport or Family)  Transition of Care Consult (CM Consult): Discharge Planning  Discharge Durable Medical Equipment: (No home O2 or DME)  Current Support Network: Own Home, Lives with Spouse(Two story home w/spouse)  Confirm Follow Up Transport: Other (see comment)(Transport)  Discharge Location  Discharge Placement: (TBD)      RUSSELL Downey

## 2020-11-02 NOTE — PROGRESS NOTES
Hematology and Oncology Progress Note    Patient: Keyonna Shook MRN: [id number]  SSN: [id number]    YOB: 1950  Age: 78 y.o. Sex: male      Admit Date: 10/29/2020    LOS: 3 days     Chief Complaint: Patient is intubated and unresponsive    Subjective:     Is intubated and unresponsive. No active bleeding reported. Discussed with patient's nurse    Objective:     Vitals:    11/02/20 0359 11/02/20 0700 11/02/20 0800 11/02/20 0828   BP:  (!) 127/90 108/76    Pulse: (!) 105   95   Resp: 19 20 19 17   Temp:  99.5 °F (37.5 °C)     SpO2: 97% 100% 98% 98%   Weight:       Height:              Physical Exam:   Constitutional: Intubated and unresponsive. Eyes: Sclerae anicteric. Conjunctivae no pallor. ENMT: Intubated. Neck: No adenopathy. Cardiovascular: Sinus tachycardia. Abdomen: Mildly distended. Soft. Nontender. No hepatosplenomegaly. No guarding or rigidity. Bowel sounds present. Extremities: No edema. Skin: No petechiae; no skin rash.   Neurologic: Sedated and unresponsive    Lab/Data Review:    Recent Results (from the past 24 hour(s))   GLUCOSE, POC    Collection Time: 11/01/20 11:35 AM   Result Value Ref Range    Glucose (POC) 114 (H) 65 - 100 mg/dL    Performed by [facility]    HGB & HCT    Collection Time: 11/01/20  1:35 PM   Result Value Ref Range    HGB 14.1 12.1 - 17.0 g/dL    HCT 42.3 36.6 - 50.3 %   RETICULOCYTE COUNT    Collection Time: 11/01/20  1:35 PM   Result Value Ref Range    Reticulocyte count 1.1 0.7 - 2.1 %    Absolute Retic Cnt. 0.0461 (L) 0.0620 - 0.0950 M/ul   LD    Collection Time: 11/01/20  1:35 PM   Result Value Ref Range     (H) 85 - 241 U/L   CBC WITH AUTOMATED DIFF    Collection Time: 11/02/20  5:20 AM   Result Value Ref Range    WBC 3.9 (L) 4.1 - 11.1 K/uL    RBC 3.45 (L) 4.10 - 5.70 M/uL    HGB 12.0 (L) 12.1 - 17.0 g/dL    HCT 35.9 (L) 36.6 - 50.3 %    .1 (H) 80.0 - 99.0 FL    MCH 34.8 (H) 26.0 - 34.0 PG    MCHC 33.4 30.0 - 36.5 g/dL RDW 14.8 (H) 11.5 - 14.5 %    PLATELET 74 (L) 188 - 400 K/uL    MPV 13.3 (H) 8.9 - 12.9 FL    NEUTROPHILS 77 (H) 32 - 75 %    LYMPHOCYTES 13 12 - 49 %    MONOCYTES 8 5 - 13 %    EOSINOPHILS 1 0 - 7 %    BASOPHILS 0 0 - 1 %    IMMATURE GRANULOCYTES 1 (H) 0.0 - 0.5 %    ABS. NEUTROPHILS 3.1 1.8 - 8.0 K/UL    ABS. LYMPHOCYTES 0.5 (L) 0.8 - 3.5 K/UL    ABS. MONOCYTES 0.3 0.0 - 1.0 K/UL    ABS. EOSINOPHILS 0.0 0.0 - 0.4 K/UL    ABS. BASOPHILS 0.0 0.0 - 0.1 K/UL    ABS. IMM. GRANS. 0.0 0.00 - 0.04 K/UL    DF AUTOMATED     BLOOD GAS, ARTERIAL    Collection Time: 11/02/20  5:30 AM   Result Value Ref Range    pH 7.44 7.35 - 7.45      PCO2 34 (L) 35 - 45 mmHg    PO2 52 (L) 75 - 100 mmHg    O2 SAT 89 (L) >95 %    BICARBONATE 24 22 - 26 mmol/L    BASE DEFICIT 0.2 0 - 2 mmol/L    O2 METHOD VENT      FIO2 35 %    MODE AssistControl/Pressure Control      SET RATE 16      IPAP/PIP 15      EPAP/CPAP/PEEP 7      Sample source Arterial      SITE Right Radial      SONA'S TEST YES      Critical value read back CVRB ANA ROBIN RN PAO2 52    HGB & HCT    Collection Time: 11/02/20  8:40 AM   Result Value Ref Range    HGB 8.9 (L) 12.1 - 17.0 g/dL    HCT 29.4 (L) 36.6 - 50.3 %           Assessment and plan:     Multiple bone lucencies of spine,femurs and pelvis:Non specific. R/o bone metastases. R/o multiple myeloma. SPEP and immunoglubulin levels pending. CEA normal.  H/o skin cancer ? melanoma more than 10 yrs ago. Check bone scan once pt out of ICU. Elevated PSA:possible prostate cancer. Urology consult once pt is stable. Patient's PSA was 46. Thrombocytopenia: due to sepsis. Check peripheral smear. No active bleeding. Monitor. Check b12,folate. Respiratory failure: Pt on ventilator. pulmonary following. Atrial fibrillation: cardiology following.    -Patient's hemoglobin was reported 8.9 earlier so I had discussed with patient's nurse and another H&H was done and his hemoglobin is 15.3 and hematocrit 44.8.       Elevated LFTS:due to possible shock liver. LDH high most likely due to liver dysfunction. LDH     This dictation was done by dragon, computer voice recognition software. Often unanticipated grammatical, syntax, phones and other interpretive errors are inadvertently transcribed. Please excuse errors that have escaped final proofreading.        Signed By: Keller Lefort, MD     November 2, 2020

## 2020-11-02 NOTE — PROGRESS NOTES
Progress Note      11/2/2020 5:29 AM  NAME: Alexandra Nunez   MRN:  [id number]   Admit Diagnosis: Atrial fibrillation ([facility] [address] [I48.91]      Problem List: .  1. COVID-19 status unknown  2. Acute hypoxic respiratory failure requiring intubation  3. Severe sepsis/septic shock  4. Possible aspiration pneumonia  5. Suspected metastatic disease versus myeloma, versus metabolic bone disease on CT  6.  History of melanoma  7.  Previous history of alcohol abuse  8.  Cholelithiasis  9.  Ascites  10.  Dilated ascending aorta (4.4 cm)     11.  Heart failure with reduced ejection fraction (EF 20-25%)   12. Mild pulmonary hypertension (RVSP =41 mmHg)  13. Paroxysmal atrial fibrillation with rapid ventricular response  14.  Status post pacemaker  15. Valvular heart disease        15a. Moderate tricuspid regurgitation        15b. Moderate mitral regurgitation  16.  Hypertension  17.  Hyperlipidemia  18. Abnormal cardiac enzymes in the indeterminate range  19.  Acute kidney injury  20.  Hyperkalemia  21.  Thrombocytopenia       Subjective:   Events over weekend noted. He is currently intubated but plans noted for possible extubation. Currently with atrial fibrillation with RVR.     Medications Personally Reviewed:    Current Facility-Administered Medications   Medication Dose Route Frequency    famotidine (PF) (PEPCID) 20 mg in 0.9% sodium chloride 10 mL injection  20 mg IntraVENous ACB&D    levoFLOXacin (LEVAQUIN) 750 mg in D5W IVPB  750 mg IntraVENous Q24H    influenza vaccine 2020-21 (4 yrs+)(PF) (FLUCELVAX QUAD) injection 0.5 mL  0.5 mL IntraMUSCular PRIOR TO DISCHARGE    metoprolol tartrate (LOPRESSOR) tablet 50 mg  50 mg Oral Q12H    NOREPINephrine (LEVOPHED) 8 mg in 5% dextrose 250mL (32 mcg/mL) infusion  0.5-16 mcg/min IntraVENous TITRATE    dexmedeTOMidine (PRECEDEX) 400 mcg in 0.9% sodium chloride 104 mL infusion  0.1-1.5 mcg/kg/hr IntraVENous TITRATE    midazolam in normal saline (VERSED) 1 mg/mL infusion  0-10 mg/hr IntraVENous TITRATE    PHENYLephrine (BUBBA-SYNEPHRINE) 30 mg in 0.9% sodium chloride 250 mL infusion   mcg/min IntraVENous TITRATE    piperacillin-tazobactam (ZOSYN) 3.375 g in 0.9% sodium chloride (MBP/ADV) 100 mL MBP  3.375 g IntraVENous Q8H    acetaminophen (TYLENOL) solution 650 mg  650 mg Per NG tube Q4H PRN           Objective:     Physical Exam:  Last 24hrs VS reviewed since prior progress note. Most recent are:    Visit Vitals  /76   Pulse 95   Temp 99.5 °F (37.5 °C)   Resp 17   Ht 6' 2.02\" (1.88 m)   Wt 102.1 kg (225 lb 1.4 oz)   SpO2 98%   BMI 28.89 kg/m²       Intake/Output Summary (Last 24 hours) at 11/2/2020 1400  Last data filed at 11/1/2020 1600  Gross per 24 hour   Intake    Output 1300 ml   Net -1300 ml        General Appearance: Well developed, intubated. Chest: Lungs clear to auscultation bilaterally. Cardiovascular: JVP is not elevated, PMI is not displaced, normal intensity S1 and S2, without S3. Abdomen: Soft, non-tender, abdominal distention. Extremities: No edema bilaterally. Data Review    Telemetry: Atrial fibrillation with controlled ventricular response, and occasional premature ventricular complexes  EKG:   [x]  No new EKG for review    Lab Data Personally Reviewed:    Recent Labs     11/02/20  0840 11/02/20  0520  11/01/20  0400   WBC  --  3.9*  --  4.9   HGB 8.9* 12.0*   < > 14.4   HCT 29.4* 35.9*   < > 42.7   PLT  --  74*  --  91*    < > = values in this interval not displayed. No results for input(s): INR, PTP, APTT, INREXT, INREXT in the last 72 hours.    Recent Labs     11/01/20  0405 10/31/20  0630    140  137   K 3.8 3.9  4.2    107  108   CO2 26 25  21   BUN 22* 24*  25*   CREA 0.95 1.09  1.02   * 126*  122*   CA 8.8 8.9  8.8     Recent Labs     10/31/20  0630        No results found for: CHOL, CHOLX, CHLST, CHOLV, HDL, HDLP, LDL, LDLC, DLDLP, TGLX, TRIGL, TRIGP, CHHD, CHHDX    Recent Labs 11/01/20  0405 10/31/20  0929 10/31/20  0829 10/31/20  0630   AP  --  66  --  73   TP  --  6.8 6.4 7.5   ALB 2.8* 2.6*  --  3.0*  2.9*   GLOB  --  4.2*  --  4.5*     Recent Labs     11/02/20  0530 11/01/20  0345   PH 7.44 7.43   PCO2 34* 35   PO2 52* 126*           Assessment/Plan:   Currently intubated. He is off sedation. Currently with atrial fibrillation with RVR. He tested negative for COVID-19. We will add digoxin IV. Hemoglobin 12.0, platelet is 74. Discussed plans with the family at bedside. Patient is not a candidate for chronic anticoagulation at this point. His hemoglobin apparently dropped from 14.1 into 12.28.9. Currently with p.o. metoprolol. Off Levophed and Jian-Synephrine. Continue supportive care. Further commendation depending on clinical progression.      Marily Bronson MD

## 2020-11-02 NOTE — PROGRESS NOTES
Hospitalist Progress Note               Daily Progress Note: 2020      Subjective: The patient is seen for follow up. He continues on mechanical ventilation. No on low-dose norepinephrine. Low-grade temperature of 99.5. Wife and son are at the bedside, questions answered      Problem List:  Problem List as of 2020 Date Reviewed: 10/30/2020          Codes Class Noted - Resolved    Atrial fibrillation [facility]) ICD-10-CM: I48.91  ICD-9-CM: 427.31  10/30/2020 - Present              Medications reviewed  Current Facility-Administered Medications   Medication Dose Route Frequency    famotidine (PF) (PEPCID) 20 mg in 0.9% sodium chloride 10 mL injection  20 mg IntraVENous ACB&D    levoFLOXacin (LEVAQUIN) 750 mg in D5W IVPB  750 mg IntraVENous Q24H    influenza vaccine  (4 yrs+)(PF) (FLUCELVAX QUAD) injection 0.5 mL  0.5 mL IntraMUSCular PRIOR TO DISCHARGE    metoprolol tartrate (LOPRESSOR) tablet 50 mg  50 mg Oral Q12H    NOREPINephrine (LEVOPHED) 8 mg in 5% dextrose 250mL (32 mcg/mL) infusion  0.5-16 mcg/min IntraVENous TITRATE    dexmedeTOMidine (PRECEDEX) 400 mcg in 0.9% sodium chloride 104 mL infusion  0.1-1.5 mcg/kg/hr IntraVENous TITRATE    midazolam in normal saline (VERSED) 1 mg/mL infusion  0-10 mg/hr IntraVENous TITRATE    PHENYLephrine (BUBBA-SYNEPHRINE) 30 mg in 0.9% sodium chloride 250 mL infusion   mcg/min IntraVENous TITRATE    piperacillin-tazobactam (ZOSYN) 3.375 g in 0.9% sodium chloride (MBP/ADV) 100 mL MBP  3.375 g IntraVENous Q8H    acetaminophen (TYLENOL) solution 650 mg  650 mg Per NG tube Q4H PRN       Review of Systems:   Review of systems not obtained due to patient factors.     Objective:   Physical Exam:     Visit Vitals  /88   Pulse 95   Temp 99.5 °F (37.5 °C)   Resp 17   Ht 6' 2.02\" (1.88 m)   Wt 102.1 kg (225 lb 1.4 oz)   SpO2 98%   BMI 28.89 kg/m²    O2 Flow Rate (L/min): 10 l/min O2 Device: Ventilator    Temp (24hrs), Av.7 °F (37.6 °C), Min:98.6 °F (37 °C), Max:100.8 °F (38.2 °C)    No intake/output data recorded. 10/31 1901 - 11/02 0700  In: -   Out: 2100 [Urine:1300]    General:   Intubated, unresponsive. Right pupil pinpoint, left pupil 5 mm and reactive   Lungs:   Clear to auscultation bilaterally. Chest wall:  No tenderness or deformity. Heart:  Regular rate and rhythm, S1, S2 normal, no murmur, click, rub or gallop. Abdomen:   Soft, non-tender. Bowel sounds normal. No masses,  No organomegaly. Extremities: Extremities normal, atraumatic, no cyanosis or edema. Pulses: 2+ and symmetric all extremities. Skin: Skin color, texture, turgor normal. No rashes or lesions   Neurologic: CNII-XII intact. No obvious motor deficits     Data Review:       Recent Days:  Recent Labs     11/02/20  0520 11/01/20  1335 11/01/20  0400  10/31/20  1325   WBC 3.9*  --  4.9  --  4.4   HGB 12.0* 14.1 14.4   < > 14.7   HCT 35.9* 42.3 42.7   < > 41.8   PLT 74*  --  91*  --  172    < > = values in this interval not displayed.      Recent Labs     11/01/20  0405 10/31/20  0929 10/31/20  0630     --  140  137   K 3.8  --  3.9  4.2     --  107  108   CO2 26  --  25  21   *  --  126*  122*   BUN 22*  --  24*  25*   CREA 0.95  --  1.09  1.02   CA 8.8  --  8.9  8.8   PHOS 1.7*  --  2.6   ALB 2.8* 2.6* 3.0*  2.9*   TBILI  --  2.0* 2.2*   ALT  --  1,318* 1,463*     Recent Labs     11/02/20  0530 11/01/20  0345 10/31/20  0300   PH 7.44 7.43 7.395   PCO2 34* 35 34*   PO2 52* 126* 139*   HCO3 24 24 22   FIO2 35 40 50.0       24 Hour Results:  Recent Results (from the past 24 hour(s))   CULTURE, RESPIRATORY/SPUTUM/BRONCH W GRAM STAIN    Collection Time: 11/01/20  9:15 AM    Specimen: Sputum   Result Value Ref Range    Special Requests: No Special Requests      GRAM STAIN Rare WBCs seen      GRAM STAIN Rare Epithelial cells seen      GRAM STAIN No organisms seen      Culture result: PENDING    GLUCOSE, POC    Collection Time: 11/01/20 11:35 AM   Result Value Ref Range    Glucose (POC) 114 (H) 65 - 100 mg/dL    Performed by Reggie Bravo    HGB & HCT    Collection Time: 11/01/20  1:35 PM   Result Value Ref Range    HGB 14.1 12.1 - 17.0 g/dL    HCT 42.3 36.6 - 50.3 %   RETICULOCYTE COUNT    Collection Time: 11/01/20  1:35 PM   Result Value Ref Range    Reticulocyte count 1.1 0.7 - 2.1 %    Absolute Retic Cnt. 0.0461 (L) 0.0620 - 0.0950 M/ul   LD    Collection Time: 11/01/20  1:35 PM   Result Value Ref Range     (H) 85 - 241 U/L   CBC WITH AUTOMATED DIFF    Collection Time: 11/02/20  5:20 AM   Result Value Ref Range    WBC 3.9 (L) 4.1 - 11.1 K/uL    RBC 3.45 (L) 4.10 - 5.70 M/uL    HGB 12.0 (L) 12.1 - 17.0 g/dL    HCT 35.9 (L) 36.6 - 50.3 %    .1 (H) 80.0 - 99.0 FL    MCH 34.8 (H) 26.0 - 34.0 PG    MCHC 33.4 30.0 - 36.5 g/dL    RDW 14.8 (H) 11.5 - 14.5 %    PLATELET 74 (L) 592 - 400 K/uL    MPV 13.3 (H) 8.9 - 12.9 FL    NEUTROPHILS 77 (H) 32 - 75 %    LYMPHOCYTES 13 12 - 49 %    MONOCYTES 8 5 - 13 %    EOSINOPHILS 1 0 - 7 %    BASOPHILS 0 0 - 1 %    IMMATURE GRANULOCYTES 1 (H) 0.0 - 0.5 %    ABS. NEUTROPHILS 3.1 1.8 - 8.0 K/UL    ABS. LYMPHOCYTES 0.5 (L) 0.8 - 3.5 K/UL    ABS. MONOCYTES 0.3 0.0 - 1.0 K/UL    ABS. EOSINOPHILS 0.0 0.0 - 0.4 K/UL    ABS. BASOPHILS 0.0 0.0 - 0.1 K/UL    ABS. IMM.  GRANS. 0.0 0.00 - 0.04 K/UL    DF AUTOMATED     BLOOD GAS, ARTERIAL    Collection Time: 11/02/20  5:30 AM   Result Value Ref Range    pH 7.44 7.35 - 7.45      PCO2 34 (L) 35 - 45 mmHg    PO2 52 (L) 75 - 100 mmHg    O2 SAT 89 (L) >95 %    BICARBONATE 24 22 - 26 mmol/L    BASE DEFICIT 0.2 0 - 2 mmol/L    O2 METHOD VENT      FIO2 35 %    MODE AssistControl/Pressure Control      SET RATE 16      IPAP/PIP 15      EPAP/CPAP/PEEP 7      Sample source Arterial      SITE Right Radial      SONA'S TEST YES      Critical value read back CVRB ANA ROBIN RN PAO2 52        XR CHEST PORT   Final Result   Impression: Underexpanded lungs with bibasilar atelectasis, left greater than   right. Left pleural effusion. Possible right pleural effusion. Possible   hydrostatic edema. XR CHEST PORT   Final Result   FINDINGS/IMPRESSION:   Radiograph is limited by patient's body habitus and AP portable technique. Support lines and tubes are appropriate in radiographic position. Left chest   wall pacemaker. Lungs similarly inflated with left basilar atelectasis with ipsilateral pleural   effusion. Cardiac silhouette stable in size. Hemodynamic status stable. No radiographically apparent pneumothorax. XR CHEST PORT   Final Result   FINDINGS/IMPRESSION:   Radiograph is limited by patient's body habitus and AP portable technique. Support lines and tubes are appropriate in radiographic position. Lungs remain hypoinflated with bibasilar atelectasis and bilateral pleural   effusions, left greater than right. Cardiac silhouette is enlarged, stable. Interval reduction of patchy bilateral   airspace opacity, possibly resolving alveolar edema. No radiographically apparent pneumothorax. XR CHEST PORT   Final Result   IMPRESSION: Findings would suggest early changes of CHF/pulmonary edema with   vascular congestion, axial interstitial edema and mild perihilar parenchymal   interstitial edema. CTA CHEST W OR W WO CONT   Final Result   IMPRESSION: Normal CTA of the chest with no pulmonary embolism or acute aortic   abnormalities identified; dilated ascending aorta is noted and will require   continued surveillance. Findings suggestive of CHF/pulmonary edema with   bronchial wall thickening that may suggest axial interstitial edema and with   diffuse parenchymal edema throughout and with associated small bilateral pleural   effusions. HISTORY:  Abdominal distention, fever. Dose reduction technique:    All CT scans at this facility are performed using dose reduction optimization   technique as appropriate on the exam including the following: Automated exposure   control, adjustment of the MA and/or KV according to patient size of use of   iterative reconstructive technique. .      TECHNIQUE:  CT ABD PELV W CONT                            100 cc Isovue-370 injected. COMPARISON: None   LIMITATIONS: None      CHEST: See above      LIVER: Normal   GALLBLADDER: Normal   BILIARY TREE: Normal   PANCREAS: Normal   SPLEEN: Normal   ADRENAL GLANDS: Normal   KIDNEYS/URETERS/BLADDER: Kidneys and ureters appear normal. Bladder wall   thickening   AORTA/RETROPERITONEUM: Normal   BOWEL/MESENTERY: Normal   APPENDIX: Identified and normal   PERITONEAL CAVITY: Mild diffuse abdominal pelvic ascites. No free   intraperitoneal air or abdominal pelvic abscess. REPRODUCTIVE ORGANS: Normal   BONE/TISSUES: Heterogeneous mineralization of the osseous structures bodies with   multiple areas of lucency identified throughout throughout spine, pelvis and   femurs. Moderate disc protrusions L2-3, L3-4, L4-5       OTHER: None      IMPRESSION: Bladder wall thickening is noted and may indicate cystitis and/or   chronic change. Abdominopelvic ascites. Gastrohepatic adenopathy is noted and   could be due to increased fluid resorption due to ascites but is nonspecific and   may require follow-up. Minimal cholelithiasis. . Abnormal mineralization:   Multiple focal lucencies throughout the axial and appendicular spine as   described and of uncertain etiology; evaluation for metastatic disease, myeloma,   metabolic bone disease recommended. Disc disease as described. The ER physician was unable to take my call at the time of this interpretation. The findings were conveyed to the ER staff and they will have the physician call   me back with any questions. Findings were conveyed to staff member Kerline Bazan.       CT ABD PELV W CONT   Final Result   IMPRESSION: Normal CTA of the chest with no pulmonary embolism or acute aortic   abnormalities identified; dilated ascending aorta is noted and will require   continued surveillance. Findings suggestive of CHF/pulmonary edema with   bronchial wall thickening that may suggest axial interstitial edema and with   diffuse parenchymal edema throughout and with associated small bilateral pleural   effusions. HISTORY:  Abdominal distention, fever. Dose reduction technique: All CT scans at this facility are performed using dose reduction optimization   technique as appropriate on the exam including the following: Automated exposure   control, adjustment of the MA and/or KV according to patient size of use of   iterative reconstructive technique. .      TECHNIQUE:  CT ABD PELV W CONT                            100 cc Isovue-370 injected. COMPARISON: None   LIMITATIONS: None      CHEST: See above      LIVER: Normal   GALLBLADDER: Normal   BILIARY TREE: Normal   PANCREAS: Normal   SPLEEN: Normal   ADRENAL GLANDS: Normal   KIDNEYS/URETERS/BLADDER: Kidneys and ureters appear normal. Bladder wall   thickening   AORTA/RETROPERITONEUM: Normal   BOWEL/MESENTERY: Normal   APPENDIX: Identified and normal   PERITONEAL CAVITY: Mild diffuse abdominal pelvic ascites. No free   intraperitoneal air or abdominal pelvic abscess. REPRODUCTIVE ORGANS: Normal   BONE/TISSUES: Heterogeneous mineralization of the osseous structures bodies with   multiple areas of lucency identified throughout throughout spine, pelvis and   femurs. Moderate disc protrusions L2-3, L3-4, L4-5       OTHER: None      IMPRESSION: Bladder wall thickening is noted and may indicate cystitis and/or   chronic change. Abdominopelvic ascites. Gastrohepatic adenopathy is noted and   could be due to increased fluid resorption due to ascites but is nonspecific and   may require follow-up. Minimal cholelithiasis. . Abnormal mineralization:   Multiple focal lucencies throughout the axial and appendicular spine as   described and of uncertain etiology; evaluation for metastatic disease, myeloma,   metabolic bone disease recommended. Disc disease as described. The ER physician was unable to take my call at the time of this interpretation. The findings were conveyed to the ER staff and they will have the physician call   me back with any questions. Findings were conveyed to staff member Kerline Bazan. XR CHEST SNGL V   Final Result   IMPRESSION: No significant interval change. Cardiomegaly which may be   contributing to the left lower lobe opacity although a component of retrocardiac   atelectasis, airspace disease and/or effusion could cause a similar appearance. XR CHEST SNGL V   Final Result   IMPRESSION:   1. Suboptimal patient positioning without definite acute cardiopulmonary   abnormality evident. 2. Cardiac silhouette is enlarged, more prominent than prior, though likely at   least partially accentuated by technique. Assessment:  Acute respiratory failure with hypoxia, requiring mechanical ventilation    COVID-19 ruled out    Acute systolic heart failure, EF 20%    Aspiration pneumonia    Paroxysmal atrial fibrillation with RVR, now with controlled rate    Acute kidney injury possible ATN. Resolved    Moderate pulmonary hypertension    Likely severe sepsis/septic shock, possibly due to aspiration. Now with fever    Blindness right eye with pinpoint pupil    Suspected metastatic disease versus myeloma versus metabolic bone disease on CT. elevated PSA suggest possible metastatic prostate cancer as the etiology.   Could also be melanoma    History of melanoma 18 years ago    Thrombocytopenia likely due to sepsis    Intellectual disability    Plan:  Continue IV Zosyn and levofloxacin  Continue tube feeds  Continue mechanical ventilation  He will need a nuclear medicine bone scan at some point  Awaiting SPEP and urine electrophoresis    Care Plan discussed with: Consultant and nurse, patient's wife    Total time spent with patient: 27 minutes.     Coni Walters MD

## 2020-11-02 NOTE — PROGRESS NOTES
Subjective   Subjective:      Pt on ventilator. Pt had fever of 101.8.      Objective     Current Facility-Administered Medications:     famotidine (PF) (PEPCID) 20 mg in 0.9% sodium chloride 10 mL injection, 20 mg, IntraVENous, ACB&D, Evie Rodríguez MD, 20 mg at 11/01/20 0945    levoFLOXacin (LEVAQUIN) 750 mg in D5W IVPB, 750 mg, IntraVENous, Q24H, Ju LARSEN MD, Last Rate: 100 mL/hr at 11/01/20 0944, 750 mg at 11/01/20 0944    influenza vaccine 2020-21 (4 yrs+)(PF) (FLUCELVAX QUAD) injection 0.5 mL, 0.5 mL, IntraMUSCular, PRIOR TO DISCHARGE, Torito Harris MD    metoprolol tartrate (LOPRESSOR) tablet 50 mg, 50 mg, Oral, Q12H, Manuelito Acosta MD, 50 mg at 11/01/20 0945    enoxaparin (LOVENOX) injection 40 mg, 40 mg, SubCUTAneous, Q24H, Manuelito Acosta MD, Stopped at 11/01/20 0224    NOREPINephrine (LEVOPHED) 8 mg in 5% dextrose 250mL (32 mcg/mL) infusion, 0.5-16 mcg/min, IntraVENous, TITRATE, Manuelito Acosta MD, Last Rate: 3.8 mL/hr at 11/01/20 0950, 2 mcg/min at 11/01/20 0950    dexmedeTOMidine (PRECEDEX) 400 mcg in 0.9% sodium chloride 104 mL infusion, 0.1-1.5 mcg/kg/hr, IntraVENous, TITRATE, Manuelito Acosta MD, Last Rate: 26.5 mL/hr at 11/01/20 0612, 1 mcg/kg/hr at 11/01/20 0612    midazolam in normal saline (VERSED) 1 mg/mL infusion, 0-10 mg/hr, IntraVENous, TITRATE, Manuelito Acosta MD, Last Rate: 4 mL/hr at 11/01/20 0600, 4 mg/hr at 11/01/20 0600    PHENYLephrine (BUBBA-SYNEPHRINE) 30 mg in 0.9% sodium chloride 250 mL infusion,  mcg/min, IntraVENous, TITRATE, Oskar Orellana MD, Last Rate: 50 mL/hr at 10/30/20 1044, 100 mcg/min at 10/30/20 1044    piperacillin-tazobactam (ZOSYN) 3.375 g in 0.9% sodium chloride (MBP/ADV) 100 mL MBP, 3.375 g, IntraVENous, Q8H, Torito Harris MD, Last Rate: 25 mL/hr at 11/01/20 1513, 3.375 g at 11/01/20 1513    acetaminophen (TYLENOL) solution 650 mg, 650 mg, Per NG tube, Q4H PRN, Torito Mena, MD, 650 mg at 11/01/20 0546    Objective:     Visit Vitals  /86   Pulse 87   Temp 98.6 °F (37 °C)   Resp 18   Ht 6' 2.02\" (1.88 m)   Wt 102.1 kg (225 lb 1.4 oz)   SpO2 99%   BMI 28.89 kg/m²      Physical Exam     @Objective    [email or web address]     Data Review:   Recent Results (from the past 24 hour(s))   HGB & HCT    Collection Time: 11/01/20 12:00 AM   Result Value Ref Range    HGB 14.7 12.1 - 17.0 g/dL    HCT 40.0 36.6 - 50.3 %   TYPE & SCREEN    Collection Time: 11/01/20 12:00 AM   Result Value Ref Range    Crossmatch Expiration 11/04/2020,2359     ABO/Rh(D) O Positive     Antibody screen Positive     Antibody ID Cold Antibody     XXAUTO CONTROL Negative    BLOOD GAS, ARTERIAL    Collection Time: 11/01/20  3:45 AM   Result Value Ref Range    pH 7.43 7.35 - 7.45      PCO2 35 35 - 45 mmHg    PO2 126 (H) 75 - 100 mmHg    O2 SAT 99 >95 %    BICARBONATE 24 22 - 26 mmol/L    BASE DEFICIT 0.3 0 - 2 mmol/L    O2 METHOD VENT      FIO2 40 %    MODE AssistControl/Pressure Control      SET RATE 16      PRESSURE SUPPORT 15      EPAP/CPAP/PEEP 8      Sample source Arterial      SITE Right Radial      SONA'S TEST YES     CBC WITH AUTOMATED DIFF    Collection Time: 11/01/20  4:00 AM   Result Value Ref Range    WBC 4.9 4.1 - 11.1 K/uL    RBC 4.08 (L) 4.10 - 5.70 M/uL    HGB 14.4 12.1 - 17.0 g/dL    HCT 42.7 36.6 - 50.3 %    .7 (H) 80.0 - 99.0 FL    MCH 35.3 (H) 26.0 - 34.0 PG    MCHC 33.7 30.0 - 36.5 g/dL    RDW 14.7 (H) 11.5 - 14.5 %    PLATELET 91 (L) 231 - 400 K/uL    MPV 13.0 (H) 8.9 - 12.9 FL    NEUTROPHILS 73 32 - 75 %    LYMPHOCYTES 19 12 - 49 %    MONOCYTES 6 5 - 13 %    EOSINOPHILS 1 0 - 7 %    BASOPHILS 1 0 - 1 %    IMMATURE GRANULOCYTES 0 0.0 - 0.5 %    ABS. NEUTROPHILS 3.6 1.8 - 8.0 K/UL    ABS. LYMPHOCYTES 0.9 0.8 - 3.5 K/UL    ABS. MONOCYTES 0.3 0.0 - 1.0 K/UL    ABS. EOSINOPHILS 0.0 0.0 - 0.4 K/UL    ABS. BASOPHILS 0.0 0.0 - 0.1 K/UL    ABS. IMM.  GRANS. 0.0 0.00 - 0.04 K/UL    DF AUTOMATED     PROCALCITONIN Collection Time: 11/01/20  4:00 AM   Result Value Ref Range    Procalcitonin 4.13 (H) 0 ng/mL   RENAL FUNCTION PANEL    Collection Time: 11/01/20  4:05 AM   Result Value Ref Range    Sodium 140 136 - 145 mmol/L    Potassium 3.8 3.5 - 5.1 mmol/L    Chloride 108 97 - 108 mmol/L    CO2 26 21 - 32 mmol/L    Anion gap 6 5 - 15 mmol/L    Glucose 134 (H) 65 - 100 mg/dL    BUN 22 (H) 6 - 20 mg/dL    Creatinine 0.95 0.70 - 1.30 mg/dL    BUN/Creatinine ratio 23 (H) 12 - 20      GFR est AA >60 >60 ml/min/1.73m2    GFR est non-AA >60 >60 ml/min/1.73m2    Calcium 8.8 8.5 - 10.1 mg/dL    Phosphorus 1.7 (L) 2.6 - 4.7 mg/dL    Albumin 2.8 (L) 3.5 - 5.0 g/dL   CULTURE, RESPIRATORY/SPUTUM/BRONCH W GRAM STAIN    Collection Time: 11/01/20  9:15 AM    Specimen: Sputum   Result Value Ref Range    Special Requests: No Special Requests      GRAM STAIN Rare WBCs seen      GRAM STAIN Rare Epithelial cells seen      GRAM STAIN No organisms seen      Culture result: PENDING    GLUCOSE, POC    Collection Time: 11/01/20 11:35 AM   Result Value Ref Range    Glucose (POC) 114 (H) 65 - 100 mg/dL    Performed by Demetris Leung    HGB & HCT    Collection Time: 11/01/20  1:35 PM   Result Value Ref Range    HGB 14.1 12.1 - 17.0 g/dL    HCT 42.3 36.6 - 50.3 %   RETICULOCYTE COUNT    Collection Time: 11/01/20  1:35 PM   Result Value Ref Range    Reticulocyte count 1.1 0.7 - 2.1 %    Absolute Retic Cnt. 0.0461 (L) 0.0620 - 0.0950 M/ul   LD    Collection Time: 11/01/20  1:35 PM   Result Value Ref Range     (H) 85 - 241 U/L       Assessment   Assessment/Plan:      Multiple bone lucencies of spine,femurs and pelvis:Non specific. R/o bone metastases. R/o multiple myeloma. SPEP and immunoglubulin levels pending. CEA normal.  H/o skin cancer ? melanoma more than 10 yrs ago. Check bone scan once pt out of ICU. Elevated PSA:possible prostate cancer. Urology consult once pt is stable. Gastrohepatic LAD:nonspecific. Monitor.   Thrombocytopenia: due to sepsis. Check peripheral smear. No active bleeding. Monitor. Check b12,folate. Respiratory failure; Pt on ventilator. pulmonary following. Atrial fibrillation:cardiology following. ? Cold antibody noted on type and screen of blood. Will discuss with blood bank. pt hb normal.Retic count low. LDH high but most likely due to elevated LFTS improving. No significant hemolysis. Elevated LFTS:due to possible shock liver. LDH high most likely due to liver dysfunction. LDH improving. D/w pt nurse. D/w pt wife in detail.

## 2020-11-03 ENCOUNTER — APPOINTMENT (OUTPATIENT)
Dept: GENERAL RADIOLOGY | Age: 70
DRG: 870 | End: 2020-11-03
Attending: INTERNAL MEDICINE
Payer: MEDICARE

## 2020-11-03 LAB
ARTERIAL PATENCY WRIST A: YES
BACTERIA SPEC CULT: NORMAL
BASE EXCESS BLDA CALC-SCNC: 2.4 MMOL/L (ref 0–2)
BDY SITE: ABNORMAL
EPAP/CPAP/PEEP, PAPEEP: 8
FIO2 ON VENT: 40 %
FOLATE SERPL-MCNC: 6.8 NG/ML (ref 5–21)
FOLATE SERPL-MCNC: 9.4 NG/ML (ref 5–21)
GAS FLOW.O2 SETTING OXYMISER: 16 L/MIN
GRAM STN SPEC: NORMAL
HCO3 BLDA-SCNC: 26 MMOL/L (ref 22–26)
HCT VFR BLD AUTO: 43.7 % (ref 36.6–50.3)
HGB BLD-MCNC: 14.8 G/DL (ref 12.1–17)
IPAP/PIP, IPAPIP: 16
PCO2 BLDA: 35 MMHG (ref 35–45)
PH BLDA: 7.48 [PH] (ref 7.35–7.45)
PO2 BLDA: 55 MMHG (ref 75–100)
SAO2 % BLD: 92 %
SAO2% DEVICE SAO2% SENSOR NAME: ABNORMAL
SPECIAL REQUESTS,SREQ: NORMAL
SPECIMEN SITE: ABNORMAL
VENTILATION MODE VENT: ABNORMAL
VIT B12 SERPL-MCNC: 1485 PG/ML (ref 193–986)
VIT B12 SERPL-MCNC: 1728 PG/ML (ref 193–986)

## 2020-11-03 PROCEDURE — 94400 HC END TIDAL CO2 RESPONSE CURVE: CPT

## 2020-11-03 PROCEDURE — 74011250636 HC RX REV CODE- 250/636: Performed by: INTERNAL MEDICINE

## 2020-11-03 PROCEDURE — 94003 VENT MGMT INPAT SUBQ DAY: CPT

## 2020-11-03 PROCEDURE — 82803 BLOOD GASES ANY COMBINATION: CPT

## 2020-11-03 PROCEDURE — 74011250637 HC RX REV CODE- 250/637: Performed by: HOSPITALIST

## 2020-11-03 PROCEDURE — 77010033678 HC OXYGEN DAILY

## 2020-11-03 PROCEDURE — 36415 COLL VENOUS BLD VENIPUNCTURE: CPT

## 2020-11-03 PROCEDURE — 74011000258 HC RX REV CODE- 258: Performed by: INTERNAL MEDICINE

## 2020-11-03 PROCEDURE — 65270000029 HC RM PRIVATE

## 2020-11-03 PROCEDURE — 85018 HEMOGLOBIN: CPT

## 2020-11-03 PROCEDURE — 71045 X-RAY EXAM CHEST 1 VIEW: CPT

## 2020-11-03 RX ORDER — LORAZEPAM 2 MG/ML
1 INJECTION INTRAMUSCULAR
Status: DISCONTINUED | OUTPATIENT
Start: 2020-11-03 | End: 2020-11-09 | Stop reason: HOSPADM

## 2020-11-03 RX ORDER — MORPHINE SULFATE 2 MG/ML
2 INJECTION, SOLUTION INTRAMUSCULAR; INTRAVENOUS
Status: DISCONTINUED | OUTPATIENT
Start: 2020-11-03 | End: 2020-11-09 | Stop reason: HOSPADM

## 2020-11-03 RX ADMIN — FAMOTIDINE 20 MG: 10 INJECTION, SOLUTION INTRAVENOUS at 08:01

## 2020-11-03 RX ADMIN — METOPROLOL TARTRATE 50 MG: 50 TABLET, FILM COATED ORAL at 08:01

## 2020-11-03 RX ADMIN — LEVOFLOXACIN 750 MG: 5 INJECTION, SOLUTION INTRAVENOUS at 08:01

## 2020-11-03 RX ADMIN — PIPERACILLIN SODIUM AND TAZOBACTAM SODIUM 3.38 G: 3; .375 INJECTION, POWDER, LYOPHILIZED, FOR SOLUTION INTRAVENOUS at 08:01

## 2020-11-03 RX ADMIN — DIGOXIN 250 MCG: 250 INJECTION, SOLUTION INTRAMUSCULAR; INTRAVENOUS; PARENTERAL at 08:00

## 2020-11-03 NOTE — PROGRESS NOTES
Hematology and Oncology Progress Note    Patient: Ahsan Huitron MRN: [id number]  SSN: [id number]    YOB: 1950  Age: 78 y.o. Sex: male      Admit Date: 10/29/2020    LOS: 4 days     Chief Complaint: Patient was admitted with respiratory failure    Subjective:     Patient is unresponsive. Now extubated    Objective:     Vitals:    11/02/20 2300 11/03/20 0024 11/03/20 0640 11/03/20 0750   BP: 124/77      Pulse: (!) 124 (!) 125 97    Resp: 22 20 18    Temp: 99.7 °F (37.6 °C)      SpO2: 100% 98% 98% 100%   Weight:       Height:            Physical Exam:   Constitutional:  Patient is extubated and unresponsive. Lab/Data Review:    Recent Results (from the past 24 hour(s))   HGB & HCT    Collection Time: 11/02/20  5:32 PM   Result Value Ref Range    HGB 15.3 12.1 - 17.0 g/dL    HCT 44.8 36.6 - 50.3 %   BLOOD GAS, ARTERIAL    Collection Time: 11/03/20  6:41 AM   Result Value Ref Range    pH 7.48 (H) 7.35 - 7.45      PCO2 35 35 - 45 mmHg    PO2 55 (L) 75 - 100 mmHg    O2 SAT 92 (L) >95 %    BICARBONATE 26 22 - 26 mmol/L    BASE EXCESS 2.4 (H) 0 - 2 mmol/L    O2 METHOD VENT      FIO2 40 %    MODE AssistControl/Pressure Control      SET RATE 16      IPAP/PIP 16      EPAP/CPAP/PEEP 8      Sample source Arterial      SITE Right Radial      SONA'S TEST YES             Assessment and plan:   Multiple bone lucencies of spine,femurs and pelvis:Non specific. R/o bone metastases. R/o multiple myeloma. SPEP and immunoglubulin levels pending. CEA normal.  H/o skin cancer ? melanoma more than 10 yrs ago. Check bone scan once pt out of ICU.     Elevated PSA:possible prostate cancer. Urology consult once pt is stable. Patient's PSA was 46.     Thrombocytopenia: due to sepsis. Check peripheral smear. No active bleeding. Monitor. Check b12,folate.     Respiratory failure:     -Discussed with patient's nurse and reviewed recent notes.   Patient's family has decided only comfort care and patient is now terminally extubated. Looks comfortable. -We will sign off the case. This dictation was done by dragon, computer voice recognition software. Often unanticipated grammatical, syntax, phones and other interpretive errors are inadvertently transcribed. Please excuse errors that have escaped final proofreading.        Signed By: Ariel Shafer MD     November 3, 2020

## 2020-11-03 NOTE — PROGRESS NOTES
The purpose of the visit was in response to a text to care for a family dealing with the difficulty of a placing the patient on comfort care. The family was tearful as they shared there feelings of pain as they watched their loved one suffer without the believing he would get better. The patient's wife mentioned that she did not want to see her  in pain, and that it's hurting her more to see him in pain. The patient's son shared that he knew his father did not want this poor quality of life. The  provided the ministry of presence and the comfort of spiritual care for the family. The  listened empathically and reflectively. The  aided the family as an advocate to communicate their decision with the doctor to move the patient to comfort care. [address] [facility] Fabian Tejada    can be reached by calling the  at [facility]  [phone]

## 2020-11-03 NOTE — PROGRESS NOTES
SW informed by RN Velvet Meter) patient has been made comfort care. Patient was terminally extubated per Linda Vera RN.        RUSSELL No

## 2020-11-03 NOTE — PROGRESS NOTES
Extubated patient per Dr Nancy Voss. Placed patient on 4 lpm n.c. SpO2 97% BBS with rhonchi. Suctioned large amount of clear thick secretions pre and post extubation. Patient had no increased work of breathing and was resting comfortably.

## 2020-11-03 NOTE — PROGRESS NOTES
Hospitalist Progress Note               Daily Progress Note: 11/3/2020      Subjective: The patient is seen for follow up. He continues on mechanical ventilation. Now on low-dose norepinephrine. Low-grade temperature of 99.5. Met with family in ICU conference room      Problem List:  Problem List as of 11/3/2020 Date Reviewed: 10/30/2020          Codes Class Noted - Resolved    Atrial fibrillation [facility]) ICD-10-CM: I48.91  ICD-9-CM: 427.31  10/30/2020 - Present              Medications reviewed  Current Facility-Administered Medications   Medication Dose Route Frequency    morphine injection 2 mg  2 mg IntraVENous Q1H PRN    LORazepam (ATIVAN) injection 1 mg  1 mg IntraVENous Q1H PRN    digoxin (LANOXIN) injection 250 mcg  250 mcg IntraVENous Q6H    amiodarone (CORDARONE) 900 mg in dextrose 5% 250 mL infusion  0.5-1 mg/min IntraVENous TITRATE    famotidine (PF) (PEPCID) 20 mg in 0.9% sodium chloride 10 mL injection  20 mg IntraVENous ACB&D    levoFLOXacin (LEVAQUIN) 750 mg in D5W IVPB  750 mg IntraVENous Q24H    influenza vaccine 2020-21 (4 yrs+)(PF) (FLUCELVAX QUAD) injection 0.5 mL  0.5 mL IntraMUSCular PRIOR TO DISCHARGE    metoprolol tartrate (LOPRESSOR) tablet 50 mg  50 mg Oral Q12H    NOREPINephrine (LEVOPHED) 8 mg in 5% dextrose 250mL (32 mcg/mL) infusion  0.5-16 mcg/min IntraVENous TITRATE    dexmedeTOMidine (PRECEDEX) 400 mcg in 0.9% sodium chloride 104 mL infusion  0.1-1.5 mcg/kg/hr IntraVENous TITRATE    midazolam in normal saline (VERSED) 1 mg/mL infusion  0-10 mg/hr IntraVENous TITRATE    PHENYLephrine (BUBBA-SYNEPHRINE) 30 mg in 0.9% sodium chloride 250 mL infusion   mcg/min IntraVENous TITRATE    piperacillin-tazobactam (ZOSYN) 3.375 g in 0.9% sodium chloride (MBP/ADV) 100 mL MBP  3.375 g IntraVENous Q8H    acetaminophen (TYLENOL) solution 650 mg  650 mg Per NG tube Q4H PRN       Review of Systems:   Review of systems not obtained due to patient factors.     Objective: Physical Exam:     Visit Vitals  /77 (BP 1 Location: Right arm, BP Patient Position: At rest)   Pulse 97   Temp 98.6 °F (37 °C)   Resp 18   Ht 6' 2.02\" (1.88 m)   Wt 102.1 kg (225 lb 1.4 oz)   SpO2 100%   BMI 28.89 kg/m²    O2 Flow Rate (L/min): 10 l/min O2 Device: Ventilator    Temp (24hrs), Av.5 °F (37.5 °C), Min:98.6 °F (37 °C), Max:99.9 °F (37.7 °C)    No intake/output data recorded.  1901 -  0700  In: -   Out: 800 [Urine:800]    General:   Intubated, unresponsive. Right pupil pinpoint, left pupil 5 mm and reactive   Lungs:   Clear to auscultation bilaterally. Chest wall:  No tenderness or deformity. Heart:  Regular rate and rhythm, S1, S2 normal, no murmur, click, rub or gallop. Abdomen:   Soft, non-tender. Bowel sounds normal. No masses,  No organomegaly. Extremities: Extremities normal, atraumatic, no cyanosis or edema. Pulses: 2+ and symmetric all extremities. Skin: Skin color, texture, turgor normal. No rashes or lesions   Neurologic: CNII-XII intact. No obvious motor deficits     Data Review:       Recent Days:  Recent Labs     20  1732 20  0840 20  0520  20  0400  10/31/20  1325   WBC  --   --  3.9*  --  4.9  --  4.4   HGB 15.3 8.9* 12.0*   < > 14.4   < > 14.7   HCT 44.8 29.4* 35.9*   < > 42.7   < > 41.8   PLT  --   --  74*  --  91*  --  172    < > = values in this interval not displayed.      Recent Labs     20  0405      K 3.8      CO2 26   *   BUN 22*   CREA 0.95   CA 8.8   PHOS 1.7*   ALB 2.8*     Recent Labs     20  0641 20  0530 20  0345   PH 7.48* 7.44 7.43   PCO2 35 34* 35   PO2 55* 52* 126*   HCO3 26 24 24   FIO2 40 35 40       24 Hour Results:  Recent Results (from the past 24 hour(s))   HGB & HCT    Collection Time: 20  5:32 PM   Result Value Ref Range    HGB 15.3 12.1 - 17.0 g/dL    HCT 44.8 36.6 - 50.3 %   BLOOD GAS, ARTERIAL    Collection Time: 20  6:41 AM   Result Value Ref Range    pH 7.48 (H) 7.35 - 7.45      PCO2 35 35 - 45 mmHg    PO2 55 (L) 75 - 100 mmHg    O2 SAT 92 (L) >95 %    BICARBONATE 26 22 - 26 mmol/L    BASE EXCESS 2.4 (H) 0 - 2 mmol/L    O2 METHOD VENT      FIO2 40 %    MODE AssistControl/Pressure Control      SET RATE 16      IPAP/PIP 16      EPAP/CPAP/PEEP 8      Sample source Arterial      SITE Right Radial      SONA'S TEST YES         XR CHEST PORT   Final Result      XR CHEST PORT   Final Result   Impression: Underexpanded lungs with bibasilar atelectasis, left greater than   right. Left pleural effusion. Possible right pleural effusion. Possible   hydrostatic edema. XR CHEST PORT   Final Result   FINDINGS/IMPRESSION:   Radiograph is limited by patient's body habitus and AP portable technique. Support lines and tubes are appropriate in radiographic position. Left chest   wall pacemaker. Lungs similarly inflated with left basilar atelectasis with ipsilateral pleural   effusion. Cardiac silhouette stable in size. Hemodynamic status stable. No radiographically apparent pneumothorax. XR CHEST PORT   Final Result   FINDINGS/IMPRESSION:   Radiograph is limited by patient's body habitus and AP portable technique. Support lines and tubes are appropriate in radiographic position. Lungs remain hypoinflated with bibasilar atelectasis and bilateral pleural   effusions, left greater than right. Cardiac silhouette is enlarged, stable. Interval reduction of patchy bilateral   airspace opacity, possibly resolving alveolar edema. No radiographically apparent pneumothorax. XR CHEST PORT   Final Result   IMPRESSION: Findings would suggest early changes of CHF/pulmonary edema with   vascular congestion, axial interstitial edema and mild perihilar parenchymal   interstitial edema.       CTA CHEST W OR W WO CONT   Final Result   IMPRESSION: Normal CTA of the chest with no pulmonary embolism or acute aortic   abnormalities identified; dilated ascending aorta is noted and will require   continued surveillance. Findings suggestive of CHF/pulmonary edema with   bronchial wall thickening that may suggest axial interstitial edema and with   diffuse parenchymal edema throughout and with associated small bilateral pleural   effusions. HISTORY:  Abdominal distention, fever. Dose reduction technique: All CT scans at this facility are performed using dose reduction optimization   technique as appropriate on the exam including the following: Automated exposure   control, adjustment of the MA and/or KV according to patient size of use of   iterative reconstructive technique. .      TECHNIQUE:  CT ABD PELV W CONT                            100 cc Isovue-370 injected. COMPARISON: None   LIMITATIONS: None      CHEST: See above      LIVER: Normal   GALLBLADDER: Normal   BILIARY TREE: Normal   PANCREAS: Normal   SPLEEN: Normal   ADRENAL GLANDS: Normal   KIDNEYS/URETERS/BLADDER: Kidneys and ureters appear normal. Bladder wall   thickening   AORTA/RETROPERITONEUM: Normal   BOWEL/MESENTERY: Normal   APPENDIX: Identified and normal   PERITONEAL CAVITY: Mild diffuse abdominal pelvic ascites. No free   intraperitoneal air or abdominal pelvic abscess. REPRODUCTIVE ORGANS: Normal   BONE/TISSUES: Heterogeneous mineralization of the osseous structures bodies with   multiple areas of lucency identified throughout throughout spine, pelvis and   femurs. Moderate disc protrusions L2-3, L3-4, L4-5       OTHER: None      IMPRESSION: Bladder wall thickening is noted and may indicate cystitis and/or   chronic change. Abdominopelvic ascites. Gastrohepatic adenopathy is noted and   could be due to increased fluid resorption due to ascites but is nonspecific and   may require follow-up. Minimal cholelithiasis. . Abnormal mineralization:   Multiple focal lucencies throughout the axial and appendicular spine as   described and of uncertain etiology; evaluation for metastatic disease, myeloma,   metabolic bone disease recommended. Disc disease as described. The ER physician was unable to take my call at the time of this interpretation. The findings were conveyed to the ER staff and they will have the physician call   me back with any questions. Findings were conveyed to staff member Kerline Bazan. CT ABD PELV W CONT   Final Result   IMPRESSION: Normal CTA of the chest with no pulmonary embolism or acute aortic   abnormalities identified; dilated ascending aorta is noted and will require   continued surveillance. Findings suggestive of CHF/pulmonary edema with   bronchial wall thickening that may suggest axial interstitial edema and with   diffuse parenchymal edema throughout and with associated small bilateral pleural   effusions. HISTORY:  Abdominal distention, fever. Dose reduction technique: All CT scans at this facility are performed using dose reduction optimization   technique as appropriate on the exam including the following: Automated exposure   control, adjustment of the MA and/or KV according to patient size of use of   iterative reconstructive technique. .      TECHNIQUE:  CT ABD PELV W CONT                            100 cc Isovue-370 injected. COMPARISON: None   LIMITATIONS: None      CHEST: See above      LIVER: Normal   GALLBLADDER: Normal   BILIARY TREE: Normal   PANCREAS: Normal   SPLEEN: Normal   ADRENAL GLANDS: Normal   KIDNEYS/URETERS/BLADDER: Kidneys and ureters appear normal. Bladder wall   thickening   AORTA/RETROPERITONEUM: Normal   BOWEL/MESENTERY: Normal   APPENDIX: Identified and normal   PERITONEAL CAVITY: Mild diffuse abdominal pelvic ascites. No free   intraperitoneal air or abdominal pelvic abscess. REPRODUCTIVE ORGANS: Normal   BONE/TISSUES: Heterogeneous mineralization of the osseous structures bodies with   multiple areas of lucency identified throughout throughout spine, pelvis and   femurs. Moderate disc protrusions L2-3, L3-4, L4-5       OTHER: None      IMPRESSION: Bladder wall thickening is noted and may indicate cystitis and/or   chronic change. Abdominopelvic ascites. Gastrohepatic adenopathy is noted and   could be due to increased fluid resorption due to ascites but is nonspecific and   may require follow-up. Minimal cholelithiasis. . Abnormal mineralization:   Multiple focal lucencies throughout the axial and appendicular spine as   described and of uncertain etiology; evaluation for metastatic disease, myeloma,   metabolic bone disease recommended. Disc disease as described. The ER physician was unable to take my call at the time of this interpretation. The findings were conveyed to the ER staff and they will have the physician call   me back with any questions. Findings were conveyed to staff member Kerline Bazan. XR CHEST SNGL V   Final Result   IMPRESSION: No significant interval change. Cardiomegaly which may be   contributing to the left lower lobe opacity although a component of retrocardiac   atelectasis, airspace disease and/or effusion could cause a similar appearance. XR CHEST SNGL V   Final Result   IMPRESSION:   1. Suboptimal patient positioning without definite acute cardiopulmonary   abnormality evident. 2. Cardiac silhouette is enlarged, more prominent than prior, though likely at   least partially accentuated by technique. XR CHEST PORT    (Results Pending)        Assessment:  Acute respiratory failure with hypoxia, requiring mechanical ventilation    COVID-19 ruled out    Acute systolic heart failure, EF 20%    Aspiration pneumonia    Paroxysmal atrial fibrillation with RVR, now with controlled rate    Acute kidney injury possible ATN. Resolved    Moderate pulmonary hypertension    Severe sepsis/septic shock, possibly due to aspiration.      Blindness right eye with pinpoint pupil    Suspected metastatic disease versus myeloma versus metabolic bone disease on CT. elevated PSA suggest possible metastatic prostate cancer as the etiology. Could also be melanoma    History of melanoma 18 years ago    Thrombocytopenia likely due to sepsis    Intellectual disability    Plan:  Detailed discussion with family including wife,son and daughter regarding overall care. Family wants him be made comfort care with extubation and discontinuation of medical therapy   Comfort care explained to family and family is agreeable   Will  extubate patient 4 L nasal oxygen and placed comfort care measures    care Plan discussed with: Consultant and nurse, patient's wife    Total time spent with patient: 30 minutes.     Nehemiah Reeder MD

## 2020-11-03 NOTE — CONSULTS
PULMONARY NOTE  [facility]    Name: Nikki Lao MRN: [id number]   : 1950 Hospital: [address] [place] [address]   Date: 11/3/2020  Admission date: 10/29/2020 Hospital Day: 6       HPI:     Hospital Problems  Date Reviewed: 10/30/2020          Codes Class Noted POA    Atrial fibrillation [facility]) ICD-10-CM: I48.91  ICD-9-CM: 427.31  10/30/2020 Yes                   [x] High complexity decision making was performed  [x] See my orders for details      Subjective/Initial History:     I was asked by Fredy Peck MD to see Nikki Lao  a 78 y.o.  male in consultation     Excerpts from admission 10/29/2020 or consult notes as follows:   74-year-old male came in because of shortness of breath,. Patient condition got worse in the emergency room he was found to be in atrial fibrillation started on IV Cardizem did not seem to help his condition got worse he was put on BiPAP machine he continues to have increased work of breathing become diaphoretic pressure was low in 70s subsequently got intubated now he is on a ventilator, he is also hypotensive hemodynamically unstable on 2 vasopressors. He is a history of atrial fibrillation but he is not taking any medication for it he took it off by himself because he does not feel like taking them.   He is not on any anticoagulation unable to get much history out of the patient so admitted and critical care consult was called for further evaluation     patient is febrile overnight he is on antibiotic Zosyn and intubated on ventilator  No Known Allergies     MAR reviewed and pertinent medications noted or modified as needed     Current Facility-Administered Medications   Medication    digoxin (LANOXIN) injection 250 mcg    amiodarone (CORDARONE) 900 mg in dextrose 5% 250 mL infusion    famotidine (PF) (PEPCID) 20 mg in 0.9% sodium chloride 10 mL injection    levoFLOXacin (LEVAQUIN) 750 mg in D5W IVPB    influenza vaccine  (4 yrs+)(PF) (FLUCELVAX QUAD) injection 0.5 mL    metoprolol tartrate (LOPRESSOR) tablet 50 mg    NOREPINephrine (LEVOPHED) 8 mg in 5% dextrose 250mL (32 mcg/mL) infusion    dexmedeTOMidine (PRECEDEX) 400 mcg in 0.9% sodium chloride 104 mL infusion    midazolam in normal saline (VERSED) 1 mg/mL infusion    PHENYLephrine (BUBBA-SYNEPHRINE) 30 mg in 0.9% sodium chloride 250 mL infusion    piperacillin-tazobactam (ZOSYN) 3.375 g in 0.9% sodium chloride (MBP/ADV) 100 mL MBP    acetaminophen (TYLENOL) solution 650 mg      Patient PCP: Sarkis Chu MD  PMH:  has a past medical history of A-fib ([address] HTN (hypertension), Pacemaker, and Skin cancer. PSH:   has a past surgical history that includes hx pacemaker placement. FHX: family history includes Asthma in his mother; Heart Disease in his mother. SHX:  reports that he has never smoked. He has never used smokeless tobacco. He reports current alcohol use. He reports that he does not use drugs. ROS:  Unable to obtain      Objective:     Vital Signs: Telemetry:    AFIB Intake/Output:   Visit Vitals  /77 (BP 1 Location: Right arm, BP Patient Position: At rest)   Pulse 97   Temp 99.7 °F (37.6 °C)   Resp 18   Ht 6' 2.02\" (1.88 m)   Wt 102.1 kg (225 lb 1.4 oz)   SpO2 100%   BMI 28.89 kg/m²       Temp (24hrs), Av.7 °F (37.6 °C), Min:99.5 °F (37.5 °C), Max:99.9 °F (37.7 °C)        O2 Device: Ventilator O2 Flow Rate (L/min): 10 l/min       Wt Readings from Last 4 Encounters:   10/30/20 102.1 kg (225 lb 1.4 oz)          Intake/Output Summary (Last 24 hours) at 11/3/2020 0831  Last data filed at 2020 1500  Gross per 24 hour   Intake    Output 800 ml   Net -800 ml       Last shift:      No intake/output data recorded. Last 3 shifts:  1901 -  0700  In: -   Out: 800 [Urine:800]       Physical Exam:     Physical Exam   HENT:   Head: Normocephalic and atraumatic. Eyes: Pupils are equal, round, and reactive to light. Neck: Normal range of motion. Neck supple. Cardiovascular: Normal rate. Pulmonary/Chest: He is in respiratory distress. He has rales. Abdominal: Soft. Musculoskeletal: Normal range of motion. Neurological:   Sedated intubated on ventilator   Skin: Skin is warm. He is diaphoretic. Labs:    Recent Labs     11/02/20  1732 11/02/20  0840 11/02/20  0520  11/01/20  0400  10/31/20  1325   WBC  --   --  3.9*  --  4.9  --  4.4   HGB 15.3 8.9* 12.0*   < > 14.4   < > 14.7   PLT  --   --  74*  --  91*  --  172    < > = values in this interval not displayed. Recent Labs     11/01/20  0405      K 3.8      CO2 26   *   BUN 22*   CREA 0.95   CA 8.8   PHOS 1.7*   ALB 2.8*     Recent Labs     11/03/20  0641 11/02/20  0530 11/01/20  0345   PH 7.48* 7.44 7.43   PCO2 35 34* 35   PO2 55* 52* 126*   HCO3 26 24 24   FIO2 40 35 40     No results for input(s): CPK, CKNDX, TROIQ in the last 72 hours. No lab exists for component: CPKMB  No results found for: BNPP, BNP   Lab Results   Component Value Date/Time    Culture result: No growth after 16 hours 11/01/2020 09:15 AM    Culture result: No growth 4 days 10/30/2020 08:30 AM     Lab Results   Component Value Date/Time    TSH 11.80 (H) 10/29/2020 06:30 AM       Imaging:    CXR Results  (Last 48 hours)               11/03/20 0456  XR CHEST PORT Final result    Narrative:  Chest single view. Comparison single view chest November 2, 2020       NG tube retracted so that its tip is at the GE junction. ET tube stable   position. Unchanged appearance for the lungs with left lung base subsegmental   atelectasis. Left-sided cardiac device. No pneumothorax or sizable pleural   effusion. 11/02/20 0450  XR CHEST PORT Final result    Impression:  Impression: Underexpanded lungs with bibasilar atelectasis, left greater than   right. Left pleural effusion. Possible right pleural effusion. Possible   hydrostatic edema.        Narrative:  Chest, frontal view, 11/2/2020 History: CHF. Comparison: Including chest 11/1/2020. Findings: The endotracheal tube tip is 6.2 cm from the kenisha. Left-sided   pacemaker device and single lead are in place. Feeding tube side-port is at the   GE junction; the tip is excluded from the field-of-view. The cardiac silhouette   is stable. The lungs are underexpanded with bibasilar atelectasis, left greater   than right. Small appearing left pleural effusion is noted. Right pleural   effusion is possible. Hydrostatic edema is possible. No pneumothorax is   identified. The osseous structures are stable. Results from [place] encounter on 10/29/20   XR CHEST PORT    Narrative Chest single view. Comparison single view chest November 2, 2020    NG tube retracted so that its tip is at the GE junction. ET tube stable  position. Unchanged appearance for the lungs with left lung base subsegmental  atelectasis. Left-sided cardiac device. No pneumothorax or sizable pleural  effusion. XR CHEST PORT    Narrative Chest, frontal view, 11/2/2020    History: CHF. Comparison: Including chest 11/1/2020. Findings: The endotracheal tube tip is 6.2 cm from the kenisha. Left-sided  pacemaker device and single lead are in place. Feeding tube side-port is at the  GE junction; the tip is excluded from the field-of-view. The cardiac silhouette  is stable. The lungs are underexpanded with bibasilar atelectasis, left greater  than right. Small appearing left pleural effusion is noted. Right pleural  effusion is possible. Hydrostatic edema is possible. No pneumothorax is  identified. The osseous structures are stable. Impression Impression: Underexpanded lungs with bibasilar atelectasis, left greater than  right. Left pleural effusion. Possible right pleural effusion. Possible  hydrostatic edema.    XR CHEST PORT    Narrative XR CHEST PORT    Comparison: Chest radiograph dated October 31, 2020      Impression FINDINGS/IMPRESSION:  Radiograph is limited by patient's body habitus and AP portable technique. Support lines and tubes are appropriate in radiographic position. Left chest  wall pacemaker. Lungs similarly inflated with left basilar atelectasis with ipsilateral pleural  effusion. Cardiac silhouette stable in size. Hemodynamic status stable. No radiographically apparent pneumothorax. Results from [place] encounter on 10/29/20   CT ABD PELV W CONT    Narrative HISTORY:  SOB, afib, eval for PE  Dose reduction technique: All CT scans at this facility are performed using dose reduction optimization  technique as appropriate on the exam including the following: Automated exposure  control, adjustment of the MA and/or KV according to patient size of use of  iterative reconstructive technique. .    TECHNIQUE: Postcontrast CT angiogram of the chest is performed with maximum  intensity projection images (MIPS) generated. 100 cc Isovue-370 injected. COMPARISON: 1/at 7/1/2018 CTA the chest available by report only; not all images  were available. LIMITATIONS: None    LINES/TUBES/MEDICAL SUPPORT DEVICES:  Left chest wall AICD    LUNG PARENCHYMA: Mild diffuse septal thickening  TRACHEA/BRONCHI: Bronchial wall thickening throughout. PULMONARY VESSELS: Normal. No definitive persistent central filling defects  identified on multiple contiguous images to diagnose pulmonary embolism. PLEURA: Small bilateral pleural effusions  MEDIASTINUM: Normal  HEART: Multichamber cardiomegaly with persistent prominent  AORTA/GREAT VESSELS: Dilated ascending aorta 4.4 cm.  No darrell aortic aneurysm or  aortic dissection  ESOPHAGUS: Normal  AXILLAE: Normal  BONES/TISSUES: No acute abnormality    UPPER ABDOMEN: See below  OTHER: None      Impression IMPRESSION: Normal CTA of the chest with no pulmonary embolism or acute aortic  abnormalities identified; dilated ascending aorta is noted and will require  continued surveillance. Findings suggestive of CHF/pulmonary edema with  bronchial wall thickening that may suggest axial interstitial edema and with  diffuse parenchymal edema throughout and with associated small bilateral pleural  effusions. HISTORY:  Abdominal distention, fever. Dose reduction technique: All CT scans at this facility are performed using dose reduction optimization  technique as appropriate on the exam including the following: Automated exposure  control, adjustment of the MA and/or KV according to patient size of use of  iterative reconstructive technique. .    TECHNIQUE:  CT ABD PELV W CONT                           100 cc Isovue-370 injected. COMPARISON: None  LIMITATIONS: None    CHEST: See above    LIVER: Normal  GALLBLADDER: Normal  BILIARY TREE: Normal  PANCREAS: Normal  SPLEEN: Normal  ADRENAL GLANDS: Normal  KIDNEYS/URETERS/BLADDER: Kidneys and ureters appear normal. Bladder wall  thickening  AORTA/RETROPERITONEUM: Normal  BOWEL/MESENTERY: Normal  APPENDIX: Identified and normal  PERITONEAL CAVITY: Mild diffuse abdominal pelvic ascites. No free  intraperitoneal air or abdominal pelvic abscess. REPRODUCTIVE ORGANS: Normal  BONE/TISSUES: Heterogeneous mineralization of the osseous structures bodies with  multiple areas of lucency identified throughout throughout spine, pelvis and  femurs. Moderate disc protrusions L2-3, L3-4, L4-5     OTHER: None    IMPRESSION: Bladder wall thickening is noted and may indicate cystitis and/or  chronic change. Abdominopelvic ascites. Gastrohepatic adenopathy is noted and  could be due to increased fluid resorption due to ascites but is nonspecific and  may require follow-up. Minimal cholelithiasis. . Abnormal mineralization:  Multiple focal lucencies throughout the axial and appendicular spine as  described and of uncertain etiology; evaluation for metastatic disease, myeloma,  metabolic bone disease recommended.  Disc disease as described. The ER physician was unable to take my call at the time of this interpretation. The findings were conveyed to the ER staff and they will have the physician call  me back with any questions. Findings were conveyed to staff member Kerline Bazan. IMPRESSION:   1. Acute hypoxic respiratory failure  2. Decompensated congestive heart failure. Pacemaker in place  3. Atrial fibrillation with RVR  4. Shock cardiogenic shock  5. Rule out non-STEMI  6. Fever  7. Moderate pulmonary hypertension  8. History of melanoma possible metastatic disease to spine  9. Thrombocytopenia  10. Acute kidney injury  11. Hyperkalemia  12. Pt is requiring Drug therapy requiring intensive monitoring for toxicity  13. Pt is unstable, unpredictable needing inpatient monitoring; is acutely ill and at high risk of sudden decline and decompensation with severe consequenses and continued end organ dysfunction and failure  14. Prognosis guarded       RECOMMENDATIONS/PLAN:     1. Patient is intubated on ventilator ABG acceptable will change to vent setting to spontaneous once off of Versed drip PO2 is 55 and 35% FiO2 will increase FiO2 to 45% and decrease the rate to 10 start weaning tried yesterday but he was tachypneic tachycardic will try again to wean if hemodynamically stable he is still on Levophed  2. COVID-19 negative   3. Severe cardiomyopathy EF about 20%  4. Culture so far negative he is on Zosyn added Levaquin  5. Pulmonary hypertension WHO group 2 secondary to left heart failure  6. CAT scan shows prominent vascular marking congestive changes pleural effusion  7. 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 Supplemental O2 to keep sats > 93%  9. Aspiration precautions  10. Labs to follow electrolytes, renal function and and blood counts  11. Glucose monitoring and SSI  12. Bronchial hygiene with respiratory therapy techniques, bronchodilators  13.  DVT, SUP prophylaxis         This care involved high complexity medical decision making: I personally:  · Reviewed the flowsheet and previous days notes  · Reviewed and summarized records or history from previous days note or discussions with staff, family  · High Risk Drug therapy requiring intensive monitoring for toxicity: eg steroids, pressors, antibiotics  · Reviewed and/or ordered Clinical lab tests  · Reviewed images and/or ordered Radiology tests  · discussed my assessment/management with : Nursing, Hospitalist of coordination of care        Time spent 30 min                   Discussed with daughter and also wife regarding his condition      Veleta Severe, MD

## 2020-11-04 ENCOUNTER — APPOINTMENT (OUTPATIENT)
Dept: GENERAL RADIOLOGY | Age: 70
DRG: 870 | End: 2020-11-04
Attending: INTERNAL MEDICINE
Payer: MEDICARE

## 2020-11-04 LAB
HCT VFR BLD AUTO: 43.6 % (ref 36.6–50.3)
HCT VFR BLD AUTO: 44.6 % (ref 36.6–50.3)
HGB BLD-MCNC: 14.5 G/DL (ref 12.1–17)
HGB BLD-MCNC: 15.2 G/DL (ref 12.1–17)
INTERPRETATION UR IFE-IMP: ABNORMAL

## 2020-11-04 PROCEDURE — 74011250636 HC RX REV CODE- 250/636: Performed by: INTERNAL MEDICINE

## 2020-11-04 PROCEDURE — 77010033678 HC OXYGEN DAILY

## 2020-11-04 PROCEDURE — 85018 HEMOGLOBIN: CPT

## 2020-11-04 PROCEDURE — 36415 COLL VENOUS BLD VENIPUNCTURE: CPT

## 2020-11-04 PROCEDURE — 65270000029 HC RM PRIVATE

## 2020-11-04 PROCEDURE — 94760 N-INVAS EAR/PLS OXIMETRY 1: CPT

## 2020-11-04 PROCEDURE — 71045 X-RAY EXAM CHEST 1 VIEW: CPT

## 2020-11-04 RX ADMIN — MORPHINE SULFATE 2 MG: 2 INJECTION, SOLUTION INTRAMUSCULAR; INTRAVENOUS at 09:01

## 2020-11-04 RX ADMIN — LORAZEPAM 1 MG: 2 INJECTION, SOLUTION INTRAMUSCULAR; INTRAVENOUS at 12:53

## 2020-11-04 RX ADMIN — MORPHINE SULFATE 2 MG: 2 INJECTION, SOLUTION INTRAMUSCULAR; INTRAVENOUS at 16:08

## 2020-11-04 NOTE — PROGRESS NOTES
CM received hospice consult for patient. Met with the patient's family at the bedside to include his children and his wife, Aida Tristan, to discuss plan moving forward. Patient appeared comfortable laying in the bed and is on 4L of oxygen. It does not appear he would meet Inpatient Hospice Criteria. Discussed all options with patient's wife and family including: in home hospice care, hospice in a nursing facility, and hospice in the hospital. Explained criteria for inpatient hospice and they agreed at this time it does not appear he meets that. Patient's wife does not feel she can manage at home and has too much anxiety in regards to being home with him. She feels the best option will be hospice in a nursing facility. They were provided with a hospice choice list and a nursing facility list to review. Will follow up with family tomorrow.

## 2020-11-04 NOTE — PROGRESS NOTES
Hospitalist Progress Note         Darnell Narayan MD          Daily Progress Note: 2020      Subjective: The patient is seen for follow  up. He was made comfort care by family yesterday. He is seen in follow-up. Family members at bedside. Resting Comfortably    Problem List:  Problem List as of 2020 Date Reviewed: 10/30/2020          Codes Class Noted - Resolved    Atrial fibrillation [facility]) ICD-10-CM: I48.91  ICD-9-CM: 427.31  10/30/2020 - Present              Medications reviewed  Current Facility-Administered Medications   Medication Dose Route Frequency    morphine injection 2 mg  2 mg IntraVENous Q1H PRN    LORazepam (ATIVAN) injection 1 mg  1 mg IntraVENous Q1H PRN    acetaminophen (TYLENOL) solution 650 mg  650 mg Per NG tube Q4H PRN       Review of Systems:   Review of systems not obtained due to patient factors. Objective:   Physical Exam:     Visit Vitals  /85 (BP 1 Location: Right arm, BP Patient Position: At rest)   Pulse (!) 54   Temp 98.1 °F (36.7 °C)   Resp 16   Ht 6' 2.02\" (1.88 m)   Wt 102.1 kg (225 lb 1.4 oz)   SpO2 98%   BMI 28.89 kg/m²    O2 Flow Rate (L/min): 3 l/min O2 Device: Nasal cannula    Temp (24hrs), Av.2 °F (36.8 °C), Min:98.1 °F (36.7 °C), Max:98.3 °F (36.8 °C)    No intake/output data recorded.  1901 -  0700  In: -   Out: 200 [Urine:200]    General:  Difficult to arouse   Lungs:   Clear to auscultation bilaterally. Chest wall:  No tenderness or deformity. Heart:  Regular rate and rhythm, S1, S2 normal, no murmur, click, rub or gallop. Abdomen:   Soft, non-tender. Bowel sounds normal. No masses,  No organomegaly. Extremities: Extremities normal, atraumatic, no cyanosis or edema. Pulses: 2+ and symmetric all extremities.    Skin: Skin color, texture, turgor normal. No rashes or lesions   Neurologic: unresponsive     Data Review:       Recent Days:  Recent Labs     20 11/02/20  0840 11/02/20  0520   WBC  --   --   --  3.9*   HGB 14.8 15.3 8.9* 12.0*   HCT 43.7 44.8 29.4* 35.9*   PLT  --   --   --  74*     No results for input(s): NA, K, CL, CO2, GLU, BUN, CREA, CA, MG, PHOS, ALB, TBIL, TBILI, ALT, INR, INREXT in the last 72 hours. No lab exists for component: SGOT  Recent Labs     11/03/20  0641 11/02/20  0530   PH 7.48* 7.44   PCO2 35 34*   PO2 55* 52*   HCO3 26 24   FIO2 40 35       24 Hour Results:  Recent Results (from the past 24 hour(s))   HGB & HCT    Collection Time: 11/03/20  8:50 PM   Result Value Ref Range    HGB 14.8 12.1 - 17.0 g/dL    HCT 43.7 36.6 - 50.3 %           Assessment/     Acute respiratory failure with hypoxia, requiring mechanical ventilation     COVID-19 ruled out     Acute systolic heart failure, EF 20%     Aspiration pneumonia     Paroxysmal atrial fibrillation with RVR, now with controlled rate     Acute kidney injury possible ATN. Resolved     Moderate pulmonary hypertension     Severe sepsis/septic shock, possibly due to aspiration.      Blindness right eye with pinpoint pupil     Suspected metastatic disease versus myeloma versus metabolic bone disease on CT. elevated PSA suggest possible metastatic prostate cancer as the etiology. Could also be melanoma     History of melanoma 18 years ago     Thrombocytopenia likely due to sepsis     Intellectual disability    Plan:  Continue comfort care measures  Hospice consulted for evaluation and treatment    Care Plan discussed with: Patient/Family    Total time spent with patient: 30 minutes.     Shaheed Dee MD

## 2020-11-04 NOTE — PROGRESS NOTES
Nutrition Note    Nutrition following for TF while in ICU, however as pt transitioning to Comfort Measures per Yohannes Laboy, nutrition to sign off at this time. Please re-consult as appropriate if nutrition interventions within goals of care. Thank you for allowing us to follow.       Electronically signed by Radha Arreguin RD on 11/4/2020 at 2:25 PM    Contact: Ext [phone]

## 2020-11-04 NOTE — PROGRESS NOTES
Spiritual Care Assessment/Progress Note  [facility]      NAME: Johann Nuno      MRN: [id number]  AGE: 78 y.o. SEX: male  Mosque Affiliation: No Judaism   Language: English     11/4/2020     Total Time (in minutes): 10     Spiritual Assessment begun in [address] through conversation with:         [x]Patient        [x] Family    [] Friend(s)        Reason for Consult: End-of-life support, Family care     Spiritual beliefs: (Please include comment if needed)     [] Identifies with a germaine tradition:         [] Supported by a germaine community:            [x] Claims no spiritual orientation:           [] Seeking spiritual identity:                [] Adheres to an individual form of spirituality:           [] Not able to assess:                           Identified resources for coping:      [x] Prayer                               [] Music                  [] Guided Imagery     [x] Family/friends                 [] Pet visits     [] Devotional reading                         [] Unknown     [] Other:                                              Interventions offered during this visit: (See comments for more details)    Patient Interventions: Initial/Spiritual assessment, Critical care     Family/Friend(s):  Affirmation of emotions/emotional suffering, End of life issues discussed, Normalization of emotional/spiritual concerns, Prayer (assurance of)     Plan of Care:     [] Support spiritual and/or cultural needs    [] Support AMD and/or advance care planning process      [] Support grieving process   [] Coordinate Rites and/or Rituals    [] Coordination with community clergy   [] No spiritual needs identified at this timeVisited by Gunnar Toth, [address] [facility], [facility]    [] Detailed Plan of Care below (See Comments)  [] Make referral to Music Therapy  [] Make referral to Pet Therapy     [] Make referral to Addiction services  [] Make referral to [facility]  [] Make referral to Spiritual Care Partner  [] No future visits requested        [x] Follow up visits as needed     Comments: Follow up visit for family support, patient on comfort care. Patient non engaging,  and patient's family engaged in brief conversation re thankfulness patient is not suffering, and the family seemed to be at peace.  assured family of  availability. Chaplains will follow as able and/or needed.     [facility] Gelacio Verde[facility]

## 2020-11-04 NOTE — PROGRESS NOTES
Received patient from the ICU on comfort care. Patient is unresponsive, does not open eyes to any stimulus. Viatal signs are stable, afebrile. Brown catheter draining sam colored urine with sediment. Catheter care given. Airway sounds wet, suctioned for thick white secretions. Bilateral hands are purple with palpable pulses. Edema bilateral feet, ankles down with bounding pulses. No open wounds or skin tears seen. Saline lock to the right hand, intact and to the left AC space intact.

## 2020-11-05 ENCOUNTER — APPOINTMENT (OUTPATIENT)
Dept: GENERAL RADIOLOGY | Age: 70
DRG: 870 | End: 2020-11-05
Attending: INTERNAL MEDICINE
Payer: MEDICARE

## 2020-11-05 LAB
ALBUMIN SERPL ELPH-MCNC: 2.8 G/DL
ALBUMIN/GLOB SERPL: 0.7 {RATIO}
ALPHA1 GLOB SERPL ELPH-MCNC: 0.3 G/DL
ALPHA2 GLOB SERPL ELPH-MCNC: 0.8 G/DL
ANION GAP SERPL CALC-SCNC: 9 MMOL/L (ref 5–15)
B-GLOBULIN SERPL ELPH-MCNC: 1.1 G/DL
BACTERIA SPEC CULT: NORMAL
BASOPHILS # BLD: 0 K/UL (ref 0–0.1)
BASOPHILS NFR BLD: 1 % (ref 0–1)
BUN SERPL-MCNC: 25 MG/DL (ref 6–20)
BUN/CREAT SERPL: 30 (ref 12–20)
CA-I BLD-MCNC: 10.2 MG/DL (ref 8.5–10.1)
CHLORIDE SERPL-SCNC: 114 MMOL/L (ref 97–108)
CO2 SERPL-SCNC: 25 MMOL/L (ref 21–32)
CREAT SERPL-MCNC: 0.82 MG/DL (ref 0.7–1.3)
DIFFERENTIAL METHOD BLD: ABNORMAL
EOSINOPHIL # BLD: 0 K/UL (ref 0–0.4)
EOSINOPHIL NFR BLD: 1 % (ref 0–7)
ERYTHROCYTE [DISTWIDTH] IN BLOOD BY AUTOMATED COUNT: 15.1 % (ref 11.5–14.5)
GAMMA GLOB SERPL ELPH-MCNC: 1.7 G/DL
GLOBULIN SER CALC-MCNC: 3.9 G/DL
GLUCOSE SERPL-MCNC: 114 MG/DL (ref 65–100)
HCT VFR BLD AUTO: 45.1 % (ref 36.6–50.3)
HGB BLD-MCNC: 15.3 G/DL (ref 12.1–17)
IMM GRANULOCYTES # BLD AUTO: 0 K/UL (ref 0–0.04)
IMM GRANULOCYTES NFR BLD AUTO: 1 % (ref 0–0.5)
LYMPHOCYTES # BLD: 1.2 K/UL (ref 0.8–3.5)
LYMPHOCYTES NFR BLD: 22 % (ref 12–49)
M PROTEIN SERPL ELPH-MCNC: 0.9 G/DL
MCH RBC QN AUTO: 35 PG (ref 26–34)
MCHC RBC AUTO-ENTMCNC: 33.9 G/DL (ref 30–36.5)
MCV RBC AUTO: 103.2 FL (ref 80–99)
MONOCYTES # BLD: 0.3 K/UL (ref 0–1)
MONOCYTES NFR BLD: 6 % (ref 5–13)
NEUTS SEG # BLD: 3.8 K/UL (ref 1.8–8)
NEUTS SEG NFR BLD: 69 % (ref 32–75)
PLATELET # BLD AUTO: 108 K/UL (ref 150–400)
PMV BLD AUTO: 13.1 FL (ref 8.9–12.9)
POTASSIUM SERPL-SCNC: 3.6 MMOL/L (ref 3.5–5.1)
PROT SERPL-MCNC: 6.7 G/DL
RBC # BLD AUTO: 4.37 M/UL (ref 4.1–5.7)
SODIUM SERPL-SCNC: 148 MMOL/L (ref 136–145)
SPECIAL REQUESTS,SREQ: NORMAL
WBC # BLD AUTO: 5.4 K/UL (ref 4.1–11.1)

## 2020-11-05 PROCEDURE — 36415 COLL VENOUS BLD VENIPUNCTURE: CPT

## 2020-11-05 PROCEDURE — 74230 X-RAY XM SWLNG FUNCJ C+: CPT

## 2020-11-05 PROCEDURE — 97162 PT EVAL MOD COMPLEX 30 MIN: CPT

## 2020-11-05 PROCEDURE — 74011250637 HC RX REV CODE- 250/637: Performed by: INTERNAL MEDICINE

## 2020-11-05 PROCEDURE — 71045 X-RAY EXAM CHEST 1 VIEW: CPT

## 2020-11-05 PROCEDURE — 74011250636 HC RX REV CODE- 250/636: Performed by: INTERNAL MEDICINE

## 2020-11-05 PROCEDURE — 92611 MOTION FLUOROSCOPY/SWALLOW: CPT

## 2020-11-05 PROCEDURE — 92610 EVALUATE SWALLOWING FUNCTION: CPT

## 2020-11-05 PROCEDURE — 80048 BASIC METABOLIC PNL TOTAL CA: CPT

## 2020-11-05 PROCEDURE — 74011000255 HC RX REV CODE- 255: Performed by: INTERNAL MEDICINE

## 2020-11-05 PROCEDURE — 97161 PT EVAL LOW COMPLEX 20 MIN: CPT

## 2020-11-05 PROCEDURE — 97530 THERAPEUTIC ACTIVITIES: CPT

## 2020-11-05 PROCEDURE — 85025 COMPLETE CBC W/AUTO DIFF WBC: CPT

## 2020-11-05 PROCEDURE — 65270000032 HC RM SEMIPRIVATE

## 2020-11-05 RX ORDER — HEPARIN SODIUM 10000 [USP'U]/ML
5000 INJECTION, SOLUTION INTRAVENOUS; SUBCUTANEOUS EVERY 12 HOURS
Status: DISCONTINUED | OUTPATIENT
Start: 2020-11-05 | End: 2020-11-07

## 2020-11-05 RX ADMIN — LORAZEPAM 1 MG: 2 INJECTION, SOLUTION INTRAMUSCULAR; INTRAVENOUS at 02:46

## 2020-11-05 RX ADMIN — BARIUM SULFATE 25 ML: 400 SUSPENSION ORAL at 15:00

## 2020-11-05 RX ADMIN — BARIUM SULFATE 15 ML: 400 PASTE ORAL at 15:00

## 2020-11-05 RX ADMIN — BARIUM SULFATE 25 ML: 0.81 POWDER, FOR SUSPENSION ORAL at 15:00

## 2020-11-05 RX ADMIN — HEPARIN SODIUM 5000 UNITS: 10000 INJECTION, SOLUTION INTRAVENOUS; SUBCUTANEOUS at 20:02

## 2020-11-05 RX ADMIN — ACETAMINOPHEN ORAL SOLUTION 650 MG: 650 SOLUTION ORAL at 20:02

## 2020-11-05 RX ADMIN — HEPARIN SODIUM 5000 UNITS: 10000 INJECTION, SOLUTION INTRAVENOUS; SUBCUTANEOUS at 12:40

## 2020-11-05 RX ADMIN — BARIUM SULFATE 30 ML: 400 SUSPENSION ORAL at 15:00

## 2020-11-05 NOTE — PROGRESS NOTES
SPEECH LANGUAGE PATHOLOGY BEDSIDE SWALLOW EVALUATIONS  Patient: Ahsan Huitron  (66 y.o. )  Date: 11/5/2020  Primary Diagnosis:   <principal problem not specified>   Precautions:  Fall, and aspiration    ASSESSMENT :  Patient presents w/ moderate oropharyngeal dysphagia. Patient w/ wet vocal quality and throat clearing upon arrival. Ice chips and thin liquids at bedside. Patient appears globally weak and confused. Wife at bedside denies dysphagia prior to hospitalization. Oral phase c/b slow bolus manipulation and A-P transit. Tonye Cogan Pharyngeal phase c/b mild to moderate effortful swallow delay and HLE is reduced upon palpation most notable hyoid protraction. Multiple audible swallows w/ all consistencies. .   Overt s/s of pen/asp c/b throat clear and delayed wet cough w/ all consistencies . Patient will benefit from skilled intervention to address the above impairments. Patients rehabilitation potential is considered to be Fair     PLAN :  Recommendations and Planned Interventions:  NPO w/ alternative means of nutrition as medically appropriate. Rec MBS to further assess oropharyngeal phase of swallow and r/o aspiration. Verbal order received from MD.   Frequency/Duration: Patient will be followed by speech-language pathology daily to address goals. Discharge Recommendations: Skilled Nursing Facility     SUBJECTIVE:   Patient reports he is hungry, wife denies hx of dysphagia. OBJECTIVE:     Past Medical History:   Diagnosis Date    A-fib ([facility] [address]     HTN (hypertension)     Pacemaker     Skin cancer        CXR Results  (Last 48 hours)                 11/05/20 1105  XR CHEST PORT Final result    Narrative:  1 view       Comparison yesterday       Mild atelectasis at underinflated bases plus minus small left effusion. Upper   lungs are clear. Normal heart and no congestion       11/04/20 1132  XR CHEST PORT Final result    Narrative:  Chest single view.        Comparison single view chest November 3, 2020       ET tube and NG tube have been removed. Left lung base subsegmental atelectasis. No gross interstitial or alveolar pulmonary edema. Cardiac and mediastinal   structures unchanged noting left side cardiac device with single lead overlying   the basal right heart. No pneumothorax or sizable pleural effusion. Diet prior to admission: Regular  Current Diet:  None     Cognitive and Communication Status:  Neurologic State: Confused, Drowsy  Orientation Level: Disoriented to person  Cognition: Decreased attention/concentration, Decreased command following           Swallowing Evaluation:   Oral Assessment:  Oral Assessment  Labial: Decreased seal  Dentition: Intact  Oral Hygiene: dry red oral mucosa some caries  Lingual: Decreased rate;Decreased strength  Velum: No impairment  Mandible: No impairment  P.O. Trials:  Patient Position: upright in bed  Vocal quality prior to P.O.: Hoarse  Consistency Presented: Honey thick liquid; Ice chips; Nectar thick liquid; Thin liquid  How Presented: SLP-fed/presented;Spoon     Bolus Acceptance: No impairment  Bolus Formation/Control: Impaired  Type of Impairment: Delayed  Propulsion: Delayed (# of seconds)  Oral Residue: None  Initiation of Swallow: Delayed (# of seconds)  Laryngeal Elevation: Weak  Aspiration Signs/Symptoms: Weak cough  Pharyngeal Phase Characteristics: Audible swallow;Double swallow;Effortful swallow; Feeling of discomfort             Oral Phase Severity: Mild  Pharyngeal Phase Severity : Moderate  Voice:     Vocal Quality: Hoarse, wet       Pain:  0     After treatment:   Patient left in no apparent distress in bed, Call bell within reach, Nursing notified, and Caregiver / family present    COMMUNICATION/EDUCATION:   Patient and wife receptive of education regarding diet recs, s/s aspiration, aspiration precautions and POC.      The patient's plan of care including recommendations, planned interventions, and recommended diet changes were discussed with: Registered nurse and Physician. Patient/family have participated as able in goal setting and plan of care. Problem: Dysphagia (Adult)  Goal: *Acute Goals and Plan of Care (Insert Text)  Description: Speech Therapy Swallow Goals  Initiated 11/5/2020  -Patient will tolerate clear diet with nectar thick liquids without clinical indicators of aspiration given minimal cues within 7 day(s). [ ] Not met  [ ]  MET   [ ] Progressing  [ ] Cindra Serge  -Patient will tolerate PO trials without clinical indicators of aspiration given minimal cues within 7 day(s). [ ] Not met  [ ]  MET   [ ] Progressing  [ ] Cindra Serge  -Patient will participate in modified barium swallow study within 7  day(s). [ ] Not met  [ ]  MET   [ ] Progressing  [ ] Cindra Serge  -Patient will perform laryngeal and BOT strengthening exercises with minimal cues within 7 day(s). [ ] Not met  [ ]  MET   [ ] Progressing  [ ] Cindra Serge  -Patient will demonstrate understanding of swallow safety precautions and aspiration precautions, diet recs with minimal cues within 7 day(s).         [ ] Not met  [ ]  MET   [ ] Hannah Santana  [ ] Discontinue    Outcome: Initial     Thank you for this referral.  Saurabh Steen M.S., M.Ed., CCC-SLP  Time Calculation: 25 mins

## 2020-11-05 NOTE — PROGRESS NOTES
[address] [facility] STUDY  Patient: Devon Hughes (11 y.o. male)  Date: 11/5/2020  Primary Diagnosis: Atrial fibrillation ([facility] [address] [I48.91]        Precautions: Aspiration and fall    ASSESSMENT :  Based on the objective data described below, the patient presents with moderate oropharyngeal dysphagia. Oral phase c/b reduced bolus control, reduced bolus manipulation, mild oral residue, and slow A-P transit. Premature spillage to the level of the pyriforms w/ thin. All other consistencies initiate at the level of the vallecula. Overall, mild to moderate swallow delay appreciated. Moderate reduction in BOT retraction, HLE and pharyngeal constriction. Initially, significantly limited epiglottic inversion however this improved w/ continued trials and weight of bolus. Cervical osteophytes present that appear to negatively impact swallow fxn and limit epiglottic inversion. Laryngeal penetration w/ subsequent aspiration of thin liquids after the swallow s/t pyriform sinus residue and global weakness. Patient w/ independent cough that does not clear aspirated material. No pen/asp w/ remaining consistencies. Moderate pharyngeal residue that minimally reduces w/ cued double swallows and liquid wash. Decreased sensation noted. Reduced relaxation and duration of relaxation of UES noted s/t above findings. Piecemeal deglutition noted. Patient is at significant risk for aspiration. Patient will benefit from skilled intervention to address the above impairments. Patients rehabilitation potential is considered to be Fair     PLAN :  Recommendations and Planned Interventions:  Rec trial clear NECTAR thick w/ crushed meds and strict asp/GERD precautions. No straws, single sips, slow rate of intake, double swallow and remain upright 1 hour after PO intake. Monitor closely for s/s aspiration, if sx's present hold PO.    Frequency/Duration: Patient will be followed by speech-language pathology daily to address goals. Discharge Recommendations: Skilled Nursing Facility     SUBJECTIVE:   Patient stated i'm ok. OBJECTIVE:     Past Medical History:   Diagnosis Date    A-fib ([address]     HTN (hypertension)     Pacemaker     Skin cancer      Past Surgical History:   Procedure Laterality Date    HX PACEMAKER PLACEMENT          CXR Results  (Last 48 hours)                 11/05/20 1105  XR CHEST PORT Final result    Narrative:  1 view       Comparison yesterday       Mild atelectasis at underinflated bases plus minus small left effusion. Upper   lungs are clear. Normal heart and no congestion       11/04/20 1132  XR CHEST PORT Final result    Narrative:  Chest single view. Comparison single view chest November 3, 2020       ET tube and NG tube have been removed. Left lung base subsegmental atelectasis. No gross interstitial or alveolar pulmonary edema. Cardiac and mediastinal   structures unchanged noting left side cardiac device with single lead overlying   the basal right heart. No pneumothorax or sizable pleural effusion. Diet prior to admission: regular  Current Diet:  None   Radiologist: Alice Strange  Film Views: Lateral  Patient Position: upright in chair    Video Flouroscopic Procedures  [x] Lateral View   [] A-P View [] Scanned to level of Sternum    [] Seated at 90 deg.   [] Other:    Presentation:   [x] Spoon   [x] Cup   [x] Straw   [] Syringe   [] Consecutive Swallows  [] Other:    Consistencies:   [x] Ba+ liquid   [x] Ba+ liquid (nectar)   [x] Ba+ liquid (honey)     [x] Ba+ pudding   [] Ba+ crunched cookie   [] Ba+ cookie   [] Other:       Results  Dysphagia Present:    [x] Yes  [] No    Ratings of Dysphagia:    [] Mild  [x] Moderate [] Severe    Stages of Breakdown:   [x] Oral [x] Pharyngeal   [] Esophageal    Aspiration: [x] Yes    [] No  [] At Risk  Cough: [x] Yes      [] No    Consistency Aspirated:  [x] Thin Liquid   [] Nectar   [] Honey   [] Pureed     [] Mech-soft  [] Solid    Motility Problems with:  [] Lip Closure:   [] Sucking:   [x] Mastication:   [x] Bolus Formation:   [x] Bolus Control:  [x] A-P Transport:  [x] Posterior Tongue Elevation:  [x] Swallow Response (delayed):  [] Velopharyngeal Closure:  [x] Laryngeal Elevation:  [x] Laryngeal Adduction:  [x] Cricopharyngeal Relaxation:  [] Esophageal Peristalsis:  [] Reflux:  [] Other:    Timing of Aspiration:  [] Before Swallow:  [] During Swallow:  [x] After Swallow:    Transit Time Delay:  [x] >1 Second  Oral  [x] >1 Second Pharyngeal  [] >20 Second Esophageal     Residuals:  [x] Buccal Cavity   [] Velum/posterior pharyngeal wall  [x] Valleculae  [x] Pyriforms    Decreased Tongue Base Retraction?: Yes  Laryngeal Elevation: Inadequate epiglottic inversion; Reduced excursion with laryngeal vestibule gap  Aspiration/Penetration Score: 7 (Aspiration-Contrast passes below the cords/, but is not ejected despite attempt)     Pharyngeal-Esophageal Segment: Decreased relaxation of upper esophageal segment; Other (comment)(cervical osteophytes negatively impacting swallow fxn)  Pharyngeal Dysfunction: Decreased tongue base retraction;Decreased strength;Decreased elevation/closure    Oral Phase Severity: Mild  Pharyngeal Phase Severity: Moderate      COMMUNICATION/EDUCATION:   Patient confusion negatively impact comprehension and carryover of education. The patients plan of care including findings from [facility] [address], recommendations, planned interventions, and recommended diet changes were discussed with: Registered nurse and Physician. Patient/family agree to work toward stated goals and plan of care. Problem: Dysphagia (Adult)  Goal: *Acute Goals and Plan of Care (Insert Text)  Description: Speech Therapy Swallow Goals  Initiated 11/5/2020  -Patient will tolerate clear diet with nectar thick liquids without clinical indicators of aspiration given minimal cues within 7 day(s).          [ ] Not met  [ ]  MET   [ ] Chani Lucero  [ ] Discontinue  -Patient will tolerate PO trials without clinical indicators of aspiration given minimal cues within 7 day(s). [ ] Not met  [ ]  MET   [ ] Progressing  [ ] Vivienne Quirk  -Patient will participate in modified barium swallow study within 7  day(s). [ ] Not met  [ x]  MET   [ ] Progressing  [ ] Vivienne Quirk  -Patient will perform laryngeal and BOT strengthening exercises with minimal cues within 7 day(s). [ ] Not met  [ ]  MET   [ ] Progressing  [ ] Vivienne Quirk  -Patient will demonstrate understanding of swallow safety precautions and aspiration precautions, diet recs with minimal cues within 7 day(s).         [ ] Not met  [ ]  MET   [ ] Teddy Martinez  [ ] Vivienne Quirmonica      11/5/2020 1445 by Anila Scott  Outcome: Progressing Towards Goal   Thank you for this referral.  Maite Baxter M.S., M.Ed., CCC-SLP  Time Calculation: 25 mins

## 2020-11-05 NOTE — PROGRESS NOTES
Hospitalist Progress Note         Sarah Costa MD          Daily Progress Note: 2020      Subjective: The patient is seen for follow  up. He was made comfort care by family 2020. He is seen in follow-up. Family members at bedside. He is surprisingly more awake and conversant this morning. Denies chest pain or shortness of breath. Says he wants to live for many more years to take care of his grandson    Problem List:  Problem List as of 2020 Date Reviewed: 10/30/2020          Codes Class Noted - Resolved    Atrial fibrillation [facility]) ICD-10-CM: I48.91  ICD-9-CM: 427.31  10/30/2020 - Present              Medications reviewed  Current Facility-Administered Medications   Medication Dose Route Frequency    heparin porcine injection 5,000 Units  5,000 Units SubCUTAneous Q12H    morphine injection 2 mg  2 mg IntraVENous Q1H PRN    LORazepam (ATIVAN) injection 1 mg  1 mg IntraVENous Q1H PRN    acetaminophen (TYLENOL) solution 650 mg  650 mg Per NG tube Q4H PRN       Review of Systems:   Review of systems not obtained due to patient factors. Objective:   Physical Exam:     Visit Vitals  BP (!) 133/109 (BP 1 Location: Right arm, BP Patient Position: At rest)   Pulse 67   Temp 98.5 °F (36.9 °C)   Resp 20   Ht 6' 2.02\" (1.88 m)   Wt 102.1 kg (225 lb 1.4 oz)   SpO2 97%   BMI 28.89 kg/m²    O2 Flow Rate (L/min): 3 l/min O2 Device: Nasal cannula    Temp (24hrs), Av.3 °F (36.8 °C), Min:98.1 °F (36.7 °C), Max:98.5 °F (36.9 °C)    No intake/output data recorded.  1901 -  0700  In: -   Out: 0770 [Urine:1125]    General:  Awake and conversant   Lungs:   Clear to auscultation bilaterally. Chest wall:  No tenderness or deformity. Heart:  Regular rate and rhythm, S1, S2 normal, no murmur, click, rub or gallop. Abdomen:   Soft, non-tender. Bowel sounds normal. No masses,  No organomegaly.    Extremities: Extremities normal, atraumatic, no cyanosis or edema.   Pulses: 2+ and symmetric all extremities. Skin: Skin color, texture, turgor normal. No rashes or lesions   Neurologic: unresponsive     Data Review:       Recent Days:  Recent Labs     11/05/20  1100 11/04/20  1725 11/04/20  1024   WBC 5.4  --   --    HGB 15.3 15.2 14.5   HCT 45.1 44.6 43.6   *  --   --      Recent Labs     11/05/20  1100   *   K 3.6   *   CO2 25   *   BUN 25*   CREA 0.82   CA 10.2*     Recent Labs     11/03/20  0641   PH 7.48*   PCO2 35   PO2 55*   HCO3 26   FIO2 40       24 Hour Results:  Recent Results (from the past 24 hour(s))   HGB & HCT    Collection Time: 11/04/20  5:25 PM   Result Value Ref Range    HGB 15.2 12.1 - 17.0 g/dL    HCT 44.6 36.6 - 50.3 %   CBC WITH AUTOMATED DIFF    Collection Time: 11/05/20 11:00 AM   Result Value Ref Range    WBC 5.4 4.1 - 11.1 K/uL    RBC 4.37 4.10 - 5.70 M/uL    HGB 15.3 12.1 - 17.0 g/dL    HCT 45.1 36.6 - 50.3 %    .2 (H) 80.0 - 99.0 FL    MCH 35.0 (H) 26.0 - 34.0 PG    MCHC 33.9 30.0 - 36.5 g/dL    RDW 15.1 (H) 11.5 - 14.5 %    PLATELET 696 (L) 406 - 400 K/uL    MPV 13.1 (H) 8.9 - 12.9 FL    NEUTROPHILS 69 32 - 75 %    LYMPHOCYTES 22 12 - 49 %    MONOCYTES 6 5 - 13 %    EOSINOPHILS 1 0 - 7 %    BASOPHILS 1 0 - 1 %    IMMATURE GRANULOCYTES 1 (H) 0.0 - 0.5 %    ABS. NEUTROPHILS 3.8 1.8 - 8.0 K/UL    ABS. LYMPHOCYTES 1.2 0.8 - 3.5 K/UL    ABS. MONOCYTES 0.3 0.0 - 1.0 K/UL    ABS. EOSINOPHILS 0.0 0.0 - 0.4 K/UL    ABS. BASOPHILS 0.0 0.0 - 0.1 K/UL    ABS. IMM.  GRANS. 0.0 0.00 - 0.04 K/UL    DF AUTOMATED     METABOLIC PANEL, BASIC    Collection Time: 11/05/20 11:00 AM   Result Value Ref Range    Sodium 148 (H) 136 - 145 mmol/L    Potassium 3.6 3.5 - 5.1 mmol/L    Chloride 114 (H) 97 - 108 mmol/L    CO2 25 21 - 32 mmol/L    Anion gap 9 5 - 15 mmol/L    Glucose 114 (H) 65 - 100 mg/dL    BUN 25 (H) 6 - 20 mg/dL    Creatinine 0.82 0.70 - 1.30 mg/dL    BUN/Creatinine ratio 30 (H) 12 - 20      GFR est AA >60 >60 ml/min/1.73m2    GFR est non-AA >60 >60 ml/min/1.73m2    Calcium 10.2 (H) 8.5 - 10.1 mg/dL           Assessment/     Acute respiratory failure with hypoxia, requiring mechanical ventilation     COVID-19 ruled out     Acute systolic heart failure, EF 20%     Aspiration pneumonia     Paroxysmal atrial fibrillation with RVR, now with controlled rate     Acute kidney injury possible ATN. Resolved     Moderate pulmonary hypertension     Severe sepsis/septic shock, possibly due to aspiration.      Blindness right eye with pinpoint pupil     Suspected metastatic disease versus myeloma versus metabolic bone disease on CT. elevated PSA suggest possible metastatic prostate cancer as the etiology. Could also be melanoma     History of melanoma 18 years ago     Thrombocytopenia likely due to sepsis     Intellectual disability    Plan:  Continue supportive care measures  Obtain speech therapy evaluation and PT OT  Anticipate discharge to home in 1 to 2 days    Care Plan discussed with: Patient/Family    Total time spent with patient: 30 minutes.     Manda Mckenna MD

## 2020-11-05 NOTE — PROGRESS NOTES
PIPPA was notified by Dr. Anna Mclean this morning that the patient is actually doing much better and he will be ordering PT and OT. Family would like to hold off on hospice at this time. Will consider home health. Awaiting PT and OT evals.

## 2020-11-05 NOTE — PROGRESS NOTES
Problem: Mobility Impaired (Adult and Pediatric)  Goal: *Acute Goals and Plan of Care (Insert Text)  Description: Bed mobility with Mod A within 7 days   Transfers with Mod A within 7 days   Amb approx 10 ft with RW and Max A within 7 days   Ind with HEP for LE within 7 days       Outcome: Not Met     Problem: Patient Education: Go to Patient Education Activity  Goal: Patient/Family Education  Outcome: Not Met     PHYSICAL THERAPY EVALUATION  Patient: Alexandra Nunez (70 y.o. male)  Date: 11/5/2020  Primary Diagnosis: Atrial fibrillation ([facility] [address] [I48.91]        Precautions: AMS, falls risk, cardiac monitoring, Supp O2, bed alarm       ASSESSMENT  Based on the objective data described below, the patient presents with Decr ROM, strength, bed mobility, transfers, balance, gait, activity tolerance, safety awareness, and functional mobility. Pt agreeable to PT evaluation, A&O x 2 to person and place. Pt received semi-supine with family at bedside, presents lethargic and drowsy. Pt admitted 10/29/20 following onset of SOB with acute respiratory failure and hypoxia. Pt treated for aspiration PNA, severe sepsis, CADE, a-fib, and acute systolic heart failure EF 20%, intubated and transferred to ICU on 10/30/20; placed on comfort care 11/3/20. Pt is now seen extubated and increased alertness, responding to verbal commands, taken off comfort care, and transferred to [facility]. Pmx: R eye blindness, afib, pacemaker, HTN, melanoma. Per Pt family, Pt lives with spouse in 2 lvl home with 5 ANGI and B railing, main bedroom and full bath on 2nd lvl with 14 steps and R railing. Pt reports Ind PLOF with dressing/bathing and Ind amb without AD. Pt completed bed mobility with Max A, and unable to tolerate OOB mobility due to severe weakness. Pt has poor activity tolerance. Pt would benefit from acute skilled PT services to address above deficits and progress towards goals. PT d/c recc IRF vs SNF when medically appropriate pending progress. Other factors to consider for discharge: acute medical state, severe weakness, fatigue     Patient will benefit from skilled therapy intervention to address the above noted impairments. PLAN :  Recommendations and Planned Interventions: bed mobility training, transfer training, gait training, therapeutic exercises, neuromuscular re-education, patient and family training/education, and therapeutic activities      Frequency/Duration: Patient will be followed by physical therapy:  5 times a week to address goals. Recommendation for discharge: (in order for the patient to meet his/her long term goals)  PT d/c recc IRF vs SNF when medically appropriate pending progress. This discharge recommendation:  Has been made in collaboration with the attending provider and/or case management    IF patient discharges home will need the following DME: bedside commode, shower chair, rolling walker, wheelchair, and to be determined (TBD)         SUBJECTIVE:   Patient stated I don't have any pain, I'm tired.     OBJECTIVE DATA SUMMARY:   HISTORY:    Past Medical History:   Diagnosis Date    A-fib ([facility] [address]     HTN (hypertension)     Pacemaker     Skin cancer      Past Surgical History:   Procedure Laterality Date    HX PACEMAKER PLACEMENT         Personal factors and/or comorbidities impacting plan of care: complicated medical history, complicated hospitalization stay    Home Situation  Home Environment: Private residence  # Steps to Enter: 5  Rails to Enter: Yes  Hand Rails : Bilateral(far apart)  One/Two Story Residence: Two story  # of Interior Steps: 15  Interior Rails: Right  Lift Chair Available: No  Living Alone: No  Support Systems: Spouse/Significant Other/Partner, Child(paige), Family member(s)  Patient Expects to be Discharged to[de-identified] Unknown  Current DME Used/Available at Home: None    PLOF: Pt Ind for ADLS/IADLS, Ind with mobility prior to admission.      EXAMINATION/PRESENTATION/DECISION MAKING:   Critical Behavior:  Neurologic State: Confused, Lethargic, Drowsy  Orientation Level: Oriented to person, Oriented to place  Cognition: Decreased attention/concentration, Follows commands(simple commands)     Hearing:     Skin:  bilat heels and sacrum bandaged  Edema: unremarkable  Range Of Motion:  AROM: Generally decreased, functional   PROM: Generally decreased, functional      Strength:    Strength: Grossly decreased, non-functional(Gross RLE 2-/5, LLE 2-/5)      Tone & Sensation:    Sensation: Intact      Functional Mobility:  Bed Mobility:  Rolling: Maximum assistance  Supine to Sit: Maximum assistance  Sit to Supine: Maximum assistance  Scooting: Maximum assistance     Pt completed bed mobility with Max A, and unable to tolerate OOB mobility due to severe weakness. Pt req constant cues for hand placement, sequencing, and safety while sitting EOB to prevent LOB with Max A due to severe retro L sided lean. Pt denies dizziness with change in position. Pt has poor activity tolerance requesting to return to supine after tolerating approx 2 min sitting EOB. Pt repositioned to prevent skin breakdown with Total A x2. Pt recovered quickly in supine. Transfers:   Does not occur   Balance:   Sitting: Impaired;High guard; With support  Sitting - Static: Poor (constant support); Prop sitting  Sitting - Dynamic: Poor (constant support); Prop sitting  Ambulation/Gait Training:   Does not occur   Stairs: Therapeutic Exercises:   Pt educated on LE mobility throughout the day to prevent decline. Functional Measure:    [address] [facility] [place] [address] [address] AM-PAC 6 Clicks         Basic Mobility Inpatient Short Form  How much difficulty does the patient currently have. .. Unable A Lot A Little None   1. Turning over in bed (including adjusting bedclothes, sheets and blankets)? [] 1   [] 2   [x] 3   [] 4   2.   Sitting down on and standing up from a chair with arms ( e.g., wheelchair, bedside commode, etc.)   [] 1   [x] 2   [] 3   [] 4 3. Moving from lying on back to sitting on the side of the bed? [] 1   [x] 2   [] 3   [] 4          How much help from another person does the patient currently need. .. Total A Lot A Little None   4. Moving to and from a bed to a chair (including a wheelchair)? [x] 1   [] 2   [] 3   [] 4   5. Need to walk in hospital room? [x] 1   [] 2   [] 3   [] 4   6. Climbing 3-5 steps with a railing? [x] 1   [] 2   [] 3   [] 4   © , Trustees of [address] [place] [address] [address] [address], under license to [employer]. All rights reserved     Score:  Initial: 10 Most Recent: X (Date: -- )   Interpretation of Tool:  Represents activities that are increasingly more difficult (i.e. Bed mobility, Transfers, Gait). Score 24 23 22-20 19-15 14-10 9-7 6   Modifier CH CI CJ CK CL CM CN          Physical Therapy Evaluation Charge Determination   History Examination Presentation Decision-Making   HIGH Complexity :3+ comorbidities / personal factors will impact the outcome/ POC  HIGH Complexity : 4+ Standardized tests and measures addressing body structure, function, activity limitation and / or participation in recreation  MEDIUM Complexity : Evolving with changing characteristics  Other Functional Measure [facility] 6 10      Based on the above components, the patient evaluation is determined to be of the following complexity level: MEDIUM    Pain Ratin/10    Activity Tolerance:   Poor, requires frequent rest breaks, and observed SOB with activity  Please refer to the flowsheet for vital signs taken during this treatment. After treatment patient left in no apparent distress:   Supine in bed, Heels elevated for pressure relief, Call bell within reach, Bed / chair alarm activated, Caregiver / family present, and Side rails x 3    COMMUNICATION/EDUCATION:   The patients plan of care was discussed with: Registered nurse.      Pt would benefit from co-treatment with OT for Pt safety and to maximize functional mobility potential.    Fall prevention education was provided and the patient/caregiver indicated understanding. and Patient/family have participated as able in goal setting and plan of care.     Thank you for this referral.  Faye Queen, PT   Time Calculation: 24 mins

## 2020-11-06 LAB
ANION GAP SERPL CALC-SCNC: 7 MMOL/L (ref 5–15)
BASOPHILS # BLD: 0 K/UL (ref 0–0.1)
BASOPHILS NFR BLD: 0 % (ref 0–1)
BUN SERPL-MCNC: 23 MG/DL (ref 6–20)
BUN/CREAT SERPL: 26 (ref 12–20)
CA-I BLD-MCNC: 10.1 MG/DL (ref 8.5–10.1)
CHLORIDE SERPL-SCNC: 117 MMOL/L (ref 97–108)
CO2 SERPL-SCNC: 26 MMOL/L (ref 21–32)
CREAT SERPL-MCNC: 0.9 MG/DL (ref 0.7–1.3)
DIFFERENTIAL METHOD BLD: ABNORMAL
EOSINOPHIL # BLD: 0 K/UL (ref 0–0.4)
EOSINOPHIL NFR BLD: 0 % (ref 0–7)
ERYTHROCYTE [DISTWIDTH] IN BLOOD BY AUTOMATED COUNT: 15 % (ref 11.5–14.5)
GLUCOSE BLD STRIP.AUTO-MCNC: 108 MG/DL (ref 65–100)
GLUCOSE SERPL-MCNC: 109 MG/DL (ref 65–100)
HCT VFR BLD AUTO: 43.5 % (ref 36.6–50.3)
HGB BLD-MCNC: 14.9 G/DL (ref 12.1–17)
IMM GRANULOCYTES # BLD AUTO: 0 K/UL (ref 0–0.04)
IMM GRANULOCYTES NFR BLD AUTO: 1 % (ref 0–0.5)
LYMPHOCYTES # BLD: 0.7 K/UL (ref 0.8–3.5)
LYMPHOCYTES NFR BLD: 13 % (ref 12–49)
MCH RBC QN AUTO: 35.3 PG (ref 26–34)
MCHC RBC AUTO-ENTMCNC: 34.3 G/DL (ref 30–36.5)
MCV RBC AUTO: 103.1 FL (ref 80–99)
MONOCYTES # BLD: 1 K/UL (ref 0–1)
MONOCYTES NFR BLD: 17 % (ref 5–13)
NEUTS SEG # BLD: 4.2 K/UL (ref 1.8–8)
NEUTS SEG NFR BLD: 69 % (ref 32–75)
PERFORMED BY, TECHID: ABNORMAL
PLATELET # BLD AUTO: 110 K/UL (ref 150–400)
PMV BLD AUTO: 12.7 FL (ref 8.9–12.9)
POTASSIUM SERPL-SCNC: 3.6 MMOL/L (ref 3.5–5.1)
RBC # BLD AUTO: 4.22 M/UL (ref 4.1–5.7)
SODIUM SERPL-SCNC: 150 MMOL/L (ref 136–145)
WBC # BLD AUTO: 5.9 K/UL (ref 4.1–11.1)

## 2020-11-06 PROCEDURE — 77010033678 HC OXYGEN DAILY

## 2020-11-06 PROCEDURE — 74011250636 HC RX REV CODE- 250/636: Performed by: INTERNAL MEDICINE

## 2020-11-06 PROCEDURE — 65270000032 HC RM SEMIPRIVATE

## 2020-11-06 PROCEDURE — 94760 N-INVAS EAR/PLS OXIMETRY 1: CPT

## 2020-11-06 PROCEDURE — 74011250637 HC RX REV CODE- 250/637: Performed by: INTERNAL MEDICINE

## 2020-11-06 PROCEDURE — 36415 COLL VENOUS BLD VENIPUNCTURE: CPT

## 2020-11-06 PROCEDURE — 97166 OT EVAL MOD COMPLEX 45 MIN: CPT

## 2020-11-06 PROCEDURE — 80048 BASIC METABOLIC PNL TOTAL CA: CPT

## 2020-11-06 PROCEDURE — 97535 SELF CARE MNGMENT TRAINING: CPT

## 2020-11-06 PROCEDURE — 82962 GLUCOSE BLOOD TEST: CPT

## 2020-11-06 PROCEDURE — 92526 ORAL FUNCTION THERAPY: CPT

## 2020-11-06 PROCEDURE — 97110 THERAPEUTIC EXERCISES: CPT

## 2020-11-06 PROCEDURE — 74011000250 HC RX REV CODE- 250: Performed by: INTERNAL MEDICINE

## 2020-11-06 PROCEDURE — 85025 COMPLETE CBC W/AUTO DIFF WBC: CPT

## 2020-11-06 RX ORDER — DILTIAZEM HYDROCHLORIDE 30 MG/1
30 TABLET, FILM COATED ORAL EVERY 6 HOURS
Status: DISCONTINUED | OUTPATIENT
Start: 2020-11-06 | End: 2020-11-07

## 2020-11-06 RX ORDER — DEXTROSE, SODIUM CHLORIDE, SODIUM LACTATE, POTASSIUM CHLORIDE, AND CALCIUM CHLORIDE 5; .6; .31; .03; .02 G/100ML; G/100ML; G/100ML; G/100ML; G/100ML
50 INJECTION, SOLUTION INTRAVENOUS CONTINUOUS
Status: DISCONTINUED | OUTPATIENT
Start: 2020-11-06 | End: 2020-11-08

## 2020-11-06 RX ORDER — DILTIAZEM HYDROCHLORIDE 5 MG/ML
5 INJECTION INTRAVENOUS ONCE
Status: COMPLETED | OUTPATIENT
Start: 2020-11-06 | End: 2020-11-06

## 2020-11-06 RX ORDER — AMOXICILLIN AND CLAVULANATE POTASSIUM 875; 125 MG/1; MG/1
1 TABLET, FILM COATED ORAL EVERY 12 HOURS
Status: DISCONTINUED | OUTPATIENT
Start: 2020-11-06 | End: 2020-11-09 | Stop reason: HOSPADM

## 2020-11-06 RX ADMIN — AMOXICILLIN AND CLAVULANATE POTASSIUM 1 TABLET: 875; 125 TABLET, FILM COATED ORAL at 09:42

## 2020-11-06 RX ADMIN — AMOXICILLIN AND CLAVULANATE POTASSIUM 1 TABLET: 875; 125 TABLET, FILM COATED ORAL at 21:04

## 2020-11-06 RX ADMIN — HEPARIN SODIUM 5000 UNITS: 10000 INJECTION, SOLUTION INTRAVENOUS; SUBCUTANEOUS at 12:17

## 2020-11-06 RX ADMIN — HEPARIN SODIUM 5000 UNITS: 10000 INJECTION, SOLUTION INTRAVENOUS; SUBCUTANEOUS at 21:05

## 2020-11-06 RX ADMIN — SODIUM CHLORIDE, SODIUM LACTATE, POTASSIUM CHLORIDE, CALCIUM CHLORIDE AND DEXTROSE MONOHYDRATE 50 ML/HR: 5; 600; 310; 30; 20 INJECTION, SOLUTION INTRAVENOUS at 12:16

## 2020-11-06 RX ADMIN — DILTIAZEM HYDROCHLORIDE 5 MG: 5 INJECTION INTRAVENOUS at 21:41

## 2020-11-06 RX ADMIN — DILTIAZEM HYDROCHLORIDE 30 MG: 30 TABLET, FILM COATED ORAL at 22:59

## 2020-11-06 NOTE — PROGRESS NOTES
Problem: Self Care Deficits Care Plan (Adult)  Goal: *Acute Goals and Plan of Care (Insert Text)  Description: 1. Patient  will wash face with mod A  2. Patient will perform oral care using foam swab, Mod A  3. Patient will participate in Alexandra UE TE (PROM/AAROM) to promote > ADL and mobility independence  4. Patient will be able to hold cup, min A, while drinking liquids. 5.  Patient will be oriented X 4, remain alert and participate in tx session 8 + minutes to promote > independence with mobility/ADL's  Outcome: Not Met   OCCUPATIONAL THERAPY EVALUATION  Patient: Kassidy Morelos (28 y.o. male)  Date: 11/6/2020  Primary Diagnosis: Atrial fibrillation ([facility] [address] [I48.91]        Precautions: fall; artificial R eye; diffusely weak       ASSESSMENT  Based on the objective data described below, the patient presents with significant impairment/limitations in ADL and mobility activities. Limitations:  significant , generalized weakness; poor activity tolerance, impaired ALEXANDRA UE coordination (gross & fine motor) visual impairment (R eye); decreased orientation. Patient with hx of intubation, now s/p extubation. Placed on  comfort care but recently taken off (11/5?) per family request.    Current Level of Function Impacting Discharge (ADLs/self-care): total assistance with ADL and turning in bed    Functional Outcome Measure:   The patient scored 6/24 on the Am-PAC outcome measure which is indicative of  the need for assistance with ADL and mobility task; need for assist X 2 for OOB activities when indicated; need for rehab services post discharge      Other factors to consider for discharge: [] Complicated Medical History  [] Decreased Communication  [] Insight into Deficits   [] Limited Family/Social Support  [] Medical Diagnosis   [] Past Medical History  [] Progressive Nature of Disease [] Safety Awareness  [x] Severity of Deficits   [] Time Since Onset  [] Other      Patient will benefit from skilled therapy intervention to address the above noted impairments. PLAN :  Recommendations and Planned Interventions: self care training, functional mobility training, therapeutic exercise, balance training, visual/perceptual training, therapeutic activities, cognitive retraining, endurance activities, neuromuscular re-education, patient education, home safety training, and family training/education    Frequency/Duration: Patient will be followed by occupational therapy 5 times a week to address goals. Recommendation for discharge: (in order for the patient to meet his/her long term goals)  IRF    pending progress . Otherwise, SNF with  upgrade to IRF. This discharge recommendation:  Has been made in collaboration with the attending provider and/or case management    IF patient discharges home will need the following DME: hospital bed, transfer bench, wheelchair, and BSC       SUBJECTIVE:   Patient stated sure.  states patient to oral care    OBJECTIVE DATA SUMMARY:   HISTORY:   Past Medical History:   Diagnosis Date    A-fib ([facility] [address]     HTN (hypertension)     Pacemaker     Skin cancer      Past Surgical History:   Procedure Laterality Date    HX PACEMAKER PLACEMENT         Expanded or extensive additional review of patient history:     Home Situation  Home Environment: Private residence  # Steps to Enter: (5)  Rails to Enter: Yes  Hand Rails : Bilateral(far apart)  One/Two Story Residence: Two story  # of Interior Steps: 15  Interior Rails: Right  Lift Chair Available: No  Living Alone: No  Support Systems: Family member(s), Spouse/Significant Other/Partner  Patient Expects to be Discharged to[de-identified] Unknown  Current DME Used/Available at Home: None    PLOF: Pt independent for ADLS/IADLS,  independent with no DME/AE use with mobility prior to admission. Hand dominance: Right    EXAMINATION OF PERFORMANCE DEFICITS:  Cognitive/Behavioral Status:  Neurologic State: Drowsy; Eyes open to voice  Orientation Level: Oriented to person;Oriented to place  Cognition: Follows commands      Follows commands with prn verbal and tactile cues to remain awake       Skin: not assessed    Edema:     Hearing:   Appears WFL    Vision/Perceptual:         Acuity: (R artificial eye)    Corrective Lenses: Glasses  Patient mainly kept eyes closed during evaluation    Range of Motion:  ALEXANDRA UE:   AROM: Grossly decreased, non-functional  PROM: Within functional limits       Strength:  ALEXANDRA UE;   Strength: Grossly decreased, non-functional      Coordination:  Coordination: Grossly decreased, non-functional  Fine Motor Skills-Upper: Left Impaired;Right Impaired    Gross Motor Skills-Upper: Left Impaired;Right Impaired    Tone & Sensation: Alexandra UE:   Tone; hypotonic    Sensation: appeared intact    Balance:   Not tested    Functional Mobility and Transfers for ADLs:  Bed Mobility:  Rolling: Total assistance(assist 1-2 person)    Transfers:   Not tested    ADL Assessment:  Feeding: Total assistance    Oral Facial Hygiene/Grooming: Total assistance    Bed level, foam swab used  Bathing: Total assistance         Lower Body Dressing: Total assistance    Toileting: Total assistance   In  bed; bed pan;  has a [place]  Functional Measure:      [address] [place] [id number] AM-PAC \"6 Clicks\"                                                       Daily Activity Inpatient Short Form  How much help from another person does the patient currently need. .. Total; A Lot A Little None   1. Putting on and taking off regular lower body clothing? [x]  1 []  2 []  3 []  4   2. Bathing (including washing, rinsing, drying)? [x]  1 []  2 []  3 []  4   3. Toileting, which includes using toilet, bedpan or urinal? [x] 1 []  2 []  3 []  4   4. Putting on and taking off regular upper body clothing? [x]  1 []  2 []  3 []  4   5. Taking care of personal grooming such as brushing teeth? [x]  1 []  2 []  3 []  4   6. Eating meals?  [x]  1 []  2 []  3 []  4   © 2007, Trustees of [address] [place] [address] [address] [address], under license to [employer]. All rights reserved     Score:      Interpretation of Tool:  Represents clinically-significant functional categories (i.e. Activities of daily living). Percentage of Impairment CH    0%   CI    1-19% CJ    20-39% CK    40-59% CL    60-79% CM    80-99% CN     100%   [facility]  Score 6-24 24 23 20-22 15-19 10-14 7-9 6        Occupational Therapy Evaluation Charge Determination   History Examination Decision-Making   MEDIUM Complexity : Expanded review of history including physical, cognitive and psychosocial  history  HIGH Complexity : 5 or more performance deficits relating to physical, cognitive , or psychosocial skils that result in activity limitations and / or participation restrictions MEDIUM Complexity : Patient may present with comorbidities that affect occupational performnce. Miniml to moderate modification of tasks or assistance (eg, physical or verbal ) with assesment(s) is necessary to enable patient to complete evaluation       Based on the above components, the patient evaluation is determined to be of the following complexity level: MEDIUM  Pain Ratin/10    Activity Tolerance:   Poor and requires rest breaks  Please refer to the flowsheet for vital signs taken during this treatment. After treatment patient left in no apparent distress:    Supine in bed and with family member (spouse) present;  on bed pan (patient requesting \"privacy \" for a few minutes)    COMMUNICATION/EDUCATION:   The patients plan of care was discussed with: Physical therapy assistant and Case management. Patient/family agree to work toward stated goals and plan of care. This patients plan of care is appropriate for delegation to [facility].     Thank you for this referral.  Teodoro Mederos,OTR/L  Time Calculation: 39 mins

## 2020-11-06 NOTE — PROGRESS NOTES
PHYSICAL THERAPY TREATMENT  Patient: Rosa M Purvis (39 y.o. male)  Date: 11/6/2020  Diagnosis: Atrial fibrillation ([facility] [I48.91]   <principal problem not specified>       Precautions:    Chart, physical therapy assessment, plan of care and goals were reviewed. ASSESSMENT  Patient continues with skilled PT services and is progressing towards goals. Pt semi-supine in bed upon arrival and agreeable to PT. PT limited due to only one therapist and pt requiring Max/total A x2 to sit on EOB. TE preformed semi-supine with pt educated on importance of preforming exercises in bed daily. See flow sheet below for exercises. Current Level of Function Impacting Discharge (mobility/balance): Decreased mobility and Ax2 for mobility/transfers     Other factors to consider for discharge: Ax2 for mobility/transfers          PLAN :  Patient continues to benefit from skilled intervention to address the above impairments. Continue treatment per established plan of care. to address goals. Recommendation for discharge: (in order for the patient to meet his/her long term goals)  To be determined: IRF vs SNF    This discharge recommendation:  Has been made in collaboration with the attending provider and/or case management    IF patient discharges home will need the following DME: to be determined (TBD)       SUBJECTIVE:   Patient stated i'm very tired today.     OBJECTIVE DATA SUMMARY:   Critical Behavior:  Neurologic State: Alert, Irritable  Orientation Level: Disoriented to situation, Disoriented to time, Oriented to person, Oriented to place  Cognition: Decreased attention/concentration, Follows commands     Functional Mobility Training:  Bed Mobility:  Rolling: Total assistance(assist 1-2 person)                 Transfers:                                   Balance:     Ambulation/Gait Training:                                                    Stairs:               Therapeutic Exercises:   Therapeutic Exercises: EXERCISE   Sets   Reps   Active Active Assist   Passive Self ROM   Comments   Ankle Pumps   [x] [] [] [] X10 B LE   Quad Sets/Glut Sets   [] [] [] []    Hamstring Sets   [] [] [] []    Short Arc Quads   [] [] [] []    Heel Slides   [] [x] [x] [] X10 B  LE   Straight Leg Raises   [] [x] [x] [] X10 B  LE   Hip abd/add   [] [x] [x] [] X10 B LE   Long Arc Quads   [] [] [] []    Marching   [] [] [] []       [] [] [] []       Pain Rating:  3/10    Activity Tolerance:   Fair and requires rest breaks  Please refer to the flowsheet for vital signs taken during this treatment. After treatment patient left in no apparent distress:   Supine in bed, Bed / chair alarm activated, and Caregiver / family present    COMMUNICATION/COLLABORATION:   The patients plan of care was discussed with: Registered nurse.      Jaxon Aj PTA   Time Calculation: 15 mins

## 2020-11-06 NOTE — CONSULTS
PULMONARY NOTE  [facility]    Name: Alejandro Wyman MRN: [id number]   : 1950 Hospital: [address] [place] [address]   Date: 2020  Admission date: 10/29/2020 Hospital Day: 9       HPI:     Hospital Problems  Date Reviewed: 10/30/2020          Codes Class Noted POA    Atrial fibrillation [facility]) ICD-10-CM: I48.91  ICD-9-CM: 427.31  10/30/2020 Yes                   [x] High complexity decision making was performed  [x] See my orders for details      Subjective/Initial History:     I was asked by Danica Josue MD to see Alejandro Wyman  a 78 y.o.  male in consultation     Excerpts from admission 10/29/2020 or consult notes as follows:   71-year-old male came in because of shortness of breath,. Patient condition got worse in the emergency room he was found to be in atrial fibrillation started on IV Cardizem did not seem to help his condition got worse he was put on BiPAP machine he continues to have increased work of breathing become diaphoretic pressure was low in 70s subsequently got intubated now he is on a ventilator, he is also hypotensive hemodynamically unstable on 2 vasopressors. He is a history of atrial fibrillation but he is not taking any medication for it he took it off by himself because he does not feel like taking them.   He is not on any anticoagulation unable to get much history out of the patient so admitted and critical care consult was called for further evaluation     patient is febrile overnight he is on antibiotic Zosyn and intubated on ventilator  No Known Allergies     MAR reviewed and pertinent medications noted or modified as needed     Current Facility-Administered Medications   Medication    amoxicillin-clavulanate (AUGMENTIN) 875-125 mg per tablet 1 Tab    heparin porcine injection 5,000 Units    morphine injection 2 mg    LORazepam (ATIVAN) injection 1 mg    acetaminophen (TYLENOL) solution 650 mg      Patient PCP: Angelica Morris MD  PMH: has a past medical history of A-fib ([address] HTN (hypertension), Pacemaker, and Skin cancer. PSH:   has a past surgical history that includes hx pacemaker placement. FHX: family history includes Asthma in his mother; Heart Disease in his mother. SHX:  reports that he has never smoked. He has never used smokeless tobacco. He reports current alcohol use. He reports that he does not use drugs. ROS:  Unable to obtain      Objective:     Vital Signs: Telemetry:    AFIB Intake/Output:   Visit Vitals  /87 (BP 1 Location: Right arm, BP Patient Position: At rest)   Pulse 77   Temp 99 °F (37.2 °C)   Resp 20   Ht 6' 2.02\" (1.88 m)   Wt 102.1 kg (225 lb 1.4 oz)   SpO2 95%   BMI 28.89 kg/m²       Temp (24hrs), Av.1 °F (37.3 °C), Min:97.8 °F (36.6 °C), Max:100.6 °F (38.1 °C)        O2 Device: Nasal cannula O2 Flow Rate (L/min): 3 l/min       Wt Readings from Last 4 Encounters:   10/30/20 102.1 kg (225 lb 1.4 oz)          Intake/Output Summary (Last 24 hours) at 2020 1118  Last data filed at 2020 1003  Gross per 24 hour   Intake    Output 1050 ml   Net -1050 ml       Last shift:       07 - 1900  In: -   Out: 500 [Urine:500]  Last 3 shifts: 1901 -  07  In: -   Out: 1017 [Urine:1025]       Physical Exam:     Physical Exam   HENT:   Head: Normocephalic and atraumatic. Eyes: Pupils are equal, round, and reactive to light. Neck: Normal range of motion. Neck supple. Cardiovascular: Normal rate. Abdominal: Soft. Musculoskeletal: Normal range of motion. Skin: Skin is warm. Labs:    Recent Labs     20  0245 20  1100 20  1725   WBC 5.9 5.4  --    HGB 14.9 15.3 15.2   * 108*  --      Recent Labs     20  0245 20  1100   * 148*   K 3.6 3.6   * 114*   CO2 26 25   * 114*   BUN 23* 25*   CREA 0.90 0.82   CA 10.1 10.2*     No results for input(s): PH, PCO2, PO2, HCO3, FIO2 in the last 72 hours.   No results for input(s): CPK, CKNDX, TROIQ in the last 72 hours. No lab exists for component: CPKMB  No results found for: BNPP, BNP   Lab Results   Component Value Date/Time    Culture result: Scant  Normal respiratory bill   11/01/2020 09:15 AM    Culture result: No growth 6 days 10/30/2020 08:30 AM     Lab Results   Component Value Date/Time    TSH 11.80 (H) 10/29/2020 06:30 AM       Imaging:    CXR Results  (Last 48 hours)               11/05/20 1105  XR CHEST PORT Final result    Narrative:  1 view       Comparison yesterday       Mild atelectasis at underinflated bases plus minus small left effusion. Upper   lungs are clear. Normal heart and no congestion       11/04/20 1132  XR CHEST PORT Final result    Narrative:  Chest single view. Comparison single view chest November 3, 2020       ET tube and NG tube have been removed. Left lung base subsegmental atelectasis. No gross interstitial or alveolar pulmonary edema. Cardiac and mediastinal   structures unchanged noting left side cardiac device with single lead overlying   the basal right heart. No pneumothorax or sizable pleural effusion. Results from [place] encounter on 10/29/20   XR SWALLOW FUNC VIDEO    Narrative 2 minutes and 1 image    Patient showed acceptable oral preparation propulsion with all consistencies. Swallowing is initiated with minor delay. Epiglottic motion is severely but  inconsistently restricted, in large part due to large cervical osteophytes with  associated soft tissue component and also due to intrinsic weakness. This causes  pronounced pooling, and overflow aspiration on one occasion. Note is made of  recent intubation   XR CHEST PORT    Narrative 1 view    Comparison yesterday    Mild atelectasis at underinflated bases plus minus small left effusion. Upper  lungs are clear. Normal heart and no congestion   XR CHEST PORT    Narrative Chest single view.     Comparison single view chest November 3, 2020    ET tube and NG tube have been removed. Left lung base subsegmental atelectasis. No gross interstitial or alveolar pulmonary edema. Cardiac and mediastinal  structures unchanged noting left side cardiac device with single lead overlying  the basal right heart. No pneumothorax or sizable pleural effusion. Results from [place] encounter on 10/29/20   CT ABD PELV W CONT    Narrative HISTORY:  SOB, afib, eval for PE  Dose reduction technique: All CT scans at this facility are performed using dose reduction optimization  technique as appropriate on the exam including the following: Automated exposure  control, adjustment of the MA and/or KV according to patient size of use of  iterative reconstructive technique. .    TECHNIQUE: Postcontrast CT angiogram of the chest is performed with maximum  intensity projection images (MIPS) generated. 100 cc Isovue-370 injected. COMPARISON: 1/at 7/1/2018 CTA the chest available by report only; not all images  were available. LIMITATIONS: None    LINES/TUBES/MEDICAL SUPPORT DEVICES:  Left chest wall AICD    LUNG PARENCHYMA: Mild diffuse septal thickening  TRACHEA/BRONCHI: Bronchial wall thickening throughout. PULMONARY VESSELS: Normal. No definitive persistent central filling defects  identified on multiple contiguous images to diagnose pulmonary embolism. PLEURA: Small bilateral pleural effusions  MEDIASTINUM: Normal  HEART: Multichamber cardiomegaly with persistent prominent  AORTA/GREAT VESSELS: Dilated ascending aorta 4.4 cm. No darrell aortic aneurysm or  aortic dissection  ESOPHAGUS: Normal  AXILLAE: Normal  BONES/TISSUES: No acute abnormality    UPPER ABDOMEN: See below  OTHER: None      Impression IMPRESSION: Normal CTA of the chest with no pulmonary embolism or acute aortic  abnormalities identified; dilated ascending aorta is noted and will require  continued surveillance.  Findings suggestive of CHF/pulmonary edema with  bronchial wall thickening that may suggest axial interstitial edema and with  diffuse parenchymal edema throughout and with associated small bilateral pleural  effusions. HISTORY:  Abdominal distention, fever. Dose reduction technique: All CT scans at this facility are performed using dose reduction optimization  technique as appropriate on the exam including the following: Automated exposure  control, adjustment of the MA and/or KV according to patient size of use of  iterative reconstructive technique. .    TECHNIQUE:  CT ABD PELV W CONT                           100 cc Isovue-370 injected. COMPARISON: None  LIMITATIONS: None    CHEST: See above    LIVER: Normal  GALLBLADDER: Normal  BILIARY TREE: Normal  PANCREAS: Normal  SPLEEN: Normal  ADRENAL GLANDS: Normal  KIDNEYS/URETERS/BLADDER: Kidneys and ureters appear normal. Bladder wall  thickening  AORTA/RETROPERITONEUM: Normal  BOWEL/MESENTERY: Normal  APPENDIX: Identified and normal  PERITONEAL CAVITY: Mild diffuse abdominal pelvic ascites. No free  intraperitoneal air or abdominal pelvic abscess. REPRODUCTIVE ORGANS: Normal  BONE/TISSUES: Heterogeneous mineralization of the osseous structures bodies with  multiple areas of lucency identified throughout throughout spine, pelvis and  femurs. Moderate disc protrusions L2-3, L3-4, L4-5     OTHER: None    IMPRESSION: Bladder wall thickening is noted and may indicate cystitis and/or  chronic change. Abdominopelvic ascites. Gastrohepatic adenopathy is noted and  could be due to increased fluid resorption due to ascites but is nonspecific and  may require follow-up. Minimal cholelithiasis. . Abnormal mineralization:  Multiple focal lucencies throughout the axial and appendicular spine as  described and of uncertain etiology; evaluation for metastatic disease, myeloma,  metabolic bone disease recommended. Disc disease as described. The ER physician was unable to take my call at the time of this interpretation.   The findings were conveyed to the ER staff and they will have the physician call  me back with any questions. Findings were conveyed to staff member Kerline Bazan. IMPRESSION:   1. Acute hypoxic respiratory failure resolving on nasal canula  2.  congestive heart failure. Pacemaker in place  3. Atrial fibrillation   4. Moderate pulmonary hypertension  5. History of melanoma possible metastatic disease to spine  6. RECOMMENDATIONS/PLAN:     1. Patient is on nasal canula  2. COVID-19 negative   3. Severe cardiomyopathy EF about 20%  4. Culture so far negative he is on Zosyn added Levaquin  5. Pulmonary hypertension WHO group 2 secondary to left heart failure  6. CAT scan shows prominent vascular marking congestive changes pleural effusion  7.  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

## 2020-11-06 NOTE — PROGRESS NOTES
Problem: Dysphagia (Adult)  Goal: *Acute Goals and Plan of Care (Insert Text)  Description: Speech Therapy Swallow Goals  Initiated 11/5/2020  -Patient will tolerate full liquids diet with nectar thick liquids without clinical indicators of aspiration given minimal cues within 7 day(s). [ ] Not met  [ ]  MET   [ x] Progressing  [ ] Discontinue  -Patient will tolerate PO trials without clinical indicators of aspiration given minimal cues within 7 day(s). [ ] Not met  [ ]  MET   [ x] Progressing  [ ] Esdras Greenberg  -Patient will participate in modified barium swallow study within 7  day(s). [ ] Not met  [ x]  MET   [ ] Progressing  [ ] Esdras Greenberg  -Patient will perform laryngeal and BOT strengthening exercises with minimal cues within 7 day(s). [ ] Not met  [ ]  MET   [x ] Progressing  [ ] Discontinue  -Patient will demonstrate understanding of swallow safety precautions and aspiration precautions, diet recs with minimal cues within 7 day(s). [ ] Not met  [ ]  MET   [ x] Progressing  [ ] Discontinue  Outcome: Progressing Towards [address] [place] [address] TREATMENT  Patient: Alexandra Nunez (84 y.o. male)  Date: 11/6/2020  Diagnosis: Atrial fibrillation ([facility] [address] [I48.91]   <principal problem not specified>       Precautions: aspiration      ASSESSMENT:  Pt seen for tx, daughter and wife present during portion of session. Nsg reports pt has been given ice chips by family per physician order. Pt positioned upright in bed. Vocal adduction tasks x 10- pt with delayed responses at times noted. Pt given trial thin via tsp with prompts for effortful swallow. Immediate strong cough is present- no further trials of thin. With trials nectar via tsp and puree via tsp, prompts for effortful swallow, spontaneous multiple swallows present. Hyolaryngeal elevation appears functional, reduced protraction is noted. Pt is able to obtain strong voicing with prompts.  No further s/s aspiration observed, but pharyngeal residue is suspected. 02 sats 97% at end of session. PLAN:  Recommendations and Planned Interventions: At this time, recommend diet upgrade to full NECTAR liquids with STRICT aspiration and GERD precautions, 1:1 assistance with ALL PO intake, monitor pt closely for s/s aspiration, meds crushed if able in puree, FEED ONLY IF AWAKE AND ALERT. Patient continues to benefit from skilled intervention to address the above impairments. Continue treatment per established plan of care. Frequency/Duration: Patient will be followed by speech-language pathology daily to address goals. Discharge Recommendations: To Be Determined     SUBJECTIVE:   Patient alert, irritable, agreeable to trials. OBJECTIVE:     CXR Results  (Last 48 hours)                 11/05/20 1105  XR CHEST PORT Final result    Narrative:  1 view       Comparison yesterday       Mild atelectasis at underinflated bases plus minus small left effusion. Upper   lungs are clear. Normal heart and no congestion                Cognitive and Communication Status:  Neurologic State: Alert, Irritable  Orientation Level: Disoriented to situation, Disoriented to time, Oriented to person, Oriented to place  Cognition: Decreased attention/concentration, Follows commands       Pain:   Pt denies pain. .     After treatment:   Patient left in no apparent distress in bed, Call bell within reach, Nursing notified, and Caregiver / family present    COMMUNICATION/EDUCATION:   Patient was educated regarding his deficit(s) of dysphagia, swallow safety precautions, diet recs and POC. He demonstrated Fair understanding as evidenced by verbal responsiveness, AMS. The patient's plan of care including recommendations, planned interventions, and recommended diet changes were discussed with: Registered nurse.      Christine Zurita M.S. CCC-SLP  Time Calculation: 13 mins

## 2020-11-07 LAB
ANION GAP SERPL CALC-SCNC: 5 MMOL/L (ref 5–15)
BASOPHILS # BLD: 0 K/UL (ref 0–0.1)
BASOPHILS NFR BLD: 0 % (ref 0–1)
BUN SERPL-MCNC: 24 MG/DL (ref 6–20)
BUN/CREAT SERPL: 28 (ref 12–20)
CA-I BLD-MCNC: 10.3 MG/DL (ref 8.5–10.1)
CHLORIDE SERPL-SCNC: 118 MMOL/L (ref 97–108)
CO2 SERPL-SCNC: 27 MMOL/L (ref 21–32)
CREAT SERPL-MCNC: 0.87 MG/DL (ref 0.7–1.3)
DIFFERENTIAL METHOD BLD: ABNORMAL
EOSINOPHIL # BLD: 0 K/UL (ref 0–0.4)
EOSINOPHIL NFR BLD: 0 % (ref 0–7)
ERYTHROCYTE [DISTWIDTH] IN BLOOD BY AUTOMATED COUNT: 15.2 % (ref 11.5–14.5)
GLUCOSE SERPL-MCNC: 113 MG/DL (ref 65–100)
HCT VFR BLD AUTO: 54.5 % (ref 36.6–50.3)
HGB BLD-MCNC: 19.4 G/DL (ref 12.1–17)
IMM GRANULOCYTES # BLD AUTO: 0 K/UL
IMM GRANULOCYTES NFR BLD AUTO: 0 %
LYMPHOCYTES # BLD: 0.7 K/UL (ref 0.8–3.5)
LYMPHOCYTES NFR BLD: 10 % (ref 12–49)
MCH RBC QN AUTO: 37.2 PG (ref 26–34)
MCHC RBC AUTO-ENTMCNC: 35.6 G/DL (ref 30–36.5)
MCV RBC AUTO: 104.6 FL (ref 80–99)
MONOCYTES # BLD: 0.1 K/UL (ref 0–1)
MONOCYTES NFR BLD: 2 % (ref 5–13)
NEUTS SEG # BLD: 6.2 K/UL (ref 1.8–8)
NEUTS SEG NFR BLD: 88 % (ref 32–75)
NRBC # BLD: 0 K/UL (ref 0–0.01)
NRBC BLD-RTO: 0 PER 100 WBC
PLATELET # BLD AUTO: 132 K/UL (ref 150–400)
PMV BLD AUTO: 13.5 FL (ref 8.9–12.9)
POTASSIUM SERPL-SCNC: 3.4 MMOL/L (ref 3.5–5.1)
RBC # BLD AUTO: 5.21 M/UL (ref 4.1–5.7)
RBC MORPH BLD: ABNORMAL
RBC MORPH BLD: ABNORMAL
SODIUM SERPL-SCNC: 150 MMOL/L (ref 136–145)
WBC # BLD AUTO: 7 K/UL (ref 4.1–11.1)

## 2020-11-07 PROCEDURE — 80048 BASIC METABOLIC PNL TOTAL CA: CPT

## 2020-11-07 PROCEDURE — 74011250637 HC RX REV CODE- 250/637: Performed by: INTERNAL MEDICINE

## 2020-11-07 PROCEDURE — 36415 COLL VENOUS BLD VENIPUNCTURE: CPT

## 2020-11-07 PROCEDURE — 85025 COMPLETE CBC W/AUTO DIFF WBC: CPT

## 2020-11-07 PROCEDURE — 65270000032 HC RM SEMIPRIVATE

## 2020-11-07 PROCEDURE — 74011250636 HC RX REV CODE- 250/636: Performed by: INTERNAL MEDICINE

## 2020-11-07 RX ORDER — IPRATROPIUM BROMIDE AND ALBUTEROL SULFATE 2.5; .5 MG/3ML; MG/3ML
3 SOLUTION RESPIRATORY (INHALATION)
Status: DISCONTINUED | OUTPATIENT
Start: 2020-11-07 | End: 2020-11-09 | Stop reason: HOSPADM

## 2020-11-07 RX ORDER — DILTIAZEM HYDROCHLORIDE 30 MG/1
60 TABLET, FILM COATED ORAL EVERY 6 HOURS
Status: DISCONTINUED | OUTPATIENT
Start: 2020-11-07 | End: 2020-11-09 | Stop reason: HOSPADM

## 2020-11-07 RX ADMIN — AMOXICILLIN AND CLAVULANATE POTASSIUM 1 TABLET: 875; 125 TABLET, FILM COATED ORAL at 20:45

## 2020-11-07 RX ADMIN — APIXABAN 5 MG: 5 TABLET, FILM COATED ORAL at 20:45

## 2020-11-07 RX ADMIN — SODIUM CHLORIDE, SODIUM LACTATE, POTASSIUM CHLORIDE, CALCIUM CHLORIDE AND DEXTROSE MONOHYDRATE 50 ML/HR: 5; 600; 310; 30; 20 INJECTION, SOLUTION INTRAVENOUS at 09:20

## 2020-11-07 RX ADMIN — DILTIAZEM HYDROCHLORIDE 30 MG: 30 TABLET, FILM COATED ORAL at 04:42

## 2020-11-07 RX ADMIN — LORAZEPAM 1 MG: 2 INJECTION, SOLUTION INTRAMUSCULAR; INTRAVENOUS at 15:52

## 2020-11-07 RX ADMIN — DILTIAZEM HYDROCHLORIDE 60 MG: 30 TABLET, FILM COATED ORAL at 19:15

## 2020-11-07 RX ADMIN — AMOXICILLIN AND CLAVULANATE POTASSIUM 1 TABLET: 875; 125 TABLET, FILM COATED ORAL at 09:26

## 2020-11-07 RX ADMIN — LORAZEPAM 1 MG: 2 INJECTION, SOLUTION INTRAMUSCULAR; INTRAVENOUS at 12:35

## 2020-11-07 RX ADMIN — DILTIAZEM HYDROCHLORIDE 30 MG: 30 TABLET, FILM COATED ORAL at 11:01

## 2020-11-07 RX ADMIN — HEPARIN SODIUM 5000 UNITS: 10000 INJECTION, SOLUTION INTRAVENOUS; SUBCUTANEOUS at 11:01

## 2020-11-07 RX ADMIN — DILTIAZEM HYDROCHLORIDE 60 MG: 30 TABLET, FILM COATED ORAL at 23:54

## 2020-11-07 NOTE — PROGRESS NOTES
Problem: Falls - Risk of  Goal: *Absence of Falls  Description: Document Carly Luke Fall Risk and appropriate interventions in the flowsheet. Outcome: Progressing Towards Goal  Note: Fall Risk Interventions:       Mentation Interventions: Adequate sleep, hydration, pain control, Bed/chair exit alarm, Door open when patient unattended, Increase mobility, More frequent rounding, Reorient patient, Update white board    Medication Interventions: Bed/chair exit alarm, Evaluate medications/consider consulting pharmacy    Elimination Interventions: Bed/chair exit alarm, Call light in reach, Patient to call for help with toileting needs, Toileting schedule/hourly rounds              Problem: Patient Education: Go to Patient Education Activity  Goal: Patient/Family Education  Outcome: Progressing Towards Goal     Problem: Pressure Injury - Risk of  Goal: *Prevention of pressure injury  Description: Document Kennedy Scale and appropriate interventions in the flowsheet. Outcome: Progressing Towards Goal  Note: Pressure Injury Interventions:  Sensory Interventions: Assess changes in LOC, Assess need for specialty bed, Discuss PT/OT consult with provider, Float heels, Keep linens dry and wrinkle-free, Maintain/enhance activity level, Minimize linen layers, Pressure redistribution bed/mattress (bed type), Turn and reposition approx. every two hours (pillows and wedges if needed)    Moisture Interventions: Apply protective barrier, creams and emollients, Check for incontinence Q2 hours and as needed, Internal/External urinary devices, Minimize layers, Moisture barrier    Activity Interventions: Assess need for specialty bed, Pressure redistribution bed/mattress(bed type), PT/OT evaluation    Mobility Interventions: Float heels, Assess need for specialty bed, PT/OT evaluation, Turn and reposition approx.  every two hours(pillow and wedges), Pressure redistribution bed/mattress (bed type)    Nutrition Interventions: Document food/fluid/supplement intake, Offer support with meals,snacks and hydration, Discuss nutritional consult with provider    Friction and Shear Interventions: Apply protective barrier, creams and emollients, Foam dressings/transparent film/skin sealants, Minimize layers                Problem: Patient Education: Go to Patient Education Activity  Goal: Patient/Family Education  Outcome: Progressing Towards Goal     Problem: Patient Education: Go to Patient Education Activity  Goal: Patient/Family Education  Outcome: Progressing Towards Goal     Problem: Patient Education: Go to Patient Education Activity  Goal: Patient/Family Education  Outcome: Progressing Towards Goal     Problem: Patient Education: Go to Patient Education Activity  Goal: Patient/Family Education  Outcome: Progressing Towards Goal

## 2020-11-07 NOTE — PROGRESS NOTES
Dr Jayla Cagle notified of pt in afib with rate in 130's to 160's. Pt is asymptomatic, no c/o pain, sob, or dizziness. bp is 138/88. Orders rcvd.

## 2020-11-07 NOTE — PROGRESS NOTES
Hospitalist Progress Note         Sarah Costa MD          Daily Progress Note: 2020      Subjective: The patient is seen for follow  up. He was made comfort care by family 2020. He is seen in follow-up. brother at bedside. He is surprisingly more awake and conversant this morning. Denies chest pain or shortness of breath. Says he wants to live for many more years to take care of his grandson. Denies chest pain or shortness of breath  Has no complaints    Problem List:  Problem List as of 2020 Date Reviewed: 10/30/2020          Codes Class Noted - Resolved    Atrial fibrillation [facility]) ICD-10-CM: I48.91  ICD-9-CM: 427.31  10/30/2020 - Present              Medications reviewed  Current Facility-Administered Medications   Medication Dose Route Frequency    albuterol-ipratropium (DUO-NEB) 2.5 MG-0.5 MG/3 ML  3 mL Nebulization Q6H PRN    amoxicillin-clavulanate (AUGMENTIN) 875-125 mg per tablet 1 Tab  1 Tab Oral Q12H    dextrose 5% lactated ringers infusion  50 mL/hr IntraVENous CONTINUOUS    dilTIAZem IR (CARDIZEM) tablet 30 mg  30 mg Oral Q6H    heparin porcine injection 5,000 Units  5,000 Units SubCUTAneous Q12H    morphine injection 2 mg  2 mg IntraVENous Q1H PRN    LORazepam (ATIVAN) injection 1 mg  1 mg IntraVENous Q1H PRN    acetaminophen (TYLENOL) solution 650 mg  650 mg Per NG tube Q4H PRN       Review of Systems:   Review of systems not obtained due to patient factors. Objective:   Physical Exam:     Visit Vitals  BP (!) 152/85 (BP 1 Location: Right arm, BP Patient Position: At rest)   Pulse (!) 127   Temp 100.4 °F (38 °C)   Resp 18   Ht 6' 2.02\" (1.88 m)   Wt 102.1 kg (225 lb 1.4 oz)   SpO2 94%   BMI 28.89 kg/m²    O2 Flow Rate (L/min): 3 l/min O2 Device: Room air    Temp (24hrs), Av.2 °F (37.3 °C), Min:98.6 °F (37 °C), Max:100.4 °F (38 °C)    No intake/output data recorded.    1901 - 700  In: -   Out: 1324 [[facility]]    General:  Awake and conversant   Lungs:   Clear to auscultation bilaterally. Chest wall:  No tenderness or deformity. Heart:  Regular rate and rhythm, S1, S2 normal, no murmur, click, rub or gallop. Abdomen:   Soft, non-tender. Bowel sounds normal. No masses,  No organomegaly. Extremities: Extremities normal, atraumatic, no cyanosis or edema. Pulses: 2+ and symmetric all extremities. Skin: Skin color, texture, turgor normal. No rashes or lesions   Neurologic: unresponsive     Data Review:       Recent Days:  Recent Labs     20  0540 20  0245 20  1100   WBC 7.0 5.9 5.4   HGB 19.4* 14.9 15.3   HCT 54.5* 43.5 45.1   PLT PENDING 110* 108*     Recent Labs     20  0540 20  0245 20  1100   * 150* 148*   K 3.4* 3.6 3.6   * 117* 114*   CO2 27 26 25   * 109* 114*   BUN 24* 23* 25*   CREA 0.87 0.90 0.82   CA 10.3* 10.1 10.2*     No results for input(s): PH, PCO2, PO2, HCO3, FIO2 in the last 72 hours. 24 Hour Results:  Recent Results (from the past 24 hour(s))   CBC WITH AUTOMATED DIFF    Collection Time: 20  5:40 AM   Result Value Ref Range    WBC 7.0 4.1 - 11.1 K/uL    RBC 5.21 4.10 - 5.70 M/uL    HGB 19.4 (H) 12.1 - 17.0 g/dL    HCT 54.5 (H) 36.6 - 50.3 %    .6 (H) 80.0 - 99.0 FL    MCH 37.2 (H) 26.0 - 34.0 PG    MCHC 35.6 30.0 - 36.5 g/dL    RDW 15.2 (H) 11.5 - 14.5 %    PLATELET PENDING K/uL    MPV PENDING FL    NRBC 0.0 0  WBC    ABSOLUTE NRBC 0.00 0.00 - 0.01 K/uL    NEUTROPHILS PENDING %    LYMPHOCYTES PENDING %    MONOCYTES PENDING %    EOSINOPHILS PENDING %    BASOPHILS PENDING %    IMMATURE GRANULOCYTES PENDING %    ABS. NEUTROPHILS PENDING K/UL    ABS. LYMPHOCYTES PENDING K/UL    ABS. MONOCYTES PENDING K/UL    ABS. EOSINOPHILS PENDING K/UL    ABS. BASOPHILS PENDING K/UL    ABS. IMM. GRANS.  PENDING K/UL    DF PENDING    METABOLIC PANEL, BASIC    Collection Time: 20  5:40 AM   Result Value Ref Range Sodium 150 (H) 136 - 145 mmol/L    Potassium 3.4 (L) 3.5 - 5.1 mmol/L    Chloride 118 (H) 97 - 108 mmol/L    CO2 27 21 - 32 mmol/L    Anion gap 5 5 - 15 mmol/L    Glucose 113 (H) 65 - 100 mg/dL    BUN 24 (H) 6 - 20 mg/dL    Creatinine 0.87 0.70 - 1.30 mg/dL    BUN/Creatinine ratio 28 (H) 12 - 20      GFR est AA >60 >60 ml/min/1.73m2    GFR est non-AA >60 >60 ml/min/1.73m2    Calcium 10.3 (H) 8.5 - 10.1 mg/dL           Assessment/     Acute respiratory failure with hypoxia, requiring mechanical ventilation. Extubated for comfort care     COVID-19 ruled out     Acute systolic heart failure, EF 20%     Aspiration pneumonia     Paroxysmal atrial fibrillation with RVR, now with controlled rate     Acute kidney injury possible ATN. Resolved     Moderate pulmonary hypertension     Severe sepsis/septic shock, possibly due to aspiration.      Blindness right eye with pinpoint pupil     Suspected metastatic disease versus myeloma versus metabolic bone disease on CT. elevated PSA suggest possible metastatic prostate cancer as the etiology. Could also be melanoma     History of melanoma 18 years ago     Thrombocytopenia likely due to sepsis     Intellectual disability    Plan:  Continue supportive care measures  Obtain speech therapy evaluation and PT OT  Begin oral Augmentin  Goals of care discussed with family  Recommendations for acute rehab versus skilled care facility but patient prefers to go home      Care Plan discussed with: Patient/Family    Total time spent with patient: 30 minutes.     Darnell Narayan MD

## 2020-11-07 NOTE — CONSULTS
PULMONARY NOTE  [facility]    Name: Janet Beltre MRN: [id number]   : 1950 Hospital: [facility]   Date: 2020  Admission date: 10/29/2020 Hospital Day: 10       HPI:     Hospital Problems  Date Reviewed: 10/30/2020          Codes Class Noted POA    Atrial fibrillation [facility]) ICD-10-CM: I48.91  ICD-9-CM: 427.31  10/30/2020 Yes                   [x] High complexity decision making was performed  [x] See my orders for details      Subjective/Initial History:     I was asked by Bradley Moya MD to see Janet Beltre  a 78 y.o.  male in consultation     Excerpts from admission 10/29/2020 or consult notes as follows:   57-year-old male came in because of shortness of breath,. Patient condition got worse in the emergency room he was found to be in atrial fibrillation started on IV Cardizem did not seem to help his condition got worse he was put on BiPAP machine he continues to have increased work of breathing become diaphoretic pressure was low in 70s subsequently got intubated now he is on a ventilator, he is also hypotensive hemodynamically unstable on 2 vasopressors. He is a history of atrial fibrillation but he is not taking any medication for it he took it off by himself because he does not feel like taking them.   He is not on any anticoagulation unable to get much history out of the patient so admitted and critical care consult was called for further evaluation     patient is febrile overnight he is on antibiotic Zosyn and intubated on ventilator  No Known Allergies     MAR reviewed and pertinent medications noted or modified as needed     Current Facility-Administered Medications   Medication    amoxicillin-clavulanate (AUGMENTIN) 875-125 mg per tablet 1 Tab    dextrose 5% lactated ringers infusion    dilTIAZem IR (CARDIZEM) tablet 30 mg    heparin porcine injection 5,000 Units    morphine injection 2 mg    LORazepam (ATIVAN) injection 1 mg  acetaminophen (TYLENOL) solution 650 mg      Patient PCP: Aneta Pineda MD  PMH:  has a past medical history of A-fib ([address] HTN (hypertension), Pacemaker, and Skin cancer. PSH:   has a past surgical history that includes hx pacemaker placement. FHX: family history includes Asthma in his mother; Heart Disease in his mother. SHX:  reports that he has never smoked. He has never used smokeless tobacco. He reports current alcohol use. He reports that he does not use drugs. ROS:  Unable to obtain      Objective:     Vital Signs: Telemetry:    AFIB Intake/Output:   Visit Vitals  BP (!) 150/92   Pulse (!) 127   Temp 98.6 °F (37 °C)   Resp 20   Ht 6' 2.02\" (1.88 m)   Wt 102.1 kg (225 lb 1.4 oz)   SpO2 94%   BMI 28.89 kg/m²       Temp (24hrs), Av.8 °F (37.1 °C), Min:98.6 °F (37 °C), Max:99 °F (37.2 °C)        O2 Device: Room air O2 Flow Rate (L/min): 3 l/min       Wt Readings from Last 4 Encounters:   10/30/20 102.1 kg (225 lb 1.4 oz)          Intake/Output Summary (Last 24 hours) at 2020 1009  Last data filed at 2020 0527  Gross per 24 hour   Intake    Output 825 ml   Net -825 ml       Last shift:      No intake/output data recorded. Last 3 shifts:  1901 -  0700  In: -   Out: 6437 [Urine:1325]       Physical Exam:     Physical Exam   HENT:   Head: Normocephalic and atraumatic. Eyes: Pupils are equal, round, and reactive to light. Neck: Normal range of motion. Neck supple. Cardiovascular: Normal rate. Abdominal: Soft. Musculoskeletal: Normal range of motion. Skin: Skin is warm.         Labs:    Recent Labs     20  0540 20  02420  1100   WBC 7.0 5.9 5.4   HGB 19.4* 14.9 15.3   PLT PENDING 110* 108*     Recent Labs     20  0540 20  1100   * 150* 148*   K 3.4* 3.6 3.6   * 117* 114*   CO2 27 26 25   * 109* 114*   BUN 24* 23* 25*   CREA 0.87 0.90 0.82   CA 10.3* 10.1 10.2*     No results for input(s): PH, PCO2, PO2, HCO3, FIO2 in the last 72 hours. No results for input(s): CPK, CKNDX, TROIQ in the last 72 hours. No lab exists for component: CPKMB  No results found for: BNPP, BNP   Lab Results   Component Value Date/Time    Culture result: Scant  Normal respiratory bill   11/01/2020 09:15 AM    Culture result: No growth 6 days 10/30/2020 08:30 AM     Lab Results   Component Value Date/Time    TSH 11.80 (H) 10/29/2020 06:30 AM       Imaging:    CXR Results  (Last 48 hours)               11/05/20 1105  XR CHEST PORT Final result    Narrative:  1 view       Comparison yesterday       Mild atelectasis at underinflated bases plus minus small left effusion. Upper   lungs are clear. Normal heart and no congestion           Results from Hospital Encounter encounter on 10/29/20   XR SWALLOW FUNC VIDEO    Narrative 2 minutes and 1 image    Patient showed acceptable oral preparation propulsion with all consistencies. Swallowing is initiated with minor delay. Epiglottic motion is severely but  inconsistently restricted, in large part due to large cervical osteophytes with  associated soft tissue component and also due to intrinsic weakness. This causes  pronounced pooling, and overflow aspiration on one occasion. Note is made of  recent intubation   XR CHEST PORT    Narrative 1 view    Comparison yesterday    Mild atelectasis at underinflated bases plus minus small left effusion. Upper  lungs are clear. Normal heart and no congestion   XR CHEST PORT    Narrative Chest single view. Comparison single view chest November 3, 2020    ET tube and NG tube have been removed. Left lung base subsegmental atelectasis. No gross interstitial or alveolar pulmonary edema. Cardiac and mediastinal  structures unchanged noting left side cardiac device with single lead overlying  the basal right heart. No pneumothorax or sizable pleural effusion.      Results from [place] encounter on 10/29/20   CT ABD PELV W CONT    Narrative HISTORY: SOB, afib, eval for PE  Dose reduction technique: All CT scans at this facility are performed using dose reduction optimization  technique as appropriate on the exam including the following: Automated exposure  control, adjustment of the MA and/or KV according to patient size of use of  iterative reconstructive technique. .    TECHNIQUE: Postcontrast CT angiogram of the chest is performed with maximum  intensity projection images (MIPS) generated. 100 cc Isovue-370 injected. COMPARISON: 1/at 7/1/2018 CTA the chest available by report only; not all images  were available. LIMITATIONS: None    LINES/TUBES/MEDICAL SUPPORT DEVICES:  Left chest wall AICD    LUNG PARENCHYMA: Mild diffuse septal thickening  TRACHEA/BRONCHI: Bronchial wall thickening throughout. PULMONARY VESSELS: Normal. No definitive persistent central filling defects  identified on multiple contiguous images to diagnose pulmonary embolism. PLEURA: Small bilateral pleural effusions  MEDIASTINUM: Normal  HEART: Multichamber cardiomegaly with persistent prominent  AORTA/GREAT VESSELS: Dilated ascending aorta 4.4 cm. No darrell aortic aneurysm or  aortic dissection  ESOPHAGUS: Normal  AXILLAE: Normal  BONES/TISSUES: No acute abnormality    UPPER ABDOMEN: See below  OTHER: None      Impression IMPRESSION: Normal CTA of the chest with no pulmonary embolism or acute aortic  abnormalities identified; dilated ascending aorta is noted and will require  continued surveillance. Findings suggestive of CHF/pulmonary edema with  bronchial wall thickening that may suggest axial interstitial edema and with  diffuse parenchymal edema throughout and with associated small bilateral pleural  effusions. HISTORY:  Abdominal distention, fever. Dose reduction technique:   All CT scans at this facility are performed using dose reduction optimization  technique as appropriate on the exam including the following: Automated exposure  control, adjustment of the MA and/or KV according to patient size of use of  iterative reconstructive technique. .    TECHNIQUE:  CT ABD PELV W CONT                           100 cc Isovue-370 injected. COMPARISON: None  LIMITATIONS: None    CHEST: See above    LIVER: Normal  GALLBLADDER: Normal  BILIARY TREE: Normal  PANCREAS: Normal  SPLEEN: Normal  ADRENAL GLANDS: Normal  KIDNEYS/URETERS/BLADDER: Kidneys and ureters appear normal. Bladder wall  thickening  AORTA/RETROPERITONEUM: Normal  BOWEL/MESENTERY: Normal  APPENDIX: Identified and normal  PERITONEAL CAVITY: Mild diffuse abdominal pelvic ascites. No free  intraperitoneal air or abdominal pelvic abscess. REPRODUCTIVE ORGANS: Normal  BONE/TISSUES: Heterogeneous mineralization of the osseous structures bodies with  multiple areas of lucency identified throughout throughout spine, pelvis and  femurs. Moderate disc protrusions L2-3, L3-4, L4-5     OTHER: None    IMPRESSION: Bladder wall thickening is noted and may indicate cystitis and/or  chronic change. Abdominopelvic ascites. Gastrohepatic adenopathy is noted and  could be due to increased fluid resorption due to ascites but is nonspecific and  may require follow-up. Minimal cholelithiasis. . Abnormal mineralization:  Multiple focal lucencies throughout the axial and appendicular spine as  described and of uncertain etiology; evaluation for metastatic disease, myeloma,  metabolic bone disease recommended. Disc disease as described. The ER physician was unable to take my call at the time of this interpretation. The findings were conveyed to the ER staff and they will have the physician call  me back with any questions. Findings were conveyed to staff member Kerline Bazan. IMPRESSION:   1. Acute hypoxic respiratory failure resolving on nasal canula  2.  congestive heart failure. Pacemaker in place  3. Atrial fibrillation   4. Moderate pulmonary hypertension  5.  History of melanoma possible metastatic disease to spine        RECOMMENDATIONS/PLAN:     1. Patient is on nasal canula  2. COVID-19 negative   3. Severe cardiomyopathy EF about 20%  4. Culture so far negative he is on Zosyn added Levaquin  5. Pulmonary hypertension WHO group 2 secondary to left heart failure  6. CAT scan shows prominent vascular marking congestive changes pleural effusion  7.  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

## 2020-11-07 NOTE — PROGRESS NOTES
CM met with the patients' wife and daughter at bedside in reference to discharge planning. The requested that referrals be sent to [place] and [place] with preference given to whichever facility will allow visitors. CM sent referral to both facilities and requested the visitation policies. CM will reach back out to family once policies are obtained. CM will continue to follow for discharge planning needs.

## 2020-11-07 NOTE — PROGRESS NOTES
Attempted Skilled ST services twice this date. First attempt, patient had been given Ativan and was too lethargic. Second attempt, he was asleep and unable to arouse. Will try again tomorrow.

## 2020-11-08 LAB
ANION GAP SERPL CALC-SCNC: 6 MMOL/L (ref 5–15)
BASOPHILS # BLD: 0 K/UL (ref 0–0.1)
BASOPHILS NFR BLD: 0 % (ref 0–1)
BUN SERPL-MCNC: 24 MG/DL (ref 6–20)
BUN/CREAT SERPL: 29 (ref 12–20)
CA-I BLD-MCNC: 10.2 MG/DL (ref 8.5–10.1)
CHLORIDE SERPL-SCNC: 119 MMOL/L (ref 97–108)
CO2 SERPL-SCNC: 26 MMOL/L (ref 21–32)
CREAT SERPL-MCNC: 0.82 MG/DL (ref 0.7–1.3)
DIFFERENTIAL METHOD BLD: ABNORMAL
EOSINOPHIL # BLD: 0.1 K/UL (ref 0–0.4)
EOSINOPHIL NFR BLD: 1 % (ref 0–7)
ERYTHROCYTE [DISTWIDTH] IN BLOOD BY AUTOMATED COUNT: 15.2 % (ref 11.5–14.5)
GLUCOSE BLD STRIP.AUTO-MCNC: 106 MG/DL (ref 65–100)
GLUCOSE BLD STRIP.AUTO-MCNC: 130 MG/DL (ref 65–100)
GLUCOSE SERPL-MCNC: 151 MG/DL (ref 65–100)
HCT VFR BLD AUTO: 44.2 % (ref 36.6–50.3)
HGB BLD-MCNC: 15.2 G/DL (ref 12.1–17)
IMM GRANULOCYTES # BLD AUTO: 0 K/UL (ref 0–0.04)
IMM GRANULOCYTES NFR BLD AUTO: 0 % (ref 0–0.5)
LYMPHOCYTES # BLD: 0.9 K/UL (ref 0.8–3.5)
LYMPHOCYTES NFR BLD: 13 % (ref 12–49)
MCH RBC QN AUTO: 35.3 PG (ref 26–34)
MCHC RBC AUTO-ENTMCNC: 34.4 G/DL (ref 30–36.5)
MCV RBC AUTO: 102.6 FL (ref 80–99)
MONOCYTES # BLD: 0.5 K/UL (ref 0–1)
MONOCYTES NFR BLD: 8 % (ref 5–13)
NEUTS SEG # BLD: 5.3 K/UL (ref 1.8–8)
NEUTS SEG NFR BLD: 78 % (ref 32–75)
PERFORMED BY, TECHID: ABNORMAL
PERFORMED BY, TECHID: ABNORMAL
PLATELET # BLD AUTO: 175 K/UL (ref 150–400)
POTASSIUM SERPL-SCNC: 3.5 MMOL/L (ref 3.5–5.1)
RBC # BLD AUTO: 4.31 M/UL (ref 4.1–5.7)
SODIUM SERPL-SCNC: 151 MMOL/L (ref 136–145)
WBC # BLD AUTO: 6.9 K/UL (ref 4.1–11.1)

## 2020-11-08 PROCEDURE — 85025 COMPLETE CBC W/AUTO DIFF WBC: CPT

## 2020-11-08 PROCEDURE — 65270000032 HC RM SEMIPRIVATE

## 2020-11-08 PROCEDURE — 74011250637 HC RX REV CODE- 250/637: Performed by: INTERNAL MEDICINE

## 2020-11-08 PROCEDURE — 80048 BASIC METABOLIC PNL TOTAL CA: CPT

## 2020-11-08 PROCEDURE — 82962 GLUCOSE BLOOD TEST: CPT

## 2020-11-08 PROCEDURE — 92526 ORAL FUNCTION THERAPY: CPT

## 2020-11-08 PROCEDURE — 36415 COLL VENOUS BLD VENIPUNCTURE: CPT

## 2020-11-08 PROCEDURE — 87635 SARS-COV-2 COVID-19 AMP PRB: CPT

## 2020-11-08 PROCEDURE — 74011250636 HC RX REV CODE- 250/636: Performed by: INTERNAL MEDICINE

## 2020-11-08 RX ADMIN — AMOXICILLIN AND CLAVULANATE POTASSIUM 1 TABLET: 875; 125 TABLET, FILM COATED ORAL at 20:57

## 2020-11-08 RX ADMIN — LORAZEPAM 1 MG: 2 INJECTION, SOLUTION INTRAMUSCULAR; INTRAVENOUS at 18:40

## 2020-11-08 RX ADMIN — APIXABAN 5 MG: 5 TABLET, FILM COATED ORAL at 20:57

## 2020-11-08 RX ADMIN — APIXABAN 5 MG: 5 TABLET, FILM COATED ORAL at 09:35

## 2020-11-08 RX ADMIN — DILTIAZEM HYDROCHLORIDE 60 MG: 30 TABLET, FILM COATED ORAL at 16:21

## 2020-11-08 RX ADMIN — AMOXICILLIN AND CLAVULANATE POTASSIUM 1 TABLET: 875; 125 TABLET, FILM COATED ORAL at 09:35

## 2020-11-08 RX ADMIN — DILTIAZEM HYDROCHLORIDE 60 MG: 30 TABLET, FILM COATED ORAL at 05:17

## 2020-11-08 RX ADMIN — DILTIAZEM HYDROCHLORIDE 60 MG: 30 TABLET, FILM COATED ORAL at 22:56

## 2020-11-08 RX ADMIN — DILTIAZEM HYDROCHLORIDE 60 MG: 30 TABLET, FILM COATED ORAL at 12:32

## 2020-11-08 NOTE — CONSULTS
PULMONARY NOTE  [facility]    Name: Isaac Steinberg MRN: [id number]   : 1950 Hospital: [address] [place] [address]   Date: 2020  Admission date: 10/29/2020 Hospital Day: 11       HPI:     Hospital Problems  Date Reviewed: 10/30/2020          Codes Class Noted POA    Atrial fibrillation [facility]) ICD-10-CM: I48.91  ICD-9-CM: 427.31  10/30/2020 Yes                   [x] High complexity decision making was performed  [x] See my orders for details      Subjective/Initial History:     I was asked by April Nielsen MD to see Isaac Steinberg  a 78 y.o.  male in consultation     Excerpts from admission 10/29/2020 or consult notes as follows:   73-year-old male came in because of shortness of breath,. Patient condition got worse in the emergency room he was found to be in atrial fibrillation started on IV Cardizem did not seem to help his condition got worse he was put on BiPAP machine he continues to have increased work of breathing become diaphoretic pressure was low in 70s subsequently got intubated now he is on a ventilator, he is also hypotensive hemodynamically unstable on 2 vasopressors. He is a history of atrial fibrillation but he is not taking any medication for it he took it off by himself because he does not feel like taking them.   He is not on any anticoagulation unable to get much history out of the patient so admitted and critical care consult was called for further evaluation     patient awake and alert speech is clear moves all 4 extremities respirations nonlabored remains on supplemental oxygen  No Known Allergies     MAR reviewed and pertinent medications noted or modified as needed     Current Facility-Administered Medications   Medication    albuterol-ipratropium (DUO-NEB) 2.5 MG-0.5 MG/3 ML    dilTIAZem IR (CARDIZEM) tablet 60 mg    apixaban (ELIQUIS) tablet 5 mg    amoxicillin-clavulanate (AUGMENTIN) 875-125 mg per tablet 1 Tab    morphine injection 2 mg    LORazepam (ATIVAN) injection 1 mg    acetaminophen (TYLENOL) solution 650 mg      Patient PCP: Park Medina MD  PMH:  has a past medical history of A-fib ([address] HTN (hypertension), Pacemaker, and Skin cancer. PSH:   has a past surgical history that includes hx pacemaker placement. FHX: family history includes Asthma in his mother; Heart Disease in his mother. SHX:  reports that he has never smoked. He has never used smokeless tobacco. He reports current alcohol use. He reports that he does not use drugs. Objective:     Vital Signs: Telemetry:    AFIB Intake/Output:   Visit Vitals  /82 (BP 1 Location: Right arm, BP Patient Position: At rest)   Pulse (!) 117   Temp 98.1 °F (36.7 °C)   Resp 18   Ht 6' 2.02\" (1.88 m)   Wt 102.1 kg (225 lb 1.4 oz)   SpO2 96%   BMI 28.89 kg/m²       Temp (24hrs), Av °F (36.7 °C), Min:97.9 °F (36.6 °C), Max:98.1 °F (36.7 °C)        O2 Device: Nasal cannula O2 Flow Rate (L/min): 3 l/min       Wt Readings from Last 4 Encounters:   10/30/20 102.1 kg (225 lb 1.4 oz)          Intake/Output Summary (Last 24 hours) at 2020 1750  Last data filed at 2020 0523  Gross per 24 hour   Intake    Output 750 ml   Net -750 ml       Last shift:      No intake/output data recorded. Last 3 shifts:  1901 -  0700  In: -   Out: 1950 [Urine:1950]       Physical Exam:     Physical Exam   Constitutional: He appears well-developed and well-nourished. No distress. HENT:   Head: Normocephalic and atraumatic. Eyes: Pupils are equal, round, and reactive to light. Conjunctivae and EOM are normal.   Neck: Normal range of motion. Neck supple. No JVD present. Cardiovascular: Normal rate. Irregular rhythm   Pulmonary/Chest:   Clear anteriorly and laterally with diminished in the bases   Abdominal: Soft. Bowel sounds are normal.   Musculoskeletal: Normal range of motion. General: No edema. Skin: Skin is warm.         Labs:    Recent Labs     20  1589 11/07/20  0540 11/06/20  0245   WBC 6.9 7.0 5.9   HGB 15.2 19.4* 14.9    132* 110*     Recent Labs     11/08/20  0345 11/07/20  0540 11/06/20  0245   * 150* 150*   K 3.5 3.4* 3.6   * 118* 117*   CO2 26 27 26   * 113* 109*   BUN 24* 24* 23*   CREA 0.82 0.87 0.90   CA 10.2* 10.3* 10.1     Lab Results   Component Value Date/Time    Culture result: Scant  Normal respiratory bill   11/01/2020 09:15 AM    Culture result: No growth 6 days 10/30/2020 08:30 AM     Lab Results   Component Value Date/Time    TSH 11.80 (H) 10/29/2020 06:30 AM       Imaging:    CXR Results  (Last 48 hours)    None        Results from [place] encounter on 10/29/20   XR SWALLOW FUNC VIDEO    Narrative 2 minutes and 1 image    Patient showed acceptable oral preparation propulsion with all consistencies. Swallowing is initiated with minor delay. Epiglottic motion is severely but  inconsistently restricted, in large part due to large cervical osteophytes with  associated soft tissue component and also due to intrinsic weakness. This causes  pronounced pooling, and overflow aspiration on one occasion. Note is made of  recent intubation   XR CHEST PORT    Narrative 1 view    Comparison yesterday    Mild atelectasis at underinflated bases plus minus small left effusion. Upper  lungs are clear. Normal heart and no congestion   XR CHEST PORT    Narrative Chest single view. Comparison single view chest November 3, 2020    ET tube and NG tube have been removed. Left lung base subsegmental atelectasis. No gross interstitial or alveolar pulmonary edema. Cardiac and mediastinal  structures unchanged noting left side cardiac device with single lead overlying  the basal right heart. No pneumothorax or sizable pleural effusion. Results from [place] encounter on 10/29/20   CT ABD PELV W CONT    Narrative HISTORY:  SOB, afib, eval for PE  Dose reduction technique:   All CT scans at this facility are performed using dose reduction optimization  technique as appropriate on the exam including the following: Automated exposure  control, adjustment of the MA and/or KV according to patient size of use of  iterative reconstructive technique. .    TECHNIQUE: Postcontrast CT angiogram of the chest is performed with maximum  intensity projection images (MIPS) generated. 100 cc Isovue-370 injected. COMPARISON: 1/at 7/1/2018 CTA the chest available by report only; not all images  were available. LIMITATIONS: None    LINES/TUBES/MEDICAL SUPPORT DEVICES:  Left chest wall AICD    LUNG PARENCHYMA: Mild diffuse septal thickening  TRACHEA/BRONCHI: Bronchial wall thickening throughout. PULMONARY VESSELS: Normal. No definitive persistent central filling defects  identified on multiple contiguous images to diagnose pulmonary embolism. PLEURA: Small bilateral pleural effusions  MEDIASTINUM: Normal  HEART: Multichamber cardiomegaly with persistent prominent  AORTA/GREAT VESSELS: Dilated ascending aorta 4.4 cm. No darrell aortic aneurysm or  aortic dissection  ESOPHAGUS: Normal  AXILLAE: Normal  BONES/TISSUES: No acute abnormality    UPPER ABDOMEN: See below  OTHER: None      Impression IMPRESSION: Normal CTA of the chest with no pulmonary embolism or acute aortic  abnormalities identified; dilated ascending aorta is noted and will require  continued surveillance. Findings suggestive of CHF/pulmonary edema with  bronchial wall thickening that may suggest axial interstitial edema and with  diffuse parenchymal edema throughout and with associated small bilateral pleural  effusions. HISTORY:  Abdominal distention, fever. Dose reduction technique:   All CT scans at this facility are performed using dose reduction optimization  technique as appropriate on the exam including the following: Automated exposure  control, adjustment of the MA and/or KV according to patient size of use of  iterative reconstructive technique. .    TECHNIQUE:  CT ABD PELV W CONT                           100 cc Isovue-370 injected. COMPARISON: None  LIMITATIONS: None    CHEST: See above    LIVER: Normal  GALLBLADDER: Normal  BILIARY TREE: Normal  PANCREAS: Normal  SPLEEN: Normal  ADRENAL GLANDS: Normal  KIDNEYS/URETERS/BLADDER: Kidneys and ureters appear normal. Bladder wall  thickening  AORTA/RETROPERITONEUM: Normal  BOWEL/MESENTERY: Normal  APPENDIX: Identified and normal  PERITONEAL CAVITY: Mild diffuse abdominal pelvic ascites. No free  intraperitoneal air or abdominal pelvic abscess. REPRODUCTIVE ORGANS: Normal  BONE/TISSUES: Heterogeneous mineralization of the osseous structures bodies with  multiple areas of lucency identified throughout throughout spine, pelvis and  femurs. Moderate disc protrusions L2-3, L3-4, L4-5     OTHER: None    IMPRESSION: Bladder wall thickening is noted and may indicate cystitis and/or  chronic change. Abdominopelvic ascites. Gastrohepatic adenopathy is noted and  could be due to increased fluid resorption due to ascites but is nonspecific and  may require follow-up. Minimal cholelithiasis. . Abnormal mineralization:  Multiple focal lucencies throughout the axial and appendicular spine as  described and of uncertain etiology; evaluation for metastatic disease, myeloma,  metabolic bone disease recommended. Disc disease as described. The ER physician was unable to take my call at the time of this interpretation. The findings were conveyed to the ER staff and they will have the physician call  me back with any questions. Findings were conveyed to staff member Kerline Bazan. IMPRESSION:   1. Acute hypoxic respiratory failure remains on nasal oxygen 3 L  2.  congestive heart failure. Pacemaker in place no JVD or edema  3. Hypernatremia questionably from IV fluids will discontinue  4. Atrial fibrillation   5. Moderate pulmonary hypertension  6.  History of melanoma possible metastatic disease to spine        RECOMMENDATIONS/PLAN:     1. Patient is on nasal canula 2 L  2. COVID-19 negative   3. Severe cardiomyopathy EF about 20% no signs of CHF on exam  4. Discontinue IV lactated Ringer's due to hypernatremia  5. Pulmonary hypertension WHO group 2 secondary to left heart failure  6. CAT scan shows prominent vascular marking congestive changes pleural effusion  7.  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

## 2020-11-08 NOTE — PROGRESS NOTES
Problem: Dysphagia (Adult)  Goal: *Acute Goals and Plan of Care (Insert Text)  Description: Speech Therapy Swallow Goals  Initiated 11/5/2020  -Patient will tolerate full liquids diet with nectar thick liquids without clinical indicators of aspiration given minimal cues within 7 day(s). [ ] Not met  [X]  MET    [ ] Progressing  [ ] Pearson Lanius  -Patient will tolerate pureed diet with nectar thick liquids without clinical indicators of aspiration given minimal cues within 7 day(s). [ ] Not met  [ ]  MET   [X] Progressing  [ ] Discontinue  -Patient will tolerate PO trials without clinical indicators of aspiration given minimal cues within 7 day(s). [ ] Not met  [ ]  MET   [ x] Progressing  [ ] Pearsonyvonne Lindsayius  -Patient will participate in modified barium swallow study within 7  day(s). [ ] Not met  [ x]  MET   [ ] Progressing  [ ] Michel Lindsayius  -Patient will perform laryngeal and BOT strengthening exercises with minimal cues within 7 day(s). [ ] Not met  [ ]  MET   [x ] Progressing  [ ] Discontinue  -Patient will demonstrate understanding of swallow safety precautions and aspiration precautions, diet recs with minimal cues within 7 day(s). [ ] Not met  [ ]  MET   [ x] Progressing  [ ] Discontinue      Outcome: Progressing Towards Goal     Problem: Patient Education: Go to Patient Education Activity  Goal: Patient/Family Education  Outcome: Progressing Towards Goal    SPEECH LANGUAGE PATHOLOGY DYSPHAGIA TREATMENT  Patient: Sara Nieves (18 y.o. male)  Date: 11/8/2020  Diagnosis: Atrial fibrillation ([facility] [address] [I48.91]   <principal problem not specified>       Precautions: Aspiration     ASSESSMENT:  Pt seen for tx, daughter and wife present during session. Pt is now alert and awake; mild confusion but able to answer orientation questions and provided information of being diagnosed some years ago and hospitalized for Bacterial Epiglottitis.  He noted that after that diagnosis he had residual swallowing issues. Nsg reports pt has been tolerating ice chips and nectar thick liquid diet, but appetite has been poor. Pt positioned upright in bed. Pt. Was given trials of ice water via cup that he was able to drink independently. Hyolaryngeal elevation appears functional, via digital palpation. He also tolerated tsp bites of applesauce and tomato soup. Pt is able to obtain strong voicing with prompts. PLAN:  Recommendations and Planned Interventions:  Recommending to upgrade diet to pureed w/nectar thick liquids; fed by staff only when alert using aspiration/GERD precautions. Patient continues to benefit from skilled intervention to address the above impairments. Continue treatment per established plan of care. Discharge Recommendations: To Be Determined     SUBJECTIVE:   Patient stated I have really good days and really bad days. OBJECTIVE:   Cognitive and Communication Status:  Neurologic State: Confused, Eyes open spontaneously  Orientation Level: Oriented to person  Cognition: Impaired decision making, Memory loss, Poor safety awareness, Decreased attention/concentration        Pain:  Pain Scale 1: FACES  Pain Intensity 1: 0       After treatment:   Patient left in no apparent distress in bed, Call bell within reach, Nursing notified, and Caregiver / family present    COMMUNICATION/EDUCATION:   Patient was educated regarding his deficit(s) of dysphagia, swallow safety precautions, diet recs and POC. He demonstrated Good understanding as evidenced by verbal responses. The patient's plan of care including recommendations, planned interventions, and recommended diet changes were discussed with: Registered nurse and Physician.      Gilda Galindo-Vickey  Time Calculation: 13 mins

## 2020-11-08 NOTE — PROGRESS NOTES
Hospitalist Progress Note         Cande Dumont MD          Daily Progress Note: 2020      Subjective: The patient is seen for follow  up. He was made comfort care by family 2020. He is seen in follow-up. brother at bedside. He is surprisingly more awake and conversant this morning. Denies chest pain or shortness of breath. Says he wants to live for many more years to take care of his grandson. Denies chest pain or shortness of breath  Has no complaints. Intermittently confused and has poor oral intake    Problem List:  Problem List as of 2020 Date Reviewed: 10/30/2020          Codes Class Noted - Resolved    Atrial fibrillation [facility]) ICD-10-CM: I48.91  ICD-9-CM: 427.31  10/30/2020 - Present              Medications reviewed  Current Facility-Administered Medications   Medication Dose Route Frequency    albuterol-ipratropium (DUO-NEB) 2.5 MG-0.5 MG/3 ML  3 mL Nebulization Q6H PRN    dilTIAZem IR (CARDIZEM) tablet 60 mg  60 mg Oral Q6H    apixaban (ELIQUIS) tablet 5 mg  5 mg Oral BID    amoxicillin-clavulanate (AUGMENTIN) 875-125 mg per tablet 1 Tab  1 Tab Oral Q12H    dextrose 5% lactated ringers infusion  50 mL/hr IntraVENous CONTINUOUS    morphine injection 2 mg  2 mg IntraVENous Q1H PRN    LORazepam (ATIVAN) injection 1 mg  1 mg IntraVENous Q1H PRN    acetaminophen (TYLENOL) solution 650 mg  650 mg Per NG tube Q4H PRN       Review of Systems:   Review of systems not obtained due to patient factors. Objective:   Physical Exam:     Visit Vitals  /82 (BP 1 Location: Right arm, BP Patient Position: At rest)   Pulse 98   Temp 98.1 °F (36.7 °C)   Resp 18   Ht 6' 2.02\" (1.88 m)   Wt 102.1 kg (225 lb 1.4 oz)   SpO2 96%   BMI 28.89 kg/m²    O2 Flow Rate (L/min): 3 l/min O2 Device: Nasal cannula    Temp (24hrs), Av.5 °F (36.9 °C), Min:97.9 °F (36.6 °C), Max:99.9 °F (37.7 °C)    No intake/output data recorded.    1901 -  0700  In: - Out: 1950 [Urine:1950]    General:  Awake and conversant but intermittently confused   Lungs:   Clear to auscultation bilaterally. Chest wall:  No tenderness or deformity. Heart:  Regular rate and rhythm, S1, S2 normal, no murmur, click, rub or gallop. Abdomen:   Soft, non-tender. Bowel sounds normal. No masses,  No organomegaly. Extremities: Extremities normal, atraumatic, no cyanosis or edema. Pulses: 2+ and symmetric all extremities. Skin: Skin color, texture, turgor normal. No rashes or lesions   Neurologic: unresponsive     Data Review:       Recent Days:  Recent Labs     11/08/20  0345 11/07/20  0540 11/06/20  0245   WBC 6.9 7.0 5.9   HGB 15.2 19.4* 14.9   HCT 44.2 54.5* 43.5    132* 110*     Recent Labs     11/08/20  0345 11/07/20  0540 11/06/20  0245   * 150* 150*   K 3.5 3.4* 3.6   * 118* 117*   CO2 26 27 26   * 113* 109*   BUN 24* 24* 23*   CREA 0.82 0.87 0.90   CA 10.2* 10.3* 10.1     No results for input(s): PH, PCO2, PO2, HCO3, FIO2 in the last 72 hours. 24 Hour Results:  Recent Results (from the past 24 hour(s))   CBC WITH AUTOMATED DIFF    Collection Time: 11/08/20  3:45 AM   Result Value Ref Range    WBC 6.9 4.1 - 11.1 K/uL    RBC 4.31 4.10 - 5.70 M/uL    HGB 15.2 12.1 - 17.0 g/dL    HCT 44.2 36.6 - 50.3 %    .6 (H) 80.0 - 99.0 FL    MCH 35.3 (H) 26.0 - 34.0 PG    MCHC 34.4 30.0 - 36.5 g/dL    RDW 15.2 (H) 11.5 - 14.5 %    PLATELET 615 186 - 028 K/uL    NEUTROPHILS 78 (H) 32 - 75 %    LYMPHOCYTES 13 12 - 49 %    MONOCYTES 8 5 - 13 %    EOSINOPHILS 1 0 - 7 %    BASOPHILS 0 0 - 1 %    IMMATURE GRANULOCYTES 0 0.0 - 0.5 %    ABS. NEUTROPHILS 5.3 1.8 - 8.0 K/UL    ABS. LYMPHOCYTES 0.9 0.8 - 3.5 K/UL    ABS. MONOCYTES 0.5 0.0 - 1.0 K/UL    ABS. EOSINOPHILS 0.1 0.0 - 0.4 K/UL    ABS. BASOPHILS 0.0 0.0 - 0.1 K/UL    ABS. IMM.  GRANS. 0.0 0.00 - 0.04 K/UL    DF AUTOMATED     METABOLIC PANEL, BASIC    Collection Time: 11/08/20  3:45 AM   Result Value Ref Range Sodium 151 (H) 136 - 145 mmol/L    Potassium 3.5 3.5 - 5.1 mmol/L    Chloride 119 (H) 97 - 108 mmol/L    CO2 26 21 - 32 mmol/L    Anion gap 6 5 - 15 mmol/L    Glucose 151 (H) 65 - 100 mg/dL    BUN 24 (H) 6 - 20 mg/dL    Creatinine 0.82 0.70 - 1.30 mg/dL    BUN/Creatinine ratio 29 (H) 12 - 20      GFR est AA >60 >60 ml/min/1.73m2    GFR est non-AA >60 >60 ml/min/1.73m2    Calcium 10.2 (H) 8.5 - 10.1 mg/dL   GLUCOSE, POC    Collection Time: 11/08/20  7:17 AM   Result Value Ref Range    Glucose (POC) 106 (H) 65 - 100 mg/dL    Performed by [facility], POC    Collection Time: 11/08/20 10:29 AM   Result Value Ref Range    Glucose (POC) 130 (H) 65 - 100 mg/dL    Performed by [facility]            Assessment/     Acute respiratory failure with hypoxia, requiring mechanical ventilation. Extubated for comfort care     COVID-19 ruled out     Acute systolic heart failure, EF 20%     Aspiration pneumonia     Paroxysmal atrial fibrillation with RVR, now with controlled rate     Acute kidney injury possible ATN. Resolved     Moderate pulmonary hypertension     Severe sepsis/septic shock, possibly due to aspiration.      Blindness right eye with pinpoint pupil     Suspected metastatic disease versus myeloma versus metabolic bone disease on CT. elevated PSA suggest possible metastatic prostate cancer as the etiology. Could also be melanoma     History of melanoma 18 years ago     Thrombocytopenia likely due to sepsis     Intellectual disability    Plan:  Continue supportive care measures  Obtain speech therapy evaluation and PT OT  Begin oral Augmentin  Goals of care discussed with family  Recommendations for acute rehab versus skilled care facility. Await placement  COVID screening ordered      Care Plan discussed with: Patient/Family    Total time spent with patient: 30 minutes.     Justice Alcocer MD

## 2020-11-09 VITALS
HEART RATE: 148 BPM | RESPIRATION RATE: 18 BRPM | DIASTOLIC BLOOD PRESSURE: 74 MMHG | WEIGHT: 225.09 LBS | HEIGHT: 74 IN | SYSTOLIC BLOOD PRESSURE: 142 MMHG | BODY MASS INDEX: 28.89 KG/M2 | TEMPERATURE: 98.7 F | OXYGEN SATURATION: 96 %

## 2020-11-09 LAB
ALBUMIN SERPL-MCNC: 2.7 G/DL (ref 3.5–5)
ANION GAP SERPL CALC-SCNC: 6 MMOL/L (ref 5–15)
BASOPHILS # BLD: 0 K/UL (ref 0–0.1)
BASOPHILS NFR BLD: 0 % (ref 0–1)
BUN SERPL-MCNC: 24 MG/DL (ref 6–20)
BUN/CREAT SERPL: 28 (ref 12–20)
CA-I BLD-MCNC: 10.1 MG/DL (ref 8.5–10.1)
CHLORIDE SERPL-SCNC: 119 MMOL/L (ref 97–108)
CO2 SERPL-SCNC: 26 MMOL/L (ref 21–32)
CREAT SERPL-MCNC: 0.85 MG/DL (ref 0.7–1.3)
DIFFERENTIAL METHOD BLD: ABNORMAL
EOSINOPHIL # BLD: 0.1 K/UL (ref 0–0.4)
EOSINOPHIL NFR BLD: 1 % (ref 0–7)
ERYTHROCYTE [DISTWIDTH] IN BLOOD BY AUTOMATED COUNT: 15.6 % (ref 11.5–14.5)
GLUCOSE BLD STRIP.AUTO-MCNC: 104 MG/DL (ref 65–100)
GLUCOSE SERPL-MCNC: 101 MG/DL (ref 65–100)
HCT VFR BLD AUTO: 42.5 % (ref 36.6–50.3)
HGB BLD-MCNC: 15 G/DL (ref 12.1–17)
IMM GRANULOCYTES # BLD AUTO: 0 K/UL (ref 0–0.04)
IMM GRANULOCYTES NFR BLD AUTO: 0 % (ref 0–0.5)
LYMPHOCYTES # BLD: 1 K/UL (ref 0.8–3.5)
LYMPHOCYTES NFR BLD: 11 % (ref 12–49)
MCH RBC QN AUTO: 36.7 PG (ref 26–34)
MCHC RBC AUTO-ENTMCNC: 35.3 G/DL (ref 30–36.5)
MCV RBC AUTO: 103.9 FL (ref 80–99)
MONOCYTES # BLD: 0.6 K/UL (ref 0–1)
MONOCYTES NFR BLD: 7 % (ref 5–13)
NEUTS SEG # BLD: 7 K/UL (ref 1.8–8)
NEUTS SEG NFR BLD: 81 % (ref 32–75)
PERFORMED BY, TECHID: ABNORMAL
PHOSPHATE SERPL-MCNC: 2.7 MG/DL (ref 2.6–4.7)
PLATELET # BLD AUTO: 238 K/UL (ref 150–400)
POTASSIUM SERPL-SCNC: 3.4 MMOL/L (ref 3.5–5.1)
RBC # BLD AUTO: 4.09 M/UL (ref 4.1–5.7)
SARS-COV-2, COV2: NORMAL
SODIUM SERPL-SCNC: 151 MMOL/L (ref 136–145)
WBC # BLD AUTO: 8.7 K/UL (ref 4.1–11.1)

## 2020-11-09 PROCEDURE — 85025 COMPLETE CBC W/AUTO DIFF WBC: CPT

## 2020-11-09 PROCEDURE — 80069 RENAL FUNCTION PANEL: CPT

## 2020-11-09 PROCEDURE — 82962 GLUCOSE BLOOD TEST: CPT

## 2020-11-09 PROCEDURE — 36415 COLL VENOUS BLD VENIPUNCTURE: CPT

## 2020-11-09 PROCEDURE — 74011250637 HC RX REV CODE- 250/637: Performed by: INTERNAL MEDICINE

## 2020-11-09 RX ORDER — LISINOPRIL 10 MG/1
10 TABLET ORAL DAILY
Qty: 30 TAB | Refills: 0 | Status: SHIPPED | OUTPATIENT
Start: 2020-11-09 | End: 2020-11-19

## 2020-11-09 RX ORDER — POTASSIUM CHLORIDE 20 MEQ/1
40 TABLET, EXTENDED RELEASE ORAL
Status: COMPLETED | OUTPATIENT
Start: 2020-11-09 | End: 2020-11-09

## 2020-11-09 RX ORDER — LISINOPRIL 10 MG/1
10 TABLET ORAL DAILY
Status: DISCONTINUED | OUTPATIENT
Start: 2020-11-09 | End: 2020-11-09 | Stop reason: HOSPADM

## 2020-11-09 RX ORDER — AMOXICILLIN AND CLAVULANATE POTASSIUM 875; 125 MG/1; MG/1
1 TABLET, FILM COATED ORAL EVERY 12 HOURS
Qty: 14 TAB | Refills: 0 | Status: SHIPPED | OUTPATIENT
Start: 2020-11-09 | End: 2020-11-16

## 2020-11-09 RX ORDER — FUROSEMIDE 40 MG/1
40 TABLET ORAL DAILY
Status: DISCONTINUED | OUTPATIENT
Start: 2020-11-10 | End: 2020-11-09 | Stop reason: HOSPADM

## 2020-11-09 RX ORDER — DILTIAZEM HYDROCHLORIDE 60 MG/1
60 TABLET, FILM COATED ORAL EVERY 6 HOURS
Qty: 120 TAB | Refills: 0 | Status: SHIPPED | OUTPATIENT
Start: 2020-11-09 | End: 2020-11-19

## 2020-11-09 RX ORDER — FUROSEMIDE 40 MG/1
40 TABLET ORAL DAILY
Qty: 30 TAB | Refills: 0 | Status: SHIPPED | OUTPATIENT
Start: 2020-11-09 | End: 2020-11-19

## 2020-11-09 RX ORDER — POTASSIUM CHLORIDE 20 MEQ/1
20 TABLET, EXTENDED RELEASE ORAL DAILY
Qty: 14 TAB | Refills: 0 | Status: SHIPPED | OUTPATIENT
Start: 2020-11-10 | End: 2020-11-19

## 2020-11-09 RX ORDER — POTASSIUM CHLORIDE 20 MEQ/1
20 TABLET, EXTENDED RELEASE ORAL DAILY
Status: DISCONTINUED | OUTPATIENT
Start: 2020-11-10 | End: 2020-11-09 | Stop reason: HOSPADM

## 2020-11-09 RX ADMIN — APIXABAN 5 MG: 5 TABLET, FILM COATED ORAL at 08:32

## 2020-11-09 RX ADMIN — DILTIAZEM HYDROCHLORIDE 60 MG: 30 TABLET, FILM COATED ORAL at 12:49

## 2020-11-09 RX ADMIN — AMOXICILLIN AND CLAVULANATE POTASSIUM 1 TABLET: 875; 125 TABLET, FILM COATED ORAL at 08:32

## 2020-11-09 RX ADMIN — DILTIAZEM HYDROCHLORIDE 60 MG: 30 TABLET, FILM COATED ORAL at 04:56

## 2020-11-09 RX ADMIN — LISINOPRIL 10 MG: 10 TABLET ORAL at 12:49

## 2020-11-09 RX ADMIN — POTASSIUM CHLORIDE 40 MEQ: 1500 TABLET, EXTENDED RELEASE ORAL at 12:49

## 2020-11-09 NOTE — PROGRESS NOTES
Pulmonary Progress Note               Daily Progress Note: 11/9/2020      Subjective: The patient is seen for follow  up. Excerpts from admission 10/29/2020 or consult notes as follows:   79-year-old male came in because of shortness of breath,. Patient condition got worse in the emergency room he was found to be in atrial fibrillation started on IV Cardizem did not seem to help his condition got worse he was put on BiPAP machine he continues to have increased work of breathing become diaphoretic pressure was low in 70s subsequently got intubated now he is on a ventilator, he is also hypotensive hemodynamically unstable on 2 vasopressors. He is a history of atrial fibrillation but he is not taking any medication for it he took it off by himself because he does not feel like taking them. He is not on any anticoagulation unable to get much history out of the patient so admitted and critical care consult was called for further evaluation     11/8 patient awake and alert speech is clear moves all 4 extremities respirations nonlabored remains on supplemental oxygen  11/09 Still on 3 L NC. Denies cough and SOB.     No Known Allergies     Problem List:  Problem List as of 11/9/2020 Date Reviewed: 10/30/2020          Codes Class Noted - Resolved    Atrial fibrillation [facility]) ICD-10-CM: I48.91  ICD-9-CM: 427.31  10/30/2020 - Present              Medications reviewed  Current Facility-Administered Medications   Medication Dose Route Frequency    albuterol-ipratropium (DUO-NEB) 2.5 MG-0.5 MG/3 ML  3 mL Nebulization Q6H PRN    dilTIAZem IR (CARDIZEM) tablet 60 mg  60 mg Oral Q6H    apixaban (ELIQUIS) tablet 5 mg  5 mg Oral BID    amoxicillin-clavulanate (AUGMENTIN) 875-125 mg per tablet 1 Tab  1 Tab Oral Q12H    morphine injection 2 mg  2 mg IntraVENous Q1H PRN    LORazepam (ATIVAN) injection 1 mg  1 mg IntraVENous Q1H PRN    acetaminophen (TYLENOL) solution 650 mg  650 mg Per NG tube Q4H PRN       Review of Systems: A comprehensive review of systems was negative except for that written in the HPI. Objective:   Physical Exam:     Visit Vitals  BP (!) 153/83   Pulse (!) 128   Temp 98.7 °F (37.1 °C)   Resp 16   Ht 6' 2.02\" (1.88 m)   Wt 102.1 kg (225 lb 1.4 oz)   SpO2 96%   BMI 28.89 kg/m²    O2 Flow Rate (L/min): 3 l/min O2 Device: Nasal cannula    Temp (24hrs), Av °F (37.2 °C), Min:98.7 °F (37.1 °C), Max:99.2 °F (37.3 °C)    No intake/output data recorded.  1901 -  07  In: -   Out: 7346 [Urine:1650]    Constitutional: He appears well-developed and well-nourished. No distress. HENT:   Head: Normocephalic and atraumatic. Eyes: Pupils are equal, round, and reactive to light. Conjunctivae and EOM are normal.   Neck: Normal range of motion. Neck supple. No JVD present. Cardiovascular: Normal rate. Irregular rhythm   Pulmonary/Chest:   Clear anteriorly and laterally with diminished in the bases   Abdominal: Soft. Bowel sounds are normal.   Musculoskeletal: Normal range of motion. General: No edema. Skin: Skin is warm. Data Review:       Recent Days:  Recent Labs     205 20  0540   WBC 8.7 6.9 7.0   HGB 15.0 15.2 19.4*   HCT 42.5 44.2 54.5*    175 132*     Recent Labs     20  0345 20  0540   * 151* 150*   K 3.4* 3.5 3.4*   * 119* 118*   CO2 26 26 27   * 151* 113*   BUN 24* 24* 24*   CREA 0.85 0.82 0.87   CA 10.1 10.2* 10.3*   PHOS 2.7  --   --    ALB 2.7*  --   --      No results for input(s): PH, PCO2, PO2, HCO3, FIO2 in the last 72 hours.     24 Hour Results:  Recent Results (from the past 24 hour(s))   GLUCOSE, POC    Collection Time: 20 10:29 AM   Result Value Ref Range    Glucose (POC) 130 (H) 65 - 100 mg/dL    [facility]    SARS-COV-2    Collection Time: 20 11:45 AM   Result Value Ref Range    SARS-CoV-2 Please find results under separate order     CBC WITH AUTOMATED DIFF    Collection Time: 11/09/20  5:20 AM   Result Value Ref Range    WBC 8.7 4.1 - 11.1 K/uL    RBC 4.09 (L) 4.10 - 5.70 M/uL    HGB 15.0 12.1 - 17.0 g/dL    HCT 42.5 36.6 - 50.3 %    .9 (H) 80.0 - 99.0 FL    MCH 36.7 (H) 26.0 - 34.0 PG    MCHC 35.3 30.0 - 36.5 g/dL    RDW 15.6 (H) 11.5 - 14.5 %    PLATELET 837 437 - 999 K/uL    NEUTROPHILS 81 (H) 32 - 75 %    LYMPHOCYTES 11 (L) 12 - 49 %    MONOCYTES 7 5 - 13 %    EOSINOPHILS 1 0 - 7 %    BASOPHILS 0 0 - 1 %    IMMATURE GRANULOCYTES 0 0.0 - 0.5 %    ABS. NEUTROPHILS 7.0 1.8 - 8.0 K/UL    ABS. LYMPHOCYTES 1.0 0.8 - 3.5 K/UL    ABS. MONOCYTES 0.6 0.0 - 1.0 K/UL    ABS. EOSINOPHILS 0.1 0.0 - 0.4 K/UL    ABS. BASOPHILS 0.0 0.0 - 0.1 K/UL    ABS. IMM. GRANS. 0.0 0.00 - 0.04 K/UL    DF AUTOMATED     RENAL FUNCTION PANEL    Collection Time: 11/09/20  5:20 AM   Result Value Ref Range    Sodium 151 (H) 136 - 145 mmol/L    Potassium 3.4 (L) 3.5 - 5.1 mmol/L    Chloride 119 (H) 97 - 108 mmol/L    CO2 26 21 - 32 mmol/L    Anion gap 6 5 - 15 mmol/L    Glucose 101 (H) 65 - 100 mg/dL    BUN 24 (H) 6 - 20 mg/dL    Creatinine 0.85 0.70 - 1.30 mg/dL    BUN/Creatinine ratio 28 (H) 12 - 20      GFR est AA >60 >60 ml/min/1.73m2    GFR est non-AA >60 >60 ml/min/1.73m2    Calcium 10.1 8.5 - 10.1 mg/dL    Phosphorus 2.7 2.6 - 4.7 mg/dL    Albumin 2.7 (L) 3.5 - 5.0 g/dL   GLUCOSE, POC    Collection Time: 11/09/20  9:13 AM   Result Value Ref Range    Glucose (POC) 104 (H) 65 - 100 mg/dL    Performed by [facility]      Imaging:         CXR Results  (Last 48 hours)     None               Results from Hospital Encounter encounter on 10/29/20   XR SWALLOW FUNC VIDEO     Narrative 2 minutes and 1 image     Patient showed acceptable oral preparation propulsion with all consistencies. Swallowing is initiated with minor delay.  Epiglottic motion is severely but  inconsistently restricted, in large part due to large cervical osteophytes with  associated soft tissue component and also due to intrinsic weakness. This causes  pronounced pooling, and overflow aspiration on one occasion. Note is made of  recent intubation   XR CHEST PORT     Narrative 1 view     Comparison yesterday     Mild atelectasis at underinflated bases plus minus small left effusion. Upper  lungs are clear. Normal heart and no congestion   XR CHEST PORT     Narrative Chest single view.     Comparison single view chest November 3, 2020     ET tube and NG tube have been removed. Left lung base subsegmental atelectasis. No gross interstitial or alveolar pulmonary edema. Cardiac and mediastinal  structures unchanged noting left side cardiac device with single lead overlying  the basal right heart. No pneumothorax or sizable pleural effusion.           Results from Hospital Encounter encounter on 10/29/20   CT ABD PELV W CONT     Narrative HISTORY:  SOB, afib, eval for PE  Dose reduction technique: All CT scans at this facility are performed using dose reduction optimization  technique as appropriate on the exam including the following: Automated exposure  control, adjustment of the MA and/or KV according to patient size of use of  iterative reconstructive technique. .     TECHNIQUE: Postcontrast CT angiogram of the chest is performed with maximum  intensity projection images (MIPS) generated. 100 cc Isovue-370 injected.         COMPARISON: 1/at 7/1/2018 CTA the chest available by report only; not all images  were available.        LIMITATIONS: None     LINES/TUBES/MEDICAL SUPPORT DEVICES:  Left chest wall AICD     LUNG PARENCHYMA: Mild diffuse septal thickening  TRACHEA/BRONCHI: Bronchial wall thickening throughout. PULMONARY VESSELS: Normal. No definitive persistent central filling defects  identified on multiple contiguous images to diagnose pulmonary embolism.   PLEURA: Small bilateral pleural effusions  MEDIASTINUM: Normal  HEART: Multichamber cardiomegaly with persistent prominent  AORTA/GREAT VESSELS: Dilated ascending aorta 4.4 cm. No darrell aortic aneurysm or  aortic dissection  ESOPHAGUS: Normal  AXILLAE: Normal  BONES/TISSUES: No acute abnormality     UPPER ABDOMEN: See below  OTHER: None        Impression IMPRESSION: Normal CTA of the chest with no pulmonary embolism or acute aortic  abnormalities identified; dilated ascending aorta is noted and will require  continued surveillance. Findings suggestive of CHF/pulmonary edema with  bronchial wall thickening that may suggest axial interstitial edema and with  diffuse parenchymal edema throughout and with associated small bilateral pleural  effusions.           HISTORY:  Abdominal distention, fever.     Dose reduction technique: All CT scans at this facility are performed using dose reduction optimization  technique as appropriate on the exam including the following: Automated exposure  control, adjustment of the MA and/or KV according to patient size of use of  iterative reconstructive technique. .     TECHNIQUE:  CT ABD PELV W CONT                           100 cc Isovue-370 injected.      COMPARISON: None  LIMITATIONS: None     CHEST: See above     LIVER: Normal  GALLBLADDER: Normal  BILIARY TREE: Normal  PANCREAS: Normal  SPLEEN: Normal  ADRENAL GLANDS: Normal  KIDNEYS/URETERS/BLADDER: Kidneys and ureters appear normal. Bladder wall  thickening  AORTA/RETROPERITONEUM: Normal  BOWEL/MESENTERY: Normal  APPENDIX: Identified and normal  PERITONEAL CAVITY: Mild diffuse abdominal pelvic ascites. No free  intraperitoneal air or abdominal pelvic abscess. REPRODUCTIVE ORGANS: Normal  BONE/TISSUES: Heterogeneous mineralization of the osseous structures bodies with  multiple areas of lucency identified throughout throughout spine, pelvis and  femurs. Moderate disc protrusions L2-3, L3-4, L4-5      OTHER: None     IMPRESSION: Bladder wall thickening is noted and may indicate cystitis and/or  chronic change. Abdominopelvic ascites. Gastrohepatic adenopathy is noted and  could be due to increased fluid resorption due to ascites but is nonspecific and  may require follow-up. Minimal cholelithiasis. . Abnormal mineralization:  Multiple focal lucencies throughout the axial and appendicular spine as  described and of uncertain etiology; evaluation for metastatic disease, myeloma,  metabolic bone disease recommended. Disc disease as described.     The ER physician was unable to take my call at the time of this interpretation. The findings were conveyed to the ER staff and they will have the physician call  me back with any questions. Findings were conveyed to staff member Kerline Bazan.            IMPRESSION:   1. Acute hypoxic respiratory failure remains on nasal oxygen 3 L  2.  congestive heart failure. Pacemaker in place no JVD or edema  3. Hypernatremia questionably from IV fluids will discontinue  4. Atrial fibrillation   5. Moderate pulmonary hypertension  6. History of melanoma possible metastatic disease to spine          RECOMMENDATIONS/PLAN:      1. Patient is on nasal canula 3 L  2. COVID-19 negative  3. On augmentin  4. Severe cardiomyopathy EF about 20% no signs of CHF on exam  5. Discontinue IV lactated Ringer's due to hypernatremia  6. Pulmonary hypertension WHO group 2 secondary to left heart failure  7. CAT scan shows prominent vascular marking congestive changes pleural effusion  8.  He is blind from the right eye                   Gabby Lucas MD

## 2020-11-09 NOTE — DISCHARGE SUMMARY
Physician Discharge Summary     Patient ID:    Cris Rogers  [id number]  78 y.o.  1950    Admit date: 10/29/2020    Discharge date : 11/9/2020    Chronic Diagnoses:    Problem List as of 11/9/2020 Date Reviewed: 10/30/2020          Codes Class Noted - Resolved    Atrial fibrillation [facility]) ICD-10-CM: I48.91  ICD-9-CM: 427.31  10/30/2020 - Present          22    Final Diagnoses:   Atrial fibrillation ([address] [I48.91]   Acute respiratory failure with hypoxia, requiring mechanical ventilation. Extubated for comfort care     COVID-19 ruled out     Acute systolic heart failure, EF 20%     Aspiration pneumonia     Paroxysmal atrial fibrillation with RVR.     Acute kidney injury possible ATN.         Moderate pulmonary hypertension     Severe sepsis/septic shock, possibly due to aspiration.      Blindness right eye with pinpoint pupil     Suspected metastatic disease versus myeloma versus metabolic bone disease on CT   History of melanoma 18 years ago     Thrombocytopenia likely due to sepsis       Reason for Hospitalization: Weakness and shortness of breath        Hospital Course: Per H and P,\"79 y.o. male with history of atrial fibrillation and hypertension presents to the emergency room complaining of significant shortness of breath started 2 days ago. Got worse and inability to breathe today so presented to the emergency room. Denies anything in particular trigger. Rest is little improvement even though he still very short of breath but activity make it worse. Having trouble breathing, mild nonproductive cough. Denies any fever or chills. States stopped taking medications as he felt he is improved from his atrial fibrillation. Currently he is not on any medications. Found with atrial fibrillation with rapid ventricular rate, given IV diltiazem twice in the emergency room with not much improvement.   Patient appears to be in heart failure for CT scan of the chest, but the urine he is making looks very dark, also he does not look fluid overloaded general body. Her chest x-ray report and CT report shows pulmonary vascular congestion\". He was immediately started on IV diuretics. Later went into respiratory distress possibly from aspiration and had to be intubated and placed in the intensive care unit. COVID-19 negative. After several days of being on ventilator, family decided on comfort care and patient was extubated. Transfer to medical floor for comfort measures but surprisingly improved. Treatment restarted and comfort care withdrawn. Family has decided on a DNR status. Overall long-term prognosis poor. He will be transferred to skilled care facility for rehab              Discharge Medications:   Current Discharge Medication List      START taking these medications    Details   amoxicillin-clavulanate (AUGMENTIN) 875-125 mg per tablet Take 1 Tab by mouth every twelve (12) hours for 7 days. Qty: 14 Tab, Refills: 0      apixaban (ELIQUIS) 5 mg tablet Take 1 Tab by mouth two (2) times a day for 30 days. Qty: 60 Tab, Refills: 0      dilTIAZem IR (CARDIZEM) 60 mg tablet Take 1 Tab by mouth every six (6) hours for 30 days. Qty: 120 Tab, Refills: 0      furosemide (LASIX) 40 mg tablet Take 1 Tab by mouth daily for 30 days. Qty: 30 Tab, Refills: 0      lisinopriL (PRINIVIL, ZESTRIL) 10 mg tablet Take 1 Tab by mouth daily for 30 days. Qty: 30 Tab, Refills: 0      potassium chloride (K-DUR, KLOR-CON) 20 mEq tablet Take 1 Tab by mouth daily for 14 days. Qty: 14 Tab, Refills: 0               Follow up Care:    1. Krista Adame MD in 1-2 weeks. Please call to set up an appointment shortly after discharge. Diet:  Cardiac Diet    Disposition:  SNF.     Advanced Directive:   FULL    DNR      Discharge Exam:  Visit Vitals  BP (!) 142/74   Pulse (!) 128   Temp 98.7 °F (37.1 °C)   Resp 18   Ht 6' 2.02\" (1.88 m)   Wt 102.1 kg (225 lb 1.4 oz)   SpO2 96%   BMI 28.89 kg/m²    O2 Flow Rate (L/min): 3 l/min O2 Device: Nasal cannula    Temp (24hrs), Av.9 °F (37.2 °C), Min:98.7 °F (37.1 °C), Max:99.2 °F (37.3 °C)    No intake/output data recorded.  1901 -  0700  In: -   Out: 9724 [Urine:1650]    General:  Alert, cooperative, no distress, appears stated age. Lungs:   Clear to auscultation bilaterally. Chest wall:  No tenderness or deformity. Heart:  Regular rate and rhythm, S1, S2 normal, no murmur, click, rub or gallop. Abdomen:   Soft, non-tender. Bowel sounds normal. No masses,  No organomegaly. Extremities: Extremities normal, atraumatic, no cyanosis or edema. Pulses: 2+ and symmetric all extremities. Skin: Skin color, texture, turgor normal. No rashes or lesions   Neurologic: CNII-XII intact. No gross sensory or motor deficits         CONSULTATIONS: Pulmonary/Intensive care    Significant Diagnostic Studies:   10/29/2020: BUN 11 mg/dL (Ref range: 6 - 20 mg/dL); Calcium 9.4 mg/dL (Ref range: 8.5 - 10.1 mg/dL); CO2 16 mmol/L* (Ref range: 21 - 32 mmol/L); Creatinine 0.91 mg/dL (Ref range: 0.70 - 1.30 mg/dL); Glucose 117 mg/dL* (Ref range: 65 - 100 mg/dL); HCT 38.9 % (Ref range: 36.6 - 50.3 %); HGB 13.4 g/dL (Ref range: 12.1 - 17.0 g/dL); Potassium 3.4 mmol/L* (Ref range: 3.5 - 5.1 mmol/L); Sodium 136 mmol/L (Ref range: 136 - 145 mmol/L)  10/30/2020: BUN 16 mg/dL (Ref range: 6 - 20 mg/dL); Calcium 8.6 mg/dL (Ref range: 8.5 - 10.1 mg/dL); CO2 22 mmol/L (Ref range: 21 - 32 mmol/L); Creatinine 1.61 mg/dL* (Ref range: 0.70 - 1.30 mg/dL); Glucose 169 mg/dL* (Ref range: 65 - 100 mg/dL); Potassium 5.3 mmol/L* (Ref range: 3.5 - 5.1 mmol/L);  Sodium 135 mmol/L* (Ref range: 136 - 145 mmol/L)  Recent Labs     20   WBC 8.7 6.9   HGB 15.0 15.2   HCT 42.5 44.2    175     Recent Labs     20  05   * 151* 150*   K 3.4* 3.5 3.4*   * 119* 118*   CO2 26 26 27   BUN 24* 24* 24*   CREA 0.85 0.82 0.87   * 151* 113*   CA 10.1 10.2* 10.3*   PHOS 2.7  --   --      Recent Labs     11/09/20  0520   ALB 2.7*     No results for input(s): INR, PTP, APTT, INREXT in the last 72 hours. No results for input(s): FE, TIBC, PSAT, FERR in the last 72 hours. No results for input(s): PH, PCO2, PO2 in the last 72 hours. No results for input(s): CPK, CKMB in the last 72 hours.     No lab exists for component: TROPONINI  Lab Results   Component Value Date/Time    Glucose (POC) 104 (H) 11/09/2020 09:13 AM    Glucose (POC) 130 (H) 11/08/2020 10:29 AM    Glucose (POC) 106 (H) 11/08/2020 07:17 AM    Glucose (POC) 108 (H) 11/06/2020 07:18 AM    Glucose (POC) 114 (H) 11/01/2020 11:35 AM       Total Time: 35 minutes    Signed:  Sarah Costa MD  11/9/2020  11:49 AM

## 2020-11-09 NOTE — PROGRESS NOTES
Pulmonary Progress Note               Daily Progress Note: 11/9/2020      Subjective: The patient is seen for follow  up. Excerpts from admission 10/29/2020 or consult notes as follows:   82-year-old male came in because of shortness of breath,. Patient condition got worse in the emergency room he was found to be in atrial fibrillation started on IV Cardizem did not seem to help his condition got worse he was put on BiPAP machine he continues to have increased work of breathing become diaphoretic pressure was low in 70s subsequently got intubated now he is on a ventilator, he is also hypotensive hemodynamically unstable on 2 vasopressors. He is a history of atrial fibrillation but he is not taking any medication for it he took it off by himself because he does not feel like taking them. He is not on any anticoagulation unable to get much history out of the patient so admitted and critical care consult was called for further evaluation     11/8 patient awake and alert speech is clear moves all 4 extremities respirations nonlabored remains on supplemental oxygen  11/09 Still on 3 L NC. Denies cough and SOB.     No Known Allergies     Problem List:  Problem List as of 11/9/2020 Date Reviewed: 10/30/2020          Codes Class Noted - Resolved    Atrial fibrillation [facility]) ICD-10-CM: I48.91  ICD-9-CM: 427.31  10/30/2020 - Present              Medications reviewed  Current Facility-Administered Medications   Medication Dose Route Frequency    albuterol-ipratropium (DUO-NEB) 2.5 MG-0.5 MG/3 ML  3 mL Nebulization Q6H PRN    dilTIAZem IR (CARDIZEM) tablet 60 mg  60 mg Oral Q6H    apixaban (ELIQUIS) tablet 5 mg  5 mg Oral BID    amoxicillin-clavulanate (AUGMENTIN) 875-125 mg per tablet 1 Tab  1 Tab Oral Q12H    morphine injection 2 mg  2 mg IntraVENous Q1H PRN    LORazepam (ATIVAN) injection 1 mg  1 mg IntraVENous Q1H PRN    acetaminophen (TYLENOL) solution 650 mg  650 mg Per NG tube Q4H PRN       Review of Systems: A comprehensive review of systems was negative except for that written in the HPI. Objective:   Physical Exam:     Visit Vitals  BP (!) 153/83   Pulse (!) 128   Temp 98.7 °F (37.1 °C)   Resp 16   Ht 6' 2.02\" (1.88 m)   Wt 225 lb 1.4 oz (102.1 kg)   SpO2 96%   BMI 28.89 kg/m²    O2 Flow Rate (L/min): 3 l/min O2 Device: Nasal cannula    Temp (24hrs), Av °F (37.2 °C), Min:98.7 °F (37.1 °C), Max:99.2 °F (37.3 °C)    No intake/output data recorded.  1901 -  07  In: -   Out: 6945 [Urine:1650]    Constitutional: He appears well-developed and well-nourished. No distress. HENT:   Head: Normocephalic and atraumatic. Eyes: Pupils are equal, round, and reactive to light. Conjunctivae and EOM are normal.   Neck: Normal range of motion. Neck supple. No JVD present. Cardiovascular: Normal rate. Irregular rhythm   Pulmonary/Chest:   Clear anteriorly and laterally with diminished in the bases   Abdominal: Soft. Bowel sounds are normal.   Musculoskeletal: Normal range of motion. General: No edema. Skin: Skin is warm. Data Review:       Recent Days:  Recent Labs     205 20  0540   WBC 8.7 6.9 7.0   HGB 15.0 15.2 19.4*   HCT 42.5 44.2 54.5*    175 132*     Recent Labs     20  0345 20  0540   * 151* 150*   K 3.4* 3.5 3.4*   * 119* 118*   CO2 26 26 27   * 151* 113*   BUN 24* 24* 24*   CREA 0.85 0.82 0.87   CA 10.1 10.2* 10.3*   PHOS 2.7  --   --    ALB 2.7*  --   --      No results for input(s): PH, PCO2, PO2, HCO3, FIO2 in the last 72 hours.     24 Hour Results:  Recent Results (from the past 24 hour(s))   GLUCOSE, POC    Collection Time: 20 10:29 AM   Result Value Ref Range    Glucose (POC) 130 (H) 65 - 100 mg/dL    [facility] by [facility]    SARS-COV-2    Collection Time: 20 11:45 AM   Result Value Ref Range    SARS-CoV-2 Please find results under separate order     CBC WITH AUTOMATED DIFF    Collection Time: 11/09/20  5:20 AM   Result Value Ref Range    WBC 8.7 4.1 - 11.1 K/uL    RBC 4.09 (L) 4.10 - 5.70 M/uL    HGB 15.0 12.1 - 17.0 g/dL    HCT 42.5 36.6 - 50.3 %    .9 (H) 80.0 - 99.0 FL    MCH 36.7 (H) 26.0 - 34.0 PG    MCHC 35.3 30.0 - 36.5 g/dL    RDW 15.6 (H) 11.5 - 14.5 %    PLATELET 496 350 - 999 K/uL    NEUTROPHILS 81 (H) 32 - 75 %    LYMPHOCYTES 11 (L) 12 - 49 %    MONOCYTES 7 5 - 13 %    EOSINOPHILS 1 0 - 7 %    BASOPHILS 0 0 - 1 %    IMMATURE GRANULOCYTES 0 0.0 - 0.5 %    ABS. NEUTROPHILS 7.0 1.8 - 8.0 K/UL    ABS. LYMPHOCYTES 1.0 0.8 - 3.5 K/UL    ABS. MONOCYTES 0.6 0.0 - 1.0 K/UL    ABS. EOSINOPHILS 0.1 0.0 - 0.4 K/UL    ABS. BASOPHILS 0.0 0.0 - 0.1 K/UL    ABS. IMM. GRANS. 0.0 0.00 - 0.04 K/UL    DF AUTOMATED     RENAL FUNCTION PANEL    Collection Time: 11/09/20  5:20 AM   Result Value Ref Range    Sodium 151 (H) 136 - 145 mmol/L    Potassium 3.4 (L) 3.5 - 5.1 mmol/L    Chloride 119 (H) 97 - 108 mmol/L    CO2 26 21 - 32 mmol/L    Anion gap 6 5 - 15 mmol/L    Glucose 101 (H) 65 - 100 mg/dL    BUN 24 (H) 6 - 20 mg/dL    Creatinine 0.85 0.70 - 1.30 mg/dL    BUN/Creatinine ratio 28 (H) 12 - 20      GFR est AA >60 >60 ml/min/1.73m2    GFR est non-AA >60 >60 ml/min/1.73m2    Calcium 10.1 8.5 - 10.1 mg/dL    Phosphorus 2.7 2.6 - 4.7 mg/dL    Albumin 2.7 (L) 3.5 - 5.0 g/dL   GLUCOSE, POC    Collection Time: 11/09/20  9:13 AM   Result Value Ref Range    Glucose (POC) 104 (H) 65 - 100 mg/dL    Performed by Felicita Chandler      Imaging:         CXR Results  (Last 48 hours)     None               Results from Hospital Encounter encounter on 10/29/20   XR SWALLOW FUNC VIDEO     Narrative 2 minutes and 1 image     Patient showed acceptable oral preparation propulsion with all consistencies. Swallowing is initiated with minor delay.  Epiglottic motion is severely but  inconsistently restricted, in large part due to large cervical osteophytes with  associated soft tissue component and also due to intrinsic weakness. This causes  pronounced pooling, and overflow aspiration on one occasion. Note is made of  recent intubation   XR CHEST PORT     Narrative 1 view     Comparison yesterday     Mild atelectasis at underinflated bases plus minus small left effusion. Upper  lungs are clear. Normal heart and no congestion   XR CHEST PORT     Narrative Chest single view.     Comparison single view chest November 3, 2020     ET tube and NG tube have been removed. Left lung base subsegmental atelectasis. No gross interstitial or alveolar pulmonary edema. Cardiac and mediastinal  structures unchanged noting left side cardiac device with single lead overlying  the basal right heart. No pneumothorax or sizable pleural effusion.           Results from Hospital Encounter encounter on 10/29/20   CT ABD PELV W CONT     Narrative HISTORY:  SOB, afib, eval for PE  Dose reduction technique: All CT scans at this facility are performed using dose reduction optimization  technique as appropriate on the exam including the following: Automated exposure  control, adjustment of the MA and/or KV according to patient size of use of  iterative reconstructive technique. .     TECHNIQUE: Postcontrast CT angiogram of the chest is performed with maximum  intensity projection images (MIPS) generated. 100 cc Isovue-370 injected.         COMPARISON: 1/at 7/1/2018 CTA the chest available by report only; not all images  were available.        LIMITATIONS: None     LINES/TUBES/MEDICAL SUPPORT DEVICES:  Left chest wall AICD     LUNG PARENCHYMA: Mild diffuse septal thickening  TRACHEA/BRONCHI: Bronchial wall thickening throughout. PULMONARY VESSELS: Normal. No definitive persistent central filling defects  identified on multiple contiguous images to diagnose pulmonary embolism.   PLEURA: Small bilateral pleural effusions  MEDIASTINUM: Normal  HEART: Multichamber cardiomegaly with persistent prominent  AORTA/GREAT VESSELS: Dilated ascending aorta 4.4 cm. No darrell aortic aneurysm or  aortic dissection  ESOPHAGUS: Normal  AXILLAE: Normal  BONES/TISSUES: No acute abnormality     UPPER ABDOMEN: See below  OTHER: None        Impression IMPRESSION: Normal CTA of the chest with no pulmonary embolism or acute aortic  abnormalities identified; dilated ascending aorta is noted and will require  continued surveillance. Findings suggestive of CHF/pulmonary edema with  bronchial wall thickening that may suggest axial interstitial edema and with  diffuse parenchymal edema throughout and with associated small bilateral pleural  effusions.           HISTORY:  Abdominal distention, fever.     Dose reduction technique: All CT scans at this facility are performed using dose reduction optimization  technique as appropriate on the exam including the following: Automated exposure  control, adjustment of the MA and/or KV according to patient size of use of  iterative reconstructive technique. .     TECHNIQUE:  CT ABD PELV W CONT                           100 cc Isovue-370 injected.      COMPARISON: None  LIMITATIONS: None     CHEST: See above     LIVER: Normal  GALLBLADDER: Normal  BILIARY TREE: Normal  PANCREAS: Normal  SPLEEN: Normal  ADRENAL GLANDS: Normal  KIDNEYS/URETERS/BLADDER: Kidneys and ureters appear normal. Bladder wall  thickening  AORTA/RETROPERITONEUM: Normal  BOWEL/MESENTERY: Normal  APPENDIX: Identified and normal  PERITONEAL CAVITY: Mild diffuse abdominal pelvic ascites. No free  intraperitoneal air or abdominal pelvic abscess. REPRODUCTIVE ORGANS: Normal  BONE/TISSUES: Heterogeneous mineralization of the osseous structures bodies with  multiple areas of lucency identified throughout throughout spine, pelvis and  femurs. Moderate disc protrusions L2-3, L3-4, L4-5      OTHER: None     IMPRESSION: Bladder wall thickening is noted and may indicate cystitis and/or  chronic change. Abdominopelvic ascites. Gastrohepatic adenopathy is noted and  could be due to increased fluid resorption due to ascites but is nonspecific and  may require follow-up. Minimal cholelithiasis. . Abnormal mineralization:  Multiple focal lucencies throughout the axial and appendicular spine as  described and of uncertain etiology; evaluation for metastatic disease, myeloma,  metabolic bone disease recommended. Disc disease as described.     The ER physician was unable to take my call at the time of this interpretation. The findings were conveyed to the ER staff and they will have the physician call  me back with any questions. Findings were conveyed to staff member Kerline Bazan.            IMPRESSION:   1. Acute hypoxic respiratory failure remains on nasal oxygen 3 L  2.  congestive heart failure. Pacemaker in place no JVD or edema  3. Hypernatremia questionably from IV fluids will discontinue  4. Atrial fibrillation   5. Moderate pulmonary hypertension  6. History of melanoma possible metastatic disease to spine          RECOMMENDATIONS/PLAN:      1. Patient is on nasal canula 3 L  2. COVID-19 negative  3. On augmentin  4. Severe cardiomyopathy EF about 20% no signs of CHF on exam  5. Discontinue IV lactated Ringer's due to hypernatremia  6. Pulmonary hypertension WHO group 2 secondary to left heart failure  7. CAT scan shows prominent vascular marking congestive changes pleural effusion  8. He is blind from the right eye               Care Plan discussed with: Patient/Family    Total time spent with patient: 30 minutes.     Tamiko Jaffe

## 2020-11-09 NOTE — PROGRESS NOTES
Patient, showing no signs of acute distress, was picked up by [facility].   Report called to Judie Gilford, [facility], at [facility]

## 2020-11-09 NOTE — PROGRESS NOTES
CM provided patient's wife and son with visitation policies on both facilities. They decided on [facility]. Patient has been accepted and can discharge there today. He will go to Room 212B and nursing report can be called to [phone]. IMM provided with no issues.

## 2020-11-10 LAB — SARS-COV-2, COV2NT: NOT DETECTED

## 2020-11-11 ENCOUNTER — APPOINTMENT (OUTPATIENT)
Dept: GENERAL RADIOLOGY | Age: 70
DRG: 683 | End: 2020-11-11
Attending: EMERGENCY MEDICINE
Payer: MEDICARE

## 2020-11-11 ENCOUNTER — HOSPITAL ENCOUNTER (INPATIENT)
Age: 70
LOS: 7 days | Discharge: NURSING, REHAB OR LONG-TERM CARE | DRG: 683 | End: 2020-11-19
Attending: EMERGENCY MEDICINE | Admitting: INTERNAL MEDICINE
Payer: MEDICARE

## 2020-11-11 ENCOUNTER — APPOINTMENT (OUTPATIENT)
Dept: CT IMAGING | Age: 70
DRG: 683 | End: 2020-11-11
Attending: EMERGENCY MEDICINE
Payer: MEDICARE

## 2020-11-11 DIAGNOSIS — S09.90XA CLOSED HEAD INJURY, INITIAL ENCOUNTER: ICD-10-CM
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
## 2020-11-11 DIAGNOSIS — I48.91 ATRIAL FIBRILLATION WITH RAPID VENTRICULAR RESPONSE (HCC): ICD-10-CM

## 2020-11-11 DIAGNOSIS — S01.81XA FACIAL LACERATION, INITIAL ENCOUNTER: ICD-10-CM

## 2020-11-11 DIAGNOSIS — E87.0 HYPERNATREMIA: ICD-10-CM

## 2020-11-11 DIAGNOSIS — N17.9 AKI (ACUTE KIDNEY INJURY) (HCC): ICD-10-CM

## 2020-11-11 DIAGNOSIS — R77.8 ELEVATED TROPONIN: ICD-10-CM

## 2020-11-11 LAB
ABO + RH BLD: NORMAL
ALBUMIN SERPL-MCNC: 2.6 G/DL (ref 3.5–5)
ALBUMIN/GLOB SERPL: 0.5 {RATIO} (ref 1.1–2.2)
ALP SERPL-CCNC: 140 U/L (ref 45–117)
ALT SERPL-CCNC: 138 U/L (ref 12–78)
ANION GAP SERPL CALC-SCNC: 6 MMOL/L (ref 5–15)
APPEARANCE UR: ABNORMAL
APTT PPP: 35.2 SEC (ref 23–35.7)
AST SERPL W P-5'-P-CCNC: 89 U/L (ref 15–37)
ATRIAL RATE: 144 BPM
BACTERIA URNS QL MICRO: NEGATIVE /HPF
BASOPHILS # BLD: 0.1 K/UL (ref 0–0.1)
BASOPHILS NFR BLD: 1 % (ref 0–1)
BILIRUB SERPL-MCNC: 7.4 MG/DL (ref 0.2–1)
BILIRUB UR QL: NEGATIVE
BLOOD GROUP ANTIBODIES SERPL: NEGATIVE
BUN SERPL-MCNC: 47 MG/DL (ref 6–20)
BUN/CREAT SERPL: 24 (ref 12–20)
CA-I BLD-MCNC: 10.8 MG/DL (ref 8.5–10.1)
CALCULATED R AXIS, ECG10: 3 DEGREES
CALCULATED T AXIS, ECG11: -175 DEGREES
CHLORIDE SERPL-SCNC: 122 MMOL/L (ref 97–108)
CO2 SERPL-SCNC: 28 MMOL/L (ref 21–32)
COLOR UR: ABNORMAL
CREAT SERPL-MCNC: 1.99 MG/DL (ref 0.7–1.3)
DIAGNOSIS, 93000: NORMAL
DIFFERENTIAL METHOD BLD: ABNORMAL
EOSINOPHIL # BLD: 0 K/UL (ref 0–0.4)
EOSINOPHIL NFR BLD: 0 % (ref 0–7)
ERYTHROCYTE [DISTWIDTH] IN BLOOD BY AUTOMATED COUNT: 16 % (ref 11.5–14.5)
GLOBULIN SER CALC-MCNC: 5.4 G/DL (ref 2–4)
GLUCOSE SERPL-MCNC: 117 MG/DL (ref 65–100)
GLUCOSE UR STRIP.AUTO-MCNC: NEGATIVE MG/DL
HCT VFR BLD AUTO: 45 % (ref 36.6–50.3)
HGB BLD-MCNC: 15.2 G/DL (ref 12.1–17)
HGB UR QL STRIP: ABNORMAL
HYALINE CASTS URNS QL MICRO: ABNORMAL /LPF (ref 0–5)
IMM GRANULOCYTES # BLD AUTO: 0.1 K/UL (ref 0–0.04)
IMM GRANULOCYTES NFR BLD AUTO: 0 % (ref 0–0.5)
INR PPP: 1.5 (ref 0.9–1.1)
KETONES UR QL STRIP.AUTO: NEGATIVE MG/DL
LEUKOCYTE ESTERASE UR QL STRIP.AUTO: NEGATIVE
LYMPHOCYTES # BLD: 1.3 K/UL (ref 0.8–3.5)
LYMPHOCYTES NFR BLD: 9 % (ref 12–49)
MCH RBC QN AUTO: 35.5 PG (ref 26–34)
MCHC RBC AUTO-ENTMCNC: 33.8 G/DL (ref 30–36.5)
MCV RBC AUTO: 105.1 FL (ref 80–99)
MONOCYTES # BLD: 0.9 K/UL (ref 0–1)
MONOCYTES NFR BLD: 6 % (ref 5–13)
NEUTS SEG # BLD: 11.8 K/UL (ref 1.8–8)
NEUTS SEG NFR BLD: 84 % (ref 32–75)
NITRITE UR QL STRIP.AUTO: NEGATIVE
PH UR STRIP: 5 [PH] (ref 5–8)
PLATELET # BLD AUTO: 138 K/UL (ref 150–400)
POTASSIUM SERPL-SCNC: 4 MMOL/L (ref 3.5–5.1)
PROT SERPL-MCNC: 8 G/DL (ref 6.4–8.2)
PROT UR STRIP-MCNC: 30 MG/DL
PROTHROMBIN TIME: 17.8 SEC (ref 11.9–14.7)
Q-T INTERVAL, ECG07: 298 MS
QRS DURATION, ECG06: 86 MS
QTC CALCULATION (BEZET), ECG08: 447 MS
RBC # BLD AUTO: 4.28 M/UL (ref 4.1–5.7)
RBC #/AREA URNS HPF: ABNORMAL /HPF (ref 0–5)
SODIUM SERPL-SCNC: 156 MMOL/L (ref 136–145)
SP GR UR REFRACTOMETRY: 1.02 (ref 1–1.03)
SPECIMEN EXP DATE BLD: NORMAL
THERAPEUTIC RANGE,PTTT: NORMAL SEC (ref 68–109)
TROPONIN I SERPL-MCNC: 0.17 NG/ML
TROPONIN I SERPL-MCNC: 0.19 NG/ML
TROPONIN I SERPL-MCNC: 0.2 NG/ML
UA: UC IF INDICATED,UAUC: ABNORMAL
UROBILINOGEN UR QL STRIP.AUTO: 4 EU/DL (ref 0.1–1)
VENTRICULAR RATE, ECG03: 135 BPM
WBC # BLD AUTO: 14.1 K/UL (ref 4.1–11.1)
WBC URNS QL MICRO: ABNORMAL /HPF (ref 0–4)

## 2020-11-11 PROCEDURE — 81001 URINALYSIS AUTO W/SCOPE: CPT

## 2020-11-11 PROCEDURE — 86900 BLOOD TYPING SEROLOGIC ABO: CPT

## 2020-11-11 PROCEDURE — 96365 THER/PROPH/DIAG IV INF INIT: CPT

## 2020-11-11 PROCEDURE — 85610 PROTHROMBIN TIME: CPT

## 2020-11-11 PROCEDURE — 99218 HC RM OBSERVATION: CPT

## 2020-11-11 PROCEDURE — 74011250637 HC RX REV CODE- 250/637: Performed by: HOSPITALIST

## 2020-11-11 PROCEDURE — 75810000293 HC SIMP/SUPERF WND  RPR

## 2020-11-11 PROCEDURE — 70450 CT HEAD/BRAIN W/O DYE: CPT

## 2020-11-11 PROCEDURE — 84484 ASSAY OF TROPONIN QUANT: CPT

## 2020-11-11 PROCEDURE — 96366 THER/PROPH/DIAG IV INF ADDON: CPT

## 2020-11-11 PROCEDURE — 93005 ELECTROCARDIOGRAM TRACING: CPT

## 2020-11-11 PROCEDURE — 71045 X-RAY EXAM CHEST 1 VIEW: CPT

## 2020-11-11 PROCEDURE — 36415 COLL VENOUS BLD VENIPUNCTURE: CPT

## 2020-11-11 PROCEDURE — 96376 TX/PRO/DX INJ SAME DRUG ADON: CPT

## 2020-11-11 PROCEDURE — 80053 COMPREHEN METABOLIC PANEL: CPT

## 2020-11-11 PROCEDURE — 85730 THROMBOPLASTIN TIME PARTIAL: CPT

## 2020-11-11 PROCEDURE — 74011000258 HC RX REV CODE- 258: Performed by: EMERGENCY MEDICINE

## 2020-11-11 PROCEDURE — 0HQ1XZZ REPAIR FACE SKIN, EXTERNAL APPROACH: ICD-10-PCS | Performed by: EMERGENCY MEDICINE

## 2020-11-11 PROCEDURE — 87086 URINE CULTURE/COLONY COUNT: CPT

## 2020-11-11 PROCEDURE — 74011000250 HC RX REV CODE- 250: Performed by: EMERGENCY MEDICINE

## 2020-11-11 PROCEDURE — 72125 CT NECK SPINE W/O DYE: CPT

## 2020-11-11 PROCEDURE — 99285 EMERGENCY DEPT VISIT HI MDM: CPT

## 2020-11-11 PROCEDURE — 85025 COMPLETE CBC W/AUTO DIFF WBC: CPT

## 2020-11-11 PROCEDURE — 87635 SARS-COV-2 COVID-19 AMP PRB: CPT

## 2020-11-11 RX ORDER — SODIUM CHLORIDE 0.9 % (FLUSH) 0.9 %
5-40 SYRINGE (ML) INJECTION AS NEEDED
Status: DISCONTINUED | OUTPATIENT
Start: 2020-11-11 | End: 2020-11-19 | Stop reason: HOSPADM

## 2020-11-11 RX ORDER — AMOXICILLIN AND CLAVULANATE POTASSIUM 875; 125 MG/1; MG/1
1 TABLET, FILM COATED ORAL EVERY 12 HOURS
Status: DISCONTINUED | OUTPATIENT
Start: 2020-11-11 | End: 2020-11-19 | Stop reason: HOSPADM

## 2020-11-11 RX ORDER — ASPIRIN 325 MG
325 TABLET ORAL
Status: ACTIVE | OUTPATIENT
Start: 2020-11-11 | End: 2020-11-11

## 2020-11-11 RX ORDER — ACETAMINOPHEN 650 MG/1
650 SUPPOSITORY RECTAL
Status: DISCONTINUED | OUTPATIENT
Start: 2020-11-11 | End: 2020-11-19 | Stop reason: HOSPADM

## 2020-11-11 RX ORDER — ONDANSETRON 2 MG/ML
4 INJECTION INTRAMUSCULAR; INTRAVENOUS
Status: DISCONTINUED | OUTPATIENT
Start: 2020-11-11 | End: 2020-11-19 | Stop reason: HOSPADM

## 2020-11-11 RX ORDER — METOPROLOL TARTRATE 25 MG/1
25 TABLET, FILM COATED ORAL EVERY 12 HOURS
Status: DISCONTINUED | OUTPATIENT
Start: 2020-11-11 | End: 2020-11-13

## 2020-11-11 RX ORDER — ACETAMINOPHEN 325 MG/1
650 TABLET ORAL
Status: DISCONTINUED | OUTPATIENT
Start: 2020-11-11 | End: 2020-11-19 | Stop reason: HOSPADM

## 2020-11-11 RX ORDER — DILTIAZEM HYDROCHLORIDE 5 MG/ML
10 INJECTION INTRAVENOUS ONCE
Status: COMPLETED | OUTPATIENT
Start: 2020-11-11 | End: 2020-11-11

## 2020-11-11 RX ORDER — POLYETHYLENE GLYCOL 3350 17 G/17G
17 POWDER, FOR SOLUTION ORAL DAILY PRN
Status: DISCONTINUED | OUTPATIENT
Start: 2020-11-11 | End: 2020-11-19 | Stop reason: HOSPADM

## 2020-11-11 RX ORDER — LISINOPRIL 10 MG/1
10 TABLET ORAL DAILY
Status: DISCONTINUED | OUTPATIENT
Start: 2020-11-11 | End: 2020-11-13

## 2020-11-11 RX ORDER — DILTIAZEM HCL/D5W 125 MG/125
5 PLASTIC BAG, INJECTION (ML) INTRAVENOUS
Status: DISCONTINUED | OUTPATIENT
Start: 2020-11-11 | End: 2020-11-11 | Stop reason: ALTCHOICE

## 2020-11-11 RX ORDER — SODIUM CHLORIDE 450 MG/100ML
100 INJECTION, SOLUTION INTRAVENOUS CONTINUOUS
Status: DISCONTINUED | OUTPATIENT
Start: 2020-11-11 | End: 2020-11-12

## 2020-11-11 RX ORDER — SODIUM CHLORIDE 0.9 % (FLUSH) 0.9 %
5-40 SYRINGE (ML) INJECTION EVERY 8 HOURS
Status: DISCONTINUED | OUTPATIENT
Start: 2020-11-11 | End: 2020-11-14

## 2020-11-11 RX ADMIN — METOPROLOL TARTRATE 25 MG: 25 TABLET, FILM COATED ORAL at 10:19

## 2020-11-11 RX ADMIN — APIXABAN 5 MG: 5 TABLET, FILM COATED ORAL at 23:55

## 2020-11-11 RX ADMIN — Medication 5 MG/HR: at 06:03

## 2020-11-11 RX ADMIN — AMOXICILLIN AND CLAVULANATE POTASSIUM 1 TABLET: 875; 125 TABLET, FILM COATED ORAL at 23:56

## 2020-11-11 RX ADMIN — POLYETHYLENE GLYCOL 3350 17 G: 17 POWDER, FOR SOLUTION ORAL at 10:21

## 2020-11-11 RX ADMIN — DILTIAZEM HYDROCHLORIDE 10 MG: 5 INJECTION INTRAVENOUS at 06:02

## 2020-11-11 RX ADMIN — AMOXICILLIN AND CLAVULANATE POTASSIUM 1 TABLET: 875; 125 TABLET, FILM COATED ORAL at 10:19

## 2020-11-11 RX ADMIN — LISINOPRIL 10 MG: 10 TABLET ORAL at 10:20

## 2020-11-11 RX ADMIN — METOPROLOL TARTRATE 25 MG: 25 TABLET, FILM COATED ORAL at 23:56

## 2020-11-11 RX ADMIN — SODIUM CHLORIDE 100 ML/HR: 4.5 INJECTION, SOLUTION INTRAVENOUS at 06:10

## 2020-11-11 RX ADMIN — APIXABAN 5 MG: 5 TABLET, FILM COATED ORAL at 10:20

## 2020-11-11 NOTE — CONSULTS
Consult Date: 11/11/2020    Consults to be seen for ground-level fall and Bravo trauma. Is a 72-year-old man from the nursing home who had a ground-level fall. Of concern is he is on Eliquis. He had rapid atrial fibrillation with in the ER is a now on a Cardizem drip. CT scan of the head and cervical spine were unremarkable. He is a very poor historian and somewhat demented. Small scalp laceration. Minimal bleeding.     Subjective     Past Medical History:   Diagnosis Date    A-fib ([address]     HTN (hypertension)     Pacemaker     Skin cancer       Past Surgical History:   Procedure Laterality Date    HX PACEMAKER PLACEMENT       Family History   Problem Relation Age of Onset    Asthma Mother     Heart Disease Mother       Social History     Tobacco Use    Smoking status: Never Smoker    Smokeless tobacco: Never Used   Substance Use Topics    Alcohol use: Yes     Frequency: 2-3 times a week       Current Facility-Administered Medications   Medication Dose Route Frequency Provider Last Rate Last Dose    dilTIAZem (CARDIZEM) 125 mg/125 mL (1 mg/mL) dextrose 5% infusion  5 mg/hr IntraVENous TITRATE Mark Barrios MD 5 mL/hr at 11/11/20 0603 5 mg/hr at 11/11/20 0603    0.45% sodium chloride infusion  100 mL/hr IntraVENous CONTINUOUS Mark Barrios  mL/hr at 11/11/20 0610 100 mL/hr at 11/11/20 0610    aspirin tablet 325 mg  325 mg Oral NOW Pettis, Walter Hatchet, MD        sodium chloride (NS) flush 5-40 mL  5-40 mL IntraVENous Q8H Neha Lange MD        sodium chloride (NS) flush 5-40 mL  5-40 mL IntraVENous PRN Neha Lange MD        acetaminophen (TYLENOL) tablet 650 mg  650 mg Oral Q6H PRN Neha Lange MD        Or   [address] [place] [address] acetaminophen (TYLENOL) suppository 650 mg  650 mg Rectal Q6H PRN Neha Lange MD        polyethylene glycol (MIRALAX) packet 17 g  17 g Oral DAILY PRN Neha Lange MD   17 g at 11/11/20 1021    ondansetron (ZOFRAN) injection 4 mg  4 mg IntraVENous Q6H PRN Etelvina Sellers MD        amoxicillin-clavulanate (AUGMENTIN) 875-125 mg per tablet 1 Tab  1 Tab Oral Q12H Etelvina Sellers MD   1 Tab at 11/11/20 1019    apixaban (ELIQUIS) tablet 5 mg  5 mg Oral BID Etelvina Sellers MD   5 mg at 11/11/20 1020    lisinopriL (PRINIVIL, ZESTRIL) tablet 10 mg  10 mg Oral DAILY Etelvina Sellers MD   10 mg at 11/11/20 1020    metoprolol tartrate (LOPRESSOR) tablet 25 mg  25 mg Oral Q12H Etelvina Sellers MD   25 mg at 11/11/20 1019     Current Outpatient Medications   Medication Sig Dispense Refill    amoxicillin-clavulanate (AUGMENTIN) 875-125 mg per tablet Take 1 Tab by mouth every twelve (12) hours for 7 days. 14 Tab 0    apixaban (ELIQUIS) 5 mg tablet Take 1 Tab by mouth two (2) times a day for 30 days. 60 Tab 0    dilTIAZem IR (CARDIZEM) 60 mg tablet Take 1 Tab by mouth every six (6) hours for 30 days. 120 Tab 0    furosemide (LASIX) 40 mg tablet Take 1 Tab by mouth daily for 30 days. 30 Tab 0    lisinopriL (PRINIVIL, ZESTRIL) 10 mg tablet Take 1 Tab by mouth daily for 30 days. 30 Tab 0    potassium chloride (K-DUR, KLOR-CON) 20 mEq tablet Take 1 Tab by mouth daily for 14 days. 14 Tab 0        Review of Systems not able to give any review of systems. Objective     Vital signs for last 24 hours:  Visit Vitals  /75 (BP 1 Location: Right arm, BP Patient Position: At rest)   Pulse (!) 124   Temp 98 °F (36.7 °C)   Resp 19   Ht 6' (1.829 m)   Wt 103.4 kg (228 lb)   SpO2 99%   BMI 30.92 kg/m²       Intake/Output this shift:  Current Shift: No intake/output data recorded. Last 3 Shifts: No intake/output data recorded.     Data Review:   Recent Results (from the past 24 hour(s))   EKG, 12 LEAD, INITIAL    Collection Time: 11/11/20  4:46 AM   Result Value Ref Range    Ventricular Rate 135 BPM    Atrial Rate 144 BPM    QRS Duration 86 ms    Q-T Interval 298 ms    QTC Calculation (Bezet) 447 ms    Calculated R Axis 3 degrees    Calculated T Axis -175 degrees    Diagnosis Atrial fibrillation with rapid ventricular response with premature   ventricular or aberrantly conducted complexes  Minimal voltage criteria for LVH, may be normal variant  ST & T wave abnormality, consider inferolateral ischemia  Abnormal ECG  When compared with ECG of 30-OCT-2020 09:18,  Vent. rate has increased BY  51 BPM  Questionable change in QRS axis  T wave inversion now evident in Inferior leads  T wave inversion more evident in Lateral leads  Confirmed by Sampson Roldan ([id number]) on 11/11/2020 8:06:39 AM     CBC WITH AUTOMATED DIFF    Collection Time: 11/11/20  4:57 AM   Result Value Ref Range    WBC 14.1 (H) 4.1 - 11.1 K/uL    RBC 4.28 4.10 - 5.70 M/uL    HGB 15.2 12.1 - 17.0 g/dL    HCT 45.0 36.6 - 50.3 %    .1 (H) 80.0 - 99.0 FL    MCH 35.5 (H) 26.0 - 34.0 PG    MCHC 33.8 30.0 - 36.5 g/dL    RDW 16.0 (H) 11.5 - 14.5 %    PLATELET 738 (L) 063 - 400 K/uL    NEUTROPHILS 84 (H) 32 - 75 %    LYMPHOCYTES 9 (L) 12 - 49 %    MONOCYTES 6 5 - 13 %    EOSINOPHILS 0 0 - 7 %    BASOPHILS 1 0 - 1 %    IMMATURE GRANULOCYTES 0 0.0 - 0.5 %    ABS. NEUTROPHILS 11.8 (H) 1.8 - 8.0 K/UL    ABS. LYMPHOCYTES 1.3 0.8 - 3.5 K/UL    ABS. MONOCYTES 0.9 0.0 - 1.0 K/UL    ABS. EOSINOPHILS 0.0 0.0 - 0.4 K/UL    ABS. BASOPHILS 0.1 0.0 - 0.1 K/UL    ABS. IMM.  GRANS. 0.1 (H) 0.00 - 0.04 K/UL    DF AUTOMATED     METABOLIC PANEL, COMPREHENSIVE    Collection Time: 11/11/20  4:57 AM   Result Value Ref Range    Sodium 156 (H) 136 - 145 mmol/L    Potassium 4.0 3.5 - 5.1 mmol/L    Chloride 122 (H) 97 - 108 mmol/L    CO2 28 21 - 32 mmol/L    Anion gap 6 5 - 15 mmol/L    Glucose 117 (H) 65 - 100 mg/dL    BUN 47 (H) 6 - 20 mg/dL    Creatinine 1.99 (H) 0.70 - 1.30 mg/dL    BUN/Creatinine ratio 24 (H) 12 - 20      GFR est AA 41 (L) >60 ml/min/1.73m2    GFR est non-AA 34 (L) >60 ml/min/1.73m2    Calcium 10.8 (H) 8.5 - 10.1 mg/dL    Bilirubin, total 7.4 (H) 0.2 - 1.0 mg/dL    AST (SGOT) 89 (H) 15 - 37 U/L    ALT (SGPT) 138 (H) 12 - 78 U/L Alk. phosphatase 140 (H) 45 - 117 U/L    Protein, total 8.0 6.4 - 8.2 g/dL    Albumin 2.6 (L) 3.5 - 5.0 g/dL    Globulin 5.4 (H) 2.0 - 4.0 g/dL    A-G Ratio 0.5 (L) 1.1 - 2.2     PROTHROMBIN TIME + INR    Collection Time: 11/11/20  4:57 AM   Result Value Ref Range    Prothrombin time 17.8 (H) 11.9 - 14.7 sec    INR 1.5 (H) 0.9 - 1.1     PTT    Collection Time: 11/11/20  4:57 AM   Result Value Ref Range    aPTT 35.2 23.0 - 35.7 sec    aPTT, therapeutic range   68 - 109 sec   TYPE & SCREEN    Collection Time: 11/11/20  4:57 AM   Result Value Ref Range    Crossmatch Expiration 11/14/2020,2359     ABO/Rh(D) Vernona Del Valle Positive     Antibody screen Negative    TROPONIN I    Collection Time: 11/11/20  4:57 AM   Result Value Ref Range    Troponin-I, Qt. 0.17 (H) <0.05 ng/mL       Physical Exam small laceration of the nose/forehead. No active bleeding. Neurologic exam is difficult to evaluate. Lungs are clear. Cardiovascular is tachycardic and irregular. Presently on a drip. Abdomen is soft and nontender. Back no tenderness or spinal deformity. His extremities show no evidence of any malformation or obvious trauma. 78year-old man ground-level fall who came in as a bravo trauma. He had rapid ventricular response with atrial fib and is now on a Cardizem drip. No obvious bleed or any type of intracranial injury.   We will follow with you

## 2020-11-11 NOTE — H&P
History and Physical    Patient: Neel Deshpande MRN: [id number]  SSN: [id number]    YOB: 1950  Age: 78 y.o. Sex: male      Subjective:      Chief Complaint:  Fall at nursing home   HPI: Neel Deshpande is a 78 y.o. male who known case of atrial fibrillation, hypertension, dysphagia on nectar thick liquids, on anticoagulation with Eliquis had a ground-level fall at nursing home millimeter laceration of forehead. Laceration was apposed with Dermabond. Patient was also noticed to have atrial fibrillation with rapid ventricular rate and was started on Cardizem drip in ER. Along with this his sodium was elevated and BUN/creatinine was also elevated. Following this hospital service was requested to admit the patient for further work-up and management. Patient is pleasantly confused and was unable to give any history to me. Past Medical History:   Diagnosis Date    A-fib ([address]     HTN (hypertension)     Pacemaker     Skin cancer      Past Surgical History:   Procedure Laterality Date    HX PACEMAKER PLACEMENT        Family History   Problem Relation Age of Onset    Asthma Mother     Heart Disease Mother      Social History     Tobacco Use    Smoking status: Never Smoker    Smokeless tobacco: Never Used   Substance Use Topics    Alcohol use: Yes     Frequency: 2-3 times a week      Prior to Admission medications    Medication Sig Start Date End Date Taking? Authorizing Provider   amoxicillin-clavulanate (AUGMENTIN) 875-125 mg per tablet Take 1 Tab by mouth every twelve (12) hours for 7 days. 11/9/20 11/16/20  Bella Ta MD   apixaban (ELIQUIS) 5 mg tablet Take 1 Tab by mouth two (2) times a day for 30 days. 11/9/20 12/9/20  Bella Ta MD   dilTIAZem IR (CARDIZEM) 60 mg tablet Take 1 Tab by mouth every six (6) hours for 30 days. 11/9/20 12/9/20  Bella Ta MD   furosemide (LASIX) 40 mg tablet Take 1 Tab by mouth daily for 30 days.  11/9/20 12/9/20  Marisa Solis Eric Harris MD   lisinopriL (PRINIVIL, ZESTRIL) 10 mg tablet Take 1 Tab by mouth daily for 30 days. 11/9/20 12/9/20  Arias Caldwell MD   potassium chloride (K-DUR, KLOR-CON) 20 mEq tablet Take 1 Tab by mouth daily for 14 days. 11/10/20 11/24/20  Arias Caldwell MD        No Known Allergies    Review of Systems:  Unable to obtain review of system as patient is confused. Objective:     Vitals:    11/11/20 0447 11/11/20 0602 11/11/20 0611   BP: 105/70  122/75   Pulse: (!) 137 (!) 164 (!) 121   Resp: 17  19   Temp: 98 °F (36.7 °C)     SpO2: 99%  99%   Weight: 103.4 kg (228 lb)     Height: 6' (1.829 m)          Physical Exam:  General: Alert, he is a disoriented. HEAD: Laceration at angle of nose and forehead has been apposed with Dermabond  Eye: PERRLA, EOMI. Throat and Neck: normal and no erythema or exudates noted. No mass   Lung: Clear to auscultation bilaterally without wheezes, rhonchi. Good air movement bilaterally. Heart: Irregularly irregular tachycardic. Abdomen: soft, non-tender. Bowel sounds present in all four quadrants. No masses appreciated. Extremities:  able to move all extremities normal, atraumatic  Skin: Clean, dry and intact without appreciated lesions. Neurologic: Patient is awake, he follows commands, patient is disoriented.   Psychiatric: non focal, normal affect, normal thought process    CBC:   Lab Results   Component Value Date/Time    WBC 14.1 (H) 11/11/2020 04:57 AM    RBC 4.28 11/11/2020 04:57 AM    HGB 15.2 11/11/2020 04:57 AM    HCT 45.0 11/11/2020 04:57 AM    PLATELET 755 (L) 19/22/7486 04:57 AM     CMP:   Lab Results   Component Value Date/Time    Glucose 117 (H) 11/11/2020 04:57 AM    Sodium 156 (H) 11/11/2020 04:57 AM    Potassium 4.0 11/11/2020 04:57 AM    Chloride 122 (H) 11/11/2020 04:57 AM    CO2 28 11/11/2020 04:57 AM    BUN 47 (H) 11/11/2020 04:57 AM    Creatinine 1.99 (H) 11/11/2020 04:57 AM    Calcium 10.8 (H) 11/11/2020 04:57 AM    Anion gap 6 11/11/2020 04:57 AM    BUN/Creatinine ratio 24 (H) 11/11/2020 04:57 AM    Alk. phosphatase 140 (H) 11/11/2020 04:57 AM    Protein, total 8.0 11/11/2020 04:57 AM    Albumin 2.6 (L) 11/11/2020 04:57 AM    Globulin 5.4 (H) 11/11/2020 04:57 AM    A-G Ratio 0.5 (L) 11/11/2020 04:57 AM     Coagulation:   Lab Results   Component Value Date/Time    Prothrombin time 17.8 (H) 11/11/2020 04:57 AM    INR 1.5 (H) 11/11/2020 04:57 AM    aPTT 35.2 11/11/2020 04:57 AM     Radiology review:   CT SPINE CERV WO CONT   Final Result   Impression:      No acute fracture. Follow-up as clinically indicated. CT HEAD WO CONT   Final Result   Impression:      No acute intracranial bleed. Follow-up as clinically indicated. XR CHEST SNGL V   Final Result   IMPRESSION: Stable enlargement of cardiopericardial silhouette. Calcified aortic   knob. Right ventricular pacing lead. Near resolution of left basilar parenchymal   opacity compared with previous. No new findings. Assessment:     Active Problems:    Atrial fibrillation with RVR ([address] (11/11/2020)      Acute renal failure (ARF) ([address] (11/11/2020)      Hypernatremia (11/11/2020)      Fall (11/11/2020)      Laceration of forehead (11/11/2020)         Plan:     30-year-old male was brought to the hospital after he had a fall and laceration to his forearm. 1.  Laceration to forehead: This have been apposed with Dermabond. Patient denies any other injuries. CT head is negative for any acute intracranial bleed. 2.  Fibrillation with rapid ventricular rate: Patient was on Cardizem 60 mg at home. However with his rapid rate now he is on Cardizem drip. Since patient will be on anticoagulation evaluated to change to metoprolol from Cardizem. Titrate off the drip once heart rate is controlled. 3.  Hyponatremia: Likely due to dehydration. We will start the patient on half-normal saline and monitor patient's renal functions and basic metabolic panel. 4.  Acute renal failure:  We will monitor once patient is hydrated. Will discontinue patient's Lasix for now. 5.  Dysphagia: Speech evaluation of the patient will be requested. Patient was on nectar thick liquid diet will resume that for now. Patient is DO NOT RESUSCITATE  He is unable to give me the name of her decision-maker  Home medications reviewed from nursing home papers.   Further plan of management depends on patient's clinical course in the hospital    Signed By: Terrell Millan MD     November 11, 2020

## 2020-11-11 NOTE — ED NOTES
Patient with elevated troponin, dr. Seble Traore notified and asked nurse to put in cardiology consult to rocio enciso.

## 2020-11-11 NOTE — ED PROVIDER NOTES
EMERGENCY DEPARTMENT HISTORY AND PHYSICAL EXAM      Date: 11/11/2020  Patient Name: Sara Nieves    History of Presenting Illness     Chief Complaint   Patient presents with    Fall       History Provided By: Patient, EMS and Nursing Home/SNF/Rehab Center    HPI: Sara Nieves, 78 y.o. male with a past medical history significant hypertension and afib presents to the ED with chief complaint of Fall  . DNR. he has a pacemaker on blood thinners. Patient had a ground-level fall fell out of the bed did have a facial laceration. Unclear conscious loss of consciousness but patient does not recall the events. Patient has no complaints. Has not had morning medications yet. Otherwise no extremity numbness or weakness. Always has unequal pupils. There are no other complaints, changes, or physical findings at this time. PCP: Cece Das MD    Current Facility-Administered Medications   Medication Dose Route Frequency Provider Last Rate Last Dose    dilTIAZem (CARDIZEM) 125 mg/125 mL (1 mg/mL) dextrose 5% infusion  5 mg/hr IntraVENous TITRATE Rogers Mix MD 5 mL/hr at 11/11/20 0603 5 mg/hr at 11/11/20 0603    0.45% sodium chloride infusion  100 mL/hr IntraVENous CONTINUOUS Rogers Mix  mL/hr at 11/11/20 0610 100 mL/hr at 11/11/20 0610     Current Outpatient Medications   Medication Sig Dispense Refill    amoxicillin-clavulanate (AUGMENTIN) 875-125 mg per tablet Take 1 Tab by mouth every twelve (12) hours for 7 days. 14 Tab 0    apixaban (ELIQUIS) 5 mg tablet Take 1 Tab by mouth two (2) times a day for 30 days. 60 Tab 0    dilTIAZem IR (CARDIZEM) 60 mg tablet Take 1 Tab by mouth every six (6) hours for 30 days. 120 Tab 0    furosemide (LASIX) 40 mg tablet Take 1 Tab by mouth daily for 30 days. 30 Tab 0    lisinopriL (PRINIVIL, ZESTRIL) 10 mg tablet Take 1 Tab by mouth daily for 30 days.  30 Tab 0    potassium chloride (K-DUR, KLOR-CON) 20 mEq tablet Take 1 Tab by mouth daily for 14 days. 14 Tab 0       Past History     Past Medical History:  Past Medical History:   Diagnosis Date    A-fib ([facility] [address]     HTN (hypertension)     Pacemaker     Skin cancer        Past Surgical History:  Past Surgical History:   Procedure Laterality Date    HX PACEMAKER PLACEMENT         Family History:  Family History   Problem Relation Age of Onset    Asthma Mother     Heart Disease Mother        Social History:  Social History     Tobacco Use    Smoking status: Never Smoker    Smokeless tobacco: Never Used   Substance Use Topics    Alcohol use: Yes     Frequency: 2-3 times a week    Drug use: Never       Allergies:  No Known Allergies      Review of Systems   Review of Systems   Constitutional: Negative. Negative for chills, fatigue and fever. HENT: Negative. Negative for congestion, ear pain, nosebleeds and sore throat. Eyes: Negative. Negative for pain, discharge and visual disturbance. Respiratory: Negative. Negative for cough, chest tightness and shortness of breath. Cardiovascular: Negative. Negative for chest pain and leg swelling. Gastrointestinal: Negative. Negative for abdominal pain, blood in stool, constipation, diarrhea, nausea and vomiting. Endocrine: Negative. Genitourinary: Negative. Negative for difficulty urinating, dysuria and flank pain. Musculoskeletal: Negative. Negative for back pain and myalgias. Skin: Negative. Negative for rash and wound. Allergic/Immunologic: Negative. Neurological: Negative. Negative for dizziness, syncope, weakness, numbness and headaches. Hematological: Negative. Does not bruise/bleed easily. Psychiatric/Behavioral: Negative. Negative for agitation, confusion, hallucinations and suicidal ideas. All other systems reviewed and are negative. Physical Exam   Physical Exam  Vitals signs and nursing note reviewed. Constitutional:       General: He is not in acute distress.      Appearance: He is normal weight. He is not ill-appearing. HENT:      Head: Normocephalic. Comments: Facial laceration 2 cm left forehead     Right Ear: External ear normal.      Left Ear: External ear normal.      Nose: Nose normal. No rhinorrhea. Mouth/Throat:      Mouth: Mucous membranes are moist.      Pharynx: Oropharynx is clear. Eyes:      Extraocular Movements: Extraocular movements intact. Conjunctiva/sclera: Conjunctivae normal.      Pupils: Pupils are equal, round, and reactive to light. Neck:      Musculoskeletal: Normal range of motion and neck supple. No neck rigidity or muscular tenderness. Cardiovascular:      Rate and Rhythm: Tachycardia present. Rhythm irregular. Pulses: Normal pulses. Heart sounds: Normal heart sounds. Pulmonary:      Effort: Pulmonary effort is normal. No respiratory distress. Breath sounds: Normal breath sounds. Abdominal:      General: Abdomen is flat. Bowel sounds are normal.      Palpations: Abdomen is soft. Musculoskeletal: Normal range of motion. General: No tenderness or deformity. Skin:     General: Skin is warm and dry. Capillary Refill: Capillary refill takes less than 2 seconds. Findings: No bruising, lesion or rash. Comments: 2 cm laceration nasal bridge   Neurological:      General: No focal deficit present. Mental Status: He is alert and oriented to person, place, and time. Mental status is at baseline. Psychiatric:         Mood and Affect: Mood normal.         Behavior: Behavior normal.         Thought Content:  Thought content normal.         Judgment: Judgment normal.         Diagnostic Study Results     Labs -     Recent Results (from the past 12 hour(s))   CBC WITH AUTOMATED DIFF    Collection Time: 11/11/20  4:57 AM   Result Value Ref Range    WBC 14.1 (H) 4.1 - 11.1 K/uL    RBC 4.28 4.10 - 5.70 M/uL    HGB 15.2 12.1 - 17.0 g/dL    HCT 45.0 36.6 - 50.3 %    .1 (H) 80.0 - 99.0 FL    MCH 35.5 (H) 26.0 - 34.0 PG    MCHC 33.8 30.0 - 36.5 g/dL    RDW 16.0 (H) 11.5 - 14.5 %    PLATELET 289 (L) 036 - 400 K/uL    NEUTROPHILS 84 (H) 32 - 75 %    LYMPHOCYTES 9 (L) 12 - 49 %    MONOCYTES 6 5 - 13 %    EOSINOPHILS 0 0 - 7 %    BASOPHILS 1 0 - 1 %    IMMATURE GRANULOCYTES 0 0.0 - 0.5 %    ABS. NEUTROPHILS 11.8 (H) 1.8 - 8.0 K/UL    ABS. LYMPHOCYTES 1.3 0.8 - 3.5 K/UL    ABS. MONOCYTES 0.9 0.0 - 1.0 K/UL    ABS. EOSINOPHILS 0.0 0.0 - 0.4 K/UL    ABS. BASOPHILS 0.1 0.0 - 0.1 K/UL    ABS. IMM. GRANS. 0.1 (H) 0.00 - 0.04 K/UL    DF AUTOMATED     METABOLIC PANEL, COMPREHENSIVE    Collection Time: 11/11/20  4:57 AM   Result Value Ref Range    Sodium 156 (H) 136 - 145 mmol/L    Potassium 4.0 3.5 - 5.1 mmol/L    Chloride 122 (H) 97 - 108 mmol/L    CO2 28 21 - 32 mmol/L    Anion gap 6 5 - 15 mmol/L    Glucose 117 (H) 65 - 100 mg/dL    BUN 47 (H) 6 - 20 mg/dL    Creatinine 1.99 (H) 0.70 - 1.30 mg/dL    BUN/Creatinine ratio 24 (H) 12 - 20      GFR est AA 41 (L) >60 ml/min/1.73m2    GFR est non-AA 34 (L) >60 ml/min/1.73m2    Calcium 10.8 (H) 8.5 - 10.1 mg/dL    Bilirubin, total 7.4 (H) 0.2 - 1.0 mg/dL    AST (SGOT) 89 (H) 15 - 37 U/L    ALT (SGPT) 138 (H) 12 - 78 U/L    Alk. phosphatase 140 (H) 45 - 117 U/L    Protein, total 8.0 6.4 - 8.2 g/dL    Albumin 2.6 (L) 3.5 - 5.0 g/dL    Globulin 5.4 (H) 2.0 - 4.0 g/dL    A-G Ratio 0.5 (L) 1.1 - 2.2     PROTHROMBIN TIME + INR    Collection Time: 11/11/20  4:57 AM   Result Value Ref Range    Prothrombin time 17.8 (H) 11.9 - 14.7 sec    INR 1.5 (H) 0.9 - 1.1     PTT    Collection Time: 11/11/20  4:57 AM   Result Value Ref Range    aPTT 35.2 23.0 - 35.7 sec    aPTT, therapeutic range   68 - 109 sec   TROPONIN I    Collection Time: 11/11/20  4:57 AM   Result Value Ref Range    Troponin-I, Qt. 0.17 (H) <0.05 ng/mL       Radiologic Studies -   CT SPINE CERV WO CONT   Final Result   Impression:      No acute fracture. Follow-up as clinically indicated.       CT HEAD WO CONT   Final Result   Impression: No acute intracranial bleed. Follow-up as clinically indicated. XR CHEST SNGL V   Final Result   IMPRESSION: Stable enlargement of cardiopericardial silhouette. Calcified aortic   knob. Right ventricular pacing lead. Near resolution of left basilar parenchymal   opacity compared with previous. No new findings. CT Results  (Last 48 hours)               11/11/20 0507  CT SPINE CERV WO CONT Final result    Impression:  Impression:       No acute fracture. Follow-up as clinically indicated. Narrative:  CT cervical spine without contrast       Dose reduction: All CT scans at this facility are performed using dose reduction   optimization techniques as appropriate to a performed exam including the   following: Automated exposure control, adjustments of the mA and/or kV according   to patient size, or use of iterative reconstruction technique. Indication: Injury       Findings:       No acute fracture is identified. Degenerative changes with areas of central   canal and neural foraminal narrowing. Minimal retrolisthesis at few levels   probably degenerative. There is some bony demineralization. Atherosclerosis. Please note that this is not the optimal study for evaluation of soft tissues or   contents of the spinal canal, including spinal cord. MRI is more sensitive in   this regard. 11/11/20 0507  CT HEAD WO CONT Final result    Impression:  Impression:       No acute intracranial bleed. Follow-up as clinically indicated. Narrative:  CT head without contrast       Dose reduction: All CT scans at this facility are performed using dose reduction   optimization techniques as appropriate to a performed exam including the   following: Automated exposure control, adjustments of the mA and/or kV according   to patient size, or use of iterative reconstruction technique.        Indication: Head injury       Findings:       No acute intracranial bleed, mass effect, midline shift or extra-axial fluid   collection is identified. Lateral ventricles and cortical sulci are slightly   prominent. There are mild white matter small vessel ischemic changes. Gray-white   matter differentiation is maintained. No skull fracture. No fluid in the   paranasal sinuses or mastoid air cells. Chronic changes left orbit. CXR Results  (Last 48 hours)               11/11/20 0450  XR CHEST SNGL V Final result    Impression:  IMPRESSION: Stable enlargement of cardiopericardial silhouette. Calcified aortic   knob. Right ventricular pacing lead. Near resolution of left basilar parenchymal   opacity compared with previous. No new findings. Narrative:  Portable chest, 0451 hours. Comparison with 11/5/2020. Medical Decision Making and ED Course   I am the first provider for this patient. I reviewed the vital signs, available nursing notes, past medical history, past surgical history, family history and social history. Vital Signs-Reviewed the patient's vital signs. Patient Vitals for the past 12 hrs:   Temp Pulse Resp BP SpO2   11/11/20 0611  (!) 121 19 122/75 99 %   11/11/20 0602  (!) 164      11/11/20 0447 98 °F (36.7 °C) (!) 137 17 105/70 99 %       EKG interpretation:   Atrial fibrillation. Rapid ventricular response. Rate of 130s. No ST changes. Reason rule out dysrhythmia. Interpreted by ER physician. Records Reviewed: Previous Hospital chart. EMS run report      ED Course:   Initial assessment performed. The patients presenting problems have been discussed, and they are in agreement with the care plan formulated and outlined with them. I have encouraged them to ask questions as they arise throughout their visit.     Orders Placed This Encounter    CT SPINE CERV WO CONT     Standing Status:   Standing     Number of Occurrences:   1     Order Specific Question:   Transport     Answer:   Stretcher [5]     Order Specific Question:   Reason for Exam     Answer: leg    CT HEAD WO CONT     Standing Status:   Standing     Number of Occurrences:   1     Order Specific Question:   Transport     Answer:   Stretcher [5]     Order Specific Question:   Reason for Exam     Answer:   head injury    XR CHEST SNGL V     Standing Status:   Standing     Number of Occurrences:   1     Order Specific Question:   Transport     Answer:   Ambulatory [1]     Order Specific Question:   Reason for Exam     Answer:   fall    CBC WITH AUTOMATED DIFF     Standing Status:   Standing     Number of Occurrences:   1    METABOLIC PANEL, COMPREHENSIVE     Standing Status:   Standing     Number of Occurrences:   1    PT     Standing Status:   Standing     Number of Occurrences:   1    PTT     Standing Status:   Standing     Number of Occurrences:   1    URINALYSIS W/ REFLEX CULTURE     Standing Status:   Standing     Number of Occurrences:   1    TROPONIN I     Standing Status:   Standing     Number of Occurrences:   1    EKG 12 LEAD INITIAL     Standing Status:   Standing     Number of Occurrences:   1     Order Specific Question:   Reason for Exam:     Answer:   fall    TYPE & SCREEN     ENTER SURGERY DATE IF FOR PRE-OP TESTING. Standing Status:   Standing     Number of Occurrences:   1    dilTIAZem (CARDIZEM) injection 10 mg    dilTIAZem (CARDIZEM) 125 mg/125 mL (1 mg/mL) dextrose 5% infusion     Order Specific Question:   Titrate Infusion? Answer:   No    0.45% sodium chloride infusion    IP CONSULT TO GENERAL SURGERY     Standing Status:   Standing     Number of Occurrences:   1     Order Specific Question:   Reason for Consult: Answer:   trauma, glf     Order Specific Question:   Did you call or speak to the consulting provider? Answer:   No     Order Specific Question:   Consult To     Answer:   trauma     Order Specific Question:   Schedule When? Answer:   TODAY              CONSULTANTS:  Consults  Hussain the case with hospitalist who will admit.   Shawna Allison surgery as patient was a trauma bravo for trauma tertiary. Provider Notes (Medical Decision Making):   71-year-old presents after ground-level fall. Differential includes ICH, facial LAC, fracture, patient also presents with tachycardia. Differential includes medication. A. fib RVR. Sepsis. Dehydration. Trauma according to ATLS protocols. Patient primary and secondary survey by myself at the bedside. Based on patient's clinical assessment and diagnostic evaluation patient C-spine is cleared by Nexus criteria. Patient is able to range C-spine without difficulty. No neurologic deficits. Medical management of atrial fibrillation with rapid ventricular response. Procedures           CRITICAL CARE NOTE :  6:27 AM  Amount of Critical Care Time: 45 minutes    IMPENDING DETERIORATION -Airway, Respiratory and Cardiovascular  ASSOCIATED RISK FACTORS - Trauma  MANAGEMENT- Bedside Assessment and Supervision of Care  INTERPRETATION -  Xrays  INTERVENTIONS - hemodynamic mngmt  CASE REVIEW - Hospitalist  TREATMENT RESPONSE -Improved  PERFORMED BY - Self    NOTES   :  I have spent critical care time involved in lab review, consultations with specialist, family decision- making, bedside attention and documentation. This time excludes time spent in any separate billed procedures. During this entire length of time I was immediately available to the patient . Kolby Rick MD          Facial laceration repair. 2 cm forehead laceration irrigated wound with normal saline. Direct pressure. Dermabond applied to the wound. Patient tolerated well. Good approximation. No complications. Facial laceration repair. Nasal bridge 2 cm laceration. Irrigated well with normal saline. Dermabond applied to the wound. Good approximation. Patient tolerated well. No complications.       Disposition       Emergency Department Disposition:  Admitted      Diagnosis     Clinical Impression:   1. Fall, initial encounter    2. Closed head injury, initial encounter    3. Facial laceration, initial encounter    4. Atrial fibrillation with rapid ventricular response ([address]    5. CADE (acute kidney injury) ([address]    6. Hypernatremia    7. Elevated troponin        Attestations:    Val Botello MD    Please note that this dictation was completed with [employer], the computer voice recognition software. Quite often unanticipated grammatical, syntax, homophones, and other interpretive errors are inadvertently transcribed by the computer software. Please disregard these errors. Please excuse any errors that have escaped final proofreading. Thank you.

## 2020-11-12 PROBLEM — I48.91 AFIB (HCC): Status: ACTIVE | Noted: 2020-11-12

## 2020-11-12 LAB
ALBUMIN SERPL-MCNC: 2.5 G/DL (ref 3.5–5)
ALBUMIN/GLOB SERPL: 0.5 {RATIO} (ref 1.1–2.2)
ALP SERPL-CCNC: 132 U/L (ref 45–117)
ALT SERPL-CCNC: 114 U/L (ref 12–78)
ANION GAP SERPL CALC-SCNC: 6 MMOL/L (ref 5–15)
AST SERPL W P-5'-P-CCNC: 75 U/L (ref 15–37)
BASOPHILS # BLD: 0.1 K/UL (ref 0–0.1)
BASOPHILS NFR BLD: 1 % (ref 0–1)
BILIRUB SERPL-MCNC: 5.6 MG/DL (ref 0.2–1)
BUN SERPL-MCNC: 53 MG/DL (ref 6–20)
BUN/CREAT SERPL: 36 (ref 12–20)
CA-I BLD-MCNC: 10.3 MG/DL (ref 8.5–10.1)
CHLORIDE SERPL-SCNC: 124 MMOL/L (ref 97–108)
CO2 SERPL-SCNC: 26 MMOL/L (ref 21–32)
CREAT SERPL-MCNC: 1.48 MG/DL (ref 0.7–1.3)
DIFFERENTIAL METHOD BLD: ABNORMAL
EOSINOPHIL # BLD: 0.1 K/UL (ref 0–0.4)
EOSINOPHIL NFR BLD: 1 % (ref 0–7)
ERYTHROCYTE [DISTWIDTH] IN BLOOD BY AUTOMATED COUNT: 15.5 % (ref 11.5–14.5)
GLOBULIN SER CALC-MCNC: 5.2 G/DL (ref 2–4)
GLUCOSE SERPL-MCNC: 111 MG/DL (ref 65–100)
HCT VFR BLD AUTO: 45.6 % (ref 36.6–50.3)
HGB BLD-MCNC: 15 G/DL (ref 12.1–17)
IMM GRANULOCYTES # BLD AUTO: 0 K/UL (ref 0–0.04)
IMM GRANULOCYTES NFR BLD AUTO: 0 % (ref 0–0.5)
LYMPHOCYTES # BLD: 1.3 K/UL (ref 0.8–3.5)
LYMPHOCYTES NFR BLD: 12 % (ref 12–49)
MCH RBC QN AUTO: 33.9 PG (ref 26–34)
MCHC RBC AUTO-ENTMCNC: 32.9 G/DL (ref 30–36.5)
MCV RBC AUTO: 103.2 FL (ref 80–99)
MONOCYTES # BLD: 0.9 K/UL (ref 0–1)
MONOCYTES NFR BLD: 8 % (ref 5–13)
NEUTS SEG # BLD: 8.9 K/UL (ref 1.8–8)
NEUTS SEG NFR BLD: 78 % (ref 32–75)
NRBC # BLD: 0 K/UL (ref 0–0.01)
NRBC BLD-RTO: 0 PER 100 WBC
PLATELET # BLD AUTO: 118 K/UL (ref 150–400)
PMV BLD AUTO: 13.9 FL (ref 8.9–12.9)
POTASSIUM SERPL-SCNC: 3.6 MMOL/L (ref 3.5–5.1)
PROT SERPL-MCNC: 7.7 G/DL (ref 6.4–8.2)
RBC # BLD AUTO: 4.42 M/UL (ref 4.1–5.7)
SARS-COV-2, COV2: NORMAL
SODIUM SERPL-SCNC: 156 MMOL/L (ref 136–145)
TROPONIN I SERPL-MCNC: 0.16 NG/ML
WBC # BLD AUTO: 11.4 K/UL (ref 4.1–11.1)

## 2020-11-12 PROCEDURE — 36415 COLL VENOUS BLD VENIPUNCTURE: CPT

## 2020-11-12 PROCEDURE — 74011000258 HC RX REV CODE- 258: Performed by: EMERGENCY MEDICINE

## 2020-11-12 PROCEDURE — 85025 COMPLETE CBC W/AUTO DIFF WBC: CPT

## 2020-11-12 PROCEDURE — 80053 COMPREHEN METABOLIC PANEL: CPT

## 2020-11-12 PROCEDURE — 84484 ASSAY OF TROPONIN QUANT: CPT

## 2020-11-12 PROCEDURE — 65270000029 HC RM PRIVATE

## 2020-11-12 PROCEDURE — 74011250637 HC RX REV CODE- 250/637: Performed by: HOSPITALIST

## 2020-11-12 PROCEDURE — 99218 HC RM OBSERVATION: CPT

## 2020-11-12 RX ORDER — DIGOXIN 0.25 MG/ML
250 INJECTION INTRAMUSCULAR; INTRAVENOUS EVERY 6 HOURS
Status: DISPENSED | OUTPATIENT
Start: 2020-11-12 | End: 2020-11-13

## 2020-11-12 RX ADMIN — POLYETHYLENE GLYCOL 3350 17 G: 17 POWDER, FOR SOLUTION ORAL at 10:42

## 2020-11-12 RX ADMIN — METOPROLOL TARTRATE 25 MG: 25 TABLET, FILM COATED ORAL at 10:42

## 2020-11-12 RX ADMIN — Medication 10 ML: at 06:00

## 2020-11-12 RX ADMIN — METOPROLOL TARTRATE 25 MG: 25 TABLET, FILM COATED ORAL at 21:26

## 2020-11-12 RX ADMIN — SODIUM CHLORIDE 100 ML/HR: 4.5 INJECTION, SOLUTION INTRAVENOUS at 10:43

## 2020-11-12 RX ADMIN — SODIUM CHLORIDE 100 ML/HR: 4.5 INJECTION, SOLUTION INTRAVENOUS at 00:04

## 2020-11-12 RX ADMIN — AMOXICILLIN AND CLAVULANATE POTASSIUM 1 TABLET: 875; 125 TABLET, FILM COATED ORAL at 21:26

## 2020-11-12 RX ADMIN — APIXABAN 5 MG: 5 TABLET, FILM COATED ORAL at 10:42

## 2020-11-12 RX ADMIN — APIXABAN 5 MG: 5 TABLET, FILM COATED ORAL at 21:26

## 2020-11-12 RX ADMIN — LISINOPRIL 10 MG: 10 TABLET ORAL at 10:42

## 2020-11-12 RX ADMIN — AMOXICILLIN AND CLAVULANATE POTASSIUM 1 TABLET: 875; 125 TABLET, FILM COATED ORAL at 10:42

## 2020-11-12 RX ADMIN — Medication 10 ML: at 22:00

## 2020-11-12 NOTE — PROGRESS NOTES
Problem: Risk for Spread of Infection  Goal: Prevent transmission of infectious organism to others  Description: Prevent the transmission of infectious organisms to other patients, staff members, and visitors. Outcome: Progressing Towards Goal     Problem: Pressure Injury - Risk of  Goal: *Prevention of pressure injury  Description: Document Kennedy Scale and appropriate interventions in the flowsheet.   Outcome: Progressing Towards Goal  Note: Pressure Injury Interventions:  Sensory Interventions: Assess changes in LOC, Check visual cues for pain, Keep linens dry and wrinkle-free    Moisture Interventions: Absorbent underpads, Check for incontinence Q2 hours and as needed, Minimize layers, Offer toileting Q_hr    Activity Interventions: PT/OT evaluation    Mobility Interventions: HOB 30 degrees or less, PT/OT evaluation    Nutrition Interventions: Offer support with meals,snacks and hydration    Friction and Shear Interventions: Apply protective barrier, creams and emollients

## 2020-11-12 NOTE — PROGRESS NOTES
Primary Nurse Mayra Kowalski RN and SCARLETT Lino RN performed a dual skin assessment on this patient. Patient observed to have Hematoma above left eyebrow & abrasion on nose. Patient has mild moisture association in his groin area. Small bruise on Left outer thigh and discoloration area in inner thigh. No other issues noted; skin intact.

## 2020-11-12 NOTE — CONSULTS
Consult    Patient: Chichi Tiwari MRN: [id number]  SSN: [id number]    YOB: 1950  Age: 78 y.o. Sex: male      Subjective:      Chichi Tiwari is a 78 y.o. male who is being seen for atrial fibrillation. Patient with dilated cardiomyopathy status post pacemaker, nonischemic cardiomyopathy who was recently admitted and treated for acute hypoxemic respiratory failure aspiration pneumonia. At that time he was extubated terminally but did better and improved subsequently and he opted for no hospice/comfort care measures. He was discharged to nursing home. Presented back with ground-level fall. He was noticed in the emergency room in atrial fibrillation with RVR. At this time he denied having palpitation or chest pain but he feels tired and exhausted.           Past Medical History:   Diagnosis Date    A-fib ([address]     HTN (hypertension)     Pacemaker     Skin cancer      Past Surgical History:   Procedure Laterality Date    HX PACEMAKER PLACEMENT        Family History   Problem Relation Age of Onset    Asthma Mother     Heart Disease Mother      Social History     Tobacco Use    Smoking status: Never Smoker    Smokeless tobacco: Never Used   Substance Use Topics    Alcohol use: Yes     Frequency: 2-3 times a week      Current Facility-Administered Medications   Medication Dose Route Frequency Provider Last Rate Last Dose    0.45% sodium chloride infusion  100 mL/hr IntraVENous CONTINUOUS Shelley Harper  mL/hr at 11/12/20 0004 100 mL/hr at 11/12/20 0004    sodium chloride (NS) flush 5-40 mL  5-40 mL IntraVENous Q8H Lily Blackwell MD   10 mL at 11/12/20 0600    sodium chloride (NS) flush 5-40 mL  5-40 mL IntraVENous PRN Lily Blackwell MD        acetaminophen (TYLENOL) tablet 650 mg  650 mg Oral Q6H PRN Lily Blackwell MD        Or   [facility] acetaminophen (TYLENOL) suppository 650 mg  650 mg Rectal Q6H PRN Lily Blackwell MD        polyethylene glycol [facility]) packet 17 g 17 g Oral DAILY PRN Kelly Balderas MD   17 g at 11/11/20 1021    ondansetron (ZOFRAN) injection 4 mg  4 mg IntraVENous Q6H PRN Kelly Balderas MD        amoxicillin-clavulanate (AUGMENTIN) 875-125 mg per tablet 1 Tab  1 Tab Oral Q12H Kelly Balderas MD   1 Tab at 11/11/20 2356    apixaban (ELIQUIS) tablet 5 mg  5 mg Oral BID Kelly Balderas MD   5 mg at 11/11/20 2355    lisinopriL (PRINIVIL, ZESTRIL) tablet 10 mg  10 mg Oral DAILY Kelly Balderas MD   10 mg at 11/11/20 1020    metoprolol tartrate (LOPRESSOR) tablet 25 mg  25 mg Oral Q12H Kelly Balderas MD   25 mg at 11/11/20 2356        No Known Allergies    Review of Systems:  A comprehensive review of systems was negative except for that written in the History of Present Illness. Objective:     Vitals:    11/12/20 0125 11/12/20 0400 11/12/20 0504 11/12/20 0800   BP: 111/69  115/71 114/71   Pulse: (!) 57 (!) 116 (!) 59 66   Resp:    18   Temp: 97.6 °F (36.4 °C)  97.1 °F (36.2 °C) 97.8 °F (36.6 °C)   SpO2: 97%  98% 99%   Weight:       Height:            Physical Exam:  General:  Alert, cooperative, no distress, appears stated age. Eyes:  Conjunctivae/corneas clear. PERRL, EOMs intact. Fundi benign   Ears:  Normal TMs and external ear canals both ears. Nose: Nares normal. Septum midline. Mucosa normal. No drainage or sinus tenderness. Mouth/Throat: Lips, mucosa, and tongue normal. Teeth and gums normal.   Neck: Supple, symmetrical, trachea midline, no adenopathy, thyroid: no enlargment/tenderness/nodules, no carotid bruit and no JVD. Back:   Symmetric, no curvature. ROM normal. No CVA tenderness. Lungs:   Clear to auscultation bilaterally. Heart:   Irregular with variable S1 with normal S2. Abdomen:   Soft, non-tender. Bowel sounds normal. No masses,  No organomegaly. Extremities: Extremities normal, atraumatic, no cyanosis or edema. Pulses: 2+ and symmetric all extremities.    Skin: Skin color, texture, turgor normal. No rashes or lesions Lymph nodes: Cervical, supraclavicular, and axillary nodes normal.   Neurologic: CNII-XII intact. Normal strength, sensation and reflexes throughout. Assessment:     Hospital Problems  Date Reviewed: 10/30/2020          Codes Class Noted POA    Atrial fibrillation with RVR ([facility] [address] ICD-10-CM: I48.91  ICD-9-CM: 427.31  11/11/2020 Unknown        Acute renal failure (ARF) ([facility]) ICD-10-CM: N17.9  ICD-9-CM: 584.9  11/11/2020 Unknown        Hypernatremia ICD-10-CM: E87.0  ICD-9-CM: 276.0  11/11/2020 Unknown        Fall ICD-10-CM: Anant Cookey. Daiana Asa  ICD-9-CM: E888.9  11/11/2020 Unknown        Laceration of forehead ICD-10-CM: G15.85XD  ICD-9-CM: 873.42  11/11/2020 Unknown          Mr. Karen Sidhu is a 29-year-old male with:    1.  COVID-19 status unknown  2.  Recent acute hypoxic respiratory failure requiring intubation  3.  Recent severe sepsis/septic shock  4.  Possible aspiration pneumonia  5.  Suspected metastatic disease versus myeloma, versus metabolic bone disease on CT  6.  History of melanoma  7.  Previous history of alcohol abuse  8.  Cholelithiasis  9.  Ascites  10.  Dilated ascending aorta (4.4 cm)     11.  Heart failure with reduced ejection fraction (EF 20-25%)   12. Mild pulmonary hypertension (RVSP =41 mmHg)  13.  Paroxysmal atrial fibrillation with rapid ventricular response  14.  Status post pacemaker  15. Valvular heart disease        15a. Moderate tricuspid regurgitation        15b. Moderate mitral regurgitation  16.  Hypertension  17.  Hyperlipidemia  18.  Abnormal cardiac enzymes in the indeterminate range  19.  Acute kidney injury, improved  20. Hypernatremia  21.  Thrombocytopenia    Plan:     WBC 11.4, hemoglobin 15 and platelet 549. Troponin are in the indeterminate range. They were high 0.2 and trended down. Anyhow his sodium is 156 and potassium 3.6 and chloride 124 and creatinine was 1.9 yesterday and it is down to 1.48. LFTs are elevated. CT head and spine was nonacute.   EKG yesterday showed atrial fibrillation with RVR with PVC. He is currently on apixaban 5 mg twice a day, lisinopril, metoprolol. Echo on the 30th showed EF of 20 to 25% with dilated left ventricle, severely reduced systolic function. Moderate MR with moderate TR    1. Troponin elevation on the basis of abnormal kidney function. 2.  Currently on apixaban for anticoagulation  3. No further cardiac testing  4. IV loading with digoxin 0.25 mg every 6 hours for 4 doses      We will follow. Thank you  For involving me in Mr. Eduin fuentes.   I will follow  Signed By: Allen Simmons MD     November 12, 2020

## 2020-11-12 NOTE — PROGRESS NOTES
Patient with elevated troponin of 0.16 Dr. Reji Cary ([facility]) notified; no new orders or interventions.

## 2020-11-12 NOTE — PROGRESS NOTES
Bedside shift change report given to Krista Serra RN (oncoming nurse) by Rhiannon Yin RN (offgoing nurse). Report included the following information SBAR, MAR and Recent Results.

## 2020-11-12 NOTE — PROGRESS NOTES
History and Physical    Patient: Aidan Kelley MRN: [id number]  SSN: [id number]    YOB: 1950  Age: 78 y.o. Sex: male      Subjective:      Chief Complaint:  Fall at nursing home   HPI: Aidan Kelley is a 78 y.o. male who known case of atrial fibrillation, hypertension, dysphagia on nectar thick liquids, on anticoagulation with Eliquis had a ground-level fall at nursing home millimeter laceration of forehead. Laceration was apposed with Dermabond. Patient was also noticed to have atrial fibrillation with rapid ventricular rate and was started on Cardizem drip in ER. Along with this his sodium was elevated and BUN/creatinine was also elevated. Following this hospital service was requested to admit the patient for further work-up and management. Patient is pleasantly confused and was unable to give any history to me.      11/12: alert, pleasantly confused it appears, poor recollection of recent events/confabulating? Prior to Admission medications    Medication Sig Start Date End Date Taking? Authorizing Provider   amoxicillin-clavulanate (AUGMENTIN) 875-125 mg per tablet Take 1 Tab by mouth every twelve (12) hours for 7 days. 11/9/20 11/16/20  Meghan Ceballos MD   apixaban (ELIQUIS) 5 mg tablet Take 1 Tab by mouth two (2) times a day for 30 days. 11/9/20 12/9/20  Meghan Ceballos MD   dilTIAZem IR (CARDIZEM) 60 mg tablet Take 1 Tab by mouth every six (6) hours for 30 days. 11/9/20 12/9/20  Meghan Ceballos MD   furosemide (LASIX) 40 mg tablet Take 1 Tab by mouth daily for 30 days. 11/9/20 12/9/20  Meghan Ceballos MD   lisinopriL (PRINIVIL, ZESTRIL) 10 mg tablet Take 1 Tab by mouth daily for 30 days. 11/9/20 12/9/20  Meghan Ceballos MD   potassium chloride (K-DUR, KLOR-CON) 20 mEq tablet Take 1 Tab by mouth daily for 14 days.  11/10/20 11/24/20  Meghan Ceballos MD        No Known Allergies    Review of Systems:  Review of Systems   Constitutional: Positive for malaise/fatigue. Negative for chills and fever. HENT: Negative. Eyes: Negative. Respiratory: Negative. Cardiovascular: Positive for palpitations. Gastrointestinal: Negative. Genitourinary: Negative. Musculoskeletal: Negative. Skin: Negative. Neurological: Negative. Endo/Heme/Allergies: Negative. Psychiatric/Behavioral: Negative. Objective:     Vitals:    11/12/20 1015 11/12/20 1120 11/12/20 1200 11/12/20 1704   BP:   118/77 116/89   Pulse:   70 (!) 58   Resp:   16 18   Temp:   97 °F (36.1 °C) 98 °F (36.7 °C)   SpO2: 99% 98% 99% 98%   Weight:       Height:            Physical Exam:  General: Alert, he is speaking clearly, pleasantly confused  HEAD: Laceration at angle of nose and forehead has been apposed with Dermabond  Eye: EOMI. Throat and Neck: normal and no erythema or exudates noted. No mass   Lung: Clear to auscultation bilaterally without wheezes, rhonchi. Good air movement bilaterally. Heart: Irregularly irregular tachycardic. -  Abdomen: soft, non-tender. Bowel sounds present in all four quadrants. No masses appreciated. Extremities:  able to move all extremities normal, atraumatic  Skin: Clean, dry and intact without appreciated lesions. Neurologic: Patient is awake, he follows commands, patient is disoriented.   Psychiatric: non focal, normal affect, poor recollection recent events    CBC:   Lab Results   Component Value Date/Time    WBC 11.4 (H) 11/12/2020 03:47 AM    RBC 4.42 11/12/2020 03:47 AM    HGB 15.0 11/12/2020 03:47 AM    HCT 45.6 11/12/2020 03:47 AM    PLATELET 671 (L) 68/24/3236 03:47 AM     CMP:   Lab Results   Component Value Date/Time    Glucose 111 (H) 11/12/2020 03:47 AM    Sodium 156 (H) 11/12/2020 03:47 AM    Potassium 3.6 11/12/2020 03:47 AM    Chloride 124 (H) 11/12/2020 03:47 AM    CO2 26 11/12/2020 03:47 AM    BUN 53 (H) 11/12/2020 03:47 AM    Creatinine 1.48 (H) 11/12/2020 03:47 AM    Calcium 10.3 (H) 11/12/2020 03:47 AM    Anion gap 6 11/12/2020 03:47 AM    BUN/Creatinine ratio 36 (H) 11/12/2020 03:47 AM    Alk. phosphatase 132 (H) 11/12/2020 03:47 AM    Protein, total 7.7 11/12/2020 03:47 AM    Albumin 2.5 (L) 11/12/2020 03:47 AM    Globulin 5.2 (H) 11/12/2020 03:47 AM    A-G Ratio 0.5 (L) 11/12/2020 03:47 AM     Coagulation:   Lab Results   Component Value Date/Time    Prothrombin time 17.8 (H) 11/11/2020 04:57 AM    INR 1.5 (H) 11/11/2020 04:57 AM    aPTT 35.2 11/11/2020 04:57 AM     Radiology review:   CT SPINE CERV WO CONT   Final Result   Impression:      No acute fracture. Follow-up as clinically indicated. CT HEAD WO CONT   Final Result   Impression:      No acute intracranial bleed. Follow-up as clinically indicated. XR CHEST SNGL V   Final Result   IMPRESSION: Stable enlargement of cardiopericardial silhouette. Calcified aortic   knob. Right ventricular pacing lead. Near resolution of left basilar parenchymal   opacity compared with previous. No new findings. Assessment:     Active Problems:    Atrial fibrillation with RVR ([address] (11/11/2020)      Acute renal failure (ARF) ([address] (11/11/2020)      Hypernatremia (11/11/2020)      Fall (11/11/2020)      Laceration of forehead (11/11/2020)      Afib ([address] (11/12/2020)         Plan:     22-year-old male was brought to the hospital after he had a fall and laceration to his forearm. 1.  Laceration to forehead: This have been apposed with Dermabond. Patient denies any other injuries. CT head is negative for any acute intracranial bleed. 2.  Atrial Fibrillation with rapid ventricular rate: Patient was on Cardizem 60 mg at home. dilt drip off, cardio appreciated, loading digoxin. Continue eliquis. 3.  Hypernatremia: Likely due to dehydration. Switch to 0.2ns, follow. Unchanged at 156  4. Acute renal failure:improved, cont judicious hydration  Holding Lasix for now. 5.  Dysphagia: Speech evaluatio.  Patient was on nectar thick liquid diet will continue that for now.  Patient is DO NOT RESUSCITATE  Vtep: Eliquis  Further plan of management depends on patient's clinical course in the hospital    Signed By: Tej Montoya MD     November 12, 2020

## 2020-11-12 NOTE — PROGRESS NOTES
SON PRADEEP CALLED TO CHECK ON HIS FATHER, REPORTED THAT IS HE COVID NEGATIVE AND CM IS LOOKING AT RETURNING HIM TO [facility] IN THE NEXT DAY OR TWO.  NO FX, NO CVA NOTED, HE IS ALERT AND ORIENTED X 2.

## 2020-11-12 NOTE — ROUTINE PROCESS
TRANSFER - OUT REPORT: 
 
Verbal report given to Nidia Bailey on Ahsan Spinner  being transferred to [facility] for Atrial Fib RVR Report consisted of patients Situation, Background, Assessment and  
Recommendations(SBAR). Lines:  
Peripheral IV 11/11/20 Left Antecubital (Active) Opportunity for questions and clarification was provided. Patient transported with: 
tele

## 2020-11-13 LAB
ANION GAP SERPL CALC-SCNC: 4 MMOL/L (ref 5–15)
BACTERIA SPEC CULT: NORMAL
BASOPHILS # BLD: 0.1 K/UL (ref 0–0.1)
BASOPHILS NFR BLD: 1 % (ref 0–1)
BUN SERPL-MCNC: 46 MG/DL (ref 6–20)
BUN/CREAT SERPL: 40 (ref 12–20)
CA-I BLD-MCNC: 10.3 MG/DL (ref 8.5–10.1)
CHLORIDE SERPL-SCNC: 126 MMOL/L (ref 97–108)
CO2 SERPL-SCNC: 24 MMOL/L (ref 21–32)
CREAT SERPL-MCNC: 1.14 MG/DL (ref 0.7–1.3)
DIFFERENTIAL METHOD BLD: ABNORMAL
EOSINOPHIL # BLD: 0.1 K/UL (ref 0–0.4)
EOSINOPHIL NFR BLD: 1 % (ref 0–7)
ERYTHROCYTE [DISTWIDTH] IN BLOOD BY AUTOMATED COUNT: 15.6 % (ref 11.5–14.5)
GLUCOSE BLD STRIP.AUTO-MCNC: 84 MG/DL (ref 65–100)
GLUCOSE SERPL-MCNC: 88 MG/DL (ref 65–100)
HCT VFR BLD AUTO: 41.6 % (ref 36.6–50.3)
HGB BLD-MCNC: 14.2 G/DL (ref 12.1–17)
IMM GRANULOCYTES # BLD AUTO: 0 K/UL (ref 0–0.04)
IMM GRANULOCYTES NFR BLD AUTO: 0 % (ref 0–0.5)
LYMPHOCYTES # BLD: 1.5 K/UL (ref 0.8–3.5)
LYMPHOCYTES NFR BLD: 17 % (ref 12–49)
MCH RBC QN AUTO: 35.4 PG (ref 26–34)
MCHC RBC AUTO-ENTMCNC: 34.1 G/DL (ref 30–36.5)
MCV RBC AUTO: 103.7 FL (ref 80–99)
MONOCYTES # BLD: 0.8 K/UL (ref 0–1)
MONOCYTES NFR BLD: 9 % (ref 5–13)
NEUTS SEG # BLD: 6.6 K/UL (ref 1.8–8)
NEUTS SEG NFR BLD: 72 % (ref 32–75)
PERFORMED BY, TECHID: NORMAL
PLATELET # BLD AUTO: 147 K/UL (ref 150–400)
POTASSIUM SERPL-SCNC: 3.7 MMOL/L (ref 3.5–5.1)
RBC # BLD AUTO: 4.01 M/UL (ref 4.1–5.7)
SODIUM SERPL-SCNC: 154 MMOL/L (ref 136–145)
SPECIAL REQUESTS,SREQ: NORMAL
WBC # BLD AUTO: 9.1 K/UL (ref 4.1–11.1)

## 2020-11-13 PROCEDURE — 74011250637 HC RX REV CODE- 250/637: Performed by: INTERNAL MEDICINE

## 2020-11-13 PROCEDURE — 85025 COMPLETE CBC W/AUTO DIFF WBC: CPT

## 2020-11-13 PROCEDURE — 74011250636 HC RX REV CODE- 250/636: Performed by: INTERNAL MEDICINE

## 2020-11-13 PROCEDURE — 82962 GLUCOSE BLOOD TEST: CPT

## 2020-11-13 PROCEDURE — 74011250637 HC RX REV CODE- 250/637: Performed by: HOSPITALIST

## 2020-11-13 PROCEDURE — 65270000029 HC RM PRIVATE

## 2020-11-13 PROCEDURE — 80048 BASIC METABOLIC PNL TOTAL CA: CPT

## 2020-11-13 PROCEDURE — 36415 COLL VENOUS BLD VENIPUNCTURE: CPT

## 2020-11-13 PROCEDURE — 74011000250 HC RX REV CODE- 250: Performed by: INTERNAL MEDICINE

## 2020-11-13 RX ORDER — METOPROLOL TARTRATE 50 MG/1
50 TABLET ORAL EVERY 12 HOURS
Status: DISCONTINUED | OUTPATIENT
Start: 2020-11-13 | End: 2020-11-19 | Stop reason: HOSPADM

## 2020-11-13 RX ORDER — LISINOPRIL 5 MG/1
2.5 TABLET ORAL DAILY
Status: DISCONTINUED | OUTPATIENT
Start: 2020-11-14 | End: 2020-11-19 | Stop reason: HOSPADM

## 2020-11-13 RX ADMIN — DIGOXIN 250 MCG: 250 INJECTION, SOLUTION INTRAMUSCULAR; INTRAVENOUS; PARENTERAL at 06:32

## 2020-11-13 RX ADMIN — DIGOXIN 250 MCG: 250 INJECTION, SOLUTION INTRAMUSCULAR; INTRAVENOUS; PARENTERAL at 01:36

## 2020-11-13 RX ADMIN — Medication 10 ML: at 13:41

## 2020-11-13 RX ADMIN — METOPROLOL TARTRATE 25 MG: 25 TABLET, FILM COATED ORAL at 08:47

## 2020-11-13 RX ADMIN — LISINOPRIL 10 MG: 10 TABLET ORAL at 08:47

## 2020-11-13 RX ADMIN — METOPROLOL TARTRATE 50 MG: 50 TABLET, FILM COATED ORAL at 22:23

## 2020-11-13 RX ADMIN — SODIUM CHLORIDE: 234 INJECTION, SOLUTION, CONCENTRATE INTRAVENOUS at 01:40

## 2020-11-13 RX ADMIN — Medication 10 ML: at 22:34

## 2020-11-13 RX ADMIN — AMOXICILLIN AND CLAVULANATE POTASSIUM 1 TABLET: 875; 125 TABLET, FILM COATED ORAL at 08:47

## 2020-11-13 RX ADMIN — SODIUM CHLORIDE: 234 INJECTION, SOLUTION, CONCENTRATE INTRAVENOUS at 15:13

## 2020-11-13 RX ADMIN — DIGOXIN 250 MCG: 250 INJECTION, SOLUTION INTRAMUSCULAR; INTRAVENOUS; PARENTERAL at 13:37

## 2020-11-13 RX ADMIN — AMOXICILLIN AND CLAVULANATE POTASSIUM 1 TABLET: 875; 125 TABLET, FILM COATED ORAL at 22:23

## 2020-11-13 RX ADMIN — APIXABAN 5 MG: 5 TABLET, FILM COATED ORAL at 22:23

## 2020-11-13 RX ADMIN — APIXABAN 5 MG: 5 TABLET, FILM COATED ORAL at 08:47

## 2020-11-13 NOTE — PROGRESS NOTES
Progress Note    Patient: Cici Hagan MRN: [id number]  SSN: [id number]    YOB: 1950  Age: 78 y.o. Sex: male      Admit Date: 11/11/2020    LOS: 1 day     Subjective:     Acute events overnight. He is laying flat denied having any chest pain or shortness of breath. Objective:     Vitals:    11/13/20 0400 11/13/20 0632 11/13/20 0800 11/13/20 0835   BP:  109/69  112/71   Pulse: (!) 115 78 (!) 158 (!) 140   Resp:    18   Temp:    97.1 °F (36.2 °C)   SpO2:    95%   Weight:       Height:            Intake and Output:  Current Shift: No intake/output data recorded. Last three shifts: No intake/output data recorded. Physical Exam:   General:  Alert, cooperative, no distress, appears stated age. Eyes:  Conjunctivae/corneas clear. PERRL, EOMs intact. Fundi benign   Ears:  Normal TMs and external ear canals both ears. Nose: Nares normal. Septum midline. Mucosa normal. No drainage or sinus tenderness. Mouth/Throat: Lips, mucosa, and tongue normal. Teeth and gums normal.   Neck: Supple, symmetrical, trachea midline, no adenopathy, thyroid: no enlargment/tenderness/nodules, no carotid bruit and no JVD. Back:   Symmetric, no curvature. ROM normal. No CVA tenderness. Lungs:   Clear to auscultation bilaterally. Heart:  Irregular, varibale S1, Normal S2. Abdomen:   Soft, non-tender. Bowel sounds normal. No masses,  No organomegaly. Extremities: Extremities normal, atraumatic, no cyanosis or edema. Pulses: 2+ and symmetric all extremities. Skin: Skin color, texture, turgor normal. No rashes or lesions   Lymph nodes: Cervical, supraclavicular, and axillary nodes normal.   Neurologic: CNII-XII intact. Normal strength, sensation and reflexes throughout. Lab/Data Review: All lab results for the last 24 hours reviewed.          Assessment:     Active Problems:    Atrial fibrillation with RVR ([facility]) (11/11/2020)      Acute renal failure (ARF) ([facility]) (11/11/2020)      Hypernatremia (11/11/2020)      Fall (11/11/2020)      Laceration of forehead (11/11/2020)      Afib ([facility] (11/12/2020)      Mr. Gracy Polk is a 71-year-old male with:     1.  COVID-19 status unknown  2.  Recent acute hypoxic respiratory failure requiring intubation  3.  Recent severe sepsis/septic shock  4.  Possible aspiration pneumonia  5.  Suspected metastatic disease versus myeloma, versus metabolic bone disease on CT  6.  History of melanoma  7.  Previous history of alcohol abuse  8.  Cholelithiasis  9.  Ascites  10.  Dilated ascending aorta (4.4 cm)     11.  Heart failure with reduced ejection fraction (EF 20-25%)   12.  Mild pulmonary hypertension (RVSP =41 mmHg)  13.  Paroxysmal atrial fibrillation with rapid ventricular response  14.  Status post pacemaker  15.  Valvular heart disease        15a.  Moderate tricuspid regurgitation        94Q.  Moderate mitral regurgitation  16.  Hypertension  17.  Hyperlipidemia  18.  Abnormal cardiac enzymes in the indeterminate range  19.  Acute kidney injury, improved  20. Hypernatremia  21.  Thrombocytopenia  Plan:     Currently in atrial fibrillation with RVR. He was started on IV digoxin yesterday. Continue apixaban, digoxin, lisinopril and metoprolol. Increase metoprolol dose to 50 mg twice a day.   Reduced dose of lisinopril    Signed By: Ezio Polk MD     November 13, 2020

## 2020-11-13 NOTE — PROGRESS NOTES
Dr. Thomos Lundborg notified of , patient currently in A Fib with 's. No new orders received, Dr. Thomos Lundborg stated he will be up to assess patient during rounds.

## 2020-11-13 NOTE — PROGRESS NOTES
Cm spoke with pt's wife - Dahlia Cary [phone]. Prior to hospitalization pt was at [facility]. Mrs. Dugan Jacques confirmed pt will return to [facility] once medically cleared for discharge. Referral made via Naseem Arrington.

## 2020-11-13 NOTE — PROGRESS NOTES
Problem: Pressure Injury - Risk of  Goal: *Prevention of pressure injury  Description: Document Kennedy Scale and appropriate interventions in the flowsheet.   Outcome: Progressing Towards Goal  Note: Pressure Injury Interventions:  Sensory Interventions: Assess changes in LOC    Moisture Interventions: Absorbent underpads, Check for incontinence Q2 hours and as needed    Activity Interventions: PT/OT evaluation    Mobility Interventions: HOB 30 degrees or less    Nutrition Interventions: Document food/fluid/supplement intake    Friction and Shear Interventions: HOB 30 degrees or less

## 2020-11-14 LAB
ANION GAP SERPL CALC-SCNC: 5 MMOL/L (ref 5–15)
BASOPHILS # BLD: 0.1 K/UL (ref 0–0.1)
BASOPHILS NFR BLD: 1 % (ref 0–1)
BUN SERPL-MCNC: 42 MG/DL (ref 6–20)
BUN/CREAT SERPL: 39 (ref 12–20)
CA-I BLD-MCNC: 10.2 MG/DL (ref 8.5–10.1)
CHLORIDE SERPL-SCNC: 123 MMOL/L (ref 97–108)
CO2 SERPL-SCNC: 24 MMOL/L (ref 21–32)
CREAT SERPL-MCNC: 1.08 MG/DL (ref 0.7–1.3)
DIFFERENTIAL METHOD BLD: ABNORMAL
EOSINOPHIL # BLD: 0.1 K/UL (ref 0–0.4)
EOSINOPHIL NFR BLD: 1 % (ref 0–7)
ERYTHROCYTE [DISTWIDTH] IN BLOOD BY AUTOMATED COUNT: 15.6 % (ref 11.5–14.5)
GLUCOSE BLD STRIP.AUTO-MCNC: 89 MG/DL (ref 65–100)
GLUCOSE BLD STRIP.AUTO-MCNC: 97 MG/DL (ref 65–100)
GLUCOSE SERPL-MCNC: 88 MG/DL (ref 65–100)
HCT VFR BLD AUTO: 42.3 % (ref 36.6–50.3)
HGB BLD-MCNC: 14.2 G/DL (ref 12.1–17)
IMM GRANULOCYTES # BLD AUTO: 0 K/UL (ref 0–0.04)
IMM GRANULOCYTES NFR BLD AUTO: 0 % (ref 0–0.5)
LYMPHOCYTES # BLD: 1.4 K/UL (ref 0.8–3.5)
LYMPHOCYTES NFR BLD: 16 % (ref 12–49)
MCH RBC QN AUTO: 34.7 PG (ref 26–34)
MCHC RBC AUTO-ENTMCNC: 33.6 G/DL (ref 30–36.5)
MCV RBC AUTO: 103.4 FL (ref 80–99)
MONOCYTES # BLD: 0.7 K/UL (ref 0–1)
MONOCYTES NFR BLD: 8 % (ref 5–13)
NEUTS SEG # BLD: 6.5 K/UL (ref 1.8–8)
NEUTS SEG NFR BLD: 74 % (ref 32–75)
PERFORMED BY, TECHID: NORMAL
PERFORMED BY, TECHID: NORMAL
PLATELET # BLD AUTO: 150 K/UL (ref 150–400)
POTASSIUM SERPL-SCNC: 3.7 MMOL/L (ref 3.5–5.1)
RBC # BLD AUTO: 4.09 M/UL (ref 4.1–5.7)
SARS-COV-2, COV2NT: NOT DETECTED
SODIUM SERPL-SCNC: 152 MMOL/L (ref 136–145)
WBC # BLD AUTO: 8.8 K/UL (ref 4.1–11.1)

## 2020-11-14 PROCEDURE — 74011250636 HC RX REV CODE- 250/636: Performed by: INTERNAL MEDICINE

## 2020-11-14 PROCEDURE — 65270000029 HC RM PRIVATE

## 2020-11-14 PROCEDURE — 82962 GLUCOSE BLOOD TEST: CPT

## 2020-11-14 PROCEDURE — 36415 COLL VENOUS BLD VENIPUNCTURE: CPT

## 2020-11-14 PROCEDURE — 80048 BASIC METABOLIC PNL TOTAL CA: CPT

## 2020-11-14 PROCEDURE — 74011250637 HC RX REV CODE- 250/637: Performed by: INTERNAL MEDICINE

## 2020-11-14 PROCEDURE — 85025 COMPLETE CBC W/AUTO DIFF WBC: CPT

## 2020-11-14 PROCEDURE — 74011000250 HC RX REV CODE- 250: Performed by: INTERNAL MEDICINE

## 2020-11-14 PROCEDURE — 74011250637 HC RX REV CODE- 250/637: Performed by: HOSPITALIST

## 2020-11-14 RX ADMIN — APIXABAN 5 MG: 5 TABLET, FILM COATED ORAL at 21:49

## 2020-11-14 RX ADMIN — AMOXICILLIN AND CLAVULANATE POTASSIUM 1 TABLET: 875; 125 TABLET, FILM COATED ORAL at 09:30

## 2020-11-14 RX ADMIN — METOPROLOL TARTRATE 50 MG: 50 TABLET, FILM COATED ORAL at 21:49

## 2020-11-14 RX ADMIN — AMOXICILLIN AND CLAVULANATE POTASSIUM 1 TABLET: 875; 125 TABLET, FILM COATED ORAL at 21:48

## 2020-11-14 RX ADMIN — APIXABAN 5 MG: 5 TABLET, FILM COATED ORAL at 09:30

## 2020-11-14 RX ADMIN — LISINOPRIL 2.5 MG: 5 TABLET ORAL at 09:31

## 2020-11-14 RX ADMIN — METOPROLOL TARTRATE 50 MG: 50 TABLET, FILM COATED ORAL at 09:30

## 2020-11-14 RX ADMIN — SODIUM CHLORIDE: 234 INJECTION, SOLUTION, CONCENTRATE INTRAVENOUS at 09:15

## 2020-11-14 NOTE — PROGRESS NOTES
History and Physical    Patient: Molly Red MRN: [id number]  SSN: [id number]    YOB: 1950  Age: 78 y.o. Sex: male      Subjective:      Chief Complaint:  Fall at nursing home   HPI: Molly Red is a 78 y.o. male who known case of atrial fibrillation, hypertension, dysphagia on nectar thick liquids, on anticoagulation with Eliquis had a ground-level fall at nursing home millimeter laceration of forehead. Laceration was apposed with Dermabond. Patient was also noticed to have atrial fibrillation with rapid ventricular rate and was started on Cardizem drip in ER. Along with this his sodium was elevated and BUN/creatinine was also elevated. Following this hospital service was requested to admit the patient for further work-up and management. 11/14  Remains pleasanlt confused, no offering complaints. HR down to 53 reported this am.  No cp or sob      11/13: pleasantly confused, poor recollection of recent admit though states \"sounds familiar\"  Hr settled down some overnight, picking up this aftermoon, cardio noted, digoxin loaded. 11/12: alert, pleasantly confused it appears, poor recollection of recent events/confabulating? Prior to Admission medications    Medication Sig Start Date End Date Taking? Authorizing Provider   amoxicillin-clavulanate (AUGMENTIN) 875-125 mg per tablet Take 1 Tab by mouth every twelve (12) hours for 7 days. 11/9/20 11/16/20  Seven Sharp MD   apixaban (ELIQUIS) 5 mg tablet Take 1 Tab by mouth two (2) times a day for 30 days. 11/9/20 12/9/20  Seven Sharp MD   dilTIAZem IR (CARDIZEM) 60 mg tablet Take 1 Tab by mouth every six (6) hours for 30 days. 11/9/20 12/9/20  Seven Sharp MD   furosemide (LASIX) 40 mg tablet Take 1 Tab by mouth daily for 30 days. 11/9/20 12/9/20  Seven Sharp MD   lisinopriL (PRINIVIL, ZESTRIL) 10 mg tablet Take 1 Tab by mouth daily for 30 days.  11/9/20 12/9/20  Seven Sharp MD potassium chloride (K-DUR, KLOR-CON) 20 mEq tablet Take 1 Tab by mouth daily for 14 days. 11/10/20 11/24/20  Desean Fernández MD        No Known Allergies    Review of Systems:  Review of Systems   Constitutional: Positive for malaise/fatigue. Negative for chills and fever. HENT: Negative. Eyes: Negative. Respiratory: Negative. Cardiovascular: Negative for palpitations. Gastrointestinal: Negative. Genitourinary: Negative. Musculoskeletal: Negative. Skin: Negative. Neurological: Negative. Endo/Heme/Allergies: Negative. Psychiatric/Behavioral: Negative. Objective:     Vitals:    11/13/20 2022 11/13/20 2341 11/14/20 0522 11/14/20 0938   BP: 128/77 132/82 124/76 101/68   Pulse: 64 (!) 51 (!) 53 76   Resp: 16 18  20   Temp: 97.5 °F (36.4 °C) 98.1 °F (36.7 °C) 98.1 °F (36.7 °C) 97.6 °F (36.4 °C)   SpO2: 96% 98% 96% 97%   Weight:       Height:            Physical Exam:  General: Alert, he is speaking clearly, pleasantly confused  HEAD: Laceration at angle of nose and forehead has been apposed with Dermabond  Eye: Left eeye deviates outward- chronic  Throat and Neck: normal and no erythema or exudates noted. No mass   Lung: Clear to auscultation bilaterally without wheezes, rhonchi. Good air movement bilaterally. Heart: Irregularly irregular tachycardic.   Abdomen: soft, non-tender. Bowel sounds present in all four quadrants. No masses appreciated. Extremities:  able to move all extremities normal, atraumatic  Skin: Clean, dry and intact without appreciated lesions. Neurologic: Patient is awake, he follows commands, patient is disoriented.   Psychiatric: non focal, normal affect, poor recollection recent events    CBC:   Lab Results   Component Value Date/Time    WBC 8.8 11/14/2020 06:24 AM    RBC 4.09 (L) 11/14/2020 06:24 AM    HGB 14.2 11/14/2020 06:24 AM    HCT 42.3 11/14/2020 06:24 AM    PLATELET 393 77/27/1967 06:24 AM     CMP:   Lab Results   Component Value Date/Time Glucose 88 11/14/2020 06:24 AM    Sodium 152 (H) 11/14/2020 06:24 AM    Potassium 3.7 11/14/2020 06:24 AM    Chloride 123 (H) 11/14/2020 06:24 AM    CO2 24 11/14/2020 06:24 AM    BUN 42 (H) 11/14/2020 06:24 AM    Creatinine 1.08 11/14/2020 06:24 AM    Calcium 10.2 (H) 11/14/2020 06:24 AM    Anion gap 5 11/14/2020 06:24 AM    BUN/Creatinine ratio 39 (H) 11/14/2020 06:24 AM    Alk. phosphatase 132 (H) 11/12/2020 03:47 AM    Protein, total 7.7 11/12/2020 03:47 AM    Albumin 2.5 (L) 11/12/2020 03:47 AM    Globulin 5.2 (H) 11/12/2020 03:47 AM    A-G Ratio 0.5 (L) 11/12/2020 03:47 AM     Coagulation:   Lab Results   Component Value Date/Time    Prothrombin time 17.8 (H) 11/11/2020 04:57 AM    INR 1.5 (H) 11/11/2020 04:57 AM    aPTT 35.2 11/11/2020 04:57 AM     Radiology review:   CT SPINE CERV WO CONT   Final Result   Impression:      No acute fracture. Follow-up as clinically indicated. CT HEAD WO CONT   Final Result   Impression:      No acute intracranial bleed. Follow-up as clinically indicated. XR CHEST SNGL V   Final Result   IMPRESSION: Stable enlargement of cardiopericardial silhouette. Calcified aortic   knob. Right ventricular pacing lead. Near resolution of left basilar parenchymal   opacity compared with previous. No new findings. Assessment:     Active Problems:    Atrial fibrillation with RVR ([address] (11/11/2020)      Acute renal failure (ARF) ([address] (11/11/2020)      Hypernatremia (11/11/2020)      Fall (11/11/2020)      Laceration of forehead (11/11/2020)      Afib ([address] (11/12/2020)         Plan:     73-year-old male was brought to the hospital after he had a fall and laceration to his forearm. 1.  Laceration to forehead: This have been apposed with Dermabond. Patient denies any other injuries. CT head is negative for any acute intracranial bleed. 2.  Atrial Fibrillation with rapid ventricular rate: Patient was on Cardizem 60 mg at home.  dilt drip off, cardio appreciated, loaded digoxin. Continue eliquis. Continue metoprolol 50 mg bid, now in afib hr 90's  3. Hypernatremia: Likely due to dehydration. Switch to 0.2ns, follow. Better- 152  4. Acute renal failure:improving, cont judicious hydration  Holding Lasix for now. 5.  Dysphagia: Speech evaluatio. Patient was on nectar thick liquid diet will continue that for now.   Patient is DO NOT RESUSCITATE  Vtep: Eliquis  Further plan of management depends on patient's clinical course in the hospital    Signed By: Mari Robertson MD     November 14, 2020

## 2020-11-14 NOTE — PROGRESS NOTES
Problem: Risk for Spread of Infection  Goal: Prevent transmission of infectious organism to others  Description: Prevent the transmission of infectious organisms to other patients, staff members, and visitors. Outcome: Progressing Towards Goal     Problem: Patient Education:  Go to Education Activity  Goal: Patient/Family Education  Outcome: Progressing Towards Goal     Problem: Pressure Injury - Risk of  Goal: *Prevention of pressure injury  Description: Document Kennedy Scale and appropriate interventions in the flowsheet. Outcome: Progressing Towards Goal  Note: Pressure Injury Interventions:  Sensory Interventions: Assess changes in LOC, Assess need for specialty bed, Avoid rigorous massage over bony prominences, Keep linens dry and wrinkle-free, Maintain/enhance activity level, Minimize linen layers, Monitor skin under medical devices    Moisture Interventions: Absorbent underpads, Limit adult briefs, Maintain skin hydration (lotion/cream), Minimize layers    Activity Interventions: Pressure redistribution bed/mattress(bed type), PT/OT evaluation    Mobility Interventions: HOB 30 degrees or less, Pressure redistribution bed/mattress (bed type), PT/OT evaluation    Nutrition Interventions: Document food/fluid/supplement intake    Friction and Shear Interventions: HOB 30 degrees or less, Lift sheet, Lift team/patient mobility team                Problem: Patient Education: Go to Patient Education Activity  Goal: Patient/Family Education  Outcome: Progressing Towards Goal     Problem: Falls - Risk of  Goal: *Absence of Falls  Description: Document Swetha Fall Risk and appropriate interventions in the flowsheet.   Outcome: Progressing Towards Goal  Note: Fall Risk Interventions:       Mentation Interventions: Adequate sleep, hydration, pain control, Door open when patient unattended, Increase mobility, More frequent rounding, Reorient patient    Medication Interventions: Bed/chair exit alarm, Evaluate medications/consider consulting pharmacy, Patient to call before getting OOB    Elimination Interventions: Patient to call for help with toileting needs    History of Falls Interventions: Bed/chair exit alarm, Evaluate medications/consider consulting pharmacy         Problem: Patient Education: Go to Patient Education Activity  Goal: Patient/Family Education  Outcome: Progressing Towards Goal

## 2020-11-15 LAB
ANION GAP SERPL CALC-SCNC: 6 MMOL/L (ref 5–15)
BUN SERPL-MCNC: 40 MG/DL (ref 6–20)
BUN/CREAT SERPL: 37 (ref 12–20)
CA-I BLD-MCNC: 9.9 MG/DL (ref 8.5–10.1)
CHLORIDE SERPL-SCNC: 122 MMOL/L (ref 97–108)
CO2 SERPL-SCNC: 24 MMOL/L (ref 21–32)
CREAT SERPL-MCNC: 1.07 MG/DL (ref 0.7–1.3)
GLUCOSE SERPL-MCNC: 87 MG/DL (ref 65–100)
POTASSIUM SERPL-SCNC: 3.7 MMOL/L (ref 3.5–5.1)
SODIUM SERPL-SCNC: 152 MMOL/L (ref 136–145)

## 2020-11-15 PROCEDURE — 74011250637 HC RX REV CODE- 250/637: Performed by: HOSPITALIST

## 2020-11-15 PROCEDURE — 74011250636 HC RX REV CODE- 250/636: Performed by: INTERNAL MEDICINE

## 2020-11-15 PROCEDURE — 74011000250 HC RX REV CODE- 250: Performed by: INTERNAL MEDICINE

## 2020-11-15 PROCEDURE — 65270000029 HC RM PRIVATE

## 2020-11-15 PROCEDURE — 80048 BASIC METABOLIC PNL TOTAL CA: CPT

## 2020-11-15 PROCEDURE — 36415 COLL VENOUS BLD VENIPUNCTURE: CPT

## 2020-11-15 PROCEDURE — 74011250637 HC RX REV CODE- 250/637: Performed by: INTERNAL MEDICINE

## 2020-11-15 RX ORDER — DEXTROSE MONOHYDRATE 50 MG/ML
75 INJECTION, SOLUTION INTRAVENOUS CONTINUOUS
Status: DISCONTINUED | OUTPATIENT
Start: 2020-11-15 | End: 2020-11-19 | Stop reason: HOSPADM

## 2020-11-15 RX ADMIN — METOPROLOL TARTRATE 50 MG: 50 TABLET, FILM COATED ORAL at 08:32

## 2020-11-15 RX ADMIN — APIXABAN 5 MG: 5 TABLET, FILM COATED ORAL at 20:29

## 2020-11-15 RX ADMIN — AMOXICILLIN AND CLAVULANATE POTASSIUM 1 TABLET: 875; 125 TABLET, FILM COATED ORAL at 20:29

## 2020-11-15 RX ADMIN — AMOXICILLIN AND CLAVULANATE POTASSIUM 1 TABLET: 875; 125 TABLET, FILM COATED ORAL at 08:31

## 2020-11-15 RX ADMIN — LISINOPRIL 2.5 MG: 5 TABLET ORAL at 08:31

## 2020-11-15 RX ADMIN — SODIUM CHLORIDE: 234 INJECTION, SOLUTION, CONCENTRATE INTRAVENOUS at 00:08

## 2020-11-15 RX ADMIN — APIXABAN 5 MG: 5 TABLET, FILM COATED ORAL at 08:32

## 2020-11-15 RX ADMIN — DEXTROSE MONOHYDRATE 75 ML/HR: 50 INJECTION, SOLUTION INTRAVENOUS at 12:15

## 2020-11-15 NOTE — PROGRESS NOTES
Problem: Risk for Spread of Infection  Goal: Prevent transmission of infectious organism to others  Description: Prevent the transmission of infectious organisms to other patients, staff members, and visitors. Outcome: Progressing Towards Goal     Problem: Patient Education:  Go to Education Activity  Goal: Patient/Family Education  Outcome: Progressing Towards Goal     Problem: Pressure Injury - Risk of  Goal: *Prevention of pressure injury  Description: Document Kennedy Scale and appropriate interventions in the flowsheet. Outcome: Progressing Towards Goal  Note: Pressure Injury Interventions:  Sensory Interventions: Assess changes in LOC, Keep linens dry and wrinkle-free, Minimize linen layers    Moisture Interventions: Absorbent underpads, Apply protective barrier, creams and emollients, Limit adult briefs, Minimize layers, Moisture barrier    Activity Interventions: Pressure redistribution bed/mattress(bed type), PT/OT evaluation    Mobility Interventions: HOB 30 degrees or less    Nutrition Interventions: Document food/fluid/supplement intake    Friction and Shear Interventions: HOB 30 degrees or less, Lift sheet, Lift team/patient mobility team                Problem: Patient Education: Go to Patient Education Activity  Goal: Patient/Family Education  Outcome: Progressing Towards Goal     Problem: Falls - Risk of  Goal: *Absence of Falls  Description: Document Swetha Fall Risk and appropriate interventions in the flowsheet.   Outcome: Progressing Towards Goal  Note: Fall Risk Interventions:       Mentation Interventions: Bed/chair exit alarm, Room close to nurse's station    Medication Interventions: Bed/chair exit alarm    Elimination Interventions: Bed/chair exit alarm    History of Falls Interventions: Bed/chair exit alarm, Evaluate medications/consider consulting pharmacy         Problem: Patient Education: Go to Patient Education Activity  Goal: Patient/Family Education  Outcome: Progressing Towards Goal

## 2020-11-15 NOTE — PROGRESS NOTES
History and Physical    Patient: Miguelito Al MRN: [id number]  SSN: [id number]    YOB: 1950  Age: 78 y.o. Sex: male      Subjective:      Chief Complaint:  Fall at nursing home   HPI: Miguelito Al is a 78 y.o. male who known case of atrial fibrillation, hypertension, dysphagia on nectar thick liquids, on anticoagulation with Eliquis had a ground-level fall at nursing home millimeter laceration of forehead. Laceration was apposed with Dermabond. Patient was also noticed to have atrial fibrillation with rapid ventricular rate and was started on Cardizem drip in ER. Along with this his sodium was elevated and BUN/creatinine was also elevated. Following this hospital service was requested to admit the patient for further work-up and management. 11/15 Aleert, tolerating lunch, denies complaints. Remains afib cvr, reportedly brief svt earlier to 160's.     11/14  Remains pleasanlt confused, no offering complaints. HR down to 53 reported this am.  No cp or sob      11/13: pleasantly confused, poor recollection of recent admit though states \"sounds familiar\"  Hr settled down some overnight, picking up this aftermoon, cardio noted, digoxin loaded. 11/12: alert, pleasantly confused it appears, poor recollection of recent events/confabulating? Prior to Admission medications    Medication Sig Start Date End Date Taking? Authorizing Provider   amoxicillin-clavulanate (AUGMENTIN) 875-125 mg per tablet Take 1 Tab by mouth every twelve (12) hours for 7 days. 11/9/20 11/16/20  Tegan Ramirez MD   apixaban (ELIQUIS) 5 mg tablet Take 1 Tab by mouth two (2) times a day for 30 days. 11/9/20 12/9/20  Tegan Ramirez MD   dilTIAZem IR (CARDIZEM) 60 mg tablet Take 1 Tab by mouth every six (6) hours for 30 days. 11/9/20 12/9/20  Tegan Ramirez MD   furosemide (LASIX) 40 mg tablet Take 1 Tab by mouth daily for 30 days.  11/9/20 12/9/20  Tegan Ramirez MD   lisinopriL (PRINIVIL, ZESTRIL) 10 mg tablet Take 1 Tab by mouth daily for 30 days. 11/9/20 12/9/20  Phan Campos MD   potassium chloride (K-DUR, KLOR-CON) 20 mEq tablet Take 1 Tab by mouth daily for 14 days. 11/10/20 11/24/20  Phan Campos MD        No Known Allergies    Review of Systems:  Review of Systems   Constitutional: Positive for malaise/fatigue. Negative for chills and fever. HENT: Negative. Eyes: Negative. Respiratory: Negative. Cardiovascular: Negative for palpitations. Gastrointestinal: Negative. Genitourinary: Negative. Musculoskeletal: Negative. Skin: Negative. Neurological: Negative. Endo/Heme/Allergies: Negative. Psychiatric/Behavioral: Negative. Objective:     Vitals:    11/14/20 1758 11/14/20 2145 11/15/20 0049 11/15/20 0833   BP: 115/79 114/77 113/75 133/77   Pulse: 60 64 66 80   Resp: 20 20 20 20   Temp: 98.1 °F (36.7 °C) 98.1 °F (36.7 °C) 97.5 °F (36.4 °C) 98.1 °F (36.7 °C)   SpO2: 97% 97% 95% 96%   Weight:       Height:            Physical Exam:  General: Alert, he is speaking clearly, pleasantly confused  HEAD: Laceration at angle of nose and forehead has been apposed with Dermabond  Eye: Left eeye deviates outward- chronic  Throat and Neck: normal and no erythema or exudates noted. No mass   Lung: Clear to auscultation bilaterally without wheezes, rhonchi. Good air movement bilaterally. Heart: Irregularly irregular tachycardic.  afib  Abdomen: soft, non-tender. Bowel sounds present in all four quadrants. No masses appreciated. Extremities:  able to move all extremities normal, atraumatic  Skin: Clean, dry and intact without appreciated lesions. Neurologic: Patient is awake, he follows commands, patient is disoriented.   Psychiatric: non focal, normal affect, poor recollection recent events    CBC:   Lab Results   Component Value Date/Time    WBC 8.8 11/14/2020 06:24 AM    RBC 4.09 (L) 11/14/2020 06:24 AM    HGB 14.2 11/14/2020 06:24 AM    HCT 42.3 11/14/2020 06:24 AM    PLATELET 005 36/51/9939 06:24 AM     CMP:   Lab Results   Component Value Date/Time    Glucose 87 11/15/2020 06:28 AM    Sodium 152 (H) 11/15/2020 06:28 AM    Potassium 3.7 11/15/2020 06:28 AM    Chloride 122 (H) 11/15/2020 06:28 AM    CO2 24 11/15/2020 06:28 AM    BUN 40 (H) 11/15/2020 06:28 AM    Creatinine 1.07 11/15/2020 06:28 AM    Calcium 9.9 11/15/2020 06:28 AM    Anion gap 6 11/15/2020 06:28 AM    BUN/Creatinine ratio 37 (H) 11/15/2020 06:28 AM    Alk. phosphatase 132 (H) 11/12/2020 03:47 AM    Protein, total 7.7 11/12/2020 03:47 AM    Albumin 2.5 (L) 11/12/2020 03:47 AM    Globulin 5.2 (H) 11/12/2020 03:47 AM    A-G Ratio 0.5 (L) 11/12/2020 03:47 AM     Coagulation:   Lab Results   Component Value Date/Time    Prothrombin time 17.8 (H) 11/11/2020 04:57 AM    INR 1.5 (H) 11/11/2020 04:57 AM    aPTT 35.2 11/11/2020 04:57 AM     Radiology review:   CT SPINE CERV WO CONT   Final Result   Impression:      No acute fracture. Follow-up as clinically indicated. CT HEAD WO CONT   Final Result   Impression:      No acute intracranial bleed. Follow-up as clinically indicated. XR CHEST SNGL V   Final Result   IMPRESSION: Stable enlargement of cardiopericardial silhouette. Calcified aortic   knob. Right ventricular pacing lead. Near resolution of left basilar parenchymal   opacity compared with previous. No new findings. Assessment:     Active Problems:    Atrial fibrillation with RVR ([address] (11/11/2020)      Acute renal failure (ARF) ([address] (11/11/2020)      Hypernatremia (11/11/2020)      Fall (11/11/2020)      Laceration of forehead (11/11/2020)      Afib ([address] (11/12/2020)         Plan:     72-year-old male was brought to the hospital after he had a fall and laceration to his forearm. 1.  Laceration to forehead: This have been apposed with Dermabond. Patient denies any other injuries. CT head is negative for any acute intracranial bleed.   2.  Atrial Fibrillation with rapid ventricular rate: Patient was on Cardizem 60 mg at home. dilt drip off, cardio appreciated, loaded digoxin. Continue eliquis. Continue metoprolol 50 mg bid, now in afib hr 101. Brief svt reported earlier this am. Cardio following. 3.  Hypernatremia: Likely due to dehydration. Switched to 0.2, persisted na 152, switch to d5w, monitor am labs. 4.  Acute renal failure:improving, cont judicious hydration  Holding Lasix for now. 5.  Dysphagia: Speech evaluatio. Patient was on nectar thick liquid diet will continue that for now.     Covid not detected    Patient is DO NOT RESUSCITATE  Vtep: Eliquis  Further plan of management depends on patient's clinical course in the hospital    Signed By: Sepideh Glaser MD     November 15, 2020

## 2020-11-16 LAB
GLUCOSE BLD STRIP.AUTO-MCNC: 101 MG/DL (ref 65–100)
GLUCOSE BLD STRIP.AUTO-MCNC: 107 MG/DL (ref 65–100)
GLUCOSE BLD STRIP.AUTO-MCNC: 119 MG/DL (ref 65–100)
GLUCOSE BLD STRIP.AUTO-MCNC: 94 MG/DL (ref 65–100)
PERFORMED BY, TECHID: ABNORMAL
PERFORMED BY, TECHID: NORMAL

## 2020-11-16 PROCEDURE — 65270000029 HC RM PRIVATE

## 2020-11-16 PROCEDURE — 74011250637 HC RX REV CODE- 250/637: Performed by: INTERNAL MEDICINE

## 2020-11-16 PROCEDURE — 74011250636 HC RX REV CODE- 250/636: Performed by: INTERNAL MEDICINE

## 2020-11-16 PROCEDURE — 82962 GLUCOSE BLOOD TEST: CPT

## 2020-11-16 PROCEDURE — 74011250637 HC RX REV CODE- 250/637: Performed by: HOSPITALIST

## 2020-11-16 RX ADMIN — DEXTROSE MONOHYDRATE 75 ML/HR: 50 INJECTION, SOLUTION INTRAVENOUS at 02:54

## 2020-11-16 RX ADMIN — AMOXICILLIN AND CLAVULANATE POTASSIUM 1 TABLET: 875; 125 TABLET, FILM COATED ORAL at 08:19

## 2020-11-16 RX ADMIN — AMOXICILLIN AND CLAVULANATE POTASSIUM 1 TABLET: 875; 125 TABLET, FILM COATED ORAL at 22:33

## 2020-11-16 RX ADMIN — METOPROLOL TARTRATE 50 MG: 50 TABLET, FILM COATED ORAL at 22:33

## 2020-11-16 RX ADMIN — DEXTROSE MONOHYDRATE 75 ML/HR: 50 INJECTION, SOLUTION INTRAVENOUS at 16:05

## 2020-11-16 RX ADMIN — APIXABAN 5 MG: 5 TABLET, FILM COATED ORAL at 22:34

## 2020-11-16 RX ADMIN — APIXABAN 5 MG: 5 TABLET, FILM COATED ORAL at 08:19

## 2020-11-16 RX ADMIN — METOPROLOL TARTRATE 50 MG: 50 TABLET, FILM COATED ORAL at 08:19

## 2020-11-16 RX ADMIN — LISINOPRIL 2.5 MG: 5 TABLET ORAL at 08:20

## 2020-11-16 NOTE — PROGRESS NOTES
CM received a call from Mogi. Patient's daughter. Her phone numbers are [phone]. Home phone number is [phone]. CM spoke with daughter at length regarding disposition and hospice. Daughter states that he is so sick and she doesn't want him to suffer. On the last admission they were going to do hospice and then he became not confused and decided he wanted to not do hospice. She has concerns about his heart and his confusion. She believes that Hospice would the best thing for him. She doesn't want him to be in pain. CM explained hospice and their catered approach to care for the patient. CM states that the patient doesn't have a secondary payor source, other than an insurance policy. Daughter is to follow up with her mother regarding medicaid as well as the life insurance policy and what it covers. PIPPA spoke with Dr. Aramis Dong and gave him Suly's numbers to call. Cell # [phone] and home number is [phone]. [facility] Will follow up with daughter in am after she has spoke with her mother and Dr. Aramis Dong.

## 2020-11-16 NOTE — PROGRESS NOTES
History and Physical    Patient: Kassidy Morelos MRN: [id number]  SSN: [id number]    YOB: 1950  Age: 78 y.o. Sex: male      Subjective:      Chief Complaint:  Fall at nursing home   HPI: Kassidy Morelos is a 78 y.o. male who known case of atrial fibrillation, hypertension, dysphagia on nectar thick liquids, on anticoagulation with Eliquis had a ground-level fall at nursing home millimeter laceration of forehead. Laceration was apposed with Dermabond. Patient was also noticed to have atrial fibrillation with rapid ventricular rate and was started on Cardizem drip in ER. Along with this his sodium was elevated and BUN/creatinine was also elevated. Following this hospital service was requested to admit the patient for further work-up and management. 11/16 alert, remains confused, alert to self, place. Afib now 80  Daughter in discussion with nursing reportedly considering hospice. Cm in discussion. Left message with daughter brennen. 11/15 Aleert, tolerating lunch, denies complaints. Remains afib cvr, reportedly brief svt earlier to 160's.     11/14  Remains pleasanlt confused, no offering complaints. HR down to 53 reported this am.  No cp or sob, currently afib ~100      11/13: pleasantly confused, poor recollection of recent admit though states \"sounds familiar\"  Hr settled down some overnight, picking up this aftermoon, cardio noted, digoxin loaded. 11/12: alert, pleasantly confused it appears, poor recollection of recent events/confabulating? Prior to Admission medications    Medication Sig Start Date End Date Taking? Authorizing Provider   amoxicillin-clavulanate (AUGMENTIN) 875-125 mg per tablet Take 1 Tab by mouth every twelve (12) hours for 7 days. 11/9/20 11/16/20  Johanna Daniel MD   apixaban (ELIQUIS) 5 mg tablet Take 1 Tab by mouth two (2) times a day for 30 days.  11/9/20 12/9/20  Johanna Daniel MD   dilTIAZem IR (CARDIZEM) 60 mg tablet Take 1 Tab by mouth every six (6) hours for 30 days. 11/9/20 12/9/20  Sally Amador MD   furosemide (LASIX) 40 mg tablet Take 1 Tab by mouth daily for 30 days. 11/9/20 12/9/20  Sally Amador MD   lisinopriL (PRINIVIL, ZESTRIL) 10 mg tablet Take 1 Tab by mouth daily for 30 days. 11/9/20 12/9/20  Sally Amador MD   potassium chloride (K-DUR, KLOR-CON) 20 mEq tablet Take 1 Tab by mouth daily for 14 days. 11/10/20 11/24/20  Sally Amador MD        No Known Allergies    Review of Systems: limited by confusion  Review of Systems   Constitutional: Positive for malaise/fatigue. Negative for chills and fever. HENT: Negative. Eyes: Negative. Respiratory: Negative. Cardiovascular: Negative for palpitations. Gastrointestinal: Negative. Genitourinary: Negative. Musculoskeletal: Negative. Skin: Negative. Neurological: Negative. Endo/Heme/Allergies: Negative. Psychiatric/Behavioral: Negative. Objective:     Vitals:    11/16/20 0811 11/16/20 1049 11/16/20 1135 11/16/20 1509   BP: 106/72 (!) 89/67 91/68 93/67   Pulse: 70 68 65 61   Resp: 18 18  17   Temp: 98.6 °F (37 °C) 98.7 °F (37.1 °C) 97.8 °F (36.6 °C) 97.6 °F (36.4 °C)   SpO2: 97% 97% 96% 96%   Weight:       Height:            Physical Exam:  General: Alert, he is speaking clearly, pleasantly confused  HEAD: Laceration at angle of nose and forehead has been apposed with Dermabond  Eye: Left eeye deviates outward- chronic  Throat and Neck: normal and no erythema or exudates noted. No mass   Lung: Clear to auscultation bilaterally without wheezes, rhonchi. Good air movement bilaterally. Heart: Irregularly irregular tachycardic.  afib  Abdomen: soft, non-tender. Bowel sounds present in all four quadrants. No masses appreciated. Extremities:  able to move all extremities normal, atraumatic  Skin: Clean, dry and intact without appreciated lesions.    Neurologic: Patient is awake, he follows commands, patient is disoriented. Psychiatric: non focal, normal affect, poor recollection recent events    CBC:   Lab Results   Component Value Date/Time    WBC 8.8 11/14/2020 06:24 AM    RBC 4.09 (L) 11/14/2020 06:24 AM    HGB 14.2 11/14/2020 06:24 AM    HCT 42.3 11/14/2020 06:24 AM    PLATELET 646 30/15/0304 06:24 AM     CMP:   Lab Results   Component Value Date/Time    Glucose 87 11/15/2020 06:28 AM    Sodium 152 (H) 11/15/2020 06:28 AM    Potassium 3.7 11/15/2020 06:28 AM    Chloride 122 (H) 11/15/2020 06:28 AM    CO2 24 11/15/2020 06:28 AM    BUN 40 (H) 11/15/2020 06:28 AM    Creatinine 1.07 11/15/2020 06:28 AM    Calcium 9.9 11/15/2020 06:28 AM    Anion gap 6 11/15/2020 06:28 AM    BUN/Creatinine ratio 37 (H) 11/15/2020 06:28 AM    Alk. phosphatase 132 (H) 11/12/2020 03:47 AM    Protein, total 7.7 11/12/2020 03:47 AM    Albumin 2.5 (L) 11/12/2020 03:47 AM    Globulin 5.2 (H) 11/12/2020 03:47 AM    A-G Ratio 0.5 (L) 11/12/2020 03:47 AM     Coagulation:   Lab Results   Component Value Date/Time    Prothrombin time 17.8 (H) 11/11/2020 04:57 AM    INR 1.5 (H) 11/11/2020 04:57 AM    aPTT 35.2 11/11/2020 04:57 AM     Radiology review:   CT SPINE CERV WO CONT   Final Result   Impression:      No acute fracture. Follow-up as clinically indicated. CT HEAD WO CONT   Final Result   Impression:      No acute intracranial bleed. Follow-up as clinically indicated. XR CHEST SNGL V   Final Result   IMPRESSION: Stable enlargement of cardiopericardial silhouette. Calcified aortic   knob. Right ventricular pacing lead. Near resolution of left basilar parenchymal   opacity compared with previous. No new findings.             Assessment:     Active Problems:    Atrial fibrillation with RVR ([facility] (11/11/2020)      Acute renal failure (ARF) ([address] (11/11/2020)      Hypernatremia (11/11/2020)      Fall (11/11/2020)      Laceration of forehead (11/11/2020)      Afib ([address] (11/12/2020)         Plan:     73-year-old male was brought to the hospital after he had a fall and laceration to his forearm. 1.  Laceration to forehead: This have been apposed with Dermabond. Patient denies any other injuries. CT head is negative for any acute intracranial bleed. 2.  Atrial Fibrillation with rapid ventricular rate: Patient was on Cardizem 60 mg at home. dilt drip off, cardio appreciated, loaded digoxin. Continue eliquis. Continue metoprolol 50 mg bid, now in afib hr 101. Brief svt reported earlier this am. Cardio following. 3.  Hypernatremia: Likely due to dehydration. Switched to 0.2, persisted na 152, switch to d5w, monitor am labs. 4.  Acute renal failure:improving, cont judicious hydration  Holding Lasix for now. 5.  Dysphagia: Speech evaluatio. Patient was on nectar thick liquid diet will continue that for now. Covid not detected    Patient is DO NOT RESUSCITATE  Vtep: Eliquis  Further plan of management depends on patient's clinical course in the hospital. PIPPA feports to me that daughter considering hospice. Daughter Zamzam Alvarenga [phone], left message. Pippa working on logistics in regards to placement vs home.     Signed By: Vicki Gan MD     November 16, 2020

## 2020-11-16 NOTE — PROGRESS NOTES
Bedside and Verbal shift change report given to Cristine Cantrell RN (oncoming nurse) by Matilde Goodrich RN (offgoing nurse). Report included the following information SBAR and MAR.

## 2020-11-17 ENCOUNTER — OFFICE VISIT (OUTPATIENT)
Dept: CARDIOLOGY | Age: 70
End: 2020-11-17
Payer: MEDICARE

## 2020-11-17 VITALS
HEART RATE: 46 BPM | SYSTOLIC BLOOD PRESSURE: 102 MMHG | HEIGHT: 74 IN | DIASTOLIC BLOOD PRESSURE: 68 MMHG | BODY MASS INDEX: 30.8 KG/M2 | OXYGEN SATURATION: 98 % | WEIGHT: 240 LBS

## 2020-11-17 LAB
ANION GAP SERPL CALC-SCNC: 7 MMOL/L (ref 5–15)
ANION GAP SERPL CALC-SCNC: 7 MMOL/L (ref 5–15)
ANION GAP SERPL CALC-SCNC: 8 MMOL/L (ref 5–15)
BASOPHILS # BLD: 0 K/UL (ref 0–0.1)
BASOPHILS NFR BLD: 0 % (ref 0–1)
BUN SERPL-MCNC: 30 MG/DL (ref 6–20)
BUN SERPL-MCNC: 31 MG/DL (ref 6–20)
BUN SERPL-MCNC: 33 MG/DL (ref 6–20)
BUN/CREAT SERPL: 29 (ref 12–20)
BUN/CREAT SERPL: 32 (ref 12–20)
BUN/CREAT SERPL: 32 (ref 12–20)
CA-I BLD-MCNC: 9.6 MG/DL (ref 8.5–10.1)
CA-I BLD-MCNC: 9.7 MG/DL (ref 8.5–10.1)
CA-I BLD-MCNC: 9.8 MG/DL (ref 8.5–10.1)
CHLORIDE SERPL-SCNC: 118 MMOL/L (ref 97–108)
CHLORIDE SERPL-SCNC: 119 MMOL/L (ref 97–108)
CHLORIDE SERPL-SCNC: 121 MMOL/L (ref 97–108)
CO2 SERPL-SCNC: 25 MMOL/L (ref 21–32)
CO2 SERPL-SCNC: 25 MMOL/L (ref 21–32)
CO2 SERPL-SCNC: 26 MMOL/L (ref 21–32)
CREAT SERPL-MCNC: 0.96 MG/DL (ref 0.7–1.3)
CREAT SERPL-MCNC: 1.03 MG/DL (ref 0.7–1.3)
CREAT SERPL-MCNC: 1.04 MG/DL (ref 0.7–1.3)
DIFFERENTIAL METHOD BLD: ABNORMAL
EOSINOPHIL # BLD: 0.1 K/UL (ref 0–0.4)
EOSINOPHIL NFR BLD: 2 % (ref 0–7)
ERYTHROCYTE [DISTWIDTH] IN BLOOD BY AUTOMATED COUNT: 16.3 % (ref 11.5–14.5)
GLUCOSE SERPL-MCNC: 103 MG/DL (ref 65–100)
GLUCOSE SERPL-MCNC: 105 MG/DL (ref 65–100)
GLUCOSE SERPL-MCNC: 91 MG/DL (ref 65–100)
HCT VFR BLD AUTO: 39.4 % (ref 36.6–50.3)
HGB BLD-MCNC: 13.6 G/DL (ref 12.1–17)
IMM GRANULOCYTES # BLD AUTO: 0 K/UL (ref 0–0.04)
IMM GRANULOCYTES NFR BLD AUTO: 0 % (ref 0–0.5)
LYMPHOCYTES # BLD: 1 K/UL (ref 0.8–3.5)
LYMPHOCYTES NFR BLD: 14 % (ref 12–49)
MCH RBC QN AUTO: 35.9 PG (ref 26–34)
MCHC RBC AUTO-ENTMCNC: 34.5 G/DL (ref 30–36.5)
MCV RBC AUTO: 104 FL (ref 80–99)
MONOCYTES # BLD: 0.6 K/UL (ref 0–1)
MONOCYTES NFR BLD: 8 % (ref 5–13)
NEUTS SEG # BLD: 5.1 K/UL (ref 1.8–8)
NEUTS SEG NFR BLD: 76 % (ref 32–75)
PLATELET # BLD AUTO: 242 K/UL (ref 150–400)
POTASSIUM SERPL-SCNC: 3.5 MMOL/L (ref 3.5–5.1)
POTASSIUM SERPL-SCNC: 3.6 MMOL/L (ref 3.5–5.1)
POTASSIUM SERPL-SCNC: 3.6 MMOL/L (ref 3.5–5.1)
RBC # BLD AUTO: 3.79 M/UL (ref 4.1–5.7)
SODIUM SERPL-SCNC: 151 MMOL/L (ref 136–145)
SODIUM SERPL-SCNC: 152 MMOL/L (ref 136–145)
SODIUM SERPL-SCNC: 153 MMOL/L (ref 136–145)
WBC # BLD AUTO: 6.7 K/UL (ref 4.1–11.1)

## 2020-11-17 PROCEDURE — 74011000250 HC RX REV CODE- 250: Performed by: INTERNAL MEDICINE

## 2020-11-17 PROCEDURE — 36415 COLL VENOUS BLD VENIPUNCTURE: CPT

## 2020-11-17 PROCEDURE — 80048 BASIC METABOLIC PNL TOTAL CA: CPT

## 2020-11-17 PROCEDURE — 74011250637 HC RX REV CODE- 250/637: Performed by: INTERNAL MEDICINE

## 2020-11-17 PROCEDURE — 99213 OFFICE O/P EST LOW 20 MIN: CPT | Performed by: INTERNAL MEDICINE

## 2020-11-17 PROCEDURE — 65270000029 HC RM PRIVATE

## 2020-11-17 PROCEDURE — 74011250637 HC RX REV CODE- 250/637: Performed by: HOSPITALIST

## 2020-11-17 PROCEDURE — 93000 ELECTROCARDIOGRAM COMPLETE: CPT | Performed by: INTERNAL MEDICINE

## 2020-11-17 PROCEDURE — 94762 N-INVAS EAR/PLS OXIMTRY CONT: CPT

## 2020-11-17 PROCEDURE — 85025 COMPLETE CBC W/AUTO DIFF WBC: CPT

## 2020-11-17 PROCEDURE — 85027 COMPLETE CBC AUTOMATED: CPT

## 2020-11-17 PROCEDURE — 74011250636 HC RX REV CODE- 250/636: Performed by: INTERNAL MEDICINE

## 2020-11-17 RX ORDER — NITROGLYCERIN 0.4 MG/1
0.4 TABLET SUBLINGUAL EVERY 5 MIN PRN
COMMUNITY
End: 2022-05-04 | Stop reason: SDUPTHER

## 2020-11-17 RX ADMIN — AMOXICILLIN AND CLAVULANATE POTASSIUM 1 TABLET: 875; 125 TABLET, FILM COATED ORAL at 10:12

## 2020-11-17 RX ADMIN — AMOXICILLIN AND CLAVULANATE POTASSIUM 1 TABLET: 875; 125 TABLET, FILM COATED ORAL at 22:23

## 2020-11-17 RX ADMIN — METOPROLOL TARTRATE 50 MG: 50 TABLET, FILM COATED ORAL at 10:12

## 2020-11-17 RX ADMIN — APIXABAN 5 MG: 5 TABLET, FILM COATED ORAL at 10:11

## 2020-11-17 RX ADMIN — APIXABAN 5 MG: 5 TABLET, FILM COATED ORAL at 22:23

## 2020-11-17 RX ADMIN — METOPROLOL TARTRATE 50 MG: 50 TABLET, FILM COATED ORAL at 22:23

## 2020-11-17 RX ADMIN — SODIUM CHLORIDE: 234 INJECTION, SOLUTION, CONCENTRATE INTRAVENOUS at 16:20

## 2020-11-17 RX ADMIN — LISINOPRIL 2.5 MG: 5 TABLET ORAL at 10:11

## 2020-11-17 ASSESSMENT — ENCOUNTER SYMPTOMS
COUGH: 1
BLOOD IN STOOL: 0
ANAL BLEEDING: 0
SHORTNESS OF BREATH: 1

## 2020-11-17 NOTE — PROGRESS NOTES
Patient refused lunch, states not hungry, Family at bedside. Made aware patient did not want to eat.states did you give him some juice. Made aware  aware patient did not want to eat.

## 2020-11-17 NOTE — PROGRESS NOTES
CM met with patient, wife and daughter. CM daughter and wife went to lobby to speak. Cm presented options for hospice with LTC. Daughter and wife are agreeable for this plan. Patient does not have a secondary payor other than a long term care benefit plan. Copy of this policy made. Number for Kiran [address] is [phone]. She is apparently the person controlling this policy with Sharon Joseph. First choice for [facility] and second is for [facility]. Cm attempted to call [facility] to send the referral and was unable to get anyone in the admissions department. First choice for Hospice is [facility]. Attempted to send referrals however [facility] is down at the moment. Will attempt to send referrals in am.  Family aware. Will continue to follow for discharge needs.

## 2020-11-17 NOTE — PROGRESS NOTES
History and Physical    Patient: Isaac Steinberg MRN: [id number]  SSN: [id number]    YOB: 1950  Age: 78 y.o. Sex: male      Subjective:      Chief Complaint:  Fall at nursing home   HPI: Isaac Steinberg is a 78 y.o. male who known case of atrial fibrillation, hypertension, dysphagia on nectar thick liquids, on anticoagulation with Eliquis had a ground-level fall at nursing home millimeter laceration of forehead. Laceration was apposed with Dermabond. Patient was also noticed to have atrial fibrillation with rapid ventricular rate and was started on Cardizem drip in ER. Along with this his sodium was elevated and BUN/creatinine was also elevated. Following this hospital service was requested to admit the patient for further work-up and management. 11/17 Pt seen and examined at bedside, no acute events overnight, currently AAOx2, discussed with RN at bedside    11/16 alert, remains confused, alert to self, place. Afib now 80  Daughter in discussion with nursing reportedly considering hospice. Cm in discussion. Left message with daughter brennen. 11/15 Aleert, tolerating lunch, denies complaints. Remains afib cvr, reportedly brief svt earlier to 160's.     11/14  Remains pleasanlt confused, no offering complaints. HR down to 53 reported this am.  No cp or sob, currently afib ~100      11/13: pleasantly confused, poor recollection of recent admit though states \"sounds familiar\"  Hr settled down some overnight, picking up this aftermoon, cardio noted, digoxin loaded. 11/12: alert, pleasantly confused it appears, poor recollection of recent events/confabulating? Prior to Admission medications    Medication Sig Start Date End Date Taking? Authorizing Provider   amoxicillin-clavulanate (AUGMENTIN) 875-125 mg per tablet Take 1 Tab by mouth every twelve (12) hours for 7 days.  11/9/20 11/16/20  Joy Barclay MD   apixaban (ELIQUIS) 5 mg tablet Take 1 Tab by mouth two (2) times a day for 30 days. 11/9/20 12/9/20  Joni Escamilla MD   dilTIAZem IR (CARDIZEM) 60 mg tablet Take 1 Tab by mouth every six (6) hours for 30 days. 11/9/20 12/9/20  Joni Escamilla MD   furosemide (LASIX) 40 mg tablet Take 1 Tab by mouth daily for 30 days. 11/9/20 12/9/20  Joni Escamilla MD   lisinopriL (PRINIVIL, ZESTRIL) 10 mg tablet Take 1 Tab by mouth daily for 30 days. 11/9/20 12/9/20  Joni Escamilla MD   potassium chloride (K-DUR, KLOR-CON) 20 mEq tablet Take 1 Tab by mouth daily for 14 days. 11/10/20 11/24/20  Joni Escamilla MD        No Known Allergies    Review of Systems: limited by confusion  Review of Systems   Constitutional: Positive for malaise/fatigue. Negative for chills and fever. HENT: Negative. Eyes: Negative. Respiratory: Negative. Cardiovascular: Negative for palpitations. Gastrointestinal: Negative. Genitourinary: Negative. Musculoskeletal: Negative. Skin: Negative. Neurological: Negative. Endo/Heme/Allergies: Negative. Psychiatric/Behavioral: Negative. Objective:     Vitals:    11/16/20 1509 11/16/20 1937 11/16/20 2229 11/17/20 0730   BP: 93/67  114/66 113/65   Pulse: 61  74 80   Resp: 17  18 18   Temp: 97.6 °F (36.4 °C)  98.4 °F (36.9 °C) 97.1 °F (36.2 °C)   SpO2: 96% 96% 96% 95%   Weight:       Height:            Physical Exam:  General: Alert, he is speaking clearly, pleasantly confused  HEAD: Laceration at angle of nose and forehead has been apposed with Dermabond  Eye: Left eeye deviates outward- chronic  Throat and Neck: normal and no erythema or exudates noted. No mass   Lung: Clear to auscultation bilaterally without wheezes, rhonchi. Good air movement bilaterally. Heart: Irregularly irregular tachycardic.  afib  Abdomen: soft, non-tender. Bowel sounds present in all four quadrants. No masses appreciated.   Extremities:  able to move all extremities normal, atraumatic  Skin: Clean, dry and intact without appreciated lesions. Neurologic: Patient is awake, he follows commands, patient is disoriented. Psychiatric: non focal, normal affect, poor recollection recent events    CBC:   Lab Results   Component Value Date/Time    WBC 8.8 11/14/2020 06:24 AM    RBC 4.09 (L) 11/14/2020 06:24 AM    HGB 14.2 11/14/2020 06:24 AM    HCT 42.3 11/14/2020 06:24 AM    PLATELET 891 48/47/4072 06:24 AM     CMP:   Lab Results   Component Value Date/Time    Glucose 87 11/15/2020 06:28 AM    Sodium 152 (H) 11/15/2020 06:28 AM    Potassium 3.7 11/15/2020 06:28 AM    Chloride 122 (H) 11/15/2020 06:28 AM    CO2 24 11/15/2020 06:28 AM    BUN 40 (H) 11/15/2020 06:28 AM    Creatinine 1.07 11/15/2020 06:28 AM    Calcium 9.9 11/15/2020 06:28 AM    Anion gap 6 11/15/2020 06:28 AM    BUN/Creatinine ratio 37 (H) 11/15/2020 06:28 AM    Alk. phosphatase 132 (H) 11/12/2020 03:47 AM    Protein, total 7.7 11/12/2020 03:47 AM    Albumin 2.5 (L) 11/12/2020 03:47 AM    Globulin 5.2 (H) 11/12/2020 03:47 AM    A-G Ratio 0.5 (L) 11/12/2020 03:47 AM     Coagulation:   Lab Results   Component Value Date/Time    Prothrombin time 17.8 (H) 11/11/2020 04:57 AM    INR 1.5 (H) 11/11/2020 04:57 AM    aPTT 35.2 11/11/2020 04:57 AM     Radiology review:   CT SPINE CERV WO CONT   Final Result   Impression:      No acute fracture. Follow-up as clinically indicated. CT HEAD WO CONT   Final Result   Impression:      No acute intracranial bleed. Follow-up as clinically indicated. XR CHEST SNGL V   Final Result   IMPRESSION: Stable enlargement of cardiopericardial silhouette. Calcified aortic   knob. Right ventricular pacing lead. Near resolution of left basilar parenchymal   opacity compared with previous. No new findings.             Assessment:     Active Problems:    Atrial fibrillation with RVR ([address] (11/11/2020)      Acute renal failure (ARF) ([facility] [address] (11/11/2020)      Hypernatremia (11/11/2020)      Fall (11/11/2020)      Laceration of forehead (11/11/2020) Afib ([facility] [address] (11/12/2020)         Plan:     62-year-old male was brought to the hospital after he had a fall and laceration to his forearm. 1.  Laceration to forehead: This have been apposed with Dermabond. Patient denies any other injuries. CT head is negative for any acute intracranial bleed. 2.  Atrial Fibrillation with rapid ventricular rate: Currently well controlled  Continue Lopressor twice daily  Continue Eliquis for anticoagulation  Cardiology recommendations appreciated, will continue to follow    3. Hypernatremia: Likely multifactorial  Obtain repeat serum sodium for further evaluation  Continue D5W at 75 cc/h  Continue to trend serum sodium every 4 hours  Obtain nephrology consult    4. Acute kidney injurylikely multifactorial, serum creatinine was improving  Continue to hold diuretics  Continue to trend serum creatinine  Nephrology consult    5. Dysphagia: Speech evaluatio. Patient was on nectar thick liquid diet will continue that for now will advance as tolerated    6. Hypertensioncontinue home antihypertensive medications    Covid not detected    Patient is DO NOT RESUSCITATE  Vtep: Eliquis  Further plan of management depends on patient's clinical course in the hospital. PIPPA feports to me that daughter considering hospice. Daughter Gabriela Burris [phone], updated daughter and wife in detail at bedside. Cm working on logistics in regards to placement vs home.     Total noncritical care time spent 35 minutes    Signed By: Lul Chun MD     November 17, 2020

## 2020-11-17 NOTE — PROGRESS NOTES
Problem: Risk for Spread of Infection  Goal: Prevent transmission of infectious organism to others  Description: Prevent the transmission of infectious organisms to other patients, staff members, and visitors. Outcome: Progressing Towards Goal     Problem: Patient Education:  Go to Education Activity  Goal: Patient/Family Education  Outcome: Progressing Towards Goal     Problem: Pressure Injury - Risk of  Goal: *Prevention of pressure injury  Description: Document Kennedy Scale and appropriate interventions in the flowsheet. Outcome: Progressing Towards Goal  Note: Pressure Injury Interventions:  Sensory Interventions: Float heels, Keep linens dry and wrinkle-free, Minimize linen layers    Moisture Interventions: Maintain skin hydration (lotion/cream), Minimize layers    Activity Interventions: PT/OT evaluation    Mobility Interventions: HOB 30 degrees or less    Nutrition Interventions: Document food/fluid/supplement intake    Friction and Shear Interventions: Minimize layers                Problem: Patient Education: Go to Patient Education Activity  Goal: Patient/Family Education  Outcome: Progressing Towards Goal     Problem: Falls - Risk of  Goal: *Absence of Falls  Description: Document Swetha Fall Risk and appropriate interventions in the flowsheet.   Outcome: Progressing Towards Goal  Note: Fall Risk Interventions:  Mobility Interventions: Bed/chair exit alarm    Mentation Interventions: Bed/chair exit alarm    Medication Interventions: Bed/chair exit alarm    Elimination Interventions: Bed/chair exit alarm, Call light in reach    History of Falls Interventions: Bed/chair exit alarm         Problem: Patient Education: Go to Patient Education Activity  Goal: Patient/Family Education  Outcome: Progressing Towards Goal

## 2020-11-17 NOTE — PROGRESS NOTES
Cm received a phone call from pt's daughter Salvador Coffey. Daughter indicated she did not speak with Dr. Gina Alves yesterday. CM informed her CM did provide Dr. Gina Alves with both of her contact#'s and her mother's phone number. Daughter had medical related questions. Cm informed her writer will reach out to the physician over seeing her father's care today and have the doctor call her when they get a chance. Daughter was agreeable with plan. Daughter wanted to know how her father was doing this morning. Cm informed her Cm will f/up with the nurse and have her call her for an update. Daughter would like to know if her and her mother can visit her father at the same time today. Cm informed her writer will reach out to [employer] (Charge Nurse) and have her call her to see if that would be allowed. Daughter was agreeable with plan. During the discussion daughter indicated she does not want her father returning to [facility] due to some concerns. CM provided 4E charge nurse with pt's daughter phone number. Per Dr. Gina Alves note yesterday, he left a message for the daughter. Cm spoke with pt's primary nurse and provided her with daughter's phone number. Rafat spoke with Dr. Reji Cary.

## 2020-11-17 NOTE — PROGRESS NOTES
10:30  flat Patch of red rash noted to left back shoulder area . And small raised red rash noted on whole area of back.

## 2020-11-17 NOTE — PROGRESS NOTES
Office Visit  Minerva Joseph is a 78 y.o. male; who present today for 1 Year Follow Up      HPI  I am seeing this 62-year-old white male in routine follow-up and it has been about 18 months since he was last seen. It sounds like he has chronic chest discomfort that is not due to coronary ischemia with only mild CAD noted by cardiac catheterization May 2018 and he continued to have some episodes of discomfort off and on since then. His discomfort sounds atypical.  Cardiac catheterization 2013 stated that there was total occlusion of a diagonal artery with left to left collaterals but the catheterization May 2018 indicated that there was only mild CAD with no mention of a diagonal occlusion. There was some lateral wall hypokinesis however. He states he is active and does everything that he wants to doing for the most part does not have exertional chest discomfort. On occasion with activity he may feel some discomfort in the lower right rib cage area. Most of his discomfort is towards the upper sternum that can wake him up at night and he takes nitroglycerin for this but usually takes several nitroglycerin before he gets any relief, suggesting that it is not due to coronary ischemia. He has chronic exertional dyspnea and continues to smoke but this has been stable with no orthopnea and no significant lower extremity edema. Has occasional palpitations, no presyncope or syncope. Current Outpatient Medications   Medication Sig Dispense Refill    nitroGLYCERIN (NITROSTAT) 0.4 MG SL tablet Place 0.4 mg under the tongue every 5 minutes as needed for Chest pain up to max of 3 total doses. If no relief after 1 dose, call 911.       metoprolol succinate (TOPROL XL) 25 MG extended release tablet TAKE ONE TABLET BY MOUTH 2 TIMES A  tablet 3    isosorbide mononitrate (IMDUR) 30 MG extended release tablet Take 1 tablet by mouth nightly 90 tablet 3    gabapentin (NEURONTIN) 300 MG capsule Take 3 tablets twice daily      lidocaine (LIDODERM) 5 % Place 1 patch onto the skin daily 12 hours on, 12 hours off. 30 patch 0    esomeprazole (NEXIUM) 40 MG delayed release capsule Take 40 mg by mouth daily       aspirin 325 MG tablet Take 325 mg by mouth daily      beclomethasone (QVAR) 80 MCG/ACT inhaler Inhale 1 puff into the lungs 2 times daily       albuterol (PROAIR HFA) 108 (90 BASE) MCG/ACT inhaler Inhale 2 puffs into the lungs every 4 hours as needed.  tiotropium (SPIRIVA HANDIHALER) 18 MCG inhalation capsule Inhale 18 mcg into the lungs daily.  fluticasone-salmeterol (ADVAIR DISKUS) 500-50 MCG/DOSE diskus inhaler Inhale 1 puff into the lungs every 12 hours       topiramate (TOPAMAX) 100 MG tablet Take 100 mg by mouth daily        No current facility-administered medications for this visit.        Medications Discontinued During This Encounter   Medication Reason    nitroGLYCERIN (NITROLINGUAL) 0.4 MG/SPRAY spray Formulary change        Allergies   Allergen Reactions    Bactrim [Sulfamethoxazole-Trimethoprim] Nausea And Vomiting    Nsaids Anaphylaxis    Succinylcholine Other (See Comments)     Paralyze waist down     Betadine [Povidone Iodine] Hives       Past Medical History:   Diagnosis Date    Allergic rhinitis     Anemia     Asthma     CAD (coronary artery disease) 4/11/2014    Chest tightness, discomfort, or pressure 6/17/2013 6/17/2013  lexiscan Positive for inferior myocardial ischemia, EF 47%, 9% ischemic myocardium on stress, intermediate risk findings, AUC indication 16, AUC score 7     Chronic back pain     COPD [facility] obstructive [facility] disease) [facility] [address]     Ex-cigarette smoker 10/27/2015    Family history of early CAD 6/24/2013    Family history of premature CAD     GERD (gastroesophageal reflux disease)     History of hernia repair     Hyperlipidemia     [facility]    Hypertension     Multiple lung nodules     Neuropathy     Nicotine abuse     Nicotine abuse     Osteoarthritis     Peripheral vascular disease ([facility] [address]     Restless legs syndrome     SOB (shortness of breath)     Thyroid nodule     Unspecified sleep apnea     can't wear cpap     Negative - Past Medical History for  Past Medical History Pertinent Negatives:   Diagnosis Date Noted    ADHD (attention deficit hyperactivity disorder) 08/22/2017    Anticoagulant long-term use 08/22/2017    Anxiety 08/22/2017    Atrial fibrillation ([address] 08/22/2017    Bipolar disorder ([address] 08/22/2017    [facility] 01/25/2013    Carotid artery stenosis 08/22/2017    Cerebrovascular disease 08/22/2017    CHF (congestive heart failure) ([address] 08/22/2017    Chronic kidney disease 08/22/2017    Congenital heart disease 08/22/2017    Depression 08/22/2017    Emphysema of lung ([facility] [address] 01/25/2013    Erectile dysfunction 08/22/2017    Fibromyalgia 08/22/2017    Headache 08/22/2017    Hyperthyroidism 08/22/2017    Hypothyroidism 08/22/2017    Irritable bowel syndrome 08/22/2017    Liver disease 08/22/2017    Obesity 08/22/2017    Seizures ([address] 08/22/2017    Type 2 diabetes mellitus without complication ([facility] [address] 53/59/7455    Type II or unspecified type diabetes mellitus without mention of complication, not stated as uncontrolled 01/25/2013    Urinary incontinence 08/22/2017       Past Surgical History:   Procedure Laterality Date    APPENDECTOMY      CARDIAC CATHETERIZATION  6/24/2013  JDT    EF 50%    CARDIAC CATHETERIZATION  10/27/15  JDT    EF 50%    CARDIAC CATHETERIZATION  05/2018    mild, non-obstructive CAD    COLONOSCOPY  20 yrs ago    not sure who, thinks @ [facility]    COLONOSCOPY  09/2014    TAx2, HP, intern hemorrhoids (3 yr)   [address]       with mesh-Dr Darryl Lugo    [place] COLSC FLX W/REMOVAL LESION BY HOT BX FORCEPS  9/21/2017    Dr Shelton-internal hemorrhoids, tubular AP (-) dysplasia--5 yr recall    [place] EGD TRANSORAL BIOPSY SINGLE/MULTIPLE N/A 10/19/2017    Dr Shelton-normal-Esme (-) chronic nonspecific inflammation    THYROIDECTOMY      UPPER GASTROINTESTINAL ENDOSCOPY      thinks so, but a long time ago if so    UPPER GASTROINTESTINAL ENDOSCOPY  2013    Cat: Grade A esophagitis and esophageal stricture. Dilated 54fr    VASCULAR SURGERY  2017    SJS. Right IJV US 9f sheath.  VASCULAR SURGERY Left 2017    SJS-L GSV RFA     Social History     Occupational History    Not on file   Tobacco Use    Smoking status: Former Smoker     Packs/day: 0.00     Types: Cigarettes     Last attempt to quit: 2020     Years since quittin.2    Smokeless tobacco: Never Used   Substance and Sexual Activity    Alcohol use: No     Alcohol/week: 0.0 standard drinks    Drug use: No    Sexual activity: Not on file        Family History   Problem Relation Age of Onset    Cancer Mother         lung and brain    Coronary Art Dis Mother     Coronary Art Dis Sister     Colon Cancer Sister     Colon Polyps Sister     Coronary Art Dis Father     Colon Cancer Maternal Grandmother     Colon Polyps Maternal Grandmother     Colon Cancer Sister     Colon Polyps Sister     Cancer Sister         \"female cancer\"   Marsha S[place]te Cancer Sister         thryoid    Coronary Art Dis Brother     Heart Failure Brother     Esophageal Cancer Neg Hx     Liver Cancer Neg Hx     Stomach Cancer Neg Hx        Review of Systems  Review of Systems   Constitutional: Positive for fatigue. Negative for fever. Respiratory: Positive for cough and shortness of breath. Cardiovascular: Positive for chest pain and palpitations. Gastrointestinal: Negative for anal bleeding and blood in stool. Genitourinary: Positive for hematuria. Neurological: Negative.           Physical Exam  /68   Pulse (!) 46   Ht 6' 2\" (1.88 m)   Wt 240 lb (108.9 kg)   SpO2 98%   BMI 30.81 kg/m²    Physical Exam  Constitutional:       Appearance: Normal appearance. He is normal weight. Cardiovascular:      Rate and Rhythm: Normal rate and regular rhythm. Heart sounds: Normal heart sounds. No gallop. Pulmonary:      Effort: Pulmonary effort is normal.      Comments: Lung aeration is fair but clear  Abdominal:      General: Abdomen is flat. Bowel sounds are normal.      Palpations: Abdomen is soft. Musculoskeletal:         General: No swelling. Skin:     General: Skin is warm and dry. Neurological:      Mental Status: He is alert. Assessment/Plan    EKG Findings:  Sinus bradycardia, 46 bpm, otherwise normal    Problem List Items Addressed This Visit        Cardiology Problems    Hypertension    Relevant Orders    EKG 12 lead (Completed)    Mild CAD    Relevant Medications    nitroGLYCERIN (NITROSTAT) 0.4 MG SL tablet    Atypical chest pain - Primary    Relevant Orders    EKG 12 lead (Completed)       Other    Smoker           Diagnosis Orders   1. Atypical chest pain  EKG 12 lead    Chronic, mild CAD by catheterization, May 2018   2. Mild CAD      Mild by cardiac catheterization, May 2018. Reported totally occluded diagonal artery 2013 not mentioned in 2018   3. Essential hypertension  EKG 12 lead   4. Smoker         Recommendations:   Diet: Low-sodium diet  Activity: Moderate activities  Medication Changes: No medication change    This patient has bradycardia due to his beta-blockers but this is cardioprotective and he has chest discomfort but actually does not sound cardiac. Most importantly he needs to notify us if he has a change in the pattern of his chest discomfort or other cardiovascular symptoms. We talked about cigarette smoking cessation. Not going to repeat stress testing at this time unless his chest discomfort pattern changes. No orders of the defined types were placed in this encounter.     Orders Placed This Encounter   Procedures    EKG 12 lead     Order Specific Question:   Reason for Exam?     Answer:   Irregular heart rate       Return in about 6 months (around 5/17/2021) for [place], routine.

## 2020-11-17 NOTE — CONSULTS
Nephrology Consult    Patient: Janet Beltre MRN: [id number]  SSN: [id number]    YOB: 1950  Age: 78 y.o. Sex: male      Subjective:   Patient is 29-year-old man with underlying history of A. fib, hypertension, pacemaker placement, CHF was admitted on 11/11 with generalized weakness and fall sustained a forehead laceration. CT head and CT cervical spine were unremarkable. He had a 2D echocardiogram done which showed depressed LVEF of 20 to 25%. On arrival his BUN/Cr was 53/1.4 and sodium 156. His repeat BUN/creatinine was 40/1.0 and sodium still 152 on 11/15. He is seen at the bedside. He is awake but seems to be confused. No noticed lower extremity swelling. He is also noticed to have low blood pressure episodes.     Past Medical History:   Diagnosis Date    A-fib ([facility] [address]     HTN (hypertension)     Pacemaker     Skin cancer      Past Surgical History:   Procedure Laterality Date    HX PACEMAKER PLACEMENT        Family History   Problem Relation Age of Onset    Asthma Mother     Heart Disease Mother      Social History     Tobacco Use    Smoking status: Never Smoker    Smokeless tobacco: Never Used   Substance Use Topics    Alcohol use: Yes     Frequency: 2-3 times a week      Current Facility-Administered Medications   Medication Dose Route Frequency Provider Last Rate Last Dose    sodium chloride 0.225 % in sterile water 1,000 mL infusion   IntraVENous Maury Yuen MD        dextrose 5% infusion  75 mL/hr IntraVENous CONTINUOUS Jacqulyn Boxer A, MD 75 mL/hr at 11/16/20 1605 75 mL/hr at 11/16/20 1605    lisinopriL (PRINIVIL, ZESTRIL) tablet 2.5 mg  2.5 mg Oral DAILY Sampson Roldan MD   2.5 mg at 11/17/20 1011    metoprolol tartrate (LOPRESSOR) tablet 50 mg  50 mg Oral Q12H Sampson Roldan MD   50 mg at 11/17/20 1012    sodium chloride (NS) flush 5-40 mL  5-40 mL IntraVENous PRN Flor Yeung MD        acetaminophen (TYLENOL) tablet 650 mg  650 mg Oral Q6H PRN Fletcher Sherwood MD        Or   [facility] acetaminophen (TYLENOL) suppository 650 mg  650 mg Rectal Q6H PRN Fletcher Sherwood MD        polyethylene glycol [facility]) packet 17 g  17 g Oral DAILY PRN Fletcher Sherwood MD   17 g at 11/12/20 1042    ondansetron (ZOFRAN) injection 4 mg  4 mg IntraVENous Q6H PRN Fletcher Sherwood MD        amoxicillin-clavulanate (AUGMENTIN) 875-125 mg per tablet 1 Tab  1 Tab Oral Q12H Fletcher Sherwood MD   1 Tab at 11/17/20 1012    apixaban (ELIQUIS) tablet 5 mg  5 mg Oral BID Fletcher Sherwood MD   5 mg at 11/17/20 1011        No Known Allergies    Review of Systems:  A comprehensive review of systems was negative except for that written in the History of Present Illness. Objective:     Vitals:    11/16/20 1937 11/16/20 2229 11/17/20 0730 11/17/20 1120   BP:  114/66 113/65    Pulse:  74 80    Resp:  18 18    Temp:  98.4 °F (36.9 °C) 97.1 °F (36.2 °C)    SpO2: 96% 96% 95% 95%   Weight:       Height:            Physical Exam:  General: NAD, confused  Eyes: sclera anicteric  Oral Cavity: No thrush or ulcers  Neck: no JVD  Chest: Fair bilateral air entry  Heart: normal sounds  Abdomen: soft and non tender   : no recinos  Lower Extremities: no edema  Skin: no rash  Neuro: AMS  Psychiatric: non-depressed            Assessment:     Hospital Problems  Date Reviewed: 10/30/2020          Codes Class Noted POA    Afib ([facility] ICD-10-CM: I48.91  ICD-9-CM: 427.31  11/12/2020 Unknown        Atrial fibrillation with RVR ([facility] ICD-10-CM: I48.91  ICD-9-CM: 427.31  11/11/2020 Unknown        Acute renal failure (ARF) (HCC) ICD-10-CM: N17.9  ICD-9-CM: 584.9  11/11/2020 Unknown        Hypernatremia ICD-10-CM: E87.0  ICD-9-CM: 276.0  11/11/2020 Unknown        Fall ICD-10-CM: Brunsonrashawn Greercks. Joseph Myers  ICD-9-CM: E888.9  11/11/2020 Unknown        Laceration of forehead ICD-10-CM: T85.66IO  ICD-9-CM: 873.42  11/11/2020 Unknown              Plan:     #1 acute kidney injury.    -Etiology is prerenal azotemia.    -On admission BUN/creatinine was 53/1.4.    -Her last BUN/creatinine was 40/1.07 on 11/15.    -I will repeat renal panel in the morning.    -He is currently not on diuretics.    -He is with no volume overload.    -No lower extremity edema. 2.  Hypernatremia. -Etiology is free water deficit.    -On admission sodium was 156 and has improved down to 152 per labs on 11/15. - I will repeat sodium level.    -I will put him on hypotonic IV fluids slow. 3.  Hypertension.    -He is noticed to have episodes of low blood pressure. - His metoprolol was increased 50 mg twice daily due to underlying A. fib. .   - He is on a very small dose of lisinopril also. 3.  A. fib.    -On admission was with A. fib and RVR. -He was placed on IV Cardizem drip.    -Now on digoxin beta-blockers and Eliquis also. 5.  CHF. -Looks compensated. 2D echocardiogram showed LVEF 20 to 25%. Not on diuretics. Thank you for the consultation.     Signed By: Preeti Burroughs MD     November 17, 2020

## 2020-11-18 ENCOUNTER — APPOINTMENT (OUTPATIENT)
Dept: GENERAL RADIOLOGY | Age: 70
DRG: 683 | End: 2020-11-18
Attending: INTERNAL MEDICINE
Payer: MEDICARE

## 2020-11-18 LAB
ANION GAP SERPL CALC-SCNC: 5 MMOL/L (ref 5–15)
ANION GAP SERPL CALC-SCNC: 8 MMOL/L (ref 5–15)
BUN SERPL-MCNC: 32 MG/DL (ref 6–20)
BUN SERPL-MCNC: 34 MG/DL (ref 6–20)
BUN/CREAT SERPL: 30 (ref 12–20)
BUN/CREAT SERPL: 36 (ref 12–20)
CA-I BLD-MCNC: 9.6 MG/DL (ref 8.5–10.1)
CA-I BLD-MCNC: 9.8 MG/DL (ref 8.5–10.1)
CHLORIDE SERPL-SCNC: 118 MMOL/L (ref 97–108)
CHLORIDE SERPL-SCNC: 119 MMOL/L (ref 97–108)
CO2 SERPL-SCNC: 23 MMOL/L (ref 21–32)
CO2 SERPL-SCNC: 25 MMOL/L (ref 21–32)
CREAT SERPL-MCNC: 0.95 MG/DL (ref 0.7–1.3)
CREAT SERPL-MCNC: 1.08 MG/DL (ref 0.7–1.3)
ERYTHROCYTE [DISTWIDTH] IN BLOOD BY AUTOMATED COUNT: 16 % (ref 11.5–14.5)
GLUCOSE SERPL-MCNC: 115 MG/DL (ref 65–100)
GLUCOSE SERPL-MCNC: 95 MG/DL (ref 65–100)
HCT VFR BLD AUTO: 43.5 % (ref 36.6–50.3)
HGB BLD-MCNC: 14.2 G/DL (ref 12.1–17)
MCH RBC QN AUTO: 33.7 PG (ref 26–34)
MCHC RBC AUTO-ENTMCNC: 32.6 G/DL (ref 30–36.5)
MCV RBC AUTO: 103.3 FL (ref 80–99)
NRBC # BLD: 0.02 K/UL (ref 0–0.01)
NRBC BLD-RTO: 0.2 PER 100 WBC
PLATELET # BLD AUTO: 97 K/UL (ref 150–400)
POTASSIUM SERPL-SCNC: 3.8 MMOL/L (ref 3.5–5.1)
POTASSIUM SERPL-SCNC: 3.8 MMOL/L (ref 3.5–5.1)
RBC # BLD AUTO: 4.21 M/UL (ref 4.1–5.7)
SODIUM SERPL-SCNC: 148 MMOL/L (ref 136–145)
SODIUM SERPL-SCNC: 150 MMOL/L (ref 136–145)
WBC # BLD AUTO: 9.8 K/UL (ref 4.1–11.1)

## 2020-11-18 PROCEDURE — 74011250637 HC RX REV CODE- 250/637: Performed by: HOSPITALIST

## 2020-11-18 PROCEDURE — 74011250637 HC RX REV CODE- 250/637: Performed by: INTERNAL MEDICINE

## 2020-11-18 PROCEDURE — 80048 BASIC METABOLIC PNL TOTAL CA: CPT

## 2020-11-18 PROCEDURE — 71045 X-RAY EXAM CHEST 1 VIEW: CPT

## 2020-11-18 PROCEDURE — 74011250636 HC RX REV CODE- 250/636: Performed by: INTERNAL MEDICINE

## 2020-11-18 PROCEDURE — 65270000029 HC RM PRIVATE

## 2020-11-18 PROCEDURE — 36415 COLL VENOUS BLD VENIPUNCTURE: CPT

## 2020-11-18 RX ORDER — MIDODRINE HYDROCHLORIDE 5 MG/1
10 TABLET ORAL
Status: DISCONTINUED | OUTPATIENT
Start: 2020-11-19 | End: 2020-11-19 | Stop reason: HOSPADM

## 2020-11-18 RX ORDER — POTASSIUM CHLORIDE 20 MEQ/1
40 TABLET, EXTENDED RELEASE ORAL
Status: COMPLETED | OUTPATIENT
Start: 2020-11-18 | End: 2020-11-18

## 2020-11-18 RX ADMIN — METOPROLOL TARTRATE 50 MG: 50 TABLET, FILM COATED ORAL at 09:32

## 2020-11-18 RX ADMIN — POTASSIUM CHLORIDE 40 MEQ: 1500 TABLET, EXTENDED RELEASE ORAL at 09:32

## 2020-11-18 RX ADMIN — METOPROLOL TARTRATE 50 MG: 50 TABLET, FILM COATED ORAL at 20:48

## 2020-11-18 RX ADMIN — AMOXICILLIN AND CLAVULANATE POTASSIUM 1 TABLET: 875; 125 TABLET, FILM COATED ORAL at 09:33

## 2020-11-18 RX ADMIN — LISINOPRIL 2.5 MG: 5 TABLET ORAL at 09:33

## 2020-11-18 RX ADMIN — APIXABAN 5 MG: 5 TABLET, FILM COATED ORAL at 09:32

## 2020-11-18 RX ADMIN — APIXABAN 5 MG: 5 TABLET, FILM COATED ORAL at 20:48

## 2020-11-18 RX ADMIN — AMOXICILLIN AND CLAVULANATE POTASSIUM 1 TABLET: 875; 125 TABLET, FILM COATED ORAL at 20:48

## 2020-11-18 RX ADMIN — DEXTROSE MONOHYDRATE 75 ML/HR: 50 INJECTION, SOLUTION INTRAVENOUS at 09:31

## 2020-11-18 NOTE — PROGRESS NOTES
History and Physical    Patient: Isaac Steinberg MRN: [id number]  SSN: [id number]    YOB: 1950  Age: 78 y.o. Sex: male      Subjective:      Chief Complaint:  Fall at nursing home   HPI: Isaac Steinberg is a 78 y.o. male who known case of atrial fibrillation, hypertension, dysphagia on nectar thick liquids, on anticoagulation with Eliquis had a ground-level fall at nursing home millimeter laceration of forehead. Laceration was apposed with Dermabond. Patient was also noticed to have atrial fibrillation with rapid ventricular rate and was started on Cardizem drip in ER. Along with this his sodium was elevated and BUN/creatinine was also elevated. Following this hospital service was requested to admit the patient for further work-up and management. 11/18 Pt seen and examined at bedside, no acute events overnight, currently AAOx2, discussed with RN at bedside    11/17 Pt seen and examined at bedside, no acute events overnight, currently AAOx2, discussed with RN at bedside    11/16 alert, remains confused, alert to self, place. Afib now 80  Daughter in discussion with nursing reportedly considering hospice. Cm in discussion. Left message with daughter brennen. 11/15 Aleert, tolerating lunch, denies complaints. Remains afib cvr, reportedly brief svt earlier to 160's.     11/14  Remains pleasanlt confused, no offering complaints. HR down to 53 reported this am.  No cp or sob, currently afib ~100      11/13: pleasantly confused, poor recollection of recent admit though states \"sounds familiar\"  Hr settled down some overnight, picking up this aftermoon, cardio noted, digoxin loaded. 11/12: alert, pleasantly confused it appears, poor recollection of recent events/confabulating? Prior to Admission medications    Medication Sig Start Date End Date Taking? Authorizing Provider   apixaban (ELIQUIS) 5 mg tablet Take 1 Tab by mouth two (2) times a day for 30 days.  11/9/20 12/9/20  Annalise Will MD   dilTIAZem IR (CARDIZEM) 60 mg tablet Take 1 Tab by mouth every six (6) hours for 30 days. 11/9/20 12/9/20  Annalise Will MD   furosemide (LASIX) 40 mg tablet Take 1 Tab by mouth daily for 30 days. 11/9/20 12/9/20  Annalise Will MD   lisinopriL (PRINIVIL, ZESTRIL) 10 mg tablet Take 1 Tab by mouth daily for 30 days. 11/9/20 12/9/20  Annalise Will MD   potassium chloride (K-DUR, KLOR-CON) 20 mEq tablet Take 1 Tab by mouth daily for 14 days. 11/10/20 11/24/20  Annalise Will MD        No Known Allergies    Review of Systems: limited by confusion  Review of Systems   Constitutional: Positive for malaise/fatigue. Negative for chills and fever. HENT: Negative. Eyes: Negative. Respiratory: Negative. Cardiovascular: Negative for palpitations. Gastrointestinal: Negative. Genitourinary: Negative. Musculoskeletal: Negative. Skin: Negative. Neurological: Negative. Endo/Heme/Allergies: Negative. Psychiatric/Behavioral: Negative. Objective:     Vitals:    11/17/20 1120 11/17/20 1611 11/17/20 1959 11/18/20 0805   BP:  108/66 112/70 111/70   Pulse:  (!) 57 62 63   Resp:  18 16 18   Temp:  97.4 °F (36.3 °C) 97.9 °F (36.6 °C) 97.6 °F (36.4 °C)   SpO2: 95% 98% 95% 96%   Weight:       Height:            Physical Exam:  General: Alert, he is speaking clearly, pleasantly confused  HEAD: Laceration at angle of nose and forehead has been apposed with Dermabond  Eye: Left eeye deviates outward- chronic  Throat and Neck: normal and no erythema or exudates noted. No mass   Lung: Clear to auscultation bilaterally without wheezes, rhonchi. Good air movement bilaterally. Heart: Irregularly irregular tachycardic.  afib  Abdomen: soft, non-tender. Bowel sounds present in all four quadrants. No masses appreciated. Extremities:  able to move all extremities normal, atraumatic  Skin: Clean, dry and intact without appreciated lesions.    Neurologic: Patient is awake, he follows commands, patient is disoriented. Psychiatric: non focal, normal affect, poor recollection recent events    CBC:   Lab Results   Component Value Date/Time    WBC 9.8 11/17/2020 10:32 PM    RBC 4.21 11/17/2020 10:32 PM    HGB 14.2 11/17/2020 10:32 PM    HCT 43.5 11/17/2020 10:32 PM    PLATELET 97 (L) 11/85/1088 10:32 PM     CMP:   Lab Results   Component Value Date/Time    Glucose 91 11/17/2020 10:32 PM    Sodium 153 (H) 11/17/2020 10:32 PM    Potassium 3.6 11/17/2020 10:32 PM    Chloride 121 (H) 11/17/2020 10:32 PM    CO2 25 11/17/2020 10:32 PM    BUN 31 (H) 11/17/2020 10:32 PM    Creatinine 0.96 11/17/2020 10:32 PM    Calcium 9.6 11/17/2020 10:32 PM    Anion gap 7 11/17/2020 10:32 PM    BUN/Creatinine ratio 32 (H) 11/17/2020 10:32 PM    Alk. phosphatase 132 (H) 11/12/2020 03:47 AM    Protein, total 7.7 11/12/2020 03:47 AM    Albumin 2.5 (L) 11/12/2020 03:47 AM    Globulin 5.2 (H) 11/12/2020 03:47 AM    A-G Ratio 0.5 (L) 11/12/2020 03:47 AM     Coagulation:   Lab Results   Component Value Date/Time    Prothrombin time 17.8 (H) 11/11/2020 04:57 AM    INR 1.5 (H) 11/11/2020 04:57 AM    aPTT 35.2 11/11/2020 04:57 AM     Radiology review:   CT SPINE CERV WO CONT   Final Result   Impression:      No acute fracture. Follow-up as clinically indicated. CT HEAD WO CONT   Final Result   Impression:      No acute intracranial bleed. Follow-up as clinically indicated. XR CHEST SNGL V   Final Result   IMPRESSION: Stable enlargement of cardiopericardial silhouette. Calcified aortic   knob. Right ventricular pacing lead. Near resolution of left basilar parenchymal   opacity compared with previous. No new findings.             Assessment:     Active Problems:    Atrial fibrillation with RVR ([address] (11/11/2020)      Acute renal failure (ARF) ([address] (11/11/2020)      Hypernatremia (11/11/2020)      Fall (11/11/2020)      Laceration of forehead (11/11/2020)      Afib ([facility] (11/12/2020)         Plan: 33-year-old male was brought to the hospital after he had a fall and laceration to his forearm. 1.  Laceration to forehead: This have been apposed with Dermabond. Patient denies any other injuries. CT head is negative for any acute intracranial bleed. 2.  Atrial Fibrillation with rapid ventricular rate: Currently well controlled  Continue Lopressor twice daily  Continue Eliquis for anticoagulation  Cardiology recommendations appreciated, will continue to follow    3. Hypernatremia: Likely multifactorial  Obtain repeat serum sodium for further evaluation  Continue D5W at 75 cc/h  Continue to trend serum sodium every 4 hours  Obtain nephrology consult    4. Acute kidney injurylikely multifactorial, serum creatinine was improving  Continue to hold diuretics  Continue to trend serum creatinine  Nephrology consult    5. Dysphagia: Speech evaluatio. Patient was on nectar thick liquid diet will continue that for now will advance as tolerated    6. Hypertensioncontinue home antihypertensive medications    Covid not detected    Patient is DO NOT RESUSCITATE  Vtep: Eliquis  Further plan of management depends on patient's clinical course in the hospital. PIPPA feports to me that daughter considering hospice. Daughter Bambi Reynaga [phone], updated daughter and wife in detail at bedside. Cm working on logistics in regards to placement vs home.     Total noncritical care time spent 35 minutes    Signed By: Harding Kussmaul, MD     November 18, 2020

## 2020-11-18 NOTE — PROGRESS NOTES
Problem: Risk for Spread of Infection  Goal: Prevent transmission of infectious organism to others  Description: Prevent the transmission of infectious organisms to other patients, staff members, and visitors. Outcome: Progressing Towards Goal     Problem: Patient Education:  Go to Education Activity  Goal: Patient/Family Education  Outcome: Progressing Towards Goal     Problem: Pressure Injury - Risk of  Goal: *Prevention of pressure injury  Description: Document Kennedy Scale and appropriate interventions in the flowsheet. Outcome: Progressing Towards Goal  Note: Pressure Injury Interventions:  Sensory Interventions: Float heels, Keep linens dry and wrinkle-free, Minimize linen layers    Moisture Interventions: Minimize layers    Activity Interventions: PT/OT evaluation    Mobility Interventions: HOB 30 degrees or less    Nutrition Interventions: Document food/fluid/supplement intake    Friction and Shear Interventions: Minimize layers                Problem: Patient Education: Go to Patient Education Activity  Goal: Patient/Family Education  Outcome: Progressing Towards Goal     Problem: Falls - Risk of  Goal: *Absence of Falls  Description: Document Swetha Fall Risk and appropriate interventions in the flowsheet.   Outcome: Progressing Towards Goal  Note: Fall Risk Interventions:  Mobility Interventions: Bed/chair exit alarm    Mentation Interventions: Bed/chair exit alarm    Medication Interventions: Bed/chair exit alarm    Elimination Interventions: Bed/chair exit alarm    History of Falls Interventions: Bed/chair exit alarm         Problem: Patient Education: Go to Patient Education Activity  Goal: Patient/Family Education  Outcome: Progressing Towards Goal

## 2020-11-19 VITALS
DIASTOLIC BLOOD PRESSURE: 58 MMHG | OXYGEN SATURATION: 97 % | TEMPERATURE: 97.7 F | WEIGHT: 228 LBS | SYSTOLIC BLOOD PRESSURE: 94 MMHG | BODY MASS INDEX: 30.88 KG/M2 | HEART RATE: 99 BPM | RESPIRATION RATE: 20 BRPM | HEIGHT: 72 IN

## 2020-11-19 PROCEDURE — 97165 OT EVAL LOW COMPLEX 30 MIN: CPT

## 2020-11-19 PROCEDURE — 97530 THERAPEUTIC ACTIVITIES: CPT

## 2020-11-19 PROCEDURE — 74011250637 HC RX REV CODE- 250/637: Performed by: HOSPITALIST

## 2020-11-19 PROCEDURE — 97161 PT EVAL LOW COMPLEX 20 MIN: CPT

## 2020-11-19 PROCEDURE — 74011250637 HC RX REV CODE- 250/637: Performed by: INTERNAL MEDICINE

## 2020-11-19 RX ORDER — LISINOPRIL 2.5 MG/1
2.5 TABLET ORAL DAILY
Qty: 30 TAB | Refills: 0 | Status: SHIPPED
Start: 2020-11-19

## 2020-11-19 RX ORDER — METOPROLOL TARTRATE 50 MG/1
50 TABLET ORAL EVERY 12 HOURS
Qty: 60 TAB | Refills: 0 | Status: SHIPPED
Start: 2020-11-19

## 2020-11-19 RX ORDER — MIDODRINE HYDROCHLORIDE 10 MG/1
10 TABLET ORAL
Qty: 90 TAB | Refills: 0 | Status: SHIPPED | OUTPATIENT
Start: 2020-11-19 | End: 2020-12-19

## 2020-11-19 RX ADMIN — AMOXICILLIN AND CLAVULANATE POTASSIUM 1 TABLET: 875; 125 TABLET, FILM COATED ORAL at 12:03

## 2020-11-19 RX ADMIN — LISINOPRIL 2.5 MG: 5 TABLET ORAL at 12:02

## 2020-11-19 RX ADMIN — MIDODRINE HYDROCHLORIDE 10 MG: 5 TABLET ORAL at 12:03

## 2020-11-19 RX ADMIN — METOPROLOL TARTRATE 50 MG: 50 TABLET, FILM COATED ORAL at 12:03

## 2020-11-19 RX ADMIN — APIXABAN 5 MG: 5 TABLET, FILM COATED ORAL at 12:03

## 2020-11-19 NOTE — PROGRESS NOTES
CM has been in contact with Reggie Lester all day from [facility]. She states that they need a PT eval and the financial stuff arranged and they would be able to take them today. Reggie Lester called this CM and stated that the PT eval was good and that they have been able to get his financial information straight and able to accept him today. Transportation has been set up for 330pm with [facility]. No IMM issued due to patient being discharged with Hospice. Pan Ragsdale states that she spoke with [facility] and they will meet with them at 56. Will continue to follow for discharge needs. Call report to [phone].   Patient going to room number 111

## 2020-11-19 NOTE — PROGRESS NOTES
Incontinent of stool, cleaned and made comfortable. Diaper applied for transport. Discharge to [facility] via stretcher. Patient talkative and alert, no distress noted.

## 2020-11-19 NOTE — DISCHARGE INSTRUCTIONS
Patient Education        Atrial Fibrillation: Care Instructions  Your Care Instructions     Atrial fibrillation is an irregular and often fast heartbeat. Treating this condition is important for several reasons. It can cause blood clots, which can travel from your heart to your brain and cause a stroke. If you have a fast heartbeat, you may feel lightheaded, dizzy, and weak. An irregular heartbeat can also increase your risk for heart failure. Atrial fibrillation is often the result of another heart condition, such as high blood pressure or coronary artery disease. Making changes to improve your heart condition will help you stay healthy and active. Follow-up care is a key part of your treatment and safety. Be sure to make and go to all appointments, and call your doctor if you are having problems. It's also a good idea to know your test results and keep a list of the medicines you take. How can you care for yourself at home? Medicines    · Take your medicines exactly as prescribed. Call your doctor if you think you are having a problem with your medicine. You will get more details on the specific medicines your doctor prescribes.     · If your doctor has given you a blood thinner to prevent a stroke, be sure you get instructions about how to take your medicine safely. Blood thinners can cause serious bleeding problems.     · Do not take any vitamins, over-the-counter drugs, or herbal products without talking to your doctor first.   Lifestyle changes    · Do not smoke. Smoking can increase your chance of a stroke and heart attack. If you need help quitting, talk to your doctor about stop-smoking programs and medicines. These can increase your chances of quitting for good.     · Eat a heart-healthy diet.     · Stay at a healthy weight. Lose weight if you need to.     · Limit alcohol to 2 drinks a day for men and 1 drink a day for women. Too much alcohol can cause health problems.     · Avoid colds and flu.  Get a pneumococcal vaccine shot. If you have had one before, ask your doctor whether you need another dose. Get a flu shot every year. If you must be around people with colds or flu, wash your hands often. Activity    · If your doctor recommends it, get more exercise. Walking is a good choice. Bit by bit, increase the amount you walk every day. Try for at least 30 minutes on most days of the week. You also may want to swim, bike, or do other activities. Your doctor may suggest that you join a cardiac rehabilitation program so that you can have help increasing your physical activity safely.     · Start light exercise if your doctor says it is okay. Even a small amount will help you get stronger, have more energy, and manage stress. Walking is an easy way to get exercise. Start out by walking a little more than you did in the hospital. Gradually increase the amount you walk.     · When you exercise, watch for signs that your heart is working too hard. You are pushing too hard if you cannot talk while you are exercising. If you become short of breath or dizzy or have chest pain, sit down and rest immediately.     · Check your pulse regularly. Place two fingers on the artery at the palm side of your wrist, in line with your thumb. If your heartbeat seems uneven or fast, talk to your doctor. When should you call for help? Call 911 anytime you think you may need emergency care. For example, call if:    · You have symptoms of a heart attack. These may include:  ? Chest pain or pressure, or a strange feeling in the chest.  ? Sweating. ? Shortness of breath. ? Nausea or vomiting. ? Pain, pressure, or a strange feeling in the back, neck, jaw, or upper belly or in one or both shoulders or arms. ? Lightheadedness or sudden weakness. ? A fast or irregular heartbeat. After you call 911, the  may tell you to chew 1 adult-strength or 2 to 4 low-dose aspirin. Wait for an ambulance.  Do not try to drive yourself.     · You have symptoms of a stroke. These may include:  ? Sudden numbness, tingling, weakness, or loss of movement in your face, arm, or leg, especially on only one side of your body. ? Sudden vision changes. ? Sudden trouble speaking. ? Sudden confusion or trouble understanding simple statements. ? Sudden problems with walking or balance. ? A sudden, severe headache that is different from past headaches.     · You passed out (lost consciousness). Call your doctor now or seek immediate medical care if:    · You have new or increased shortness of breath.     · You feel dizzy or lightheaded, or you feel like you may faint.     · Your heart rate becomes irregular.     · You can feel your heart flutter in your chest or skip heartbeats. Tell your doctor if these symptoms are new or worse. Watch closely for changes in your health, and be sure to contact your doctor if you have any problems. Where can you learn more? Go to http://www.gray.com/  Enter U020 in the search box to learn more about \"Atrial Fibrillation: Care Instructions. \"  Current as of: December 16, 2019               Content Version: 12.6  © 0185-0737 [employer]. Care instructions adapted under license by [employer] (which disclaims liability or warranty for this information). If you have questions about a medical condition or this instruction, always ask your healthcare professional. [address] any warranty or liability for your use of this information. Patient Education        Preventing Falls: Care Instructions  Your Care Instructions     Getting around your home safely can be a challenge if you have injuries or health problems that make it easy for you to fall. Loose rugs and furniture in walkways are among the dangers for many older people who have problems walking or who have poor eyesight.  People who have conditions such as arthritis, osteoporosis, or dementia also have to be careful not to fall. You can make your home safer with a few simple measures. Follow-up care is a key part of your treatment and safety. Be sure to make and go to all appointments, and call your doctor if you are having problems. It's also a good idea to know your test results and keep a list of the medicines you take. How can you care for yourself at home? Taking care of yourself  · You may get dizzy if you do not drink enough water. To prevent dehydration, drink plenty of fluids, enough so that your urine is light yellow or clear like water. Choose water and other caffeine-free clear liquids. If you have kidney, heart, or liver disease and have to limit fluids, talk with your doctor before you increase the amount of fluids you drink. · Exercise regularly to improve your strength, muscle tone, and balance. Walk if you can. Swimming may be a good choice if you cannot walk easily. · Have your vision and hearing checked each year or any time you notice a change. If you have trouble seeing and hearing, you might not be able to avoid objects and could lose your balance. · Know the side effects of the medicines you take. Ask your doctor or pharmacist whether the medicines you take can affect your balance. Sleeping pills or sedatives can affect your balance. · Limit the amount of alcohol you drink. Alcohol can impair your balance and other senses. · Ask your doctor whether calluses or corns on your feet need to be removed. If you wear loose-fitting shoes because of calluses or corns, you can lose your balance and fall. · Talk to your doctor if you have numbness in your feet. Preventing falls at home  · Remove raised doorway thresholds, throw rugs, and clutter. Repair loose carpet or raised areas in the floor. · Move furniture and electrical cords to keep them out of walking paths. · Use nonskid floor wax, and wipe up spills right away, especially on ceramic tile floors.   · If you use a walker or cane, put rubber tips on it. If you use crutches, clean the bottoms of them regularly with an abrasive pad, such as steel wool. · Keep your house well lit, especially Shira Maxin, and outside walkways. Use night-lights in areas such as hallways and bathrooms. Add extra light switches or use remote switches (such as switches that go on or off when you clap your hands) to make it easier to turn lights on if you have to get up during the night. · Install sturdy handrails on stairways. · Move items in your cabinets so that the things you use a lot are on the lower shelves (about waist level). · Keep a cordless phone and a flashlight with new batteries by your bed. If possible, put a phone in each of the main rooms of your house, or carry a cell phone in case you fall and cannot reach a phone. Or, you can wear a device around your neck or wrist. You push a button that sends a signal for help. · Wear low-heeled shoes that fit well and give your feet good support. Use footwear with nonskid soles. Check the heels and soles of your shoes for wear. Repair or replace worn heels or soles. · Do not wear socks without shoes on wood floors. · Walk on the grass when the sidewalks are slippery. If you live in an area that gets snow and ice in the winter, sprinkle salt on slippery steps and sidewalks. Preventing falls in the bath  · Install grab bars and nonskid mats inside and outside your shower or tub and near the toilet and sinks. · Use shower chairs and bath benches. · Use a hand-held shower head that will allow you to sit while showering. · Get into a tub or shower by putting the weaker leg in first. Get out of a tub or shower with your strong side first.  · Repair loose toilet seats and consider installing a raised toilet seat to make getting on and off the toilet easier. · Keep your bathroom door unlocked while you are in the shower. Where can you learn more?   Go to http://www.gray.com/  Enter G117 in the search box to learn more about \"Preventing Falls: Care Instructions. \"  Current as of: April 15, 2020               Content Version: 12.6  © 7501-9969 [employer]. Care instructions adapted under license by [employer] (which disclaims liability or warranty for this information). If you have questions about a medical condition or this instruction, always ask your healthcare professional. [address] any warranty or liability for your use of this information.

## 2020-11-19 NOTE — PROGRESS NOTES
Patient has been accepted by [facility] pending PT eval and payor source from family. Liaison to speak with daughter regarding payor. [facility]. Pending acceptance. Cm inquired about patient being able to discharge to [facility] today. Awaiting word for acceptance today.

## 2020-11-19 NOTE — PROGRESS NOTES
PHYSICAL THERAPY EVALUATION  Patient: Devon Hughes (39 y.o. male)  Date: 11/19/2020  Primary Diagnosis: Atrial fibrillation with RVR ([facility] [address] [I48.91]  Afib ([facility] [address] [I48.91]        Precautions: falls, dysphagia diet (nector thick liquids)       ASSESSMENT  Based on the objective data described below, the patient presents with generalized weakness, impaired functional mobility, unable to amb, impaired balance, and decreased activity tolerance. Per chart review pt from [facility], per pt he was ambulatory without AD PTA. Pt semi-supine in bed upon PT arrival, lethargic but agreeable to evaluation. PT requested OT assistance during evlauation due to increased assistance required for bed mobility. Pt required mod to max A for bed mobility, mod to max Ax2 supine <> sit, max Ax2 sit <> stand transfers with 3 attempts to reach full standing position. Pt demonstrates WBOS in standing with constant bilateral knee flexion, forward flexion at hips and heavy posterior lean requiring max A x2 to maintain upright position x 15 seconds prior to returning to EOB. Pt was unable to amb today due to difficulty with standing. Pt did fair with session today with requiring max verbal cues for safety and command following as well as requiring max A x1-2 for all mobility and inability to amb. Pt will benefit from continued skilled PT to address above deficits and return to PLOF. Current PT DC recommendation SNF. Current Level of Function Impacting Discharge (mobility/balance): inability to amb    Other factors to consider for discharge: confusion, per pt was amb prior to admission, fall PTA        PLAN :  Recommendations and Planned Interventions: bed mobility training, transfer training, gait training, therapeutic exercises, patient and family training/education, and therapeutic activities      Frequency/Duration: Patient will be followed by physical therapy:  3 times a week to address goals.     Recommendation for discharge: (in order for the patient to meet his/her long term goals)  SNF    This discharge recommendation:  Has been made in collaboration with the attending provider and/or case management    IF patient discharges home will need the following DME: none         SUBJECTIVE:   Patient stated i'm ok.     OBJECTIVE DATA SUMMARY:   HISTORY:    Past Medical History:   Diagnosis Date    A-fib ([address]     HTN (hypertension)     Pacemaker     Skin cancer      Past Surgical History:   Procedure Laterality Date    HX PACEMAKER PLACEMENT         Personal factors and/or comorbidities impacting plan of care: from [facility] 24: [facility] Name: [facility]  One/Two Story Residence: Other (Comment)  Living Alone: No  Support Systems: Spouse/Significant Other/Partner  Patient Expects to be Discharged to[de-identified] Rehabilitation facility  Current DME Used/Available at Home: None    EXAMINATION/PRESENTATION/DECISION MAKING:   Critical Behavior:  Neurologic State: Drowsy  Orientation Level: Oriented to place, Oriented to person  Cognition: Decreased command following     Hearing: Auditory  Auditory Impairment: None  Skin:  intact where visible   Edema: none noted   Range Of Motion:  AROM: Grossly decreased, non-functional           PROM: Grossly decreased, non-functional           Strength:    Strength: Grossly decreased, non-functional(grossly 2/5 BLE)                 Functional Mobility:  Bed Mobility:  Rolling: Moderate assistance;Maximum assistance  Supine to Sit: Moderate assistance;Maximum assistance  Sit to Supine: Moderate assistance;Maximum assistance  Scooting:  Moderate assistance  Transfers:  Sit to Stand: Maximum assistance;Assist x2  Stand to Sit: Maximum assistance;Assist x2           Balance:   Sitting: Impaired  Sitting - Static: Fair (occasional)  Sitting - Dynamic: Poor (constant support)  Standing: Impaired  Standing - Static: Poor  Standing - Dynamic : Poor  Ambulation/Gait Training:   Not completed this session      Therapeutic Exercises:   Not completed this session    Functional Measure:  [facility] In[facility] Short Form  How much difficulty does the patient currently have. .. Unable A Lot A Little None   1. Turning over in bed (including adjusting bedclothes, sheets and blankets)? [] 1   [x] 2   [] 3   [] 4   2. Sitting down on and standing up from a chair with arms ( e.g., wheelchair, bedside commode, etc.)   [] 1   [x] 2   [] 3   [] 4   3. Moving from lying on back to sitting on the side of the bed? [] 1   [x] 2   [] 3   [] 4          How much help from another person does the patient currently need. .. Total A Lot A Little None   4. Moving to and from a bed to a chair (including a wheelchair)? [] 1   [x] 2   [] 3   [] 4   5. Need to walk in hospital room? [x] 1   [] 2   [] 3   [] 4   6. Climbing 3-5 steps with a railing? [x] 1   [] 2   [] 3   [] 4   © 2007, Trustees of [facility], under license to [employer]. All rights reserved     Score:  Initial: 10/24 Most Recent: X (Date: 11/19/2020)   Interpretation of Tool:  Represents activities that are increasingly more difficult (i.e. Bed mobility, Transfers, Gait).   Score 24 23 22-20 19-15 14-10 9-7 6   Modifier CH CI CJ CK CL CM CN         Physical Therapy Evaluation Charge Determination   History Examination Presentation Decision-Making   HIGH Complexity :3+ comorbidities / personal factors will impact the outcome/ POC  HIGH Complexity : 4+ Standardized tests and measures addressing body structure, function, activity limitation and / or participation in recreation  LOW Complexity : Stable, uncomplicated  Other outcome measures ampac 6  mod      Based on the above components, the patient evaluation is determined to be of the following complexity level: LOW     Pain Rating:  Non reported, no grimace noted with mobility     Activity Tolerance:   Fair    After treatment patient left in no apparent distress:   Supine in bed, Call bell within reach, Bed / chair alarm activated, Side rails x 3, and OT in room for meal set up, CM and MD updated     GOALS:    Problem: Mobility Impaired (Adult and Pediatric)  Goal: *Acute Goals and Plan of Care (Insert Text)  Description: Pt will be I with LE HEP in 7 days. Pt will perform bed mobility with mod I in 7 days. Pt will perform transfers with mod I in 7 days. Pt will amb 25-50 feet with LRAD safely with mod I in 7 days. Outcome: Not Met       COMMUNICATION/EDUCATION:   The patients plan of care was discussed with: Occupational therapist, Physician, and Case management. Fall prevention education was provided and the patient/caregiver indicated understanding. and Patient understands intent and goals of therapy, but is neutral about his/her participation. PT/OT sessions occurred together for increased safety of pt and clinician.     Thank you for this referral.  Kris Ruano, PT, DPT   Time Calculation: 20 mins

## 2020-11-19 NOTE — DISCHARGE SUMMARY
Hospitalist Discharge Summary     Patient ID:  Isaac Steinberg  [id number]  25 y.o.  1950 11/11/2020    PCP on record: Eloisa Beasley MD    Admit date: 11/11/2020  Discharge date and time: 11/19/2020    DISCHARGE DIAGNOSIS:    Fall/CADE/Hypernatremia    CONSULTATIONS:  IP CONSULT TO GENERAL SURGERY  IP CONSULT TO CARDIOLOGY  IP CONSULT TO NEPHROLOGY    Excerpted HPI from H&P of Ana Goode MD:  Isaac Steinberg is a 78 y.o. male who known case of atrial fibrillation, hypertension, dysphagia on nectar thick liquids, on anticoagulation with Eliquis had a ground-level fall at nursing home millimeter laceration of forehead. Laceration was apposed with Dermabond. Patient was also noticed to have atrial fibrillation with rapid ventricular rate and was started on Cardizem drip in ER. Along with this his sodium was elevated and BUN/creatinine was also elevated. Following this hospital service was requested to admit the patient for further work-up and management. Patient is pleasantly confused and was unable to give any history to me    ______________________________________________________________________  DISCHARGE SUMMARY/HOSPITAL COURSE:  for full details see H&P, daily progress notes, labs, consult notes.    Patient was subsequently admitted to [facility] status post fall, patient was found to have a laceration of his forehead that was subsequently treated, patient was in atrial fibrillation with RVR, patient was evaluated by cardiology and his medications were readjusted following which his heart rate had improved and was under adequate control, patient was found to have acute kidney injury which subsequently improved, patient was found to have hypernatremia and was started on D5W with subsequent improvement in serum sodium, patient was evaluated by nephrology for management of acute kidney injury as well as hyponatremia, of note patient was evaluated by physical therapy and Occupational Therapy, I had a detailed goals of care discussion with patient's daughter as well as mother about patient's overall cognitive/functional decline and suitability of hospice services following which patient was discharged to hospice facility in consultation with case management as well as patient's wife and daughter.          _______________________________________________________________________  Patient seen and examined by me on discharge day. Pertinent Findings:  Gen:    Not in distress  Chest: Clear lungs  CVS:   Regular rhythm. S1/s2 no m/r/g  No edema  Abd:  Soft, not distended, not tender  Neuro:  Alert, Oriented x 2  _______________________________________________________________________  DISCHARGE MEDICATIONS:   Current Discharge Medication List      START taking these medications    Details   metoprolol tartrate (LOPRESSOR) 50 mg tablet Take 1 Tab by mouth every twelve (12) hours. Qty: 60 Tab, Refills: 0      midodrine (PROAMATINE) 10 mg tablet Take 1 Tab by mouth three (3) times daily (with meals) for 30 days. Qty: 90 Tab, Refills: 0         CONTINUE these medications which have CHANGED    Details   lisinopriL (PRINIVIL, ZESTRIL) 2.5 mg tablet Take 1 Tab by mouth daily. Qty: 30 Tab, Refills: 0         CONTINUE these medications which have NOT CHANGED    Details   apixaban (ELIQUIS) 5 mg tablet Take 1 Tab by mouth two (2) times a day for 30 days. Qty: 60 Tab, Refills: 0         STOP taking these medications       dilTIAZem IR (CARDIZEM) 60 mg tablet Comments:   Reason for Stopping:         furosemide (LASIX) 40 mg tablet Comments:   Reason for Stopping:         potassium chloride (K-DUR, KLOR-CON) 20 mEq tablet Comments:   Reason for Stopping:                 Patient Follow Up Instructions: Activity: PT/OT Eval and Treat  Diet: Cardiac Diet  Wound Care: As directed    Follow-up with PCP in 2 weeks.   Follow-up tests/labs As per PCP  Follow-up Information     Follow up With Specialties Details Why Contact Info    Carrie Gifford MD [facility]   [address] [facility] [address] [facility]  [phone]          ________________________________________________________________    Risk of deterioration: Low    Condition at Discharge:  Stable  __________________________________________________________________    Disposition  IP Hospice    ____________________________________________________________________    Code Status: DNR/DNI  ___________________________________________________________________      Total time in minutes spent coordinating this discharge (includes going over instructions, follow-up, prescriptions, and preparing report for sign off to her PCP) :  45 minutes    Signed:   Angelo Chang MD

## 2020-11-19 NOTE — PROGRESS NOTES
Nephrology Consult    Patient: Bravo Palacio MRN: [id number]  SSN: [id number]    YOB: 1950  Age: 78 y.o.   Sex: male      Subjective:   Patient is at the bedside  BP low  Na 150  Cr 0.9  On hypotonic IVF     Past Medical History:   Diagnosis Date    A-fib ([address]     HTN (hypertension)     Pacemaker     Skin cancer      Past Surgical History:   Procedure Laterality Date    HX PACEMAKER PLACEMENT        Family History   Problem Relation Age of Onset    Asthma Mother     Heart Disease Mother      Social History     Tobacco Use    Smoking status: Never Smoker    Smokeless tobacco: Never Used   Substance Use Topics    Alcohol use: Yes     Frequency: 2-3 times a week      Current Facility-Administered Medications   Medication Dose Route Frequency Provider Last Rate Last Dose    dextrose 5% infusion  75 mL/hr IntraVENous CONTINUOUS Christal Jade VERMA MD 75 mL/hr at 11/18/20 0931 75 mL/hr at 11/18/20 0931    lisinopriL (PRINIVIL, ZESTRIL) tablet 2.5 mg  2.5 mg Oral DAILY Sampson Roldan MD   2.5 mg at 11/18/20 0933    metoprolol tartrate (LOPRESSOR) tablet 50 mg  50 mg Oral Q12H Sampson Roldan MD   50 mg at 11/18/20 0932    sodium chloride (NS) flush 5-40 mL  5-40 mL IntraVENous PRN Sal Guillaume MD        acetaminophen (TYLENOL) tablet 650 mg  650 mg Oral Q6H PRN Sal Guillaume MD        Or   [facility] acetaminophen (TYLENOL) suppository 650 mg  650 mg Rectal Q6H PRN Sal Guillaume MD        polyethylene glycol (MIRALAX) packet 17 g  17 g Oral DAILY PRN Sal Guillaume MD   17 g at 11/12/20 1042    ondansetron (ZOFRAN) injection 4 mg  4 mg IntraVENous Q6H PRN Sal Guillaume MD        amoxicillin-clavulanate (AUGMENTIN) 875-125 mg per tablet 1 Tab  1 Tab Oral Q12H Sal Guillaume MD   1 Tab at 11/18/20 0933    apixaban (ELIQUIS) tablet 5 mg  5 mg Oral BID Sal Guillaume MD   5 mg at 11/18/20 0932        No Known Allergies    Review of Systems:  A comprehensive review of systems was negative except for that written in the History of Present Illness. Objective:     Vitals:    11/17/20 1611 11/17/20 1959 11/18/20 0805 11/18/20 1612   BP: 108/66 112/70 111/70 (!) 82/53   Pulse: (!) 57 62 63 68   Resp: 18 16 18 18   Temp: 97.4 °F (36.3 °C) 97.9 °F (36.6 °C) 97.6 °F (36.4 °C) 97.1 °F (36.2 °C)   SpO2: 98% 95% 96% 96%   Weight:       Height:            Physical Exam:  General: NAD, confused  Eyes: sclera anicteric  Oral Cavity: No thrush or ulcers  Neck: no JVD  Chest: Fair bilateral air entry  Heart: normal sounds  Abdomen: soft and non tender   : no recinos  Lower Extremities: no edema  Skin: no rash  Neuro: AMS  Psychiatric: non-depressed            Assessment:     Hospital Problems  Date Reviewed: 10/30/2020          Codes Class Noted POA    Afib ([facility] ICD-10-CM: I48.91  ICD-9-CM: 427.31  11/12/2020 Unknown        Atrial fibrillation with RVR ([facility] [address] ICD-10-CM: I48.91  ICD-9-CM: 427.31  11/11/2020 Unknown        Acute renal failure (ARF) ([facility]) ICD-10-CM: N17.9  ICD-9-CM: 584.9  11/11/2020 Unknown        Hypernatremia ICD-10-CM: E87.0  ICD-9-CM: 276.0  11/11/2020 Unknown        Fall ICD-10-CM: Cyrus Hane. Alisia Champ  ICD-9-CM: E888.9  11/11/2020 Unknown        Laceration of forehead ICD-10-CM: D17.65PJ  ICD-9-CM: 873.42  11/11/2020 Unknown              Plan:     #1 acute kidney injury.    -Etiology is prerenal azotemia.    -On admission BUN/creatinine was 53/1.4.    -now BUN/creatinine 34/0.9  today  -He is currently not on diuretics.    -He is with no volume overload.    -No lower extremity edema. 2.  Hypernatremia. -Etiology is free water deficit.    -On admission sodium was 156 and has improved down to 150    -I will keep on hypotonic IV fluids slow. 3.  Hypertension.    -He is noticed to have episodes of low blood pressure. - His metoprolol was increased 50 mg twice daily due to underlying A. fib. .   - He is on a very small dose of lisinopril also. -will put on midodrine 10 mg tid     3. A. fib.    -On admission was with A. fib and RVR. -He was placed on IV Cardizem drip.    -Now on digoxin beta-blockers and Eliquis also. 5.  CHF. -Looks compensated. 2D echocardiogram showed LVEF 20 to 25%. Not on diuretics.       Signed By: Fritz pSrague MD     November 18, 2020

## 2020-11-19 NOTE — PROGRESS NOTES
Report called to [address] [place] [address] at . Patients daughter aware patient for discharge.

## 2020-11-19 NOTE — PROGRESS NOTES
OCCUPATIONAL THERAPY EVALUATION  Patient: Joy Dodge (89 y.o. male)  Date: 11/19/2020  Primary Diagnosis: Atrial fibrillation with RVR ([facility] [address] [I48.91]  Afib ([facility] [address] [I48.91]        Precautions: falls       ASSESSMENT  Based on the objective data described below, the patient presents with generalized deconditioning, decreased sitting and standing balance, decreased activity tolerance, increased need for A with self care and functional mobility/transfers. Patient sitting EOB with PT upon OT arrival and agreeable to working with therapy, PT requesting assistance with evaluation due to patient with increased assistance required for bed mobility, MD aware and gave verbal orders for OT evaluation. Patient A&O to person and place and per chart review patient is from [facility] and was ambulatory without AD PTA. Patient mod to max A bed mobility, mod to max A x2 sit -> sup, max A x2 sit <> stand and required 3 attempts to stand, able to stand only on third attempt. Patient CGA to wash face sitting at EOB, total A LE dressing. Patient participated in feeding while sitting EOB and supine with HOB raised, requires min A for drink and food to mouth to avoid spillage. Patient would benefit from continued skilled OT services to address above deficits and improve safety and independence with self care and functional mobility/transfers. Recommend discharge to SNF when medically appropriate. Current Level of Function Impacting Discharge (ADLs/self-care): mod to max A for all mobility, min to total A for ADLs. Other factors to consider for discharge: severity of deficits, PLOF, confusion        PLAN :  Recommendations and Planned Interventions: self care training, functional mobility training, therapeutic exercise, balance training, therapeutic activities, endurance activities and patient education    Frequency/Duration: Patient will be followed by occupational therapy 3 times a week to address goals.     Recommendation for discharge: (in order for the patient to meet his/her long term goals)  SNF    This discharge recommendation:  Has been made in collaboration with the attending provider and/or case management    IF patient discharges home will need the following DME: none       SUBJECTIVE:   Patient stated I'm okay.     OBJECTIVE DATA SUMMARY:   HISTORY:   Past Medical History:   Diagnosis Date    A-fib ([address]     HTN (hypertension)     Pacemaker     Skin cancer      Past Surgical History:   Procedure Laterality Date    HX PACEMAKER PLACEMENT         Expanded or extensive additional review of patient history:     Home Situation  Home Environment: [address] [place] [address] Name: [facility]  One/Two Story Residence: Other (Comment)  Living Alone: No  Support Systems: Spouse/Significant Other/Partner  Patient Expects to be Discharged to[de-identified] Rehabilitation facility  Current DME Used/Available at Home: None    PLOF: Pt able to ambulate with no AD, pt unable to provide PLOF information for ADLs     Hand dominance: Right    EXAMINATION OF PERFORMANCE DEFICITS:  Cognitive/Behavioral Status:  Neurologic State: Drowsy; Restless  Orientation Level: Oriented to person;Oriented to place  Cognition: Decreased command following    Skin: red spots on back    Edema: none noted    Hearing:   Auditory  Auditory Impairment: None    Vision/Perceptual:    Unable to formally test at this time    Range of Motion:  AROM: Grossly decreased, non-functional    Strength:  RUE Strength  Observation: grossly observed to be 3/5     LUE Strength  Observation: grossly observed to be 3-/5    Coordination:  Unable to follow commands for testing    Tone & Sensation:  Tone: normal  Sensation: not tested    Balance:  Sitting: Impaired  Sitting - Static: Fair (occasional)  Sitting - Dynamic: Poor (constant support)  Standing: Impaired  Standing - Static: Poor  Standing - Dynamic : Poor    Functional Mobility and Transfers for ADLs:  Bed Mobility:  Rolling: Moderate assistance;Maximum assistance  Supine to Sit: Moderate assistance;Maximum assistance  Sit to Supine: Moderate assistance;Maximum assistance  Scooting: Moderate assistance    Transfers:  Sit to Stand: Maximum assistance;Assist x2  Stand to Sit: Maximum assistance;Assist x2    ADL Assessment:  Feeding: Minimum assistance    Oral Facial Hygiene/Grooming: Contact guard assistance    Lower Body Dressing: Total assistance    Toileting: Total assistance    ADL Intervention and task modifications:  Feeding  Feeding Assistance: Minimum assistance  Food to Mouth: Minimum assistance  Drink to Mouth: Minimum assistance    Grooming  Grooming Assistance: Contact guard assistance  Position Performed: Seated edge of bed  Washing Face: Contact guard assistance    Lower Body Dressing Assistance  Dressing Assistance: Total assistance(dependent)  Socks: Total assistance (dependent)  Position Performed: Seated edge of bed    Toileting  Toileting Assistance: Total assistance(dependent)(bed level)  Bowel Hygiene: Total assistance (dependent)  Clothing Management: Total assistance (dependent)    Therapeutic Exercise:  Patient unable to follow commands for UE HEP participation at this time, Ray Ards completed during ROM and MMT testing     Functional Measure:    [address] [facility] [address] [address] AM-PACTM \"6 Clicks\"                                                       Daily Activity Inpatient Short Form  How much help from another person does the patient currently need. .. Total; A Lot A Little None   1. Putting on and taking off regular lower body clothing? [x]  1 []  2 []  3 []  4   2. Bathing (including washing, rinsing, drying)? []  1 [x]  2 []  3 []  4   3. Toileting, which includes using toilet, bedpan or urinal? [x] 1 []  2 []  3 []  4   4. Putting on and taking off regular upper body clothing? []  1 []  2 [x]  3 []  4   5. Taking care of personal grooming such as brushing teeth? []  1 []  2 [x]  3 []  4   6.   Eating meals? []  1 []  2 [x]  3 []  4   © 2007, Trustees of [address] [place] [address] [address] [address], under license to [employer]. All rights reserved     Score: 13/24     Interpretation of Tool:  Represents clinically-significant functional categories (i.e. Activities of daily living). Percentage of Impairment CH    0%   CI    1-19% CJ    20-39% CK    40-59% CL    60-79% CM    80-99% CN     100%   AMPAC  Score 6-24 24 23 20-22 15-19 10-14 7-9 6        Occupational Therapy Evaluation Charge Determination   History Examination Decision-Making   MEDIUM Complexity : Expanded review of history including physical, cognitive and psychosocial  history  LOW Complexity : 1-3 performance deficits relating to physical, cognitive , or psychosocial skils that result in activity limitations and / or participation restrictions  MEDIUM Complexity : Patient may present with comorbidities that affect occupational performnce. Miniml to moderate modification of tasks or assistance (eg, physical or verbal ) with assesment(s) is necessary to enable patient to complete evaluation       Based on the above components, the patient evaluation is determined to be of the following complexity level: LOW     Pain Rating:  None reported, no facial grimace noted during mobility    Activity Tolerance:   Fair    After treatment patient left in no apparent distress:    Supine in bed, Call bell within reach, Bed / chair alarm activated and Side rails x 3    COMMUNICATION/EDUCATION:   The patients plan of care was discussed with: Physical therapist, Registered nurse, Physician and Case management. Patient is unable to participate in goal setting and plan of care. This patients plan of care is appropriate for delegation to SHAZIA. OT/PT sessions occurred together for increased patient and clinician safety as pt requires A of 2 for mobility at this time.     Problem: Self Care Deficits Care Plan (Adult)  Goal: *Acute Goals and Plan of Care (Insert Text)  Description: Pt will be CGA sup <> sit in prep for EOB ADLs  Pt will be SBA grooming sitting EOB  Pt will be mod A LE dressing sitting EOB/long sit  Pt will be min A sit <>  prep for toileting LRAD  Pt will be min A toileting/toilet transfer/cloth mgmt LRAD  Pt will be SBA following UE HEP in prep for self care tasks     Outcome: Not Met    Thank you for this referral.  Christin Villatoro, OTR/L  Time Calculation: 20 mins

## 2020-11-19 NOTE — PROGRESS NOTES
Nephrology Consult    Patient: Miguelito Al MRN: [id number]  SSN: [id number]    YOB: 1950  Age: 78 y.o.   Sex: male      Subjective:   Patient is at the bedside  BP low  Na 150  Cr 0.9  On hypotonic IVF     Past Medical History:   Diagnosis Date    A-fib [facility] [address]     HTN (hypertension)     Pacemaker     Skin cancer      Past Surgical History:   Procedure Laterality Date    HX PACEMAKER PLACEMENT        Family History   Problem Relation Age of Onset    Asthma Mother     Heart Disease Mother      Social History     Tobacco Use    Smoking status: Never Smoker    Smokeless tobacco: Never Used   Substance Use Topics    Alcohol use: Yes     Frequency: 2-3 times a week      Current Facility-Administered Medications   Medication Dose Route Frequency Provider Last Rate Last Dose    midodrine (PROAMATINE) tablet 10 mg  10 mg Oral TID WITH MEALS Yaw Choudhury MD        dextrose 5% infusion  75 mL/hr IntraVENous CONTINUOUS Scarlet VERMA MD 75 mL/hr at 11/18/20 0931 75 mL/hr at 11/18/20 0931    lisinopriL (PRINIVIL, ZESTRIL) tablet 2.5 mg  2.5 mg Oral DAILY Sampson Roldan MD   2.5 mg at 11/18/20 0933    metoprolol tartrate (LOPRESSOR) tablet 50 mg  50 mg Oral Q12H Sampson Roldan MD   50 mg at 11/18/20 2048    sodium chloride (NS) flush 5-40 mL  5-40 mL IntraVENous PRN Paty Allen MD        acetaminophen (TYLENOL) tablet 650 mg  650 mg Oral Q6H PRN Paty Allen MD        Or   [employer]Kind acetaminophen (TYLENOL) suppository 650 mg  650 mg Rectal Q6H PRN Paty Allen MD        polyethylene glycol (MIRALAX) packet 17 g  17 g Oral DAILY PRN Paty Allen MD   17 g at 11/12/20 1042    ondansetron (ZOFRAN) injection 4 mg  4 mg IntraVENous Q6H PRN Paty Allen MD        amoxicillin-clavulanate (AUGMENTIN) 875-125 mg per tablet 1 Tab  1 Tab Oral Q12H Paty Allen MD   1 Tab at 11/18/20 2048    apixaban (ELIQUIS) tablet 5 mg  5 mg Oral BID Paty Allen MD   5 mg at 11/18/20 2048        No Known Allergies    Review of Systems:  A comprehensive review of systems was negative except for that written in the History of Present Illness. Objective:     Vitals:    11/18/20 0805 11/18/20 1612 11/18/20 2046 11/19/20 0433   BP: 111/70 (!) 82/53 109/66 (P) 111/69   Pulse: 63 68 62 (P) 65   Resp: 18 18 18 (P) 18   Temp: 97.6 °F (36.4 °C) 97.1 °F (36.2 °C) 97.5 °F (36.4 °C) (P) 97.8 °F (36.6 °C)   SpO2: 96% 96% 96% (P) 97%   Weight:       Height:            Physical Exam:  General: NAD, confused  Eyes: sclera anicteric  Oral Cavity: No thrush or ulcers  Neck: no JVD  Chest: Fair bilateral air entry  Heart: normal sounds  Abdomen: soft and non tender   : no recinos  Lower Extremities: no edema  Skin: no rash  Neuro: AMS  Psychiatric: non-depressed            Assessment:     Hospital Problems  Date Reviewed: 10/30/2020          Codes Class Noted POA    Afib ([facility] ICD-10-CM: I48.91  ICD-9-CM: 427.31  11/12/2020 Unknown        Atrial fibrillation with RVR ([facility] ICD-10-CM: I48.91  ICD-9-CM: 427.31  11/11/2020 Unknown        Acute renal failure (ARF) (H[facility]) ICD-10-CM: N17.9  ICD-9-CM: 584.9  11/11/2020 Unknown        Hypernatremia ICD-10-CM: E87.0  ICD-9-CM: 276.0  11/11/2020 Unknown        Fall ICD-10-CM: [place]. Kecia Labella  ICD-9-CM: E888.9  11/11/2020 Unknown        Laceration of forehead ICD-10-CM: W84.60UQ  ICD-9-CM: 873.42  11/11/2020 Unknown              Plan:     #1 acute kidney injury.    -Etiology is prerenal azotemia.    -On admission BUN/creatinine was 53/1.4.    -now BUN/creatinine 32/0.9  today  -He is currently not on diuretics.    -He is with no volume overload.    -No lower extremity edema. 2.  Hypernatremia. -Etiology is free water deficit.    -On admission sodium was 156 and has improved down to 148  -I will keep on hypotonic IV fluids slow. 3.  Hypertension.    -He is noticed to have episodes of low blood pressure.    - His metoprolol was increased 50 mg twice daily due to underlying A. fib. .   - He is on a very small dose of lisinopril also. -will put on midodrine 10 mg tid     3. A. fib.    -On admission was with A. fib and RVR. -He was placed on IV Cardizem drip.    -Now on digoxin beta-blockers and Eliquis also. 5.  CHF. -Looks compensated. 2D echocardiogram showed LVEF 20 to 25%. Not on diuretics.       Signed By: Paula Sarmiento MD     November 19, 2020

## 2020-11-28 ENCOUNTER — OFFICE VISIT (OUTPATIENT)
Age: 70
End: 2020-11-28

## 2020-11-28 VITALS — OXYGEN SATURATION: 95 % | HEART RATE: 61 BPM | TEMPERATURE: 98.5 F

## 2020-11-29 LAB — SARS-COV-2, PCR: NOT DETECTED

## 2020-12-02 ENCOUNTER — HOSPITAL ENCOUNTER (OUTPATIENT)
Dept: PULMONOLOGY | Age: 70
Discharge: HOME | End: 2020-12-02
Payer: OTHER GOVERNMENT

## 2020-12-02 ENCOUNTER — OUTSIDE FACILITY SERVICE (OUTPATIENT)
Dept: PULMONOLOGY | Facility: CLINIC | Age: 70
End: 2020-12-02

## 2020-12-02 PROCEDURE — 94060 EVALUATION OF WHEEZING: CPT

## 2020-12-02 PROCEDURE — 6370000000 HC RX 637 (ALT 250 FOR IP): Performed by: INTERNAL MEDICINE

## 2020-12-02 PROCEDURE — 94726 PLETHYSMOGRAPHY LUNG VOLUMES: CPT

## 2020-12-02 PROCEDURE — 94729 DIFFUSING CAPACITY: CPT | Performed by: INTERNAL MEDICINE

## 2020-12-02 PROCEDURE — 94727 GAS DIL/WSHOT DETER LNG VOL: CPT

## 2020-12-02 PROCEDURE — 94060 EVALUATION OF WHEEZING: CPT | Performed by: INTERNAL MEDICINE

## 2020-12-02 PROCEDURE — 94729 DIFFUSING CAPACITY: CPT

## 2020-12-02 RX ORDER — ALBUTEROL SULFATE 90 UG/1
2 AEROSOL, METERED RESPIRATORY (INHALATION)
Status: COMPLETED | OUTPATIENT
Start: 2020-12-02 | End: 2020-12-02

## 2020-12-02 RX ADMIN — ALBUTEROL SULFATE 2 PUFF: 90 AEROSOL, METERED RESPIRATORY (INHALATION) at 11:11

## 2020-12-04 NOTE — PROCEDURES
HEBERNCTHONY Hightail OF [facility] [place] [facility]                               PULMONARY FUNCTION    PATIENT NAME: Adrien Pinto                   :        1950  MED REC NO:   [id number]                              ROOM:  ACCOUNT NO:   [de-identified]                           ADMIT DATE: 2020  PROVIDER:     Rashid Ellsworth MD    DATE OF PROCEDURE:  2020    IMPRESSION:  1. Spirometry shows moderate airflow obstruction. 2.  Following bronchodilator, there is no change. 3.  Midflow is reduced. 4.  Maximum ventilation is reduced.   5.  Diffusion capacity is normal.        Anna Mercado MD    D: 2020 6:57:05      T: 2020 8:24:19     [id number]  Job#: [id number]     Doc#: [id number]    CC:

## 2021-01-26 ENCOUNTER — TELEPHONE (OUTPATIENT)
Dept: CARDIOLOGY CLINIC | Age: 71
End: 2021-01-26

## 2021-01-27 ENCOUNTER — HOSPITAL ENCOUNTER (OUTPATIENT)
Dept: LAB | Age: 71
Discharge: HOME | End: 2021-01-27
Payer: OTHER GOVERNMENT

## 2021-01-30 LAB
ORGANISM: ABNORMAL
URINE CULTURE, ROUTINE: ABNORMAL
URINE CULTURE, ROUTINE: ABNORMAL

## 2021-02-01 ENCOUNTER — OFFICE VISIT (OUTPATIENT)
Age: 71
End: 2021-02-01

## 2021-02-01 VITALS — HEART RATE: 75 BPM | OXYGEN SATURATION: 99 %

## 2021-02-01 DIAGNOSIS — Z11.59 SCREENING FOR VIRAL DISEASE: Primary | ICD-10-CM

## 2021-02-01 LAB — SARS-COV-2, PCR: NOT DETECTED

## 2021-02-01 PROCEDURE — 99999 PR OFFICE/OUTPT VISIT,PROCEDURE ONLY: CPT | Performed by: NURSE PRACTITIONER

## 2021-02-03 ENCOUNTER — TELEPHONE (OUTPATIENT)
Dept: CARDIOLOGY CLINIC | Age: 71
End: 2021-02-03

## 2021-02-03 NOTE — TELEPHONE ENCOUNTER
Patients wife called stating she needed a clearance for patients surgery. Advised she would need to have surgeon fax to office. She said they already did. Spoke with Julia Ferrera LPN and she faxed the surgeons office a form on 1/26/21 to fill out and fax back. She states she has not received that form surgeon yet. Advised patients wife that our office has not received clearance request and they will need to resend. She voiced understanding.

## 2021-05-03 ENCOUNTER — OFFICE VISIT (OUTPATIENT)
Dept: CARDIOLOGY CLINIC | Age: 71
End: 2021-05-03
Payer: MEDICARE

## 2021-05-03 VITALS
OXYGEN SATURATION: 99 % | HEIGHT: 74 IN | WEIGHT: 239 LBS | DIASTOLIC BLOOD PRESSURE: 62 MMHG | SYSTOLIC BLOOD PRESSURE: 132 MMHG | HEART RATE: 60 BPM | BODY MASS INDEX: 30.67 KG/M2

## 2021-05-03 DIAGNOSIS — E78.2 MIXED HYPERLIPIDEMIA: ICD-10-CM

## 2021-05-03 DIAGNOSIS — I10 ESSENTIAL HYPERTENSION: ICD-10-CM

## 2021-05-03 DIAGNOSIS — I25.10 MILD CAD: Primary | ICD-10-CM

## 2021-05-03 PROCEDURE — 99214 OFFICE O/P EST MOD 30 MIN: CPT | Performed by: NURSE PRACTITIONER

## 2021-05-03 PROCEDURE — 3017F COLORECTAL CA SCREEN DOC REV: CPT | Performed by: NURSE PRACTITIONER

## 2021-05-03 PROCEDURE — 1123F ACP DISCUSS/DSCN MKR DOCD: CPT | Performed by: NURSE PRACTITIONER

## 2021-05-03 PROCEDURE — 1036F TOBACCO NON-USER: CPT | Performed by: NURSE PRACTITIONER

## 2021-05-03 PROCEDURE — G8427 DOCREV CUR MEDS BY ELIG CLIN: HCPCS | Performed by: NURSE PRACTITIONER

## 2021-05-03 PROCEDURE — G8417 CALC BMI ABV UP PARAM F/U: HCPCS | Performed by: NURSE PRACTITIONER

## 2021-05-03 PROCEDURE — 4040F PNEUMOC VAC/ADMIN/RCVD: CPT | Performed by: NURSE PRACTITIONER

## 2021-05-03 RX ORDER — TAMSULOSIN HYDROCHLORIDE 0.4 MG/1
1 CAPSULE ORAL DAILY
COMMUNITY

## 2021-05-03 RX ORDER — RANOLAZINE 500 MG/1
TABLET, EXTENDED RELEASE ORAL
COMMUNITY
End: 2022-02-10

## 2021-05-03 RX ORDER — HYDROCODONE BITARTRATE AND ACETAMINOPHEN 10; 325 MG/1; MG/1
2 TABLET ORAL EVERY 6 HOURS PRN
Status: ON HOLD | COMMUNITY
End: 2022-03-04 | Stop reason: HOSPADM

## 2021-05-03 NOTE — PROGRESS NOTES
[facility], [place]. [address] [facility] [address] [facility]  [phone] office  [phone] fax      OFFICE VISIT:  5/3/2021    Debbie Ayers - : 1950  Reason For Visit:  Valentina Galeana is a 73 y.o. male who is here for 6 Month Follow-Up (Patient denies any cardiac symptoms. ) and Coronary Artery Disease    History:   Diagnosis Orders   1. Mild CAD     2. Essential hypertension     3. Mixed hyperlipidemia       The patient presents today for cardiology follow up. Mr. Louisa Blair resports doing well. He follows with the [facility]. The patient has opted not to have the coronavirus vaccine series at this point. The patient denies symptoms to suggest myocardial ischemia, heart failure or arrhythmia. BP is well controlled on current regimen. The patient's PCP monitors cholesterol. The patient reports \"the [facility] is watching some spots on my lungs. \"    Subjective  Valentina Galeana denies exertional chest pain, shortness of breath, orthopnea, paroxysmal nocturnal dyspnea, syncope, presyncope, sensed arrhythmia, edema and fatigue. The patient denies numbness or weakness to suggest cerebrovascular accident or transient ischemic attack.       Debbie Ayers has the following history as recorded in [facility]:  Patient Active Problem List   Diagnosis Code    Abdominal pain, other specified site R10.9    Nausea R11.0    Dysphagia R13.10    Weight loss R63.4    Fatigue R53.83    Heartburn R12    Hoarseness R49.0    Regurgitation [id number]    Hyperlipidemia E78.5    Nicotine abuse Z72.0    Family history of premature CAD Z80.55    Other chest pain R07.89    Hypertension I10    Mild CAD I25.10    Change in bowel habits R19.4    Fecal incontinence R15.9    Rectal pain K62.89    Pelvic pain in male R10.2    Family history of colon cancer Z80.0    Ex-cigarette smoker Z87.891    Chest pressure R07.89    Varicose veins of bilateral lower extremities with pain I83.813    Venous insufficiency of both lower extremities I87.2    Swelling of both lower extremities M79.89    Rectal bleeding K62.5    Altered bowel function R19.8    Unexplained weight loss R63.4    Generalized abdominal pain R10.84    Excessive gas R14.3    History of adenomatous polyp of colon Z86.010    Anemia D64.9    Internal hemorrhoids K64.8    Atypical chest pain R07.89    Smoker F17.200    Cigarette nicotine dependence without complication A06.724     Past Medical History:   Diagnosis Date    Allergic rhinitis     Anemia     Asthma     CAD (coronary artery disease) 4/11/2014    Chest tightness, discomfort, or pressure 6/17/2013 6/17/2013  lexiscan Positive for inferior myocardial ischemia, EF 47%, 9% ischemic myocardium on stress, intermediate risk findings, AUC indication 16, AUC score 7     Chronic back pain     COPD (chronic obstructive pulmonary disease) ([address]     Ex-cigarette smoker 10/27/2015    Family history of early CAD 6/24/2013    Family history of premature CAD     GERD (gastroesophageal reflux disease)     History of hernia repair     Hyperlipidemia     [facility]    Hypertension     Multiple lung nodules     Neuropathy     Nicotine abuse     Nicotine abuse     Osteoarthritis     Peripheral vascular disease (HCC)     Restless legs syndrome     SOB (shortness of breath)     Thyroid nodule     Unspecified sleep apnea     can't wear cpap     Past Surgical History:   Procedure Laterality Date    APPENDECTOMY      BLADDER SURGERY      x 3    CARDIAC CATHETERIZATION  6/24/2013  JDT    EF 50%    CARDIAC CATHETERIZATION  10/27/15  JDT    EF 50%    CARDIAC CATHETERIZATION  05/2018    mild, non-obstructive CAD    COLONOSCOPY  20 yrs ago    not sure who, thinks @ [facility]    COLONOSCOPY  09/2014    TAx2, HP, intern hemorrhoids (3 yr)   [address]       with mesh- [place]    OK COLSC FLX W/REMOVAL LESION BY HOT BX FORCEPS  2017    Dr Shelton-internal hemorrhoids, tubular AP (-) dysplasia--5 yr recall    OK EGD TRANSORAL BIOPSY SINGLE/MULTIPLE N/A 10/19/2017    Dr Shelton-normal-Esme (-) chronic nonspecific inflammation    THYROIDECTOMY      UPPER GASTROINTESTINAL ENDOSCOPY      thinks so, but a long time ago if so    UPPER GASTROINTESTINAL ENDOSCOPY  2013    Cat: Grade A esophagitis and esophageal stricture. Dilated 54fr    VASCULAR SURGERY  2017    SJS. Right IJV US 9f sheath.  VASCULAR SURGERY Left 2017    SJS-L GSV RFA     Family History   Problem Relation Age of Onset    Cancer Mother         lung and brain    Coronary Art Dis Mother     Coronary Art Dis Sister     Colon Cancer Sister     Colon Polyps Sister     Coronary Art Dis Father     Colon Cancer Maternal Grandmother     Colon Polyps Maternal Grandmother     Colon Cancer Sister     Colon Polyps Sister    Janet Larios Cancer Sister         \"female cancer\"   Janet Larios Cancer Sister         thryoid    Coronary Art Dis Brother     Heart Failure Brother     Esophageal Cancer Neg Hx     Liver Cancer Neg Hx     Stomach Cancer Neg Hx      Social History     Tobacco Use    Smoking status: Former Smoker     Packs/day: 0.00     Types: Cigarettes     Quit date: 2020     Years since quittin.7    Smokeless tobacco: Never Used   Substance Use Topics    Alcohol use: No     Alcohol/week: 0.0 standard drinks      Current Outpatient Medications   Medication Sig Dispense Refill    HYDROcodone-acetaminophen (NORCO)  MG per tablet hydrocodone 10 mg-acetaminophen 325 mg tablet   Take 1 tablet 4 times a day by oral route as needed.  ranolazine (RANEXA) 500 MG extended release tablet Ranexa 500 mg tablet,extended release   Take 1 tablet twice a day by oral route.       tamsulosin (FLOMAX) 0.4 MG capsule Take 1 capsule by mouth daily      nitroGLYCERIN (NITROSTAT) 0.4 MG SL tablet Place 0.4 mg under the tongue every 5 minutes as needed for Chest pain up to max of 3 total doses. If no relief after 1 dose, call 911.  metoprolol succinate (TOPROL XL) 25 MG extended release tablet TAKE ONE TABLET BY MOUTH 2 TIMES A  tablet 3    isosorbide mononitrate (IMDUR) 30 MG extended release tablet Take 1 tablet by mouth nightly 90 tablet 3    gabapentin (NEURONTIN) 300 MG capsule Take 3 tablets twice daily      lidocaine (LIDODERM) 5 % Place 1 patch onto the skin daily 12 hours on, 12 hours off. 30 patch 0    esomeprazole (NEXIUM) 40 MG delayed release capsule Take 40 mg by mouth daily       aspirin 325 MG tablet Take 325 mg by mouth daily      topiramate (TOPAMAX) 100 MG tablet Take 100 mg by mouth daily       beclomethasone (QVAR) 80 MCG/ACT inhaler Inhale 1 puff into the lungs 2 times daily       albuterol (PROAIR HFA) 108 (90 BASE) MCG/ACT inhaler Inhale 2 puffs into the lungs every 4 hours as needed.  tiotropium (SPIRIVA HANDIHALER) 18 MCG inhalation capsule Inhale 18 mcg into the lungs daily.  fluticasone-salmeterol (ADVAIR DISKUS) 500-50 MCG/DOSE diskus inhaler Inhale 1 puff into the lungs every 12 hours        No current facility-administered medications for this visit. Allergies: Bactrim [sulfamethoxazole-trimethoprim], Nsaids, Succinylcholine, and Betadine [povidone iodine]    Review of Systems  Constitutional - no appetite change, or unexpected weight change. No fever, chills or diaphoresis. No significant change in activity level or new onset of fatigue. HEENT - no significant rhinorrhea or epistaxis. No tinnitus or significant hearing loss. Eyes - no sudden vision change or amaurosis. No corneal arcus, xantholasma, subconjunctival hemorrhage or discharge. Respiratory - no significant wheezing, stridor, apnea or cough. No dyspnea on exertion or shortness of air. Cardiovascular - no exertional chest pain to suggest myocardial ischemia. No orthopnea or PND. No sensation of sustained arrythmia. No occurrence of slow heart rate. No palpitations. No claudication. Gastrointestinal - no abdominal swelling or pain. No blood in stool. No severe constipation, diarrhea, nausea, or vomiting. Genitourinary - no dysuria, frequency, or urgency. No flank pain or hematuria. Musculoskeletal - no back pain or myalgia. No problems with gait. Extremities - no clubbing, cyanosis or extremity edema. Skin - no color change or rash. No pallor. No new surgical incision. Neurologic - no speech difficulty, facial asymmetry or lateralizing weakness. No seizures, presyncope or syncope. No significant dizziness. Hematologic - no easy bruising or excessive bleeding. Psychiatric - no severe anxiety or insomnia. No confusion. All other review of systems are negative. Objective  Vital Signs - /62   Pulse 60   Ht 6' 2\" (1.88 m)   Wt 239 lb (108.4 kg)   SpO2 99%   BMI 30.69 kg/m²   General - Sheldon Riser is alert, cooperative, and pleasant. Well groomed. No acute distress. Body habitus - Body mass index is 30.69 kg/m². HEENT - Head is normocephalic. No circumoral cyanosis. Dentition is normal.  EYES -   Lids normal without ptosis. No discharge, edema or subconjunctival hemorrhage. Neck - Symmetrical without apparent mass or lymphadenopathy. Respiratory - Normal respiratory effort without use of accessory muscles. Ausculatation reveals vesicular breath sounds without crackles, wheezes, rub or rhonchi. Cardiovascular - No jugular venous distention. Auscultation reveals regular rate and rhythm. No audible clicks, gallop or rub. No murmur. No lower extremity varicosities. No carotid bruits. Abdominal -  No visible distention, mass or pulsations. Extremities - No clubbing or cyanosis. No statis dermatitis or ulcers. No edema. Musculoskeletal -   No Osler's nodes. No kyphosis or scoliosis. Gait is even and regular without limp or shuffle. Ambulates without assistance. Skin -  Warm and dry; no rash or pallor. No new surgical wound. Neurological - No focal neurological deficits. Thought processes coherent. No apparent tremor. Oriented to person, place and time. Psychiatric -  Appropriate affect and mood. Data reviewed:  Prior Cardiac History -   6/17/2013  lexiscan  Positive for inferior myocardial ischemia, EF 47%, 9% ischemia at risk  6/24/2013  Cath  100% diag with left to left collaterals, anterior lateral hypo, EF 50%  10/26/2015  DSE Positive for clinical myocardial ischemia, discordant risk findings, AUC indication 20, AUC score 7  10/27/15  Cath  100% diag with left to left collaterals, anterior lateral hypo, EF 50%  9/29/16  lexiscan Positive for inferior reverese myocardial ischemia, EF 41%, 1/8% ischemic myocardium on stress, uninterpretable risk findings  5/29/2018  ACS LEA RISK Score 5 (angina, CAD>50, age>65, cigarettes, hypertension, hypercholesteremia, ASA ), AUC indication 3, AUC score 9   5/30/18  Cath mild CAD, anterior lateral hypo, EF 50%    5/29/18 CXR  Two-view chest:    History: Chest pain    Frontal and lateral projection chest radiograph obtained. Comparison:  9/28/2016    Findings: There are scattered granuloma appreciated. There are COPD changes. The lungs are clear without acute infiltrates. The heart is normal in size, pulmonary circulation appropriate,   without heart failure. The bony structures are intact. Radiographically, the chest is unchanged. .       Impression   Impression:   1. No acute cardiopulmonary process.    Signed by Dr Agus Herron on 5/29/2018 2:22 PM     Lab Results   Component Value Date    WBC 7.4 10/31/2019    HGB 14.4 10/31/2019    HCT 44.1 10/31/2019    MCV 97.1 (H) 10/31/2019     10/31/2019     Lab Results   Component Value Date     10/31/2019    K 4.5 10/31/2019     10/31/2019    CO2 25 10/31/2019    BUN 18 10/31/2019    CREATININE 0.8 10/31/2019 GLUCOSE 100 10/31/2019    CALCIUM 9.5 10/31/2019    PROT 6.7 10/31/2019    LABALBU 4.1 10/31/2019    BILITOT 0.4 10/31/2019    ALKPHOS 65 10/31/2019    AST 16 10/31/2019    ALT 11 10/31/2019    LABGLOM >60 10/31/2019    GLOB 2.3 09/29/2016       Lab Results   Component Value Date    CHOL 189 10/31/2019    CHOL 113 (L) 09/29/2016    CHOL 167 10/26/2015     Lab Results   Component Value Date    TRIG 132 10/31/2019    TRIG 103 (L) 09/29/2016    TRIG 107 (L) 10/26/2015     Lab Results   Component Value Date    HDL 46 (L) 10/31/2019    HDL 35 (L) 09/29/2016    HDL 39 (L) 10/26/2015     Lab Results   Component Value Date    LDLCALC 117 10/31/2019    LDLCALC 57 09/29/2016       BP Readings from Last 3 Encounters:   05/03/21 132/62   11/17/20 102/68   11/18/19 116/76    Pulse Readings from Last 3 Encounters:   05/03/21 60   02/01/21 75   11/28/20 61        Wt Readings from Last 3 Encounters:   05/03/21 239 lb (108.4 kg)   11/17/20 240 lb (108.9 kg)   11/18/19 238 lb (108 kg)     Assessment/Plan:   Diagnosis Orders   1. Mild CAD     2. Essential hypertension     3. Mixed hyperlipidemia       Stable CV status without overt heart failure, sensed arrhythmia or angina. Mild CAD - stable on current medical management to include Toprol XL, Ranexa, Imdur and ASA. Continue same. HTN - normotensive on Toprol XL. Continue same. Hyperlipidemia - labs followed by PCP. Patient not currently taking statin. Patient is compliant with medication regimen. Previous cardiac history and records reviewed. Continue current medical management for cardiac related condition. Continue other current medications as directed. Continue to follow up with primary care provider for non cardiac medical problems. If your primary care provider is outside of the [facility], please request that your labs be faxed to this office at [phone]. BP goal 130/80 or less.   Call the office with any problems, questions or concerns at [phone]. Cardiology follow up as scheduled in [address] appointments. Educational included in patient instructions. Heart health.       Clari Bay, APRN

## 2021-05-03 NOTE — PATIENT INSTRUCTIONS
may include:  · Chest pain or pressure, or a strange feeling in the chest.  · Sweating. · Shortness of breath. · Pain, pressure, or a strange feeling in the back, neck, jaw, or upper belly or in one or both shoulders or arms. · Lightheadedness or sudden weakness. · A fast or irregular heartbeat. After you call 911, the  may tell you to chew 1 adult-strength or 2 to 4 low-dose aspirin. Wait for an ambulance. Do not try to drive yourself. Watch closely for changes in your health, and be sure to contact your doctor if you have any problems. Where can you learn more? Go to [email or web address] org and sign in to your [employer] account. Enter Y297 in the [employer] box to learn more about \"A Healthy Heart: Care Instructions. \"     If you do not have an account, please click on the \"Sign Up Now\" link. Current as of: December 16, 2019               Content Version: 12.5  © 5861-1734 [employer], [employer]. Care instructions adapted under license by [facility] ([facility]). If you have questions about a medical condition or this instruction, always ask your healthcare professional. [address] any warranty or liability for your use of this information.

## 2021-08-03 PROBLEM — I48.91 ATRIAL FIBRILLATION (HCC): Status: RESOLVED | Noted: 2020-10-30 | Resolved: 2021-08-03

## 2021-08-03 PROBLEM — I48.91 AFIB (HCC): Status: RESOLVED | Noted: 2020-11-12 | Resolved: 2021-08-03

## 2021-09-28 ENCOUNTER — TRANSCRIBE ORDERS (OUTPATIENT)
Dept: ADMINISTRATIVE | Facility: HOSPITAL | Age: 71
End: 2021-09-28

## 2021-09-28 DIAGNOSIS — M54.50 LOW BACK PAIN, UNSPECIFIED BACK PAIN LATERALITY, UNSPECIFIED CHRONICITY, UNSPECIFIED WHETHER SCIATICA PRESENT: ICD-10-CM

## 2021-09-28 DIAGNOSIS — M54.16 LUMBAR RADICULOPATHY: Primary | ICD-10-CM

## 2021-10-05 ENCOUNTER — HOSPITAL ENCOUNTER (OUTPATIENT)
Dept: MRI IMAGING | Age: 71
Discharge: HOME | End: 2021-10-05
Payer: MEDICARE

## 2021-10-05 DIAGNOSIS — M54.50 LOW BACK PAIN, UNSPECIFIED BACK PAIN LATERALITY, UNSPECIFIED CHRONICITY, UNSPECIFIED WHETHER SCIATICA PRESENT: ICD-10-CM

## 2021-10-05 DIAGNOSIS — M54.16 LUMBAR RADICULOPATHY: ICD-10-CM

## 2021-10-05 PROCEDURE — 72148 MRI LUMBAR SPINE W/O DYE: CPT

## 2021-11-04 ENCOUNTER — OFFICE VISIT (OUTPATIENT)
Dept: CARDIOLOGY CLINIC | Age: 71
End: 2021-11-04
Payer: MEDICARE

## 2021-11-04 VITALS
HEIGHT: 73 IN | SYSTOLIC BLOOD PRESSURE: 102 MMHG | DIASTOLIC BLOOD PRESSURE: 62 MMHG | HEART RATE: 66 BPM | WEIGHT: 227 LBS | BODY MASS INDEX: 30.09 KG/M2

## 2021-11-04 DIAGNOSIS — I10 ESSENTIAL HYPERTENSION: ICD-10-CM

## 2021-11-04 DIAGNOSIS — R07.89 ATYPICAL CHEST PAIN: Primary | ICD-10-CM

## 2021-11-04 DIAGNOSIS — E78.2 MIXED HYPERLIPIDEMIA: ICD-10-CM

## 2021-11-04 DIAGNOSIS — I25.10 MILD CAD: ICD-10-CM

## 2021-11-04 DIAGNOSIS — F17.200 SMOKER: ICD-10-CM

## 2021-11-04 PROCEDURE — 1123F ACP DISCUSS/DSCN MKR DOCD: CPT | Performed by: INTERNAL MEDICINE

## 2021-11-04 PROCEDURE — 99213 OFFICE O/P EST LOW 20 MIN: CPT | Performed by: INTERNAL MEDICINE

## 2021-11-04 PROCEDURE — 3017F COLORECTAL CA SCREEN DOC REV: CPT | Performed by: INTERNAL MEDICINE

## 2021-11-04 PROCEDURE — G8427 DOCREV CUR MEDS BY ELIG CLIN: HCPCS | Performed by: INTERNAL MEDICINE

## 2021-11-04 PROCEDURE — 4004F PT TOBACCO SCREEN RCVD TLK: CPT | Performed by: INTERNAL MEDICINE

## 2021-11-04 PROCEDURE — G8417 CALC BMI ABV UP PARAM F/U: HCPCS | Performed by: INTERNAL MEDICINE

## 2021-11-04 PROCEDURE — G8484 FLU IMMUNIZE NO ADMIN: HCPCS | Performed by: INTERNAL MEDICINE

## 2021-11-04 PROCEDURE — 4040F PNEUMOC VAC/ADMIN/RCVD: CPT | Performed by: INTERNAL MEDICINE

## 2021-11-04 ASSESSMENT — ENCOUNTER SYMPTOMS
COUGH: 1
SHORTNESS OF BREATH: 1
ANAL BLEEDING: 1
BLOOD IN STOOL: 1

## 2021-11-04 NOTE — PROGRESS NOTES
Office Visit  Meghan Montelongo is a 73 y.o. male; who present today for 6 Month Follow-Up      HPI  I am seeing this 74-year-old white male with chronic chest discomfort who had only mild coronary artery disease by cath in 2018 in routine follow-up. He remains on antianginal therapy. His chest discomfort is described as atypical, mostly left upper chest unrelated to activity that can last for 30 to 60 minutes. He is limited in activities because of hip and knee problems and uses a cane. He has shortness of breath on a chronic basis with exertion that has not changed and he does continue to smoke. He denies palpitations, no presyncope or syncope. He states that he was found to have some blood in his stool and he is under care and evaluation for this by PCP. Current Outpatient Medications   Medication Sig Dispense Refill    HYDROcodone-acetaminophen (NORCO)  MG per tablet hydrocodone 10 mg-acetaminophen 325 mg tablet   Take 1 tablet 4 times a day by oral route as needed.  ranolazine (RANEXA) 500 MG extended release tablet Ranexa 500 mg tablet,extended release   Take 1 tablet twice a day by oral route.  tamsulosin (FLOMAX) 0.4 MG capsule Take 1 capsule by mouth daily      nitroGLYCERIN (NITROSTAT) 0.4 MG SL tablet Place 0.4 mg under the tongue every 5 minutes as needed for Chest pain up to max of 3 total doses. If no relief after 1 dose, call 911.  metoprolol succinate (TOPROL XL) 25 MG extended release tablet TAKE ONE TABLET BY MOUTH 2 TIMES A  tablet 3    isosorbide mononitrate (IMDUR) 30 MG extended release tablet Take 1 tablet by mouth nightly 90 tablet 3    gabapentin (NEURONTIN) 300 MG capsule Take 3 tablets twice daily      lidocaine (LIDODERM) 5 % Place 1 patch onto the skin daily 12 hours on, 12 hours off.  30 patch 0    esomeprazole (NEXIUM) 40 MG delayed release capsule Take 40 mg by mouth daily       aspirin 325 MG tablet Take 325 mg by mouth daily      topiramate (TOPAMAX) 100 MG tablet Take 100 mg by mouth daily       beclomethasone (QVAR) 80 MCG/ACT inhaler Inhale 1 puff into the lungs 2 times daily       albuterol (PROAIR HFA) 108 (90 BASE) MCG/ACT inhaler Inhale 2 puffs into the lungs every 4 hours as needed.  tiotropium (SPIRIVA HANDIHALER) 18 MCG inhalation capsule Inhale 18 mcg into the lungs daily.  fluticasone-salmeterol (ADVAIR DISKUS) 500-50 MCG/DOSE diskus inhaler Inhale 1 puff into the lungs every 12 hours        No current facility-administered medications for this visit. There are no discontinued medications.      Allergies   Allergen Reactions    Bactrim [Sulfamethoxazole-Trimethoprim] Nausea And Vomiting    Nsaids Anaphylaxis    Succinylcholine Other (See Comments)     Paralyze waist down     Betadine [Povidone Iodine] Hives       Past Medical History:   Diagnosis Date    Allergic rhinitis     Anemia     Asthma     CAD (coronary artery disease) 4/11/2014    Chest tightness, discomfort, or pressure 6/17/2013 6/17/2013  lexiscan Positive for inferior myocardial ischemia, EF 47%, 9% ischemic myocardium on stress, intermediate risk findings, AUC indication 16, AUC score 7     Chronic back pain     COPD [facility]ronic obstructive [facility] disease) ([facility]     Ex-cigarette smoker 10/27/2015    Family history of early CAD 6/24/2013    Family history of premature CAD     GERD (gastroesophageal reflux disease)     History of hernia repair     Hyperlipidemia     [facility]    Hypertension     Multiple lung nodules     Neuropathy     Nicotine abuse     Nicotine abuse     Osteoarthritis     Peripheral vascular disease (HCC)     Restless legs syndrome     SOB (shortness of breath)     Thyroid nodule     Unspecified sleep apnea     can't wear cpap     Negative - Past Medical History for  Past Medical History Pertinent Negatives:   Diagnosis Date Noted    ADHD (attention deficit hyperactivity disorder) 08/22/2017    Anticoagulant long-term use 08/22/2017    Anxiety 08/22/2017    Atrial fibrillation ([facility] 08/22/2017    Bipolar disorder ([facility] 08/22/2017    Cancer ([facility] 01/25/2013    Carotid artery stenosis 08/22/2017    Cerebrovascular disease 08/22/2017    CHF (congestive heart failure) (HCC) 08/22/2017    Chronic kidney disease 08/22/2017    Congenital heart disease 08/22/2017    Depression 08/22/2017    Emphysema of lung ([facility] 01/25/2013    Erectile dysfunction 08/22/2017    Fibromyalgia 08/22/2017    Headache 08/22/2017    Hyperthyroidism 08/22/2017    Hypothyroidism 08/22/2017    Irritable bowel syndrome 08/22/2017    Liver disease 08/22/2017    Obesity 08/22/2017    Seizures ([facility] 08/22/2017    Type 2 diabetes mellitus without complication ([facility] 06/00/5866    Type II or unspecified type diabetes mellitus without mention of complication, not stated as uncontrolled 01/25/2013    Urinary incontinence 08/22/2017       Past Surgical History:   Procedure Laterality Date    APPENDECTOMY      BLADDER SURGERY      x 3    CARDIAC CATHETERIZATION  6/24/2013  JDT    EF 50%    CARDIAC CATHETERIZATION  10/27/15  JDT    EF 50%    CARDIAC CATHETERIZATION  05/2018    mild, non-obstructive CAD    COLONOSCOPY  20 yrs ago    not sure who, thinks @ [facility]    COLONOSCOPY  09/2014    TAx2, HP, intern hemorrhoids (3 yr)   [address]       with mesh-Dr Kayden Lawrence    SD COLSC FLX W/REMOVAL LESION BY HOT BX FORCEPS  09/21/2017    Dr Shelton-internal hemorrhoids, tubular AP (-) dysplasia--5 yr recall    SD EGD TRANSORAL BIOPSY SINGLE/MULTIPLE N/A 10/19/2017    Dr Chand Modest (-) chronic nonspecific inflammation    THYROIDECTOMY      UPPER GASTROINTESTINAL ENDOSCOPY      thinks so, but a long time ago if so    UPPER GASTROINTESTINAL ENDOSCOPY  02/11/2013    Cat: Grade A esophagitis and esophageal stricture.  Dilated 54fr    VASCULAR SURGERY  2017    [facility]. Right IJV US 9f sheath.  VASCULAR SURGERY Left 2017    [facility]-L GSV RFA     Social History     Occupational History    Not on file   Tobacco Use    Smoking status: Current Some Day Smoker     Types: Cigarettes     Last attempt to quit: 2020     Years since quittin.2    Smokeless tobacco: Never Used    Tobacco comment: some days he smokes, some days he doesn't   Vaping Use    Vaping Use: Never used   Substance and Sexual Activity    Alcohol use: No     Alcohol/week: 0.0 standard drinks    Drug use: No    Sexual activity: Not on file        Family History   Problem Relation Age of Onset    Cancer Mother         lung and brain    Coronary Art Dis Mother     Coronary Art Dis Sister     Colon Cancer Sister     Colon Polyps Sister     Coronary Art Dis Father     Colon Cancer Maternal Grandmother     Colon Polyps Maternal Grandmother     Colon Cancer Sister     Colon Polyps Sister     Cancer Sister         \"female cancer\"   Jay Mullen Cancer Sister         thryoid    Coronary Art Dis Brother     Heart Failure Brother     Esophageal Cancer Neg Hx     Liver Cancer Neg Hx     Stomach Cancer Neg Hx        Review of Systems  Review of Systems   Constitutional: Negative for fatigue and fever. Respiratory: Positive for cough and shortness of breath. Cardiovascular: Positive for chest pain. Negative for palpitations and leg swelling. Gastrointestinal: Positive for anal bleeding and blood in stool. Neurological: Negative for syncope, facial asymmetry and speech difficulty. Physical Exam  /62   Pulse 66   Ht 6' 1\" (1.854 m)   Wt 227 lb (103 kg)   BMI 29.95 kg/m²    Physical Exam  Constitutional:       Appearance: Normal appearance. He is normal weight. Cardiovascular:      Rate and Rhythm: Normal rate and regular rhythm. Heart sounds: Normal heart sounds. No gallop.     Pulmonary:      Effort: Pulmonary effort is normal.      Comments: Lungs are clear, aeration fair to poor  Abdominal:      General: Abdomen is flat. Bowel sounds are normal.      Palpations: Abdomen is soft. Musculoskeletal:         General: No swelling. Skin:     General: Skin is warm and dry. Neurological:      Mental Status: He is alert. Assessment/Plan    EKG Findings:  Not performed today    Problem List Items Addressed This Visit        Cardiology Problems    Hyperlipidemia    Essential hypertension    Mild CAD    Atypical chest pain - Primary       Other    Smoker           Diagnosis Orders   1. Atypical chest pain      Chronic, mild CAD by cath, May 2018   2. Mild CAD      Mild by cath, May 2018. Reported totally occluded diagonal by cath, 2013, not noted in 2018   3. Essential hypertension     4. Mixed hyperlipidemia     5. Smoker         Recommendations:   Diet: Low-sodium, low-fat  Activity: Moderate activities, cigarette smoking cessation  Medication Changes: Continue same medications    This patient has been on the same medications a long time. In the past he has been reluctant to make any changes. His chest discomfort mostly does not sound cardiac. I will not make any changes. No orders of the defined types were placed in this encounter. No orders of the defined types were placed in this encounter. Return in about 6 months (around 5/4/2022) for argelia mensah.

## 2021-11-10 ENCOUNTER — HOSPITAL ENCOUNTER (OUTPATIENT)
Dept: MRI IMAGING | Age: 71
Discharge: HOME | End: 2021-11-10
Payer: OTHER GOVERNMENT

## 2021-11-10 DIAGNOSIS — M25.551 RIGHT HIP PAIN: ICD-10-CM

## 2021-11-10 DIAGNOSIS — M25.561 RIGHT KNEE PAIN, UNSPECIFIED CHRONICITY: ICD-10-CM

## 2021-11-10 PROCEDURE — 73721 MRI JNT OF LWR EXTRE W/O DYE: CPT

## 2021-12-06 ENCOUNTER — PROCEDURE VISIT (OUTPATIENT)
Dept: UROLOGY | Facility: CLINIC | Age: 71
End: 2021-12-06

## 2021-12-06 DIAGNOSIS — N32.0 BLADDER OUTLET OBSTRUCTION: Primary | ICD-10-CM

## 2021-12-06 DIAGNOSIS — N40.1 BPH WITH URINARY OBSTRUCTION: ICD-10-CM

## 2021-12-06 DIAGNOSIS — N13.8 BPH WITH URINARY OBSTRUCTION: ICD-10-CM

## 2021-12-06 DIAGNOSIS — N39.41 URGE INCONTINENCE OF URINE: ICD-10-CM

## 2021-12-06 LAB
BILIRUB BLD-MCNC: NEGATIVE MG/DL
CLARITY, POC: CLEAR
COLOR UR: YELLOW
GLUCOSE UR STRIP-MCNC: NEGATIVE MG/DL
KETONES UR QL: NEGATIVE
LEUKOCYTE EST, POC: NEGATIVE
NITRITE UR-MCNC: NEGATIVE MG/ML
PH UR: 5.5 [PH] (ref 5–8)
PROT UR STRIP-MCNC: NEGATIVE MG/DL
RBC # UR STRIP: ABNORMAL /UL
SP GR UR: 1.03 (ref 1–1.03)
UROBILINOGEN UR QL: NORMAL

## 2021-12-06 PROCEDURE — 52000 CYSTOURETHROSCOPY: CPT | Performed by: UROLOGY

## 2021-12-06 PROCEDURE — 81003 URINALYSIS AUTO W/O SCOPE: CPT | Performed by: UROLOGY

## 2021-12-06 PROCEDURE — 99214 OFFICE O/P EST MOD 30 MIN: CPT | Performed by: UROLOGY

## 2021-12-06 NOTE — PROGRESS NOTES
Subjective    Mr. Kam is 71 y.o. male    Chief Complaint: Lower urinary tract symptoms    History of Present Illness  Patient not seen since 2019.  He is undergone TURP in the past.  He did have urodynamics and cystoscopy 2019 there was Concern over some remaining apical tissue.  He is bothered by frequency urgency and nocturia as well as urge incontinence.  He has tried anticholinergic therapy with no improvement.  There was some concern for obstruction on urodynamics in 2019.      The following portions of the patient's history were reviewed and updated as appropriate: allergies, current medications, past family history, past medical history, past social history, past surgical history and problem list.    Review of Systems   Constitutional: Negative for chills and fever.   Gastrointestinal: Negative for abdominal pain, anal bleeding and blood in stool.   Genitourinary: Positive for frequency and urgency. Negative for dysuria and hematuria.         Current Outpatient Medications:   •  albuterol (PROAIR HFA) 108 (90 BASE) MCG/ACT inhaler, Inhale 2 puffs Every 4 (Four) Hours As Needed for shortness of air., Disp: , Rfl:   •  aspirin 325 MG tablet, Take 325 mg by mouth daily, Disp: , Rfl:   •  atorvastatin (LIPITOR) 40 MG tablet, Take 1 tablet by mouth daily, Disp: , Rfl:   •  beclomethasone (QVAR) 80 MCG/ACT inhaler, Inhale 1 puff into the lungs 2 times daily., Disp: , Rfl:   •  fluticasone-salmeterol (ADVAIR DISKUS) 500-50 MCG/DOSE DISKUS, Every 12 (Twelve) Hours., Disp: , Rfl:   •  gabapentin (NEURONTIN) 300 MG capsule, Take 600 mg by mouth 3 (Three) Times a Day. 2 capsules BID and 3 capsules at bedtime, Disp: , Rfl:   •  HYDROcodone-acetaminophen (NORCO) 5-325 MG per tablet, Take 1 tablet by mouth Every 6 (Six) Hours As Needed for moderate pain (4-6) (LELG AND FEET PAIN)., Disp: 40 tablet, Rfl: 0  •  isosorbide mononitrate (IMDUR) 60 MG 24 hr tablet, Take 1 tablet by mouth daily, Disp: , Rfl:   •  metoprolol  succinate XL (TOPROL XL) 50 MG 24 hr tablet, Take 25 mg by mouth Daily., Disp: , Rfl:   •  nitroglycerin (NITROLINGUAL) 0.4 MG/SPRAY spray, Place 1 spray under the tongue every 5 minutes as needed for Chest pain, Disp: , Rfl:   •  oxybutynin XL (DITROPAN-XL) 5 MG 24 hr tablet, Take 1 tablet by mouth Daily., Disp: 90 tablet, Rfl: 3  •  tamsulosin (FLOMAX) 0.4 MG capsule 24 hr capsule, Take 1 capsule by mouth Daily., Disp: , Rfl:   •  tiotropium (SPIRIVA HANDIHALER) 18 MCG per inhalation capsule, Inhale 18 mcg into the lungs daily., Disp: , Rfl:   •  topiramate (TOPAMAX) 100 MG tablet, Take 125 mg by mouth Daily. TAKES 125 MG DAILY DIVIDED OVER SEVERAL DOSES PER DAY, Disp: , Rfl:     Past Medical History:   Diagnosis Date   • Arthritis    • COPD (chronic obstructive pulmonary disease) (CMS/HCC)    • Coronary artery disease    • Enlarged prostate    • Full dentures    • GERD (gastroesophageal reflux disease)    • Hearing loss    • Heart abnormality    • Hypertension    • Lung nodule     3 IN 1 LUNG AND 4 IN THE OTHER AND STATES IT IS SLOW GROWING   • Neuropathic pain    • Sleep apnea     DOES NOT WEAR C PAP       Past Surgical History:   Procedure Laterality Date   • APPENDECTOMY     • CATARACT EXTRACTION     • CYSTOSCOPY TRANSURETHRAL RESECTION OF PROSTATE N/A 11/8/2016    Procedure: CYSTOSCOPY TRANSURETHRAL RESECTION OF PROSTATE;  Surgeon: Brad Bal MD;  Location: [facility] OR;  Service:    • SINUS SURGERY     • THYROIDECTOMY         Social History     Socioeconomic History   • Marital status:    Tobacco Use   • Smoking status: Current Some Day Smoker     Packs/day: 0.25     Types: Cigars   • Smokeless tobacco: Never Used   Substance and Sexual Activity   • Alcohol use: Yes     Alcohol/week: 1.0 standard drink     Types: 1 Cans of beer per week   • Drug use: No   • Sexual activity: Defer       Family History   Problem Relation Age of Onset   • No Known Problems Father    • No Known Problems Mother         Objective    There were no vitals taken for this visit.    Physical Exam  Genitourinary:     Penis: Normal.       Testes: Normal.             Results for orders placed or performed in visit on 12/06/21   POC Urinalysis Dipstick, Multipro    Specimen: Urine   Result Value Ref Range    Color Yellow Yellow, Straw, Dark Yellow, Bing    Clarity, UA Clear Clear    Glucose, UA Negative Negative, 1000 mg/dL (3+) mg/dL    Bilirubin Negative Negative    Ketones, UA Negative Negative    Specific Gravity  1.030 1.005 - 1.030    Blood, UA Trace (A) Negative    pH, Urine 5.5 5.0 - 8.0    Protein, POC Negative Negative mg/dL    Urobilinogen, UA Normal Normal    Nitrite, UA Negative Negative    Leukocytes Negative Negative     Assessment and Plan    Diagnoses and all orders for this visit:    1. Bladder outlet obstruction (Primary)  -     POC Urinalysis Dipstick, Multipro      Cystoscopy in the office today shows some remaining tissue on the right however his prostatic fossa is wide open.  I do not see any tissue that is obstructing.  I really do not think he benefit from TURP under try him on Myrbetriq for 3 months and then reevaluate him.  Discussion of this resulted in significant evaluation management in addition to the cystoscopy performed today.        Pre- operative diagnosis:  Benign prostatic hypertrophy    Post operative diagnosis:  Same    Procedure:  The patient was prepped and draped in a normal sterile fashion.  The urethra was anesthetized with 2% lidocaine jelly.  A flexible cystoscope was introduced per urethra.      Urethra:  Normal    Bladder:  mild trabeculation    Ureteral orifices:  Normal position bilaterally and Clear efflux bilaterally    Prostate:  Well resected prostatic urethra     Patient tolerated the procedure well    Complications: none    Blood loss: minimal    Follow up:    Myrbetriq

## 2022-01-31 ENCOUNTER — TELEPHONE (OUTPATIENT)
Dept: CARDIOLOGY CLINIC | Age: 72
End: 2022-01-31

## 2022-01-31 NOTE — TELEPHONE ENCOUNTER
Date: 3-4-22    Cardiologist: Dr. Xavi Post    Procedure: Right THR    Surgeon: Dr. Christine Carranza    Last Office Visit: 11-4-21    Reason for office visit and medical concerns addressed at this office visit: HTN, CAD, Hyperlipidemia, CHF, CKD, DM, PVD    Testing Performed and Date of Service:  EKG 11-17-20      RCRI = 2 pts, elevated, 6.6%   METs 3    Current Medications: ranexa, flomax, nitro, toprol xl, imdur, neurontin, nexium, ASA, topamax, proair, spiriva    Is the patient currently taking an anticoagulant? If so, what is the diagnosis the patient has been given to warrant the need for the anticoagulant?  ASA    Additional Notes: Cardiac Risk Request

## 2022-02-10 ENCOUNTER — HOSPITAL ENCOUNTER (OUTPATIENT)
Dept: GENERAL RADIOLOGY | Age: 72
Discharge: HOME | End: 2022-02-10
Payer: OTHER GOVERNMENT

## 2022-02-10 ENCOUNTER — HOSPITAL ENCOUNTER (OUTPATIENT)
Dept: PREADMISSION TESTING | Age: 72
Discharge: HOME | End: 2022-02-14
Payer: OTHER GOVERNMENT

## 2022-02-10 VITALS — BODY MASS INDEX: 28.88 KG/M2 | HEIGHT: 74 IN | WEIGHT: 225 LBS

## 2022-02-10 LAB
ABO/RH: NORMAL
ALBUMIN SERPL-MCNC: 3.7 G/DL (ref 3.5–5.2)
ALP BLD-CCNC: 78 U/L (ref 40–130)
ALT SERPL-CCNC: 10 U/L (ref 5–41)
ANION GAP SERPL CALCULATED.3IONS-SCNC: 14 MMOL/L (ref 7–19)
ANTIBODY SCREEN: NORMAL
APTT: 31.5 SEC (ref 26–36.2)
AST SERPL-CCNC: 19 U/L (ref 5–40)
BASOPHILS ABSOLUTE: 0 K/UL (ref 0–0.2)
BASOPHILS RELATIVE PERCENT: 0.8 % (ref 0–1)
BILIRUB SERPL-MCNC: <0.2 MG/DL (ref 0.2–1.2)
BUN BLDV-MCNC: 19 MG/DL (ref 8–23)
CALCIUM SERPL-MCNC: 8.7 MG/DL (ref 8.8–10.2)
CHLORIDE BLD-SCNC: 103 MMOL/L (ref 98–111)
CO2: 20 MMOL/L (ref 22–29)
CREAT SERPL-MCNC: 0.8 MG/DL (ref 0.5–1.2)
EOSINOPHILS ABSOLUTE: 0.1 K/UL (ref 0–0.6)
EOSINOPHILS RELATIVE PERCENT: 2.4 % (ref 0–5)
GFR AFRICAN AMERICAN: >59
GFR NON-AFRICAN AMERICAN: >60
GLUCOSE BLD-MCNC: 109 MG/DL (ref 74–109)
HCT VFR BLD CALC: 38.2 % (ref 42–52)
HEMOGLOBIN: 12.1 G/DL (ref 14–18)
IMMATURE GRANULOCYTES #: 0 K/UL
INR BLD: 0.95 (ref 0.88–1.18)
LYMPHOCYTES ABSOLUTE: 1.5 K/UL (ref 1.1–4.5)
LYMPHOCYTES RELATIVE PERCENT: 29.4 % (ref 20–40)
MCH RBC QN AUTO: 31 PG (ref 27–31)
MCHC RBC AUTO-ENTMCNC: 31.7 G/DL (ref 33–37)
MCV RBC AUTO: 97.9 FL (ref 80–94)
MONOCYTES ABSOLUTE: 0.6 K/UL (ref 0–0.9)
MONOCYTES RELATIVE PERCENT: 11.3 % (ref 0–10)
MRSA SCREEN RT-PCR: NOT DETECTED
NEUTROPHILS ABSOLUTE: 2.8 K/UL (ref 1.5–7.5)
NEUTROPHILS RELATIVE PERCENT: 55.7 % (ref 50–65)
PDW BLD-RTO: 13.3 % (ref 11.5–14.5)
PLATELET # BLD: 271 K/UL (ref 130–400)
PMV BLD AUTO: 10.6 FL (ref 9.4–12.4)
POTASSIUM SERPL-SCNC: 4 MMOL/L (ref 3.5–5)
PROTHROMBIN TIME: 12.9 SEC (ref 12–14.6)
RBC # BLD: 3.9 M/UL (ref 4.7–6.1)
SODIUM BLD-SCNC: 137 MMOL/L (ref 136–145)
TOTAL PROTEIN: 5.9 G/DL (ref 6.6–8.7)
WBC # BLD: 4.9 K/UL (ref 4.8–10.8)

## 2022-02-10 PROCEDURE — 86850 RBC ANTIBODY SCREEN: CPT

## 2022-02-10 PROCEDURE — 85025 COMPLETE CBC W/AUTO DIFF WBC: CPT

## 2022-02-10 PROCEDURE — 86901 BLOOD TYPING SEROLOGIC RH(D): CPT

## 2022-02-10 PROCEDURE — 87086 URINE CULTURE/COLONY COUNT: CPT

## 2022-02-10 PROCEDURE — 80053 COMPREHEN METABOLIC PANEL: CPT

## 2022-02-10 PROCEDURE — 71046 X-RAY EXAM CHEST 2 VIEWS: CPT

## 2022-02-10 PROCEDURE — 85730 THROMBOPLASTIN TIME PARTIAL: CPT

## 2022-02-10 PROCEDURE — 87641 MR-STAPH DNA AMP PROBE: CPT

## 2022-02-10 PROCEDURE — 86900 BLOOD TYPING SEROLOGIC ABO: CPT

## 2022-02-10 PROCEDURE — 93005 ELECTROCARDIOGRAM TRACING: CPT | Performed by: ORTHOPAEDIC SURGERY

## 2022-02-10 PROCEDURE — 85610 PROTHROMBIN TIME: CPT

## 2022-02-10 RX ORDER — PREGABALIN 75 MG/1
75 CAPSULE ORAL ONCE
Status: CANCELLED | OUTPATIENT
Start: 2022-03-04

## 2022-02-10 RX ORDER — ACETAMINOPHEN 500 MG
1000 TABLET ORAL ONCE
Status: CANCELLED | OUTPATIENT
Start: 2022-03-04

## 2022-02-10 RX ORDER — OXYCODONE HCL 10 MG/1
10 TABLET, FILM COATED, EXTENDED RELEASE ORAL ONCE
Status: CANCELLED | OUTPATIENT
Start: 2022-03-04

## 2022-02-10 RX ORDER — PRIMIDONE 50 MG/1
50 TABLET ORAL NIGHTLY
COMMUNITY

## 2022-02-10 RX ORDER — DEXAMETHASONE SODIUM PHOSPHATE 10 MG/ML
10 INJECTION, SOLUTION INTRAMUSCULAR; INTRAVENOUS ONCE
Status: CANCELLED | OUTPATIENT
Start: 2022-03-04

## 2022-02-11 LAB
EKG P AXIS: 32 DEGREES
EKG P-R INTERVAL: 114 MS
EKG Q-T INTERVAL: 436 MS
EKG QRS DURATION: 122 MS
EKG QTC CALCULATION (BAZETT): 439 MS
EKG T AXIS: 38 DEGREES

## 2022-02-12 LAB — URINE CULTURE, ROUTINE: NORMAL

## 2022-02-22 ENCOUNTER — OFFICE VISIT (OUTPATIENT)
Dept: CARDIOLOGY CLINIC | Age: 72
End: 2022-02-22
Payer: MEDICARE

## 2022-02-22 VITALS
SYSTOLIC BLOOD PRESSURE: 106 MMHG | HEIGHT: 74 IN | HEART RATE: 97 BPM | BODY MASS INDEX: 28.88 KG/M2 | DIASTOLIC BLOOD PRESSURE: 66 MMHG | WEIGHT: 225 LBS | OXYGEN SATURATION: 98 %

## 2022-02-22 DIAGNOSIS — E78.2 MIXED HYPERLIPIDEMIA: ICD-10-CM

## 2022-02-22 DIAGNOSIS — I10 ESSENTIAL HYPERTENSION: ICD-10-CM

## 2022-02-22 DIAGNOSIS — I25.10 MILD CAD: ICD-10-CM

## 2022-02-22 DIAGNOSIS — R94.31 ABNORMAL EKG: ICD-10-CM

## 2022-02-22 DIAGNOSIS — Z01.810 PREOPERATIVE CARDIOVASCULAR EXAMINATION: Primary | ICD-10-CM

## 2022-02-22 DIAGNOSIS — R07.9 CHEST PAIN AT REST: ICD-10-CM

## 2022-02-22 DIAGNOSIS — R07.89 NON-CARDIAC CHEST PAIN: ICD-10-CM

## 2022-02-22 PROCEDURE — 1036F TOBACCO NON-USER: CPT | Performed by: INTERNAL MEDICINE

## 2022-02-22 PROCEDURE — 99214 OFFICE O/P EST MOD 30 MIN: CPT | Performed by: INTERNAL MEDICINE

## 2022-02-22 PROCEDURE — 1123F ACP DISCUSS/DSCN MKR DOCD: CPT | Performed by: INTERNAL MEDICINE

## 2022-02-22 PROCEDURE — G8427 DOCREV CUR MEDS BY ELIG CLIN: HCPCS | Performed by: INTERNAL MEDICINE

## 2022-02-22 PROCEDURE — G8484 FLU IMMUNIZE NO ADMIN: HCPCS | Performed by: INTERNAL MEDICINE

## 2022-02-22 PROCEDURE — 4040F PNEUMOC VAC/ADMIN/RCVD: CPT | Performed by: INTERNAL MEDICINE

## 2022-02-22 PROCEDURE — 3017F COLORECTAL CA SCREEN DOC REV: CPT | Performed by: INTERNAL MEDICINE

## 2022-02-22 PROCEDURE — G8417 CALC BMI ABV UP PARAM F/U: HCPCS | Performed by: INTERNAL MEDICINE

## 2022-02-22 PROCEDURE — 93000 ELECTROCARDIOGRAM COMPLETE: CPT | Performed by: INTERNAL MEDICINE

## 2022-02-22 ASSESSMENT — ENCOUNTER SYMPTOMS
ANAL BLEEDING: 0
COUGH: 0
BLOOD IN STOOL: 0
SHORTNESS OF BREATH: 1

## 2022-02-22 NOTE — PROGRESS NOTES
Office Visit  Emily Taylor is a 73 y.o. male; who present today for Cardiac Clearance      HPI  I am seeing this 79-year-old white male in follow-up today and he needs clearance for general anesthesia and planned right hip surgery. He is followed for chest discomfort with previously demonstrated mild CAD and also hypertension. Fortunately, he did stop smoking about 1-1/2 months ago and he states that his breathing is better. He indicates today that he really has not been getting any chest discomfort at all. [facility] stopped his Ranexa quite some time ago which it seemed that he probably did not need. He gets occasional palpitations denies any presyncope or syncope. He has not had any neurologic episodes such as loss of vision, motor weakness or slurred speech and no history of stroke or TIA. He has not had any significant fever or febrile illness. He takes aspirin daily and there has been bleed or melena. Current Outpatient Medications   Medication Sig Dispense Refill    DICLOFENAC SODIUM PO Take 76 mg by mouth daily      primidone (MYSOLINE) 50 MG tablet Take 50 mg by mouth nightly 1/2 tablet at bedtime for tremors      fluticasone-salmeterol (ADVAIR DISKUS) 500-50 MCG/DOSE diskus inhaler Inhale 1 puff into the lungs every 12 hours Indications: uses prn 60 each 0    HYDROcodone-acetaminophen (NORCO)  MG per tablet Take 2 tablets by mouth every 6 hours as needed.  tamsulosin (FLOMAX) 0.4 MG capsule Take 1 capsule by mouth daily      nitroGLYCERIN (NITROSTAT) 0.4 MG SL tablet Place 0.4 mg under the tongue every 5 minutes as needed for Chest pain up to max of 3 total doses. If no relief after 1 dose, call 911.       metoprolol succinate (TOPROL XL) 25 MG extended release tablet TAKE ONE TABLET BY MOUTH 2 TIMES A DAY (Patient taking differently: Take 25 mg by mouth daily TAKE ONE TABLET BY MOUTH 2 TIMES A DAY) 180 tablet 3    isosorbide mononitrate (IMDUR) 30 MG extended release tablet Take 1 tablet by mouth nightly 90 tablet 3    gabapentin (NEURONTIN) 300 MG capsule Take 300 mg by mouth. Take 3 tablets twice daily       lidocaine (LIDODERM) 5 % Place 1 patch onto the skin daily 12 hours on, 12 hours off. 30 patch 0    esomeprazole (NEXIUM) 40 MG delayed release capsule Take 40 mg by mouth daily       aspirin 325 MG tablet Take 325 mg by mouth daily      topiramate (TOPAMAX) 100 MG tablet Take 100 mg by mouth daily       beclomethasone (QVAR) 80 MCG/ACT inhaler Inhale 1 puff into the lungs 2 times daily       albuterol (PROAIR HFA) 108 (90 BASE) MCG/ACT inhaler Inhale 2 puffs into the lungs every 4 hours as needed.  tiotropium (SPIRIVA HANDIHALER) 18 MCG inhalation capsule Inhale 18 mcg into the lungs daily. No current facility-administered medications for this visit. There are no discontinued medications.      Allergies   Allergen Reactions    Bactrim [Sulfamethoxazole-Trimethoprim] Nausea And Vomiting    Nsaids Anaphylaxis    Succinylcholine Other (See Comments)     Paralyze waist down     Betadine [Povidone Iodine] Hives       Past Medical History:   Diagnosis Date    Allergic rhinitis     Anemia     Asthma     CAD (coronary artery disease) 4/11/2014    Chest tightness, discomfort, or pressure 6/17/2013 6/17/2013  lexiscan Positive for inferior myocardial ischemia, EF 47%, 9% ischemic myocardium on stress, intermediate risk findings, AUC indication 16, AUC score 7     [facility]estive [facility] failure) [facility]     Chronic back pain     COPD [facility]     Ex-cigarette smoker 10/27/2015    Family history of early CAD 6/24/2013    Family history of premature CAD     GERD (gastroesophageal reflux disease)     History of hernia repair     Hyperlipidemia     [facility]    Hypertension     Multiple lung nodules     Neuropathy     Nicotine abuse     Nicotine abuse     Osteoarthritis     Peripheral vascular disease ([address]     Restless legs syndrome     SOB (shortness of breath)     Thyroid nodule     Unspecified sleep apnea     can't wear cpap     Negative - Past Medical History for  Past Medical History Pertinent Negatives:   Diagnosis Date Noted    ADHD (attention deficit hyperactivity disorder) 08/22/2017    Anticoagulant long-term use 08/22/2017    Anxiety 08/22/2017    Atrial fibrillation ([address] 08/22/2017    Bipolar disorder ([address] 08/22/2017    Cancer ([address] 01/25/2013    Carotid artery stenosis 08/22/2017    Cerebrovascular disease 08/22/2017    Chronic kidney disease 08/22/2017    Congenital heart disease 08/22/2017    Depression 08/22/2017    Emphysema of lung ([address] 01/25/2013    Erectile dysfunction 08/22/2017    Fibromyalgia 08/22/2017    Headache 08/22/2017    Hyperthyroidism 08/22/2017    Hypothyroidism 08/22/2017    Irritable bowel syndrome 08/22/2017    Liver disease 08/22/2017    Obesity 08/22/2017    Seizures ([address] 08/22/2017    Type 2 diabetes mellitus without complication ([address] 43/80/4363    Type II or unspecified type diabetes mellitus without mention of complication, not stated as uncontrolled 01/25/2013    Urinary incontinence 08/22/2017       Past Surgical History:   Procedure Laterality Date    APPENDECTOMY      BLADDER SURGERY      x 3    CARDIAC CATHETERIZATION  6/24/2013  JDT    EF 50%    CARDIAC CATHETERIZATION  10/27/15  JDT    EF 50%    CARDIAC CATHETERIZATION  05/2018    mild, non-obstructive CAD    COLONOSCOPY  20 yrs ago    not sure who, thinks @ [facility]    COLONOSCOPY  09/2014    TAx2, HP, intern hemorrhoids (3 yr)   [address]       with mesh-Dr Katya Mobley    TX COLSC FLX W/REMOVAL LESION BY HOT BX FORCEPS  09/21/2017    Dr Shelton-internal hemorrhoids, tubular AP (-) dysplasia--5 yr recall    TX EGD TRANSORAL BIOPSY SINGLE/MULTIPLE N/A 10/19/2017    Dr Benedict [place] (-) chronic nonspecific inflammation    PROSTATE SURGERY      THYROIDECTOMY      UPPER GASTROINTESTINAL ENDOSCOPY      thinks so, but a long time ago if so    UPPER GASTROINTESTINAL ENDOSCOPY  2013    Cat: Grade A esophagitis and esophageal stricture. Dilated 54fr    VASCULAR SURGERY  2017    [facility]. Right IJV US 9f sheath.  VASCULAR SURGERY Left 2017    [facility]-L GSV RFA     Social History     Occupational History    Not on file   Tobacco Use    Smoking status: Former Smoker     Types: Cigarettes     Quit date: 2022     Years since quittin.0    Smokeless tobacco: Never Used    Tobacco comment: some days he smokes, some days he doesn't   Vaping Use    Vaping Use: Never used   Substance and Sexual Activity    Alcohol use: No     Alcohol/week: 0.0 standard drinks    Drug use: No    Sexual activity: Not on file        Family History   Problem Relation Age of Onset    Cancer Mother         lung and brain    Coronary Art Dis Mother     Coronary Art Dis Sister     Colon Cancer Sister     Colon Polyps Sister     Coronary Art Dis Father     Colon Cancer Maternal Grandmother     Colon Polyps Maternal Grandmother     Colon Cancer Sister     Colon Polyps Sister     Cancer Sister         \"female cancer\"   [facility] Cancer Sister         thryoid    Coronary Art Dis Brother     Heart Failure Brother     Esophageal Cancer Neg Hx     Liver Cancer Neg Hx     Stomach Cancer Neg Hx        Review of Systems  Review of Systems   Constitutional: Negative for fatigue and fever. Respiratory: Positive for shortness of breath. Negative for cough. Cardiovascular: Positive for palpitations. Negative for chest pain and leg swelling. Gastrointestinal: Negative for anal bleeding and blood in stool. Neurological: Negative for syncope, facial asymmetry and speech difficulty.          Physical Exam  /66   Pulse 97   Ht 6' 2\" (1.88 m)   Wt 225 lb (102.1 kg)   SpO2 98%   BMI 28.89 kg/m² Physical Exam  Constitutional:       Appearance: Normal appearance. He is normal weight. Cardiovascular:      Rate and Rhythm: Normal rate and regular rhythm. Heart sounds: Normal heart sounds. No gallop. Pulmonary:      Effort: Pulmonary effort is normal.      Comments: Lungs clear, aeration fair  Abdominal:      General: Abdomen is flat. Bowel sounds are normal.      Palpations: Abdomen is soft. Musculoskeletal:         General: No swelling. Skin:     General: Skin is warm and dry. Neurological:      Mental Status: He is alert. Assessment/Plan    EKG Findings:  Junctional rhythm, 98 bpm, left axis deviation    Problem List Items Addressed This Visit        Cardiology Problems    Hyperlipidemia    Non-cardiac chest pain    Essential hypertension    Mild CAD    Chest pain at rest    Relevant Orders    NM MYOCARDIAL SPECT REST EXERCISE OR RX       Other    Preoperative cardiovascular examination - Primary    Abnormal EKG           Diagnosis Orders   1. Preoperative cardiovascular examination     2. Non-cardiac chest pain     3. Chest pain at rest  NM MYOCARDIAL SPECT REST EXERCISE OR RX    History of resolved   4. Mild CAD      Cath May 2018 with mild CAD. Reported diagonal by cath, 2013 not seen in 2018   5. Abnormal EKG      Junctional rhythm, no clinical significance   6. Essential hypertension     7. Mixed hyperlipidemia         Recommendations:   Diet: Low-sodium, low-fat  Activity: Moderate activities  Medication Changes: No medication change    Because of this patient's history as delineated above with what is probably a totally occluded diagonal and years ago I will proceed by getting a nuclear pharmacologic stress since he cannot walk well before clearing for surgery. He is noted to have junctional rhythm on his EKG today which appears to be of no clinical significance.   If it is normal or near normal I will clear him as appropriate and he will be cleared to hold aspirin for 5 days prior to surgery if required by the surgeon with recommendation to restart antiplatelet therapy as soon as it is felt safe postoperatively. No orders of the defined types were placed in this encounter. Orders Placed This Encounter   Procedures    NM MYOCARDIAL SPECT REST EXERCISE OR RX     Standing Status:   Future     Standing Expiration Date:   2/22/2023     Order Specific Question:   Reason for Exam?     Answer:   Chest pain     Order Specific Question:   Procedure Type     Answer:   Rx     Order Specific Question:   Reason for exam:     Answer:   chest pain    EKG 12 lead     Order Specific Question:   Reason for Exam?     Answer: Other       Return in about 6 months (around 8/22/2022) for me, routine.

## 2022-02-22 NOTE — PATIENT INSTRUCTIONS
[place] at the [facility] and [address] [facility] located on the first floor of [place] [address] through hospital main entrance and turn immediately to your left. Patient's contact number:  [phone] (home)      Spero Rosalba Stress Test  Date Thursday 24 10:30    Lexiscan (regadenoson injection) is a prescription drug given through an IV line that increases blood flow through the arteries of the heart during a cardiac nuclear stress test.     There are two parts to a Lexiscan stress test: the rest portion and the exercise portion. For the rest portion, a radioactive tracer is injected into your arm through the IV. After 30 to 60 minutes, the process of imaging will begin. A nuclear camera will be placed on your chest area and images are taken for the next 15 to 20 minutes. For the exercise portion, a nurse will attach EKG electrodes to your chest to monitor your heart rate. The drug Spero Rosalba is administered to simulate stress on the heart. Your heart rhythm will then be monitored for the next few minutes. Your blood pressure will also be monitored throughout the exercise portion. Bath through the exercise portion, a second round of radioactive tracer is injected into your body. Your heart rate and EKG will be monitored for another few minutes after administering the drug. Test Preparation:     Bring a list of your current medications. Do not take any of your medications the morning of the test, but bring all morning medications with you as you will take them after the stress portion of the test is completed.  Do not eat Bananas 12 hours prior to test.     No caffeine 12 hours prior to the testing. This includes: coffee, pop/soda, chocolate, cold medications, etc.  Any product that might contain caffeine.  No nicotine or alcohol 12 hours prior to your test.    Nothing to eat or drink 6-8 hours prior to appointment time.   It is okay to drink small amounts of water during the four hours prior to the test.   Nitroglycerin patches must be taken off 1 hour before testing.  Wear comfortable clothing.  Please refrain from any strenuous exercise or activities the day before your test, or the day of your test.   The Nuclear Lexiscan Stress test takes about 2 ½ to 3 hours to complete. If for any reason you are unable to keep this appointment, please contact Outpatient Scheduling, [phone], as soon as possible to reschedule.

## 2022-02-24 ENCOUNTER — TELEPHONE (OUTPATIENT)
Dept: CARDIOLOGY CLINIC | Age: 72
End: 2022-02-24

## 2022-02-24 NOTE — TELEPHONE ENCOUNTER
Spoke with the Pt's wife, and stated that per Dr. Florentin Mchugh he will not clear the Pt before he has his stress test for the safety of the Pt. Pt's wife was very upset, and I informed the the PT's wife that our office has sent to the [place] to auth but our office is still waiting to hear from them. Cleveland Saha stated that she had just called the [place] and they stated that his Junior Pinna would be covered through Dr. Richa Platt and to do it under that. I stated that I would reach out to Sydnie Martínez, who helps  With the pre-authorization, and if we could go through [address] [place] [address] we could call back to help get the PT to scheduling department.

## 2022-02-24 NOTE — TELEPHONE ENCOUNTER
Called and spoke with pt wife, Autumn Rich and explained to her that the [place] for Dr. Elisabeth Jaffe would not cover a Nuclear Med test, the auth is ONLY for Chest X-Ray; EKG; Office Visit; and ECHO. Pt wife stated that we were to just schedule with Medicare and she did not want to wait on a [place] auth to be processed for Mr. Zenobia Rueda to have his Cardiac testing performed.   I have noted this in the appt note and transferred Autumn Rich to scheduling to r/s pt Tanya

## 2022-03-01 ENCOUNTER — HOSPITAL ENCOUNTER (OUTPATIENT)
Dept: PREADMISSION TESTING | Age: 72
Discharge: HOME | End: 2022-03-05
Payer: OTHER GOVERNMENT

## 2022-03-01 LAB — SARS-COV-2, PCR: NOT DETECTED

## 2022-03-01 PROCEDURE — U0003 INFECTIOUS AGENT DETECTION BY NUCLEIC ACID (DNA OR RNA); SEVERE ACUTE RESPIRATORY SYNDROME CORONAVIRUS 2 (SARS-COV-2) (CORONAVIRUS DISEASE [COVID-19]), AMPLIFIED PROBE TECHNIQUE, MAKING USE OF HIGH THROUGHPUT TECHNOLOGIES AS DESCRIBED BY CMS-2020-01-R: HCPCS

## 2022-03-01 PROCEDURE — U0005 INFEC AGEN DETEC AMPLI PROBE: HCPCS

## 2022-03-02 ENCOUNTER — HOSPITAL ENCOUNTER (OUTPATIENT)
Dept: NUCLEAR MEDICINE | Age: 72
Discharge: HOME | End: 2022-03-04
Payer: OTHER GOVERNMENT

## 2022-03-02 DIAGNOSIS — R07.9 CHEST PAIN AT REST: ICD-10-CM

## 2022-03-02 PROCEDURE — A9502 TC99M TETROFOSMIN: HCPCS | Performed by: INTERNAL MEDICINE

## 2022-03-02 PROCEDURE — 78452 HT MUSCLE IMAGE SPECT MULT: CPT

## 2022-03-02 PROCEDURE — 3430000000 HC RX DIAGNOSTIC RADIOPHARMACEUTICAL: Performed by: INTERNAL MEDICINE

## 2022-03-02 PROCEDURE — 6360000002 HC RX W HCPCS: Performed by: INTERNAL MEDICINE

## 2022-03-02 RX ADMIN — REGADENOSON 0.4 MG: 0.08 INJECTION, SOLUTION INTRAVENOUS at 09:56

## 2022-03-02 RX ADMIN — TETROFOSMIN 10 MILLICURIE: 1.38 INJECTION, POWDER, LYOPHILIZED, FOR SOLUTION INTRAVENOUS at 13:05

## 2022-03-02 RX ADMIN — TETROFOSMIN 24 MILLICURIE: 1.38 INJECTION, POWDER, LYOPHILIZED, FOR SOLUTION INTRAVENOUS at 13:06

## 2022-03-03 ENCOUNTER — TELEPHONE (OUTPATIENT)
Dept: CARDIOLOGY CLINIC | Age: 72
End: 2022-03-03

## 2022-03-03 LAB
LV EF: 40 %
LVEF MODALITY: NORMAL

## 2022-03-03 NOTE — TELEPHONE ENCOUNTER
Lacey Bateman from Dr. Dione Duffy office is requesting for Chad to return her call in regards to patients surgery for tomorrow. Please return call as soon as possible.     Ph: [phone]  Ext. [phone]

## 2022-03-04 ENCOUNTER — APPOINTMENT (OUTPATIENT)
Dept: GENERAL RADIOLOGY | Age: 72
End: 2022-03-04
Attending: ORTHOPAEDIC SURGERY
Payer: OTHER GOVERNMENT

## 2022-03-04 ENCOUNTER — ANESTHESIA EVENT (OUTPATIENT)
Dept: OPERATING ROOM | Age: 72
End: 2022-03-04
Payer: OTHER GOVERNMENT

## 2022-03-04 ENCOUNTER — HOSPITAL ENCOUNTER (OUTPATIENT)
Age: 72
Setting detail: OBSERVATION
Discharge: HOME HEALTH | End: 2022-03-05
Attending: ORTHOPAEDIC SURGERY | Admitting: ORTHOPAEDIC SURGERY
Payer: OTHER GOVERNMENT

## 2022-03-04 ENCOUNTER — ANESTHESIA (OUTPATIENT)
Dept: OPERATING ROOM | Age: 72
End: 2022-03-04
Payer: OTHER GOVERNMENT

## 2022-03-04 VITALS — SYSTOLIC BLOOD PRESSURE: 82 MMHG | OXYGEN SATURATION: 99 % | TEMPERATURE: 95 F | DIASTOLIC BLOOD PRESSURE: 49 MMHG

## 2022-03-04 DIAGNOSIS — M16.11 PRIMARY OSTEOARTHRITIS OF RIGHT HIP: Primary | ICD-10-CM

## 2022-03-04 PROBLEM — M16.9 DEGENERATIVE JOINT DISEASE (DJD) OF HIP: Status: ACTIVE | Noted: 2022-03-04

## 2022-03-04 LAB
ABO/RH: NORMAL
ANTIBODY SCREEN: NORMAL

## 2022-03-04 PROCEDURE — 88311 DECALCIFY TISSUE: CPT

## 2022-03-04 PROCEDURE — 86850 RBC ANTIBODY SCREEN: CPT

## 2022-03-04 PROCEDURE — 2580000003 HC RX 258: Performed by: ORTHOPAEDIC SURGERY

## 2022-03-04 PROCEDURE — 7100000001 HC PACU RECOVERY - ADDTL 15 MIN: Performed by: ORTHOPAEDIC SURGERY

## 2022-03-04 PROCEDURE — 2709999900 HC NON-CHARGEABLE SUPPLY: Performed by: ORTHOPAEDIC SURGERY

## 2022-03-04 PROCEDURE — 94640 AIRWAY INHALATION TREATMENT: CPT

## 2022-03-04 PROCEDURE — 6360000002 HC RX W HCPCS: Performed by: ORTHOPAEDIC SURGERY

## 2022-03-04 PROCEDURE — 36415 COLL VENOUS BLD VENIPUNCTURE: CPT

## 2022-03-04 PROCEDURE — 86901 BLOOD TYPING SEROLOGIC RH(D): CPT

## 2022-03-04 PROCEDURE — 73502 X-RAY EXAM HIP UNI 2-3 VIEWS: CPT

## 2022-03-04 PROCEDURE — C1776 JOINT DEVICE (IMPLANTABLE): HCPCS | Performed by: ORTHOPAEDIC SURGERY

## 2022-03-04 PROCEDURE — C1713 ANCHOR/SCREW BN/BN,TIS/BN: HCPCS | Performed by: ORTHOPAEDIC SURGERY

## 2022-03-04 PROCEDURE — 2500000003 HC RX 250 WO HCPCS: Performed by: NURSE ANESTHETIST, CERTIFIED REGISTERED

## 2022-03-04 PROCEDURE — 3600000015 HC SURGERY LEVEL 5 ADDTL 15MIN: Performed by: ORTHOPAEDIC SURGERY

## 2022-03-04 PROCEDURE — 6370000000 HC RX 637 (ALT 250 FOR IP): Performed by: ORTHOPAEDIC SURGERY

## 2022-03-04 PROCEDURE — 6360000002 HC RX W HCPCS: Performed by: NURSE ANESTHETIST, CERTIFIED REGISTERED

## 2022-03-04 PROCEDURE — 88304 TISSUE EXAM BY PATHOLOGIST: CPT

## 2022-03-04 PROCEDURE — 2500000003 HC RX 250 WO HCPCS: Performed by: ORTHOPAEDIC SURGERY

## 2022-03-04 PROCEDURE — P9045 ALBUMIN (HUMAN), 5%, 250 ML: HCPCS | Performed by: NURSE ANESTHETIST, CERTIFIED REGISTERED

## 2022-03-04 PROCEDURE — 7100000000 HC PACU RECOVERY - FIRST 15 MIN: Performed by: ORTHOPAEDIC SURGERY

## 2022-03-04 PROCEDURE — 3600000005 HC SURGERY LEVEL 5 BASE: Performed by: ORTHOPAEDIC SURGERY

## 2022-03-04 PROCEDURE — 3700000000 HC ANESTHESIA ATTENDED CARE: Performed by: ORTHOPAEDIC SURGERY

## 2022-03-04 PROCEDURE — G0378 HOSPITAL OBSERVATION PER HR: HCPCS

## 2022-03-04 PROCEDURE — 3209999900 FLUORO FOR SURGICAL PROCEDURES

## 2022-03-04 PROCEDURE — 2720000010 HC SURG SUPPLY STERILE: Performed by: ORTHOPAEDIC SURGERY

## 2022-03-04 PROCEDURE — 86900 BLOOD TYPING SEROLOGIC ABO: CPT

## 2022-03-04 PROCEDURE — 2580000003 HC RX 258: Performed by: NURSE ANESTHETIST, CERTIFIED REGISTERED

## 2022-03-04 PROCEDURE — 3700000001 HC ADD 15 MINUTES (ANESTHESIA): Performed by: ORTHOPAEDIC SURGERY

## 2022-03-04 PROCEDURE — C9290 INJ, BUPIVACAINE LIPOSOME: HCPCS | Performed by: ORTHOPAEDIC SURGERY

## 2022-03-04 DEVICE — SCREW BNE L25MM DIA6.5MM CANC HIP S STL GRIPTION FULL THRD: Type: IMPLANTABLE DEVICE | Site: HIP | Status: FUNCTIONAL

## 2022-03-04 DEVICE — STEM FEM SZ 10 HI OFFSET CLLRD 12/14 TAPR ACTIS ARTC EZ: Type: IMPLANTABLE DEVICE | Site: HIP | Status: FUNCTIONAL

## 2022-03-04 DEVICE — HEAD FEM DIA36MM +5MM OFFSET 12/14 TAPR HIP CERAMIC BIOLOX: Type: IMPLANTABLE DEVICE | Site: HIP | Status: FUNCTIONAL

## 2022-03-04 DEVICE — CUP ACET DIA58MM 12 H HIP TI GRIPTION VIP TAPR DOME REV: Type: IMPLANTABLE DEVICE | Site: HIP | Status: FUNCTIONAL

## 2022-03-04 DEVICE — LINER ACET OD58MM ID36MM HIP ALTRX PINN: Type: IMPLANTABLE DEVICE | Site: HIP | Status: FUNCTIONAL

## 2022-03-04 RX ORDER — BUPIVACAINE HYDROCHLORIDE 2.5 MG/ML
INJECTION, SOLUTION INFILTRATION; PERINEURAL PRN
Status: DISCONTINUED | OUTPATIENT
Start: 2022-03-04 | End: 2022-03-04 | Stop reason: ALTCHOICE

## 2022-03-04 RX ORDER — DIPHENHYDRAMINE HCL 25 MG
25 TABLET ORAL EVERY 6 HOURS PRN
Status: DISCONTINUED | OUTPATIENT
Start: 2022-03-04 | End: 2022-03-05 | Stop reason: HOSPADM

## 2022-03-04 RX ORDER — ALBUMIN, HUMAN INJ 5% 5 %
SOLUTION INTRAVENOUS PRN
Status: DISCONTINUED | OUTPATIENT
Start: 2022-03-04 | End: 2022-03-04 | Stop reason: SDUPTHER

## 2022-03-04 RX ORDER — ALBUTEROL SULFATE 2.5 MG/3ML
2.5 SOLUTION RESPIRATORY (INHALATION) EVERY 6 HOURS PRN
Status: DISCONTINUED | OUTPATIENT
Start: 2022-03-04 | End: 2022-03-05 | Stop reason: HOSPADM

## 2022-03-04 RX ORDER — ESOMEPRAZOLE MAGNESIUM 40 MG/1
40 CAPSULE, DELAYED RELEASE ORAL DAILY
Status: DISCONTINUED | OUTPATIENT
Start: 2022-03-04 | End: 2022-03-04 | Stop reason: CLARIF

## 2022-03-04 RX ORDER — NITROGLYCERIN 0.4 MG/1
0.4 TABLET SUBLINGUAL EVERY 5 MIN PRN
Status: DISCONTINUED | OUTPATIENT
Start: 2022-03-04 | End: 2022-03-05 | Stop reason: HOSPADM

## 2022-03-04 RX ORDER — HYDROMORPHONE HYDROCHLORIDE 1 MG/ML
1 INJECTION, SOLUTION INTRAMUSCULAR; INTRAVENOUS; SUBCUTANEOUS
Status: DISCONTINUED | OUTPATIENT
Start: 2022-03-04 | End: 2022-03-05 | Stop reason: HOSPADM

## 2022-03-04 RX ORDER — DEXAMETHASONE SODIUM PHOSPHATE 10 MG/ML
INJECTION, SOLUTION INTRAMUSCULAR; INTRAVENOUS PRN
Status: DISCONTINUED | OUTPATIENT
Start: 2022-03-04 | End: 2022-03-04 | Stop reason: SDUPTHER

## 2022-03-04 RX ORDER — PREGABALIN 75 MG/1
75 CAPSULE ORAL ONCE
Status: COMPLETED | OUTPATIENT
Start: 2022-03-04 | End: 2022-03-04

## 2022-03-04 RX ORDER — OXYCODONE HYDROCHLORIDE 10 MG/1
10 TABLET ORAL SEE ADMIN INSTRUCTIONS
Qty: 90 TABLET | Refills: 0 | Status: SHIPPED | OUTPATIENT
Start: 2022-03-04 | End: 2022-04-04

## 2022-03-04 RX ORDER — TAMSULOSIN HYDROCHLORIDE 0.4 MG/1
0.4 CAPSULE ORAL DAILY
Status: DISCONTINUED | OUTPATIENT
Start: 2022-03-04 | End: 2022-03-05 | Stop reason: HOSPADM

## 2022-03-04 RX ORDER — ASPIRIN 325 MG
325 TABLET ORAL DAILY
Status: DISCONTINUED | OUTPATIENT
Start: 2022-03-04 | End: 2022-03-05 | Stop reason: HOSPADM

## 2022-03-04 RX ORDER — SODIUM CHLORIDE 0.9 % (FLUSH) 0.9 %
5-40 SYRINGE (ML) INJECTION PRN
Status: DISCONTINUED | OUTPATIENT
Start: 2022-03-04 | End: 2022-03-04 | Stop reason: HOSPADM

## 2022-03-04 RX ORDER — SODIUM CHLORIDE, SODIUM LACTATE, POTASSIUM CHLORIDE, CALCIUM CHLORIDE 600; 310; 30; 20 MG/100ML; MG/100ML; MG/100ML; MG/100ML
INJECTION, SOLUTION INTRAVENOUS CONTINUOUS PRN
Status: DISCONTINUED | OUTPATIENT
Start: 2022-03-04 | End: 2022-03-04 | Stop reason: SDUPTHER

## 2022-03-04 RX ORDER — METOCLOPRAMIDE HYDROCHLORIDE 5 MG/ML
10 INJECTION INTRAMUSCULAR; INTRAVENOUS
Status: DISCONTINUED | OUTPATIENT
Start: 2022-03-04 | End: 2022-03-04 | Stop reason: HOSPADM

## 2022-03-04 RX ORDER — ONDANSETRON 2 MG/ML
INJECTION INTRAMUSCULAR; INTRAVENOUS PRN
Status: DISCONTINUED | OUTPATIENT
Start: 2022-03-04 | End: 2022-03-04 | Stop reason: SDUPTHER

## 2022-03-04 RX ORDER — SODIUM CHLORIDE 9 MG/ML
INJECTION, SOLUTION INTRAVENOUS CONTINUOUS
Status: DISCONTINUED | OUTPATIENT
Start: 2022-03-04 | End: 2022-03-05 | Stop reason: HOSPADM

## 2022-03-04 RX ORDER — ARFORMOTEROL TARTRATE 15 UG/2ML
15 SOLUTION RESPIRATORY (INHALATION) 2 TIMES DAILY
Status: DISCONTINUED | OUTPATIENT
Start: 2022-03-04 | End: 2022-03-05 | Stop reason: HOSPADM

## 2022-03-04 RX ORDER — 0.9 % SODIUM CHLORIDE 0.9 %
500 INTRAVENOUS SOLUTION INTRAVENOUS PRN
Status: DISCONTINUED | OUTPATIENT
Start: 2022-03-04 | End: 2022-03-05 | Stop reason: HOSPADM

## 2022-03-04 RX ORDER — HYDROMORPHONE HYDROCHLORIDE 1 MG/ML
0.25 INJECTION, SOLUTION INTRAMUSCULAR; INTRAVENOUS; SUBCUTANEOUS EVERY 5 MIN PRN
Status: DISCONTINUED | OUTPATIENT
Start: 2022-03-04 | End: 2022-03-04 | Stop reason: HOSPADM

## 2022-03-04 RX ORDER — HYDROMORPHONE HYDROCHLORIDE 1 MG/ML
0.5 INJECTION, SOLUTION INTRAMUSCULAR; INTRAVENOUS; SUBCUTANEOUS
Status: DISCONTINUED | OUTPATIENT
Start: 2022-03-04 | End: 2022-03-05 | Stop reason: HOSPADM

## 2022-03-04 RX ORDER — HYDROMORPHONE HYDROCHLORIDE 1 MG/ML
2 INJECTION, SOLUTION INTRAMUSCULAR; INTRAVENOUS; SUBCUTANEOUS
Status: DISCONTINUED | OUTPATIENT
Start: 2022-03-04 | End: 2022-03-05 | Stop reason: HOSPADM

## 2022-03-04 RX ORDER — HYDROMORPHONE HYDROCHLORIDE 1 MG/ML
0.5 INJECTION, SOLUTION INTRAMUSCULAR; INTRAVENOUS; SUBCUTANEOUS EVERY 5 MIN PRN
Status: DISCONTINUED | OUTPATIENT
Start: 2022-03-04 | End: 2022-03-04 | Stop reason: HOSPADM

## 2022-03-04 RX ORDER — ONDANSETRON 4 MG/1
4 TABLET, ORALLY DISINTEGRATING ORAL EVERY 8 HOURS PRN
Status: DISCONTINUED | OUTPATIENT
Start: 2022-03-04 | End: 2022-03-05 | Stop reason: HOSPADM

## 2022-03-04 RX ORDER — ACETAMINOPHEN 500 MG
1000 TABLET ORAL ONCE
Status: COMPLETED | OUTPATIENT
Start: 2022-03-04 | End: 2022-03-04

## 2022-03-04 RX ORDER — DIPHENHYDRAMINE HYDROCHLORIDE 50 MG/ML
12.5 INJECTION INTRAMUSCULAR; INTRAVENOUS
Status: DISCONTINUED | OUTPATIENT
Start: 2022-03-04 | End: 2022-03-04 | Stop reason: HOSPADM

## 2022-03-04 RX ORDER — OXYCODONE HYDROCHLORIDE 5 MG/1
10 TABLET ORAL EVERY 4 HOURS PRN
Status: DISCONTINUED | OUTPATIENT
Start: 2022-03-04 | End: 2022-03-05 | Stop reason: HOSPADM

## 2022-03-04 RX ORDER — ROCURONIUM BROMIDE 10 MG/ML
INJECTION, SOLUTION INTRAVENOUS PRN
Status: DISCONTINUED | OUTPATIENT
Start: 2022-03-04 | End: 2022-03-04 | Stop reason: SDUPTHER

## 2022-03-04 RX ORDER — ONDANSETRON 2 MG/ML
4 INJECTION INTRAMUSCULAR; INTRAVENOUS EVERY 6 HOURS PRN
Status: DISCONTINUED | OUTPATIENT
Start: 2022-03-04 | End: 2022-03-05 | Stop reason: HOSPADM

## 2022-03-04 RX ORDER — CLINDAMYCIN PHOSPHATE 900 MG/50ML
900 INJECTION INTRAVENOUS EVERY 8 HOURS
Status: DISCONTINUED | OUTPATIENT
Start: 2022-03-04 | End: 2022-03-05 | Stop reason: HOSPADM

## 2022-03-04 RX ORDER — METOPROLOL SUCCINATE 25 MG/1
25 TABLET, EXTENDED RELEASE ORAL DAILY
Status: DISCONTINUED | OUTPATIENT
Start: 2022-03-04 | End: 2022-03-05 | Stop reason: HOSPADM

## 2022-03-04 RX ORDER — ALBUTEROL SULFATE 90 UG/1
2 AEROSOL, METERED RESPIRATORY (INHALATION) EVERY 6 HOURS PRN
Status: DISCONTINUED | OUTPATIENT
Start: 2022-03-04 | End: 2022-03-04 | Stop reason: CLARIF

## 2022-03-04 RX ORDER — OXYCODONE HYDROCHLORIDE 5 MG/1
20 TABLET ORAL EVERY 4 HOURS PRN
Status: DISCONTINUED | OUTPATIENT
Start: 2022-03-04 | End: 2022-03-05 | Stop reason: HOSPADM

## 2022-03-04 RX ORDER — TOPIRAMATE 100 MG/1
100 TABLET, FILM COATED ORAL DAILY
Status: DISCONTINUED | OUTPATIENT
Start: 2022-03-04 | End: 2022-03-05 | Stop reason: HOSPADM

## 2022-03-04 RX ORDER — MIDAZOLAM HYDROCHLORIDE 1 MG/ML
INJECTION INTRAMUSCULAR; INTRAVENOUS PRN
Status: DISCONTINUED | OUTPATIENT
Start: 2022-03-04 | End: 2022-03-04 | Stop reason: SDUPTHER

## 2022-03-04 RX ORDER — OXYCODONE HYDROCHLORIDE 5 MG/1
5 TABLET ORAL EVERY 4 HOURS PRN
Status: DISCONTINUED | OUTPATIENT
Start: 2022-03-04 | End: 2022-03-05 | Stop reason: HOSPADM

## 2022-03-04 RX ORDER — LIDOCAINE HYDROCHLORIDE 10 MG/ML
INJECTION, SOLUTION INFILTRATION; PERINEURAL PRN
Status: DISCONTINUED | OUTPATIENT
Start: 2022-03-04 | End: 2022-03-04 | Stop reason: SDUPTHER

## 2022-03-04 RX ORDER — PRIMIDONE 50 MG/1
50 TABLET ORAL NIGHTLY
Status: DISCONTINUED | OUTPATIENT
Start: 2022-03-04 | End: 2022-03-05 | Stop reason: HOSPADM

## 2022-03-04 RX ORDER — TRANEXAMIC ACID 100 MG/ML
INJECTION, SOLUTION INTRAVENOUS PRN
Status: DISCONTINUED | OUTPATIENT
Start: 2022-03-04 | End: 2022-03-04 | Stop reason: SDUPTHER

## 2022-03-04 RX ORDER — OXYCODONE HCL 10 MG/1
10 TABLET, FILM COATED, EXTENDED RELEASE ORAL ONCE
Status: COMPLETED | OUTPATIENT
Start: 2022-03-04 | End: 2022-03-04

## 2022-03-04 RX ORDER — GABAPENTIN 300 MG/1
300 CAPSULE ORAL 3 TIMES DAILY
Status: DISCONTINUED | OUTPATIENT
Start: 2022-03-04 | End: 2022-03-05 | Stop reason: HOSPADM

## 2022-03-04 RX ORDER — DEXAMETHASONE SODIUM PHOSPHATE 10 MG/ML
10 INJECTION, SOLUTION INTRAMUSCULAR; INTRAVENOUS ONCE
Status: DISCONTINUED | OUTPATIENT
Start: 2022-03-04 | End: 2022-03-04 | Stop reason: HOSPADM

## 2022-03-04 RX ORDER — ONDANSETRON 4 MG/1
4 TABLET, FILM COATED ORAL EVERY 8 HOURS PRN
Qty: 30 TABLET | Refills: 0 | Status: SHIPPED | OUTPATIENT
Start: 2022-03-04 | End: 2022-06-30

## 2022-03-04 RX ORDER — PANTOPRAZOLE SODIUM 40 MG/1
40 TABLET, DELAYED RELEASE ORAL DAILY
Status: DISCONTINUED | OUTPATIENT
Start: 2022-03-04 | End: 2022-03-05 | Stop reason: HOSPADM

## 2022-03-04 RX ORDER — FENTANYL CITRATE 50 UG/ML
INJECTION, SOLUTION INTRAMUSCULAR; INTRAVENOUS PRN
Status: DISCONTINUED | OUTPATIENT
Start: 2022-03-04 | End: 2022-03-04 | Stop reason: SDUPTHER

## 2022-03-04 RX ORDER — SODIUM CHLORIDE 9 MG/ML
25 INJECTION, SOLUTION INTRAVENOUS PRN
Status: DISCONTINUED | OUTPATIENT
Start: 2022-03-04 | End: 2022-03-05 | Stop reason: HOSPADM

## 2022-03-04 RX ORDER — BUDESONIDE 0.5 MG/2ML
1 INHALANT ORAL 2 TIMES DAILY
Status: DISCONTINUED | OUTPATIENT
Start: 2022-03-04 | End: 2022-03-05 | Stop reason: HOSPADM

## 2022-03-04 RX ORDER — EPHEDRINE SULFATE 50 MG/ML
INJECTION, SOLUTION INTRAVENOUS PRN
Status: DISCONTINUED | OUTPATIENT
Start: 2022-03-04 | End: 2022-03-04 | Stop reason: SDUPTHER

## 2022-03-04 RX ORDER — ACETAMINOPHEN 325 MG/1
650 TABLET ORAL EVERY 6 HOURS
Status: DISCONTINUED | OUTPATIENT
Start: 2022-03-04 | End: 2022-03-05 | Stop reason: HOSPADM

## 2022-03-04 RX ORDER — TRAMADOL HYDROCHLORIDE 50 MG/1
50 TABLET ORAL EVERY 6 HOURS
Status: DISCONTINUED | OUTPATIENT
Start: 2022-03-04 | End: 2022-03-05 | Stop reason: HOSPADM

## 2022-03-04 RX ORDER — SODIUM CHLORIDE 9 MG/ML
25 INJECTION, SOLUTION INTRAVENOUS PRN
Status: DISCONTINUED | OUTPATIENT
Start: 2022-03-04 | End: 2022-03-04 | Stop reason: HOSPADM

## 2022-03-04 RX ORDER — SODIUM CHLORIDE 0.9 % (FLUSH) 0.9 %
5-40 SYRINGE (ML) INJECTION EVERY 12 HOURS SCHEDULED
Status: DISCONTINUED | OUTPATIENT
Start: 2022-03-04 | End: 2022-03-04 | Stop reason: HOSPADM

## 2022-03-04 RX ORDER — PROPOFOL 10 MG/ML
INJECTION, EMULSION INTRAVENOUS PRN
Status: DISCONTINUED | OUTPATIENT
Start: 2022-03-04 | End: 2022-03-04 | Stop reason: SDUPTHER

## 2022-03-04 RX ORDER — ISOSORBIDE MONONITRATE 30 MG/1
30 TABLET, EXTENDED RELEASE ORAL NIGHTLY
Status: DISCONTINUED | OUTPATIENT
Start: 2022-03-04 | End: 2022-03-05 | Stop reason: HOSPADM

## 2022-03-04 RX ORDER — MEPERIDINE HYDROCHLORIDE 25 MG/ML
12.5 INJECTION INTRAMUSCULAR; INTRAVENOUS; SUBCUTANEOUS EVERY 5 MIN PRN
Status: DISCONTINUED | OUTPATIENT
Start: 2022-03-04 | End: 2022-03-04 | Stop reason: HOSPADM

## 2022-03-04 RX ORDER — SODIUM CHLORIDE 0.9 % (FLUSH) 0.9 %
10 SYRINGE (ML) INJECTION EVERY 12 HOURS SCHEDULED
Status: DISCONTINUED | OUTPATIENT
Start: 2022-03-04 | End: 2022-03-05 | Stop reason: HOSPADM

## 2022-03-04 RX ORDER — SODIUM CHLORIDE 0.9 % (FLUSH) 0.9 %
10 SYRINGE (ML) INJECTION PRN
Status: DISCONTINUED | OUTPATIENT
Start: 2022-03-04 | End: 2022-03-05 | Stop reason: HOSPADM

## 2022-03-04 RX ADMIN — SUGAMMADEX 210 MG: 100 INJECTION, SOLUTION INTRAVENOUS at 13:53

## 2022-03-04 RX ADMIN — TRANEXAMIC ACID 1000 MG: 1 INJECTION, SOLUTION INTRAVENOUS at 13:37

## 2022-03-04 RX ADMIN — ALBUMIN HUMAN 50 ML: 50 SOLUTION INTRAVENOUS at 13:25

## 2022-03-04 RX ADMIN — FENTANYL CITRATE 50 MCG: 50 INJECTION, SOLUTION INTRAMUSCULAR; INTRAVENOUS at 12:03

## 2022-03-04 RX ADMIN — PREGABALIN 75 MG: 75 CAPSULE ORAL at 10:15

## 2022-03-04 RX ADMIN — TRAMADOL HYDROCHLORIDE 50 MG: 50 TABLET, COATED ORAL at 17:01

## 2022-03-04 RX ADMIN — PHENYLEPHRINE HYDROCHLORIDE 80 MCG: 10 INJECTION INTRAVENOUS at 12:57

## 2022-03-04 RX ADMIN — DIPHENHYDRAMINE HYDROCHLORIDE 25 MG: 25 TABLET ORAL at 21:34

## 2022-03-04 RX ADMIN — Medication 2000 MG: at 12:06

## 2022-03-04 RX ADMIN — BISACODYL 5 MG: 5 TABLET, COATED ORAL at 17:01

## 2022-03-04 RX ADMIN — OXYCODONE HYDROCHLORIDE 10 MG: 10 TABLET, FILM COATED, EXTENDED RELEASE ORAL at 10:15

## 2022-03-04 RX ADMIN — CLINDAMYCIN IN 5 PERCENT DEXTROSE 900 MG: 18 INJECTION, SOLUTION INTRAVENOUS at 17:07

## 2022-03-04 RX ADMIN — HYDROMORPHONE HYDROCHLORIDE 0.5 MG: 1 INJECTION, SOLUTION INTRAMUSCULAR; INTRAVENOUS; SUBCUTANEOUS at 14:23

## 2022-03-04 RX ADMIN — ROCURONIUM BROMIDE 50 MG: 10 INJECTION, SOLUTION INTRAVENOUS at 12:03

## 2022-03-04 RX ADMIN — EPHEDRINE SULFATE 10 MG: 50 INJECTION INTRAMUSCULAR; INTRAVENOUS; SUBCUTANEOUS at 13:42

## 2022-03-04 RX ADMIN — PHENYLEPHRINE HYDROCHLORIDE 80 MCG: 10 INJECTION INTRAVENOUS at 12:38

## 2022-03-04 RX ADMIN — PHENYLEPHRINE HYDROCHLORIDE 80 MCG: 10 INJECTION INTRAVENOUS at 13:10

## 2022-03-04 RX ADMIN — TOPIRAMATE 100 MG: 100 TABLET, FILM COATED ORAL at 17:01

## 2022-03-04 RX ADMIN — ALBUMIN HUMAN 50 ML: 50 SOLUTION INTRAVENOUS at 13:29

## 2022-03-04 RX ADMIN — ACETAMINOPHEN 650 MG: 325 TABLET ORAL at 21:33

## 2022-03-04 RX ADMIN — ASPIRIN 325 MG: 325 TABLET ORAL at 17:01

## 2022-03-04 RX ADMIN — PANTOPRAZOLE SODIUM 40 MG: 40 TABLET, DELAYED RELEASE ORAL at 17:01

## 2022-03-04 RX ADMIN — LIDOCAINE HYDROCHLORIDE 50 MG: 10 INJECTION, SOLUTION INFILTRATION; PERINEURAL at 12:03

## 2022-03-04 RX ADMIN — DEXAMETHASONE SODIUM PHOSPHATE 10 MG: 10 INJECTION, SOLUTION INTRAMUSCULAR; INTRAVENOUS at 12:08

## 2022-03-04 RX ADMIN — ROCURONIUM BROMIDE 30 MG: 10 INJECTION, SOLUTION INTRAVENOUS at 12:45

## 2022-03-04 RX ADMIN — FENTANYL CITRATE 50 MCG: 50 INJECTION, SOLUTION INTRAMUSCULAR; INTRAVENOUS at 12:16

## 2022-03-04 RX ADMIN — FENTANYL CITRATE 50 MCG: 50 INJECTION, SOLUTION INTRAMUSCULAR; INTRAVENOUS at 14:05

## 2022-03-04 RX ADMIN — TRANEXAMIC ACID 1000 MG: 1 INJECTION, SOLUTION INTRAVENOUS at 12:08

## 2022-03-04 RX ADMIN — SODIUM CHLORIDE, SODIUM LACTATE, POTASSIUM CHLORIDE, AND CALCIUM CHLORIDE: 600; 310; 30; 20 INJECTION, SOLUTION INTRAVENOUS at 13:30

## 2022-03-04 RX ADMIN — SODIUM CHLORIDE: 9 INJECTION, SOLUTION INTRAVENOUS at 17:01

## 2022-03-04 RX ADMIN — ALBUMIN HUMAN 50 ML: 50 SOLUTION INTRAVENOUS at 13:22

## 2022-03-04 RX ADMIN — PHENYLEPHRINE HYDROCHLORIDE 80 MCG: 10 INJECTION INTRAVENOUS at 12:45

## 2022-03-04 RX ADMIN — METOPROLOL SUCCINATE 25 MG: 25 TABLET, EXTENDED RELEASE ORAL at 17:02

## 2022-03-04 RX ADMIN — ACETAMINOPHEN 650 MG: 325 TABLET ORAL at 17:01

## 2022-03-04 RX ADMIN — ONDANSETRON 4 MG: 2 INJECTION INTRAMUSCULAR; INTRAVENOUS at 13:41

## 2022-03-04 RX ADMIN — PRIMIDONE 50 MG: 50 TABLET ORAL at 21:34

## 2022-03-04 RX ADMIN — ACETAMINOPHEN 1000 MG: 500 TABLET ORAL at 10:15

## 2022-03-04 RX ADMIN — PHENYLEPHRINE HYDROCHLORIDE 80 MCG: 10 INJECTION INTRAVENOUS at 13:42

## 2022-03-04 RX ADMIN — HYDROMORPHONE HYDROCHLORIDE 0.5 MG: 1 INJECTION, SOLUTION INTRAMUSCULAR; INTRAVENOUS; SUBCUTANEOUS at 14:31

## 2022-03-04 RX ADMIN — PHENYLEPHRINE HYDROCHLORIDE 80 MCG: 10 INJECTION INTRAVENOUS at 13:00

## 2022-03-04 RX ADMIN — GABAPENTIN 300 MG: 300 CAPSULE ORAL at 17:01

## 2022-03-04 RX ADMIN — TAMSULOSIN HYDROCHLORIDE 0.4 MG: 0.4 CAPSULE ORAL at 17:02

## 2022-03-04 RX ADMIN — IPRATROPIUM BROMIDE 0.5 MG: 0.5 SOLUTION RESPIRATORY (INHALATION) at 16:07

## 2022-03-04 RX ADMIN — ALBUMIN HUMAN 50 ML: 50 SOLUTION INTRAVENOUS at 13:33

## 2022-03-04 RX ADMIN — PHENYLEPHRINE HYDROCHLORIDE 80 MCG: 10 INJECTION INTRAVENOUS at 12:08

## 2022-03-04 RX ADMIN — MIDAZOLAM 1 MG: 1 INJECTION INTRAMUSCULAR; INTRAVENOUS at 11:44

## 2022-03-04 RX ADMIN — FENTANYL CITRATE 50 MCG: 50 INJECTION, SOLUTION INTRAMUSCULAR; INTRAVENOUS at 13:50

## 2022-03-04 RX ADMIN — RIVAROXABAN 10 MG: 10 TABLET, FILM COATED ORAL at 21:38

## 2022-03-04 RX ADMIN — SODIUM CHLORIDE, SODIUM LACTATE, POTASSIUM CHLORIDE, AND CALCIUM CHLORIDE: 600; 310; 30; 20 INJECTION, SOLUTION INTRAVENOUS at 11:44

## 2022-03-04 RX ADMIN — ALBUMIN HUMAN 50 ML: 50 SOLUTION INTRAVENOUS at 13:32

## 2022-03-04 RX ADMIN — GABAPENTIN 300 MG: 300 CAPSULE ORAL at 21:33

## 2022-03-04 RX ADMIN — PHENYLEPHRINE HYDROCHLORIDE 80 MCG: 10 INJECTION INTRAVENOUS at 13:22

## 2022-03-04 RX ADMIN — ISOSORBIDE MONONITRATE 30 MG: 30 TABLET, EXTENDED RELEASE ORAL at 21:34

## 2022-03-04 RX ADMIN — Medication 2000 MG: at 21:35

## 2022-03-04 RX ADMIN — TRAMADOL HYDROCHLORIDE 50 MG: 50 TABLET, COATED ORAL at 21:34

## 2022-03-04 RX ADMIN — EPHEDRINE SULFATE 10 MG: 50 INJECTION INTRAMUSCULAR; INTRAVENOUS; SUBCUTANEOUS at 13:33

## 2022-03-04 RX ADMIN — PHENYLEPHRINE HYDROCHLORIDE 80 MCG: 10 INJECTION INTRAVENOUS at 12:51

## 2022-03-04 RX ADMIN — PROPOFOL 120 MG: 10 INJECTION, EMULSION INTRAVENOUS at 12:03

## 2022-03-04 RX ADMIN — PHENYLEPHRINE HYDROCHLORIDE 80 MCG: 10 INJECTION INTRAVENOUS at 13:08

## 2022-03-04 ASSESSMENT — PAIN SCALES - GENERAL
PAINLEVEL_OUTOF10: 7
PAINLEVEL_OUTOF10: 7
PAINLEVEL_OUTOF10: 4
PAINLEVEL_OUTOF10: 2
PAINLEVEL_OUTOF10: 2
PAINLEVEL_OUTOF10: 7

## 2022-03-04 ASSESSMENT — PAIN DESCRIPTION - DESCRIPTORS
DESCRIPTORS: DISCOMFORT

## 2022-03-04 ASSESSMENT — PAIN DESCRIPTION - FREQUENCY
FREQUENCY: CONTINUOUS

## 2022-03-04 ASSESSMENT — PAIN DESCRIPTION - LOCATION
LOCATION: HIP

## 2022-03-04 ASSESSMENT — PAIN DESCRIPTION - ORIENTATION
ORIENTATION: RIGHT

## 2022-03-04 ASSESSMENT — PAIN - FUNCTIONAL ASSESSMENT: PAIN_FUNCTIONAL_ASSESSMENT: 0-10

## 2022-03-04 ASSESSMENT — PAIN DESCRIPTION - ONSET
ONSET: ON-GOING
ONSET: ON-GOING

## 2022-03-04 ASSESSMENT — PAIN DESCRIPTION - PROGRESSION
CLINICAL_PROGRESSION: NOT CHANGED
CLINICAL_PROGRESSION: GRADUALLY IMPROVING

## 2022-03-04 ASSESSMENT — PAIN DESCRIPTION - PAIN TYPE
TYPE: SURGICAL PAIN

## 2022-03-04 NOTE — ANESTHESIA POSTPROCEDURE EVALUATION
Department of Anesthesiology  Postprocedure Note    Patient: Delfino Moreno  MRN: [id number]  [place]: 1950  Date of evaluation: 3/4/2022  Time:  2:05 PM     Procedure Summary     Date: 03/04/22 Room / Location: [facility] OR 09 / [facility]    Anesthesia Start: 6126 Anesthesia Stop:     Procedure: RIGHT TOTAL HIP ARTHROPLASTY (Right Hip) Diagnosis: (m16.11)    Surgeons: Nicole Quick MD Responsible Provider: MARGY Jolly CRNA    Anesthesia Type: TIVA ASA Status: 3          Anesthesia Type: No value filed. Inderjit Phase I: Inderjit Score: 10    Inderjit Phase II:      Last vitals: Reviewed and per EMR flowsheets.        Anesthesia Post Evaluation    Patient location during evaluation: PACU  Patient participation: waiting for patient participation  Level of consciousness: awake and lethargic  Pain score: 0  Airway patency: patent  Nausea & Vomiting: no nausea and no vomiting  Complications: no  Cardiovascular status: blood pressure returned to baseline  Respiratory status: acceptable  Hydration status: euvolemic  Comments: Report to RN

## 2022-03-04 NOTE — DISCHARGE INSTR - DIET

## 2022-03-04 NOTE — ANESTHESIA PRE PROCEDURE
Department of Anesthesiology  Preprocedure Note       Name:  Keren Uribe   Age:  73 y.o.  :  1950                                          MRN:  [id number]         Date:  3/4/2022      Surgeon: Corinthia Goodpasture):  Momo Gupta MD    Procedure: Procedure(s):  EGD    Medications prior to admission:   Prior to Admission medications    Medication Sig Start Date End Date Taking? Authorizing Provider   DICLOFENAC SODIUM PO Take 76 mg by mouth daily    Historical Provider, MD   primidone (MYSOLINE) 50 MG tablet Take 50 mg by mouth nightly 1/2 tablet at bedtime for tremors    Historical Provider, MD   fluticasone-salmeterol (ADVAIR DISKUS) 500-50 MCG/DOSE diskus inhaler Inhale 1 puff into the lungs every 12 hours Indications: uses prn 21  Radha Luevano DO   HYDROcodone-acetaminophen (NORCO)  MG per tablet Take 2 tablets by mouth every 6 hours as needed. Historical Provider, MD   tamsulosin (FLOMAX) 0.4 MG capsule Take 1 capsule by mouth daily    Historical Provider, MD   nitroGLYCERIN (NITROSTAT) 0.4 MG SL tablet Place 0.4 mg under the tongue every 5 minutes as needed for Chest pain up to max of 3 total doses. If no relief after 1 dose, call 911. Historical Provider, MD   metoprolol succinate (TOPROL XL) 25 MG extended release tablet TAKE ONE TABLET BY MOUTH 2 TIMES A DAY  Patient taking differently: Take 25 mg by mouth daily TAKE ONE TABLET BY MOUTH 2 TIMES A DAY 5/15/19   MARGY Smith   isosorbide mononitrate (IMDUR) 30 MG extended release tablet Take 1 tablet by mouth nightly 5/15/19   MARGY Smith   gabapentin (NEURONTIN) 300 MG capsule Take 300 mg by mouth 3 times daily.  Take 3 tablets twice daily     Historical Provider, MD   lidocaine (LIDODERM) 5 % Place 1 patch onto the skin daily 12 hours on, 12 hours off. 18   Fide Larios MD   esomeprazole (NEXIUM) 40 MG delayed release capsule Take 40 mg by mouth daily  16   Historical Provider, MD   aspirin 325 MG tablet Take 325 mg by mouth daily    Historical Provider, MD   topiramate (TOPAMAX) 100 MG tablet Take 100 mg by mouth daily     Historical Provider, MD   beclomethasone (QVAR) 80 MCG/ACT inhaler Inhale 1 puff into the lungs 2 times daily     Historical Provider, MD   albuterol (PROAIR HFA) 108 (90 BASE) MCG/ACT inhaler Inhale 2 puffs into the lungs every 4 hours as needed. Historical Provider, MD   tiotropium (SPIRIVA HANDIHALER) 18 MCG inhalation capsule Inhale 18 mcg into the lungs daily. Historical Provider, MD       Current medications:    No current facility-administered medications for this visit. No current outpatient medications on file. Facility-Administered Medications Ordered in Other Visits   Medication Dose Route Frequency Provider Last Rate Last Admin    ceFAZolin (ANCEF) 2000 mg in 0.9% sodium chloride 50 mL IVPB  2,000 mg IntraVENous Once Alejandro Mcknight MD        dexamethasone (PF) (DECADRON) injection 10 mg  10 mg IntraVENous Once Alejandro Mcknight MD           Allergies:     Allergies   Allergen Reactions    Bactrim [Sulfamethoxazole-Trimethoprim] Nausea And Vomiting    Nsaids Anaphylaxis    Succinylcholine Other (See Comments)     Paralyze waist down     Betadine [Povidone Iodine] Hives       Problem List:    Patient Active Problem List   Diagnosis Code    Abdominal pain, other specified site R10.9    Nausea R11.0    Dysphagia R13.10    Preoperative cardiovascular examination Z01.810    Weight loss R63.4    Fatigue R53.83    Heartburn R12    Hoarseness R49.0    Regurgitation [id number]    Hyperlipidemia E78.5    Nicotine abuse Z72.0    Family history of premature CAD Z82.49    Non-cardiac chest pain R07.89    Essential hypertension I10    Mild CAD I25.10    Change in bowel habits R19.4    Fecal incontinence R15.9    Rectal pain K62.89    Pelvic pain in male R10.2    Family history of colon cancer Z80.0    Ex-cigarette smoker Z87.891    Chest pressure R07.89  Varicose veins of bilateral lower extremities with pain I83.813    Venous insufficiency of both lower extremities I87.2    Swelling of both lower extremities M79.89    Rectal bleeding K62.5    Altered bowel function R19.8    Unexplained weight loss R63.4    Generalized abdominal pain R10.84    Excessive gas R14.3    History of adenomatous polyp of colon Z86.010    Anemia D64.9    Internal hemorrhoids K64.8    Atypical chest pain R07.89    Smoker F17.200    Cigarette nicotine dependence without complication R57.358    Chest pain at rest R07.9    Abnormal EKG R94.31       Past Medical History:        Diagnosis Date    Allergic rhinitis     Anemia     Asthma     CAD (coronary artery disease) 4/11/2014    Chest tightness, discomfort, or pressure 6/17/2013 6/17/2013  lexiscan Positive for inferior myocardial ischemia, EF 47%, 9% ischemic myocardium on stress, intermediate risk findings, AUC indication 16, AUC score 7     CHF (congestive heart failure) (HCC)     Chronic back pain     COPD (chronic obstructive pulmonary disease) ([facility]     Ex-cigarette smoker 10/27/2015    Family history of early CAD 6/24/2013    Family history of premature CAD     GERD (gastroesophageal reflux disease)     History of hernia repair     Hyperlipidemia     [facility]    Hypertension     Multiple lung nodules     Neuropathy     Nicotine abuse     Nicotine abuse     Osteoarthritis     Peripheral vascular disease (HCC)     Restless legs syndrome     SOB (shortness of breath)     Thyroid nodule     Unspecified sleep apnea     can't wear cpap       Past Surgical History:        Procedure Laterality Date    APPENDECTOMY      BLADDER SURGERY      x 3    CARDIAC CATHETERIZATION  6/24/2013  JDT    EF 50%    CARDIAC CATHETERIZATION  10/27/15  JDT    EF 50%    CARDIAC CATHETERIZATION  05/2018    mild, non-obstructive CAD    COLONOSCOPY  20 yrs ago    not sure who, thinks @ [facility] [id number] COLONOSCOPY  2014    TAx2, HP, intern hemorrhoids (3 yr)    [facility]    HERNIA REPAIR      with mesh-Dr Niki Latham    NC COLSC FLX W/REMOVAL LESION BY HOT BX FORCEPS  2017    Dr Shelton-internal hemorrhoids, tubular AP (-) dysplasia--5 yr recall    [place] EGD TRANSORAL BIOPSY SINGLE/MULTIPLE N/A 10/19/2017    Dr Shelton-normal-Esme (-) chronic nonspecific inflammation    PROSTATE SURGERY      THYROIDECTOMY      UPPER GASTROINTESTINAL ENDOSCOPY      thinks so, but a long time ago if so    UPPER GASTROINTESTINAL ENDOSCOPY  2013    Cat: Grade A esophagitis and esophageal stricture. Dilated 54fr    VASCULAR SURGERY  2017    SJS. Right IJV US 9f sheath.  VASCULAR SURGERY Left 2017    SJS-L GSV RFA       Social History:    Social History     Tobacco Use    Smoking status: Former Smoker     Types: Cigarettes     Quit date: 2022     Years since quittin.1    Smokeless tobacco: Never Used    Tobacco comment: some days he smokes, some days he doesn't   Substance Use Topics    Alcohol use: No     Alcohol/week: 0.0 standard drinks                                Counseling given: Not Answered  Comment: some days he smokes, some days he doesn't      Vital Signs (Current): There were no vitals filed for this visit.                                            BP Readings from Last 3 Encounters:   22 120/81   22 106/66   21 102/62       NPO Status:                                                                                 BMI:   Wt Readings from Last 3 Encounters:   22 225 lb (102.1 kg)   22 225 lb (102.1 kg)   02/10/22 225 lb (102.1 kg)     There is no height or weight on file to calculate BMI.    CBC:   Lab Results   Component Value Date    WBC 4.9 02/10/2022    RBC 3.90 02/10/2022    HGB 12.1 02/10/2022    HCT 38.2 02/10/2022    MCV 97.9 02/10/2022    RDW 13.3 02/10/2022     02/10/2022 CMP:   Lab Results   Component Value Date     02/10/2022    K 4.0 02/10/2022     02/10/2022    CO2 20 02/10/2022    BUN 19 02/10/2022    CREATININE 0.8 02/10/2022    GFRAA >59 02/10/2022    LABGLOM >60 02/10/2022    GLUCOSE 109 02/10/2022    PROT 5.9 02/10/2022    CALCIUM 8.7 02/10/2022    BILITOT <0.2 02/10/2022    ALKPHOS 78 02/10/2022    AST 19 02/10/2022    ALT 10 02/10/2022       POC Tests: No results for input(s): POCGLU, POCNA, POCK, POCCL, POCBUN, POCHEMO, POCHCT in the last 72 hours. Coags:   Lab Results   Component Value Date    PROTIME 12.9 02/10/2022    INR 0.95 02/10/2022    APTT 31.5 02/10/2022       HCG (If Applicable): No results found for: PREGTESTUR, PREGSERUM, HCG, HCGQUANT     ABGs: No results found for: PHART, PO2ART, MVA6PSO, XBB3FPP, BEART, D4PTYTDW     Type & Screen (If Applicable):  No results found for: LABABO, [address]    Anesthesia Evaluation  Patient summary reviewed and Nursing notes reviewed history of anesthetic complications:   Airway: Mallampati: II  TM distance: >3 FB   Neck ROM: full  Mouth opening: < 3 FB Dental:    (+) upper dentures and lower dentures      Pulmonary:normal exam    (+) COPD:  sleep apnea: on noncompliant,  asthma:                           ROS comment: Current smoker 1ppd   Cardiovascular:    (+) hypertension:, CAD:, CHF:,          Beta Blocker:  Dose within 24 Hrs         Neuro/Psych:   Negative Neuro/Psych ROS              GI/Hepatic/Renal:   (+) GERD: well controlled,           Endo/Other:    (+) blood dyscrasia: anemia, arthritis:., .                 Abdominal:             Vascular: negative vascular ROS. Other Findings:               Anesthesia Plan      TIVA     ASA 3       Induction: intravenous. Anesthetic plan and risks discussed with patient.                       MARGY Jaimes - STEPHANIE   3/4/2022

## 2022-03-04 NOTE — H&P
hemorrhoids, tubular AP (-) dysplasia--5 yr recall    [place] EGD TRANSORAL BIOPSY SINGLE/MULTIPLE N/A 10/19/2017    Dr Shelton-normal-Esme (-) chronic nonspecific inflammation    PROSTATE SURGERY      THYROIDECTOMY      UPPER GASTROINTESTINAL ENDOSCOPY      thinks so, but a long time ago if so    UPPER GASTROINTESTINAL ENDOSCOPY  02/11/2013    Cat: Grade A esophagitis and esophageal stricture. Dilated 54fr    VASCULAR SURGERY  07/11/2017    SJS. Right IJV US 9f sheath.  VASCULAR SURGERY Left 08/17/2017    SJS-L GSV RFA       Medications:   Prior to Admission medications    Medication Sig Start Date End Date Taking? Authorizing Provider   DICLOFENAC SODIUM PO Take 76 mg by mouth daily   Yes Historical Provider, MD   primidone (MYSOLINE) 50 MG tablet Take 50 mg by mouth nightly 1/2 tablet at bedtime for tremors   Yes Historical Provider, MD   tamsulosin (FLOMAX) 0.4 MG capsule Take 1 capsule by mouth daily   Yes Historical Provider, MD   metoprolol succinate (TOPROL XL) 25 MG extended release tablet TAKE ONE TABLET BY MOUTH 2 TIMES A DAY  Patient taking differently: Take 25 mg by mouth daily TAKE ONE TABLET BY MOUTH 2 TIMES A DAY 5/15/19  Yes MARGY Dimas   isosorbide mononitrate (IMDUR) 30 MG extended release tablet Take 1 tablet by mouth nightly 5/15/19  Yes MARGY Dimas   gabapentin (NEURONTIN) 300 MG capsule Take 300 mg by mouth 3 times daily. Take 3 tablets twice daily    Yes Historical Provider, MD   aspirin 325 MG tablet Take 325 mg by mouth daily   Yes Historical Provider, MD   topiramate (TOPAMAX) 100 MG tablet Take 100 mg by mouth daily    Yes Historical Provider, MD   tiotropium (SPIRIVA HANDIHALER) 18 MCG inhalation capsule Inhale 18 mcg into the lungs daily.    Yes Historical Provider, MD   fluticasone-salmeterol (ADVAIR DISKUS) 500-50 MCG/DOSE diskus inhaler Inhale 1 puff into the lungs every 12 hours Indications: uses prn 11/4/21 2/22/22  Waqar Echeverria DO HYDROcodone-acetaminophen (NORCO)  MG per tablet Take 2 tablets by mouth every 6 hours as needed. Historical Provider, MD   nitroGLYCERIN (NITROSTAT) 0.4 MG SL tablet Place 0.4 mg under the tongue every 5 minutes as needed for Chest pain up to max of 3 total doses. If no relief after 1 dose, call 911. Historical Provider, MD   lidocaine (LIDODERM) 5 % Place 1 patch onto the skin daily 12 hours on, 12 hours off. 1/23/18   Naeem Jimenez MD   esomeprazole (NEXIUM) 40 MG delayed release capsule Take 40 mg by mouth daily  9/29/16   Historical Provider, MD   beclomethasone (QVAR) 80 MCG/ACT inhaler Inhale 1 puff into the lungs 2 times daily     Historical Provider, MD   albuterol (PROAIR HFA) 108 (90 BASE) MCG/ACT inhaler Inhale 2 puffs into the lungs every 4 hours as needed. Historical Provider, MD       Allergies:  Bactrim [sulfamethoxazole-trimethoprim], Nsaids, Succinylcholine, and Betadine [povidone iodine]    Social History:   Tobacco:  reports that he quit smoking about 5 weeks ago. His smoking use included cigarettes. He has never used smokeless tobacco.   Alcohol:  reports no history of alcohol use.     Review of Systems:  General: Denies any fever or chills  EYES: Denies any diplopia  ENT: Tinnitus or vertigo  Resp: Denies any shortness of breath, cough or wheezing  Cardiac: Denies any chest pain, palpitations, claudication or edema  GI: Denies any melena, hematochezia, hematemesis or pyrosis  : Denies any frequency, urgency, hesitancy or incontinence  Musculoskeletal: Denies back pain, joint pain, myalgias  Heme: Denies bruising or bleeding easily  Endocrine: Denies any history of diabetes or thyroid disease  Psych: Denies anxiety or depression  Neuro: Denies any focal motor or sensory deficits    NEUROLOGIC: CN II-XI grossly intact, no motor or sensory deficits   PSYCH: mood and affect are normal with a normal rate and tone of speech    Physical Exam:  Vitals: /81   Pulse 65 Temp 97.7 °F (36.5 °C) (Tympanic)   Resp 12   Ht 6' 2\" (1.88 m)   Wt 225 lb (102.1 kg)   SpO2 99%   BMI 28.89 kg/m²   CONSTITUTIONAL: Alert, appropriate, no acute distress. EYES: Non icteric, EOM intact, pupils equal round and reactive to light  ENT: Mucus membranes moist, no oral pharyngeal lesions, nares patent   NECK: Supple, no masses, no JVD, trachea mid line   CHEST/LUNGS: CTA bilaterally, normal respiratory effort   CARDIOVASCULAR: RRR, no murmurs,  2+ DP and radial pulses bilaterally  ABDOMEN: soft, nontender  EXTREMITIES: warm, well perfused, no edema   SKIN: warm, dry with no rashes or lesions  LYMPH: No cervical or inguinal lymphadenopathy    General Appearance: Patient is well nourished, well developed    HEENT: Normal    CARDIOVASCULAR: Normal S1 and S2.  Regular rhythm. No murmurs, gallops, or rubs. PULMONARY: Normal.    GASTROINTESTINAL: Soft, non-tender, normal bowel sounds. No bruits, organomegaly or masses.     EXTREMITIES: positive exam findings: lateral joint line tenderness, tender over tibial tubercle, ecchymosis noted entire limb and ROM limited to approximately 80 degrees    MUSCULOSKELETAL: Tenderness over right hip(s)    NEUROLOGICAL: gait and coordination normal or speech normal    Diagnostic Studies:      Labs: CBC with Differential:    Lab Results   Component Value Date    WBC 4.9 02/10/2022    RBC 3.90 02/10/2022    HGB 12.1 02/10/2022    HCT 38.2 02/10/2022     02/10/2022    MCV 97.9 02/10/2022    MCH 31.0 02/10/2022    MCHC 31.7 02/10/2022    RDW 13.3 02/10/2022    LYMPHOPCT 29.4 02/10/2022    MONOPCT 11.3 02/10/2022    BASOPCT 0.8 02/10/2022    MONOSABS 0.60 02/10/2022    LYMPHSABS 1.5 02/10/2022    EOSABS 0.10 02/10/2022    BASOSABS 0.00 02/10/2022     BMP:    Lab Results   Component Value Date     02/10/2022    K 4.0 02/10/2022     02/10/2022    CO2 20 02/10/2022    BUN 19 02/10/2022    LABALBU 3.7 02/10/2022    CREATININE 0.8 02/10/2022    CALCIUM 8.7

## 2022-03-04 NOTE — PROGRESS NOTES
4 Eyes Skin Assessment    Keila Lundy is being assessed upon: Admission    I agree that Tonja Arias RN, along with Vanessa Huerta RN have performed a thorough Head to Toe Skin Assessment on the patient. ALL assessment sites listed below have been assessed. Areas assessed by both nurses:     [x]   Head, Face, and Ears   [x]   Shoulders, Back, and Chest  [x]   Arms, Elbows, and Hands   [x]   Coccyx, Sacrum, and Ischium  [x]   Legs, Feet, and Heels    Does the Patient have Skin Breakdown?  No    Mike Prevention initiated: No  Wound Care Orders initiated: No    WOC nurse consulted for Pressure Injury (Stage 3,4, Unstageable, DTI, NWPT, and Complex wounds) and New or Established Ostomies: No        Primary Nurse eSignature: Jesi Alvarenga RN on 3/4/2022 at 4:13 PM      Co-Signer eSignature: {Esignature:[id number]}

## 2022-03-04 NOTE — BRIEF OP NOTE
Brief Postoperative Note      Patient: Ernie Nelson  YOB: 1950  MRN: [id number]    Date of Procedure: 3/4/2022    Pre-Op Diagnosis: m16.11    Post-Op Diagnosis: Same       Procedure(s):  RIGHT TOTAL HIP ARTHROPLASTY    Surgeon(s):  Chelita Delgado MD    Assistant:  Surgical Assistant: Agustina Olvera  First Assistant: Steffanie Gayle PA-C    Anesthesia: General    Estimated Blood Loss (mL): 766    Complications: None    Specimens:   ID Type Source Tests Collected by Time Destination   A : RIGHT FEMORAL HEAD  Bone Joint, Hip SURGICAL PATHOLOGY Chelita Delgado MD 3/4/2022 1242        Implants:  Implant Name Type Inv.  Item Serial No.  Lot No. LRB No. Used Action   SCREW BNE L25MM DIA6.5MM [facility] HIP [employer] THRD - [id number]  SCREW BNE L25MM DIA6.5MM CANC HIP S STL GRIPTION FULL THRD  [facility] [employer] [facility] [id number] Right 1 Implanted   CUP ACET [id number] 12 H HIP TI [employer] REV - [id number]  CUP ACET [id number] 12 H HIP TI GRIPTION VIP TAPR DOME REV  [facility] [employer] [facility] [id number] Right 1 Implanted   LINER ACET OD58MM ID36MM HIP ALTRX PINN - [id number]  LINER ACET OD58MM ID36MM HIP ALTRX PINN  [facility] [employer] [facility] [id number] Right 1 Implanted   STEM FEM SZ 10 HI OFFSET CLLRD 12/14 TAPR ACTIS ART EZ - [id number]  STEM FEM SZ 10 HI OFFSET CLLRD 12/14 TAPR ACTIS ARTC EZ  [facility] [employer] [facility] [id number] Right 1 Implanted   HEAD FEM [id number] +5MM OFFSET 12/14 TAPR HIP CERAMIC BIOLOX - [id number]  HEAD FEM [id number] +5MM OFFSET 12/14 TAPR HIP CERAMIC BIOLOX  [facility] [employer] [facility] [id number] Right 1 Implanted         Drains: * No LDAs found *    Findings:     Electronically signed by Chelita Delgado MD on 3/4/2022 at 1:54 PM

## 2022-03-05 VITALS
TEMPERATURE: 97.3 F | HEART RATE: 68 BPM | OXYGEN SATURATION: 98 % | BODY MASS INDEX: 28.88 KG/M2 | HEIGHT: 74 IN | DIASTOLIC BLOOD PRESSURE: 68 MMHG | WEIGHT: 225 LBS | SYSTOLIC BLOOD PRESSURE: 101 MMHG | RESPIRATION RATE: 18 BRPM

## 2022-03-05 PROBLEM — M16.11 PRIMARY OSTEOARTHRITIS OF RIGHT HIP: Status: ACTIVE | Noted: 2022-03-04

## 2022-03-05 LAB
ANION GAP SERPL CALCULATED.3IONS-SCNC: 10 MMOL/L (ref 7–19)
BUN BLDV-MCNC: 18 MG/DL (ref 8–23)
CALCIUM SERPL-MCNC: 8.1 MG/DL (ref 8.8–10.2)
CHLORIDE BLD-SCNC: 102 MMOL/L (ref 98–111)
CO2: 22 MMOL/L (ref 22–29)
CREAT SERPL-MCNC: 0.9 MG/DL (ref 0.5–1.2)
GFR AFRICAN AMERICAN: >59
GFR NON-AFRICAN AMERICAN: >60
GLUCOSE BLD-MCNC: 119 MG/DL (ref 74–109)
HCT VFR BLD CALC: 31.8 % (ref 42–52)
HEMOGLOBIN: 10.1 G/DL (ref 14–18)
POTASSIUM REFLEX MAGNESIUM: 4.7 MMOL/L (ref 3.5–5)
SODIUM BLD-SCNC: 134 MMOL/L (ref 136–145)

## 2022-03-05 PROCEDURE — 97116 GAIT TRAINING THERAPY: CPT

## 2022-03-05 PROCEDURE — 2500000003 HC RX 250 WO HCPCS: Performed by: ORTHOPAEDIC SURGERY

## 2022-03-05 PROCEDURE — G0378 HOSPITAL OBSERVATION PER HR: HCPCS

## 2022-03-05 PROCEDURE — 6370000000 HC RX 637 (ALT 250 FOR IP): Performed by: ORTHOPAEDIC SURGERY

## 2022-03-05 PROCEDURE — 97161 PT EVAL LOW COMPLEX 20 MIN: CPT

## 2022-03-05 PROCEDURE — 85018 HEMOGLOBIN: CPT

## 2022-03-05 PROCEDURE — 80048 BASIC METABOLIC PNL TOTAL CA: CPT

## 2022-03-05 PROCEDURE — 6360000002 HC RX W HCPCS: Performed by: ORTHOPAEDIC SURGERY

## 2022-03-05 PROCEDURE — 85014 HEMATOCRIT: CPT

## 2022-03-05 PROCEDURE — 94640 AIRWAY INHALATION TREATMENT: CPT

## 2022-03-05 PROCEDURE — 2580000003 HC RX 258: Performed by: ORTHOPAEDIC SURGERY

## 2022-03-05 PROCEDURE — 36415 COLL VENOUS BLD VENIPUNCTURE: CPT

## 2022-03-05 RX ADMIN — TAMSULOSIN HYDROCHLORIDE 0.4 MG: 0.4 CAPSULE ORAL at 10:06

## 2022-03-05 RX ADMIN — TRAMADOL HYDROCHLORIDE 50 MG: 50 TABLET, COATED ORAL at 10:07

## 2022-03-05 RX ADMIN — IPRATROPIUM BROMIDE 0.5 MG: 0.5 SOLUTION RESPIRATORY (INHALATION) at 06:48

## 2022-03-05 RX ADMIN — SODIUM CHLORIDE, PRESERVATIVE FREE 10 ML: 5 INJECTION INTRAVENOUS at 10:07

## 2022-03-05 RX ADMIN — Medication 2000 MG: at 02:28

## 2022-03-05 RX ADMIN — BISACODYL 5 MG: 5 TABLET, COATED ORAL at 10:07

## 2022-03-05 RX ADMIN — CLINDAMYCIN IN 5 PERCENT DEXTROSE 900 MG: 18 INJECTION, SOLUTION INTRAVENOUS at 10:08

## 2022-03-05 RX ADMIN — TOPIRAMATE 100 MG: 100 TABLET, FILM COATED ORAL at 10:06

## 2022-03-05 RX ADMIN — GABAPENTIN 300 MG: 300 CAPSULE ORAL at 10:07

## 2022-03-05 RX ADMIN — METOPROLOL SUCCINATE 25 MG: 25 TABLET, EXTENDED RELEASE ORAL at 10:07

## 2022-03-05 RX ADMIN — ACETAMINOPHEN 650 MG: 325 TABLET ORAL at 10:07

## 2022-03-05 RX ADMIN — ACETAMINOPHEN 650 MG: 325 TABLET ORAL at 02:29

## 2022-03-05 RX ADMIN — CLINDAMYCIN IN 5 PERCENT DEXTROSE 900 MG: 18 INJECTION, SOLUTION INTRAVENOUS at 00:05

## 2022-03-05 RX ADMIN — PANTOPRAZOLE SODIUM 40 MG: 40 TABLET, DELAYED RELEASE ORAL at 10:07

## 2022-03-05 RX ADMIN — BUDESONIDE 1000 MCG: 0.5 SUSPENSION RESPIRATORY (INHALATION) at 06:47

## 2022-03-05 RX ADMIN — ASPIRIN 325 MG: 325 TABLET ORAL at 10:06

## 2022-03-05 RX ADMIN — TRAMADOL HYDROCHLORIDE 50 MG: 50 TABLET, COATED ORAL at 02:28

## 2022-03-05 RX ADMIN — ARFORMOTEROL TARTRATE 15 MCG: 15 SOLUTION RESPIRATORY (INHALATION) at 06:48

## 2022-03-05 ASSESSMENT — PAIN SCALES - GENERAL
PAINLEVEL_OUTOF10: 3
PAINLEVEL_OUTOF10: 3

## 2022-03-05 NOTE — DISCHARGE SUMMARY
[facility] [address]  Dr. Thor Pratt  Discharge Summary       Carey Arreola is a 73 y.o. male underwent right total hip replacement procedure without complication. Carey Arreola was admitted to the floor following their recovery in the PACU.      Discharge Diagnosis  right Hip Replacement    Current Inpatient Medications    Current Facility-Administered Medications: topiramate (TOPAMAX) tablet 100 mg, 100 mg, Oral, Daily  aspirin tablet 325 mg, 325 mg, Oral, Daily  gabapentin (NEURONTIN) capsule 300 mg, 300 mg, Oral, TID  metoprolol succinate (TOPROL XL) extended release tablet 25 mg, 25 mg, Oral, Daily  isosorbide mononitrate (IMDUR) extended release tablet 30 mg, 30 mg, Oral, Nightly  nitroGLYCERIN (NITROSTAT) SL tablet 0.4 mg, 0.4 mg, SubLINGual, Q5 Min PRN  tamsulosin (FLOMAX) capsule 0.4 mg, 0.4 mg, Oral, Daily  primidone (MYSOLINE) tablet 50 mg, 50 mg, Oral, Nightly  0.9 % sodium chloride infusion, , IntraVENous, Continuous  0.9 % sodium chloride bolus, 500 mL, IntraVENous, PRN  sodium chloride flush 0.9 % injection 10 mL, 10 mL, IntraVENous, 2 times per day  sodium chloride flush 0.9 % injection 10 mL, 10 mL, IntraVENous, PRN  0.9 % sodium chloride infusion, 25 mL, IntraVENous, PRN  acetaminophen (TYLENOL) tablet 650 mg, 650 mg, Oral, Q6H  oxyCODONE (ROXICODONE) immediate release tablet 5 mg, 5 mg, Oral, Q4H PRN **OR** oxyCODONE (ROXICODONE) immediate release tablet 10 mg, 10 mg, Oral, Q4H PRN  bisacodyl (DULCOLAX) EC tablet 5 mg, 5 mg, Oral, Daily  ondansetron (ZOFRAN-ODT) disintegrating tablet 4 mg, 4 mg, Oral, Q8H PRN **OR** ondansetron (ZOFRAN) injection 4 mg, 4 mg, IntraVENous, Q6H PRN  oxyCODONE (ROXICODONE) immediate release tablet 10 mg, 10 mg, Oral, Q4H PRN **OR** oxyCODONE (ROXICODONE) immediate release tablet 20 mg, 20 mg, Oral, Q4H PRN  traMADol (ULTRAM) tablet 50 mg, 50 mg, Oral, Q6H  HYDROmorphone HCl PF (DILAUDID) injection 0.5 mg, 0.5 mg, IntraVENous, Q3H PRN **OR** HYDROmorphone HCl PF (DILAUDID) injection 1 mg, 1 mg, IntraVENous, Q3H PRN  HYDROmorphone HCl PF (DILAUDID) injection 1 mg, 1 mg, IntraVENous, Q3H PRN **OR** HYDROmorphone HCl PF (DILAUDID) injection 2 mg, 2 mg, IntraVENous, Q3H PRN  diphenhydrAMINE (BENADRYL) tablet 25 mg, 25 mg, Oral, Q6H PRN  clindamycin (CLEOCIN) 900 mg in dextrose 5 % 50 mL IVPB, 900 mg, IntraVENous, Q8H  rivaroxaban (XARELTO) tablet 10 mg, 10 mg, Oral, Daily  pantoprazole (PROTONIX) tablet 40 mg, 40 mg, Oral, Daily  albuterol (PROVENTIL) nebulizer solution 2.5 mg, 2.5 mg, Nebulization, Q6H PRN  ipratropium (ATROVENT) 0.02 % nebulizer solution 0.5 mg, 0.5 mg, Nebulization, TID  budesonide (PULMICORT) nebulizer suspension 1,000 mcg, 1 mg, Nebulization, BID **AND** Arformoterol Tartrate (BROVANA) nebulizer solution 15 mcg, 15 mcg, Nebulization, BID    Post-operatively the patients diet was advanced as tolerated and their incision was checked on POD #1. The incision is was clean, dry and intact with no signs of infection. The patient remained neurovascularly intact in the lower extremity and had intact pulses distally. Patients calf remained soft and showed no evidence of DVT. The patient was able to move his right leg and ankle/foot without any problems post-operatively. Physical therapy and occupational therapy were consulted and began working with the patient post-operatively. The patient progressed with PT/OT as would be expected and continued to improve through their stay. The patients pain was initially controlled with IV medications but we were able to transition to oral pain medications soon after arrival to the floor and their pain remained under good control through their hospital stay. From a medical standpoint the patient remained stable and continued to have the medicine team follow throughout their stay. Acute postoperative blood loss anemia after joint replacement being monitored with daily hemoglobin/hematocrit.   The patients dressing was changed/incision was checked on day of d/c. The patient will be discharged at this time to home with home health as per anterior hip protocol with their current diet restrictions and will continue to follow the hip precautions outlined to them by us and PT/OT. Condition on Discharge: Stable    Plan  Followup at scheduled appointment time (1 month post-op). Patient was instructed on the use of pain medications, the signs and symptoms of infection, and was given our number to call should they have any questions or concerns following discharge.

## 2022-03-05 NOTE — PLAN OF CARE
Problem: Falls - Risk of:  Goal: Will remain free from falls  3/5/2022 0732 by Elvia Juarez RN  Outcome: Ongoing  3/4/2022 2203 by Kenneth Durán RN  Outcome: Ongoing  Goal: Absence of physical injury  3/5/2022 0732 by Elvia Juarez RN  Outcome: Ongoing  3/4/2022 2203 by Kenneth Durán RN  Outcome: Ongoing     Problem: Pain:  Description: Pain management should include both nonpharmacologic and pharmacologic interventions.   Goal: Pain level will decrease  3/5/2022 0732 by Elvia Juarez RN  Outcome: Ongoing  3/4/2022 2203 by Kenneth Durán RN  Outcome: Ongoing  Goal: Control of acute pain  3/5/2022 0732 by Elvia Juarez RN  Outcome: Ongoing  3/4/2022 2203 by Kenneth Durán RN  Outcome: Ongoing  Goal: Control of chronic pain  3/5/2022 0732 by Elvia Juarez RN  Outcome: Ongoing  3/4/2022 2203 by Kenneth Durán RN  Outcome: Ongoing

## 2022-03-05 NOTE — PROGRESS NOTES
Discharge instruction discussed with wife and patient. No questions at this time.      Electronically signed by Beata Reece RN on 3/5/2022 at 1:21 PM

## 2022-03-05 NOTE — CONSULTS
Consult Note      CHIEF COMPLAINT:  Right Hip Pain    Reason for Admission:  Right AURELIA    History Obtained From:  Patient, chart    HISTORY OF PRESENT ILLNESS:      The patient is a 73 y.o. male who was admitted to Dr. Flory Wilburn service and underwent a right AURELIA. His pain is controlled. She has no c/o CP or SOA. No abdominal pain or N/V. He is eating ok. No dysuria. No HA or dizziness. No recent illnesses or fevers.    Past Medical History:        Diagnosis Date    Allergic rhinitis     Anemia     Asthma     CAD (coronary artery disease) 4/11/2014    Chest tightness, discomfort, or pressure 6/17/2013 6/17/2013  lexiscan Positive for inferior myocardial ischemia, EF 47%, 9% ischemic myocardium on stress, intermediate risk findings, AUC indication 16, AUC score 7     [facility]congest[facility] heart failure) [facility])     Chronic back pain     [facility] obstructive pulmonary disease) [facility] [address]     Ex-cigarette smoker 10/27/2015    Family history of early CAD 6/24/2013    Family history of premature CAD     GERD (gastroesophageal reflux disease)     History of hernia repair     Hyperlipidemia     [facility]    Hypertension     Multiple lung nodules     Neuropathy     Nicotine abuse     Nicotine abuse     Osteoarthritis     Peripheral vascular disease (HCC)     Restless legs syndrome     SOB (shortness of breath)     Thyroid nodule     Unspecified sleep apnea     can't wear cpap     Past Surgical History:        Procedure Laterality Date    APPENDECTOMY      BLADDER SURGERY      x 3    CARDIAC CATHETERIZATION  6/24/2013  JDT    EF 50%    CARDIAC CATHETERIZATION  10/27/15  JDT    EF 50%    CARDIAC CATHETERIZATION  05/2018    mild, non-obstructive CAD    COLONOSCOPY  20 yrs ago    not sure who, thinks @ [facility]    COLONOSCOPY  09/2014    TAx2, HP, intern hemorrhoids (3 yr)   [address] [facility]      with mesh-Dr Jewels Molina [place] COLSC FLX W/REMOVAL LESION BY HOT BX FORCEPS  09/21/2017    Dr Shelton-internal hemorrhoids, tubular AP (-) dysplasia--5 yr recall    GA EGD TRANSORAL BIOPSY SINGLE/MULTIPLE N/A 10/19/2017    Dr Shelton-normal-Esme (-) chronic nonspecific inflammation    PROSTATE SURGERY      THYROIDECTOMY      UPPER GASTROINTESTINAL ENDOSCOPY      thinks so, but a long time ago if so    UPPER GASTROINTESTINAL ENDOSCOPY  02/11/2013    Cat: Grade A esophagitis and esophageal stricture. Dilated 54fr    VASCULAR SURGERY  07/11/2017    SJS. Right IJV US 9f sheath.  VASCULAR SURGERY Left 08/17/2017    SJS-L GSV RFA         Medications Prior to Admission:    Medications Prior to Admission: primidone (MYSOLINE) 50 MG tablet, Take 50 mg by mouth nightly 1/2 tablet at bedtime for tremors  tamsulosin (FLOMAX) 0.4 MG capsule, Take 1 capsule by mouth daily  metoprolol succinate (TOPROL XL) 25 MG extended release tablet, TAKE ONE TABLET BY MOUTH 2 TIMES A DAY (Patient taking differently: Take 25 mg by mouth daily TAKE ONE TABLET BY MOUTH 2 TIMES A DAY)  isosorbide mononitrate (IMDUR) 30 MG extended release tablet, Take 1 tablet by mouth nightly  gabapentin (NEURONTIN) 300 MG capsule, Take 300 mg by mouth 3 times daily. Take 3 tablets twice daily   aspirin 325 MG tablet, Take 325 mg by mouth daily  topiramate (TOPAMAX) 100 MG tablet, Take 100 mg by mouth daily   beclomethasone (QVAR) 80 MCG/ACT inhaler, Inhale 1 puff into the lungs 2 times daily   tiotropium (SPIRIVA HANDIHALER) 18 MCG inhalation capsule, Inhale 18 mcg into the lungs daily. [DISCONTINUED] DICLOFENAC SODIUM PO, Take 76 mg by mouth daily  fluticasone-salmeterol (ADVAIR DISKUS) 500-50 MCG/DOSE diskus inhaler, Inhale 1 puff into the lungs every 12 hours Indications: uses prn  [DISCONTINUED] HYDROcodone-acetaminophen (NORCO)  MG per tablet, Take 2 tablets by mouth every 6 hours as needed.    nitroGLYCERIN (NITROSTAT) 0.4 MG SL tablet, Place 0.4 mg under the tongue every 5 minutes as needed for Chest pain up to max of 3 total doses. If no relief after 1 dose, call 911. [DISCONTINUED] lidocaine (LIDODERM) 5 %, Place 1 patch onto the skin daily 12 hours on, 12 hours off.  esomeprazole (NEXIUM) 40 MG delayed release capsule, Take 40 mg by mouth daily   albuterol (PROAIR HFA) 108 (90 BASE) MCG/ACT inhaler, Inhale 2 puffs into the lungs every 4 hours as needed. Allergies:  Bactrim [sulfamethoxazole-trimethoprim], Nsaids, Succinylcholine, and Betadine [povidone iodine]    Social History:   TOBACCO:   reports that he quit smoking about 5 weeks ago. His smoking use included cigarettes. He has never used smokeless tobacco.  ETOH:   reports no history of alcohol use. DRUGS:   reports no history of drug use.   MARITAL STATUS:    OCCUPATION:  retired  Patient currently lives with family       Family History:       Problem Relation Age of Onset   [facility] Cancer Mother         lung and brain    Coronary Art Dis Mother     Coronary Art Dis Sister     Colon Cancer Sister     Colon Polyps Sister     Coronary Art Dis Father     Colon Cancer Maternal Grandmother     Colon Polyps Maternal Grandmother     Colon Cancer Sister     Colon Polyps Sister    [facility] Sister         \"female cancer\"   [facility] Cancer Sister         thryoid    Coronary Art Dis Brother     Heart Failure Brother     Esophageal Cancer Neg Hx     Liver Cancer Neg Hx     Stomach Cancer Neg Hx      REVIEW OF SYSTEMS:  Constitutional: neg  CV: neg  Pulmonary: neg  GI: neg  : neg  Psych: neg  Neuro: neg  Skin: neg  MusculoSkeletal: neg  HEENT: neg  Joints: right hip pain  Vitals:  /68   Pulse 68   Temp 97.3 °F (36.3 °C) (Temporal)   Resp 18   Ht 6' 2\" (1.88 m)   Wt 225 lb (102.1 kg)   SpO2 98%   BMI 28.89 kg/m²     PHYSICAL EXAM:  Gen: NAD, alert, pleasant  HEENT: WNL  Lymph: no LAD  Neck: no JVD or masses  Chest: CTA bilat  CV: RRR  Abdomen: NT/ND  Extrem: no C/C/E  Neuro: non focal  Skin: no rashes  Joints: no redness  DATA:  I have reviewed the admission labs and imaging tests.     ASSESSMENT AND PLAN:      Principal Problem:    Primary osteoarthritis of right hip, S/P right AURELIA---follow with Orthopedics, continue with current care, pain treatment    ABL Anemia    HTN--follow BP    CAD----stable with medical treatment    COPD--stable    EMMANUEL        Phan Alvarado MD  1:16 PM 3/5/2022

## 2022-03-05 NOTE — PROGRESS NOTES
Physical Therapy    Facility/Department: [facility] SURG SERVICES  Initial Assessment    NAME: Cleo Keller  : 1950  MRN: [id number]    Date of Service: 3/5/2022    Discharge Recommendations:  Home with assist PRN        Assessment   Body structures, Functions, Activity limitations: Decreased functional mobility   Assessment: Patient will benefit from continuing skilled physical therapy to improve mobility  Treatment Diagnosis: Decline in mobility  Prognosis: Good  Decision Making: Low Complexity  PT Education: Weight-bearing Education; Functional Mobility Training;Precautions;General Safety;Equipment  REQUIRES PT FOLLOW UP: Yes  Activity Tolerance  Activity Tolerance: Patient Tolerated treatment well       Patient Diagnosis(es): The encounter diagnosis was Primary osteoarthritis of right hip.     has a past medical history of Allergic rhinitis, Anemia, Asthma, CAD (coronary artery disease), Chest tightness, discomfort, or pressure, CHF (congestive heart failure) ([facility]), Chronic back pain, COPD (chronic obstructive pulmonary disease) ([facility] [address] Ex-cigarette smoker, Family history of early CAD, Family history of premature CAD, GERD (gastroesophageal reflux disease), History of hernia repair, Hyperlipidemia, Hypertension, Multiple lung nodules, Neuropathy, Nicotine abuse, Nicotine abuse, Osteoarthritis, Peripheral vascular disease ([facility] [address] Restless legs syndrome, SOB (shortness of breath), Thyroid nodule, and Unspecified sleep apnea. has a past surgical history that includes Appendectomy; Colonoscopy (20 yrs ago); hernia repair; Cardiac catheterization (2013  JDT); Upper gastrointestinal endoscopy; Upper gastrointestinal endoscopy (2013); Thyroidectomy; Cardiac catheterization (10/27/15  JDT); Eye surgery; vascular surgery (2017); vascular surgery (Left, 2017);  Colonoscopy (2014); pr colsc flx w/removal lesion by hot bx forceps (2017); pr egd transoral biopsy single/multiple (N/A, 10/19/2017); Cardiac catheterization (05/2018); eye muscle surgery; Bladder surgery; and Prostate surgery.     Restrictions     Vision/Hearing        Subjective  General  Chart Reviewed: Yes  Diagnosis: Right AURELIA  Follows Commands: Within Functional Limits  Subjective  Subjective: Planning on discharge home today  Pain Screening  Patient Currently in Pain: No  Vital Signs  Patient Currently in Pain: No       Orientation  Orientation  Overall Orientation Status: Within Normal Limits  Social/Functional History     Cognition        Objective          PROM RLE (degrees)  RLE PROM: WFL  PROM LLE (degrees)  LLE PROM: WFL  Strength RLE  Comment: Grossly 3-/5  Strength LLE  Strength LLE: WFL        Bed mobility  Supine to Sit: Modified independent  Sit to Supine: Modified independent  Scooting: Modified independent  Transfers  Sit to Stand: Contact guard assistance  Stand to sit: Contact guard assistance  Bed to Chair: Contact guard assistance  Ambulation  Ambulation?: Yes  WB Status: WBAT  Ambulation 1  Surface: level tile  Device: Rolling Walker  Assistance: Contact guard assistance  Quality of Gait: Fair  Distance: 50 feet  Comments: Minor safety concerns during transition stand to sit, poor walker management     Balance  Posture: Good  Sitting - Static: Good  Sitting - Dynamic: Good  Standing - Static: Fair;+  Standing - Dynamic: Fair        Plan   Plan  Times per week: 7  Times per day: Daily  Plan weeks: 2  Current Treatment Recommendations: Functional Mobility Training,Transfer Training,Stair training,Gait Training,Safety Education & Training  Safety Devices  Type of devices: Call light within reach,Chair alarm in place,Gait belt,Left in chair,Nurse notified    G-Code       OutComes Score                                                  AM-PAC Score             Goals  Short term goals  Time Frame for Short term goals: 2 weeks  Short term goal 1: Independent with bed mobility and transfers  Short term goal 2: Ambulate 400 feet independently with assistive device  Short term goal 3: Demonstrate safety awareness  Short term goal 4: Ascend and descend 3 steps with hand rail and minimum assist of 1  Patient Goals   Patient goals : Return home       Therapy Time   Individual Concurrent Group Co-treatment   Time In           Time Out           Minutes                   Marta Boss PT       Electronically signed by Marta Boss PT on 3/5/2022 at 8:27 AM

## 2022-03-05 NOTE — OP NOTE
Progress Note    Patient: Antolin Emmanuel Date: 9/12/2019   male, 80 year old  Admit Date: 9/8/2019   Attending: Nataliia Bazzi MD      Subjective:  Antolin Emmanuel is a 80 year old male who is being seen in follow up for NSTEMI (non-ST elevated myocardial infarction) (CMS/formerly [facility]).    Pt denies chest pain.  Still C/o difficulty coughing his phlegm.  Denies SOB.Denies ABP.  Increased weight.           Medications: personally reviewed today in this patient's active orders section of epic  Allergies:   Allergies as of 09/08/2019 - Reviewed 09/08/2019   Allergen Reaction Noted   • Adhesive   (environmental) RASH    • Latex   (environmental) RASH and PRURITUS 10/04/2016   • Penicillins RASH        PHYSICAL EXAM:  Visit Vitals  /58 (BP Location: [facility], Patient Position: Sitting)   Pulse 58   Temp 97.7 °F (36.5 °C) (Oral)   Resp 16   Ht 5' 7\" (1.702 m)   Wt 108.7 kg   SpO2 95%   BMI 37.53 kg/m²     General: A & O X 3 in no acute distress, normocephalic/atraumatic, Mood and Affect appropriate.  Lungs: Decreased breathsounds at the bases, b/l rales  Heart:  Regular rate and rhythm and S1, S2 present  Abdomen: NT/ND;  + B.S.; No guarding or rebound; No peritoneal signs  Extremities: cyanosis absent; no obvious lesions or wounds.1+ b/l Le edema  Neurologic:  CN 2-12 Grossly intact as best as patient can cooperate with exam     strength is bilaterally intact in all 4 extremities , sensation is grossly intact throughout    Labs:  Recent Labs   Lab 09/12/19  0643 09/11/19  0550 09/10/19  1126   WBC 12.4* 12.4* 14.3*   RBC 4.23* 4.28* 4.08*   HGB 13.2 13.3 13.0   HCT 41.9 41.5 39.7    213 218   SEG 73 73 83     Recent Labs   Lab 09/12/19  0643 09/11/19  0550 09/10/19  1126 09/09/19  0650 09/08/19  1220  09/06/19  1407   SODIUM 138 135 136 137 139   < >  --    POTASSIUM 3.8 4.2 4.0 4.0 4.5   < >  --    CHLORIDE 107 101 103 103 103   < >  --    CO2 24 27 23 20* 29   < >  --    BUN 65* 64* 63* 44* 37*   < >  --   RICHARDSON revoPT [facility] M[facility], [address] [facility]                                OPERATIVE REPORT    PATIENT NAME: Debra Diggs                   :        1950  MED REC NO:   [id number]                              ROOM:       [facility]  ACCOUNT NO:   [de-identified]                           ADMIT DATE: 2022  PROVIDER:     Christopher Casanova MD    DATE OF PROCEDURE:  2022    PREOPERATIVE DIAGNOSIS:  End-stage arthritis, right hip. POSTOPERATIVE DIAGNOSIS:  End-stage arthritis, right hip. PROCEDURE PERFORMED:  1. Right total hip arthroplasty. 2.  Use of computer navigation for assessment of leg length and  component placement. Surgical code 0700Z. SURGEON:  Christopher Casanova MD    FIRST SURGICAL ASSISTANT:  Tyrone Farmer PA-C. Please note, he is a  critical assistant in exposure and placement of implants. ANESTHESIA:  General after a failed attempted at spinal anesthesia. EBL:  500 mL. FLUIDS:  1300 mL of crystalloid and 250 mL of albumin. URINE OUTPUT:  400 mL. COMPONENTS USED:  DePuy hip system, acetabular shell size 58 GRIPTION  PINNACLE shell with a 25 mm screw, 36 mm polyethylene liner, stem is a  size 10 high-offset ACTIS stem, head is a 36 mm +5 ceramic head. SPECIMENS:  Right femoral head, placed in formalin for pathology. INDICATIONS:  A 24-year-old gentleman who actually presented with severe  knee pain. It was noted in the clinic that he had complete collapse of  his femoral head and noted to have arthritis of his right hip with an  audible click and radiating pain all the way to his knee. Because of  this, he elected for the above procedure. OPERATIVE PROCEDURE:  After informed consent, he was given 2 gm of  Ancef, 1 gm of tranexamic acid. After a failed attempt at spinal  anesthesia, he underwent general anesthesia. Castanon catheter was  inserted. He was placed on the Mcallen table.   A combined   CREATININE 1.88* 2.14* 2.03* 1.71* 1.56*   < >  --    GFRNA 33 29 30 38 42   < >  --    GLUCOSE 108* 137* 208* 206* 171*   < >  --    CALCIUM 10.3* 10.6* 10.5* 10.7* 11.2*   < >  --    ALBUMIN  --   --  3.5* 3.6 3.6  --   --    AST  --   --  85* 208* 16  --   --    GPT  --   --  36 42 24  --   --    BILIRUBIN  --   --  0.7 1.0 0.7  --   --    ALKPT  --   --  84 92 93  --   --    INR  --   --   --  1.7 1.7  --  1.9    < > = values in this interval not displayed.       Assessment & Plan:     · NSTEMI with CAD with diffuse vessel disease -     S/p [facility] 9//9.19- diffuse vessel dx  CTS planning for CABG.  CABG for tomorrow cancelled due to fluid overload  C/w heparin gtt, brilinta, ASA, coreg, lipitor, losartan.  Cardiology following.  Echo- EF-37% with akinesis of apex    · Acute on chronic hypoxic respiratory failure -   · Resolved following use of lasix.  · Secondary to pulmonary edema, less likely due to COD exacerbation.  · Per pulmonary to receive 1 more day of levaquin    · Diabetes mellitus type 2 on long term insulin with chronic kidney disease stage 3 -   · Hba1c- 6.5 8/19. Will slowly titrate insulin regimen to PTA dose.    · hypercalcemia -   · Improving, will check iPTH and PrPTH     · GERD, essential hypertension, hx of CVA, COPD:  · Stable, c/w Asa, lipitor  · DVT Prophylaxis- on heparin gtt    Central Line- none  Kidd Catheter- none    Code status: Full Resuscitation    Disposition: for CABG date to be resheduled    Nataliia Bazzi MD  Hospitalist  9/12/2019  2:23 PM           This revealed that we could size up by one  or two sizes. We broached up to a size 10 broach, used our calcar  planer. The final size 10 high-offset ACTIS stem was press-fit down the  canal and had excellent purchase. The final 36 mm +5 ceramic head was  placed onto the Earlean Post taper. The hip was reduced. We had excellent  stability to anterior maneuvers including hyperextension to the floor  and a maximal external rotation and there was no evidence of any  impingement of the femoral neck on the cup or the liner above. Fluoroscopic views revealed that we lengthened the leg by 16 mm, added  10 mm to the femoral offset and 16 mm to the total offset, which was  well within our goals as it was about 17 mm short radiographically by  our [address]. We used a total of 3 liters of  irrigation. We mixed 20 mL of Exparel with 30 mL of saline and 50 mL of  0.25% Marcaine. We injected the TFL and rectus muscles with this  solution. TFL fascia was closed with 0 Vicryl suture, 2-0 Vicryl suture  for subcutaneous tissues, and 3-0 Vicryl for subcuticular stitch. Prineo Dermabond and soft dressings were applied. The patient was taken  to the recovery room in stable condition. POSTOPERATIVE PLANS:  1. He will be on our typical postoperative total hip arthroplasty  protocol. 2.  He will be on 2 doses of Ancef and 6 doses of clindamycin. 3.  He will be on Xarelto 10 mg a day for 21 days. We will not stop his  aspirin, we will keep him on his aspirin and for weeks 4, 5 and 6, we  will double up his aspirin at 81 mg twice a day and then go back to his  aspirin regimen. 4.  Lastly, we will follow his femoral head pathology.         Nell Ayala MD    D: 03/04/2022 15:18:11      T: 03/04/2022 22:03:08     [email or web address]  Job#: [id number]     Doc#: [id number]

## 2022-03-05 NOTE — PROGRESS NOTES
PHYSICAL THERAPY  Daily Treatment Note    DATE:  3/5/2022  NAME:  Linus Goldmann  :  1950  (73 y.o.,male)  MRN:  [id number]      Subjective  General  Chart Reviewed: Yes  Diagnosis: Right AURELIA  Follows Commands: Within Functional Limits  Subjective  Subjective: Planning on discharge home today  Pain Screening  Patient Currently in Pain: No  Vital Signs  Patient Currently in Pain: No       HOME LIVING:       RESTRICTIONS/PRECAUTIONS:         OVERALL  ORIENTATION STATUS:  Overall Orientation Status: Within Normal Limits    STRENGTH  Strength RLE  Comment: Grossly 3-/5  Strength LLE  Strength LLE: WFL    ROM   PROM RLE (degrees)  RLE PROM: WFL  PROM LLE (degrees)  LLE PROM: WFL     BALANCE  Balance  Posture: Good  Sitting - Static: Good  Sitting - Dynamic: Good  Standing - Static: Fair,+  Standing - Dynamic: Fair    BED MOBILITY  Bed Mobility  Scooting: Modified independent    TRANSFERS  Transfers  Sit to Stand: Contact guard assistance  Stand to sit: Contact guard assistance  Bed to Chair: Contact guard assistance    AMBULATION  Device: Rolling Walker  Assistance: Contact guard assistance  Distance: 50 feet    STAIRS             COMMENTS:  Returned to Chair at patient request  Call light within reach  Chair alarm activated  Patient instructed to request assistance before getting up  Nursing updated        Electronically signed by Ema Oviedo PT on 3/5/2022 at 8:27 AM

## 2022-03-05 NOTE — PROGRESS NOTES
Subjective:     Post-Operative Day: 1 Status Post right AURELIA. Pt is awake and alert. Ox3. Doing well this am.  He was able to ambulate 50 ft with PT this am.  He is resting in his chair. No other issues. Systemic or Specific Complaints:No Complaints  no nausea and no vomiting Pain 4    Objective:     Patient Vitals for the past 24 hrs:   BP Temp Temp src Pulse Resp SpO2 Height Weight   03/05/22 0821 101/68 -- -- 68 -- -- -- --   03/05/22 0814 -- 97.3 °F (36.3 °C) Temporal 77 18 98 % -- --   03/04/22 2130 118/85 97.5 °F (36.4 °C) Temporal 71 18 93 % -- --   03/04/22 1707 104/80 95.5 °F (35.3 °C) Temporal 62 16 97 % -- --   03/04/22 1552 107/74 95.7 °F (35.4 °C) Temporal 63 14 97 % -- --   03/04/22 1529 106/77 95.4 °F (35.2 °C) Temporal 70 14 97 % -- --   03/04/22 1500 121/85 -- -- 62 9 96 % -- --   03/04/22 1455 -- -- -- 71 12 94 % -- --   03/04/22 1450 120/62 -- -- 68 21 94 % -- --   03/04/22 1448 -- -- -- 74 -- -- -- --   03/04/22 1447 -- -- -- 65 14 96 % -- --   03/04/22 1445 115/86 -- -- 67 13 95 % -- --   03/04/22 1440 112/78 -- -- 77 20 95 % -- --   03/04/22 1435 113/68 -- -- 67 13 94 % -- --   03/04/22 1430 120/73 -- -- 73 14 95 % -- --   03/04/22 1425 108/72 -- -- 69 14 95 % -- --   03/04/22 1420 98/66 -- -- 76 17 96 % -- --   03/04/22 1415 (!) 123/101 -- -- 65 9 96 % -- --   03/04/22 1410 103/67 -- -- 71 11 95 % -- --   03/04/22 1407 -- 96.9 °F (36.1 °C) -- -- -- -- -- --   03/04/22 1405 110/78 96.9 °F (36.1 °C) Temporal 71 15 96 % -- --   03/04/22 0931 120/81 97.7 °F (36.5 °C) Tympanic 65 12 99 % 6' 2\" (1.88 m) 225 lb (102.1 kg)       General: alert, appears stated age, cooperative and no distress   Exam: Good active motion to right lower extremity   Wound: Wound clean and dry no evidence of infection. , No Drainage and Wound Intact   Neurovascular: Exam normal     DVT Exam: No evidence of DVT seen on physical exam.   Xray:  right Hip implants in good position       Data Review:  Recent Labs 03/05/22 0349   HGB 10.1*     Recent Labs     03/05/22 0349   *   K 4.7   CREATININE 0.9     Recent Labs     03/05/22 0349   LABGLOM >60     No results for input(s): INR in the last 72 hours. Assessment:     Status Post right AURELIA. Doing well postoperatively. Plan:     Pt wants to go home. He has done well with PT.  Okay to discharge to home with home health as per anterior hip protocol. He may f/u in office in 6 weeks or sooner for issues.       Electronically signed by Lara Partida PA-C on 3/5/2022 at 8:55 AM

## 2022-03-05 NOTE — PROGRESS NOTES
Physical Therapy  Ro Wylie  [id number]     03/05/22 1133   Subjective   Subjective Attempt, patient states he feels comfortable with going home and has been up and down again with nursing assist in the room. Patient in bed resting at this time awaiting discharge.    Electronically signed by Tae Cohn PTA on 3/5/2022 at 11:34 AM

## 2022-03-07 ENCOUNTER — TELEPHONE (OUTPATIENT)
Dept: INPATIENT UNIT | Age: 72
End: 2022-03-07

## 2022-03-07 NOTE — CARE COORDINATION
Home Health Referral received over weekend by [facility] 9 Intake. Patient set up with [facility].   Electronically signed by Kathryn Sharif on 3/7/22 at 8:31 AM CST

## 2022-03-18 PROBLEM — E87.0 HYPERNATREMIA: Status: ACTIVE | Noted: 2020-11-11

## 2022-03-19 PROBLEM — N17.9 ACUTE RENAL FAILURE (ARF) (HCC): Status: ACTIVE | Noted: 2020-11-11

## 2022-03-19 PROBLEM — I48.91 ATRIAL FIBRILLATION WITH RVR (HCC): Status: ACTIVE | Noted: 2020-11-11
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
## 2022-03-20 PROBLEM — S01.81XA LACERATION OF FOREHEAD: Status: ACTIVE | Noted: 2020-11-11

## 2022-05-04 ENCOUNTER — OFFICE VISIT (OUTPATIENT)
Dept: CARDIOLOGY CLINIC | Age: 72
End: 2022-05-04
Payer: MEDICARE

## 2022-05-04 VITALS
OXYGEN SATURATION: 100 % | WEIGHT: 231 LBS | HEIGHT: 75 IN | DIASTOLIC BLOOD PRESSURE: 80 MMHG | BODY MASS INDEX: 28.72 KG/M2 | SYSTOLIC BLOOD PRESSURE: 124 MMHG | HEART RATE: 82 BPM

## 2022-05-04 DIAGNOSIS — I25.10 MILD CAD: Primary | ICD-10-CM

## 2022-05-04 DIAGNOSIS — R07.9 CHRONIC CHEST PAIN: ICD-10-CM

## 2022-05-04 DIAGNOSIS — I10 ESSENTIAL HYPERTENSION: ICD-10-CM

## 2022-05-04 DIAGNOSIS — E78.2 MIXED HYPERLIPIDEMIA: ICD-10-CM

## 2022-05-04 DIAGNOSIS — G89.29 CHRONIC CHEST PAIN: ICD-10-CM

## 2022-05-04 PROCEDURE — G8417 CALC BMI ABV UP PARAM F/U: HCPCS | Performed by: NURSE PRACTITIONER

## 2022-05-04 PROCEDURE — 1123F ACP DISCUSS/DSCN MKR DOCD: CPT | Performed by: NURSE PRACTITIONER

## 2022-05-04 PROCEDURE — 3017F COLORECTAL CA SCREEN DOC REV: CPT | Performed by: NURSE PRACTITIONER

## 2022-05-04 PROCEDURE — 4040F PNEUMOC VAC/ADMIN/RCVD: CPT | Performed by: NURSE PRACTITIONER

## 2022-05-04 PROCEDURE — G8427 DOCREV CUR MEDS BY ELIG CLIN: HCPCS | Performed by: NURSE PRACTITIONER

## 2022-05-04 PROCEDURE — 1036F TOBACCO NON-USER: CPT | Performed by: NURSE PRACTITIONER

## 2022-05-04 PROCEDURE — 99214 OFFICE O/P EST MOD 30 MIN: CPT | Performed by: NURSE PRACTITIONER

## 2022-05-04 RX ORDER — RANOLAZINE 500 MG/1
500 TABLET, EXTENDED RELEASE ORAL 2 TIMES DAILY
Qty: 60 TABLET | Refills: 5 | Status: SHIPPED | OUTPATIENT
Start: 2022-05-04

## 2022-05-04 RX ORDER — NITROGLYCERIN 0.4 MG/1
0.4 TABLET SUBLINGUAL EVERY 5 MIN PRN
Qty: 25 TABLET | Refills: 2 | Status: SHIPPED | OUTPATIENT
Start: 2022-05-04

## 2022-05-04 NOTE — PATIENT INSTRUCTIONS
New instructions for today:  Add Ranexa 500 mg (1) tab twice daily for chest pain. How to take:  NITROGLYCERIN (Nitrostat) 0.4 mg tablets, sublingual.  Nitroglycerin is in a group of drugs called nitrates. Nitroglycerin dilates (widens) blood vessels, making it easier for blood to flow through them and easier for the heart to pump. Dosing Guidelines for Nitroglycerin Tablets  · At the start of an angina (chest pain) attack, place one tablet under the tongue or between the cheek and gum. Do not swallow or chew the tablet; let it dissolve on its own. If necessary, a second and third tablet may be used, with five minutes between using each tablet. If you use a third tablet and your chest pain continues, it is time to seek immediate medical attention. Call 911 immediately and have someone drive you to the emergency room. You may be having a heart attack or other serious heart problem. · To prevent angina from exercise or stress, use 1 tablet 5 to 10 minutes before the activity. Patient Instructions:  Continue current medications as prescribed. Always keep a current medication list. Bring your medications to every office visit. Continue to follow up with primary care provider for non cardiac medical problems. Call the office with any problems, questions or concerns at [phone]. If you have been asked to keep a blood pressure log, do so for 2 weeks. Call the office to report readings to the triage nurse at [phone]. Follow up with cardiologist as scheduled. The following educational material has been included in this after visit summary for your review: Life simple 7. Heart health. Life simple 7  1) Manage blood pressure - high blood pressure is a major risk factor for heart disease and stroke. Keeping blood pressure in health range reduces strain on your heart, arteries and kidneys. Blood pressure goal is less than 130/80.    2) Control cholesterol - contributes to plaque, which can clog arteries and lead to heart disease and stroke. When you control your cholesterol you are giving your arteries their best chance to remain clear. It is recommended that you get cholesterol lab work done once a year. 3) Reduce blood sugar - most of the food we eat is turning into glucose or blood sugar that our body uses for energy. Over time, high levels of blood sugar can damage your heart, kidneys, eyes and nerves. 4) Get active - living an active life is one of the most rewarding gifts you can give yourself and those you love. Simply put, daily physical activity increases your length and quality of life. Strive to exercise 15 minutes most days of the week. 5)  Eat better - A healthy diet is one of your best weapons for fighting cardiovascular disease. When you eat a heart healthy diet, you improve your chances for feeling good and staying healthy for life. 6)  Lose weight - when you shed extra fat an unnecessary pounds, you reduce the burden on your hear, lungs, blood vessels and skeleton. You give yourself the gift of active living, you lower your blood pressure and help yourself feel better. 7) Stop smoking - cigarette smokers have a higher risk of developing cardiovascular disease. If  You smoke, quitting is the best thing you can do for your health. Check American Heart Association on line for more information on Life's Simple 7 and tips for healthy living. A Healthy Heart: Care Instructions  Your Care Instructions     Coronary artery disease, also called heart disease, occurs when a substance called plaque builds up in the vessels that supply oxygen-rich blood to your heart muscle. This can narrow the blood vessels and reduce blood flow. A heart attack happens when blood flow is completely blocked. A high-fat diet, smoking, and other factors increase the risk of heart disease. Your doctor has found that you have a chance of having heart disease.  You can do lots of things to keep your heart healthy. It may not be easy, but you can change your diet, exercise more, and quit smoking. These steps really work to lower your chance of heart disease. Follow-up care is a key part of your treatment and safety. Be sure to make and go to all appointments, and call your doctor if you are having problems. It's also a good idea to know your test results and keep a list of the medicines you take. How can you care for yourself at home? Diet  · Use less salt when you cook and eat. This helps lower your blood pressure. Taste food before salting. Add only a little salt when you think you need it. With time, your taste buds will adjust to less salt. · Eat fewer snack items, fast foods, canned soups, and other high-salt, high-fat, processed foods. · Read food labels and try to avoid saturated and trans fats. They increase your risk of heart disease by raising cholesterol levels. · Limit the amount of solid fat-butter, margarine, and shortening-you eat. Use olive, peanut, or canola oil when you cook. Bake, broil, and steam foods instead of frying them. · Eat a variety of fruit and vegetables every day. Dark green, deep orange, red, or yellow fruits and vegetables are especially good for you. Examples include spinach, carrots, peaches, and berries. · Foods high in fiber can reduce your cholesterol and provide important vitamins and minerals. High-fiber foods include whole-grain cereals and breads, oatmeal, beans, brown rice, citrus fruits, and apples. · Eat lean proteins. Heart-healthy proteins include seafood, lean meats and poultry, eggs, beans, peas, nuts, seeds, and soy products. · Limit drinks and foods with added sugar. These include candy, desserts, and soda pop. Lifestyle changes  · If your doctor recommends it, get more exercise. Walking is a good choice. Bit by bit, increase the amount you walk every day. Try for at least 30 minutes on most days of the week.  You also may want to swim, bike, or do other activities. · Do not smoke. If you need help quitting, talk to your doctor about stop-smoking programs and medicines. These can increase your chances of quitting for good. Quitting smoking may be the most important step you can take to protect your heart. It is never too late to quit. · Limit alcohol to 2 drinks a day for men and 1 drink a day for women. Too much alcohol can cause health problems. · Manage other health problems such as diabetes, high blood pressure, and high cholesterol. If you think you may have a problem with alcohol or drug use, talk to your doctor. Medicines  · Take your medicines exactly as prescribed. Call your doctor if you think you are having a problem with your medicine. · If your doctor recommends aspirin, take the amount directed each day. Make sure you take aspirin and not another kind of pain reliever, such as acetaminophen (Tylenol). When should you call for help? [id number] if you have symptoms of a heart attack. These may include:  · Chest pain or pressure, or a strange feeling in the chest.  · Sweating. · Shortness of breath. · Pain, pressure, or a strange feeling in the back, neck, jaw, or upper belly or in one or both shoulders or arms. · Lightheadedness or sudden weakness. · A fast or irregular heartbeat. After you call 911, the  may tell you to chew 1 adult-strength or 2 to 4 low-dose aspirin. Wait for an ambulance. Do not try to drive yourself. Watch closely for changes in your health, and be sure to contact your doctor if you have any problems. Where can you learn more? Go to https://Hoseannatarik.[employer]. org and sign in to your [employer] account. Enter T771 in the Humansized box to learn more about \"A Healthy Heart: Care Instructions. \"     If you do not have an account, please click on the \"Sign Up Now\" link. Current as of: December 16, 2019               Content Version: 12.5  © 0644-1665 Healthwise, [facility].    Care instructions adapted under license by [facility] ([facility]). If you have questions about a medical condition or this instruction, always ask your healthcare professional. [address] any warranty or liability for your use of this information.

## 2022-05-04 NOTE — PROGRESS NOTES
[facility], [place]. [address], [facility] [address] [facility]  [phone] office  [phone] fax      OFFICE VISIT:  Blake Santana - : 1950  Reason For Visit:  Sanjuana Ayon is a 81 y.o. male who is here for 3 Month Follow-Up (Still having some SOA, and occasional chest pain. ) and Coronary Artery Disease    History:   Diagnosis Orders   1. Mild CAD     2. Essential hypertension     3. Mixed hyperlipidemia     4. Chronic chest pain       The patient presents today for cardiology follow up. Mr. Collin Butler reports chronic chest pain and RUSSO which has not changed. He also reports some concern over short term memory loss which has discussed with Dr. Ketty Luis recently. The patient recently had right THR and is recovering well. BP is well controlled on current regimen. The patient's PCP monitors cholesterol. The patient stopped smoking just prior to hip surgery. Subjective  Sanjuana Ayon denies orthopnea, paroxysmal nocturnal dyspnea, syncope, presyncope, sensed arrhythmia, edema and fatigue. The patient denies numbness or weakness to suggest cerebrovascular accident or transient ischemic attack. + chronic but stable chest pain with hx of mild CAD. + stable RUSSO.     Isabel Casper has the following history as recorded in [facility]:  Patient Active Problem List   Diagnosis Code    Abdominal pain, other specified site R10.9    Nausea R11.0    Dysphagia R13.10    Weight loss R63.4    Fatigue R53.83    Heartburn R12    Hoarseness R49.0    Regurgitation [id number]    Hyperlipidemia E78.5    Nicotine abuse Z72.0    Family history of premature CAD Z82.49    Non-cardiac chest pain R07.89    Essential hypertension I10    Mild CAD I25.10    Change in bowel habits R19.4    Fecal incontinence R15.9    Rectal pain K62.89    Pelvic pain in male R10.2    Family history of colon cancer Z80.0    Ex-cigarette smoker Z87.891    Chest pressure R07.89    Varicose veins of bilateral lower extremities with pain I83.813    Venous insufficiency of both lower extremities I87.2    Swelling of both lower extremities M79.89    Rectal bleeding K62.5    Altered bowel function R19.8    Unexplained weight loss R63.4    Generalized abdominal pain R10.84    Excessive gas R14.3    History of adenomatous polyp of colon Z86.010    Anemia D64.9    Internal hemorrhoids K64.8    Atypical chest pain R07.89    Smoker F17.200    Cigarette nicotine dependence without complication S84.605    Chest pain at rest R07.9    Abnormal EKG R94.31    Primary osteoarthritis of right hip M16.11     Past Medical History:   Diagnosis Date    Allergic rhinitis     Anemia     Asthma     CAD (coronary artery disease) 4/11/2014    Chest tightness, discomfort, or pressure 6/17/2013 6/17/2013  lexiscan Positive for inferior myocardial ischemia, EF 47%, 9% ischemic myocardium on stress, intermediate risk findings, AUC indication 16, AUC score 7     CH[facility]congestive heart failure) ([facility])     Chronic back pain     COPD (chronic obstructive pulmonary disease) ([facility] [address]     Ex-cigarette smoker 10/27/2015    Family history of early CAD 6/24/2013    Family history of premature CAD     GERD (gastroesophageal reflux disease)     History of hernia repair     [facility]lipidemia     [facility]    Hypertension     Multiple lung nodules     Neuropathy     Nicotine abuse     Nicotine abuse     Osteoarthritis     Peripheral vascular disease (HCC)     Restless legs syndrome     SOB (shortness of breath)     Thyroid nodule     Unspecified sleep apnea     can't wear cpap     Past Surgical History:   Procedure Laterality Date    APPENDECTOMY      BLADDER SURGERY      x 3    CARDIAC CATHETERIZATION  6/24/2013  JDT    EF 50%    CARDIAC CATHETERIZATION  10/27/15  JDT    EF 50%    CARDIAC CATHETERIZATION  05/2018    mild, non-obstructive CAD    COLONOSCOPY  20 yrs ago    not sure who, thinks @ [facility]    COLONOSCOPY  2014    TAx2, HP, intern hemorrhoids (3 yr)   2422       with mesh-Dr France Trimble    MO COLSC FLX W/REMOVAL LESION BY HOT BX FORCEPS  2017    Dr Shelton-internal hemorrhoids, tubular AP (-) dysplasia--5 yr recall    MO EGD TRANSORAL BIOPSY SINGLE/MULTIPLE N/A 10/19/2017    Dr Shelton-normal-Esme (-) chronic nonspecific inflammation    PROSTATE SURGERY      THYROIDECTOMY      TOTAL HIP ARTHROPLASTY Right 3/4/2022    RIGHT TOTAL HIP ARTHROPLASTY performed by Chen Goldstein MD at [facility]      thinks so, but a long time ago if so    UPPER GASTROINTESTINAL ENDOSCOPY  2013    Cat: Grade A esophagitis and esophageal stricture. Dilated 54fr    VASCULAR SURGERY  2017    SJS. Right IJV US 9f sheath.     VASCULAR SURGERY Left 2017    SJS-L GSV RFA     Family History   Problem Relation Age of Onset    Cancer Mother         lung and brain    Coronary Art Dis Mother     Coronary Art Dis Sister     Colon Cancer Sister     Colon Polyps Sister     Coronary Art Dis Father     Colon Cancer Maternal Grandmother     Colon Polyps Maternal Grandmother     Colon Cancer Sister     Colon Polyps Sister    [facility] Cancer Sister         \"female cancer\"   [facility] Cancer Sister         thryoid    Coronary Art Dis Brother     Heart Failure Brother     Esophageal Cancer Neg Hx     Liver Cancer Neg Hx     Stomach Cancer Neg Hx      Social History     Tobacco Use    Smoking status: Former Smoker     Types: Cigarettes     Quit date: 2022     Years since quittin.2    Smokeless tobacco: Never Used    Tobacco comment: some days he smokes, some days he doesn't   Substance Use Topics    Alcohol use: No     Alcohol/week: 0.0 standard drinks      Current Outpatient Medications   Medication Sig Dispense Refill    nitroGLYCERIN (NITROSTAT) 0.4 MG SL tablet Place 1 tablet under the tongue every 5 minutes as needed for Chest pain up to max of 3 total doses. If no relief after 1 dose, call 911. 25 tablet 2    rivaroxaban (XARELTO) 10 MG TABS tablet Take 1 tablet by mouth daily for 20 days 20 tablet 0    ondansetron (ZOFRAN) 4 MG tablet Take 1 tablet by mouth every 8 hours as needed for Nausea or Vomiting 30 tablet 0    primidone (MYSOLINE) 50 MG tablet Take 50 mg by mouth nightly 1/2 tablet at bedtime for tremors      fluticasone-salmeterol (ADVAIR DISKUS) 500-50 MCG/DOSE diskus inhaler Inhale 1 puff into the lungs every 12 hours Indications: uses prn 60 each 0    tamsulosin (FLOMAX) 0.4 MG capsule Take 1 capsule by mouth daily      metoprolol succinate (TOPROL XL) 25 MG extended release tablet TAKE ONE TABLET BY MOUTH 2 TIMES A DAY (Patient taking differently: Take 25 mg by mouth daily TAKE ONE TABLET BY MOUTH 2 TIMES A DAY) 180 tablet 3    isosorbide mononitrate (IMDUR) 30 MG extended release tablet Take 1 tablet by mouth nightly 90 tablet 3    gabapentin (NEURONTIN) 300 MG capsule Take 300 mg by mouth 3 times daily. Take 3 tablets twice daily       esomeprazole (NEXIUM) 40 MG delayed release capsule Take 40 mg by mouth daily       aspirin 325 MG tablet Take 325 mg by mouth daily      topiramate (TOPAMAX) 100 MG tablet Take 100 mg by mouth daily       beclomethasone (QVAR) 80 MCG/ACT inhaler Inhale 1 puff into the lungs 2 times daily       albuterol (PROAIR HFA) 108 (90 BASE) MCG/ACT inhaler Inhale 2 puffs into the lungs every 4 hours as needed.  tiotropium (SPIRIVA HANDIHALER) 18 MCG inhalation capsule Inhale 18 mcg into the lungs daily. No current facility-administered medications for this visit. Allergies: Bactrim [sulfamethoxazole-trimethoprim], Nsaids, Succinylcholine, and Betadine [povidone iodine]    Review of Systems  Constitutional - no appetite change, or unexpected weight change. No fever, chills or diaphoresis. No significant change in activity level or new onset of fatigue. HEENT - no significant rhinorrhea or epistaxis. No tinnitus or significant hearing loss. Eyes - no sudden vision change or amaurosis. No corneal arcus, xantholasma, subconjunctival hemorrhage or discharge. Respiratory - no significant wheezing, stridor, apnea or cough. + stable RUSSO. Cardiovascular - no orthopnea or PND. No sensation of sustained arrythmia. No occurrence of slow heart rate. No palpitations. No claudication. + stable chronic chest pain with hx of mild CAD. Gastrointestinal - no abdominal swelling or pain. No blood in stool. No severe constipation, diarrhea, nausea, or vomiting. Genitourinary - no dysuria, frequency, or urgency. No flank pain or hematuria. Musculoskeletal - no back pain or myalgia. Ambulates with limp due to THR. Extremities - no clubbing, cyanosis or extremity edema. Skin - no color change or rash. No pallor. No new surgical incision. Neurologic - no speech difficulty, facial asymmetry or lateralizing weakness. No seizures, presyncope or syncope. No significant dizziness. Hematologic - no easy bruising or excessive bleeding. Psychiatric - no severe anxiety or insomnia. No confusion. All other review of systems are negative. Objective  Vital Signs - /80   Pulse 82   Ht 6' 3\" (1.905 m)   Wt 231 lb (104.8 kg)   SpO2 100%   BMI 28.87 kg/m²   General - Taty Hercules is alert, cooperative, and pleasant. Well groomed. No acute distress. Body habitus - Body mass index is 28.87 kg/m². HEENT - Head is normocephalic. No circumoral cyanosis. Dentition is normal.  EYES -   Lids normal without ptosis. No discharge, edema or subconjunctival hemorrhage. Neck - Symmetrical without apparent mass or lymphadenopathy. Respiratory - Normal respiratory effort without use of accessory muscles. Ausculatation reveals vesicular breath sounds without crackles, wheezes, rub or rhonchi. Cardiovascular - No jugular venous distention. Auscultation reveals regular rate and rhythm. No audible clicks, gallop or rub. No murmur. No lower extremity varicosities. No carotid bruits. Abdominal -  No visible distention, mass or pulsations. Extremities - No clubbing or cyanosis. No statis dermatitis or ulcers. No edema. Musculoskeletal -   No Osler's nodes. No kyphosis or scoliosis. Gait is even and regular with limp. Ambulates without assistance. Skin -  Warm and dry; no rash or pallor. No new surgical wound. Neurological - No focal neurological deficits. Thought processes coherent. No apparent tremor. Oriented to person, place and time. Psychiatric -  Appropriate affect and mood. Data reviewed:  Prior Cardiac History -   6/17/2013  lexiscan  Positive for inferior myocardial ischemia, EF 47%, 9% ischemia at risk  6/24/2013  Cath  100% diag with left to left collaterals, anterior lateral hypo, EF 50%  10/26/2015  DSE Positive for clinical myocardial ischemia, discordant risk findings, AUC indication 20, AUC score 7  10/27/15  Cath  100% diag with left to left collaterals, anterior lateral hypo, EF 50%  9/29/16  lexiscan Positive for inferior reverese myocardial ischemia, EF 41%, 1/8% ischemic myocardium on stress, uninterpretable risk findings  5/29/2018  ACS LEA RISK Score 5 (angina, CAD>50, age>65, cigarettes, hypertension, hypercholesteremia, ASA ), AUC indication 3, AUC score 9   5/30/18  Cath mild CAD, anterior lateral hypo, EF 50%    3/2/22 Lexiscan  Conclusions   This is an abnormal pharmacologic nuclear stress test with a fixed   apical defect that possibly represents normal perfusion dropped out or   represents scar. There was no ischemia. Decrease in global wall   motion, no segmental abnormalities.    Signed by Dr Rose Mary Flood DO   3/3/2022  1:13 PM CST         Please inform patient that the nuclear stress test has a slight abnormality but of no concern and I can clear him to proceed with hip surgery and general anesthesia with low risk. Lab Results   Component Value Date    WBC 4.9 02/10/2022    HGB 10.1 (L) 03/05/2022    HCT 31.8 (L) 03/05/2022    MCV 97.9 (H) 02/10/2022     02/10/2022     Lab Results   Component Value Date     (L) 03/05/2022    K 4.7 03/05/2022     03/05/2022    CO2 22 03/05/2022    BUN 18 03/05/2022    CREATININE 0.9 03/05/2022    GLUCOSE 119 (H) 03/05/2022    CALCIUM 8.1 (L) 03/05/2022    PROT 5.9 (L) 02/10/2022    LABALBU 3.7 02/10/2022    BILITOT <0.2 02/10/2022    ALKPHOS 78 02/10/2022    AST 19 02/10/2022    ALT 10 02/10/2022    LABGLOM >60 03/05/2022    GFRAA >59 03/05/2022    GLOB 2.3 09/29/2016       Lab Results   Component Value Date    CHOL 189 10/31/2019    CHOL 113 (L) 09/29/2016    CHOL 167 10/26/2015     Lab Results   Component Value Date    TRIG 132 10/31/2019    TRIG 103 (L) 09/29/2016    TRIG 107 (L) 10/26/2015     Lab Results   Component Value Date    HDL 46 (L) 10/31/2019    HDL 35 (L) 09/29/2016    HDL 39 (L) 10/26/2015     Lab Results   Component Value Date    LDLCALC 117 10/31/2019    LDLCALC 57 09/29/2016       BP Readings from Last 3 Encounters:   05/04/22 124/80   03/05/22 101/68   03/04/22 (!) 82/49    Pulse Readings from Last 3 Encounters:   05/04/22 82   03/05/22 68   02/22/22 97        Wt Readings from Last 3 Encounters:   05/04/22 231 lb (104.8 kg)   03/04/22 225 lb (102.1 kg)   02/22/22 225 lb (102.1 kg)     Assessment/Plan:   Diagnosis Orders   1. Mild CAD     2. Essential hypertension     3. Mixed hyperlipidemia     4. Chronic chest pain       Stable CV status without overt heart failure, sensed arrhythmia or angina. Mild CAD with chronic stable chest pain -   Current medical management includes Toprol XL, Imdur and ASA. The patient has taken Ranexa in the past which was helpful. [facility] reports [facility]. Add back in Ranexa 500 mg BID.   Follow up in one month. HTN - normotensive on current regimen. BP today 124/80. Continue same. Hyperlipidemia - monitored and managed by PCP. Patient is compliant with medication regimen. Previous cardiac history and records reviewed. Continue current medical management for cardiac related condition. Continue other current medications as directed. Continue to follow up with primary care provider for non cardiac medical problems. If your primary care provider is outside of the [facility], please request that your labs be faxed to this office at [phone]. BP goal 130/80 or less. Call the office with any problems, questions or concerns at [phone]. Cardiology follow up as scheduled in [address] appointments. Educational included in patient instructions. Heart health. NTG sl.      Addison Bamberger, APRN

## 2022-06-15 ENCOUNTER — TRANSCRIBE ORDERS (OUTPATIENT)
Dept: ADMINISTRATIVE | Facility: HOSPITAL | Age: 72
End: 2022-06-15

## 2022-06-15 ENCOUNTER — OFFICE VISIT (OUTPATIENT)
Dept: CARDIOLOGY CLINIC | Age: 72
End: 2022-06-15
Payer: OTHER GOVERNMENT

## 2022-06-15 VITALS
WEIGHT: 233 LBS | HEART RATE: 64 BPM | DIASTOLIC BLOOD PRESSURE: 80 MMHG | SYSTOLIC BLOOD PRESSURE: 116 MMHG | HEIGHT: 74 IN | BODY MASS INDEX: 29.9 KG/M2 | OXYGEN SATURATION: 97 %

## 2022-06-15 DIAGNOSIS — I25.10 MILD CAD: Primary | ICD-10-CM

## 2022-06-15 DIAGNOSIS — I10 ESSENTIAL HYPERTENSION: ICD-10-CM

## 2022-06-15 DIAGNOSIS — R91.1 SOLITARY PULMONARY NODULE: Primary | ICD-10-CM

## 2022-06-15 DIAGNOSIS — E78.2 MIXED HYPERLIPIDEMIA: ICD-10-CM

## 2022-06-15 PROCEDURE — G8417 CALC BMI ABV UP PARAM F/U: HCPCS | Performed by: NURSE PRACTITIONER

## 2022-06-15 PROCEDURE — 99214 OFFICE O/P EST MOD 30 MIN: CPT | Performed by: NURSE PRACTITIONER

## 2022-06-15 PROCEDURE — 1123F ACP DISCUSS/DSCN MKR DOCD: CPT | Performed by: NURSE PRACTITIONER

## 2022-06-15 PROCEDURE — 1036F TOBACCO NON-USER: CPT | Performed by: NURSE PRACTITIONER

## 2022-06-15 PROCEDURE — 3017F COLORECTAL CA SCREEN DOC REV: CPT | Performed by: NURSE PRACTITIONER

## 2022-06-15 PROCEDURE — G8427 DOCREV CUR MEDS BY ELIG CLIN: HCPCS | Performed by: NURSE PRACTITIONER

## 2022-06-15 RX ORDER — ISOSORBIDE MONONITRATE 60 MG/1
60 TABLET, EXTENDED RELEASE ORAL NIGHTLY
Qty: 90 TABLET | Refills: 3 | Status: SHIPPED | OUTPATIENT
Start: 2022-06-15

## 2022-06-15 NOTE — PATIENT INSTRUCTIONS
New instructions for today:    Start taking Imdur 30 mg (2) tabs at bedtime. When you get the new prescription, it will be the 60 mg tablet (1) at bedtime. Patient Instructions:  Continue current medications as prescribed. Always keep a current medication list. Bring your medications to every office visit. Continue to follow up with primary care provider for non cardiac medical problems. Call the office with any problems, questions or concerns at [phone]. If you have been asked to keep a blood pressure log, do so for 2 weeks. Call the office to report readings to the triage nurse at [phone]. Follow up with cardiologist as scheduled. The following educational material has been included in this after visit summary for your review: Life simple 7. Heart health. Life simple 7  1) Manage blood pressure - high blood pressure is a major risk factor for heart disease and stroke. Keeping blood pressure in health range reduces strain on your heart, arteries and kidneys. Blood pressure goal is less than 130/80. 2) Control cholesterol - contributes to plaque, which can clog arteries and lead to heart disease and stroke. When you control your cholesterol you are giving your arteries their best chance to remain clear. It is recommended that you get cholesterol lab work done once a year. 3) Reduce blood sugar - most of the food we eat is turning into glucose or blood sugar that our body uses for energy. Over time, high levels of blood sugar can damage your heart, kidneys, eyes and nerves. 4) Get active - living an active life is one of the most rewarding gifts you can give yourself and those you love. Simply put, daily physical activity increases your length and quality of life. Strive to exercise 15 minutes most days of the week. 5)  Eat better - A healthy diet is one of your best weapons for fighting cardiovascular disease.   When you eat a heart healthy diet, you improve your chances for feeling good and staying healthy for life. 6)  Lose weight - when you shed extra fat an unnecessary pounds, you reduce the burden on your hear, lungs, blood vessels and skeleton. You give yourself the gift of active living, you lower your blood pressure and help yourself feel better. 7) Stop smoking - cigarette smokers have a higher risk of developing cardiovascular disease. If  You smoke, quitting is the best thing you can do for your health. Check American Heart Association on line for more information on Life's Simple 7 and tips for healthy living. A Healthy Heart: Care Instructions  Your Care Instructions     Coronary artery disease, also called heart disease, occurs when a substance called plaque builds up in the vessels that supply oxygen-rich blood to your heart muscle. This can narrow the blood vessels and reduce blood flow. A heart attack happens when blood flow is completely blocked. A high-fat diet, smoking, and other factors increase the risk of heart disease. Your doctor has found that you have a chance of having heart disease. You can do lots of things to keep your heart healthy. It may not be easy, but you can change your diet, exercise more, and quit smoking. These steps really work to lower your chance of heart disease. Follow-up care is a key part of your treatment and safety. Be sure to make and go to all appointments, and call your doctor if you are having problems. It's also a good idea to know your test results and keep a list of the medicines you take. How can you care for yourself at home? Diet  · Use less salt when you cook and eat. This helps lower your blood pressure. Taste food before salting. Add only a little salt when you think you need it. With time, your taste buds will adjust to less salt. · Eat fewer snack items, fast foods, canned soups, and other high-salt, high-fat, processed foods. · Read food labels and try to avoid saturated and trans fats.  They increase your risk of heart disease by raising cholesterol levels. · Limit the amount of solid fat-butter, margarine, and shortening-you eat. Use olive, peanut, or canola oil when you cook. Bake, broil, and steam foods instead of frying them. · Eat a variety of fruit and vegetables every day. Dark green, deep orange, red, or yellow fruits and vegetables are especially good for you. Examples include spinach, carrots, peaches, and berries. · Foods high in fiber can reduce your cholesterol and provide important vitamins and minerals. High-fiber foods include whole-grain cereals and breads, oatmeal, beans, brown rice, citrus fruits, and apples. · Eat lean proteins. Heart-healthy proteins include seafood, lean meats and poultry, eggs, beans, peas, nuts, seeds, and soy products. · Limit drinks and foods with added sugar. These include candy, desserts, and soda pop. Lifestyle changes  · If your doctor recommends it, get more exercise. Walking is a good choice. Bit by bit, increase the amount you walk every day. Try for at least 30 minutes on most days of the week. You also may want to swim, bike, or do other activities. · Do not smoke. If you need help quitting, talk to your doctor about stop-smoking programs and medicines. These can increase your chances of quitting for good. Quitting smoking may be the most important step you can take to protect your heart. It is never too late to quit. · Limit alcohol to 2 drinks a day for men and 1 drink a day for women. Too much alcohol can cause health problems. · Manage other health problems such as diabetes, high blood pressure, and high cholesterol. If you think you may have a problem with alcohol or drug use, talk to your doctor. Medicines  · Take your medicines exactly as prescribed. Call your doctor if you think you are having a problem with your medicine. · If your doctor recommends aspirin, take the amount directed each day.  Make sure you take aspirin and not another kind of pain reliever, such as acetaminophen (Tylenol). When should you call for help? [id number] if you have symptoms of a heart attack. These may include:  · Chest pain or pressure, or a strange feeling in the chest.  · Sweating. · Shortness of breath. · Pain, pressure, or a strange feeling in the back, neck, jaw, or upper belly or in one or both shoulders or arms. · Lightheadedness or sudden weakness. · A fast or irregular heartbeat. After you call 911, the  may tell you to chew 1 adult-strength or 2 to 4 low-dose aspirin. Wait for an ambulance. Do not try to drive yourself. Watch closely for changes in your health, and be sure to contact your doctor if you have any problems. Where can you learn more? Go to https://[employer]. org and sign in to your [employer] account. Enter J962 in the [employer] box to learn more about \"A Healthy Heart: Care Instructions. \"     If you do not have an account, please click on the \"Sign Up Now\" link. Current as of: December 16, 2019               Content Version: 12.5  © 4140-9383 Healthwise, Incorporated. Care instructions adapted under license by [facility] [employer] [facility] ([facility]). If you have questions about a medical condition or this instruction, always ask your healthcare professional. [address] any warranty or liability for your use of this information.

## 2022-06-15 NOTE — PROGRESS NOTES
[facility], [place]. [address] [facility]  [phone] office  [phone] fax      OFFICE VISIT:  6/15/2022    [address]: 1950  Reason For Visit:  Nirav Zhang is a 81 y.o. 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 ) and Coronary Artery Disease    History:   Diagnosis Orders   1. Mild CAD     2. Essential hypertension     3. Mixed hyperlipidemia       The patient presents today for cardiology follow up. 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 They say it is in a bad place behind my heart and will be hard to biopsy. I still hurt a little in my chest when I walk or overdo it, it is sharp and kind of dull too. \"   A cath on 5/29/18 showed mild diffuse CAD with no significant stenotic disease. A Lexiscan ordered by Dr. Cynthia Hamm for cardiac risk stratification prior to surgery showed apical defect possibly representing normal perfusion or scare. No ischemia with decrease in global wall motion. EF 40%. Dr. Maria Dolores Perez note indicated te following \"the nuclear stress test has a slight abnormality but of no concern and I can clear him to proceed with hip surgery and general anesthesia with low risk. The patient continues on Ranexa, Imdur and Toprol XL. The patient denies symptoms to suggest heart failure or arrhythmia. BP is well controlled on current regimen. The patient's PCP monitors cholesterol. Subjective  Nirav Zhang denies shortness of breath, orthopnea, paroxysmal nocturnal dyspnea, syncope, presyncope, sensed arrhythmia, edema and fatigue. The patient denies numbness or weakness to suggest cerebrovascular accident or transient ischemic attack. + chronic chest pain with activity described as \"sharp and dull. \"  3/22 Lexiscan showed no ischemia.     Janelle Perez has the following history as recorded in [facility]:  Patient Active Problem List   Diagnosis Code    Abdominal pain, other specified site R10.9    Nausea R11.0    Dysphagia R13.10    Weight loss R63.4    Fatigue R53.83    Heartburn R12    Hoarseness R49.0    Regurgitation [id number]    Hyperlipidemia E78.5    Nicotine abuse Z72.0    Family history of premature CAD Z82.49    Non-cardiac chest pain R07.89    Essential hypertension I10    Mild CAD I25.10    Change in bowel habits R19.4    Fecal incontinence R15.9    Rectal pain K62.89    Pelvic pain in male R10.2    Family history of colon cancer Z80.0    Ex-cigarette smoker Z87.891    Chest pressure R07.89    Varicose veins of bilateral lower extremities with pain I83.813    Venous insufficiency of both lower extremities I87.2    Swelling of both lower extremities M79.89    Rectal bleeding K62.5    Altered bowel function R19.8    Unexplained weight loss R63.4    Generalized abdominal pain R10.84    Excessive gas R14.3    History of adenomatous polyp of colon Z86.010    Anemia D64.9    Internal hemorrhoids K64.8    Atypical chest pain R07.89    Smoker F17.200    Cigarette nicotine dependence without complication D04.706    Chest pain at rest R07.9    Abnormal EKG R94.31    Primary osteoarthritis of right hip M16.11     Past Medical History:   Diagnosis Date    Allergic rhinitis     Anemia     Asthma     CAD (coronary artery disease) 4/11/2014    Chest tightness, discomfort, or pressure 6/17/2013 6/17/2013  lexiscan Positive for inferior myocardial ischemia, EF 47%, 9% ischemic myocardium on stress, intermediate risk findings, AUC indication 16, AUC score 7     CH[facility]congestive heart failure) [facility])     Chronic back pain     COPD (chronic obstructive pulmonary disease) ([facility] [address]     Ex-cigarette smoker 10/27/2015    Family history of early CAD 6/24/2013    Family history of premature CAD     GERD (gastroesophageal reflux disease)     History of hernia repair     [facility]lipidemia     [facility]    Hypertension     Multiple lung nodules     Neuropathy     Nicotine abuse     Nicotine abuse     Osteoarthritis     Peripheral vascular disease (HCC)     Restless legs syndrome     SOB (shortness of breath)     Thyroid nodule     Unspecified sleep apnea     can't wear cpap     Past Surgical History:   Procedure Laterality Date    APPENDECTOMY      BLADDER SURGERY      x 3    CARDIAC CATHETERIZATION  6/24/2013  JDT    EF 50%    CARDIAC CATHETERIZATION  10/27/15  JDT    EF 50%    CARDIAC CATHETERIZATION  05/2018    mild, non-obstructive CAD    COLONOSCOPY  20 yrs ago    not sure who, thinks @ [facility]    COLONOSCOPY  09/2014    TAx2, HP, intern hemorrhoids (3 yr)   [address]       with mesh-Dr [place]    NH COLSC FLX W/REMOVAL LESION BY HOT BX FORCEPS  09/21/2017    Dr Shelton-internal hemorrhoids, tubular AP (-) dysplasia--5 yr recall    NH EGD TRANSORAL BIOPSY SINGLE/MULTIPLE N/A 10/19/2017    Dr Shelton-normal-Esme (-) chronic nonspecific inflammation    PROSTATE SURGERY      THYROIDECTOMY      TOTAL HIP ARTHROPLASTY Right 3/4/2022    RIGHT TOTAL HIP ARTHROPLASTY performed by Jareth Camargo MD at [address]      thinks so, but a long time ago if so    UPPER GASTROINTESTINAL ENDOSCOPY  02/11/2013    Cat: Grade A esophagitis and esophageal stricture. Dilated 54fr    VASCULAR SURGERY  07/11/2017    SJS. Right IJV US 9f sheath.     VASCULAR SURGERY Left 08/17/2017    SJS-L GSV RFA     Family History   Problem Relation Age of Onset    Cancer Mother         lung and brain    Coronary Art Dis Mother     Coronary Art Dis Sister     Colon Cancer Sister     Colon Polyps Sister     Coronary Art Dis Father     Colon Cancer Maternal Grandmother     Colon Polyps Maternal Grandmother     Colon Cancer Sister     Colon Polyps Sister     Cancer Sister         Prashanth Garcia tablet Take 325 mg by mouth daily      topiramate (TOPAMAX) 100 MG tablet Take 100 mg by mouth daily       beclomethasone (QVAR) 80 MCG/ACT inhaler Inhale 1 puff into the lungs 2 times daily       albuterol (PROAIR HFA) 108 (90 BASE) MCG/ACT inhaler Inhale 2 puffs into the lungs every 4 hours as needed.  tiotropium (SPIRIVA HANDIHALER) 18 MCG inhalation capsule Inhale 18 mcg into the lungs daily. No current facility-administered medications for this visit. Allergies: Bactrim [sulfamethoxazole-trimethoprim], Nsaids, Succinylcholine, and Betadine [povidone iodine]    Review of Systems  Constitutional - no appetite change, or unexpected weight change. No fever, chills or diaphoresis. No significant change in activity level or new onset of fatigue. HEENT - no significant rhinorrhea or epistaxis. No tinnitus or significant hearing loss. Eyes - no sudden vision change or amaurosis. No corneal arcus, xantholasma, subconjunctival hemorrhage or discharge. Respiratory - no significant wheezing, stridor, apnea or cough. No dyspnea on exertion or shortness of air. Cardiovascular - no sensation of sustained arrythmia. No occurrence of slow heart rate. No palpitations. No claudication. + chronic chest pain with activity described as \"sharp and dull. \"  3/22 Lexiscan showed no ischemia. Gastrointestinal - no abdominal swelling or pain. No blood in stool. No severe constipation, diarrhea, nausea, or vomiting. Genitourinary - no dysuria, frequency, or urgency. No flank pain or hematuria. Musculoskeletal - no back pain or myalgia. No problems with gait. Extremities - no clubbing, cyanosis or extremity edema. Skin - no color change or rash. No pallor. No new surgical incision. Neurologic - no speech difficulty, facial asymmetry or lateralizing weakness. No seizures, presyncope or syncope. No significant dizziness. Hematologic - no easy bruising or excessive bleeding.    Psychiatric - no severe anxiety or insomnia. No confusion. All other review of systems are negative. Objective  Vital Signs - /80   Pulse 64   Ht 6' 2\" (1.88 m)   Wt 233 lb (105.7 kg)   SpO2 97%   BMI 29.92 kg/m²   General - [facility] Kevin is alert, cooperative, and pleasant. Well groomed. No acute distress. Body habitus - Body mass index is 29.92 kg/m². HEENT - Head is normocephalic. No circumoral cyanosis. Dentition is normal.  EYES -   Lids normal without ptosis. No discharge, edema or subconjunctival hemorrhage. Neck - Symmetrical without apparent mass or lymphadenopathy. Respiratory - Normal respiratory effort without use of accessory muscles. Ausculatation reveals vesicular breath sounds without crackles, wheezes, rub or rhonchi. Cardiovascular - No jugular venous distention. Auscultation reveals regular rate and rhythm. No audible clicks, gallop or rub. No murmur. No lower extremity varicosities. No carotid bruits. Abdominal -  No visible distention, mass or pulsations. Extremities - No clubbing or cyanosis. No statis dermatitis or ulcers. No edema. Musculoskeletal -   No Osler's nodes. No kyphosis or scoliosis. Gait is even and regular without limp or shuffle. Ambulates without assistance. Skin -  Warm and dry; no rash or pallor. No new surgical wound. Neurological - No focal neurological deficits. Thought processes coherent. No apparent tremor. Oriented to person, place and time. Psychiatric -  Appropriate affect and mood.      Data reviewed:  Prior Cardiac History -   6/17/2013  lexiscan  Positive for inferior myocardial ischemia, EF 47%, 9% ischemia at risk  6/24/2013  Cath  100% diag with left to left collaterals, anterior lateral hypo, EF 50%  10/26/2015  DSE Positive for clinical myocardial ischemia, discordant risk findings, AUC indication 20, AUC score 7  10/27/15  Cath  100% diag with left to left collaterals, anterior lateral hypo, EF 50%  9/29/16  lexiscan Positive for inferior reverese myocardial ischemia, EF 41%, 1/8% ischemic myocardium on stress, uninterpretable risk findings  5/29/2018  ACS LEA RISK Score 5 (angina, CAD>50, age>65, cigarettes, hypertension, hypercholesteremia, ASA ), AUC indication 3, AUC score 9   5/30/18  Cath mild CAD, anterior lateral hypo, EF 50%     3/2/22 Lexiscan  Conclusions   This is an abnormal pharmacologic nuclear stress test with a fixed   apical defect that possibly represents normal perfusion dropped out or   represents scar. There was no ischemia. Decrease in global wall   motion, no segmental abnormalities. Signed by Dr Arlet Valerio     5/29/18 cath - Dr. Lizzette Falcon  Coronary Arteries:   Left Main Coronary Artery:  A large vessel which arises from the left sinus of Valsalva. It divides into the left anterior descending coronary artery and the left circumflex. There is mild diffuse disease throughout the entire length of the vessel.    Left Anterior Coronary Artery:  The LAD is a moderate sized vessel with several diagonal branches. There is mild diffuse disease throughout the entire length of the vessel.    Left Circumflex Coronary Artery:  The LCx is a moderate sized vessel with several marginal branches. There is mild diffuse disease throughout the entire length of the vessel.    Right Coronary Artery:  The RCA is a moderate sized dominant vessel, with an anterior takeoff, which arises from the right sinus of Valsalva. There is mild diffuse disease throughout the entire length of the vessel.    Left Ventriculogram:  The left ventriculogram is obtained in the right oblique projection. There is mild anterior lateral hypokinesis. All other regional wall segments move appropriately. The estimated visual ejection fraction is approximately 50%. There is no mitral regurgitation nor is there a pull back gradient seen across the aortic valve.     Lab Results   Component Value Date    WBC 4.9 02/10/2022    HGB 10.1 (L) 03/05/2022    HCT 31.8 (L) 03/05/2022    MCV 97.9 (H) 02/10/2022     02/10/2022     Lab Results   Component Value Date     (L) 03/05/2022    K 4.7 03/05/2022     03/05/2022    CO2 22 03/05/2022    BUN 18 03/05/2022    CREATININE 0.9 03/05/2022    GLUCOSE 119 (H) 03/05/2022    CALCIUM 8.1 (L) 03/05/2022    PROT 5.9 (L) 02/10/2022    LABALBU 3.7 02/10/2022    BILITOT <0.2 02/10/2022    ALKPHOS 78 02/10/2022    AST 19 02/10/2022    ALT 10 02/10/2022    LABGLOM >60 03/05/2022    GFRAA >59 03/05/2022    GLOB 2.3 09/29/2016       Lab Results   Component Value Date    CHOL 189 10/31/2019    CHOL 113 (L) 09/29/2016    CHOL 167 10/26/2015     Lab Results   Component Value Date    TRIG 132 10/31/2019    TRIG 103 (L) 09/29/2016    TRIG 107 (L) 10/26/2015     Lab Results   Component Value Date    HDL 46 (L) 10/31/2019    HDL 35 (L) 09/29/2016    HDL 39 (L) 10/26/2015     Lab Results   Component Value Date    LDLCALC 117 10/31/2019    LDLCALC 57 09/29/2016       BP Readings from Last 3 Encounters:   06/15/22 116/80   05/04/22 124/80   03/05/22 101/68    Pulse Readings from Last 3 Encounters:   06/15/22 64   05/04/22 82   03/05/22 68        Wt Readings from Last 3 Encounters:   06/15/22 233 lb (105.7 kg)   05/04/22 231 lb (104.8 kg)   03/04/22 225 lb (102.1 kg)     Assessment/Plan:   Diagnosis Orders   1. Mild CAD     2. Essential hypertension     3. Mixed hyperlipidemia       Hx mild non occlusive chest pain with chronic chest pain - probable non cardiac related. Currently being followed for left lung mass. Continue Ranexa and Toprol XL. Increase Imdur to 60 mg daily and reassess per telephone visit in 2 weeks. HTN - normotensive on current regimen. BP today 116/80. Continue same. [facility] Not currently on statin therapy. The patient has stopped smoking. Patient is compliant with medication regimen.     Previous cardiac history and records reviewed. Continue current medical management for cardiac related condition. Continue other current medications as directed. Continue to follow up with primary care provider for non cardiac medical problems. If your primary care provider is outside of the [facility], please request that your labs be faxed to this office at [phone]. BP goal 130/80 or less. Call the office with any problems, questions or concerns at [phone]. Cardiology follow up as scheduled in [address] appointments. Educational included in patient instructions. Heart health.      Namrata Brown, APRN

## 2022-06-20 ENCOUNTER — HOSPITAL ENCOUNTER (OUTPATIENT)
Dept: CT IMAGING | Facility: HOSPITAL | Age: 72
Discharge: HOME | End: 2022-06-20

## 2022-06-20 PROCEDURE — 0 FLUDEOXYGLUCOSE F18 SOLUTION: Performed by: INTERNAL MEDICINE

## 2022-06-20 PROCEDURE — 78815 PET IMAGE W/CT SKULL-THIGH: CPT

## 2022-06-20 PROCEDURE — A9552 F18 FDG: HCPCS | Performed by: INTERNAL MEDICINE

## 2022-06-20 RX ADMIN — FLUDEOXYGLUCOSE F18 1 DOSE: 300 INJECTION INTRAVENOUS at 08:44

## 2022-06-30 ENCOUNTER — SCHEDULED TELEPHONE ENCOUNTER (OUTPATIENT)
Dept: CARDIOLOGY CLINIC | Age: 72
End: 2022-06-30
Payer: MEDICARE

## 2022-06-30 DIAGNOSIS — I10 ESSENTIAL HYPERTENSION: ICD-10-CM

## 2022-06-30 DIAGNOSIS — E78.2 MIXED HYPERLIPIDEMIA: ICD-10-CM

## 2022-06-30 DIAGNOSIS — I25.10 MILD CAD: Primary | ICD-10-CM

## 2022-06-30 PROCEDURE — 99441 PR PHYS/QHP TELEPHONE EVALUATION 5-10 MIN: CPT | Performed by: NURSE PRACTITIONER

## 2022-06-30 NOTE — PROGRESS NOTES
Gillian Juarez is a 81 y.o. male evaluated via telephone on 6/30/2022. Consent:  He and/or health care decision maker is aware that that he may receive a bill for this telephone service, depending on his insurance coverage, and has provided verbal consent to proceed: Yes    Documentation:  I communicated with the patient and/or health care decision maker about medication changes. Details of this discussion including any medical advice provided: heart health. I affirm this is a Patient Initiated Episode with an Established Patient who has not had a related appointment within my department in the past 7 days or scheduled within the next 24 hours. Total Time: minutes: 5-10 minutes    Note: not billable if this call serves to triage the patient into an appointment for the relevant concern    MARGY Guevara      6/30/2022    Audio Patient Encouter(During UJJBB-88 public health emergency)  The telephone encourter was conducted with patient in their residence from [address] with MARGY Suggs; assistance by Alba Soria MA.    HPI:  Gillian Juarez   Diagnosis Orders   1. Mild CAD     2. Essential hypertension     3. Mixed hyperlipidemia       The patient presents today for audio evaluation after Imdur was increased to 50 mg daily on 6/15/22. At that visit, the patient reported mild exertional chest pain. He feels as if the discomfort has improved after the increase in Imdur. A cath on 5/29/18 showed mild diffuse CAD with no significant stenotic disease. A Lexiscan ordered by Dr. Devika Roman for cardiac risk stratification prior to surgery showed apical defect possibly representing normal perfusion or scare. No ischemia with decrease in global wall motion. EF 40%. Dr. Yasmeen Jang note indicated te following \"the nuclear stress test has a slight abnormality but of no concern and I can clear him to proceed with hip surgery and general anesthesia with low risk.   The patient continues on Ranexa, Imdur and Toprol XL. BP is well controlled on current regimen. The patient's PCP monitors cholesterol. [facility] per patient report today. SUBJECTIVE:  Juanita Woolwich shortness of breath, orthopnea, paroxysmal nocturnal dyspnea, syncope, presyncope, sensed arrhythmia, edema and fatigue. The patient denies numbness or weakness to suggest cerebrovascular accident or transient ischemic attack. + chronic chest pain with activity described as \"sharp and dull. \"  3/22 Lexiscan showed no ischemia. Reports improvement in symptoms after increase in Imdur. Review of Systems    Prior to Visit Medications    Medication Sig Taking? Authorizing Provider   isosorbide mononitrate (IMDUR) 60 MG extended release tablet Take 1 tablet by mouth nightly Yes Lacyanni Perales APRPAULA   nitroGLYCERIN (NITROSTAT) 0.4 MG SL tablet Place 1 tablet under the tongue every 5 minutes as needed for Chest pain up to max of 3 total doses. If no relief after 1 dose, call 911. Yes Lacy Lan, APRN   ranolazine (RANEXA) 500 MG extended release tablet Take 1 tablet by mouth 2 times daily Yes Lacy Lan, APRN   primidone (MYSOLINE) 50 MG tablet Take 50 mg by mouth nightly 1/2 tablet at bedtime for tremors Yes Historical Provider, MD   fluticasone-salmeterol (ADVAIR DISKUS) 500-50 MCG/DOSE diskus inhaler Inhale 1 puff into the lungs every 12 hours Indications: uses prn Yes Letitia Luevano DO   tamsulosin (FLOMAX) 0.4 MG capsule Take 1 capsule by mouth daily Yes Historical Provider, MD   metoprolol succinate (TOPROL XL) 25 MG extended release tablet TAKE ONE TABLET BY MOUTH 2 TIMES A DAY  Patient taking differently: Take 25 mg by mouth daily  Yes Lacyanni Perales APRPAULA   gabapentin (NEURONTIN) 300 MG capsule Take 300 mg by mouth in the morning and at bedtime.  Take 3 tablets twice daily  Yes Historical Provider, MD   esomeprazole (NEXIUM) 40 MG delayed release capsule Take 40 mg by mouth daily  Yes Historical Provider, MD   aspirin 325 MG tablet Take 325 mg by mouth daily Yes Historical Provider, MD   topiramate (TOPAMAX) 100 MG tablet Take 100 mg by mouth daily  Yes Historical Provider, MD   beclomethasone (QVAR) 80 MCG/ACT inhaler Inhale 1 puff into the lungs 2 times daily  Yes Historical Provider, MD   albuterol (PROAIR HFA) 108 (90 BASE) MCG/ACT inhaler Inhale 2 puffs into the lungs every 4 hours as needed. Yes Historical Provider, MD   tiotropium (SPIRIVA HANDIHALER) 18 MCG inhalation capsule Inhale 18 mcg into the lungs daily. Yes Historical Provider, MD       Social History     Tobacco Use    Smoking status: Former Smoker     Types: Cigarettes     Quit date: 2022     Years since quittin.4    Smokeless tobacco: Never Used    Tobacco comment: some days he smokes, some days he doesn't   Vaping Use    Vaping Use: Never used   Substance Use Topics    Alcohol use: No     Alcohol/week: 0.0 standard drinks    Drug use: No        REVIEW OF SYSTEMS:  Constitutional - no appetite change, or unexpected weight change. No fever, chills or diaphoresis. No significant change in activity level or new onset of fatigue. HEENT - no significant rhinorrhea or epistaxis. No tinnitus or significant hearing loss. Eyes - no sudden vision change or amaurosis. No corneal arcus, xantholasma, subconjunctival hemorrhage or discharge. Respiratory - no significant wheezing, stridor, apnea or cough. No dyspnea on exertion or shortness of air. Cardiovascular - no orthopnea or PND. No sensation of sustained arrythmia. No occurrence of slow heart rate. No palpitations. No claudication. + chronic chest pain with activity described as \"sharp and dull. \"  3/22 Lexiscan showed no ischemia. Reports improvement in symptoms after increase in Imdur. Gastrointestinal - no abdominal swelling or pain. No blood in stool. No severe constipation, diarrhea, nausea, or vomiting.    Genitourinary - no dysuria, frequency, or urgency. No flank pain or hematuria. Musculoskeletal - no back pain or myalgia. No problems with gait. Extremities - no clubbing, cyanosis or extremity edema. Skin - no color change or rash. No pallor. No new surgical incision. Neurologic - no speech difficulty, facial asymmetry or lateralizing weakness. No seizures, presyncope or syncope. No significant dizziness. Hematologic - no easy bruising or excessive bleeding. Psychiatric - no severe anxiety or insomnia. No confusion. All other review of systems are negative. DATA REVIEWED:  Prior Cardiac History -   6/17/2013  lexiscan  Positive for inferior myocardial ischemia, EF 47%, 9% ischemia at risk  6/24/2013  Cath  100% diag with left to left collaterals, anterior lateral hypo, EF 50%  10/26/2015  DSE Positive for clinical myocardial ischemia, discordant risk findings, AUC indication 20, AUC score 7  10/27/15  Cath  100% diag with left to left collaterals, anterior lateral hypo, EF 50%  9/29/16  lexiscan Positive for inferior reverese myocardial ischemia, EF 41%, 1/8% ischemic myocardium on stress, uninterpretable risk findings  5/29/2018  ACS LEA RISK Score 5 (angina, CAD>50, age>65, cigarettes, hypertension, hypercholesteremia, ASA ), AUC indication 3, AUC score 9   5/30/18  Cath mild CAD, anterior lateral hypo, EF 50%    3/2/22 Lexiscan  Conclusions   This is an abnormal pharmacologic nuclear stress test with a fixed   apical defect that possibly represents normal perfusion dropped out or   represents scar. There was no ischemia. Decrease in global wall   motion, no segmental abnormalities.    Signed by Dr Arturo Cordova     Lab Results   Component Value Date    WBC 4.9 02/10/2022    HGB 10.1 (L) 03/05/2022    HCT 31.8 (L) 03/05/2022    MCV 97.9 (H) 02/10/2022     02/10/2022     Lab Results   Component Value Date     (L) 03/05/2022    K 4.7 03/05/2022     03/05/2022    CO2 22 03/05/2022    BUN 18 03/05/2022 CREATININE 0.9 03/05/2022    GLUCOSE 119 (H) 03/05/2022    CALCIUM 8.1 (L) 03/05/2022    PROT 5.9 (L) 02/10/2022    LABALBU 3.7 02/10/2022    BILITOT <0.2 02/10/2022    ALKPHOS 78 02/10/2022    AST 19 02/10/2022    ALT 10 02/10/2022    LABGLOM >60 03/05/2022    GFRAA >59 03/05/2022    GLOB 2.3 09/29/2016       Lab Results   Component Value Date    CHOL 189 10/31/2019    CHOL 113 (L) 09/29/2016    CHOL 167 10/26/2015     Lab Results   Component Value Date    TRIG 132 10/31/2019    TRIG 103 (L) 09/29/2016    TRIG 107 (L) 10/26/2015     Lab Results   Component Value Date    HDL 46 (L) 10/31/2019    HDL 35 (L) 09/29/2016    HDL 39 (L) 10/26/2015     Lab Results   Component Value Date    LDLCALC 117 10/31/2019    LDLCALC 57 09/29/2016     ASSESSMENT/PlAN:   Diagnosis Orders   1. Mild CAD     2. Essential hypertension     3. Mixed hyperlipidemia        Hx mild non occlusive chest pain with chronic chest pain - probable non cardiac related. Currently being followed for left lung mass by the [facility] every 3 months. Imdur increased to 60 mg daily at last visit. The patient reports a decrease in chest discomfort. Continue Ranexa and Toprol XL.     HTN - normotensive on current regimen. BP today 116/80. Continue same.     [facility]. Not currently on statin therapy.     The patient has stopped smoking.     Patient is compliant with medication regimen. Continue current medical management for cardiac condition. Continue current medications as prescribed. BP goal is 130/80 or less. If your primary care provider is outside of the [facility], please request your labs be faxed to this office at [phone]. Continue to follow up with primary care provider for non cardiac medical problems. Call the office with any problems, questions or concerns at [phone]. Follow up with cardiologist as scheduled in [address]   The following educational material has been included in this after visit summary for patient review:  Heart health. Zoraida Bates is a 81 y.o. male being evaluated by a telephone encounter to address concerns as mentioned above. A caregiver was present when appropriate. Due to this being a TeleHealth encounter (During DNMQX-43 public health emergency). Pursuant to the emergency declaration under the [address] [place] [address], [address] [place] [address] and the [employer] [facility] and [employer] Act, this Virtual Visit was conducted with patient's (and/or legal guardian's) consent, to reduce the patient's risk of exposure to COVID-19 and provide necessary medical care. The patient (and/or legal guardian) has also been advised to contact this office for worsening conditions or problems, and seek emergency medical treatment and/or call 911 if deemed necessary. Services were provided through a telephone discussion virtually to substitute for in-person clinic visit. Patient and provider were located at their individual homes. --MARGY Rosas on 6/30/2022 at 11:02 AM    An electronic signature was used to authenticate this note.

## 2022-06-30 NOTE — PATIENT INSTRUCTIONS
New instructions for today:    Patient Instructions:  Continue current medications as prescribed. Always keep a current medication list. Bring your medications to every office visit. Continue to follow up with primary care provider for non cardiac medical problems. Call the office with any problems, questions or concerns at [phone]. If you have been asked to keep a blood pressure log, do so for 2 weeks. Call the office to report readings to the triage nurse at [phone]. Follow up with cardiologist as scheduled. The following educational material has been included in this after visit summary for your review: Life simple 7. Heart health. Life simple 7  1) Manage blood pressure - high blood pressure is a major risk factor for heart disease and stroke. Keeping blood pressure in health range reduces strain on your heart, arteries and kidneys. Blood pressure goal is less than 130/80. 2) Control cholesterol - contributes to plaque, which can clog arteries and lead to heart disease and stroke. When you control your cholesterol you are giving your arteries their best chance to remain clear. It is recommended that you get cholesterol lab work done once a year. 3) Reduce blood sugar - most of the food we eat is turning into glucose or blood sugar that our body uses for energy. Over time, high levels of blood sugar can damage your heart, kidneys, eyes and nerves. 4) Get active - living an active life is one of the most rewarding gifts you can give yourself and those you love. Simply put, daily physical activity increases your length and quality of life. Strive to exercise 15 minutes most days of the week. 5)  Eat better - A healthy diet is one of your best weapons for fighting cardiovascular disease. When you eat a heart healthy diet, you improve your chances for feeling good and staying healthy for life.   6)  Lose weight - when you shed extra fat an unnecessary pounds, you reduce the burden on your hear, lungs, blood vessels and skeleton. You give yourself the gift of active living, you lower your blood pressure and help yourself feel better. 7) Stop smoking - cigarette smokers have a higher risk of developing cardiovascular disease. If  You smoke, quitting is the best thing you can do for your health. Check American Heart Association on line for more information on Life's Simple 7 and tips for healthy living. A Healthy Heart: Care Instructions  Your Care Instructions     Coronary artery disease, also called heart disease, occurs when a substance called plaque builds up in the vessels that supply oxygen-rich blood to your heart muscle. This can narrow the blood vessels and reduce blood flow. A heart attack happens when blood flow is completely blocked. A high-fat diet, smoking, and other factors increase the risk of heart disease. Your doctor has found that you have a chance of having heart disease. You can do lots of things to keep your heart healthy. It may not be easy, but you can change your diet, exercise more, and quit smoking. These steps really work to lower your chance of heart disease. Follow-up care is a key part of your treatment and safety. Be sure to make and go to all appointments, and call your doctor if you are having problems. It's also a good idea to know your test results and keep a list of the medicines you take. How can you care for yourself at home? Diet  · Use less salt when you cook and eat. This helps lower your blood pressure. Taste food before salting. Add only a little salt when you think you need it. With time, your taste buds will adjust to less salt. · Eat fewer snack items, fast foods, canned soups, and other high-salt, high-fat, processed foods. · Read food labels and try to avoid saturated and trans fats. They increase your risk of heart disease by raising cholesterol levels. · Limit the amount of solid fat-butter, margarine, and shortening-you eat.  Use may include:  · Chest pain or pressure, or a strange feeling in the chest.  · Sweating. · Shortness of breath. · Pain, pressure, or a strange feeling in the back, neck, jaw, or upper belly or in one or both shoulders or arms. · Lightheadedness or sudden weakness. · A fast or irregular heartbeat. After you call 911, the  may tell you to chew 1 adult-strength or 2 to 4 low-dose aspirin. Wait for an ambulance. Do not try to drive yourself. Watch closely for changes in your health, and be sure to contact your doctor if you have any problems. Where can you learn more? Go to https://Filepicker.ioeb.Quibb. org and sign in to your [employer] account. Enter Q784 in the Snippets box to learn more about \"A Healthy Heart: Care Instructions. \"     If you do not have an account, please click on the \"Sign Up Now\" link. Current as of: December 16, 2019               Content Version: 12.5  © 8306-3560 Healthwise, [employer]orporated. Care instructions adapted under license by [facility] ([facility]). If you have questions about a medical condition or this instruction, always ask your healthcare professional. [place] [address] any warranty or liability for your use of this information.

## 2022-07-13 ENCOUNTER — PATIENT ROUNDING (BHMG ONLY) (OUTPATIENT)
Dept: CARDIAC SURGERY | Facility: CLINIC | Age: 72
End: 2022-07-13

## 2022-07-13 ENCOUNTER — OFFICE VISIT (OUTPATIENT)
Dept: CARDIAC SURGERY | Facility: CLINIC | Age: 72
End: 2022-07-13

## 2022-07-13 VITALS
HEART RATE: 59 BPM | OXYGEN SATURATION: 98 % | BODY MASS INDEX: 29.36 KG/M2 | DIASTOLIC BLOOD PRESSURE: 62 MMHG | SYSTOLIC BLOOD PRESSURE: 105 MMHG | WEIGHT: 228.8 LBS | HEIGHT: 74 IN

## 2022-07-13 DIAGNOSIS — F17.210 CIGARETTE SMOKER: ICD-10-CM

## 2022-07-13 DIAGNOSIS — R91.1 LUNG NODULE: Primary | ICD-10-CM

## 2022-07-13 PROCEDURE — 99204 OFFICE O/P NEW MOD 45 MIN: CPT | Performed by: THORACIC SURGERY (CARDIOTHORACIC VASCULAR SURGERY)

## 2022-07-13 RX ORDER — RANOLAZINE 500 MG/1
1 TABLET, EXTENDED RELEASE ORAL 2 TIMES DAILY
COMMUNITY
Start: 2022-05-04

## 2022-07-13 RX ORDER — LIDOCAINE 50 MG/G
1 PATCH TOPICAL EVERY 24 HOURS
COMMUNITY

## 2022-07-13 NOTE — PROGRESS NOTES
A [employer]ole[employer] message has been sent to the patient for PATIENT ROUNDING with [facility].

## 2022-07-15 ENCOUNTER — LAB (OUTPATIENT)
Dept: LAB | Facility: HOSPITAL | Age: 72
End: 2022-07-15

## 2022-07-15 ENCOUNTER — HOSPITAL ENCOUNTER (OUTPATIENT)
Dept: PULMONOLOGY | Facility: HOSPITAL | Age: 72
Discharge: HOME | End: 2022-07-15

## 2022-07-15 DIAGNOSIS — Z20.822 ENCOUNTER FOR PREPROCEDURE SCREENING LABORATORY TESTING FOR COVID-19: Primary | ICD-10-CM

## 2022-07-15 DIAGNOSIS — Z20.822 ENCOUNTER FOR PREPROCEDURE SCREENING LABORATORY TESTING FOR COVID-19: ICD-10-CM

## 2022-07-15 DIAGNOSIS — Z01.812 ENCOUNTER FOR PREPROCEDURE SCREENING LABORATORY TESTING FOR COVID-19: ICD-10-CM

## 2022-07-15 DIAGNOSIS — R91.1 LUNG NODULE: ICD-10-CM

## 2022-07-15 DIAGNOSIS — Z01.812 ENCOUNTER FOR PREPROCEDURE SCREENING LABORATORY TESTING FOR COVID-19: Primary | ICD-10-CM

## 2022-07-15 LAB
ARTERIAL PATENCY WRIST A: POSITIVE
ATMOSPHERIC PRESS: 752 MMHG
BASE EXCESS BLDA CALC-SCNC: 1.5 MMOL/L (ref 0–2)
BDY SITE: ABNORMAL
BODY TEMPERATURE: 37 C
HCO3 BLDA-SCNC: 25.4 MMOL/L (ref 20–26)
Lab: ABNORMAL
MODALITY: ABNORMAL
PCO2 BLDA: 36.8 MM HG (ref 35–45)
PCO2 TEMP ADJ BLD: 36.8 MM HG (ref 35–45)
PH BLDA: 7.45 PH UNITS (ref 7.35–7.45)
PH, TEMP CORRECTED: 7.45 PH UNITS (ref 7.35–7.45)
PO2 BLDA: 81.8 MM HG (ref 83–108)
PO2 TEMP ADJ BLD: 81.8 MM HG (ref 83–108)
SAO2 % BLDCOA: 97.3 % (ref 94–99)
SARS-COV-2 RNA PNL SPEC NAA+PROBE: NOT DETECTED
VENTILATOR MODE: ABNORMAL

## 2022-07-15 PROCEDURE — 94729 DIFFUSING CAPACITY: CPT | Performed by: INTERNAL MEDICINE

## 2022-07-15 PROCEDURE — 94729 DIFFUSING CAPACITY: CPT

## 2022-07-15 PROCEDURE — 94726 PLETHYSMOGRAPHY LUNG VOLUMES: CPT

## 2022-07-15 PROCEDURE — 94726 PLETHYSMOGRAPHY LUNG VOLUMES: CPT | Performed by: INTERNAL MEDICINE

## 2022-07-15 PROCEDURE — 87635 SARS-COV-2 COVID-19 AMP PRB: CPT

## 2022-07-15 PROCEDURE — 82803 BLOOD GASES ANY COMBINATION: CPT

## 2022-07-15 PROCEDURE — 36600 WITHDRAWAL OF ARTERIAL BLOOD: CPT

## 2022-07-15 PROCEDURE — 94060 EVALUATION OF WHEEZING: CPT

## 2022-07-15 PROCEDURE — 94060 EVALUATION OF WHEEZING: CPT | Performed by: INTERNAL MEDICINE

## 2022-07-15 RX ORDER — ALBUTEROL SULFATE 2.5 MG/3ML
2.5 SOLUTION RESPIRATORY (INHALATION) ONCE
Status: COMPLETED | OUTPATIENT
Start: 2022-07-15 | End: 2022-07-15

## 2022-07-15 RX ADMIN — ALBUTEROL SULFATE 2.5 MG: 2.5 SOLUTION RESPIRATORY (INHALATION) at 15:12

## 2022-08-01 PROBLEM — R91.1 LUNG NODULE: Status: ACTIVE | Noted: 2022-08-01

## 2022-08-01 NOTE — PROGRESS NOTES
Chief Complaint   Patient presents with   • Lung Nodule     New pt from [facility]          Subjective     History of Present Illness    72-year-old male with history of COPD, sleep apnea, hypertension, coronary artery disease, chronic ischemic heart disease, rheumatoid arthritis, and lung nodules since 2020 presents today for evaluation of a growing in size lung nodule that is not PET avid. He denies unintended weight loss, hoarseness of voice, chest pain, hemoptysis, history of pneumonia, or cough.  Imaging showed slow growth of the left upper lobe lung lesion measuring 1.2 cm in size and had a maximum SUV of 2.57.  This is below physiologic background.  In 2020 this measured 6 mm in size.  A right lower lobe lung nodule measured 9 mm in size and has been stable since 2020 without FDG avidity.    He does continue to smoke about 1/2 pack/day.  He had smoked approximate pack per day over 50+ years  He has chronic back pain.  He has oral anticoagulation for which he takes Xarelto.  He does take long-acting nitrates      Review of Systems   Constitutional: Positive for fatigue. Negative for activity change, appetite change, chills, diaphoresis, fever and unexpected weight change.   HENT: Negative for dental problem, hearing loss, nosebleeds, sore throat, trouble swallowing and voice change.    Eyes: Negative for photophobia, redness and visual disturbance.   Respiratory: Positive for shortness of breath. Negative for apnea, cough, chest tightness, wheezing and stridor.    Cardiovascular: Negative for chest pain, palpitations and leg swelling.   Gastrointestinal: Negative for abdominal distention, abdominal pain, blood in stool, constipation, diarrhea, nausea and vomiting.   Endocrine: Negative for cold intolerance, heat intolerance, polyphagia and polyuria.   Genitourinary: Negative for decreased urine volume, difficulty urinating, dysuria, flank pain, frequency, hematuria and urgency.   Musculoskeletal: Positive for  arthralgias and back pain. Negative for gait problem, joint swelling, myalgias and neck pain.   Skin: Negative for pallor, rash and wound.   Allergic/Immunologic: Negative for immunocompromised state.   Neurological: Negative for dizziness, tremors, seizures, syncope, speech difficulty, weakness, light-headedness, numbness and headaches.   Hematological: Bruises/bleeds easily.   Psychiatric/Behavioral: Negative for confusion, sleep disturbance and suicidal ideas. The patient is not nervous/anxious.           Past Medical History:   Diagnosis Date   • Arthritis    • COPD (chronic obstructive pulmonary disease) (HCC)    • Coronary artery disease    • Enlarged prostate    • Full dentures    • GERD (gastroesophageal reflux disease)    • Hearing loss    • Heart abnormality    • Hypertension    • Lung nodule     3 IN 1 LUNG AND 4 IN THE OTHER AND STATES IT IS SLOW GROWING   • Neuropathic pain    • Sleep apnea     DOES NOT WEAR C PAP     Past Surgical History:   Procedure Laterality Date   • APPENDECTOMY     • CATARACT EXTRACTION     • CYSTOSCOPY TRANSURETHRAL RESECTION OF PROSTATE N/A 11/8/2016    Procedure: CYSTOSCOPY TRANSURETHRAL RESECTION OF PROSTATE;  Surgeon: Brad Bal MD;  Location: [facility] OR;  Service:    • SINUS SURGERY     • THYROIDECTOMY       Family History   Problem Relation Age of Onset   • No Known Problems Father    • No Known Problems Mother      Social History     Tobacco Use   • Smoking status: Current Every Day Smoker     Packs/day: 0.50     Types: Cigarettes   • Smokeless tobacco: Never Used   Substance Use Topics   • Alcohol use: Not Currently     Alcohol/week: 1.0 standard drink     Types: 1 Cans of beer per week   • Drug use: No     Current Outpatient Medications   Medication Sig Dispense Refill   • albuterol sulfate  (90 Base) MCG/ACT inhaler Inhale 2 puffs Every 4 (Four) Hours As Needed for shortness of air.     • aspirin 325 MG tablet Take 325 mg by mouth daily     •  atorvastatin (LIPITOR) 40 MG tablet Take 1 tablet by mouth daily     • beclomethasone (QVAR) 80 MCG/ACT inhaler Inhale 1 puff into the lungs 2 times daily.     • fluticasone-salmeterol (ADVAIR) 500-50 MCG/DOSE DISKUS Every 12 (Twelve) Hours.     • gabapentin (NEURONTIN) 300 MG capsule Take 600 mg by mouth 3 (Three) Times a Day. 2 capsules BID and 3 capsules at bedtime     • HYDROcodone-acetaminophen (NORCO) 5-325 MG per tablet Take 1 tablet by mouth Every 6 (Six) Hours As Needed for moderate pain (4-6) (LELG AND FEET PAIN). 40 tablet 0   • isosorbide mononitrate (IMDUR) 60 MG 24 hr tablet Take 1 tablet by mouth daily     • lidocaine (LIDODERM) 5 % Place 1 patch on the skin as directed by provider Daily. Remove & Discard patch within 12 hours or as directed by MD     • metoprolol succinate XL (TOPROL-XL) 50 MG 24 hr tablet Take 25 mg by mouth Daily.     • Mirabegron ER (Myrbetriq) 50 MG tablet sustained-release 24 hour 24 hr tablet Take 50 mg by mouth Daily. 90 tablet 3   • nitroglycerin (NITROLINGUAL) 0.4 MG/SPRAY spray Place 1 spray under the tongue every 5 minutes as needed for Chest pain     • oxybutynin XL (DITROPAN-XL) 5 MG 24 hr tablet Take 1 tablet by mouth Daily. 90 tablet 3   • ranolazine (RANEXA) 500 MG 12 hr tablet Take 1 tablet by mouth 2 (Two) Times a Day.     • tamsulosin (FLOMAX) 0.4 MG capsule 24 hr capsule Take 1 capsule by mouth Daily.     • tiotropium (SPIRIVA) 18 MCG per inhalation capsule Inhale 18 mcg into the lungs daily.     • topiramate (TOPAMAX) 100 MG tablet Take 125 mg by mouth Daily. TAKES 125 MG DAILY DIVIDED OVER SEVERAL DOSES PER DAY       No current facility-administered medications for this visit.     Allergies:  Bactrim [sulfamethoxazole-trimethoprim], Betadine [povidone iodine], Iodinated diagnostic agents, Nsaids, Succinylcholine, and Sulfa antibiotics    Objective      Vital Signs  Visit Vitals  /62 (BP Location: Right arm, Patient Position: Sitting, Cuff Size: Adult)  "  Pulse 59   Ht 188 cm (74\")   Wt 104 kg (228 lb 12.8 oz)   SpO2 98%   BMI 29.38 kg/m²         Physical Exam  Vitals reviewed.   Constitutional:       Appearance: He is well-developed.   HENT:      Head: Normocephalic and atraumatic.   Eyes:      Pupils: Pupils are equal, round, and reactive to light.   Neck:      Thyroid: No thyromegaly.      Vascular: No JVD.      Trachea: No tracheal deviation.   Cardiovascular:      Rate and Rhythm: Normal rate and regular rhythm.      Heart sounds: Normal heart sounds. No murmur heard.    No friction rub. No gallop.   Pulmonary:      Effort: Pulmonary effort is normal. No respiratory distress.      Breath sounds: Normal breath sounds. No wheezing or rales.   Chest:      Chest wall: No tenderness.   Abdominal:      General: There is no distension.      Palpations: Abdomen is soft.      Tenderness: There is no abdominal tenderness.   Musculoskeletal:         General: Normal range of motion.      Cervical back: Normal range of motion and neck supple.   Lymphadenopathy:      Cervical: No cervical adenopathy.   Skin:     General: Skin is warm and dry.   Neurological:      Mental Status: He is alert and oriented to person, place, and time.      Cranial Nerves: No cranial nerve deficit.         Results Review:   Study Result    Narrative & Impression   Exam: NM PET/CT SKULL BASE TO MID THIGH-     Indication: Solitary pulmonary nodule evaluation comment enlarging LEFT  upper lobe pulmonary nodule     Comparison: 6/8/2022, 6/18/2020     11.32 mCi F-18 FDG was administered IV per protocol via the RIGHT arm.  Blood glucose at the time of injection was 91 mg/dl.     PET/CT images were obtained from the base of the skull to the proximal  femurs approximately 60 minutes after radiotracer administration.  Noncontrast CT images of the neck, chest, abdomen, and pelvis were  obtained for attenuation correction and anatomic localization. DLP: 985  mGy cm. Automated exposure control was also " utilized to decrease patient  radiation dose.     Findings:  Background right hepatic lobe metabolic activity measures max SUV 2.74.     Redemonstrated 12 x 10 mm pulmonary nodule in the LEFT upper lobe on  image 154. This nodule has mild FDG uptake with a maximum SUV of 2.57,  below physiologic background. This nodule is increase in size compared  to a study from 6/15/2020 which time it measured up to 6 mm.     9 mm pulmonary nodule along the major fissure in the RIGHT lower lobe on  image 141 is unchanged from the 2020 exam and not metabolically active.  7 mm nodule in the RIGHT upper lobe along the minor fissure on image 142  is unchanged from the 2020 exam and is not metabolically active. A few  smaller bilateral pulmonary nodules are similar to the prior study and  not metabolically active although too small to definitively characterize  with PET/CT.     No hypermetabolic thoracic lymph nodes.     No abnormal hypermetabolic activity in the neck.      No abnormal hypermetabolic activity in the abdomen or pelvis.     RIGHT anterior fifth and sixth rib subacute fractures with  hypermetabolic activity. No suspicious hypermetabolic bone lesion.     Non-FDG avid findings:     Lower intracranial cavity is unremarkable. No acute orbital finding.  Parotid glands appear normal. No cervical lymphadenopathy. Carotid  vascular calcifications are noted. Mastoid air cells and paranasal  sinuses are clear. Degenerative change in the cervical spine.     Central airways are clear. No consolidation or pleural effusion.  Moderate emphysema. No enlarged thoracic lymph nodes. Main pulmonary  artery is nondilated. Thoracic aorta is nonaneurysmal. No pericardial  effusion. Mild coronary calcification.     Unenhanced liver, gallbladder, biliary tree, pancreas, spleen, and  adrenal glands are unremarkable. Small bilateral renal cysts. No  urolithiasis or hydronephrosis. No focal urinary bladder abnormality.     Colonic diverticulosis  without evidence of diverticulitis. No abnormal  bowel distention or evidence of active bowel inflammation. No ascites or  free pelvic fluid. No pelvic mass or pelvic collection. Prostate and  seminal vesicles are unremarkable.     Nonaneurysmal atherosclerotic abdominal aorta. No enlarged  retroperitoneal, mesenteric, pelvic, or inguinal lymph nodes. Ventral  abdominal wall mesh repair device noted. RIGHT hip arthroplasty. No  acute osseous finding.     IMPRESSION:     1.  12 x 10 mm pulmonary nodule in the peripheral LEFT upper lobe has  mild FDG uptake slightly below physiologic background. Given increase in  size compared to a study from 6/15/2020, this nodule is concerning for  an indolent slow-growing neoplasm. Recommend continued clinical and  imaging follow-up.     2.  No abnormal hypermetabolic activity in the neck, abdomen, or pelvis.     3.  Subacute RIGHT anterior fifth and sixth rib fractures with  hypermetabolic activity.  This report was finalized on 06/20/2022 11:53 by Dr. Johnny Schmidt MD.            I reviewed the patient's new clinical results.  Discussed with patient      Assessment & Plan       Diagnoses and all orders for this visit:    1. Lung nodule (Primary)  -     Full Pulmonary Function Test With Bronchodilator & ABG; Future    2. Cigarette smoker          I discussed the patients findings and my recommendations with patient.  We discussed the differential diagnoses possible with malignancy intermediate or high in the differential.  We discussed the importance of staging such that best therapy can be selected and portend prognosis accurately.  We discussed options for care including continued surveillance verses efforts towards diagnosis and treatment.  We discussed options for diagnosis including bronchoscopy, CT-guided needle biopsy, or surgical biopsy with either cervical mediastinoscopy or thoracoscopy with resection.We discussed the value of clinical staging with a CT/PET scan.  The  pros and cons of each option were discussed at length.  I believe the best option for treatment may be Left thoracoscopic wedge resection, possible lobectomy with mediastinal lymph node dissection.  The risks of the procedure were discussed to included but not limited to bleeding, infection, stroke, heart attack, anesthesia risks, need for additional procedures, great vessel injury, injury to nerve with resultant hoarseness of voice, mechanical ventilation, ICU stay, chronic pain syndromes, need for thoracotomy, need for prolonged chest tube use, and/or death.  Additionally an assessment of lung function status is warranted to evaluate his candidacy for a resection. If lung function is adequate a stress test will be needed.  All questions have been answered to the best of my ability and he is agreeable to the aforementioned plan.    Many thanks for the opportunity to care for your patient.    I will continue to keep you apprised of provided care as it ensues.      A return to clinic appointment has been set a couple weeks with PFT's.

## 2022-08-02 ENCOUNTER — TELEPHONE (OUTPATIENT)
Dept: CARDIAC SURGERY | Facility: CLINIC | Age: 72
End: 2022-08-02

## 2022-08-02 NOTE — TELEPHONE ENCOUNTER
Caller: Luis Kam    Relationship to patient: Self    Best call back number:     Chief complaint: ILLNESS/COVID    Type of visit: 4WEEK FOLLOW UP    If rescheduling, when is the original appointment: 8/3/22  Additional notes: MR. KAM CALLED TO CANCEL APPOINTMENT ON 8/3/22 DUE TO ILLNESS. THE NEXT AVAILABLE IS ON 9/28/22 AND ADDED TO THE WAIT LIST.

## 2022-08-02 NOTE — TELEPHONE ENCOUNTER
Pt had PFT's on 07/15/22. Will need to d/w Aleshia and Dr. Aceves to see if this appointment date is acceptable or if pt will need to be moved up.

## 2022-08-23 ENCOUNTER — OFFICE VISIT (OUTPATIENT)
Dept: CARDIAC SURGERY | Facility: CLINIC | Age: 72
End: 2022-08-23

## 2022-08-23 ENCOUNTER — TELEPHONE (OUTPATIENT)
Dept: CARDIAC SURGERY | Facility: CLINIC | Age: 72
End: 2022-08-23

## 2022-08-23 VITALS
HEART RATE: 58 BPM | WEIGHT: 230 LBS | DIASTOLIC BLOOD PRESSURE: 80 MMHG | HEIGHT: 74 IN | BODY MASS INDEX: 29.52 KG/M2 | OXYGEN SATURATION: 98 % | SYSTOLIC BLOOD PRESSURE: 122 MMHG

## 2022-08-23 DIAGNOSIS — R06.02 SOB (SHORTNESS OF BREATH): Primary | ICD-10-CM

## 2022-08-23 DIAGNOSIS — R91.1 LUNG NODULE: ICD-10-CM

## 2022-08-23 DIAGNOSIS — I25.10 CHRONIC CORONARY ARTERY DISEASE: ICD-10-CM

## 2022-08-23 PROCEDURE — 99214 OFFICE O/P EST MOD 30 MIN: CPT | Performed by: THORACIC SURGERY (CARDIOTHORACIC VASCULAR SURGERY)

## 2022-08-23 NOTE — TELEPHONE ENCOUNTER
Pt is a  and is asking if we can get [facility] auth for his future visits.  Pt is aware we do not have [employer] auth for his visit today./namita

## 2022-09-13 ENCOUNTER — HOSPITAL ENCOUNTER (OUTPATIENT)
Dept: CARDIOLOGY | Facility: HOSPITAL | Age: 72
Discharge: HOME | End: 2022-09-13

## 2022-09-13 VITALS
BODY MASS INDEX: 29.43 KG/M2 | DIASTOLIC BLOOD PRESSURE: 61 MMHG | HEIGHT: 74 IN | WEIGHT: 229.28 LBS | SYSTOLIC BLOOD PRESSURE: 100 MMHG | HEART RATE: 54 BPM

## 2022-09-13 DIAGNOSIS — R06.02 SOB (SHORTNESS OF BREATH): ICD-10-CM

## 2022-09-13 PROCEDURE — 0 TECHNETIUM SESTAMIBI: Performed by: NURSE PRACTITIONER

## 2022-09-13 PROCEDURE — 25010000002 REGADENOSON 0.4 MG/5ML SOLUTION: Performed by: INTERNAL MEDICINE

## 2022-09-13 PROCEDURE — A9500 TC99M SESTAMIBI: HCPCS | Performed by: NURSE PRACTITIONER

## 2022-09-13 PROCEDURE — 93017 CV STRESS TEST TRACING ONLY: CPT

## 2022-09-13 PROCEDURE — 93018 CV STRESS TEST I&R ONLY: CPT | Performed by: INTERNAL MEDICINE

## 2022-09-13 PROCEDURE — 78452 HT MUSCLE IMAGE SPECT MULT: CPT

## 2022-09-13 PROCEDURE — 78452 HT MUSCLE IMAGE SPECT MULT: CPT | Performed by: INTERNAL MEDICINE

## 2022-09-13 RX ADMIN — TECHNETIUM TC 99M SESTAMIBI 1 DOSE: 1 INJECTION INTRAVENOUS at 10:40

## 2022-09-13 RX ADMIN — REGADENOSON 0.4 MG: 0.08 INJECTION, SOLUTION INTRAVENOUS at 12:07

## 2022-09-13 RX ADMIN — TECHNETIUM TC 99M SESTAMIBI 1 DOSE: 1 INJECTION INTRAVENOUS at 11:58

## 2022-09-20 DIAGNOSIS — R91.1 LUNG NODULE: Primary | ICD-10-CM

## 2022-09-20 LAB
BH CV NUCLEAR PRIOR STUDY: 3
BH CV REST NUCLEAR ISOTOPE DOSE: 10.7 MCI
BH CV STRESS BP STAGE 1: NORMAL
BH CV STRESS COMMENTS STAGE 1: NORMAL
BH CV STRESS DOSE REGADENOSON STAGE 1: 0.4
BH CV STRESS DURATION MIN STAGE 1: 0
BH CV STRESS DURATION SEC STAGE 1: 10
BH CV STRESS HR STAGE 1: 77
BH CV STRESS NUCLEAR ISOTOPE DOSE: 34.2 MCI
BH CV STRESS PROTOCOL 1: NORMAL
BH CV STRESS RECOVERY BP: NORMAL MMHG
BH CV STRESS RECOVERY HR: 62 BPM
BH CV STRESS STAGE 1: 1
LV EF NUC BP: 49 %
MAXIMAL PREDICTED HEART RATE: 150 BPM
PERCENT MAX PREDICTED HR: 51.33 %
STRESS BASELINE BP: NORMAL MMHG
STRESS BASELINE HR: 55 BPM
STRESS PERCENT HR: 60 %
STRESS POST EXERCISE DUR MIN: 0 MIN
STRESS POST EXERCISE DUR SEC: 10 SEC
STRESS POST PEAK BP: NORMAL MMHG
STRESS POST PEAK HR: 77 BPM
STRESS TARGET HR: 128 BPM

## 2022-09-21 ENCOUNTER — PREP FOR SURGERY (OUTPATIENT)
Dept: OTHER | Facility: HOSPITAL | Age: 72
End: 2022-09-21

## 2022-09-21 ENCOUNTER — TELEPHONE (OUTPATIENT)
Dept: CARDIAC SURGERY | Facility: CLINIC | Age: 72
End: 2022-09-21

## 2022-09-21 DIAGNOSIS — Z79.01 ANTICOAGULATED: ICD-10-CM

## 2022-09-21 DIAGNOSIS — R91.1 LUNG NODULE: Primary | ICD-10-CM

## 2022-09-21 RX ORDER — PREDNISONE 50 MG/1
50 TABLET ORAL SEE ADMIN INSTRUCTIONS
Qty: 3 TABLET | Refills: 0 | Status: SHIPPED | OUTPATIENT
Start: 2022-09-21 | End: 2022-09-27 | Stop reason: SDUPTHER

## 2022-09-21 RX ORDER — BUPIVACAINE HCL/0.9 % NACL/PF 0.1 %
2 PLASTIC BAG, INJECTION (ML) EPIDURAL ONCE
Status: CANCELLED | OUTPATIENT
Start: 2022-10-04

## 2022-09-21 RX ORDER — SODIUM CHLORIDE 0.9 % (FLUSH) 0.9 %
10 SYRINGE (ML) INJECTION EVERY 12 HOURS SCHEDULED
Status: CANCELLED | OUTPATIENT
Start: 2022-09-21

## 2022-09-21 RX ORDER — SODIUM CHLORIDE 0.9 % (FLUSH) 0.9 %
10 SYRINGE (ML) INJECTION AS NEEDED
Status: CANCELLED | OUTPATIENT
Start: 2022-09-21

## 2022-09-21 RX ORDER — ALBUTEROL SULFATE 1.25 MG/3ML
1.25 SOLUTION RESPIRATORY (INHALATION)
Status: CANCELLED | OUTPATIENT
Start: 2022-09-21

## 2022-09-22 ENCOUNTER — TELEPHONE (OUTPATIENT)
Dept: CARDIOLOGY CLINIC | Age: 72
End: 2022-09-22

## 2022-09-22 NOTE — TELEPHONE ENCOUNTER
Date: 10/4/22    [facility]    [place]cedure: bronoscopy, thoracoscopy wedge resection, lobectomy,. Mediastinal lymph node dissection    Surgeon: Shagufta Page    Last Office Visit: 6/15/22  Reason for office visit and medical concerns addressed at this office visit: abnormal ekgd, htn, hyperlipidemia, mild cad, chf, copd,     Testing Performed and Date of Service:  3/2/22 Tanya   This is an abnormal pharmacologic nuclear stress test with a fixed   apical defect that possibly represents normal perfusion dropped out or   represents scar. There was no ischemia. Decrease in global wall   motion, no segmental abnormalities. RCRI = 6.6   METs 4    Current Medications: imdur, ranexa, primidone, flomax, metoprolol, gabapentin, nexium, aspirin, topamax, qvar,     Is the patient currently taking an anticoagulant?  If so, what is the diagnosis the patient has been given to warrant the need for the anticoagulant? none    Additional Notes: requesting cardiac clearance prior to procedure

## 2022-09-26 ENCOUNTER — HOSPITAL ENCOUNTER (OUTPATIENT)
Dept: CT IMAGING | Facility: HOSPITAL | Age: 72
Discharge: HOME | End: 2022-09-26

## 2022-09-26 ENCOUNTER — TELEPHONE (OUTPATIENT)
Dept: CARDIAC SURGERY | Facility: CLINIC | Age: 72
End: 2022-09-26

## 2022-09-26 DIAGNOSIS — R91.1 LUNG NODULE: ICD-10-CM

## 2022-09-26 NOTE — TELEPHONE ENCOUNTER
Julius in [place] called office to report that pt presented for CT today, has an allergy to CT Contrast, and pt did not take his premedication as directed. Julius states that his radiologist will not allow him to proceed without pt taking premed correctly or if this is done without contrast. Informed Julius that this will need to be rescheduled and we will try to get this scheduled on same day as his prework, but cannot guarantee this. We will send a message to Aleshia TOBIAS and inform her so that pre-med can be reordered. Julius voiced understanding.

## 2022-09-26 NOTE — TELEPHONE ENCOUNTER
Patient's wife (Pennie) aware of CT rescheduled to 9/30. He will need additional pre-medication sent in to [facility]. I went over the pre-medication instructions with her (prednisone 13 hr prior, 7 hr prior, and 1 hr prior w/Benadryl) - she voiced understanding to all.

## 2022-09-27 ENCOUNTER — APPOINTMENT (OUTPATIENT)
Dept: CARDIOLOGY | Facility: HOSPITAL | Age: 72
End: 2022-09-27

## 2022-09-27 RX ORDER — PREDNISONE 50 MG/1
50 TABLET ORAL SEE ADMIN INSTRUCTIONS
Qty: 3 TABLET | Refills: 0 | Status: SHIPPED | OUTPATIENT
Start: 2022-09-27 | End: 2022-09-30

## 2022-09-27 NOTE — TELEPHONE ENCOUNTER
I have resent in the meds and added for the pharmacist to discuss with patient as well when he picks up meds.

## 2022-09-30 ENCOUNTER — HOSPITAL ENCOUNTER (OUTPATIENT)
Dept: CT IMAGING | Facility: HOSPITAL | Age: 72
Discharge: HOME | End: 2022-09-30

## 2022-09-30 ENCOUNTER — HOSPITAL ENCOUNTER (OUTPATIENT)
Dept: GENERAL RADIOLOGY | Facility: HOSPITAL | Age: 72
Discharge: HOME | End: 2022-09-30

## 2022-09-30 ENCOUNTER — PRE-ADMISSION TESTING (OUTPATIENT)
Dept: PREADMISSION TESTING | Facility: HOSPITAL | Age: 72
End: 2022-09-30

## 2022-09-30 VITALS
HEART RATE: 70 BPM | BODY MASS INDEX: 28.59 KG/M2 | DIASTOLIC BLOOD PRESSURE: 87 MMHG | WEIGHT: 229.94 LBS | OXYGEN SATURATION: 99 % | RESPIRATION RATE: 18 BRPM | SYSTOLIC BLOOD PRESSURE: 120 MMHG | HEIGHT: 75 IN

## 2022-09-30 DIAGNOSIS — R91.1 LUNG NODULE: ICD-10-CM

## 2022-09-30 LAB
ALBUMIN SERPL-MCNC: 4.1 G/DL (ref 3.5–5.2)
ALBUMIN/GLOB SERPL: 1.8 G/DL
ALP SERPL-CCNC: 61 U/L (ref 39–117)
ALT SERPL W P-5'-P-CCNC: 10 U/L (ref 1–41)
ANION GAP SERPL CALCULATED.3IONS-SCNC: 8 MMOL/L (ref 5–15)
APTT PPP: 29.6 SECONDS (ref 24.1–35)
AST SERPL-CCNC: 16 U/L (ref 1–40)
BASOPHILS # BLD AUTO: 0 10*3/MM3 (ref 0–0.2)
BASOPHILS NFR BLD AUTO: 0 % (ref 0–1.5)
BILIRUB SERPL-MCNC: 0.3 MG/DL (ref 0–1.2)
BUN SERPL-MCNC: 17 MG/DL (ref 8–23)
BUN/CREAT SERPL: 18.1 (ref 7–25)
CALCIUM SPEC-SCNC: 9.1 MG/DL (ref 8.6–10.5)
CHLORIDE SERPL-SCNC: 102 MMOL/L (ref 98–107)
CO2 SERPL-SCNC: 25 MMOL/L (ref 22–29)
CREAT BLDA-MCNC: 0.9 MG/DL (ref 0.6–1.3)
CREAT SERPL-MCNC: 0.94 MG/DL (ref 0.76–1.27)
DEPRECATED RDW RBC AUTO: 52.2 FL (ref 37–54)
EGFRCR SERPLBLD CKD-EPI 2021: 87.2 ML/MIN/1.73
EOSINOPHIL # BLD AUTO: 0 10*3/MM3 (ref 0–0.4)
EOSINOPHIL NFR BLD AUTO: 0 % (ref 0.3–6.2)
ERYTHROCYTE [DISTWIDTH] IN BLOOD BY AUTOMATED COUNT: 14.3 % (ref 12.3–15.4)
GLOBULIN UR ELPH-MCNC: 2.3 GM/DL
GLUCOSE SERPL-MCNC: 126 MG/DL (ref 65–99)
HCT VFR BLD AUTO: 39.5 % (ref 37.5–51)
HGB BLD-MCNC: 13.1 G/DL (ref 13–17.7)
IMM GRANULOCYTES # BLD AUTO: 0.03 10*3/MM3 (ref 0–0.05)
IMM GRANULOCYTES NFR BLD AUTO: 0.5 % (ref 0–0.5)
INR PPP: 1.05 (ref 0.91–1.09)
LYMPHOCYTES # BLD AUTO: 0.36 10*3/MM3 (ref 0.7–3.1)
LYMPHOCYTES NFR BLD AUTO: 6.4 % (ref 19.6–45.3)
MCH RBC QN AUTO: 32.6 PG (ref 26.6–33)
MCHC RBC AUTO-ENTMCNC: 33.2 G/DL (ref 31.5–35.7)
MCV RBC AUTO: 98.3 FL (ref 79–97)
MONOCYTES # BLD AUTO: 0.11 10*3/MM3 (ref 0.1–0.9)
MONOCYTES NFR BLD AUTO: 2 % (ref 5–12)
NEUTROPHILS NFR BLD AUTO: 5.09 10*3/MM3 (ref 1.7–7)
NEUTROPHILS NFR BLD AUTO: 91.1 % (ref 42.7–76)
NRBC BLD AUTO-RTO: 0 /100 WBC (ref 0–0.2)
PLATELET # BLD AUTO: 221 10*3/MM3 (ref 140–450)
PMV BLD AUTO: 10.9 FL (ref 6–12)
POTASSIUM SERPL-SCNC: 4.6 MMOL/L (ref 3.5–5.2)
PROT SERPL-MCNC: 6.4 G/DL (ref 6–8.5)
PROTHROMBIN TIME: 13.3 SECONDS (ref 11.9–14.6)
RBC # BLD AUTO: 4.02 10*6/MM3 (ref 4.14–5.8)
SODIUM SERPL-SCNC: 135 MMOL/L (ref 136–145)
WBC NRBC COR # BLD: 5.59 10*3/MM3 (ref 3.4–10.8)

## 2022-09-30 PROCEDURE — 71260 CT THORAX DX C+: CPT

## 2022-09-30 PROCEDURE — 85025 COMPLETE CBC W/AUTO DIFF WBC: CPT

## 2022-09-30 PROCEDURE — 85610 PROTHROMBIN TIME: CPT

## 2022-09-30 PROCEDURE — 25010000002 IOPAMIDOL 61 % SOLUTION: Performed by: NURSE PRACTITIONER

## 2022-09-30 PROCEDURE — 85730 THROMBOPLASTIN TIME PARTIAL: CPT

## 2022-09-30 PROCEDURE — 36415 COLL VENOUS BLD VENIPUNCTURE: CPT

## 2022-09-30 PROCEDURE — 93010 ELECTROCARDIOGRAM REPORT: CPT | Performed by: INTERNAL MEDICINE

## 2022-09-30 PROCEDURE — 93005 ELECTROCARDIOGRAM TRACING: CPT

## 2022-09-30 PROCEDURE — 82565 ASSAY OF CREATININE: CPT

## 2022-09-30 PROCEDURE — 80053 COMPREHEN METABOLIC PANEL: CPT

## 2022-09-30 RX ADMIN — IOPAMIDOL 100 ML: 612 INJECTION, SOLUTION INTRAVENOUS at 12:17

## 2022-10-03 ENCOUNTER — TELEPHONE (OUTPATIENT)
Dept: CARDIAC SURGERY | Facility: CLINIC | Age: 72
End: 2022-10-03

## 2022-10-03 LAB
QT INTERVAL: 424 MS
QTC INTERVAL: 424 MS

## 2022-10-17 DIAGNOSIS — R91.1 LUNG NODULE: Primary | ICD-10-CM

## 2022-12-15 ENCOUNTER — OFFICE VISIT (OUTPATIENT)
Dept: CARDIOLOGY CLINIC | Age: 72
End: 2022-12-15
Payer: MEDICARE

## 2022-12-15 VITALS
HEART RATE: 63 BPM | BODY MASS INDEX: 29.42 KG/M2 | OXYGEN SATURATION: 98 % | HEIGHT: 73 IN | SYSTOLIC BLOOD PRESSURE: 118 MMHG | WEIGHT: 222 LBS | DIASTOLIC BLOOD PRESSURE: 62 MMHG

## 2022-12-15 DIAGNOSIS — I10 ESSENTIAL HYPERTENSION: ICD-10-CM

## 2022-12-15 DIAGNOSIS — I25.10 CORONARY ARTERY DISEASE INVOLVING NATIVE CORONARY ARTERY OF NATIVE HEART WITHOUT ANGINA PECTORIS: Primary | ICD-10-CM

## 2022-12-15 DIAGNOSIS — E78.5 DYSLIPIDEMIA: ICD-10-CM

## 2022-12-15 PROCEDURE — 3074F SYST BP LT 130 MM HG: CPT | Performed by: NURSE PRACTITIONER

## 2022-12-15 PROCEDURE — G8484 FLU IMMUNIZE NO ADMIN: HCPCS | Performed by: NURSE PRACTITIONER

## 2022-12-15 PROCEDURE — 3017F COLORECTAL CA SCREEN DOC REV: CPT | Performed by: NURSE PRACTITIONER

## 2022-12-15 PROCEDURE — 3078F DIAST BP <80 MM HG: CPT | Performed by: NURSE PRACTITIONER

## 2022-12-15 PROCEDURE — 99214 OFFICE O/P EST MOD 30 MIN: CPT | Performed by: NURSE PRACTITIONER

## 2022-12-15 PROCEDURE — G8417 CALC BMI ABV UP PARAM F/U: HCPCS | Performed by: NURSE PRACTITIONER

## 2022-12-15 PROCEDURE — G8427 DOCREV CUR MEDS BY ELIG CLIN: HCPCS | Performed by: NURSE PRACTITIONER

## 2022-12-15 PROCEDURE — 1123F ACP DISCUSS/DSCN MKR DOCD: CPT | Performed by: NURSE PRACTITIONER

## 2022-12-15 PROCEDURE — 4004F PT TOBACCO SCREEN RCVD TLK: CPT | Performed by: NURSE PRACTITIONER

## 2022-12-15 ASSESSMENT — ENCOUNTER SYMPTOMS
SORE THROAT: 0
WHEEZING: 0
CHEST TIGHTNESS: 0
COUGH: 1
SHORTNESS OF BREATH: 1

## 2022-12-15 NOTE — PROGRESS NOTES
[facility]   Established Patient Office Visit   [facility]. [address] [address]  [phone]        OFFICE VISIT:  12/15/2022    Martha Velazquez - : 1950    Reason For Visit:  Holly Connell is a 81 y.o. male who is here for 6 Month Follow-Up    1. Coronary artery disease involving native coronary artery of native heart without angina pectoris    2. Essential hypertension    3. Dyslipidemia      Patient with a history of mild coronary artery disease, hypertension and dyslipidemia. Patient with a history of left lung mass followed by Dr. Kat Lee at [facility].     Cardiac cath in May 2018 showed mild diffuse CAD with no significant stenotic disease. He is a patient of Dr. Odalys Thayer. Patient presents to clinic today for 6-month follow-up. Patient continues to cough up some blood. He does have a follow-up appointment with Dr. Kat Lee as soon. There is no chest pain, pressure or tightness. For shortness of breath this is same no worsening. There is no orthopnea or PND. Patient denies any lightheadedness or syncope. He says his balance has been off a little bit lately. Patient states he is due to see PCP regarding this next week.         Subjective    Martha Velazquez is a 81 y.o. male with the following history as recorded in [facility]:    Patient Active Problem List    Diagnosis Date Noted    Non-cardiac chest pain 2013    Hyperlipidemia     Atypical chest pain 2018    Internal hemorrhoids 2017    Rectal bleeding 2017    Altered bowel function 2017    Unexplained weight loss 2017    Generalized abdominal pain 2017    Excessive gas 2017    History of adenomatous polyp of colon 2017    Anemia 2017    Varicose veins of bilateral lower extremities with pain 2017    Venous insufficiency of both lower extremities 2017    Swelling of both lower extremities 2017    Chest pressure     Ex-cigarette smoker 10/27/2015 Change in bowel habits 08/08/2014    Fecal incontinence 08/08/2014    Rectal pain 08/08/2014    Pelvic pain in male 08/08/2014    Family history of colon cancer 08/08/2014    Mild CAD 04/11/2014    Essential hypertension 06/24/2013    Nicotine abuse     Family history of premature CAD     Abdominal pain, other specified site 01/25/2013    Nausea 01/25/2013    Dysphagia 01/25/2013    Weight loss 01/25/2013    Fatigue 01/25/2013    Heartburn 01/25/2013    Hoarseness 01/25/2013    Regurgitation 01/25/2013    Primary osteoarthritis of right hip 03/04/2022    Chest pain at rest 02/22/2022    Abnormal EKG 02/22/2022    Cigarette nicotine dependence without complication 16/56/0589    Smoker 09/11/2018     Current Outpatient Medications   Medication Sig Dispense Refill    isosorbide mononitrate (IMDUR) 60 MG extended release tablet Take 1 tablet by mouth nightly 90 tablet 3    nitroGLYCERIN (NITROSTAT) 0.4 MG SL tablet Place 1 tablet under the tongue every 5 minutes as needed for Chest pain up to max of 3 total doses. If no relief after 1 dose, call 911. 25 tablet 2    ranolazine (RANEXA) 500 MG extended release tablet Take 1 tablet by mouth 2 times daily 60 tablet 5    primidone (MYSOLINE) 50 MG tablet Take 50 mg by mouth nightly 1/2 tablet at bedtime for tremors      fluticasone-salmeterol (ADVAIR DISKUS) 500-50 MCG/DOSE diskus inhaler Inhale 1 puff into the lungs every 12 hours Indications: uses prn 60 each 0    tamsulosin (FLOMAX) 0.4 MG capsule Take 1 capsule by mouth daily      metoprolol succinate (TOPROL XL) 25 MG extended release tablet TAKE ONE TABLET BY MOUTH 2 TIMES A DAY (Patient taking differently: Take 25 mg by mouth daily) 180 tablet 3    gabapentin (NEURONTIN) 300 MG capsule Take 300 mg by mouth in the morning and at bedtime.  Take 3 tablets twice daily       esomeprazole (NEXIUM) 40 MG delayed release capsule Take 40 mg by mouth daily       aspirin 325 MG tablet Take 325 mg by mouth daily      topiramate (TOPAMAX) 100 MG tablet Take 100 mg by mouth daily       beclomethasone (QVAR) 80 MCG/ACT inhaler Inhale 1 puff into the lungs 2 times daily       albuterol sulfate HFA (PROVENTIL;VENTOLIN;PROAIR) 108 (90 Base) MCG/ACT inhaler Inhale 2 puffs into the lungs every 4 hours as needed. tiotropium (SPIRIVA) 18 MCG inhalation capsule Inhale 18 mcg into the lungs daily. No current facility-administered medications for this visit.      Allergies: Bactrim [sulfamethoxazole-trimethoprim], Nsaids, Succinylcholine, and Betadine [povidone iodine]  Past Medical History:   Diagnosis Date    Allergic rhinitis     Anemia     Asthma     CAD (coronary artery disease) 4/11/2014    Chest tightness, discomfort, or pressure 6/17/2013 6/17/2013  lexiscan Positive for inferior myocardial ischemia, EF 47%, 9% ischemic myocardium on stress, intermediate risk findings, AUC indication 16, AUC score 7     CHF (congestive heart failure) ([facility])     Chronic back pain     COPD (chronic obstructive pulmonary disease) ([facility] [address]     Ex-cigarette smoker 10/27/2015    Family history of early CAD 6/24/2013    Family history of premature CAD     GERD (gastroesophageal reflux disease)     History of hernia repair     Hyperlipidemia     VA manages cholesterol    Hypertension     Multiple lung nodules     Neuropathy     Nicotine abuse     Nicotine abuse     Osteoarthritis     Peripheral vascular disease (HCC)     Restless legs syndrome     SOB (shortness of breath)     Thyroid nodule     Unspecified sleep apnea     can't wear cpap     Past Surgical History:   Procedure Laterality Date    APPENDECTOMY      BLADDER SURGERY      x 3    CARDIAC CATHETERIZATION  6/24/2013  JDT    EF 50%    CARDIAC CATHETERIZATION  10/27/15  JDT    EF 50%    CARDIAC CATHETERIZATION  05/2018    mild, non-obstructive CAD    COLONOSCOPY  20 yrs ago    not sure who, thinks @ [facility]    COLONOSCOPY  09/2014    TAx2, HP, intern hemorrhoids (3 yr)    [address] [facility] [address]      with mesh-Dr Rodney Sender    [place] COLSC FLX W/REMOVAL LESION BY HOT BX FORCEPS  2017    Dr Shelton-internal hemorrhoids, tubular AP (-) dysplasia--5 yr recall    [place] EGD TRANSORAL BIOPSY SINGLE/MULTIPLE N/A 10/19/2017    Dr Shelton-normal-Esme (-) chronic nonspecific inflammation    PROSTATE SURGERY      THYROIDECTOMY      TOTAL HIP ARTHROPLASTY Right 3/4/2022    RIGHT TOTAL HIP ARTHROPLASTY performed by Jeffery Hayden MD at [address] [facility]      thinks so, but a long time ago if so    UPPER GASTROINTESTINAL ENDOSCOPY  2013    Cat: Grade A esophagitis and esophageal stricture. Dilated 54fr    VASCULAR SURGERY  2017    SJS. Right IJV US 9f sheath. VASCULAR SURGERY Left 2017    SJS-L GSV RFA     Family History   Problem Relation Age of Onset    Cancer Mother         lung and brain    Coronary Art Dis Mother     Coronary Art Dis Sister     Colon Cancer Sister     Colon Polyps Sister     Coronary Art Dis Father     Colon Cancer Maternal Grandmother     Colon Polyps Maternal Grandmother     Colon Cancer Sister     Colon Polyps Sister     Cancer Sister         \"female cancer\"    Cancer Sister         thryoid    Coronary Art Dis Brother     Heart Failure Brother     Esophageal Cancer Neg Hx     Liver Cancer Neg Hx     Stomach Cancer Neg Hx      Social History     Tobacco Use    Smoking status: Some Days     Types: Cigarettes     Last attempt to quit: 2022     Years since quittin.8    Smokeless tobacco: Never    Tobacco comments:     some days he smokes, some days he doesn't   Substance Use Topics    Alcohol use: No     Alcohol/week: 0.0 standard drinks          Review of Systems:    Review of Systems   Constitutional:  Negative for activity change, chills, diaphoresis, fatigue and fever. HENT:  Negative for congestion and sore throat. Respiratory:  Positive for cough and shortness of breath.  Negative for chest tightness and wheezing. Cardiovascular:  Negative for chest pain, palpitations and leg swelling. Neurological:  Negative for dizziness, syncope, light-headedness and headaches. Psychiatric/Behavioral:  Negative for confusion. The patient is not nervous/anxious. Objective:    /62   Pulse 63   Ht 6' 1\" (1.854 m)   Wt 222 lb (100.7 kg)   SpO2 98%   BMI 29.29 kg/m²     GENERAL - well developed and well nourished, conversant  HEENT -  PERRLA, Hearing appears normal, gentleman lids are normal.  External inspection of ears and nose appear normal.  NECK - no thyromegaly, no JVD, trachea is in the midline  CARDIOVASCULAR - PMI is in the mid line clavicular position, Normal S1 and S2 with no systolic murmur. No S3 or S4    PULMONARY - no respiratory distress. No wheezes or rales. Lungs are clear to ausculation, normal respiratory effort. ABDOMEN  - soft, non tender, no rebound  MUSCULOSKELETAL  - range of motion of the upper and lower extermites appears normal and equal and is without pain   EXTREMITIES - no significant edema   NEUROLOGIC - gait and station are normal  SKIN - turgor is normal, can warm and dry. PSYCHIATRIC - normal mood and affect, alert and orientated x 3,      ASSESSMENT:    ALL THE CARDIOLOGY PROBLEMS ARE LISTED ABOVE; HOWEVER, THE FOLLOWING SPECIFIC CARDIAC PROBLEMS / CONDITIONS WERE ADDRESSED AND TREATED DURING THE OFFICE VISIT TODAY:                                                                                            MEDICAL DECISION MAKING             Cardiac Specific Problem / Diagnosis  Discussion and Data Reviewed Diagnostic Procedures Ordered Management Options Selected           1. CAD - mild  Stable   Review and summation of old records:    No angina. Patient is on Imdur, Toprol, Ranexa, aspirin No Continue current medications:     Yes           2. Hypertension  Well Controlled   Pressure in the office today 118/62. O2 sat 98%.   Patient is on Toprol-XL 25 mg daily. No Continue current medications:    Yes           3. Lung mass Shows no change Followed by Dr. Blanka Neely at [facility] No Continue current medications: Yes                     No orders of the defined types were placed in this encounter. No orders of the defined types were placed in this encounter. Discussed with patient. Return in about 6 months (around 6/15/2023) for Dr Baldev Pfeiffer . I greatly appreciate the opportunity to meet Renne Closs and your confidence in allowing me to participate in his cardiovascular care. MARGY Richardson - NP  12/15/2022 9:15 AM CST                    This dictation was generated by voice recognition computer software. Although all attempts are made to edit dictation for accuracy, there may be errors in the transcription that are not intended.

## 2022-12-29 ENCOUNTER — TELEPHONE (OUTPATIENT)
Dept: NEUROLOGY | Age: 72
End: 2022-12-29

## 2022-12-29 NOTE — TELEPHONE ENCOUNTER
Received a referral from Dr. Alicia Lefort office for this patient. Called and left patient a VM to let him know when I have him scheduled an appointment with Dr. Victoria Thomas. Left on VM the appointment time/date/location/phone #. Will mail patient an appointment letter.

## 2022-12-30 RX ORDER — PREDNISONE 50 MG/1
50 TABLET ORAL SEE ADMIN INSTRUCTIONS
Qty: 3 TABLET | Refills: 0 | Status: SHIPPED | OUTPATIENT
Start: 2022-12-30

## 2023-01-03 ENCOUNTER — HOSPITAL ENCOUNTER (OUTPATIENT)
Dept: NON INVASIVE DIAGNOSTICS | Age: 73
Discharge: HOME | End: 2023-01-03
Payer: MEDICARE

## 2023-01-03 DIAGNOSIS — R55 SYNCOPE AND COLLAPSE: ICD-10-CM

## 2023-01-03 PROCEDURE — 93246 EXT ECG>7D<15D RECORDING: CPT

## 2023-01-18 ENCOUNTER — HOSPITAL ENCOUNTER (OUTPATIENT)
Dept: CT IMAGING | Facility: HOSPITAL | Age: 73
Discharge: HOME | End: 2023-01-18
Admitting: THORACIC SURGERY (CARDIOTHORACIC VASCULAR SURGERY)
Payer: OTHER GOVERNMENT

## 2023-01-18 ENCOUNTER — OFFICE VISIT (OUTPATIENT)
Dept: CARDIAC SURGERY | Facility: CLINIC | Age: 73
End: 2023-01-18
Payer: OTHER GOVERNMENT

## 2023-01-18 VITALS
HEIGHT: 75 IN | OXYGEN SATURATION: 98 % | SYSTOLIC BLOOD PRESSURE: 120 MMHG | DIASTOLIC BLOOD PRESSURE: 80 MMHG | BODY MASS INDEX: 27.53 KG/M2 | WEIGHT: 221.4 LBS | HEART RATE: 64 BPM

## 2023-01-18 DIAGNOSIS — N28.89 NODULE OF KIDNEY: ICD-10-CM

## 2023-01-18 DIAGNOSIS — R91.1 LUNG NODULE: Primary | ICD-10-CM

## 2023-01-18 DIAGNOSIS — R91.1 LUNG NODULE: ICD-10-CM

## 2023-01-18 LAB — CREAT BLDA-MCNC: 1 MG/DL (ref 0.6–1.3)

## 2023-01-18 PROCEDURE — 71260 CT THORAX DX C+: CPT

## 2023-01-18 PROCEDURE — 25010000002 IOPAMIDOL 61 % SOLUTION: Performed by: THORACIC SURGERY (CARDIOTHORACIC VASCULAR SURGERY)

## 2023-01-18 PROCEDURE — 99213 OFFICE O/P EST LOW 20 MIN: CPT | Performed by: THORACIC SURGERY (CARDIOTHORACIC VASCULAR SURGERY)

## 2023-01-18 PROCEDURE — 82565 ASSAY OF CREATININE: CPT

## 2023-01-18 RX ORDER — TOPIRAMATE 25 MG/1
TABLET ORAL
COMMUNITY
Start: 2022-12-21

## 2023-01-18 RX ORDER — AMOXICILLIN AND CLAVULANATE POTASSIUM 875; 125 MG/1; MG/1
TABLET, FILM COATED ORAL
COMMUNITY
Start: 2022-12-21

## 2023-01-18 RX ORDER — NITROGLYCERIN 0.4 MG/1
TABLET SUBLINGUAL
COMMUNITY

## 2023-01-18 RX ADMIN — IOPAMIDOL 100 ML: 612 INJECTION, SOLUTION INTRAVENOUS at 08:56

## 2023-01-25 ENCOUNTER — HOSPITAL ENCOUNTER (OUTPATIENT)
Dept: ULTRASOUND IMAGING | Facility: HOSPITAL | Age: 73
Discharge: HOME | End: 2023-01-25
Admitting: THORACIC SURGERY (CARDIOTHORACIC VASCULAR SURGERY)
Payer: OTHER GOVERNMENT

## 2023-01-25 DIAGNOSIS — N28.89 NODULE OF KIDNEY: ICD-10-CM

## 2023-01-25 PROCEDURE — 76775 US EXAM ABDO BACK WALL LIM: CPT

## 2023-01-26 ENCOUNTER — OFFICE VISIT (OUTPATIENT)
Dept: NEUROLOGY | Age: 73
End: 2023-01-26
Payer: MEDICARE

## 2023-01-26 VITALS
SYSTOLIC BLOOD PRESSURE: 122 MMHG | WEIGHT: 222 LBS | HEART RATE: 76 BPM | HEIGHT: 73 IN | OXYGEN SATURATION: 93 % | BODY MASS INDEX: 29.42 KG/M2 | DIASTOLIC BLOOD PRESSURE: 76 MMHG

## 2023-01-26 DIAGNOSIS — R41.3 MEMORY LOSS: ICD-10-CM

## 2023-01-26 DIAGNOSIS — G62.9 NEUROPATHY: ICD-10-CM

## 2023-01-26 DIAGNOSIS — G25.0 ESSENTIAL TREMOR: ICD-10-CM

## 2023-01-26 DIAGNOSIS — R41.3 MEMORY LOSS: Primary | ICD-10-CM

## 2023-01-26 LAB
TSH REFLEX FT4: 1.15 UIU/ML (ref 0.35–5.5)
VITAMIN B-12: 234 PG/ML (ref 211–946)

## 2023-01-26 PROCEDURE — G8484 FLU IMMUNIZE NO ADMIN: HCPCS | Performed by: PSYCHIATRY & NEUROLOGY

## 2023-01-26 PROCEDURE — 3078F DIAST BP <80 MM HG: CPT | Performed by: PSYCHIATRY & NEUROLOGY

## 2023-01-26 PROCEDURE — G8427 DOCREV CUR MEDS BY ELIG CLIN: HCPCS | Performed by: PSYCHIATRY & NEUROLOGY

## 2023-01-26 PROCEDURE — 99204 OFFICE O/P NEW MOD 45 MIN: CPT | Performed by: PSYCHIATRY & NEUROLOGY

## 2023-01-26 PROCEDURE — 4004F PT TOBACCO SCREEN RCVD TLK: CPT | Performed by: PSYCHIATRY & NEUROLOGY

## 2023-01-26 PROCEDURE — 3074F SYST BP LT 130 MM HG: CPT | Performed by: PSYCHIATRY & NEUROLOGY

## 2023-01-26 PROCEDURE — 3017F COLORECTAL CA SCREEN DOC REV: CPT | Performed by: PSYCHIATRY & NEUROLOGY

## 2023-01-26 PROCEDURE — G8417 CALC BMI ABV UP PARAM F/U: HCPCS | Performed by: PSYCHIATRY & NEUROLOGY

## 2023-01-26 PROCEDURE — 1123F ACP DISCUSS/DSCN MKR DOCD: CPT | Performed by: PSYCHIATRY & NEUROLOGY

## 2023-01-26 RX ORDER — DIPHENHYDRAMINE HCL 50 MG
1 CAPSULE ORAL NIGHTLY
COMMUNITY

## 2023-01-26 RX ORDER — GABAPENTIN 300 MG/1
600-900 CAPSULE ORAL 3 TIMES DAILY
COMMUNITY

## 2023-01-26 RX ORDER — MEMANTINE HYDROCHLORIDE 5 MG/1
TABLET ORAL
Qty: 120 TABLET | Refills: 0 | Status: SHIPPED | OUTPATIENT
Start: 2023-01-26

## 2023-01-26 RX ORDER — OXYCODONE HYDROCHLORIDE 10 MG/1
1 TABLET ORAL DAILY PRN
COMMUNITY

## 2023-01-26 NOTE — PROGRESS NOTES
Chief Complaint   Patient presents with    New Patient    Loss of Consciousness    Fall     Has fallen several times. Tremors     Has tremors in both hands. Christine Bryan is a 81y.o. year old male who is seen for evaluation of tremors and poor memory. For about a year or so he has had a postural tremor in the right upper extremity. For several years he is complained of poor short-term memory. He has a history of exposure to agent orange, [place]a clot and many other chemicals. He has dropped 50 pounds over the past year. He has been on Topamax and gabapentin for years for migraines. He had a negative Zio patch for 2 weeks about a year ago. Otherwise denies any focal neurological difficulties. Records are reviewed. Also takes Mysoline 25 mg at bedtime for his tremor. Takes metoprolol 25 mg a day as well. .    Active Ambulatory Problems     Diagnosis Date Noted    Abdominal pain, other specified site 01/25/2013    Nausea 01/25/2013    Dysphagia 01/25/2013    Weight loss 01/25/2013    Fatigue 01/25/2013    Heartburn 01/25/2013    Hoarseness 01/25/2013    Regurgitation 01/25/2013    Hyperlipidemia     Nicotine abuse     Family history of premature CAD     Non-cardiac chest pain 06/17/2013    Essential hypertension 06/24/2013    Mild CAD 04/11/2014    Change in bowel habits 08/08/2014    Fecal incontinence 08/08/2014    Rectal pain 08/08/2014    Pelvic pain in male 08/08/2014    Family history of colon cancer 08/08/2014    Ex-cigarette smoker 10/27/2015    Chest pressure     Varicose veins of bilateral lower extremities with pain 07/11/2017    Venous insufficiency of both lower extremities 07/11/2017    Swelling of both lower extremities 07/11/2017    Rectal bleeding 08/29/2017    Altered bowel function 08/29/2017    Unexplained weight loss 08/29/2017    Generalized abdominal pain 08/29/2017    Excessive gas 08/29/2017    History of adenomatous polyp of colon 08/29/2017    Anemia 08/29/2017    Internal hemorrhoids 09/25/2017    Atypical chest pain 05/29/2018    Smoker 09/11/2018    Cigarette nicotine dependence without complication 57/73/9574    Chest pain at rest 02/22/2022    Abnormal EKG 02/22/2022    Primary osteoarthritis of right hip 03/04/2022     Resolved Ambulatory Problems     Diagnosis Date Noted    Preoperative cardiovascular examination 01/25/2013    Pain and swelling of right lower leg 07/11/2017     Past Medical History:   Diagnosis Date    Allergic rhinitis     Asthma     CAD (coronary artery disease) 4/11/2014    Chest tightness, discomfort, or pressure 6/17/2013    CHF (congestive heart failure) (HCC)     Chronic back pain     COPD (chronic obstructive pulmonary disease) (HCC)     Family history of early CAD 6/24/2013    GERD (gastroesophageal reflux disease)     History of hernia repair     Hypertension     Multiple lung nodules     Neuropathy     Osteoarthritis     Peripheral vascular disease (HCC)     Restless legs syndrome     SOB (shortness of breath)     Thyroid nodule     Unspecified sleep apnea        Past Surgical History:   Procedure Laterality Date    APPENDECTOMY      BLADDER SURGERY      x 3    CARDIAC CATHETERIZATION  6/24/2013  [facility]    EF 50%    CARDIAC CATHETERIZATION  10/27/15  J[facility]    EF 50%    CARDIAC CATHETERIZATION  05/2018    mild, non-obstructive CAD    COLONOSCOPY  20 yrs ago    not sure who, thinks @ [facility]    COLONOSCOPY  09/2014    TAx2, HP, intern hemorrhoids (3 yr)    [address]      with mesh-Dr Vernon Pitts    VT COLSC FLX W/REMOVAL LESION BY HOT BX FORCEPS  09/21/2017    Dr Shelton-internal hemorrhoids, tubular AP (-) dysplasia--5 yr recall    VT EGD TRANSORAL BIOPSY SINGLE/MULTIPLE N/A 10/19/2017    Dr Shelton-normal-Esme (-) chronic nonspecific inflammation    PROSTATE SURGERY      THYROIDECTOMY      TOTAL HIP ARTHROPLASTY Right 3/4/2022    RIGHT TOTAL HIP ARTHROPLASTY performed by Geovanna Parra MD at [facility]      thinks so, but a long time ago if so    UPPER GASTROINTESTINAL ENDOSCOPY  2013    Cat: Grade A esophagitis and esophageal stricture. Dilated 54fr    VASCULAR SURGERY  2017    SJS. Right IJV US 9f sheath.     VASCULAR SURGERY Left 2017    SJS-L GSV RFA       Family History   Problem Relation Age of Onset    Cancer Mother         lung and brain    Coronary Art Dis Mother     Coronary Art Dis Sister     Colon Cancer Sister     Colon Polyps Sister     Coronary Art Dis Father     Colon Cancer Maternal Grandmother     Colon Polyps Maternal Grandmother     Colon Cancer Sister     Colon Polyps Sister     Cancer Sister         \"female cancer\"    Cancer Sister         thryoid    Coronary Art Dis Brother     Heart Failure Brother     Esophageal Cancer Neg Hx     Liver Cancer Neg Hx     Stomach Cancer Neg Hx        Allergies   Allergen Reactions    Bactrim [Sulfamethoxazole-Trimethoprim] Nausea And Vomiting    Iodinated Contrast Media Itching    Nsaids Anaphylaxis    Succinylcholine Other (See Comments)     Paralyze waist down     Sulfa Antibiotics Other (See Comments) and Shortness Of Breath     RESPIRATORY DISTRESS    Betadine [Povidone Iodine] Hives       Social History     Socioeconomic History    Marital status:      Spouse name: Not on file    Number of children: Not on file    Years of education: Not on file    Highest education level: Not on file   Occupational History    Not on file   Tobacco Use    Smoking status: Some Days     Types: Cigarettes     Last attempt to quit: 2022     Years since quittin.0    Smokeless tobacco: Never    Tobacco comments:     some days he smokes, some days he doesn't   Vaping Use    Vaping Use: Never used   Substance and Sexual Activity    Alcohol use: No     Alcohol/week: 0.0 standard drinks    Drug use: No    Sexual activity: Not Currently   Other Topics Concern    Not on file   Social History Narrative Not on file     Social Determinants of Health     Financial Resource Strain: Not on file   Food Insecurity: Not on file   Transportation Needs: Not on file   Physical Activity: Not on file   Stress: Not on file   Social Connections: Not on file   Intimate Partner Violence: Not on file   Housing Stability: Not on file       Review of Systems  Constitutional: []Fever []Sweats []Chills [] Recent Injury   [x] Denies all unless marked  HENT:[]Headache  [] Head Injury  [] Sore Throat  [] Ear Pain  [] Dizziness [] Hearing Loss   [x] Denies all unless marked  Spine:  [] Neck pain  [] Back pain  [] Sciatica  [x] Denies all unless marked  Cardiovascular:[]Chest Pain []Palpitations [] Heart Disease  [x] Denies all unless marked  Pulmonary: []Shortness of Breath []Cough   [x] Denies all unless marked  Gastrointestinal:  []Abdominal Pain  []Blood in Stool  []Diarrhea []Constipation []Nausea  []Vomiting  [x] Denies all unless marked  Genitourinary:  [] Dysuria [] Frequency  [] Incontinence [] Urgency   [x] Denies all unless marked  Musculoskeletal: [] Arthralgia  [] Myalgias [] Muscle cramps  [x] Muscle twitches   [x] Denies all unless marked   Extremities:   [] Pain   [] Swelling   [] Denies all unless marked  Skin:[] Rash  [] Color Change  [x] Denies all unless marked  Neurological:[] Visual Disturbance [] Double Vision [] Slurred Speech [] Trouble swallowing  [] Vertigo [] Tingling [] Numbness [x] Weakness [x] Loss of Balance   [x] Loss of Consciousness [] Memory Loss [] Seizures  [] Denies all unless marked  Psychiatric/Behavioral:[] Depression [] Anxiety  [x] Denies all unless marked  Sleep: []  Insomnia [] Sleep Disturbance [] Snoring [] Restless Legs [] Daytime Sleepiness [] Sleep Apnea  [x] Denies all unless marked         Current Outpatient Medications   Medication Sig Dispense Refill    gabapentin (NEURONTIN) 300 MG capsule Take 600-900 mg by mouth 3 times daily.       diphenhydrAMINE (BENADRYL) 50 MG capsule Take 1 capsule by mouth at bedtime      mirabegron (MYRBETRIQ) 50 MG TB24 Take 50 mg by mouth daily      oxyCODONE HCl (OXY-IR) 10 MG immediate release tablet Take 1 tablet by mouth daily as needed. memantine (NAMENDA) 5 MG tablet 1 daily for a week, then twice daily for a week, then 1 am and 2 pm for a week, then 2 am and pm 120 tablet 0    isosorbide mononitrate (IMDUR) 60 MG extended release tablet Take 1 tablet by mouth nightly 90 tablet 3    nitroGLYCERIN (NITROSTAT) 0.4 MG SL tablet Place 1 tablet under the tongue every 5 minutes as needed for Chest pain up to max of 3 total doses. If no relief after 1 dose, call 911. 25 tablet 2    ranolazine (RANEXA) 500 MG extended release tablet Take 1 tablet by mouth 2 times daily 60 tablet 5    primidone (MYSOLINE) 50 MG tablet Take 50 mg by mouth nightly 1/2 tablet at bedtime for tremors      fluticasone-salmeterol (ADVAIR DISKUS) 500-50 MCG/DOSE diskus inhaler Inhale 1 puff into the lungs every 12 hours Indications: uses prn 60 each 0    tamsulosin (FLOMAX) 0.4 MG capsule Take 1 capsule by mouth daily      metoprolol succinate (TOPROL XL) 25 MG extended release tablet TAKE ONE TABLET BY MOUTH 2 TIMES A DAY (Patient taking differently: Take 25 mg by mouth daily) 180 tablet 3    esomeprazole (NEXIUM) 40 MG delayed release capsule Take 40 mg by mouth daily       aspirin 325 MG tablet Take 325 mg by mouth daily      topiramate (TOPAMAX) 100 MG tablet Take 100 mg by mouth daily       beclomethasone (QVAR) 80 MCG/ACT inhaler Inhale 1 puff into the lungs 2 times daily       albuterol sulfate HFA (PROVENTIL;VENTOLIN;PROAIR) 108 (90 Base) MCG/ACT inhaler Inhale 2 puffs into the lungs every 4 hours as needed. tiotropium (SPIRIVA) 18 MCG inhalation capsule Inhale 18 mcg into the lungs daily. No current facility-administered medications for this visit.        Outpatient Medications Marked as Taking for the 1/26/23 encounter (Office Visit) with Miley Caro MD   Medication Sig Dispense Refill    gabapentin (NEURONTIN) 300 MG capsule Take 600-900 mg by mouth 3 times daily. diphenhydrAMINE (BENADRYL) 50 MG capsule Take 1 capsule by mouth at bedtime      mirabegron (MYRBETRIQ) 50 MG TB24 Take 50 mg by mouth daily      oxyCODONE HCl (OXY-IR) 10 MG immediate release tablet Take 1 tablet by mouth daily as needed. memantine (NAMENDA) 5 MG tablet 1 daily for a week, then twice daily for a week, then 1 am and 2 pm for a week, then 2 am and pm 120 tablet 0    isosorbide mononitrate (IMDUR) 60 MG extended release tablet Take 1 tablet by mouth nightly 90 tablet 3    nitroGLYCERIN (NITROSTAT) 0.4 MG SL tablet Place 1 tablet under the tongue every 5 minutes as needed for Chest pain up to max of 3 total doses. If no relief after 1 dose, call 911. 25 tablet 2    ranolazine (RANEXA) 500 MG extended release tablet Take 1 tablet by mouth 2 times daily 60 tablet 5    primidone (MYSOLINE) 50 MG tablet Take 50 mg by mouth nightly 1/2 tablet at bedtime for tremors      fluticasone-salmeterol (ADVAIR DISKUS) 500-50 MCG/DOSE diskus inhaler Inhale 1 puff into the lungs every 12 hours Indications: uses prn 60 each 0    tamsulosin (FLOMAX) 0.4 MG capsule Take 1 capsule by mouth daily      metoprolol succinate (TOPROL XL) 25 MG extended release tablet TAKE ONE TABLET BY MOUTH 2 TIMES A DAY (Patient taking differently: Take 25 mg by mouth daily) 180 tablet 3    esomeprazole (NEXIUM) 40 MG delayed release capsule Take 40 mg by mouth daily       aspirin 325 MG tablet Take 325 mg by mouth daily      topiramate (TOPAMAX) 100 MG tablet Take 100 mg by mouth daily       beclomethasone (QVAR) 80 MCG/ACT inhaler Inhale 1 puff into the lungs 2 times daily       albuterol sulfate HFA (PROVENTIL;VENTOLIN;PROAIR) 108 (90 Base) MCG/ACT inhaler Inhale 2 puffs into the lungs every 4 hours as needed. tiotropium (SPIRIVA) 18 MCG inhalation capsule Inhale 18 mcg into the lungs daily.          /76   Pulse 76 Ht 6' 1\" (1.854 m)   Wt 222 lb (100.7 kg)   SpO2 93%   BMI 29.29 kg/m²       Constitutional - well developed, well nourished. Eyes - conjunctiva normal.  Pupils react to light  Ear, nose, throat -hearing intact to finger rub No scars, masses, or lesions over external nose or ears, no atrophy of tongue  Neck-symmetric, no masses noted, no jugular vein distension. No bruits noted. Respiration- chest wall appears symmetric, good expansion,   normal effort without use of accessory muscles  Cardiovascular- RRR  Musculoskeletal - no significant wasting of muscles noted, no bony deformities, gait no gross ataxia  Extremities-no clubbing, cyanosis or edema. Arthritic appearing hands  Skin - warm, dry, and intact. No rash, erythema, or pallor. Psychiatric - mood, affect, and behavior appear normal.      Neurological exam  Awake, alert, fluent oriented x 3 appropriate affect  Attention and concentration appear appropriate short-term memory 2/4 at 3 minutes. Poor clock drawing. Did poorly with inverted syntax commands  Speech normal without dysarthria  No clear issues with language of fund of knowledge    Cranial Nerve Exam   CN II- Visual fields grossly unremarkable. VA adequate. Discs sharp  CN III, IV,VI- PERRLA, EOMI, No nystagmus, conjugate eye movements, no ptosis  CN V-sensation intact to LT over face  CN VII-no facial asymmetry  CN VIII-Hearing intact   CN IX and X- Palate elevates in midline  CN XI-good shoulder shrug  CN XII-Tongue midline with no fasciculations or fibrillations    Motor Exam  V/V throughout upper and lower extremities bilaterally, no cogwheeling, normal tone    Sensory Exam decreased vibration to the shin. Decreased pinprick to the ankles    Reflexes 1+ at the knees and otherwise absent  No Gordillo's sign bilateral hands. No Babinski sign. Tremors-mild postural tremor noted worse in the right    Gait  Normal base and speed  No ataxia.  No Romberg sign    Coordination  Finger to nose and ALEXANDER-unremarkable    Lab Results   Component Value Date    WHYCRSDX64 234 01/26/2023     Lab Results   Component Value Date    WBC 4.9 02/10/2022    HGB 10.1 (L) 03/05/2022    HCT 31.8 (L) 03/05/2022    MCV 97.9 (H) 02/10/2022     02/10/2022     Lab Results   Component Value Date     (L) 03/05/2022    K 4.7 03/05/2022     03/05/2022    CO2 22 03/05/2022    BUN 18 03/05/2022    CREATININE 0.9 03/05/2022    GLUCOSE 119 (H) 03/05/2022    CALCIUM 8.1 (L) 03/05/2022    PROT 5.9 (L) 02/10/2022    LABALBU 3.7 02/10/2022    BILITOT <0.2 02/10/2022    ALKPHOS 78 02/10/2022    AST 19 02/10/2022    ALT 10 02/10/2022    LABGLOM >60 03/05/2022    GFRAA >59 03/05/2022    GLOB 2.3 09/29/2016           Assessment    ICD-10-CM    1. Memory loss  R41.3 TSH with Reflex to FT4     Vitamin B12      2. Essential tremor  G25.0       3. Neuropathy  G62.9 TSH with Reflex to FT4     Vitamin B12          Possible beginnings of dementia although he is on several medications which can interfere with cognitive function including Topamax, gabapentin, Benadryl and Oxy IR. He rarely takes the latter. I have ordered a B12 and TSH levels and have recommended that he begin Namenda. Once he is on this he will try weaning off of gabapentin to see if it is actually doing anything beneficial for him. He had does have some mild neuropathy. He is also felt to have an essential tremor and on a follow-up visit we will consider changing his medication for that. Plan        Pt warned of potential side effects of medication changes/new medicines. They are to call for new or ongoing difficulties. Return in about 3 months (around 4/26/2023).     (Please note that portions of this note were completed with a voice recognition program. Efforts were made to edit the dictations but occasionally words are mis-transcribed.)

## 2023-01-26 NOTE — PROGRESS NOTES
REVIEW OF SYSTEMS    Constitutional: []Fever []Sweats []Chills [] Recent Injury   [x] Denies all unless marked  HENT:[]Headache  [] Head Injury  [] Sore Throat  [] Ear Pain  [] Dizziness [] Hearing Loss   [x] Denies all unless marked  Spine:  [] Neck pain  [] Back pain  [] Sciatica  [x] Denies all unless marked  Cardiovascular:[]Chest Pain []Palpitations [] Heart Disease  [x] Denies all unless marked  Pulmonary: []Shortness of Breath []Cough   [x] Denies all unless marked  Gastrointestinal:  []Abdominal Pain  []Blood in Stool  []Diarrhea []Constipation []Nausea  []Vomiting  [x] Denies all unless marked  Genitourinary:  [] Dysuria [] Frequency  [] Incontinence [] Urgency   [x] Denies all unless marked  Musculoskeletal: [] Arthralgia  [] Myalgias [] Muscle cramps  [x] Muscle twitches   [x] Denies all unless marked   Extremities:   [] Pain   [] Swelling   [] Denies all unless marked  Skin:[] Rash  [] Color Change  [x] Denies all unless marked  Neurological:[] Visual Disturbance [] Double Vision [] Slurred Speech [] Trouble swallowing  [] Vertigo [] Tingling [] Numbness [x] Weakness [x] Loss of Balance   [x] Loss of Consciousness [] Memory Loss [] Seizures  [] Denies all unless marked  Psychiatric/Behavioral:[] Depression [] Anxiety  [x] Denies all unless marked  Sleep: []  Insomnia [] Sleep Disturbance [] Snoring [] Restless Legs [] Daytime Sleepiness [] Sleep Apnea  [x] Denies all unless marked

## 2023-01-27 ENCOUNTER — TELEPHONE (OUTPATIENT)
Dept: NEUROLOGY | Age: 73
End: 2023-01-27

## 2023-01-27 NOTE — TELEPHONE ENCOUNTER
----- Message from Sammie Molina MD sent at 1/27/2023 12:31 PM CST -----  Please send a copy of this note to his PCP, Dr. Alice Phillips.   Thank you

## 2023-04-21 ENCOUNTER — OFFICE VISIT (OUTPATIENT)
Dept: CARDIOLOGY CLINIC | Age: 73
End: 2023-04-21

## 2023-04-21 VITALS
BODY MASS INDEX: 29.16 KG/M2 | HEART RATE: 60 BPM | DIASTOLIC BLOOD PRESSURE: 76 MMHG | HEIGHT: 73 IN | SYSTOLIC BLOOD PRESSURE: 110 MMHG | WEIGHT: 220 LBS

## 2023-04-21 DIAGNOSIS — I10 ESSENTIAL HYPERTENSION: ICD-10-CM

## 2023-04-21 DIAGNOSIS — I51.9 LEFT VENTRICULAR SYSTOLIC DYSFUNCTION (LVSD): ICD-10-CM

## 2023-04-21 DIAGNOSIS — I50.22 CHRONIC SYSTOLIC (CONGESTIVE) HEART FAILURE (HCC): ICD-10-CM

## 2023-04-21 DIAGNOSIS — I47.20 VENTRICULAR TACHYCARDIA (HCC): Primary | ICD-10-CM

## 2023-04-21 DIAGNOSIS — I25.10 CORONARY ARTERY DISEASE INVOLVING NATIVE CORONARY ARTERY OF NATIVE HEART WITHOUT ANGINA PECTORIS: ICD-10-CM

## 2023-04-21 LAB — BNP BLD-MCNC: 239 PG/ML (ref 0–124)

## 2023-04-21 ASSESSMENT — ENCOUNTER SYMPTOMS
WHEEZING: 0
BLOOD IN STOOL: 0
VOMITING: 0
DIARRHEA: 0
ABDOMINAL DISTENTION: 0
ABDOMINAL PAIN: 0
BACK PAIN: 0
SHORTNESS OF BREATH: 0
COUGH: 0

## 2023-04-21 NOTE — PROGRESS NOTES
[facility]  Cardiology Office Note  [place]  [id number]  Phone: [phone]  Fax: [phone]                            Date:  4/21/2023  Patient: Vanesa Munoz  Age:  81 y.o., 1950    Referral: Zohaib Alvarez MD      PROBLEM LIST:    Patient Active Problem List    Diagnosis Date Noted    Non-cardiac chest pain 06/17/2013     Priority: High     Overview Note:             Hyperlipidemia      Priority: High    Primary osteoarthritis of right hip 03/04/2022     Priority: Low    Chest pain at rest 02/22/2022     Priority: Low     Overview Note:     History of resolved      Abnormal EKG 02/22/2022     Priority: Low     Overview Note:     Junctional rhythm, no clinical significance      Cigarette nicotine dependence without complication 45/78/0537     Priority: Low    Smoker 09/11/2018     Priority: Low    Atypical chest pain 05/29/2018     Priority: Low    Internal hemorrhoids 09/25/2017     Priority: Low    Rectal bleeding 08/29/2017     Priority: Low    Altered bowel function 08/29/2017     Priority: Low     Overview Note:     Updating Deprecated Diagnoses      Unexplained weight loss 08/29/2017     Priority: Low    Generalized abdominal pain 08/29/2017     Priority: Low    Excessive gas 08/29/2017     Priority: Low    History of adenomatous polyp of colon 08/29/2017     Priority: Low    Anemia 08/29/2017     Priority: Low     Overview Note:     Updating Deprecated Diagnoses      Varicose veins of bilateral lower extremities with pain 07/11/2017     Priority: Low    Venous insufficiency of both lower extremities 07/11/2017     Priority: Low    Swelling of both lower extremities 07/11/2017     Priority: Low    Chest pressure      Priority: Low    Ex-cigarette smoker 10/27/2015     Priority: Low    Change in bowel habits 08/08/2014     Priority: Low    Fecal incontinence 08/08/2014     Priority: Low    Rectal pain 08/08/2014     Priority: Low

## 2023-04-27 ENCOUNTER — OFFICE VISIT (OUTPATIENT)
Dept: NEUROLOGY | Age: 73
End: 2023-04-27

## 2023-04-27 VITALS
HEIGHT: 73 IN | DIASTOLIC BLOOD PRESSURE: 62 MMHG | HEART RATE: 66 BPM | SYSTOLIC BLOOD PRESSURE: 102 MMHG | WEIGHT: 224 LBS | BODY MASS INDEX: 29.69 KG/M2

## 2023-04-27 DIAGNOSIS — G62.9 NEUROPATHY: ICD-10-CM

## 2023-04-27 DIAGNOSIS — R41.3 MEMORY LOSS: Primary | ICD-10-CM

## 2023-04-27 DIAGNOSIS — G25.0 ESSENTIAL TREMOR: ICD-10-CM

## 2023-04-27 RX ORDER — MEMANTINE HYDROCHLORIDE 10 MG/1
TABLET ORAL
Qty: 60 TABLET | Refills: 5 | Status: SHIPPED | OUTPATIENT
Start: 2023-04-27

## 2023-04-27 RX ORDER — DONEPEZIL HYDROCHLORIDE 5 MG/1
5 TABLET, FILM COATED ORAL NIGHTLY
Qty: 30 TABLET | Refills: 2 | Status: SHIPPED | OUTPATIENT
Start: 2023-04-27

## 2023-04-27 NOTE — PROGRESS NOTES
Chief Complaint   Patient presents with    Follow-up    Memory Loss       Francisca Padilla is a 72y.o. year old male who is seen for evaluation of tremors and poor memory. For about a year or so he has had a postural tremor in the right upper extremity. For several years he is complained of poor short-term memory. He has a history of exposure to agent orange, Paraquat and many other chemicals. He has dropped 50 pounds over the past year. He has been on Topamax and gabapentin for years for migraines. He had a negative Zio patch for 2 weeks about a year ago. Otherwise denies any focal neurological difficulties. Records are reviewed. Also takes Mysoline 25 mg at bedtime for his tremor. Takes metoprolol 25 mg a day as well. .  Recently found to have a low B12 level and he is taking oral replacement. TSH was normal.  He is still forgetful. Loses his keys sometimes. He is raising a son with autism.   Tremors are still bother him  Active Ambulatory Problems     Diagnosis Date Noted    Abdominal pain, other specified site 01/25/2013    Nausea 01/25/2013    Dysphagia 01/25/2013    Weight loss 01/25/2013    Fatigue 01/25/2013    Heartburn 01/25/2013    Hoarseness 01/25/2013    Regurgitation 01/25/2013    Hyperlipidemia     Nicotine abuse     Family history of premature CAD     Non-cardiac chest pain 06/17/2013    Essential hypertension 06/24/2013    Mild CAD 04/11/2014    Change in bowel habits 08/08/2014    Fecal incontinence 08/08/2014    Rectal pain 08/08/2014    Pelvic pain in male 08/08/2014    Family history of colon cancer 08/08/2014    Ex-cigarette smoker 10/27/2015    Chest pressure     Varicose veins of bilateral lower extremities with pain 07/11/2017    Venous insufficiency of both lower extremities 07/11/2017    Swelling of both lower extremities 07/11/2017    Rectal bleeding 08/29/2017    Altered bowel function 08/29/2017    Unexplained weight loss 08/29/2017    Generalized abdominal pain 08/29/2017

## 2023-04-28 ENCOUNTER — OFFICE VISIT (OUTPATIENT)
Dept: GASTROENTEROLOGY | Age: 73
End: 2023-04-28
Payer: MEDICARE

## 2023-04-28 VITALS
BODY MASS INDEX: 28.23 KG/M2 | HEART RATE: 66 BPM | OXYGEN SATURATION: 98 % | HEIGHT: 74 IN | WEIGHT: 220 LBS | DIASTOLIC BLOOD PRESSURE: 80 MMHG | SYSTOLIC BLOOD PRESSURE: 115 MMHG

## 2023-04-28 DIAGNOSIS — R13.10 DYSPHAGIA, UNSPECIFIED TYPE: ICD-10-CM

## 2023-04-28 DIAGNOSIS — R63.4 WEIGHT LOSS: ICD-10-CM

## 2023-04-28 DIAGNOSIS — K62.5 RECTAL BLEEDING: Primary | ICD-10-CM

## 2023-04-28 DIAGNOSIS — K59.00 CONSTIPATION, UNSPECIFIED CONSTIPATION TYPE: ICD-10-CM

## 2023-04-28 DIAGNOSIS — R10.84 GENERALIZED ABDOMINAL PAIN: ICD-10-CM

## 2023-04-28 DIAGNOSIS — Z77.098 EXPOSURE TO AGENT ORANGE: ICD-10-CM

## 2023-04-28 DIAGNOSIS — K21.9 GASTROESOPHAGEAL REFLUX DISEASE, UNSPECIFIED WHETHER ESOPHAGITIS PRESENT: ICD-10-CM

## 2023-04-28 PROCEDURE — G8427 DOCREV CUR MEDS BY ELIG CLIN: HCPCS | Performed by: NURSE PRACTITIONER

## 2023-04-28 PROCEDURE — 4004F PT TOBACCO SCREEN RCVD TLK: CPT | Performed by: NURSE PRACTITIONER

## 2023-04-28 PROCEDURE — 3017F COLORECTAL CA SCREEN DOC REV: CPT | Performed by: NURSE PRACTITIONER

## 2023-04-28 PROCEDURE — 1123F ACP DISCUSS/DSCN MKR DOCD: CPT | Performed by: NURSE PRACTITIONER

## 2023-04-28 PROCEDURE — G8417 CALC BMI ABV UP PARAM F/U: HCPCS | Performed by: NURSE PRACTITIONER

## 2023-04-28 PROCEDURE — 3074F SYST BP LT 130 MM HG: CPT | Performed by: NURSE PRACTITIONER

## 2023-04-28 PROCEDURE — 99204 OFFICE O/P NEW MOD 45 MIN: CPT | Performed by: NURSE PRACTITIONER

## 2023-04-28 PROCEDURE — 3079F DIAST BP 80-89 MM HG: CPT | Performed by: NURSE PRACTITIONER

## 2023-04-28 ASSESSMENT — ENCOUNTER SYMPTOMS
ABDOMINAL PAIN: 1
VOMITING: 0
NAUSEA: 0
BLOOD IN STOOL: 0
RECTAL PAIN: 0
TROUBLE SWALLOWING: 0
ANAL BLEEDING: 0
COUGH: 0
CHOKING: 0
ABDOMINAL DISTENTION: 0
CONSTIPATION: 1
DIARRHEA: 0
SHORTNESS OF BREATH: 0

## 2023-04-28 NOTE — PROGRESS NOTES
Subjective:     Patient ID: Dany Bates is a 72 y.o. male  PCP: Robert Hawk MD  Referring Provider: No ref. provider found    HPI  Patient presents to the office today with the following complaints: Rectal Bleeding      Patient seen in the office today with complaints of worsening constipation, weight loss, and rectal bleeding  Reports this change in his constipation has increased over the pat 1 year  Reports occasional rectal bleeding   Reports he has had a weight loss of 45 pounds in 8-9 months  Denies rectal pain, does report lower abd pain      Also reports he is having difficulty with food getting stuck at times and acid reflux    Assessment:     1. Rectal bleeding  2. Constipation, unspecified constipation type  3. Weight loss  4. Generalized abdominal pain  5. Dysphagia, unspecified type  6. Gastroesophageal reflux disease, unspecified whether esophagitis present  7. Exposure to [employer]         Plan:   Schedule Colonoscopy and EGD  Instruct on bowel prep. Nothing to eat or drink after midnight the day of the exam.  Unable to drive for 24 hours after the procedure. No aspirin or nonsteroidal anti-inflammatories for 5 days before procedure. I have discussed the benefits, alternatives, and risks (including bleeding, perforation and death)  for pursuing Endoscopy (EGD/Colonscopy/EUS/ERCP) with the patient and they are willing to continue. We also discussed the need for anesthesia, IV access, proper dietary changes, medication changes if necessary, and need for bowel prep (if ordered) prior to their Endoscopic procedure. They are aware they must have someone accompany them to their scheduled procedure to drive them home - they agree to the above and are willing to continue. Orders  No orders of the defined types were placed in this encounter. Medications  No orders of the defined types were placed in this encounter.         Patient History:     Past Medical History:   Diagnosis Date

## 2023-05-15 ENCOUNTER — ANESTHESIA EVENT (OUTPATIENT)
Dept: OPERATING ROOM | Age: 73
End: 2023-05-15

## 2023-05-17 ENCOUNTER — APPOINTMENT (OUTPATIENT)
Dept: OPERATING ROOM | Age: 73
End: 2023-05-17

## 2023-05-17 ENCOUNTER — HOSPITAL ENCOUNTER (OUTPATIENT)
Age: 73
Setting detail: OUTPATIENT SURGERY
Discharge: HOME | End: 2023-05-17
Attending: INTERNAL MEDICINE | Admitting: INTERNAL MEDICINE
Payer: MEDICARE

## 2023-05-17 ENCOUNTER — ANESTHESIA (OUTPATIENT)
Dept: OPERATING ROOM | Age: 73
End: 2023-05-17

## 2023-05-17 ENCOUNTER — HOSPITAL ENCOUNTER (OUTPATIENT)
Age: 73
Setting detail: SPECIMEN
Discharge: HOME | End: 2023-05-17
Payer: MEDICARE

## 2023-05-17 VITALS
DIASTOLIC BLOOD PRESSURE: 59 MMHG | HEIGHT: 76 IN | RESPIRATION RATE: 20 BRPM | OXYGEN SATURATION: 96 % | TEMPERATURE: 97.3 F | SYSTOLIC BLOOD PRESSURE: 110 MMHG | HEART RATE: 56 BPM | BODY MASS INDEX: 25.94 KG/M2 | WEIGHT: 213 LBS

## 2023-05-17 PROCEDURE — G8907 PT DOC NO EVENTS ON DISCHARG: HCPCS

## 2023-05-17 PROCEDURE — 45378 DIAGNOSTIC COLONOSCOPY: CPT | Performed by: INTERNAL MEDICINE

## 2023-05-17 PROCEDURE — 43239 EGD BIOPSY SINGLE/MULTIPLE: CPT | Performed by: INTERNAL MEDICINE

## 2023-05-17 PROCEDURE — 45378 DIAGNOSTIC COLONOSCOPY: CPT

## 2023-05-17 PROCEDURE — G8918 PT W/O PREOP ORDER IV AB PRO: HCPCS

## 2023-05-17 PROCEDURE — 88305 TISSUE EXAM BY PATHOLOGIST: CPT

## 2023-05-17 PROCEDURE — 43239 EGD BIOPSY SINGLE/MULTIPLE: CPT

## 2023-05-17 PROCEDURE — 43450 DILATE ESOPHAGUS 1/MULT PASS: CPT

## 2023-05-17 PROCEDURE — 43450 DILATE ESOPHAGUS 1/MULT PASS: CPT | Performed by: INTERNAL MEDICINE

## 2023-05-17 RX ORDER — SODIUM CHLORIDE, SODIUM LACTATE, POTASSIUM CHLORIDE, CALCIUM CHLORIDE 600; 310; 30; 20 MG/100ML; MG/100ML; MG/100ML; MG/100ML
INJECTION, SOLUTION INTRAVENOUS CONTINUOUS
Status: DISCONTINUED | OUTPATIENT
Start: 2023-05-17 | End: 2023-05-17 | Stop reason: HOSPADM

## 2023-05-17 RX ORDER — LIDOCAINE HYDROCHLORIDE 10 MG/ML
INJECTION, SOLUTION EPIDURAL; INFILTRATION; INTRACAUDAL; PERINEURAL PRN
Status: DISCONTINUED | OUTPATIENT
Start: 2023-05-17 | End: 2023-05-17 | Stop reason: SDUPTHER

## 2023-05-17 RX ORDER — PROPOFOL 10 MG/ML
INJECTION, EMULSION INTRAVENOUS PRN
Status: DISCONTINUED | OUTPATIENT
Start: 2023-05-17 | End: 2023-05-17 | Stop reason: SDUPTHER

## 2023-05-17 RX ADMIN — PROPOFOL 350 MG: 10 INJECTION, EMULSION INTRAVENOUS at 08:43

## 2023-05-17 RX ADMIN — LIDOCAINE HYDROCHLORIDE 30 MG: 10 INJECTION, SOLUTION EPIDURAL; INFILTRATION; INTRACAUDAL; PERINEURAL at 08:43

## 2023-05-17 RX ADMIN — SODIUM CHLORIDE, SODIUM LACTATE, POTASSIUM CHLORIDE, CALCIUM CHLORIDE: 600; 310; 30; 20 INJECTION, SOLUTION INTRAVENOUS at 07:37

## 2023-05-17 ASSESSMENT — ENCOUNTER SYMPTOMS: SHORTNESS OF BREATH: 1

## 2023-05-17 ASSESSMENT — PAIN - FUNCTIONAL ASSESSMENT: PAIN_FUNCTIONAL_ASSESSMENT: 0-10

## 2023-05-17 NOTE — ANESTHESIA PRE PROCEDURE
Department of Anesthesiology  Preprocedure Note       Name:  Aislinn Thacker   Age:  72 y.o.  :  1950                                          MRN:  [id number]         Date:  2023      Surgeon: Edi Gaona):  Angella Healy MD    Procedure: Procedure(s):  EGD BIOPSY  COLONOSCOPY DIAGNOSTIC    Medications prior to admission:   Prior to Admission medications    Medication Sig Start Date End Date Taking? Authorizing Provider   cyanocobalamin 1000 MCG tablet 1 tablet 23   Historical Provider, MD   tiotropium-olodaterol (STIOLTO) 2.5-2.5 MCG/ACT AERS INHALE 2 PUFFS BY ORAL INHALATION ONCE A DAY ADMINISTER AT SAME TIME EACH DAY 3/6/23   Historical Provider, MD   memantine (NAMENDA) 10 MG tablet 1 twice daily 23   Tunde Guerra MD   donepezil (ARICEPT) 5 MG tablet Take 1 tablet by mouth nightly 23   Tunde Guerra MD   diphenhydrAMINE (BENADRYL) 50 MG capsule Take 1 capsule by mouth at bedtime    Historical Provider, MD   mirabegron (MYRBETRIQ) 50 MG TB24 Take 50 mg by mouth daily 21   Historical Provider, MD   isosorbide mononitrate (IMDUR) 60 MG extended release tablet Take 1 tablet by mouth nightly 6/15/22   MARGY Zavala   nitroGLYCERIN (NITROSTAT) 0.4 MG SL tablet Place 1 tablet under the tongue every 5 minutes as needed for Chest pain up to max of 3 total doses.  If no relief after 1 dose, call 911. 22   MARGY Zavala   ranolazine (RANEXA) 500 MG extended release tablet Take 1 tablet by mouth 2 times daily 22   MARGY Zavala   primidone (MYSOLINE) 50 MG tablet Take 1 tablet by mouth nightly 1/2 tablet at bedtime for tremors    Historical Provider, MD   fluticasone-salmeterol (ADVAIR DISKUS) 500-50 MCG/DOSE diskus inhaler Inhale 1 puff into the lungs every 12 hours Indications: uses prn 21  Deneise Doctor DO Bassem   tamsulosin (FLOMAX) 0.4 MG capsule Take 1 capsule by mouth daily  Patient not taking: Reported on 2023    Historical Provider,

## 2023-05-17 NOTE — DISCHARGE INSTRUCTIONS
1. Await path results, the patient will be contacted in 7-10 days with biopsy results. 2.  Magic mouthwash 5 ml PO Swish and swallow q3h PRN ONLY IF patient has post-procedural sorethroat or chest pain. 3. Full liquids to soft diet today magalys discharge from the surgicenter; may advance  diet starting in AM tomorrow. 4. May resume other meds except any ASA/NSAIDs; may use cough drops or lozenges PRN; also anti-GERD measures. 5. NO ASA/NSAIDs x 2 weeks  6. OP f/u in 4-6 weeks with Ms. Eryn Powers; will consider an Esophageal manometry later if the patient's dysphagia persists. 7.Repeat colonoscopy: Based on his personal and family history and colonoscopy findings including prep quality today-in 3 years with a strict 2-day clear liquid diet and a 2-day prep using Plenvu or a similar solution along with Zofran 4 mg p.o. every 6 hours as needed; sooner if his personal or family history as pertaining to colorectal cancer risk changes requiring an earlier exam or if the patient were to develop lower GI symptoms such as bleeding, abdominal pain, change in bowel habits or stool caliber or if the patient has anemia or unexplained weight loss in the future. NSAIDS Non-steroidal Anti-Inflammatory  You have been directed by your physician to avoid any NSAID's; the following medications are a list of those to avoid. If you think that you are taking any NSAID's notify your physician.    Over The Counter  Advil                      Motrin  Nuprin                   Ibuprofen  Midol                     Aleve  Naproxen              Orudis  Aspirin                   Arleth-Damian  Prescriptions and Generics  Cataflam              Relafen  Voltaren               Clinoril  Indocin                 Naproxen  Arthrotec              Lodine  Daypro                 Nalfon  Toradol                Ansaid  Feldene               Meclofenamate  Fenoprofen          Ponstel  Mobic                   Celebrex  Vioxx     POST-OP ORDERS:

## 2023-05-17 NOTE — ANESTHESIA POSTPROCEDURE EVALUATION
Department of Anesthesiology  Postprocedure Note    Patient: Earna Hodgkins  MRN: [id number]  [place]: 1950  Date of evaluation: 5/17/2023      Procedure Summary     Date: 05/17/23 Room / Location: [facility] ENDO 02 / [address]    Anesthesia Start: 1745 Anesthesia Stop:     Procedures:       EGD BIOPSY (Esophagus)      COLONOSCOPY DIAGNOSTIC (Abdomen)      DILATION, ESOPHAGUS 54 fr Diagnosis:       Dysphagia, unspecified type      Gastroesophageal reflux disease, unspecified whether esophagitis present      Constipation, unspecified constipation type      Change in bowel habits      RB (rectal bleeding)      Generalized abdominal pain      (Dysphagia, unspecified type [R13.10])      (Gastroesophageal reflux disease, unspecified whether esophagitis present [K21.9])      (Constipation, unspecified constipation type [K59.00])      (Change in bowel habits [R19.4])      (RB (rectal bleeding) [K62.5])      (Generalized abdominal pain [R10.84])    Surgeons: Donovan Talamantes MD Responsible Provider: MARGY Umanzor - STEPHANIE    Anesthesia Type: general, TIVA ASA Status: 3          Anesthesia Type: No value filed.     Inderjit Phase I:      Inderjit Phase II:        Anesthesia Post Evaluation    Patient location during evaluation: bedside  Patient participation: complete - patient participated  Level of consciousness: sleepy but conscious  Pain score: 0  Airway patency: patent  Nausea & Vomiting: no nausea and no vomiting  Complications: no  Cardiovascular status: blood pressure returned to baseline  Respiratory status: acceptable, room air and spontaneous ventilation  Hydration status: euvolemic

## 2023-05-17 NOTE — H&P
Patient Name: Georges Ferrell  : 1950  MRN: [id number]  DATE: 23    Allergies: Allergies   Allergen Reactions    Bactrim [Sulfamethoxazole-Trimethoprim] Nausea And Vomiting    Iodinated Contrast Media Itching    Nsaids Anaphylaxis    Succinylcholine Other (See Comments)     Paralyze waist down     Sulfa Antibiotics Other (See Comments) and Shortness Of Breath     RESPIRATORY DISTRESS    Betadine [Povidone Iodine] Hives        ENDOSCOPY  History and Physical    Procedure:    [x] Diagnostic Colonoscopy       [] Screening Colonoscopy  [x] EGD      [] ERCP      [] EUS       [] Other    [x] Previous office notes/History and Physical reviewed from the patients chart. Please see EMR for further details of HPI. I have examined the patient's status immediately prior to the procedure and:      Indications/HPI:  For both EGD and colonoscopy exams today:  1. Rectal bleeding  2. Constipation, unspecified constipation type  3. Weight loss  4. Generalized abdominal pain  5. Dysphagia, unspecified type  6. Gastroesophageal reflux disease, unspecified whether esophagitis present  7.  Exposure to Agent Orange    []Abdominal Pain   []Cancer- GI/Lung     []Fhx of colon CA/polyps  []History of Polyps  []Barretts            []Melena  []Abnormal Imaging              []Dysphagia              []Persistent Pneumonia   []Anemia                            []Food Impaction        []History of Polyps  [] GI Bleed             []Pulmonary nodule/Mass   []Change in bowel habits []Heartburn/Reflux  []Rectal Bleed (BRBPR)  []Chest Pain - Non Cardiac []Heme (+) Stool []Ulcers  []Constipation  []Hemoptysis  []Varices  []Diarrhea  []Hypoxemia    []Nausea/Vomiting   []Screening   []Crohns/Colitis  []Other:     Anesthesia:   [x] MAC [] Moderate Sedation   [] General   [] None     ROS: 12 pt Review of Symptoms was negative unless mentioned above    Medications:   Prior to Admission medications    Medication Sig Start Date End Date

## 2023-05-17 NOTE — OP NOTE
----- Message from Ciaran Michaud MD sent at 7/1/2018 10:56 AM CDT -----  Please inform pt that kidney function has slightly worsened. He is to stop benazepril and if BP increases above 140/90 he is to resume amlodipine 5 mg qd.    retirement  
Called patient gave the results patient verbalized understanding.  
Endoscopic Procedure Note    Patient: Clara Garces: 1950  Med Rec#: [id number] Acc#: [id number]     Primary Care Provider Oswaldo Rogers MD    Endoscopist: Mario Espinosa MD, MD    Date of Procedure:  5/17/2023    Procedure:   1. EGD with a Lopes bougie dilation of esophagus and cold biopsies    Indications: For both EGD and colonoscopy exams today:  1. Rectal bleeding  2. Constipation, unspecified constipation type  3. Weight loss  4. Generalized abdominal pain  5. Dysphagia, unspecified type  6. Gastroesophageal reflux disease, unspecified whether esophagitis present  7. Exposure to Agent Orange    Anesthesia:  Sedation was administered by anesthesia who monitored the patient during the procedure. Estimated Blood Loss: minimal    Procedure:   After reviewing the patient's chart and obtaining informed consent, the patient was placed in the left lateral decubitus position. A forward-viewing Olympus endoscope was lubricated and inserted through the mouth into the oropharynx. Under direct visualization, the upper esophagus was intubated. The scope was advanced to the level of the third portion of duodenum. Scope was slowly withdrawn with careful inspection of the mucosal surfaces. The scope was retroflexed for inspection of the gastric fundus and incisura. Findings and maneuvers are listed in impression below. Next, a lubricated Lopes weighted Bougie dilator-54 Fr was gently introduced into the patient's mouth and passed into the Esophagus and into the proximal stomach without much resistance and then withdrawn. Repeat EGD was performed to verify dilation and scope tip was passed into the stomach. NO evidence of perforation or excessive bleeding was noted subsequent to the dilation. The patient tolerated the procedure well. The scope was removed. There were no immediate complications. Findings/IMPRESSION:  Esophagus: normal and a normal EG junction at 44 cm.     NO
position. The perianal area was inspected, and a digital rectal exam was performed. A rectal exam was performed: normal tone, no palpable lesions. At this point, a forward viewing Olympus colonoscope was inserted into the anus and carefully advanced to the terminal ileum. The cecum was identified by the ileocecal valve and the appendiceal orifice. The colonoscope was then slowly withdrawn with careful inspection of the mucosa in a linear and circumferential fashion. The scope was retroflexed in the rectum. Suction was utilized during the procedure to remove as much air as possible from the bowel. The colonoscope was removed from the patient, and the procedure was terminated. Findings are listed below. 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 NO large polyps or masses or strictures or colitis. Suboptimal exam due to prep quality as described above. Moderate diverticulosis in the left colon  Internal hemorrhoids-Grade 2 without any bleeding stigmata at this time but likely source of patient's recent bright red blood per rectum  Where it was clearly visible, the mucosa appeared normal throughout the entire examined colon  Retroflexion in the rectum was otherwise normal and revealed no further abnormalities      Recommendations:  1.  Repeat colonoscopy: Based on his personal and family history and colonoscopy findings including prep quality today-in 3 years with a strict 2-day clear liquid diet and a 2-day prep using Plenvu or a similar solution along with Zofran 4 mg p.o. every 6 hours as needed; sooner if his personal or family history as pertaining to colorectal cancer risk changes requiring an earlier exam or if the patient were to develop lower GI symptoms such as bleeding, abdominal pain, change in bowel habits or stool caliber or if the patient has anemia or unexplained weight loss in the

## 2023-05-25 RX ORDER — LISINOPRIL 2.5 MG/1
2.5 TABLET ORAL DAILY
COMMUNITY
Start: 2020-11-19

## 2023-05-25 RX ORDER — METOPROLOL TARTRATE 50 MG/1
50 TABLET, FILM COATED ORAL EVERY 12 HOURS
COMMUNITY
Start: 2020-11-19

## 2023-06-02 ENCOUNTER — OFFICE VISIT (OUTPATIENT)
Dept: CARDIOLOGY CLINIC | Age: 73
End: 2023-06-02
Payer: MEDICARE

## 2023-06-02 VITALS
HEIGHT: 74 IN | BODY MASS INDEX: 27.08 KG/M2 | SYSTOLIC BLOOD PRESSURE: 122 MMHG | HEART RATE: 82 BPM | OXYGEN SATURATION: 97 % | WEIGHT: 211 LBS | DIASTOLIC BLOOD PRESSURE: 70 MMHG

## 2023-06-02 DIAGNOSIS — I42.8 NONISCHEMIC CARDIOMYOPATHY (HCC): ICD-10-CM

## 2023-06-02 DIAGNOSIS — R06.09 DYSPNEA ON EXERTION: ICD-10-CM

## 2023-06-02 DIAGNOSIS — I25.10 CORONARY ARTERY DISEASE INVOLVING NATIVE CORONARY ARTERY OF NATIVE HEART WITHOUT ANGINA PECTORIS: Primary | ICD-10-CM

## 2023-06-02 DIAGNOSIS — I10 ESSENTIAL HYPERTENSION: ICD-10-CM

## 2023-06-02 DIAGNOSIS — E78.5 DYSLIPIDEMIA: ICD-10-CM

## 2023-06-02 LAB
ALBUMIN SERPL-MCNC: 4.1 G/DL (ref 3.5–5.2)
ALP SERPL-CCNC: 69 U/L (ref 40–130)
ALT SERPL-CCNC: 8 U/L (ref 5–41)
ANION GAP SERPL CALCULATED.3IONS-SCNC: 8 MMOL/L (ref 7–19)
AST SERPL-CCNC: 17 U/L (ref 5–40)
BILIRUB SERPL-MCNC: 0.5 MG/DL (ref 0.2–1.2)
BUN SERPL-MCNC: 16 MG/DL (ref 8–23)
CALCIUM SERPL-MCNC: 9.2 MG/DL (ref 8.8–10.2)
CHLORIDE SERPL-SCNC: 102 MMOL/L (ref 98–111)
CO2 SERPL-SCNC: 24 MMOL/L (ref 22–29)
CREAT SERPL-MCNC: 1.1 MG/DL (ref 0.5–1.2)
GLUCOSE SERPL-MCNC: 114 MG/DL (ref 74–109)
POTASSIUM SERPL-SCNC: 4.6 MMOL/L (ref 3.5–5)
PROT SERPL-MCNC: 6.4 G/DL (ref 6.6–8.7)
SODIUM SERPL-SCNC: 134 MMOL/L (ref 136–145)

## 2023-06-02 PROCEDURE — G8427 DOCREV CUR MEDS BY ELIG CLIN: HCPCS | Performed by: NURSE PRACTITIONER

## 2023-06-02 PROCEDURE — 3017F COLORECTAL CA SCREEN DOC REV: CPT | Performed by: NURSE PRACTITIONER

## 2023-06-02 PROCEDURE — 99214 OFFICE O/P EST MOD 30 MIN: CPT | Performed by: NURSE PRACTITIONER

## 2023-06-02 PROCEDURE — 3074F SYST BP LT 130 MM HG: CPT | Performed by: NURSE PRACTITIONER

## 2023-06-02 PROCEDURE — G8417 CALC BMI ABV UP PARAM F/U: HCPCS | Performed by: NURSE PRACTITIONER

## 2023-06-02 PROCEDURE — 1123F ACP DISCUSS/DSCN MKR DOCD: CPT | Performed by: NURSE PRACTITIONER

## 2023-06-02 PROCEDURE — 3078F DIAST BP <80 MM HG: CPT | Performed by: NURSE PRACTITIONER

## 2023-06-02 PROCEDURE — 4004F PT TOBACCO SCREEN RCVD TLK: CPT | Performed by: NURSE PRACTITIONER

## 2023-06-02 RX ORDER — FUROSEMIDE 20 MG/1
20 TABLET ORAL PRN
Qty: 90 TABLET | Refills: 1 | Status: SHIPPED | OUTPATIENT
Start: 2023-06-02

## 2023-06-02 ASSESSMENT — ENCOUNTER SYMPTOMS
WHEEZING: 0
COUGH: 0
SHORTNESS OF BREATH: 1
SORE THROAT: 0
CHEST TIGHTNESS: 0

## 2023-06-02 NOTE — PROGRESS NOTES
Asthma     CAD (coronary artery disease) 04/11/2014    Chest tightness, discomfort, or pressure 06/17/2013 6/17/2013  lexiscan Positive for inferior myocardial ischemia, EF 47%, 9% ischemic myocardium on stress, intermediate risk findings, AUC indication 16, AUC score 7     CHF (congestive heart failure) (HCC)     Chronic back pain     COPD (chronic obstructive pulmonary disease) ([facility] [address]     Ex-cigarette smoker 10/27/2015    Family history of early CAD 06/24/2013    Family history of premature CAD     GERD (gastroesophageal reflux disease)     History of hernia repair     Hyperlipidemia     [facility] manages cholesterol    Hypertension     Multiple lung nodules     Neuropathy     Nicotine abuse     Nicotine abuse     Osteoarthritis     Peripheral vascular disease (HCC)     Restless legs syndrome     SOB (shortness of breath)     Thyroid nodule     Unspecified sleep apnea     can't wear cpap     Past Surgical History:   Procedure Laterality Date    APPENDECTOMY      BLADDER SURGERY      x 3    CARDIAC CATHETERIZATION  6/24/2013  JDT    EF 50%    CARDIAC CATHETERIZATION  10/27/15  JDT    EF 50%    CARDIAC CATHETERIZATION  05/2018    mild, non-obstructive CAD    COLONOSCOPY  20 yrs ago    not sure who, thinks @ [facility]    COLONOSCOPY  09/2014    TAx2, HP, intern hemorrhoids (3 yr)    COLONOSCOPY N/A 05/17/2023    Dr Katerin Martinez, 2 nonbleeding, AVMs in proximal right colon, Mod diverticulosis left colon, int hem Gr 2 wo bleeding-likely source of recent, 3 year colon recall    ESOPHAGEAL DILATATION  05/17/2023    Dr Dorantes Friends 54 fr bougie dilation    [address]      with mesh-Dr Britton Abad    MO COLSC FLX W/REMOVAL LESION BY HOT BX FORCEPS  09/21/2017    Dr Shelton-internal hemorrhoids, tubular AP (-) dysplasia--5 yr recall    MO EGD TRANSORAL BIOPSY SINGLE/MULTIPLE N/A 10/19/2017    Dr Castro Pulse (-) chronic nonspecific inflammation

## 2023-06-02 NOTE — PATIENT INSTRUCTIONS
[place] at the [facility] and [address] [facility] located on the first floor of [place] [address] through hospital main entrance and turn immediately to your left. Date/Time:     Patient's contact number:  [phone] (home)     Echocardiogram -  No prep. Takes approximately 30 min. An echocardiogram uses sound waves to produce images of your heart. This commonly used test allows your doctor to see how your heart is beating and pumping blood. Your doctor can use the images from an echocardiogram to identify various abnormalities in the heart muscle and valves. This test has 2 parts: You will be asked to disrobe from the waist up and given a gown to wear. The technologist will then hook up an EKG monitor to you for the entire exam.   You will then have an ultrasound of your heart (echocardiogram) to assess the heart muscle, heart valves and heart function. You may eat and take any medicines before the exam.     If you need to change your appointment, please call outpatient scheduling at [phone].

## 2023-06-08 ENCOUNTER — TELEPHONE (OUTPATIENT)
Dept: CARDIOLOGY CLINIC | Age: 73
End: 2023-06-08

## 2023-06-08 ENCOUNTER — HOSPITAL ENCOUNTER (OUTPATIENT)
Dept: NON INVASIVE DIAGNOSTICS | Age: 73
Discharge: HOME | End: 2023-06-08
Payer: MEDICARE

## 2023-06-08 DIAGNOSIS — I51.9 LEFT VENTRICULAR SYSTOLIC DYSFUNCTION (LVSD): ICD-10-CM

## 2023-06-08 DIAGNOSIS — I10 ESSENTIAL HYPERTENSION: ICD-10-CM

## 2023-06-08 LAB
LV EF: 48 %
LVEF MODALITY: NORMAL

## 2023-06-08 PROCEDURE — 6360000004 HC RX CONTRAST MEDICATION: Performed by: NURSE PRACTITIONER

## 2023-06-08 PROCEDURE — C8929 TTE W OR WO FOL WCON,DOPPLER: HCPCS

## 2023-06-08 RX ADMIN — PERFLUTREN 1.5 ML: 6.52 INJECTION, SUSPENSION INTRAVENOUS at 13:23

## 2023-06-23 ENCOUNTER — OFFICE VISIT (OUTPATIENT)
Dept: GASTROENTEROLOGY | Age: 73
End: 2023-06-23
Payer: MEDICARE

## 2023-06-23 ENCOUNTER — TELEPHONE (OUTPATIENT)
Dept: GASTROENTEROLOGY | Age: 73
End: 2023-06-23

## 2023-06-23 VITALS
HEIGHT: 74 IN | BODY MASS INDEX: 27.59 KG/M2 | HEART RATE: 61 BPM | DIASTOLIC BLOOD PRESSURE: 70 MMHG | SYSTOLIC BLOOD PRESSURE: 115 MMHG | WEIGHT: 215 LBS | OXYGEN SATURATION: 98 %

## 2023-06-23 DIAGNOSIS — K21.9 GASTROESOPHAGEAL REFLUX DISEASE, UNSPECIFIED WHETHER ESOPHAGITIS PRESENT: ICD-10-CM

## 2023-06-23 DIAGNOSIS — R13.10 DYSPHAGIA, UNSPECIFIED TYPE: Primary | ICD-10-CM

## 2023-06-23 DIAGNOSIS — K62.5 RECTAL BLEEDING: ICD-10-CM

## 2023-06-23 PROCEDURE — 4004F PT TOBACCO SCREEN RCVD TLK: CPT | Performed by: NURSE PRACTITIONER

## 2023-06-23 PROCEDURE — 3078F DIAST BP <80 MM HG: CPT | Performed by: NURSE PRACTITIONER

## 2023-06-23 PROCEDURE — G8417 CALC BMI ABV UP PARAM F/U: HCPCS | Performed by: NURSE PRACTITIONER

## 2023-06-23 PROCEDURE — 1123F ACP DISCUSS/DSCN MKR DOCD: CPT | Performed by: NURSE PRACTITIONER

## 2023-06-23 PROCEDURE — 3074F SYST BP LT 130 MM HG: CPT | Performed by: NURSE PRACTITIONER

## 2023-06-23 PROCEDURE — 3017F COLORECTAL CA SCREEN DOC REV: CPT | Performed by: NURSE PRACTITIONER

## 2023-06-23 PROCEDURE — G8427 DOCREV CUR MEDS BY ELIG CLIN: HCPCS | Performed by: NURSE PRACTITIONER

## 2023-06-23 PROCEDURE — 99214 OFFICE O/P EST MOD 30 MIN: CPT | Performed by: NURSE PRACTITIONER

## 2023-06-23 ASSESSMENT — ENCOUNTER SYMPTOMS
SHORTNESS OF BREATH: 0
DIARRHEA: 0
NAUSEA: 0
TROUBLE SWALLOWING: 1
CHOKING: 0
RECTAL PAIN: 0
CONSTIPATION: 0
VOMITING: 0
ABDOMINAL PAIN: 0
ANAL BLEEDING: 0
ABDOMINAL DISTENTION: 0
BLOOD IN STOOL: 0
COUGH: 0

## 2023-06-23 NOTE — TELEPHONE ENCOUNTER
6-23-23  Referral has been faxed to [facility]   [phone]    Fax confirmation scanned in media and attached to this encounter.

## 2023-06-23 NOTE — TELEPHONE ENCOUNTER
----- Message from Brigitte GHOSH [facility] sent at 6/23/2023  8:46 AM CDT -----  Regarding: checkout note 6/23/23  Esophageal manometry

## 2023-06-23 NOTE — PROGRESS NOTES
(-)Obvious barretts like changes, mucosal changes and erythema likely med related gastritis,    VASCULAR SURGERY  07/11/2017    SJS. Right IJV US 9f sheath.     VASCULAR SURGERY Left 08/17/2017    SJS-L GSV RFA       Family History   Problem Relation Age of Onset    Cancer Mother         lung and brain    Coronary Art Dis Mother     Coronary Art Dis Sister     Colon Cancer Sister     Colon Polyps Sister     Coronary Art Dis Father     Colon Cancer Maternal Grandmother     Colon Polyps Maternal Grandmother     Colon Cancer Sister     Colon Polyps Sister     Cancer Sister         \"female cancer\"    Cancer Sister         thryoid    Coronary Art Dis Brother     Heart Failure Brother     Esophageal Cancer Neg Hx     Liver Cancer Neg Hx     Stomach Cancer Neg Hx        Social History     Socioeconomic History    Marital status:    Tobacco Use    Smoking status: Some Days     Types: Cigarettes    Smokeless tobacco: Never    Tobacco comments:     some days he smokes, some days he doesn't   Vaping Use    Vaping Use: Never used   Substance and Sexual Activity    Alcohol use: No     Alcohol/week: 0.0 standard drinks    Drug use: No    Sexual activity: Not Currently       Current Outpatient Medications   Medication Sig Dispense Refill    furosemide (LASIX) 20 MG tablet Take 1 tablet by mouth as needed (if wt gain of 3-5 lbs in 24 hours) 90 tablet 1    cyanocobalamin 1000 MCG tablet 1 tablet      tiotropium-olodaterol (STIOLTO) 2.5-2.5 MCG/ACT AERS INHALE 2 PUFFS BY ORAL INHALATION ONCE A DAY ADMINISTER AT SAME TIME EACH DAY      memantine (NAMENDA) 10 MG tablet 1 twice daily 60 tablet 5    donepezil (ARICEPT) 5 MG tablet Take 1 tablet by mouth nightly 30 tablet 2    diphenhydrAMINE (BENADRYL) 50 MG capsule Take 1 capsule by mouth at bedtime      mirabegron (MYRBETRIQ) 50 MG TB24 Take 50 mg by mouth daily      isosorbide mononitrate (IMDUR) 60 MG extended release tablet Take 1 tablet by mouth nightly 90 tablet 3

## 2023-07-25 ENCOUNTER — TELEPHONE (OUTPATIENT)
Dept: CARDIAC SURGERY | Facility: CLINIC | Age: 73
End: 2023-07-25
Payer: MEDICARE

## 2023-07-25 NOTE — TELEPHONE ENCOUNTER
Pt's [facility] form is still not signed. Can you please r/s office visit for after CT scheduled 07/31/2023

## 2023-07-25 NOTE — TELEPHONE ENCOUNTER
Preparing to call pt's to confirm appts tomorrow but noticed that this pt's CT scan is sched for next week.  Next available appt with Dr Aceves is 8-16-23.  Does pt need to be resched or what to do about CT?/namita

## 2023-07-31 NOTE — TELEPHONE ENCOUNTER
Pt's CT appointment is now scheduled for 08/09 and office visit is still scheduled for 08/16. No authorization received yet. Will follow up with [facility] if we have not received this by 08/04.

## 2023-08-09 ENCOUNTER — HOSPITAL ENCOUNTER (OUTPATIENT)
Dept: CT IMAGING | Facility: HOSPITAL | Age: 73
Discharge: HOME | End: 2023-08-09
Admitting: THORACIC SURGERY (CARDIOTHORACIC VASCULAR SURGERY)
Payer: OTHER GOVERNMENT

## 2023-08-09 DIAGNOSIS — R91.1 LUNG NODULE: ICD-10-CM

## 2023-08-09 PROCEDURE — 71250 CT THORAX DX C-: CPT

## 2023-08-16 ENCOUNTER — APPOINTMENT (OUTPATIENT)
Dept: GENERAL RADIOLOGY | Age: 73
End: 2023-08-16
Payer: OTHER GOVERNMENT

## 2023-08-16 ENCOUNTER — OFFICE VISIT (OUTPATIENT)
Dept: CARDIAC SURGERY | Facility: CLINIC | Age: 73
End: 2023-08-16
Payer: OTHER GOVERNMENT

## 2023-08-16 ENCOUNTER — HOSPITAL ENCOUNTER (OUTPATIENT)
Age: 73
Setting detail: OBSERVATION
Discharge: HOME | End: 2023-08-17
Attending: PEDIATRICS | Admitting: HOSPITALIST
Payer: OTHER GOVERNMENT

## 2023-08-16 VITALS
DIASTOLIC BLOOD PRESSURE: 74 MMHG | HEART RATE: 66 BPM | SYSTOLIC BLOOD PRESSURE: 118 MMHG | WEIGHT: 210 LBS | BODY MASS INDEX: 26.39 KG/M2 | OXYGEN SATURATION: 98 %

## 2023-08-16 DIAGNOSIS — R91.8 MULTIPLE LUNG NODULES: Primary | ICD-10-CM

## 2023-08-16 DIAGNOSIS — I20.0 UNSTABLE ANGINA PECTORIS (HCC): Primary | ICD-10-CM

## 2023-08-16 DIAGNOSIS — R91.1 LUNG NODULE: Primary | ICD-10-CM

## 2023-08-16 PROBLEM — R07.2 SUBSTERNAL CHEST PAIN RELIEVED BY NITROGLYCERIN: Status: ACTIVE | Noted: 2023-08-16

## 2023-08-16 LAB
ALBUMIN SERPL-MCNC: 4.1 G/DL (ref 3.5–5.2)
ALP SERPL-CCNC: 61 U/L (ref 40–130)
ALT SERPL-CCNC: 8 U/L (ref 5–41)
ANION GAP SERPL CALCULATED.3IONS-SCNC: 8 MMOL/L (ref 7–19)
APTT PPP: 30.5 SEC (ref 26–36.2)
AST SERPL-CCNC: 21 U/L (ref 5–40)
BASOPHILS # BLD: 0 K/UL (ref 0–0.2)
BASOPHILS NFR BLD: 0.5 % (ref 0–1)
BILIRUB SERPL-MCNC: 0.4 MG/DL (ref 0.2–1.2)
BNP BLD-MCNC: 296 PG/ML (ref 0–124)
BUN SERPL-MCNC: 12 MG/DL (ref 8–23)
CALCIUM SERPL-MCNC: 9.1 MG/DL (ref 8.8–10.2)
CHLORIDE SERPL-SCNC: 105 MMOL/L (ref 98–111)
CHOLEST SERPL-MCNC: 177 MG/DL (ref 160–199)
CO2 SERPL-SCNC: 27 MMOL/L (ref 22–29)
CREAT SERPL-MCNC: 0.9 MG/DL (ref 0.5–1.2)
D DIMER PPP FEU-MCNC: 0.46 UG/ML FEU (ref 0–0.48)
EOSINOPHIL # BLD: 0.1 K/UL (ref 0–0.6)
EOSINOPHIL NFR BLD: 2.2 % (ref 0–5)
ERYTHROCYTE [DISTWIDTH] IN BLOOD BY AUTOMATED COUNT: 14.2 % (ref 11.5–14.5)
GLUCOSE SERPL-MCNC: 92 MG/DL (ref 74–109)
HBA1C MFR BLD: 5.3 % (ref 4–6)
HCT VFR BLD AUTO: 40.5 % (ref 42–52)
HDLC SERPL-MCNC: 49 MG/DL (ref 55–121)
HGB BLD-MCNC: 13.4 G/DL (ref 14–18)
IMM GRANULOCYTES # BLD: 0 K/UL
INR PPP: 0.99 (ref 0.88–1.18)
LDLC SERPL CALC-MCNC: 103 MG/DL
LIPASE SERPL-CCNC: 20 U/L (ref 13–60)
LYMPHOCYTES # BLD: 1.7 K/UL (ref 1.1–4.5)
LYMPHOCYTES NFR BLD: 30.6 % (ref 20–40)
MAGNESIUM SERPL-MCNC: 2 MG/DL (ref 1.6–2.4)
MAGNESIUM SERPL-MCNC: 2.1 MG/DL (ref 1.6–2.4)
MCH RBC QN AUTO: 32.6 PG (ref 27–31)
MCHC RBC AUTO-ENTMCNC: 33.1 G/DL (ref 33–37)
MCV RBC AUTO: 98.5 FL (ref 80–94)
MONOCYTES # BLD: 0.6 K/UL (ref 0–0.9)
MONOCYTES NFR BLD: 11.1 % (ref 0–10)
NEUTROPHILS # BLD: 3.1 K/UL (ref 1.5–7.5)
NEUTS SEG NFR BLD: 55.4 % (ref 50–65)
PHOSPHATE SERPL-MCNC: 3.3 MG/DL (ref 2.5–4.5)
PLATELET # BLD AUTO: 209 K/UL (ref 130–400)
PMV BLD AUTO: 10 FL (ref 9.4–12.4)
POTASSIUM SERPL-SCNC: 4.8 MMOL/L (ref 3.5–5)
PROT SERPL-MCNC: 6.6 G/DL (ref 6.6–8.7)
PROTHROMBIN TIME: 12.8 SEC (ref 12–14.6)
RBC # BLD AUTO: 4.11 M/UL (ref 4.7–6.1)
SODIUM SERPL-SCNC: 140 MMOL/L (ref 136–145)
TRIGL SERPL-MCNC: 127 MG/DL (ref 0–149)
TROPONIN T SERPL-MCNC: <0.01 NG/ML (ref 0–0.03)
TROPONIN T SERPL-MCNC: <0.01 NG/ML (ref 0–0.03)
TSH SERPL DL<=0.005 MIU/L-ACNC: 1.21 UIU/ML (ref 0.35–5.5)
WBC # BLD AUTO: 5.5 K/UL (ref 4.8–10.8)

## 2023-08-16 PROCEDURE — 80061 LIPID PANEL: CPT

## 2023-08-16 PROCEDURE — 85025 COMPLETE CBC W/AUTO DIFF WBC: CPT

## 2023-08-16 PROCEDURE — 6370000000 HC RX 637 (ALT 250 FOR IP): Performed by: PEDIATRICS

## 2023-08-16 PROCEDURE — 84443 ASSAY THYROID STIM HORMONE: CPT

## 2023-08-16 PROCEDURE — 6370000000 HC RX 637 (ALT 250 FOR IP): Performed by: HOSPITALIST

## 2023-08-16 PROCEDURE — 85379 FIBRIN DEGRADATION QUANT: CPT

## 2023-08-16 PROCEDURE — 83735 ASSAY OF MAGNESIUM: CPT

## 2023-08-16 PROCEDURE — 94760 N-INVAS EAR/PLS OXIMETRY 1: CPT

## 2023-08-16 PROCEDURE — 80053 COMPREHEN METABOLIC PANEL: CPT

## 2023-08-16 PROCEDURE — 96372 THER/PROPH/DIAG INJ SC/IM: CPT

## 2023-08-16 PROCEDURE — 84100 ASSAY OF PHOSPHORUS: CPT

## 2023-08-16 PROCEDURE — 99285 EMERGENCY DEPT VISIT HI MDM: CPT

## 2023-08-16 PROCEDURE — 6360000002 HC RX W HCPCS: Performed by: HOSPITALIST

## 2023-08-16 PROCEDURE — 36415 COLL VENOUS BLD VENIPUNCTURE: CPT

## 2023-08-16 PROCEDURE — G0378 HOSPITAL OBSERVATION PER HR: HCPCS

## 2023-08-16 PROCEDURE — 83880 ASSAY OF NATRIURETIC PEPTIDE: CPT

## 2023-08-16 PROCEDURE — 94640 AIRWAY INHALATION TREATMENT: CPT

## 2023-08-16 PROCEDURE — 83690 ASSAY OF LIPASE: CPT

## 2023-08-16 PROCEDURE — 85610 PROTHROMBIN TIME: CPT

## 2023-08-16 PROCEDURE — 83036 HEMOGLOBIN GLYCOSYLATED A1C: CPT

## 2023-08-16 PROCEDURE — 2580000003 HC RX 258: Performed by: HOSPITALIST

## 2023-08-16 PROCEDURE — 85730 THROMBOPLASTIN TIME PARTIAL: CPT

## 2023-08-16 PROCEDURE — 99213 OFFICE O/P EST LOW 20 MIN: CPT | Performed by: THORACIC SURGERY (CARDIOTHORACIC VASCULAR SURGERY)

## 2023-08-16 PROCEDURE — 71045 X-RAY EXAM CHEST 1 VIEW: CPT

## 2023-08-16 PROCEDURE — 84484 ASSAY OF TROPONIN QUANT: CPT

## 2023-08-16 RX ORDER — ACETAMINOPHEN 325 MG/1
650 TABLET ORAL EVERY 4 HOURS PRN
Status: DISCONTINUED | OUTPATIENT
Start: 2023-08-16 | End: 2023-08-17 | Stop reason: HOSPADM

## 2023-08-16 RX ORDER — POTASSIUM CHLORIDE 20 MEQ/1
40 TABLET, EXTENDED RELEASE ORAL PRN
Status: DISCONTINUED | OUTPATIENT
Start: 2023-08-16 | End: 2023-08-17 | Stop reason: HOSPADM

## 2023-08-16 RX ORDER — ESOMEPRAZOLE MAGNESIUM 40 MG/1
40 CAPSULE, DELAYED RELEASE ORAL AS NEEDED
COMMUNITY
Start: 2023-03-22

## 2023-08-16 RX ORDER — DIPHENHYDRAMINE HCL 25 MG
50 TABLET ORAL NIGHTLY
Status: DISCONTINUED | OUTPATIENT
Start: 2023-08-16 | End: 2023-08-17 | Stop reason: HOSPADM

## 2023-08-16 RX ORDER — FUROSEMIDE 20 MG/1
20 TABLET ORAL DAILY PRN
COMMUNITY
Start: 2023-06-02

## 2023-08-16 RX ORDER — MEMANTINE HYDROCHLORIDE 10 MG/1
10 TABLET ORAL DAILY
COMMUNITY
Start: 2023-04-27

## 2023-08-16 RX ORDER — DONEPEZIL HYDROCHLORIDE 5 MG/1
5 TABLET, FILM COATED ORAL NIGHTLY
Status: DISCONTINUED | OUTPATIENT
Start: 2023-08-16 | End: 2023-08-17 | Stop reason: HOSPADM

## 2023-08-16 RX ORDER — BISACODYL 5 MG
5 TABLET, DELAYED RELEASE (ENTERIC COATED) ORAL DAILY PRN
COMMUNITY
Start: 2023-05-10

## 2023-08-16 RX ORDER — NITROGLYCERIN 0.4 MG/1
0.4 TABLET SUBLINGUAL EVERY 5 MIN PRN
Status: DISCONTINUED | OUTPATIENT
Start: 2023-08-16 | End: 2023-08-17 | Stop reason: HOSPADM

## 2023-08-16 RX ORDER — SODIUM CHLORIDE 0.9 % (FLUSH) 0.9 %
5-40 SYRINGE (ML) INJECTION EVERY 12 HOURS SCHEDULED
Status: DISCONTINUED | OUTPATIENT
Start: 2023-08-16 | End: 2023-08-17 | Stop reason: HOSPADM

## 2023-08-16 RX ORDER — ASPIRIN 81 MG/1
81 TABLET ORAL DAILY
Status: DISCONTINUED | OUTPATIENT
Start: 2023-08-17 | End: 2023-08-17 | Stop reason: HOSPADM

## 2023-08-16 RX ORDER — PRIMIDONE 50 MG/1
50 TABLET ORAL NIGHTLY
COMMUNITY

## 2023-08-16 RX ORDER — PRIMIDONE 50 MG/1
50 TABLET ORAL NIGHTLY
Status: DISCONTINUED | OUTPATIENT
Start: 2023-08-16 | End: 2023-08-17 | Stop reason: HOSPADM

## 2023-08-16 RX ORDER — DONEPEZIL HYDROCHLORIDE 5 MG/1
1 TABLET, FILM COATED ORAL NIGHTLY
COMMUNITY
Start: 2023-04-27

## 2023-08-16 RX ORDER — CALCIUM CARBONATE 500 MG/1
500 TABLET, CHEWABLE ORAL 3 TIMES DAILY PRN
Status: DISCONTINUED | OUTPATIENT
Start: 2023-08-16 | End: 2023-08-17 | Stop reason: HOSPADM

## 2023-08-16 RX ORDER — TROSPIUM CHLORIDE 20 MG/1
20 TABLET, FILM COATED ORAL
Status: DISCONTINUED | OUTPATIENT
Start: 2023-08-17 | End: 2023-08-17 | Stop reason: HOSPADM

## 2023-08-16 RX ORDER — SODIUM CHLORIDE 9 MG/ML
INJECTION, SOLUTION INTRAVENOUS PRN
Status: DISCONTINUED | OUTPATIENT
Start: 2023-08-16 | End: 2023-08-17 | Stop reason: HOSPADM

## 2023-08-16 RX ORDER — ACETAMINOPHEN 650 MG/1
650 SUPPOSITORY RECTAL EVERY 4 HOURS PRN
Status: DISCONTINUED | OUTPATIENT
Start: 2023-08-16 | End: 2023-08-17 | Stop reason: HOSPADM

## 2023-08-16 RX ORDER — RANOLAZINE 500 MG/1
500 TABLET, EXTENDED RELEASE ORAL 2 TIMES DAILY
Status: DISCONTINUED | OUTPATIENT
Start: 2023-08-16 | End: 2023-08-17 | Stop reason: HOSPADM

## 2023-08-16 RX ORDER — METOPROLOL SUCCINATE 25 MG/1
25 TABLET, EXTENDED RELEASE ORAL 2 TIMES DAILY
Status: DISCONTINUED | OUTPATIENT
Start: 2023-08-16 | End: 2023-08-17 | Stop reason: HOSPADM

## 2023-08-16 RX ORDER — ISOSORBIDE MONONITRATE 60 MG/1
60 TABLET, EXTENDED RELEASE ORAL NIGHTLY
Status: DISCONTINUED | OUTPATIENT
Start: 2023-08-16 | End: 2023-08-17 | Stop reason: HOSPADM

## 2023-08-16 RX ORDER — TAMSULOSIN HYDROCHLORIDE 0.4 MG/1
0.4 CAPSULE ORAL DAILY
Status: DISCONTINUED | OUTPATIENT
Start: 2023-08-17 | End: 2023-08-17 | Stop reason: HOSPADM

## 2023-08-16 RX ORDER — ALBUTEROL SULFATE 2.5 MG/3ML
2.5 SOLUTION RESPIRATORY (INHALATION) EVERY 4 HOURS PRN
Status: DISCONTINUED | OUTPATIENT
Start: 2023-08-16 | End: 2023-08-17 | Stop reason: HOSPADM

## 2023-08-16 RX ORDER — CHOLECALCIFEROL (VITAMIN D3) 125 MCG
1000 CAPSULE ORAL DAILY
Status: DISCONTINUED | OUTPATIENT
Start: 2023-08-17 | End: 2023-08-17 | Stop reason: HOSPADM

## 2023-08-16 RX ORDER — MECOBALAMIN 5000 MCG
5 TABLET,DISINTEGRATING ORAL NIGHTLY PRN
Status: DISCONTINUED | OUTPATIENT
Start: 2023-08-16 | End: 2023-08-17 | Stop reason: HOSPADM

## 2023-08-16 RX ORDER — SODIUM CHLORIDE 0.9 % (FLUSH) 0.9 %
5-40 SYRINGE (ML) INJECTION PRN
Status: DISCONTINUED | OUTPATIENT
Start: 2023-08-16 | End: 2023-08-17 | Stop reason: HOSPADM

## 2023-08-16 RX ORDER — ONDANSETRON 2 MG/ML
4 INJECTION INTRAMUSCULAR; INTRAVENOUS EVERY 6 HOURS PRN
Status: DISCONTINUED | OUTPATIENT
Start: 2023-08-16 | End: 2023-08-17 | Stop reason: HOSPADM

## 2023-08-16 RX ORDER — ONDANSETRON 4 MG/1
4 TABLET, ORALLY DISINTEGRATING ORAL EVERY 8 HOURS PRN
Status: DISCONTINUED | OUTPATIENT
Start: 2023-08-16 | End: 2023-08-17 | Stop reason: HOSPADM

## 2023-08-16 RX ORDER — ARFORMOTEROL TARTRATE 15 UG/2ML
15 SOLUTION RESPIRATORY (INHALATION)
Status: DISCONTINUED | OUTPATIENT
Start: 2023-08-16 | End: 2023-08-17 | Stop reason: HOSPADM

## 2023-08-16 RX ORDER — MAGNESIUM SULFATE IN WATER 40 MG/ML
2000 INJECTION, SOLUTION INTRAVENOUS PRN
Status: DISCONTINUED | OUTPATIENT
Start: 2023-08-16 | End: 2023-08-17 | Stop reason: HOSPADM

## 2023-08-16 RX ORDER — BUDESONIDE 0.5 MG/2ML
0.5 INHALANT ORAL
Status: DISCONTINUED | OUTPATIENT
Start: 2023-08-16 | End: 2023-08-17 | Stop reason: HOSPADM

## 2023-08-16 RX ORDER — POLYETHYLENE GLYCOL 3350 17 G/17G
17 POWDER, FOR SOLUTION ORAL DAILY PRN
Status: DISCONTINUED | OUTPATIENT
Start: 2023-08-16 | End: 2023-08-17 | Stop reason: HOSPADM

## 2023-08-16 RX ORDER — MEMANTINE HYDROCHLORIDE 5 MG/1
10 TABLET ORAL 2 TIMES DAILY
Status: DISCONTINUED | OUTPATIENT
Start: 2023-08-16 | End: 2023-08-17 | Stop reason: HOSPADM

## 2023-08-16 RX ORDER — ATORVASTATIN CALCIUM 40 MG/1
40 TABLET, FILM COATED ORAL NIGHTLY
Status: DISCONTINUED | OUTPATIENT
Start: 2023-08-16 | End: 2023-08-17 | Stop reason: HOSPADM

## 2023-08-16 RX ORDER — TAMSULOSIN HYDROCHLORIDE 0.4 MG/1
1 CAPSULE ORAL DAILY
COMMUNITY
Start: 2023-03-22

## 2023-08-16 RX ORDER — ASPIRIN 81 MG/1
324 TABLET, CHEWABLE ORAL ONCE
Status: COMPLETED | OUTPATIENT
Start: 2023-08-16 | End: 2023-08-16

## 2023-08-16 RX ORDER — PANTOPRAZOLE SODIUM 40 MG/1
40 TABLET, DELAYED RELEASE ORAL
Status: DISCONTINUED | OUTPATIENT
Start: 2023-08-17 | End: 2023-08-17 | Stop reason: HOSPADM

## 2023-08-16 RX ORDER — ENOXAPARIN SODIUM 100 MG/ML
40 INJECTION SUBCUTANEOUS DAILY
Status: DISCONTINUED | OUTPATIENT
Start: 2023-08-16 | End: 2023-08-17 | Stop reason: HOSPADM

## 2023-08-16 RX ORDER — POTASSIUM CHLORIDE 7.45 MG/ML
10 INJECTION INTRAVENOUS PRN
Status: DISCONTINUED | OUTPATIENT
Start: 2023-08-16 | End: 2023-08-17 | Stop reason: HOSPADM

## 2023-08-16 RX ADMIN — BUDESONIDE INHALATION 500 MCG: 0.5 SUSPENSION RESPIRATORY (INHALATION) at 20:42

## 2023-08-16 RX ADMIN — ATORVASTATIN CALCIUM 40 MG: 40 TABLET, FILM COATED ORAL at 21:32

## 2023-08-16 RX ADMIN — ENOXAPARIN SODIUM 40 MG: 100 INJECTION SUBCUTANEOUS at 21:33

## 2023-08-16 RX ADMIN — SODIUM CHLORIDE, PRESERVATIVE FREE 10 ML: 5 INJECTION INTRAVENOUS at 21:36

## 2023-08-16 RX ADMIN — DIPHENHYDRAMINE HYDROCHLORIDE 50 MG: 25 TABLET ORAL at 21:32

## 2023-08-16 RX ADMIN — METOPROLOL SUCCINATE 25 MG: 25 TABLET, EXTENDED RELEASE ORAL at 21:32

## 2023-08-16 RX ADMIN — ASPIRIN 324 MG: 81 TABLET, CHEWABLE ORAL at 17:46

## 2023-08-16 RX ADMIN — PRIMIDONE 50 MG: 50 TABLET ORAL at 21:32

## 2023-08-16 RX ADMIN — RANOLAZINE 500 MG: 500 TABLET, EXTENDED RELEASE ORAL at 21:32

## 2023-08-16 RX ADMIN — ARFORMOTEROL TARTRATE 15 MCG: 15 SOLUTION RESPIRATORY (INHALATION) at 20:42

## 2023-08-16 RX ADMIN — MEMANTINE HYDROCHLORIDE 10 MG: 5 TABLET ORAL at 21:32

## 2023-08-16 RX ADMIN — ISOSORBIDE MONONITRATE 60 MG: 60 TABLET, EXTENDED RELEASE ORAL at 21:32

## 2023-08-16 RX ADMIN — DONEPEZIL HYDROCHLORIDE 5 MG: 5 TABLET, FILM COATED ORAL at 21:33

## 2023-08-16 ASSESSMENT — ENCOUNTER SYMPTOMS
BLOOD IN STOOL: 0
BACK PAIN: 0
SHORTNESS OF BREATH: 1
VOMITING: 0
COLOR CHANGE: 0
NAUSEA: 1
RHINORRHEA: 0
ABDOMINAL PAIN: 0
COUGH: 0

## 2023-08-16 NOTE — PROGRESS NOTES
Subjective:     Chief Complaint   Patient presents with    Lung Nodule     Pt is here for follow-up w/ CT Scan        Luis Kam is a 73 y.o. male. The patient presents today for furtherevaluation and treatment of a lung nodule.     72-year-old male with history of COPD, sleep apnea, hypertension, coronary artery disease, chronic ischemic heart disease, rheumatoid arthritis, and lung nodules since 2020 presents today for subsequent evaluation of a growing in size lung nodule that is not PET avid. He denies unintended weight loss, hoarseness of voice, chest pain, hemoptysis, history of pneumonia, or cough.  Imaging showed slow growth of the left upper lobe lung lesion measuring 1.2 cm in size and had a maximum SUV of 2.57.  This is below physiologic background.  In 2020 this measured 6 mm in size.  A right lower lobe lung nodule measured 9 mm in size and has been stable since 2020 without FDG avidity.  He does continue to smoke about 1pack/day.  He had smoked approximate pack per day over 50+ years  He has chronic back pain.  He has oral anticoagulation for which he takes Xarelto. He does take long-acting nitrates for CAD  He was seen previously with PFT's ordered and returns to discuss.    In the interim, he continues to complain of mild dyspnea.    He states that his mother and 3 of his sisters passed away from cancer. His grandmother also passed away from lung cancer.   He reports that he previously had a thyroidectomy, and now his thyroid troubles.       Luis Kam was seen by me on 08/23/2022 with a left upper lobe lung lesion measuring 1.2cm in size and had an maximum SUV of 2.57. While this was below physiologic background, there is demonstratable growth having the same lesion measured in 2020 6mm in size. There is a right lower lobe lung nodule measuring 9mm in size and this has been stable since the 2022 study and did not have any FDG avidity. I had advised at that time to consider resection. Repeat  imaging showed that the left upper lobe lesion had decreased in size and therefore with a measuring of 7 x 6mm in size. Ongoing surveillance was recommended. The patient has expressed thereafter many concerns and specifically with concerns of a specific radiologist at our facility and Dr. Banda about reading his studies. He has specifically been discussed with myself my recommendations and the fact that I have measured and corroborate the measurements Dr. Banda had obtained and that my opinion is based on my measurements and not just solely on the measurements of Dr. Banda nor their interpretation of Dr. Banda. That being said, he has expressed that he did not want Dr. Banda involved in his care any further and that was again emphasized on today's message via the Epic portal.    Patient continues to experience chest pain.   Patient denies hemoptysis.   He denies intentionally losing weight and he weighs 221 pounds today.  Patient is trying to quit smoking. He quit for 4 to 5 days and then resumed.    Luis Kam presents to the clinic today for a follow-up visit to discuss multiple lung nodules.    Mr. Kam states he is doing quite well.  Patient has sleep apnea and has been using his CPAP machine.  He has been unintentionally losing weight, but he has not discussed this with any physicians.  Patient's primary care physician is Dr. Patel.  He served in the  and was exposed to chemicals.  He has an upcoming visit scheduled next week to discuss this.  Patient has had recent falls due to losing his balance, but he is under the care of neurology who has been managing this.  He was taken off of gabapentin and advised that this may be causing dizziness.  Patient has quit smoking approximately 1 month ago and feels much better.  He continues to cough productively.  Patient was supposed to have another CT scan obtained next week for his pulmonologist.        Social History     Socioeconomic History     "Marital status:    Tobacco Use    Smoking status: Former     Packs/day: 0.70     Years: 57.00     Pack years: 39.90     Types: Cigarettes     Start date:      Quit date: 2023     Years since quittin.1     Passive exposure: Current    Smokeless tobacco: Never    Tobacco comments:     \"14 Cigarettes per day, but it's been a week since I last smoked\"   Substance and Sexual Activity    Alcohol use: Not Currently     Comment: Quit drinking     Drug use: No    Sexual activity: Defer     Family History   Problem Relation Age of Onset    No Known Problems Father     No Known Problems Mother        Review of Systems  Respiratory: positive for dyspnea on exertion and emphysema  Cardiovascular: positive for dyspnea  Gastrointestinal: positive for abdominal pain and Rectal pain  Musculoskeletal:positive for arthralgias and pelvic pain  Allergic/Immunologic: positive for seasonal allergies       Objective:      /74 (BP Location: Right arm, Patient Position: Sitting, Cuff Size: Adult)   Pulse 66   Wt 95.3 kg (210 lb)   SpO2 98%   BMI 26.39 kg/m²     General Appearance:    Alert, cooperative, no distress, appears older than stated age   Head:    Normocephalic, without obvious abnormality, atraumatic   Eyes:    PERRL, conjunctiva/corneas clear, EOM's intact, fundi     benign, both eyes        Ears:    Normal TM's and external ear canals, both ears   Nose:   Nares normal, septum midline, mucosa normal, no drainage    or sinus tenderness   Throat:   Lips, mucosa, and tongue normal; teeth and gums normal   Neck:   Supple, symmetrical, trachea midline, no adenopathy;        thyroid:  No enlargement/tenderness/nodules; no carotid    bruit or JVD   Back:     Symmetric, no curvature, ROM normal, no CVA tenderness   Lungs:     Clear to auscultation bilaterally, respirations unlabored   Chest wall:    No tenderness or deformity   Heart:    Regular rate and rhythm, S1 and S2 normal, no murmur, rub   or gallop "   Abdomen:     Soft, non-tender, bowel sounds active all four quadrants,     no masses, no organomegaly   Genitalia:    Normal male without lesion, discharge or tenderness   Rectal:    Normal tone, normal prostate, no masses or tenderness;    guaiac negative stool   Extremities:   Extremities normal, atraumatic, no cyanosis or edema   Pulses:   2+ and symmetric all extremities   Skin:   Skin color, texture, turgor normal, no rashes or lesions   Lymph nodes:   Cervical, supraclavicular, and axillary nodes normal   Neurologic:   CNII-XII intact. Normal strength, sensation and reflexes       throughout    /74 (BP Location: Right arm, Patient Position: Sitting, Cuff Size: Adult)   Pulse 66   Wt 95.3 kg (210 lb)   SpO2 98%   BMI 26.39 kg/m²       Physical Exam 08/16/2023:  General: He does have a productive cough. Otherwise, no acute distress, in good spirits.  Cardiovascular: Heart is regular rate and rhythm without murmurs, rubs, or gallops.  Lung: Clear to auscultation bilaterally without wheezing, rubs, or rales.  Abdomen: Soft, nondistended, nontender.  Extremities: No clubbing, cyanosis, or edema. He does have some bruising on his upper extremities, particularly left greater than right.    Diagnostic Review  PFT (07/15/2022)    CT Chest With Contrast Diagnostic    Result Date: 1/18/2023  CT CHEST W CONTRAST DIAGNOSTIC- 1/18/2023 8:50 AM CST  HISTORY: Lung nodule; R91.1-Solitary pulmonary nodule  COMPARISON: CT scan dated 09/30/2022  DOSE LENGTH PRODUCT: 422 mGy cm. Automated exposure control was also utilized to decrease patient radiation dose.  TECHNIQUE: Serial helical tomographic images of the chest were acquired following the intravenous infusion of contrast. Multiplanar reformatted images were provided for review.  FINDINGS:  Visualized neck base: The imaged portion of the base of the neck appears unremarkable.  Lungs: Centrilobular emphysema. There is a 9 mm juxtapleural nodule in the left upper  lobe along the mediastinal pleura, just above the aortic arch (series 5-image 94), stable in size. Additional 8 mm left upper lobe juxtapleural nodule on axial image 47 which is stable. 5 mm left lower lobe nodule on axial image 116 is stable. 4 mm right lower lobe nodule on axial image 77 is stable. There are no new or enlarging nodules. Lungs are well expanded. No consolidation or pleural effusion. Airways are clear.  Heart: The heart is normal in size. There is no pericardial effusion. Moderate coronary atheromatous calcification.  Vasculature: Thoracic aorta is normal in caliber. No dissection identified. No flow-limiting stenosis identified at the great vessel origins. The pulmonary arteries are normal in appearance.  Mediastinum and lymph nodes: No suspicious hilar or mediastinal adenopathy. Incidental granulomatous calcification.  Skeletal and soft tissues: Chest wall soft tissues are unremarkable. No acute bony abnormality.   Upper abdomen: The imaged portion of the upper abdomen demonstrates no acute process. 1.2 cm exophytic hyperdense nodule along the lateral aspect of the left inferior renal pole (40.3 mean Hounsfield density, axial image 179).       Impression: 1. Stable bilateral pulmonary nodules. This includes two 9 mm left upper lobe juxtapleural nodules, both of which are stable. There are no new or enlarging pulmonary nodules. No suspicious adenopathy. 2. Lung emphysema. 3. 1.2 cm exophytic left inferior renal pole hemorrhagic cyst versus small solid enhancing mass. Renal ultrasound recommended for further evaluation.   This report was finalized on 01/18/2023 09:36 by Dr Melvin Cohen, .      Independent interpretation of CT scan of chest obtained 01/18/2023:  The left upper lobe lesion measured 8mm in size today, on my measurement. There is an additional upper lobe nodule measuring 8mm in size and there is an additional lower lobe nodule measuring 5mm in size and a right lower lobe nodule  measuring 4mm in size. He has hilar calcified lymph nodes in the right hilum, in particular. Of note, on the report, there is a 1.2cm exophytic hyperdense nodule in the lateral aspect of the left inferior renal pole. We will need to obtain an ultrasound of the kidney to better clarify that per radiology recommendations.     CT Chest Without Contrast Diagnostic    Result Date: 8/9/2023  EXAM/TECHNIQUE: CT chest without contrast  INDICATION: lung nodule; R91.1-Solitary pulmonary nodule  COMPARISON: 01/18/2023  DLP: 330 mGy cm. Automated exposure control was also utilized to decrease patient radiation dose.  FINDINGS:  No change in 9 mm LEFT upper lobe pulmonary nodule, image 42. No change in 7 mm subpleural LEFT upper lobe pulmonary nodule, image 39. No change in 4 mm LEFT lower lobe pulmonary nodule, image 99. No change in 5 mm LEFT lower lobe pulmonary nodule on image 92. No change in 4 mm RIGHT lower lobe pulmonary nodule, image 84. No new or enlarging pulmonary nodule.  Central airways are clear. No consolidation or pleural effusion. Moderate emphysema. Mild biapical parenchymal scarring. No enlarged thoracic lymph nodes.  Main pulmonary artery is upper limits of normal in caliber. Thoracic aorta is nonaneurysmal and contains atherosclerotic calcification. Mild coronary calcification. No pericardial effusion.  Prior RIGHT thyroidectomy. No LEFT thyroid nodule identified. No acute chest wall soft tissue abnormality. No new findings in the upper abdomen. Multilevel thoracic spine degenerative change. No acute osseous finding.      Impression:  No change in multiple bilateral pulmonary nodules measuring up to 9 mm. This report was finalized on 08/09/2023 12:53 by Dr. Johnny Schmidt MD.      In brief, his multiple lung nodules are without change. The left upper lobe lung nodule that garnered the initial referral measures 9mm in size. Previously, this measured 8mm in size. As a whole, it has been relatively stable,  while acknowledging that there might be a slight difference in size.      Impression:  Multiple lung nodules, grossly stable.  Recent fall secondary to dizziness, being evaluated by neurologist at outside facility.    Medical decision making/Recommendations/Plan:  At this time, I recommend ongoing active surveillance of his lung nodules. He has previously been made aware of signs and symptoms that would prompt an earlier assessment. With that being said, given the ongoing stability, I would recommend an additional 6 months for follow-up with the intent to have him see JATINDER Ugalde in 6 months with a CT scan of the chest without contrast.  He is a non-smoker at this time and has been congratulated on this. He is now free from smoking for 1 month. I encouraged him to speak with Dr. Patel if he does continue to have a productive cough.  Discussed that this is not unexpected after recent smoking cessation to have increasing secretions early on.  Many thanks again for the opportunity to aid in the care of your patient. I will continue to keep you apprised of care as it ensues.    Transcribed from ambient dictation for Adán Aceves MD by Angeles Schulte.  08/16/23   11:40 CDT    Patient or patient representative verbalized consent to the visit recording.  I have personally performed the services described in this document as transcribed by the above individual, and it is both accurate and complete.

## 2023-08-17 VITALS
WEIGHT: 209 LBS | SYSTOLIC BLOOD PRESSURE: 88 MMHG | BODY MASS INDEX: 25.99 KG/M2 | HEART RATE: 55 BPM | OXYGEN SATURATION: 99 % | TEMPERATURE: 97.2 F | DIASTOLIC BLOOD PRESSURE: 60 MMHG | RESPIRATION RATE: 18 BRPM | HEIGHT: 75 IN

## 2023-08-17 LAB
ANION GAP SERPL CALCULATED.3IONS-SCNC: 10 MMOL/L (ref 7–19)
BASOPHILS # BLD: 0 K/UL (ref 0–0.2)
BASOPHILS NFR BLD: 0.9 % (ref 0–1)
BUN SERPL-MCNC: 18 MG/DL (ref 8–23)
CALCIUM SERPL-MCNC: 8.5 MG/DL (ref 8.8–10.2)
CHLORIDE SERPL-SCNC: 105 MMOL/L (ref 98–111)
CO2 SERPL-SCNC: 22 MMOL/L (ref 22–29)
CREAT SERPL-MCNC: 0.8 MG/DL (ref 0.5–1.2)
EOSINOPHIL # BLD: 0.1 K/UL (ref 0–0.6)
EOSINOPHIL NFR BLD: 2.3 % (ref 0–5)
ERYTHROCYTE [DISTWIDTH] IN BLOOD BY AUTOMATED COUNT: 14.2 % (ref 11.5–14.5)
GLUCOSE SERPL-MCNC: 87 MG/DL (ref 74–109)
HCT VFR BLD AUTO: 36.5 % (ref 42–52)
HGB BLD-MCNC: 12 G/DL (ref 14–18)
IMM GRANULOCYTES # BLD: 0 K/UL
LYMPHOCYTES # BLD: 1.7 K/UL (ref 1.1–4.5)
LYMPHOCYTES NFR BLD: 35.1 % (ref 20–40)
MCH RBC QN AUTO: 32.4 PG (ref 27–31)
MCHC RBC AUTO-ENTMCNC: 32.9 G/DL (ref 33–37)
MCV RBC AUTO: 98.6 FL (ref 80–94)
MONOCYTES # BLD: 0.5 K/UL (ref 0–0.9)
MONOCYTES NFR BLD: 10 % (ref 0–10)
NEUTROPHILS # BLD: 2.4 K/UL (ref 1.5–7.5)
NEUTS SEG NFR BLD: 51.5 % (ref 50–65)
PLATELET # BLD AUTO: 199 K/UL (ref 130–400)
PMV BLD AUTO: 10.2 FL (ref 9.4–12.4)
POTASSIUM SERPL-SCNC: 4.4 MMOL/L (ref 3.5–5)
RBC # BLD AUTO: 3.7 M/UL (ref 4.7–6.1)
SODIUM SERPL-SCNC: 137 MMOL/L (ref 136–145)
TROPONIN T SERPL-MCNC: <0.01 NG/ML (ref 0–0.03)
TROPONIN T SERPL-MCNC: <0.01 NG/ML (ref 0–0.03)
WBC # BLD AUTO: 4.7 K/UL (ref 4.8–10.8)

## 2023-08-17 PROCEDURE — 94760 N-INVAS EAR/PLS OXIMETRY 1: CPT

## 2023-08-17 PROCEDURE — 80048 BASIC METABOLIC PNL TOTAL CA: CPT

## 2023-08-17 PROCEDURE — 85025 COMPLETE CBC W/AUTO DIFF WBC: CPT

## 2023-08-17 PROCEDURE — 6370000000 HC RX 637 (ALT 250 FOR IP): Performed by: HOSPITALIST

## 2023-08-17 PROCEDURE — G0378 HOSPITAL OBSERVATION PER HR: HCPCS

## 2023-08-17 PROCEDURE — 94640 AIRWAY INHALATION TREATMENT: CPT

## 2023-08-17 PROCEDURE — 36415 COLL VENOUS BLD VENIPUNCTURE: CPT

## 2023-08-17 PROCEDURE — 2580000003 HC RX 258: Performed by: HOSPITALIST

## 2023-08-17 PROCEDURE — 84484 ASSAY OF TROPONIN QUANT: CPT

## 2023-08-17 PROCEDURE — 6360000002 HC RX W HCPCS: Performed by: HOSPITALIST

## 2023-08-17 PROCEDURE — 99222 1ST HOSP IP/OBS MODERATE 55: CPT | Performed by: INTERNAL MEDICINE

## 2023-08-17 RX ORDER — ATORVASTATIN CALCIUM 40 MG/1
40 TABLET, FILM COATED ORAL NIGHTLY
Qty: 30 TABLET | Refills: 3 | Status: SHIPPED | OUTPATIENT
Start: 2023-08-17

## 2023-08-17 RX ADMIN — IPRATROPIUM BROMIDE 0.5 MG: 0.5 SOLUTION RESPIRATORY (INHALATION) at 10:11

## 2023-08-17 RX ADMIN — MEMANTINE HYDROCHLORIDE 10 MG: 5 TABLET ORAL at 09:51

## 2023-08-17 RX ADMIN — ARFORMOTEROL TARTRATE 15 MCG: 15 SOLUTION RESPIRATORY (INHALATION) at 06:16

## 2023-08-17 RX ADMIN — SODIUM CHLORIDE, PRESERVATIVE FREE 10 ML: 5 INJECTION INTRAVENOUS at 09:51

## 2023-08-17 RX ADMIN — CYANOCOBALAMIN TAB 500 MCG 1000 MCG: 500 TAB at 09:51

## 2023-08-17 RX ADMIN — ASPIRIN 81 MG: 81 TABLET, COATED ORAL at 09:51

## 2023-08-17 RX ADMIN — RANOLAZINE 500 MG: 500 TABLET, EXTENDED RELEASE ORAL at 09:51

## 2023-08-17 RX ADMIN — TAMSULOSIN HYDROCHLORIDE 0.4 MG: 0.4 CAPSULE ORAL at 09:51

## 2023-08-17 RX ADMIN — BUDESONIDE INHALATION 500 MCG: 0.5 SUSPENSION RESPIRATORY (INHALATION) at 06:16

## 2023-08-17 RX ADMIN — TROSPIUM CHLORIDE 20 MG: 20 TABLET, FILM COATED ORAL at 06:15

## 2023-08-17 RX ADMIN — PANTOPRAZOLE SODIUM 40 MG: 40 TABLET, DELAYED RELEASE ORAL at 06:15

## 2023-08-17 RX ADMIN — IPRATROPIUM BROMIDE 0.5 MG: 0.5 SOLUTION RESPIRATORY (INHALATION) at 06:16

## 2023-08-17 ASSESSMENT — ENCOUNTER SYMPTOMS
NAUSEA: 0
RESPIRATORY NEGATIVE: 1
GASTROINTESTINAL NEGATIVE: 1
SHORTNESS OF BREATH: 0
VOMITING: 0
EYES NEGATIVE: 1
DIARRHEA: 0

## 2023-08-17 NOTE — DISCHARGE SUMMARY
Ingris Mai  :  1950  MRN:  [id number]    Admit date:  2023  Discharge date:  2023    Discharging Physician:  Dr. Sheila Aplpe Directive: Full Code    Consults: IP CONSULT TO CARDIOLOGY     Primary Care Physician:  Christel Menezes MD    Discharge Diagnoses:  Principal Problem:    Substernal chest pain relieved by nitroglycerin  Active Problems:    Essential hypertension  Resolved Problems:    * No resolved hospital problems. *      Portions of this note have been copied forward, however, changed to reflect the most current clinical status of this patient. Hospital Course: The patient is a 72-year-old male with past medical history of CAD, CHF, COPD, hypertension, lung nodules who presented to the ED with complaint of chest pain. Patient reported to being seen by PCP on day of admission and indicated increasing frequency of chest pain. Patient reports using nitroglycerin frequently at home. Patient reports having chest pain that is intermittent and occurs with and without activity. Patient reported associated shortness of breath at times. In the ED initial troponin and EKG negative for ischemia. Patient presents patient for further evaluation. Patient overnight remained negative. Patient has recent echocardiogram.  Cardiology indicates patient has history of cardiac catheterization and stress test in . Defect ischemia, EF 40%. Patient also reported that her chest pain was intermittent or severe and is baseline. Patient has been up walking halls without any worsening pain. EKG indicates no significant ST wave abnormalities. Cardiology indicates patient to be discharged and follow-up as outpatient. At this time cardiology does not feel any medication adjustments are needed. Patient is agreeable with this plan. At this time patient medically stable for discharge home.     Significant Diagnostic Studies:   XR CHEST PORTABLE    Result Date: 2023  EXAM:  FRONTAL VIEW daily     tamsulosin 0.4 MG capsule  Commonly known as: FLOMAX     tiotropium 18 MCG inhalation capsule  Commonly known as: Retia Greek     tiotropium-olodaterol 2.5-2.5 MCG/ACT Aers  Commonly known as: STIOLTO               Where to Get Your Medications        These medications were sent to [facility], [address] [place] [address] S-D - [address] [place] [address] [phone] [address] [place] [address], [address] [address]      Phone: [phone]   atorvastatin 40 MG tablet         Discharge Instructions: Follow up with Susannah Bella MD in 5-7 days. Cardiology in 1 month. Take medications as directed. Resume activity as tolerated. Diet: ADULT DIET; Regular     Disposition: Patient is Stableand will be discharged to Home. Time spent on discharge 31 minutes spent in assessing patient, reviewing medications, discussion with nursing, confirming safe discharge plan and preparation of discharge summary.     Signed:  MARGY Pina CNP, 8/17/2023 10:02 AM

## 2023-08-17 NOTE — PROGRESS NOTES
Discharge instructions reviewed with patient, verbalized understanding. IV and telemetry box removed.

## 2023-08-17 NOTE — ED PROVIDER NOTES
[facility]  eMERGENCY dEPARTMENT eNCOUnter      Pt Name: Yimi Portillo  MRN: [id number]  [address] [facility] [place] 1950  Date of evaluation: 8/16/2023  Provider: Everardo Sewell MD    CHIEF COMPLAINT       Chief Complaint   Patient presents with    Chest Pain         HISTORY OF PRESENT ILLNESS   (Location/Symptom, Timing/Onset,Context/Setting, Quality, Duration, Modifying Factors, Severity)  Note limiting factors. Yimi Portillo is a 72 y.o. male who presents to the emergency department with chest pain. Patient was sent to the emergency department by his [address] [place] [address] provider. Patient has been found to have an abnormal EKG combined with his history of increasing episodes of chest pain. Patient states \"I just push through it. If I convince myself it does not hurt I can ignore it. \"  Patient states that he has been having episodes of left chest pain that last for 5 to 15 minutes at a time. Patient states \"it feels like pressure is on it. \"  Last episode was on Monday after he did yard work. Patient came in and laid down because he was not feeling well. Patient states that exertion exacerbates or brings on pain. Rest can alleviate symptoms. Associated symptoms include shortness of breath, nausea, occasional palpitations and sweatiness. Patient's last stress test was at [facility] in September of last year. Patient had a catheterization in 2018. Patient has a total occlusion of the circumflex artery. Patient has a history of high cholesterol, hypertension, coronary artery disease, and tobacco use. Symptoms have been occurring \"more often lately. \"  Patient is not currently in pain. HPI    NursingNotes were reviewed. REVIEW OF SYSTEMS    (2-9 systems for level 4, 10 or more for level 5)     Review of Systems   Constitutional:  Positive for diaphoresis. Negative for chills and fever. HENT:  Negative for congestion and rhinorrhea. Respiratory:  Positive for shortness of breath. Negative for cough. noted below, none     Procedures    FINAL IMPRESSION      1.  Unstable angina pectoris [facility])          DISPOSITION/PLAN   DISPOSITION Admitted 08/16/2023 07:27:36 PM               (Please note that portions of this note were completed with a voice recognition program.  Efforts were made to edit thedictations but occasionally words are mis-transcribed.)    Serenity Olivares MD (electronically signed)  Attending Emergency Physician          Serenity Olivares MD  08/16/23 [id number]

## 2023-09-07 ENCOUNTER — OFFICE VISIT (OUTPATIENT)
Dept: NEUROLOGY | Age: 73
End: 2023-09-07
Payer: MEDICARE

## 2023-09-07 VITALS
WEIGHT: 209 LBS | HEIGHT: 75 IN | HEART RATE: 57 BPM | BODY MASS INDEX: 25.99 KG/M2 | SYSTOLIC BLOOD PRESSURE: 116 MMHG | DIASTOLIC BLOOD PRESSURE: 75 MMHG

## 2023-09-07 DIAGNOSIS — I67.858 OTHER HEREDITARY CEREBROVASCULAR DISEASE: ICD-10-CM

## 2023-09-07 DIAGNOSIS — G62.9 NEUROPATHY: ICD-10-CM

## 2023-09-07 DIAGNOSIS — H54.61 VISION LOSS OF RIGHT EYE: ICD-10-CM

## 2023-09-07 DIAGNOSIS — R41.3 MEMORY LOSS: Primary | ICD-10-CM

## 2023-09-07 DIAGNOSIS — G25.0 ESSENTIAL TREMOR: ICD-10-CM

## 2023-09-07 PROCEDURE — 3074F SYST BP LT 130 MM HG: CPT | Performed by: PSYCHIATRY & NEUROLOGY

## 2023-09-07 PROCEDURE — G8417 CALC BMI ABV UP PARAM F/U: HCPCS | Performed by: PSYCHIATRY & NEUROLOGY

## 2023-09-07 PROCEDURE — 1123F ACP DISCUSS/DSCN MKR DOCD: CPT | Performed by: PSYCHIATRY & NEUROLOGY

## 2023-09-07 PROCEDURE — G8427 DOCREV CUR MEDS BY ELIG CLIN: HCPCS | Performed by: PSYCHIATRY & NEUROLOGY

## 2023-09-07 PROCEDURE — 4004F PT TOBACCO SCREEN RCVD TLK: CPT | Performed by: PSYCHIATRY & NEUROLOGY

## 2023-09-07 PROCEDURE — 3017F COLORECTAL CA SCREEN DOC REV: CPT | Performed by: PSYCHIATRY & NEUROLOGY

## 2023-09-07 PROCEDURE — 3078F DIAST BP <80 MM HG: CPT | Performed by: PSYCHIATRY & NEUROLOGY

## 2023-09-07 PROCEDURE — 99214 OFFICE O/P EST MOD 30 MIN: CPT | Performed by: PSYCHIATRY & NEUROLOGY

## 2023-09-07 RX ORDER — POLYETHYLENE GLYCOL 3350
POWDER (GRAM) MISCELLANEOUS
COMMUNITY
Start: 2023-08-21

## 2023-09-07 RX ORDER — HYDROCODONE BITARTRATE AND ACETAMINOPHEN 10; 325 MG/1; MG/1
TABLET ORAL
COMMUNITY
Start: 2023-06-23

## 2023-09-07 RX ORDER — HYDROCORTISONE 25 MG/G
CREAM TOPICAL
COMMUNITY
Start: 2023-08-30

## 2023-09-07 RX ORDER — BISACODYL 5 MG/1
5 TABLET, DELAYED RELEASE ORAL DAILY PRN
COMMUNITY
Start: 2023-05-10

## 2023-09-07 RX ORDER — OXYBUTYNIN CHLORIDE 10 MG/1
TABLET, EXTENDED RELEASE ORAL
COMMUNITY
Start: 2023-08-30

## 2023-09-07 RX ORDER — DONEPEZIL HYDROCHLORIDE 10 MG/1
10 TABLET, FILM COATED ORAL NIGHTLY
Qty: 30 TABLET | Refills: 5 | Status: SHIPPED | OUTPATIENT
Start: 2023-09-07

## 2023-09-07 NOTE — PROGRESS NOTES
REVIEW OF SYSTEMS    Constitutional: []Fever []Sweat []Chills [] Recent Injury [x] Denies all unless marked  HEENT:[]Headache  [] Head Injury/Hearing Loss  [] Sore Throat  [] Ear Ache/Dizziness  [x] Denies all unless marked  Spine:  [] Neck pain  [] Back pain  [] Sciaticia  [x] Denies all unless marked  Cardiovascular:[]Heart Disease []Chest Pain [] Palpitations  [x] Denies all unless marked  Pulmonary: []Shortness of Breath []Cough   [x] Denies all unless marke  Gastrointestinal: []Nausea  []Vomiting  []Abdominal Pain  []Constipation  []Diarrhea  []Dark Bloody Stools  [x] Denies all unless marked  Psychiatric/Behavioral:[] Depression [] Anxiety [x] Denies all unless marked  Genitourinary:   [] Frequency  [] Urgency  [] Incontinence [] Pain with Urination  [x] Denies all unless marked  Extremities: []Pain  []Swelling  [x] Denies all unless marked  Musculoskeletal: [] Muscle Pain  [] Joint Pain  [] Arthritis [] Muscle Cramps [] Muscle Twitches  [x] Denies all unless marked  Sleep: [] Insomnia [] Snoring [] Restless Legs [] Sleep Apnea  [] Daytime Sleepiness  [x] Denies all unless marked  Skin:[] Rash [] Skin Discoloration [x] Denies all unless marked   Neurological: [x]Visual Disturbance/Memory Loss [] Loss of Balance [] Slurred Speech/Weakness [] Seizures  [] Vertigo/Dizziness [x] Denies all unless marked
scars, masses, or lesions over external nose or ears, no atrophy of tongue  Neck-symmetric, no masses noted, no jugular vein distension. No bruits noted. Respiration- chest wall appears symmetric, good expansion,   normal effort without use of accessory muscles  Cardiovascular- RRR  Musculoskeletal - no significant wasting of muscles noted, no bony deformities, gait no gross ataxia  Extremities-no clubbing, cyanosis or edema. Arthritic appearing hands  Skin - warm, dry, and intact. No rash, erythema, or pallor. Psychiatric - mood, affect, and behavior appear normal.      Neurological exam  Awake, alert, fluent oriented x 3 appropriate affect  Attention and concentration appear appropriate. Some trouble with inverted syntax commands. Trouble with name of the month. It takes him a while. Speech normal without dysarthria  No clear issues with language of fund of knowledge    Cranial Nerve Exam   CN II- Visual fields grossly unremarkable. VA adequate. CN III, IV,VI- PERRLA, EOMI, No nystagmus, conjugate eye movements, no ptosis  CN VII-no facial asymmetry  CN VIII-Hearing intact   CN IX and X- Palate elevates in midline  CN XI-good shoulder shrug  CN XII-Tongue midline with no fasciculations or fibrillations    Motor Exam  V/V throughout upper and lower extremities bilaterally, no cogwheeling, normal tone      Reflexes 1+ at the knees and otherwise absent    Tremors-mild postural tremor noted worse in the right. ?RT    Gait  Normal base and speed  No ataxia.  No Romberg sign    Coordination  Finger to nose and ALEXANDER-unremarkable    Lab Results   Component Value Date    [id number] 234 01/26/2023     Lab Results   Component Value Date    WBC 4.7 (L) 08/17/2023    HGB 12.0 (L) 08/17/2023    HCT 36.5 (L) 08/17/2023    MCV 98.6 (H) 08/17/2023     08/17/2023     Lab Results   Component Value Date     08/17/2023    K 4.4 08/17/2023     08/17/2023    CO2 22 08/17/2023    BUN 18 08/17/2023    CREATININE

## 2023-09-11 PROBLEM — R91.8 MULTIPLE LUNG NODULES: Status: ACTIVE | Noted: 2022-08-01

## 2023-09-12 ENCOUNTER — HOSPITAL ENCOUNTER (OUTPATIENT)
Dept: VASCULAR LAB | Age: 73
Discharge: HOME | End: 2023-09-12
Attending: PSYCHIATRY & NEUROLOGY
Payer: MEDICARE

## 2023-09-12 DIAGNOSIS — H54.61 VISION LOSS OF RIGHT EYE: ICD-10-CM

## 2023-09-12 DIAGNOSIS — I67.858 OTHER HEREDITARY CEREBROVASCULAR DISEASE: ICD-10-CM

## 2023-09-12 PROCEDURE — 93880 EXTRACRANIAL BILAT STUDY: CPT

## 2023-10-05 ENCOUNTER — OFFICE VISIT (OUTPATIENT)
Dept: CARDIOLOGY CLINIC | Age: 73
End: 2023-10-05

## 2023-10-05 VITALS
WEIGHT: 214 LBS | DIASTOLIC BLOOD PRESSURE: 76 MMHG | BODY MASS INDEX: 26.61 KG/M2 | HEART RATE: 50 BPM | SYSTOLIC BLOOD PRESSURE: 108 MMHG | HEIGHT: 75 IN

## 2023-10-05 DIAGNOSIS — I42.8 NONISCHEMIC CARDIOMYOPATHY (HCC): ICD-10-CM

## 2023-10-05 DIAGNOSIS — I10 ESSENTIAL HYPERTENSION: ICD-10-CM

## 2023-10-05 DIAGNOSIS — I50.22 CHRONIC SYSTOLIC (CONGESTIVE) HEART FAILURE (HCC): ICD-10-CM

## 2023-10-05 DIAGNOSIS — I47.20 VENTRICULAR TACHYCARDIA (HCC): ICD-10-CM

## 2023-10-05 DIAGNOSIS — R06.09 DYSPNEA ON EXERTION: ICD-10-CM

## 2023-10-05 DIAGNOSIS — I25.10 CORONARY ARTERY DISEASE INVOLVING NATIVE CORONARY ARTERY OF NATIVE HEART WITHOUT ANGINA PECTORIS: ICD-10-CM

## 2023-10-05 DIAGNOSIS — E78.2 MIXED HYPERLIPIDEMIA: ICD-10-CM

## 2023-10-05 DIAGNOSIS — E78.5 DYSLIPIDEMIA: ICD-10-CM

## 2023-10-05 DIAGNOSIS — I25.10 MILD CAD: ICD-10-CM

## 2023-10-05 DIAGNOSIS — R00.2 PALPITATION: Primary | ICD-10-CM

## 2023-10-05 DIAGNOSIS — I51.9 LEFT VENTRICULAR SYSTOLIC DYSFUNCTION (LVSD): ICD-10-CM

## 2023-10-05 ASSESSMENT — ENCOUNTER SYMPTOMS
GASTROINTESTINAL NEGATIVE: 1
SHORTNESS OF BREATH: 0
RESPIRATORY NEGATIVE: 1
NAUSEA: 0
EYES NEGATIVE: 1
DIARRHEA: 0
VOMITING: 0

## 2023-10-12 ENCOUNTER — OFFICE VISIT (OUTPATIENT)
Dept: NEUROLOGY | Age: 73
End: 2023-10-12
Payer: MEDICARE

## 2023-10-12 VITALS
DIASTOLIC BLOOD PRESSURE: 66 MMHG | HEART RATE: 50 BPM | WEIGHT: 214 LBS | BODY MASS INDEX: 26.61 KG/M2 | SYSTOLIC BLOOD PRESSURE: 108 MMHG | OXYGEN SATURATION: 99 % | HEIGHT: 75 IN

## 2023-10-12 DIAGNOSIS — G25.0 ESSENTIAL TREMOR: ICD-10-CM

## 2023-10-12 DIAGNOSIS — R41.3 MEMORY LOSS: Primary | ICD-10-CM

## 2023-10-12 DIAGNOSIS — G62.9 NEUROPATHY: ICD-10-CM

## 2023-10-12 PROCEDURE — G8427 DOCREV CUR MEDS BY ELIG CLIN: HCPCS | Performed by: PSYCHIATRY & NEUROLOGY

## 2023-10-12 PROCEDURE — 3017F COLORECTAL CA SCREEN DOC REV: CPT | Performed by: PSYCHIATRY & NEUROLOGY

## 2023-10-12 PROCEDURE — G8417 CALC BMI ABV UP PARAM F/U: HCPCS | Performed by: PSYCHIATRY & NEUROLOGY

## 2023-10-12 PROCEDURE — G8484 FLU IMMUNIZE NO ADMIN: HCPCS | Performed by: PSYCHIATRY & NEUROLOGY

## 2023-10-12 PROCEDURE — 3078F DIAST BP <80 MM HG: CPT | Performed by: PSYCHIATRY & NEUROLOGY

## 2023-10-12 PROCEDURE — 4004F PT TOBACCO SCREEN RCVD TLK: CPT | Performed by: PSYCHIATRY & NEUROLOGY

## 2023-10-12 PROCEDURE — 99214 OFFICE O/P EST MOD 30 MIN: CPT | Performed by: PSYCHIATRY & NEUROLOGY

## 2023-10-12 PROCEDURE — 1123F ACP DISCUSS/DSCN MKR DOCD: CPT | Performed by: PSYCHIATRY & NEUROLOGY

## 2023-10-12 PROCEDURE — 3074F SYST BP LT 130 MM HG: CPT | Performed by: PSYCHIATRY & NEUROLOGY

## 2023-10-12 RX ORDER — DONEPEZIL HYDROCHLORIDE 10 MG/1
TABLET, FILM COATED ORAL
Qty: 60 TABLET | Refills: 5 | Status: SHIPPED | OUTPATIENT
Start: 2023-10-12

## 2023-10-12 NOTE — PROGRESS NOTES
REVIEW OF SYSTEMS    Constitutional: []Fever []Sweats []Chills [] Recent Injury   [x] Denies all unless marked  HENT:[]Headache  [] Head Injury  [] Sore Throat  [] Ear Pain  [] Dizziness [] Hearing Loss   [x] Denies all unless marked  Musculoskeletal: [] Arthralgia  [] Myalgias [] Muscle cramps  [] Muscle twitches   [x] Denies all unless marked   Spine:  [] Neck pain  [] Back pain  [] Sciatica  [x] Denies all unless marked  Neurological:[] Visual Disturbance [] Double Vision [] Slurred Speech [] Trouble swallowing  [] Vertigo [] Tingling [] Numbness [] Weakness [] Loss of Balance   [] Loss of Consciousness [x] Memory Loss [] Seizures  [] Denies all unless marked  Psychiatric/Behavioral:[] Depression [] Anxiety  [x] Denies all unless marked  Sleep: []  Insomnia [] Sleep Disturbance [] Snoring [] Restless Legs [] Daytime Sleepiness [] Sleep Apnea  [x] Denies all unless marked
veins of bilateral lower extremities with pain 07/11/2017    Venous insufficiency of both lower extremities 07/11/2017    Swelling of both lower extremities 07/11/2017    Rectal bleeding 08/29/2017    Altered bowel function 08/29/2017    Unexplained weight loss 08/29/2017    Generalized abdominal pain 08/29/2017    Excessive gas 08/29/2017    History of adenomatous polyp of colon 08/29/2017    Anemia 08/29/2017    Internal hemorrhoids 09/25/2017    Atypical chest pain 05/29/2018    Smoker 09/11/2018    Cigarette nicotine dependence without complication 57/00/7678    Chest pain at rest 02/22/2022    Abnormal EKG 02/22/2022    Primary osteoarthritis of right hip 03/04/2022    Substernal chest pain relieved by nitroglycerin 08/16/2023     Resolved Ambulatory Problems     Diagnosis Date Noted    Preoperative cardiovascular examination 01/25/2013    Pain and swelling of right lower leg 07/11/2017     Past Medical History:   Diagnosis Date    Allergic rhinitis     Asthma     CAD (coronary artery disease) 04/11/2014    Chest tightness, discomfort, or pressure 06/17/2013    CHF (congestive heart failure) ([facility])     Chronic back pain     COPD (chronic obstructive pulmonary disease) ([address])     Family history of early CAD 06/24/2013    GERD (gastroesophageal reflux disease)     Heart attack ([address]) 09/2023    History of hernia repair     Hypertension     Multiple lung nodules     Neuropathy     Osteoarthritis     Peripheral vascular disease (HCC)     Restless legs syndrome     SOB (shortness of breath)     Thyroid nodule     Unspecified sleep apnea        Past Surgical History:   Procedure Laterality Date    APPENDECTOMY      BLADDER SURGERY      x 3    CARDIAC CATHETERIZATION  6/24/2013  JDT    EF 50%    CARDIAC CATHETERIZATION  10/27/15  JDT    EF 50%    CARDIAC CATHETERIZATION  05/2018    mild, non-obstructive CAD    COLONOSCOPY  20 yrs ago    not sure who, thinks @ [facility]    COLONOSCOPY  09/2014    TAx2, [facility], intern

## 2023-10-25 ENCOUNTER — HOSPITAL ENCOUNTER (OUTPATIENT)
Dept: MRI IMAGING | Age: 73
Discharge: HOME | End: 2023-10-25
Payer: MEDICARE

## 2023-10-25 DIAGNOSIS — R41.3 MEMORY LOSS: ICD-10-CM

## 2023-10-25 PROCEDURE — 70551 MRI BRAIN STEM W/O DYE: CPT

## 2023-12-11 ENCOUNTER — HOSPITAL ENCOUNTER (EMERGENCY)
Age: 73
Discharge: HOME | End: 2023-12-11
Attending: EMERGENCY MEDICINE
Payer: OTHER GOVERNMENT

## 2023-12-11 VITALS
DIASTOLIC BLOOD PRESSURE: 87 MMHG | RESPIRATION RATE: 18 BRPM | SYSTOLIC BLOOD PRESSURE: 130 MMHG | OXYGEN SATURATION: 100 % | TEMPERATURE: 97.6 F | HEART RATE: 55 BPM

## 2023-12-11 DIAGNOSIS — S61.411A LACERATION OF RIGHT HAND, FOREIGN BODY PRESENCE UNSPECIFIED, INITIAL ENCOUNTER: Primary | ICD-10-CM

## 2023-12-11 PROCEDURE — 2500000003 HC RX 250 WO HCPCS: Performed by: EMERGENCY MEDICINE

## 2023-12-11 PROCEDURE — 99283 EMERGENCY DEPT VISIT LOW MDM: CPT

## 2023-12-11 PROCEDURE — 12001 RPR S/N/AX/GEN/TRNK 2.5CM/<: CPT

## 2023-12-11 RX ORDER — LIDOCAINE HYDROCHLORIDE 10 MG/ML
5 INJECTION, SOLUTION EPIDURAL; INFILTRATION; INTRACAUDAL; PERINEURAL ONCE
Status: COMPLETED | OUTPATIENT
Start: 2023-12-11 | End: 2023-12-11

## 2023-12-11 RX ORDER — CEPHALEXIN 500 MG/1
500 CAPSULE ORAL 2 TIMES DAILY
Qty: 14 CAPSULE | Refills: 0 | Status: SHIPPED | OUTPATIENT
Start: 2023-12-11 | End: 2023-12-18

## 2023-12-11 RX ADMIN — LIDOCAINE HYDROCHLORIDE 5 ML: 10 INJECTION, SOLUTION EPIDURAL; INFILTRATION; INTRACAUDAL; PERINEURAL at 08:00

## 2023-12-11 ASSESSMENT — PAIN SCALES - GENERAL: PAINLEVEL_OUTOF10: 3

## 2023-12-11 NOTE — ED PROVIDER NOTES
[facility] EMERGENCY DEPT  eMERGENCY dEPARTMENT eNCOUnter      Pt Name: Savanna Mcclure  MRN: [id number]  [address] [facility] 1950  Date of evaluation: 12/11/2023  Provider: Rhianna Rogers MD    [address]       Chief Complaint   Patient presents with    Hand Laceration     On trash can right hand           HISTORY OF PRESENT ILLNESS   (Location/Symptom, Timing/Onset,Context/Setting, Quality, Duration, Modifying Factors, Severity)  Note limiting factors. Savanna Mcclure is a 72 y.o. male who presents to the emergency department due to left hand laceration. Patient said he put his hand in a trash can last night and when he did he thinks he cut his hand on an open dog food can. This was late last night. No other injuries. Has laceration over the dorsal aspect near his MCP of his right index finger. Has chronic deformity to his right index finger and numbness secondary to infection of the right hand 20 years ago. Holds index finger in semiflexed position. Very clear this is no new or different. No fevers chills or other constitutional symptoms. No redness warmth or swelling to the hand or wound. Tetanus is up-to-date. HPI    NursingNotes were reviewed. REVIEW OF SYSTEMS    (2-9 systems for level 4, 10 or more for level 5)     Review of Systems   Neurological:  Positive for weakness (R hand which is baseline/unchanged) and numbness (R hand which is baseline). A complete review of systems was performed and is negative except as noted above in the HPI.        PAST MEDICAL HISTORY     Past Medical History:   Diagnosis Date    Allergic rhinitis     Anemia     Asthma     CAD (coronary artery disease) 04/11/2014    Chest tightness, discomfort, or pressure 06/17/2013 6/17/2013  lexiscan Positive for inferior myocardial ischemia, EF 47%, 9% ischemic myocardium on stress, intermediate risk findings, AUC indication 16, AUC score 7     CHF (congestive heart failure) (HCC)     Chronic back pain     COPD

## 2024-01-12 ENCOUNTER — OFFICE VISIT (OUTPATIENT)
Dept: NEUROLOGY | Age: 74
End: 2024-01-12
Payer: MEDICARE

## 2024-01-12 VITALS
DIASTOLIC BLOOD PRESSURE: 67 MMHG | BODY MASS INDEX: 27.6 KG/M2 | WEIGHT: 222 LBS | SYSTOLIC BLOOD PRESSURE: 127 MMHG | HEIGHT: 75 IN | HEART RATE: 46 BPM

## 2024-01-12 DIAGNOSIS — R41.3 MEMORY LOSS: Primary | ICD-10-CM

## 2024-01-12 DIAGNOSIS — G62.9 NEUROPATHY: ICD-10-CM

## 2024-01-12 DIAGNOSIS — G25.0 ESSENTIAL TREMOR: ICD-10-CM

## 2024-01-12 PROCEDURE — 99213 OFFICE O/P EST LOW 20 MIN: CPT | Performed by: PSYCHIATRY & NEUROLOGY

## 2024-01-12 PROCEDURE — G8417 CALC BMI ABV UP PARAM F/U: HCPCS | Performed by: PSYCHIATRY & NEUROLOGY

## 2024-01-12 PROCEDURE — G8427 DOCREV CUR MEDS BY ELIG CLIN: HCPCS | Performed by: PSYCHIATRY & NEUROLOGY

## 2024-01-12 PROCEDURE — G8484 FLU IMMUNIZE NO ADMIN: HCPCS | Performed by: PSYCHIATRY & NEUROLOGY

## 2024-01-12 PROCEDURE — 1123F ACP DISCUSS/DSCN MKR DOCD: CPT | Performed by: PSYCHIATRY & NEUROLOGY

## 2024-01-12 PROCEDURE — 3078F DIAST BP <80 MM HG: CPT | Performed by: PSYCHIATRY & NEUROLOGY

## 2024-01-12 PROCEDURE — 4004F PT TOBACCO SCREEN RCVD TLK: CPT | Performed by: PSYCHIATRY & NEUROLOGY

## 2024-01-12 PROCEDURE — 3017F COLORECTAL CA SCREEN DOC REV: CPT | Performed by: PSYCHIATRY & NEUROLOGY

## 2024-01-12 PROCEDURE — 3074F SYST BP LT 130 MM HG: CPT | Performed by: PSYCHIATRY & NEUROLOGY

## 2024-01-12 RX ORDER — BUPROPION HYDROCHLORIDE 150 MG/1
150 TABLET ORAL DAILY
COMMUNITY
Start: 2023-10-03

## 2024-01-12 NOTE — PROGRESS NOTES
REVIEW OF SYSTEMS    Constitutional: []Fever []Sweat []Chills [] Recent Injury [x] Denies all unless marked  HEENT:[]Headache  [] Head Injury/Hearing Loss  [] Sore Throat  [] Ear Ache/Dizziness  [x] Denies all unless marked  Spine:  [] Neck pain  [] Back pain  [] Sciaticia  [x] Denies all unless marked  Cardiovascular:[]Heart Disease []Chest Pain [] Palpitations  [x] Denies all unless marked  Pulmonary: []Shortness of Breath []Cough   [x] Denies all unless marke  Gastrointestinal: []Nausea  []Vomiting  []Abdominal Pain  []Constipation  []Diarrhea  []Dark Bloody Stools  [x] Denies all unless marked  Psychiatric/Behavioral:[] Depression [] Anxiety [x] Denies all unless marked  Genitourinary:   [] Frequency  [] Urgency  [] Incontinence [] Pain with Urination  [x] Denies all unless marked  Extremities: []Pain  []Swelling  [x] Denies all unless marked  Musculoskeletal: [] Muscle Pain  [] Joint Pain  [] Arthritis [] Muscle Cramps [] Muscle Twitches  [x] Denies all unless marked  Sleep: [] Insomnia [] Snoring [] Restless Legs [] Sleep Apnea  [] Daytime Sleepiness  [x] Denies all unless marked  Skin:[] Rash [] Skin Discoloration [x] Denies all unless marked   Neurological: [x]Visual Disturbance/Memory Loss [] Loss of Balance [] Slurred Speech/Weakness [] Seizures  [] Vertigo/Dizziness [x] Denies all unless marked       
Date     08/17/2023    K 4.4 08/17/2023     08/17/2023    CO2 22 08/17/2023    BUN 18 08/17/2023    CREATININE 0.8 08/17/2023    GLUCOSE 87 08/17/2023    CALCIUM 8.5 (L) 08/17/2023    PROT 6.6 08/16/2023    LABALBU 4.1 08/16/2023    BILITOT 0.4 08/16/2023    ALKPHOS 61 08/16/2023    AST 21 08/16/2023    ALT 8 08/16/2023    LABGLOM >60 08/17/2023    GFRAA >59 03/05/2022    GLOB 2.3 09/29/2016           Assessment    ICD-10-CM    1. Memory loss  R41.3       2. Essential tremor  G25.0       3. Neuropathy  G62.9                   Probable beginnings of dementia .  No significant improvement after getting off several medications including Topamax and gabapentin.  He is on Namenda and Aricept 20 mg a day.  He will continue on those for now without change.  Essential tremor controlled.  Neuropathy stable.  Plan        Return in about 6 months (around 7/12/2024).    (Please note that portions of this note were completed with a voice recognition program. Efforts were made to edit the dictations but occasionally words are mis-transcribed.)

## 2024-01-17 ENCOUNTER — TELEPHONE (OUTPATIENT)
Dept: NEUROLOGY | Age: 74
End: 2024-01-17

## 2024-01-17 NOTE — TELEPHONE ENCOUNTER
Spoke with patient about what insurance he would like to file for his visit that he had with Dr. Zimmerman and patient stated he was fine with filing his Medicare at this time, and not [facility]. He stated it would be easier to file Medicare. I also asked the PSR and she said he wanted to file Medicare that day.

## 2024-01-22 ENCOUNTER — TELEPHONE (OUTPATIENT)
Dept: CARDIAC SURGERY | Facility: CLINIC | Age: 74
End: 2024-01-22
Payer: MEDICARE

## 2024-02-20 ENCOUNTER — OFFICE VISIT (OUTPATIENT)
Dept: CARDIAC SURGERY | Facility: CLINIC | Age: 74
End: 2024-02-20
Payer: OTHER GOVERNMENT

## 2024-02-20 ENCOUNTER — HOSPITAL ENCOUNTER (OUTPATIENT)
Dept: CT IMAGING | Facility: HOSPITAL | Age: 74
Discharge: HOME | End: 2024-02-20
Admitting: THORACIC SURGERY (CARDIOTHORACIC VASCULAR SURGERY)
Payer: OTHER GOVERNMENT

## 2024-02-20 VITALS
OXYGEN SATURATION: 98 % | HEART RATE: 56 BPM | WEIGHT: 230 LBS | BODY MASS INDEX: 28.6 KG/M2 | HEIGHT: 75 IN | DIASTOLIC BLOOD PRESSURE: 78 MMHG | SYSTOLIC BLOOD PRESSURE: 118 MMHG

## 2024-02-20 DIAGNOSIS — Z87.891 FORMER CIGARETTE SMOKER: ICD-10-CM

## 2024-02-20 DIAGNOSIS — R91.8 MULTIPLE LUNG NODULES: Primary | ICD-10-CM

## 2024-02-20 DIAGNOSIS — R91.1 LUNG NODULE: ICD-10-CM

## 2024-02-20 PROCEDURE — 71250 CT THORAX DX C-: CPT

## 2024-02-20 RX ORDER — CEPHALEXIN 500 MG/1
CAPSULE ORAL
COMMUNITY
Start: 2023-12-11

## 2024-02-20 RX ORDER — DICLOFENAC SODIUM 75 MG/1
TABLET, DELAYED RELEASE ORAL
COMMUNITY
Start: 2023-12-19

## 2024-02-20 RX ORDER — BUPROPION HYDROCHLORIDE 150 MG/1
1 TABLET ORAL DAILY
COMMUNITY
Start: 2023-10-03

## 2024-02-20 NOTE — PROGRESS NOTES
"  Chief Complaint   Patient presents with    Lung Nodule     Patient is here for follow up w/CT       Subjective     History of Present Illness    Mr. Kam is a 73-year-old male with a past medical history of coronary artery disease, hyperlipidemia, hypertension, BPH, degenerative disc disease that returns today in follow-up for multiple lung nodules.  He is overall doing well today.  He does report that he has had occasional chest pain and pain between his shoulder blades when leaning back.  He is currently pain-free.  He does see \"Dr. Waggoner\" with cardiology at the [facility].  He denies unintentional weight loss or hemoptysis.  Denies fever or chills.  He denies fatigue.  He does report shortness of breath but states it is at his baseline.  He reports that he quit smoking 2 months ago.      Review of Systems   Constitutional:  Negative for chills, fatigue, fever and unexpected weight change.   Respiratory:  Positive for shortness of breath (Occasional exertional).    Cardiovascular:  Positive for chest pain.   Gastrointestinal:  Negative for abdominal pain.   Endocrine: Negative for cold intolerance and heat intolerance.   Musculoskeletal:  Positive for back pain.   Skin:  Negative for color change, pallor and rash.          Past Medical History:   Diagnosis Date    Arthritis     COPD (chronic obstructive pulmonary disease)     Coronary artery disease     Early-onset Parkinson's disease     INCREASED WHITE MATTER    Enlarged prostate     Full dentures     GERD (gastroesophageal reflux disease)     Hearing loss     Heart abnormality     PARTIAL BLOCKAGE CLOSE TO \" MAKER\"    Hyperlipidemia     Hypertension     Incontinence of urine     Lung nodule     3 IN 1 LUNG AND 4 IN THE OTHER AND STATES IT IS SLOW GROWING    Migraine     Neuropathic pain     Sleep apnea     DOES NOT WEAR C PAP     Past Surgical History:   Procedure Laterality Date    APPENDECTOMY      CATARACT EXTRACTION      CYSTOSCOPY TRANSURETHRAL " "RESECTION OF PROSTATE N/A 2016    Procedure: CYSTOSCOPY TRANSURETHRAL RESECTION OF PROSTATE;  Surgeon: Brad Bal MD;  Location[facility];  Service:     HIP ARTHROPLASTY Right     SINUS SURGERY      THYROIDECTOMY       Family History   Problem Relation Age of Onset    No Known Problems Father     No Known Problems Mother      Social History     Tobacco Use    Smoking status: Former     Packs/day: 0.70     Years: 57.00     Additional pack years: 0.00     Total pack years: 39.90     Types: Cigarettes     Start date:      Quit date: 2023     Years since quittin.6     Passive exposure: Current    Smokeless tobacco: Never    Tobacco comments:     \"14 Cigarettes per day, but it's been a week since I last smoked\"   Substance Use Topics    Alcohol use: Not Currently     Comment: Quit drinking     Drug use: No     Current Outpatient Medications   Medication Sig Dispense Refill    albuterol sulfate  (90 Base) MCG/ACT inhaler Inhale 2 puffs Every 4 (Four) Hours As Needed for Shortness of Air.      amoxicillin-clavulanate (AUGMENTIN) 875-125 MG per tablet       aspirin 325 MG tablet Take 325 mg by mouth daily      atorvastatin (LIPITOR) 40 MG tablet Take 1 tablet by mouth daily      beclomethasone (QVAR) 80 MCG/ACT inhaler Inhale 1 puff into the lungs 2 times daily.      bisacodyl 5 MG EC tablet Take 1 tablet by mouth Daily As Needed.      buPROPion XL (WELLBUTRIN XL) 150 MG 24 hr tablet Take 1 tablet by mouth Daily.      cephalexin (KEFLEX) 500 MG capsule       diclofenac (VOLTAREN) 75 MG EC tablet       donepezil (ARICEPT) 5 MG tablet Take 1 tablet by mouth Every Night.      esomeprazole (nexIUM) 40 MG capsule Take 1 capsule by mouth As Needed.      fluticasone-salmeterol (ADVAIR) 500-50 MCG/DOSE DISKUS 1 puff 2 (Two) Times a Day As Needed.      furosemide (LASIX) 20 MG tablet Take 1 tablet by mouth Daily As Needed.      HYDROcodone-acetaminophen (NORCO) 5-325 MG per tablet Take 1 " tablet by mouth Every 6 (Six) Hours As Needed for moderate pain (4-6) (LELG AND FEET PAIN). 40 tablet 0    isosorbide mononitrate (IMDUR) 60 MG 24 hr tablet Take 1 tablet by mouth daily      lidocaine (LIDODERM) 5 % Place 1 patch on the skin as directed by provider Daily. Remove & Discard patch within 12 hours or as directed by MD      memantine (NAMENDA) 10 MG tablet Take 1 tablet by mouth Daily.      metoprolol succinate XL (TOPROL-XL) 50 MG 24 hr tablet Take 0.5 tablets by mouth Daily.      Mirabegron ER (Myrbetriq) 50 MG tablet sustained-release 24 hour 24 hr tablet Take 50 mg by mouth Daily. 90 tablet 3    nitroglycerin (NITROLINGUAL) 0.4 MG/SPRAY spray Place 1 spray under the tongue every 5 minutes as needed for Chest pain      nitroglycerin (NITROSTAT) 0.4 MG SL tablet nitroglycerin 0.4 mg sublingual tablet      oxybutynin XL (DITROPAN-XL) 5 MG 24 hr tablet Take 1 tablet by mouth Daily. 90 tablet 3    primidone (MYSOLINE) 50 MG tablet Take 1 tablet by mouth Every Night.      ranolazine (RANEXA) 500 MG 12 hr tablet Take 1 tablet by mouth 2 (Two) Times a Day.      tamsulosin (FLOMAX) 0.4 MG capsule 24 hr capsule Take 1 capsule by mouth Daily.      tiotropium (SPIRIVA) 18 MCG per inhalation capsule Place 1 capsule into inhaler and inhale.      tiotropium bromide-olodaterol (STIOLTO RESPIMAT) 2.5-2.5 MCG/ACT aerosol solution inhaler Inhale.      topiramate (TOPAMAX) 25 MG tablet       diphenhydrAMINE (BENADRYL) 50 MG tablet Take 1 tablet by mouth See Admin Instructions. (Patient not taking: Reported on 8/16/2023) 1 tablet 0    gabapentin (NEURONTIN) 300 MG capsule Take 600 mg by mouth 3 (Three) Times a Day. 2 capsules BID and 3 capsules at bedtime (Patient not taking: Reported on 8/16/2023)      predniSONE (DELTASONE) 50 MG tablet Take 1 tablet by mouth See Admin Instructions for 3 doses. (Patient not taking: Reported on 8/16/2023) 3 tablet 0     No current facility-administered medications for this visit.  "    Allergies:  Bactrim [sulfamethoxazole-trimethoprim], Betadine [povidone iodine], Iodinated contrast media, Nsaids, Succinylcholine, and Sulfa antibiotics    Objective      Vital Signs  Visit Vitals  /78 (BP Location: Right arm, Patient Position: Sitting, Cuff Size: Adult)   Pulse 56   Ht 190 cm (74.8\")   Wt 104 kg (230 lb)   SpO2 98%   BMI 28.90 kg/m²         Physical Exam  Constitutional:       General: He is not in acute distress.  HENT:      Head: Normocephalic and atraumatic.   Eyes:      Pupils: Pupils are equal, round, and reactive to light.   Cardiovascular:      Rate and Rhythm: Normal rate and regular rhythm.      Heart sounds: No murmur heard.  Pulmonary:      Effort: Pulmonary effort is normal.      Breath sounds: Normal breath sounds. No wheezing, rhonchi or rales.   Abdominal:      General: There is no distension.      Palpations: Abdomen is soft.      Tenderness: There is no abdominal tenderness.   Musculoskeletal:         General: Normal range of motion.      Cervical back: Normal range of motion.      Right lower leg: No edema.      Left lower leg: No edema.   Skin:     General: Skin is warm and dry.   Neurological:      General: No focal deficit present.      Mental Status: He is alert and oriented to person, place, and time.   Psychiatric:         Mood and Affect: Mood normal.         Behavior: Behavior normal.       Results Review:   CT Chest Without Contrast Diagnostic    Result Date: 2/20/2024  Narrative: EXAM: CT CHEST WO CONTRAST DIAGNOSTIC- 2/20/2024 9:25 AM  HISTORY: Lung Nodule; R91.1-Solitary pulmonary nodule   DOSE LENGTH PRODUCT: 440.54 mGy.cm mGy cm. Automated exposure control was also utilized to decrease patient radiation dose.  COMPARISON: 8/9/2023  TECHNIQUE: Serial helical tomographic images of the chest were acquired without intravenous contrast. Multiplanar reformatted images were provided for review.  FINDINGS:  Airways/Lungs/Pleura: There is a single new medial right " lower lobe nodule. The remaining noncalcified pulmonary nodules are stable. Representative examples below.  *  Stable 9 mm left upper lobe nodule (image 50). *  Slightly decreased thickness of a 6 mm subpleural left upper lobe nodule (image 48). This probably represents scarring. *  Stable 4 mm left lower lobe nodule (image 111). *  Stable 5 mm left lower lobe nodule (image 102). *  Stable 3 mm medial right lower lobe nodule (image 95). *  New 3 mm medial right lower lobe nodule (image 93)  Central airways are clear. Mild to moderate emphysematous changes are again noted.  Lower Neck: Status post right hemithyroidectomy. Tiny subcentimeter nodule in the posterior left thyroid lobe.  Mediastinum/Hilum: No enlarged lymphadenopathy. Prior healed granulomatous disease.  Heart/Vasculature: Cardiac size is normal. No pericardial effusion. Mild aortic valvular calcifications. Moderate coronary artery calcifications and/or stents. Mild to moderate aortic atherosclerosis. Main pulmonary artery is normal in size.  Chest wall/Soft Tissues: Unremarkable.  Upper Abdomen: No acute or suspicious findings.  Osseous Structures: No acute or suspicious osseous finding.      Impression:  Single new 3 mm right lower lobe nodule. Remaining pulmonary nodules are stable measuring up to 9 mm.    This report was signed and finalized on 2/20/2024 11:05 AM by Joce Pastor.        Images reviewed with Dr. Aceves.  Multiple stable lung nodules measuring up to 9 mm.  There is 1 new 3 mm right lower lobe nodule.    Assessment & Plan       Diagnoses and all orders for this visit:    1. Multiple lung nodules (Primary)  -     CT Chest Without Contrast; Future    2. Former cigarette smoker        Overall Mr. Kam is doing well today.  He returns for a 6-month follow-up on multiple lung nodules.  CT imaging today shows several stable lung nodules measuring up to 9 mm in size and one new 3 mm left lower lobe nodule.  He denies unintentional weight  "loss, persistent cough, hemoptysis, or new or worsening shortness of breath.  He does report shortness of breath with exertion which he has at baseline.  He does report occasional chest pain and pain in between his shoulder blades when leaning back.  He currently denies any pain.  I have encouraged him to contact his PCP or \"Dr. Waggoner\" [facility] for [facility] for his pain symptoms.    We discussed the differential diagnoses possible given a lung nodule.    We discussed options for care including continued surveillance verses efforts towards diagnosis and treatment.  We discussed options for diagnosis including bronchoscopy, CT-guided needle biopsy, or surgical biopsy with either cervical mediastinoscopy or Thoracoscopy with resection.  Surveillance is probably the best option at this time.  I will have him return in 12 months with Dr. Aceves with CT chest without contrast.    We discussed signs and symptoms suggesting malignancy including but not limited to hoarseness of voice, hemoptysis, persistent cough, unintended weight loss, chest pain, or persistent pneumonia.  The potential of a delayed diagnosis and adverse results were discussed.  Collaboratively a decision has been made that reflects the patient's risk factors, the data available and patient's considerations.  All questions have been answered to the best of my ability and he is agreeable to the aforementioned plan.  I encouraged him to call the office with any questions or concerns should they arise prior to his next office visit.      JATINDER Burdick        "

## 2024-04-08 ENCOUNTER — OFFICE VISIT (OUTPATIENT)
Dept: CARDIOLOGY CLINIC | Age: 74
End: 2024-04-08
Payer: OTHER GOVERNMENT

## 2024-04-08 VITALS
DIASTOLIC BLOOD PRESSURE: 68 MMHG | HEIGHT: 75 IN | HEART RATE: 52 BPM | BODY MASS INDEX: 27.35 KG/M2 | SYSTOLIC BLOOD PRESSURE: 126 MMHG | WEIGHT: 220 LBS

## 2024-04-08 DIAGNOSIS — R06.09 DYSPNEA ON EXERTION: ICD-10-CM

## 2024-04-08 DIAGNOSIS — G89.29 CHRONIC CHEST PAIN: ICD-10-CM

## 2024-04-08 DIAGNOSIS — I50.22 CHRONIC SYSTOLIC (CONGESTIVE) HEART FAILURE (HCC): ICD-10-CM

## 2024-04-08 DIAGNOSIS — E78.5 DYSLIPIDEMIA: ICD-10-CM

## 2024-04-08 DIAGNOSIS — I10 ESSENTIAL HYPERTENSION: ICD-10-CM

## 2024-04-08 DIAGNOSIS — E78.2 MIXED HYPERLIPIDEMIA: ICD-10-CM

## 2024-04-08 DIAGNOSIS — I42.8 NONISCHEMIC CARDIOMYOPATHY (HCC): ICD-10-CM

## 2024-04-08 DIAGNOSIS — I25.10 MILD CAD: ICD-10-CM

## 2024-04-08 DIAGNOSIS — I25.10 CORONARY ARTERY DISEASE INVOLVING NATIVE CORONARY ARTERY OF NATIVE HEART WITHOUT ANGINA PECTORIS: Primary | ICD-10-CM

## 2024-04-08 DIAGNOSIS — R07.9 CHRONIC CHEST PAIN: ICD-10-CM

## 2024-04-08 DIAGNOSIS — I47.20 VENTRICULAR TACHYCARDIA (HCC): ICD-10-CM

## 2024-04-08 PROCEDURE — 99213 OFFICE O/P EST LOW 20 MIN: CPT | Performed by: INTERNAL MEDICINE

## 2024-04-08 PROCEDURE — 1123F ACP DISCUSS/DSCN MKR DOCD: CPT | Performed by: INTERNAL MEDICINE

## 2024-04-08 PROCEDURE — 3078F DIAST BP <80 MM HG: CPT | Performed by: INTERNAL MEDICINE

## 2024-04-08 PROCEDURE — 3074F SYST BP LT 130 MM HG: CPT | Performed by: INTERNAL MEDICINE

## 2024-04-08 ASSESSMENT — ENCOUNTER SYMPTOMS
EYES NEGATIVE: 1
NAUSEA: 0
GASTROINTESTINAL NEGATIVE: 1
RESPIRATORY NEGATIVE: 1
VOMITING: 0
SHORTNESS OF BREATH: 0
DIARRHEA: 0

## 2024-04-08 NOTE — PROGRESS NOTES
[facility] Progress Note                            Date:  4/8/2024  Patient: Evans Castrejon  Age:  73 y.o., 1950      Reason for evaluation:         SUBJECTIVE:    Returns today follow-up assessment coronary artery disease hyperlipidemia hypertension chronic congestive heart failure shortness of breath overall doing well all conditions at goal.  Dyspnea about the same denies anginal chest pain palpitations claudication or other complaints.  Blood pressure 126/68 heart 52.  Tolerating all medications well.  Lipid profile from 8/16/2023 was 103.    Review of Systems   Constitutional: Negative.  Negative for chills, fever and unexpected weight change.   HENT: Negative.     Eyes: Negative.    Respiratory: Negative.  Negative for shortness of breath.    Cardiovascular: Negative.  Negative for chest pain.   Gastrointestinal: Negative.  Negative for diarrhea, nausea and vomiting.   Endocrine: Negative.    Genitourinary: Negative.    Musculoskeletal: Negative.    Skin: Negative.    Neurological: Negative.    All other systems reviewed and are negative.        OBJECTIVE:     /68   Pulse 52   Ht 1.905 m (6' 3\")   Wt 99.8 kg (220 lb)   BMI 27.50 kg/m²     Labs:   CBC: No results for input(s): \"WBC\", \"HGB\", \"HCT\", \"PLT\" in the last 72 hours.  BMP:No results for input(s): \"NA\", \"K\", \"CO2\", \"BUN\", \"CREATININE\", \"LABGLOM\", \"GLUCOSE\" in the last 72 hours.  BNP: No results for input(s): \"BNP\" in the last 72 hours.  PT/INR: No results for input(s): \"PROTIME\", \"INR\" in the last 72 hours.  APTT:No results for input(s): \"APTT\" in the last 72 hours.  CARDIAC ENZYMES:No results for input(s): \"CKTOTAL\", \"CKMB\", \"CKMBINDEX\", \"TROPONINI\" in the last 72 hours.  FASTING LIPID PANEL:  Lab Results   Component Value Date/Time    HDL 49 08/16/2023 05:41 PM    LDLDIRECT 113 10/26/2015 06:31 AM    LDLCALC 103 08/16/2023 05:41 PM    TRIG 127 08/16/2023 05:41 PM     LIVER PROFILE:No results for input(s): \"AST\",

## 2024-05-02 ENCOUNTER — OFFICE VISIT (OUTPATIENT)
Dept: GASTROENTEROLOGY | Age: 74
End: 2024-05-02
Payer: OTHER GOVERNMENT

## 2024-05-02 VITALS
OXYGEN SATURATION: 97 % | WEIGHT: 222 LBS | HEART RATE: 84 BPM | SYSTOLIC BLOOD PRESSURE: 130 MMHG | DIASTOLIC BLOOD PRESSURE: 82 MMHG | BODY MASS INDEX: 27.6 KG/M2 | HEIGHT: 75 IN

## 2024-05-02 DIAGNOSIS — K21.9 GASTROESOPHAGEAL REFLUX DISEASE, UNSPECIFIED WHETHER ESOPHAGITIS PRESENT: Primary | ICD-10-CM

## 2024-05-02 DIAGNOSIS — K59.00 CONSTIPATION, UNSPECIFIED CONSTIPATION TYPE: ICD-10-CM

## 2024-05-02 PROCEDURE — 1123F ACP DISCUSS/DSCN MKR DOCD: CPT | Performed by: NURSE PRACTITIONER

## 2024-05-02 PROCEDURE — 3079F DIAST BP 80-89 MM HG: CPT | Performed by: NURSE PRACTITIONER

## 2024-05-02 PROCEDURE — 99213 OFFICE O/P EST LOW 20 MIN: CPT | Performed by: NURSE PRACTITIONER

## 2024-05-02 PROCEDURE — 3075F SYST BP GE 130 - 139MM HG: CPT | Performed by: NURSE PRACTITIONER

## 2024-05-02 RX ORDER — ESOMEPRAZOLE MAGNESIUM 40 MG/1
40 CAPSULE, DELAYED RELEASE ORAL DAILY
Qty: 30 CAPSULE | Refills: 11 | Status: SHIPPED | OUTPATIENT
Start: 2024-05-02

## 2024-05-02 ASSESSMENT — ENCOUNTER SYMPTOMS
ABDOMINAL PAIN: 0
CHOKING: 0
COUGH: 0
ABDOMINAL DISTENTION: 0
CONSTIPATION: 0
BLOOD IN STOOL: 0
RECTAL PAIN: 0
ANAL BLEEDING: 0
NAUSEA: 0
DIARRHEA: 0
TROUBLE SWALLOWING: 0
SHORTNESS OF BREATH: 0
VOMITING: 0

## 2024-05-02 NOTE — PROGRESS NOTES
donepezil (ARICEPT) 10 MG tablet Take 2 pills each am with food 60 tablet 5    bisacodyl (DULCOLAX) 5 MG EC tablet Take 1 tablet by mouth daily as needed      HYDROcodone-acetaminophen (NORCO)  MG per tablet       Polyethylene Glycol 3350 POWD MIX AND DRINK 1 CAPFUL BY MOUTH ONCE A DAY TO PREVENT CONSTIPATION. (MEASURE WITH CAP AND MIX IN 8 OZ OF WATER)      oxybutynin (DITROPAN-XL) 10 MG extended release tablet       atorvastatin (LIPITOR) 40 MG tablet Take 1 tablet by mouth nightly 30 tablet 3    furosemide (LASIX) 20 MG tablet Take 1 tablet by mouth as needed (if wt gain of 3-5 lbs in 24 hours) 90 tablet 1    tiotropium-olodaterol (STIOLTO) 2.5-2.5 MCG/ACT AERS INHALE 2 PUFFS BY ORAL INHALATION ONCE A DAY ADMINISTER AT SAME TIME EACH DAY      memantine (NAMENDA) 10 MG tablet 1 twice daily 60 tablet 5    diphenhydrAMINE (BENADRYL) 50 MG capsule Take 1 capsule by mouth at bedtime      mirabegron (MYRBETRIQ) 50 MG TB24 Take 50 mg by mouth daily      isosorbide mononitrate (IMDUR) 60 MG extended release tablet Take 1 tablet by mouth nightly 90 tablet 3    nitroGLYCERIN (NITROSTAT) 0.4 MG SL tablet Place 1 tablet under the tongue every 5 minutes as needed for Chest pain up to max of 3 total doses. If no relief after 1 dose, call 911. 25 tablet 2    primidone (MYSOLINE) 50 MG tablet Take 1 tablet by mouth nightly 1/2 tablet at bedtime for tremors      tamsulosin (FLOMAX) 0.4 MG capsule Take 1 capsule by mouth daily      metoprolol succinate (TOPROL XL) 25 MG extended release tablet TAKE ONE TABLET BY MOUTH 2 TIMES A DAY (Patient taking differently: Take 1 tablet by mouth in the morning and at bedtime) 180 tablet 3    aspirin 325 MG tablet Take 1 tablet by mouth daily      albuterol sulfate HFA (PROVENTIL;VENTOLIN;PROAIR) 108 (90 Base) MCG/ACT inhaler Inhale 2 puffs into the lungs every 4 hours as needed       No current facility-administered medications for this visit.       Allergies   Allergen Reactions

## 2024-07-12 ENCOUNTER — OFFICE VISIT (OUTPATIENT)
Dept: NEUROLOGY | Age: 74
End: 2024-07-12
Payer: MEDICARE

## 2024-07-12 VITALS
HEART RATE: 53 BPM | WEIGHT: 213 LBS | BODY MASS INDEX: 26.49 KG/M2 | HEIGHT: 75 IN | DIASTOLIC BLOOD PRESSURE: 60 MMHG | SYSTOLIC BLOOD PRESSURE: 94 MMHG

## 2024-07-12 DIAGNOSIS — G25.0 ESSENTIAL TREMOR: ICD-10-CM

## 2024-07-12 DIAGNOSIS — R41.3 MEMORY LOSS: Primary | ICD-10-CM

## 2024-07-12 DIAGNOSIS — G62.9 NEUROPATHY: ICD-10-CM

## 2024-07-12 PROCEDURE — 4004F PT TOBACCO SCREEN RCVD TLK: CPT | Performed by: PSYCHIATRY & NEUROLOGY

## 2024-07-12 PROCEDURE — 3078F DIAST BP <80 MM HG: CPT | Performed by: PSYCHIATRY & NEUROLOGY

## 2024-07-12 PROCEDURE — 1123F ACP DISCUSS/DSCN MKR DOCD: CPT | Performed by: PSYCHIATRY & NEUROLOGY

## 2024-07-12 PROCEDURE — G8417 CALC BMI ABV UP PARAM F/U: HCPCS | Performed by: PSYCHIATRY & NEUROLOGY

## 2024-07-12 PROCEDURE — 3074F SYST BP LT 130 MM HG: CPT | Performed by: PSYCHIATRY & NEUROLOGY

## 2024-07-12 PROCEDURE — 3017F COLORECTAL CA SCREEN DOC REV: CPT | Performed by: PSYCHIATRY & NEUROLOGY

## 2024-07-12 PROCEDURE — G8427 DOCREV CUR MEDS BY ELIG CLIN: HCPCS | Performed by: PSYCHIATRY & NEUROLOGY

## 2024-07-12 PROCEDURE — 99213 OFFICE O/P EST LOW 20 MIN: CPT | Performed by: PSYCHIATRY & NEUROLOGY

## 2024-07-12 RX ORDER — PRIMIDONE 50 MG/1
50 TABLET ORAL NIGHTLY
Qty: 90 TABLET | Refills: 3 | Status: SHIPPED | OUTPATIENT
Start: 2024-07-12

## 2024-07-12 NOTE — PROGRESS NOTES
REVIEW OF SYSTEMS    Constitutional: []Fever []Sweat []Chills [] Recent Injury [x] Denies all unless marked  HEENT:[]Headache  [] Head Injury/Hearing Loss  [] Sore Throat  [] Ear Ache/Dizziness  [x] Denies all unless marked  Spine:  [] Neck pain  [] Back pain  [] Sciaticia  [x] Denies all unless marked  Cardiovascular:[]Heart Disease []Chest Pain [] Palpitations  [x] Denies all unless marked  Pulmonary: []Shortness of Breath []Cough   [x] Denies all unless marke  Gastrointestinal: []Nausea  []Vomiting  []Abdominal Pain  []Constipation  []Diarrhea  []Dark Bloody Stools  [x] Denies all unless marked  Psychiatric/Behavioral:[] Depression [] Anxiety [x] Denies all unless marked  Genitourinary:   [] Frequency  [] Urgency  [] Incontinence [] Pain with Urination  [x] Denies all unless marked  Extremities: []Pain  []Swelling  [x] Denies all unless marked  Musculoskeletal: [] Muscle Pain  [] Joint Pain  [] Arthritis [] Muscle Cramps [] Muscle Twitches  [x] Denies all unless marked  Sleep: [] Insomnia [] Snoring [] Restless Legs [] Sleep Apnea  [] Daytime Sleepiness  [x] Denies all unless marked  Skin:[] Rash [] Skin Discoloration [x] Denies all unless marked   Neurological: [x]Visual Disturbance/Memory Loss [] Loss of Balance [] Slurred Speech/Weakness [] Seizures  [] Vertigo/Dizziness [x] Denies all unless marked       
DAY ADMINISTER AT SAME TIME EACH DAY      memantine (NAMENDA) 10 MG tablet 1 twice daily 60 tablet 5    diphenhydrAMINE (BENADRYL) 50 MG capsule Take 1 capsule by mouth at bedtime      mirabegron (MYRBETRIQ) 50 MG TB24 Take 50 mg by mouth daily      isosorbide mononitrate (IMDUR) 60 MG extended release tablet Take 1 tablet by mouth nightly 90 tablet 3    nitroGLYCERIN (NITROSTAT) 0.4 MG SL tablet Place 1 tablet under the tongue every 5 minutes as needed for Chest pain up to max of 3 total doses. If no relief after 1 dose, call 911. 25 tablet 2    tamsulosin (FLOMAX) 0.4 MG capsule Take 1 capsule by mouth daily      metoprolol succinate (TOPROL XL) 25 MG extended release tablet TAKE ONE TABLET BY MOUTH 2 TIMES A DAY (Patient taking differently: Take 1 tablet by mouth in the morning and at bedtime) 180 tablet 3    aspirin 325 MG tablet Take 1 tablet by mouth daily      albuterol sulfate HFA (PROVENTIL;VENTOLIN;PROAIR) 108 (90 Base) MCG/ACT inhaler Inhale 2 puffs into the lungs every 4 hours as needed         BP 94/60   Pulse 53   Ht 1.905 m (6' 3\")   Wt 96.6 kg (213 lb)   BMI 26.62 kg/m²       Constitutional - well developed, well nourished.    Eyes - conjunctiva normal.  Pupils react to light  Ear, nose, throat -hearing intact to finger rub No scars, masses, or lesions over external nose or ears, no atrophy of tongue  Neck-symmetric, no masses noted, no jugular vein distension.  No bruits noted.  Respiration- chest wall appears symmetric, good expansion,   normal effort without use of accessory muscles  Cardiovascular- RRR  Musculoskeletal - no significant wasting of muscles noted, no bony deformities, gait no gross ataxia  Extremities-no clubbing, cyanosis or edema.  Arthritic appearing hands  Skin - warm, dry, and intact.  No rash, erythema, or pallor.  Psychiatric - mood, affect, and behavior appear normal.      Neurological exam  Awake, alert, fluent oriented x 3 appropriate affect  Attention and

## 2024-10-10 ENCOUNTER — OFFICE VISIT (OUTPATIENT)
Dept: CARDIOLOGY CLINIC | Age: 74
End: 2024-10-10

## 2024-10-10 ENCOUNTER — TELEPHONE (OUTPATIENT)
Dept: CARDIOLOGY CLINIC | Age: 74
End: 2024-10-10

## 2024-10-10 VITALS
WEIGHT: 218 LBS | BODY MASS INDEX: 27.1 KG/M2 | HEIGHT: 75 IN | HEART RATE: 65 BPM | DIASTOLIC BLOOD PRESSURE: 76 MMHG | SYSTOLIC BLOOD PRESSURE: 124 MMHG

## 2024-10-10 DIAGNOSIS — I47.20 VENTRICULAR TACHYCARDIA (HCC): ICD-10-CM

## 2024-10-10 DIAGNOSIS — I10 ESSENTIAL HYPERTENSION: ICD-10-CM

## 2024-10-10 DIAGNOSIS — I50.22 CHRONIC SYSTOLIC (CONGESTIVE) HEART FAILURE (HCC): ICD-10-CM

## 2024-10-10 DIAGNOSIS — I25.10 CORONARY ARTERY DISEASE INVOLVING NATIVE CORONARY ARTERY OF NATIVE HEART WITHOUT ANGINA PECTORIS: ICD-10-CM

## 2024-10-10 DIAGNOSIS — Z01.818 PRE-OP TESTING: ICD-10-CM

## 2024-10-10 DIAGNOSIS — R06.09 DYSPNEA ON EXERTION: Primary | ICD-10-CM

## 2024-10-10 DIAGNOSIS — R06.09 DOE (DYSPNEA ON EXERTION): ICD-10-CM

## 2024-10-10 DIAGNOSIS — E78.2 MIXED HYPERLIPIDEMIA: ICD-10-CM

## 2024-10-10 DIAGNOSIS — R07.9 CHEST PAIN AT REST: ICD-10-CM

## 2024-10-10 DIAGNOSIS — G89.29 CHRONIC CHEST PAIN: ICD-10-CM

## 2024-10-10 DIAGNOSIS — I42.8 NONISCHEMIC CARDIOMYOPATHY (HCC): ICD-10-CM

## 2024-10-10 DIAGNOSIS — R07.9 CHRONIC CHEST PAIN: ICD-10-CM

## 2024-10-10 DIAGNOSIS — R07.89 CHEST DISCOMFORT: ICD-10-CM

## 2024-10-10 LAB
ANION GAP SERPL CALCULATED.3IONS-SCNC: 9 MMOL/L (ref 7–19)
BUN SERPL-MCNC: 14 MG/DL (ref 8–23)
CALCIUM SERPL-MCNC: 9 MG/DL (ref 8.8–10.2)
CHLORIDE SERPL-SCNC: 103 MMOL/L (ref 98–111)
CO2 SERPL-SCNC: 27 MMOL/L (ref 22–29)
CREAT SERPL-MCNC: 1 MG/DL (ref 0.7–1.2)
ERYTHROCYTE [DISTWIDTH] IN BLOOD BY AUTOMATED COUNT: 13.1 % (ref 11.5–14.5)
GLUCOSE SERPL-MCNC: 94 MG/DL (ref 70–99)
HCT VFR BLD AUTO: 43.1 % (ref 42–52)
HGB BLD-MCNC: 14.1 G/DL (ref 14–18)
MCH RBC QN AUTO: 32.6 PG (ref 27–31)
MCHC RBC AUTO-ENTMCNC: 32.7 G/DL (ref 33–37)
MCV RBC AUTO: 99.8 FL (ref 80–94)
PLATELET # BLD AUTO: 225 K/UL (ref 130–400)
PMV BLD AUTO: 11 FL (ref 9.4–12.4)
POTASSIUM SERPL-SCNC: 4.3 MMOL/L (ref 3.5–5)
RBC # BLD AUTO: 4.32 M/UL (ref 4.7–6.1)
SODIUM SERPL-SCNC: 139 MMOL/L (ref 136–145)
WBC # BLD AUTO: 5.5 K/UL (ref 4.8–10.8)

## 2024-10-10 RX ORDER — PREDNISONE 20 MG/1
TABLET ORAL
Qty: 4 TABLET | Refills: 0 | Status: SHIPPED | OUTPATIENT
Start: 2024-10-10

## 2024-10-10 RX ORDER — NITROGLYCERIN 0.4 MG/1
0.4 TABLET SUBLINGUAL EVERY 5 MIN PRN
Qty: 25 TABLET | Refills: 2 | Status: SHIPPED | OUTPATIENT
Start: 2024-10-10

## 2024-10-10 ASSESSMENT — ENCOUNTER SYMPTOMS
RESPIRATORY NEGATIVE: 1
DIARRHEA: 0
VOMITING: 0
SHORTNESS OF BREATH: 0
NAUSEA: 0
EYES NEGATIVE: 1
GASTROINTESTINAL NEGATIVE: 1

## 2024-10-10 NOTE — PROGRESS NOTES
[facility]                            Date:  10/10/2024  Patient: Evans Castrejon  Age:  74 y.o., 1950      Reason for evaluation:         SUBJECTIVE:    Returns today follow-up assessment coronary artery disease hypertension cardiomyopathy hyperlipidemia dyspnea on exertion chronic congestive heart failure chronic recurrent chest pain.  He reports getting progressively more out of breath in recent weeks and months he quit smoking about 2 to 3 weeks ago.  He also has intermittent discomfort in his chest and recalls waking up with a severe episode of chest pain lasting 30 to 40 minutes several weeks ago.  Ultimately he took several nitroglycerin and this eventually relieved his pain.  ECG today sinus rhythm left anterior fascicular block otherwise unremarkable.    Review of Systems   Constitutional: Negative.  Negative for chills, fever and unexpected weight change.   HENT: Negative.     Eyes: Negative.    Respiratory: Negative.  Negative for shortness of breath.    Cardiovascular: Negative.  Negative for chest pain.   Gastrointestinal: Negative.  Negative for diarrhea, nausea and vomiting.   Endocrine: Negative.    Genitourinary: Negative.    Musculoskeletal: Negative.    Skin: Negative.    Neurological: Negative.    All other systems reviewed and are negative.        OBJECTIVE:    /76   Pulse 65   Ht 1.905 m (6' 3\")   Wt 98.9 kg (218 lb)   BMI 27.25 kg/m²     Labs:   CBC: No results for input(s): \"WBC\", \"HGB\", \"HCT\", \"PLT\" in the last 72 hours.  BMP:No results for input(s): \"NA\", \"K\", \"CO2\", \"BUN\", \"CREATININE\", \"LABGLOM\", \"GLUCOSE\" in the last 72 hours.  BNP: No results for input(s): \"BNP\" in the last 72 hours.  PT/INR: No results for input(s): \"PROTIME\", \"INR\" in the last 72 hours.  APTT:No results for input(s): \"APTT\" in the last 72 hours.  CARDIAC ENZYMES:No results for input(s): \"CKTOTAL\", \"CKMB\", \"CKMBINDEX\", \"TROPONINI\" in the last 72 hours.  FASTING LIPID

## 2024-10-10 NOTE — TELEPHONE ENCOUNTER
[place] at the [facility] ([facility]3] [facility] [place] located on the first floor of [facility].   Enter through hospital main entrance and turn immediately to your left.    Spoke with: Evans Castrejon in clinic    Date: Tuesday, October 29, 2024. Advised patient will receive a phone call from cath lab day prior to procedure with arrival time.      Pre-operative work-up:  CBC, BMP    Allergies:  Bactrim [sulfamethoxazole-trimethoprim], Iodinated contrast media, Nsaids, Succinylcholine, Sulfa antibiotics, and Betadine [povidone iodine]   Contact number:  [phone] (home)     Cardiac Catheterization Instructions   Do not eat or drink anything after midnight on the night before your procedure.  You can take your morning medications with a sip of water unless otherwise directed not to.    Bring a list of the names and dosages of all the medications you are taking.    You should arrange to have someone take you home rather than drive yourself.  Further plan will depend upon the result of the cardiac catheterization.      If for any reason you are unable to keep this appointment, please contact [facility], [phone], as soon as possible to reschedule.  -------------------------------------------------------------------------------------------------------------------  Cardiac Catheterization   (Coronary Angiography; Coronary Arteriography; Coronary Angiogram)   Definition:  Cardiac catheterization is a test that uses a catheter (tube) and x-ray machine to assess the heart and its blood supply.     Reasons for Procedure   It is used to find the cause of symptoms, like chest pain, that could mean heart problems.   Cardiac catheterization helps doctors to:   Identify narrowed or clogged arteries of the heart   Measure blood pressure within the heart   Evaluate how well the heart valves and chambers function   Check heart defects   Evaluate an enlarged heart   Decide on an appropriate treatment

## 2024-10-29 ENCOUNTER — HOSPITAL ENCOUNTER (OUTPATIENT)
Age: 74
Setting detail: OUTPATIENT SURGERY
Discharge: HOME | End: 2024-10-29
Attending: INTERNAL MEDICINE | Admitting: INTERNAL MEDICINE
Payer: OTHER GOVERNMENT

## 2024-10-29 VITALS
SYSTOLIC BLOOD PRESSURE: 119 MMHG | BODY MASS INDEX: 28.23 KG/M2 | HEIGHT: 74 IN | TEMPERATURE: 96.9 F | RESPIRATION RATE: 15 BRPM | DIASTOLIC BLOOD PRESSURE: 61 MMHG | OXYGEN SATURATION: 100 % | WEIGHT: 220 LBS | HEART RATE: 51 BPM

## 2024-10-29 DIAGNOSIS — R07.89 CHEST DISCOMFORT: ICD-10-CM

## 2024-10-29 DIAGNOSIS — R06.09 DOE (DYSPNEA ON EXERTION): ICD-10-CM

## 2024-10-29 LAB — ECHO BSA: 2.28 M2

## 2024-10-29 PROCEDURE — C1894 INTRO/SHEATH, NON-LASER: HCPCS | Performed by: INTERNAL MEDICINE

## 2024-10-29 PROCEDURE — 7100000010 HC PHASE II RECOVERY - FIRST 15 MIN: Performed by: INTERNAL MEDICINE

## 2024-10-29 PROCEDURE — C1769 GUIDE WIRE: HCPCS | Performed by: INTERNAL MEDICINE

## 2024-10-29 PROCEDURE — 2500000003 HC RX 250 WO HCPCS: Performed by: INTERNAL MEDICINE

## 2024-10-29 PROCEDURE — 2580000003 HC RX 258: Performed by: INTERNAL MEDICINE

## 2024-10-29 PROCEDURE — 7100000011 HC PHASE II RECOVERY - ADDTL 15 MIN: Performed by: INTERNAL MEDICINE

## 2024-10-29 PROCEDURE — 6360000002 HC RX W HCPCS: Performed by: INTERNAL MEDICINE

## 2024-10-29 PROCEDURE — 6360000004 HC RX CONTRAST MEDICATION: Performed by: INTERNAL MEDICINE

## 2024-10-29 PROCEDURE — 93458 L HRT ARTERY/VENTRICLE ANGIO: CPT | Performed by: INTERNAL MEDICINE

## 2024-10-29 PROCEDURE — 99152 MOD SED SAME PHYS/QHP 5/>YRS: CPT | Performed by: INTERNAL MEDICINE

## 2024-10-29 PROCEDURE — 2709999900 HC NON-CHARGEABLE SUPPLY: Performed by: INTERNAL MEDICINE

## 2024-10-29 RX ORDER — HEPARIN SODIUM 1000 [USP'U]/ML
INJECTION, SOLUTION INTRAVENOUS; SUBCUTANEOUS PRN
Status: DISCONTINUED | OUTPATIENT
Start: 2024-10-29 | End: 2024-10-29 | Stop reason: HOSPADM

## 2024-10-29 RX ORDER — SODIUM CHLORIDE 9 MG/ML
INJECTION, SOLUTION INTRAVENOUS CONTINUOUS
Status: DISCONTINUED | OUTPATIENT
Start: 2024-10-29 | End: 2024-10-29 | Stop reason: HOSPADM

## 2024-10-29 RX ORDER — ONDANSETRON 2 MG/ML
4 INJECTION INTRAMUSCULAR; INTRAVENOUS EVERY 6 HOURS PRN
Status: DISCONTINUED | OUTPATIENT
Start: 2024-10-29 | End: 2024-10-29 | Stop reason: HOSPADM

## 2024-10-29 RX ORDER — VERAPAMIL HYDROCHLORIDE 2.5 MG/ML
INJECTION, SOLUTION INTRAVENOUS PRN
Status: DISCONTINUED | OUTPATIENT
Start: 2024-10-29 | End: 2024-10-29 | Stop reason: HOSPADM

## 2024-10-29 RX ORDER — SODIUM CHLORIDE 9 MG/ML
INJECTION, SOLUTION INTRAVENOUS PRN
Status: DISCONTINUED | OUTPATIENT
Start: 2024-10-29 | End: 2024-10-29 | Stop reason: HOSPADM

## 2024-10-29 RX ORDER — SODIUM CHLORIDE 0.9 % (FLUSH) 0.9 %
5-40 SYRINGE (ML) INJECTION EVERY 12 HOURS SCHEDULED
Status: DISCONTINUED | OUTPATIENT
Start: 2024-10-29 | End: 2024-10-29 | Stop reason: HOSPADM

## 2024-10-29 RX ORDER — NITROGLYCERIN 20 MG/100ML
INJECTION INTRAVENOUS PRN
Status: DISCONTINUED | OUTPATIENT
Start: 2024-10-29 | End: 2024-10-29 | Stop reason: HOSPADM

## 2024-10-29 RX ORDER — SODIUM CHLORIDE 0.9 % (FLUSH) 0.9 %
5-40 SYRINGE (ML) INJECTION PRN
Status: DISCONTINUED | OUTPATIENT
Start: 2024-10-29 | End: 2024-10-29 | Stop reason: HOSPADM

## 2024-10-29 RX ORDER — FENTANYL CITRATE 50 UG/ML
INJECTION, SOLUTION INTRAMUSCULAR; INTRAVENOUS PRN
Status: DISCONTINUED | OUTPATIENT
Start: 2024-10-29 | End: 2024-10-29 | Stop reason: HOSPADM

## 2024-10-29 RX ORDER — IOPAMIDOL 612 MG/ML
INJECTION, SOLUTION INTRAVASCULAR PRN
Status: DISCONTINUED | OUTPATIENT
Start: 2024-10-29 | End: 2024-10-29 | Stop reason: HOSPADM

## 2024-10-29 RX ORDER — MIDAZOLAM HYDROCHLORIDE 1 MG/ML
INJECTION, SOLUTION INTRAMUSCULAR; INTRAVENOUS PRN
Status: DISCONTINUED | OUTPATIENT
Start: 2024-10-29 | End: 2024-10-29 | Stop reason: HOSPADM

## 2024-10-29 RX ORDER — ACETAMINOPHEN 325 MG/1
650 TABLET ORAL EVERY 4 HOURS PRN
Status: DISCONTINUED | OUTPATIENT
Start: 2024-10-29 | End: 2024-10-29 | Stop reason: HOSPADM

## 2024-10-29 RX ORDER — SODIUM CHLORIDE 9 MG/ML
INJECTION, SOLUTION INTRAVENOUS CONTINUOUS
Status: DISCONTINUED | OUTPATIENT
Start: 2024-10-29 | End: 2024-10-29

## 2024-10-29 RX ADMIN — SODIUM CHLORIDE: 9 INJECTION, SOLUTION INTRAVENOUS at 07:18

## 2024-10-29 NOTE — DISCHARGE INSTRUCTIONS
PATIENT INSTRUCTIONS- POST RADIAL CARDIAC CATH/ANGIOPLASTY      Do not subject hand/arm to any forceful movements for 24 hours (i.e. Supporting weight) when rising from a chair or bed.       For 2 days following discharge:  Do Not  Operate a motor vehicle, tractor, lawnmower, motorcycle or all-terrain vehicle.  Do Not  Lift anything heavier than 1 pound with affected arm.  Avoid  Excessive (extension/flexion) wrist movement.      Avoid heavy lifting with affected arm for 3 days following discharge.      Do Not engage in vigorous exercise (i.e. Tennis, ect.) using affected arm for 5 days following discharge.     If bleeding should occur while in the hospital, apply firm pressure to the site an call your nurse.     If bleeding should occur following discharge:  Sit down and apply firm pressure to site with your fingers x 10 mins.  If the bleeding stops, continue to sit quietly, keeping your wrist straight for 2 hours. Notify your physician as soon as possible.  If bleeding DOES NOT STOP after 10 mins or if there is a large amount of bleeding or spurting , CALL 911 immediately. DO NOT DRIVE YOURSELF TO THE HOSPITAL.     Remove bandage 24 hours following application and replace with a band aid.     You may shower on the day following your procedure. Do not take a tub bath for 3 days following discharge. Do not submerge arm in dishwater or other water sources for 5 days.

## 2024-10-29 NOTE — PROGRESS NOTES
Patient out of bed with RN at bedside and ambulated to bathroom and back. Patient tolerated activity without difficulty. Pt then redressing with wife assist.

## 2024-10-29 NOTE — PROGRESS NOTES
Cardiac catheterization note pulmonary findings right radial artery access tolerated well  Right coronary artery 30% proximal narrowing and minimal luminal regularities elsewhere medical management recommended

## 2024-10-29 NOTE — H&P
Office Visit  10/10/2024  [facility]       Angel Haji MD  Interventional Cardiology Dyspnea on exertion +8 more  Dx 6 Month Follow-Up  Reason for Visit     Progress Notes  Angel Haji MD (Physician)  Interventional Cardiology  Expand All Collapse All  [facility] Progress Note                              Date:                10/10/2024  Patient:            Evans Castrejon  Age:                 74 y.o., 1950        Reason for evaluation:            SUBJECTIVE:    Returns today follow-up assessment coronary artery disease hypertension cardiomyopathy hyperlipidemia dyspnea on exertion chronic congestive heart failure chronic recurrent chest pain.  He reports getting progressively more out of breath in recent weeks and months he quit smoking about 2 to 3 weeks ago.  He also has intermittent discomfort in his chest and recalls waking up with a severe episode of chest pain lasting 30 to 40 minutes several weeks ago.  Ultimately he took several nitroglycerin and this eventually relieved his pain.  ECG today sinus rhythm left anterior fascicular block otherwise unremarkable.     Review of Systems   Constitutional: Negative.  Negative for chills, fever and unexpected weight change.   HENT: Negative.     Eyes: Negative.    Respiratory: Negative.  Negative for shortness of breath.    Cardiovascular: Negative.  Negative for chest pain.   Gastrointestinal: Negative.  Negative for diarrhea, nausea and vomiting.   Endocrine: Negative.    Genitourinary: Negative.    Musculoskeletal: Negative.    Skin: Negative.    Neurological: Negative.    All other systems reviewed and are negative.           OBJECTIVE:    /76   Pulse 65   Ht 1.905 m (6' 3\")   Wt 98.9 kg (218 lb)   BMI 27.25 kg/m²      Labs:   CBC: No results for input(s): \"WBC\", \"HGB\", \"HCT\", \"PLT\" in the last 72 hours.  BMP:No results for input(s): \"NA\", \"K\", \"CO2\", \"BUN\", \"CREATININE\", \"LABGLOM\",

## 2024-11-12 ENCOUNTER — OFFICE VISIT (OUTPATIENT)
Age: 74
End: 2024-11-12

## 2024-11-12 VITALS — HEIGHT: 74 IN | BODY MASS INDEX: 29.08 KG/M2 | WEIGHT: 226.6 LBS

## 2024-11-12 DIAGNOSIS — M25.562 CHRONIC PAIN OF BOTH KNEES: ICD-10-CM

## 2024-11-12 DIAGNOSIS — M25.561 CHRONIC PAIN OF BOTH KNEES: ICD-10-CM

## 2024-11-12 DIAGNOSIS — G89.29 CHRONIC PAIN OF BOTH KNEES: ICD-10-CM

## 2024-11-12 DIAGNOSIS — M17.0 PRIMARY OSTEOARTHRITIS OF BOTH KNEES: Primary | ICD-10-CM

## 2024-11-12 RX ORDER — BETAMETHASONE SODIUM PHOSPHATE AND BETAMETHASONE ACETATE 3; 3 MG/ML; MG/ML
12 INJECTION, SUSPENSION INTRA-ARTICULAR; INTRALESIONAL; INTRAMUSCULAR; SOFT TISSUE ONCE
Status: COMPLETED | OUTPATIENT
Start: 2024-11-12 | End: 2024-11-12

## 2024-11-12 RX ORDER — LIDOCAINE HYDROCHLORIDE 10 MG/ML
2.5 INJECTION, SOLUTION INFILTRATION; PERINEURAL ONCE
Status: COMPLETED | OUTPATIENT
Start: 2024-11-12 | End: 2024-11-12

## 2024-11-12 RX ORDER — BUPIVACAINE HYDROCHLORIDE 5 MG/ML
30 INJECTION, SOLUTION EPIDURAL; INTRACAUDAL ONCE
Status: COMPLETED | OUTPATIENT
Start: 2024-11-12 | End: 2024-11-12

## 2024-11-12 RX ADMIN — LIDOCAINE HYDROCHLORIDE 2.5 ML: 10 INJECTION, SOLUTION INFILTRATION; PERINEURAL at 09:45

## 2024-11-12 RX ADMIN — BUPIVACAINE HYDROCHLORIDE 150 MG: 5 INJECTION, SOLUTION EPIDURAL; INTRACAUDAL at 09:43

## 2024-11-12 RX ADMIN — BETAMETHASONE SODIUM PHOSPHATE AND BETAMETHASONE ACETATE 12 MG: 3; 3 INJECTION, SUSPENSION INTRA-ARTICULAR; INTRALESIONAL; INTRAMUSCULAR; SOFT TISSUE at 09:42

## 2024-11-12 RX ADMIN — BETAMETHASONE SODIUM PHOSPHATE AND BETAMETHASONE ACETATE 12 MG: 3; 3 INJECTION, SUSPENSION INTRA-ARTICULAR; INTRALESIONAL; INTRAMUSCULAR; SOFT TISSUE at 09:40

## 2024-11-12 RX ADMIN — BUPIVACAINE HYDROCHLORIDE 150 MG: 5 INJECTION, SOLUTION EPIDURAL; INTRACAUDAL at 09:44

## 2024-11-12 NOTE — PROGRESS NOTES
Orthopaedic Clinic Note - Established Patient    NAME:  Evans Castrejon   : 1950  MRN: [id number]      2024      CHIEF COMPLAINT:  follow up knee pain, repeat injection      HISTORY OF PRESENT ILLNESS:   Patient is a 74 y.o. male who returns today for follow up of bilateral knee pain, requesting repeat injection. It has been at least 3 months since last injection was completed. Patient denies any recent trauma, fall, or other other injury. Pain is rated 7/10 today.  They would like to continue with conservative care with repeat injection today.    Past Medical History:        Diagnosis Date    Allergic rhinitis     Anemia     Asthma     CAD (coronary artery disease) 2014    Chest tightness, discomfort, or pressure 2013  lexiscan Positive for inferior myocardial ischemia, EF 47%, 9% ischemic myocardium on stress, intermediate risk findings, AUC indication 16, AUC score 7     CHF (congestive heart failure) ([facility])     Chronic back pain     COPD (chronic obstructive pulmonary disease) ([facility])     Ex-cigarette smoker 10/27/2015    Family history of early CAD 2013    Family history of premature CAD     GERD (gastroesophageal reflux disease)     Heart attack ([facility]) 2023    History of hernia repair     Hyperlipidemia     [facility]    Hypertension     Multiple lung nodules     Neuropathy     Nicotine abuse     Nicotine abuse     Osteoarthritis     Peripheral vascular disease ([facility])     Restless legs syndrome     SOB (shortness of breath)     Thyroid nodule     Unspecified sleep apnea     can't wear cpap       Past Surgical History:        Procedure Laterality Date    APPENDECTOMY      BLADDER SURGERY      x 3    CARDIAC CATHETERIZATION  2013  JDT    EF 50%    CARDIAC CATHETERIZATION  10/27/15  JDT    EF 50%    CARDIAC CATHETERIZATION  2018    mild, non-obstructive CAD    CARDIAC PROCEDURE N/A 10/29/2024    Left heart cath / coronary angiography performed by

## 2024-11-26 ENCOUNTER — HOSPITAL ENCOUNTER (OUTPATIENT)
Dept: NUCLEAR MEDICINE | Age: 74
Discharge: HOME | End: 2024-11-28
Payer: OTHER GOVERNMENT

## 2024-11-26 DIAGNOSIS — R91.1 SOLITARY PULMONARY NODULE: ICD-10-CM

## 2024-11-26 LAB
GLUCOSE BLD-MCNC: 80 MG/DL (ref 70–99)
PERFORMED ON: NORMAL

## 2024-11-26 PROCEDURE — 78815 PET IMAGE W/CT SKULL-THIGH: CPT

## 2024-11-26 PROCEDURE — A9609 HC RX DIAGNOSTIC RADIOPHARMACEUTICAL: HCPCS | Performed by: PHYSICIAN ASSISTANT

## 2024-11-26 PROCEDURE — 3430000000 HC RX DIAGNOSTIC RADIOPHARMACEUTICAL: Performed by: PHYSICIAN ASSISTANT

## 2024-11-26 PROCEDURE — 82962 GLUCOSE BLOOD TEST: CPT

## 2024-11-26 RX ORDER — FLUDEOXYGLUCOSE F 18 200 MCI/ML
15 INJECTION, SOLUTION INTRAVENOUS ONCE
Status: COMPLETED | OUTPATIENT
Start: 2024-11-26 | End: 2024-11-26

## 2024-11-26 RX ADMIN — FLUDEOXYGLUCOSE F 18 15 MILLICURIE: 200 INJECTION, SOLUTION INTRAVENOUS at 10:05

## 2024-12-05 ENCOUNTER — PREP FOR PROCEDURE (OUTPATIENT)
Dept: CARDIOLOGY CLINIC | Age: 74
End: 2024-12-05

## 2024-12-05 ENCOUNTER — OFFICE VISIT (OUTPATIENT)
Dept: CARDIOTHORACIC SURGERY | Age: 74
End: 2024-12-05
Payer: OTHER GOVERNMENT

## 2024-12-05 VITALS
HEART RATE: 60 BPM | OXYGEN SATURATION: 94 % | WEIGHT: 216 LBS | BODY MASS INDEX: 27.72 KG/M2 | HEIGHT: 74 IN | SYSTOLIC BLOOD PRESSURE: 100 MMHG | DIASTOLIC BLOOD PRESSURE: 72 MMHG

## 2024-12-05 DIAGNOSIS — R91.8 LUNG MASS: ICD-10-CM

## 2024-12-05 DIAGNOSIS — I25.10 CORONARY ARTERY DISEASE INVOLVING NATIVE CORONARY ARTERY OF NATIVE HEART WITHOUT ANGINA PECTORIS: Primary | ICD-10-CM

## 2024-12-05 PROCEDURE — 99205 OFFICE O/P NEW HI 60 MIN: CPT | Performed by: SURGERY

## 2024-12-05 PROCEDURE — 3078F DIAST BP <80 MM HG: CPT | Performed by: SURGERY

## 2024-12-05 PROCEDURE — 1123F ACP DISCUSS/DSCN MKR DOCD: CPT | Performed by: SURGERY

## 2024-12-05 PROCEDURE — 3074F SYST BP LT 130 MM HG: CPT | Performed by: SURGERY

## 2024-12-10 ENCOUNTER — HOSPITAL ENCOUNTER (OUTPATIENT)
Dept: GENERAL RADIOLOGY | Age: 74
Discharge: HOME | End: 2024-12-10
Payer: OTHER GOVERNMENT

## 2024-12-10 PROCEDURE — 71046 X-RAY EXAM CHEST 2 VIEWS: CPT

## 2024-12-12 ENCOUNTER — ANESTHESIA EVENT (OUTPATIENT)
Dept: OPERATING ROOM | Age: 74
End: 2024-12-12
Payer: OTHER GOVERNMENT

## 2024-12-13 ENCOUNTER — HOSPITAL ENCOUNTER (INPATIENT)
Age: 74
LOS: 10 days | Discharge: HOME | DRG: 164 | End: 2024-12-23
Attending: SURGERY | Admitting: SURGERY
Payer: OTHER GOVERNMENT

## 2024-12-13 ENCOUNTER — APPOINTMENT (OUTPATIENT)
Dept: GENERAL RADIOLOGY | Age: 74
DRG: 164 | End: 2024-12-13
Attending: SURGERY
Payer: OTHER GOVERNMENT

## 2024-12-13 ENCOUNTER — ANESTHESIA (OUTPATIENT)
Dept: OPERATING ROOM | Age: 74
End: 2024-12-13
Payer: OTHER GOVERNMENT

## 2024-12-13 DIAGNOSIS — R91.8 LUNG MASS: ICD-10-CM

## 2024-12-13 DIAGNOSIS — R91.8 RIGHT LOWER LOBE LUNG MASS: Primary | ICD-10-CM

## 2024-12-13 LAB
ABO/RH: NORMAL
ANTIBODY SCREEN: NORMAL
GLUCOSE BLD-MCNC: 127 MG/DL (ref 70–99)
PERFORMED ON: ABNORMAL

## 2024-12-13 PROCEDURE — 6360000002 HC RX W HCPCS: Performed by: SURGERY

## 2024-12-13 PROCEDURE — C1729 CATH, DRAINAGE: HCPCS | Performed by: SURGERY

## 2024-12-13 PROCEDURE — 6370000000 HC RX 637 (ALT 250 FOR IP): Performed by: ANESTHESIOLOGY

## 2024-12-13 PROCEDURE — 3700000001 HC ADD 15 MINUTES (ANESTHESIA): Performed by: SURGERY

## 2024-12-13 PROCEDURE — 32668 THORACOSCOPY W/W RESECT DIAG: CPT | Performed by: SURGERY

## 2024-12-13 PROCEDURE — 2500000003 HC RX 250 WO HCPCS: Performed by: SURGERY

## 2024-12-13 PROCEDURE — 2700000000 HC OXYGEN THERAPY PER DAY

## 2024-12-13 PROCEDURE — 3700000000 HC ANESTHESIA ATTENDED CARE: Performed by: SURGERY

## 2024-12-13 PROCEDURE — 3600000009 HC SURGERY ROBOT BASE: Performed by: SURGERY

## 2024-12-13 PROCEDURE — 82962 GLUCOSE BLOOD TEST: CPT

## 2024-12-13 PROCEDURE — 6360000002 HC RX W HCPCS: Performed by: ANESTHESIOLOGY

## 2024-12-13 PROCEDURE — 86901 BLOOD TYPING SEROLOGIC RH(D): CPT

## 2024-12-13 PROCEDURE — C9290 INJ, BUPIVACAINE LIPOSOME: HCPCS | Performed by: SURGERY

## 2024-12-13 PROCEDURE — 07B74ZX EXCISION OF THORAX LYMPHATIC, PERCUTANEOUS ENDOSCOPIC APPROACH, DIAGNOSTIC: ICD-10-PCS | Performed by: SURGERY

## 2024-12-13 PROCEDURE — 71045 X-RAY EXAM CHEST 1 VIEW: CPT

## 2024-12-13 PROCEDURE — 2500000003 HC RX 250 WO HCPCS

## 2024-12-13 PROCEDURE — 2580000003 HC RX 258: Performed by: ANESTHESIOLOGY

## 2024-12-13 PROCEDURE — 36415 COLL VENOUS BLD VENIPUNCTURE: CPT

## 2024-12-13 PROCEDURE — 2720000010 HC SURG SUPPLY STERILE: Performed by: SURGERY

## 2024-12-13 PROCEDURE — 2000000000 HC ICU R&B

## 2024-12-13 PROCEDURE — 86900 BLOOD TYPING SEROLOGIC ABO: CPT

## 2024-12-13 PROCEDURE — 6370000000 HC RX 637 (ALT 250 FOR IP): Performed by: SURGERY

## 2024-12-13 PROCEDURE — 32663 THORACOSCOPY W/LOBECTOMY: CPT | Performed by: SURGERY

## 2024-12-13 PROCEDURE — 88311 DECALCIFY TISSUE: CPT

## 2024-12-13 PROCEDURE — 86850 RBC ANTIBODY SCREEN: CPT

## 2024-12-13 PROCEDURE — 2709999900 HC NON-CHARGEABLE SUPPLY: Performed by: SURGERY

## 2024-12-13 PROCEDURE — 6360000002 HC RX W HCPCS

## 2024-12-13 PROCEDURE — 94150 VITAL CAPACITY TEST: CPT

## 2024-12-13 PROCEDURE — 3600000019 HC SURGERY ROBOT ADDTL 15MIN: Performed by: SURGERY

## 2024-12-13 PROCEDURE — 32674 THORACOSCOPY LYMPH NODE EXC: CPT | Performed by: SURGERY

## 2024-12-13 PROCEDURE — C1713 ANCHOR/SCREW BN/BN,TIS/BN: HCPCS | Performed by: SURGERY

## 2024-12-13 PROCEDURE — 94640 AIRWAY INHALATION TREATMENT: CPT

## 2024-12-13 PROCEDURE — 88305 TISSUE EXAM BY PATHOLOGIST: CPT

## 2024-12-13 PROCEDURE — 7100000000 HC PACU RECOVERY - FIRST 15 MIN: Performed by: SURGERY

## 2024-12-13 PROCEDURE — 8E0W4CZ ROBOTIC ASSISTED PROCEDURE OF TRUNK REGION, PERCUTANEOUS ENDOSCOPIC APPROACH: ICD-10-PCS | Performed by: SURGERY

## 2024-12-13 PROCEDURE — 99024 POSTOP FOLLOW-UP VISIT: CPT | Performed by: SURGERY

## 2024-12-13 PROCEDURE — 7100000001 HC PACU RECOVERY - ADDTL 15 MIN: Performed by: SURGERY

## 2024-12-13 PROCEDURE — S2900 ROBOTIC SURGICAL SYSTEM: HCPCS | Performed by: SURGERY

## 2024-12-13 PROCEDURE — 2580000003 HC RX 258: Performed by: SURGERY

## 2024-12-13 PROCEDURE — 0BTF4ZZ RESECTION OF RIGHT LOWER LUNG LOBE, PERCUTANEOUS ENDOSCOPIC APPROACH: ICD-10-PCS | Performed by: SURGERY

## 2024-12-13 PROCEDURE — 88309 TISSUE EXAM BY PATHOLOGIST: CPT

## 2024-12-13 RX ORDER — SCOLOPAMINE TRANSDERMAL SYSTEM 1 MG/1
1 PATCH, EXTENDED RELEASE TRANSDERMAL
Status: DISCONTINUED | OUTPATIENT
Start: 2024-12-13 | End: 2024-12-13 | Stop reason: HOSPADM

## 2024-12-13 RX ORDER — MIDAZOLAM HYDROCHLORIDE 2 MG/2ML
2 INJECTION, SOLUTION INTRAMUSCULAR; INTRAVENOUS
Status: DISCONTINUED | OUTPATIENT
Start: 2024-12-13 | End: 2024-12-13 | Stop reason: HOSPADM

## 2024-12-13 RX ORDER — SODIUM CHLORIDE 9 MG/ML
INJECTION, SOLUTION INTRAVENOUS PRN
Status: DISCONTINUED | OUTPATIENT
Start: 2024-12-13 | End: 2024-12-13 | Stop reason: HOSPADM

## 2024-12-13 RX ORDER — PROCHLORPERAZINE EDISYLATE 5 MG/ML
5 INJECTION INTRAMUSCULAR; INTRAVENOUS
Status: DISCONTINUED | OUTPATIENT
Start: 2024-12-13 | End: 2024-12-13 | Stop reason: HOSPADM

## 2024-12-13 RX ORDER — MORPHINE SULFATE 2 MG/ML
2 INJECTION, SOLUTION INTRAMUSCULAR; INTRAVENOUS
Status: DISCONTINUED | OUTPATIENT
Start: 2024-12-13 | End: 2024-12-23 | Stop reason: HOSPADM

## 2024-12-13 RX ORDER — CEFAZOLIN SODIUM 1 G/3ML
INJECTION, POWDER, FOR SOLUTION INTRAMUSCULAR; INTRAVENOUS
Status: DISCONTINUED | OUTPATIENT
Start: 2024-12-13 | End: 2024-12-13 | Stop reason: SDUPTHER

## 2024-12-13 RX ORDER — SODIUM CHLORIDE, SODIUM LACTATE, POTASSIUM CHLORIDE, CALCIUM CHLORIDE 600; 310; 30; 20 MG/100ML; MG/100ML; MG/100ML; MG/100ML
INJECTION, SOLUTION INTRAVENOUS CONTINUOUS
Status: DISCONTINUED | OUTPATIENT
Start: 2024-12-13 | End: 2024-12-13 | Stop reason: HOSPADM

## 2024-12-13 RX ORDER — SODIUM CHLORIDE 0.9 % (FLUSH) 0.9 %
5-40 SYRINGE (ML) INJECTION PRN
Status: DISCONTINUED | OUTPATIENT
Start: 2024-12-13 | End: 2024-12-13 | Stop reason: HOSPADM

## 2024-12-13 RX ORDER — FAMOTIDINE 20 MG/1
20 TABLET, FILM COATED ORAL ONCE
Status: COMPLETED | OUTPATIENT
Start: 2024-12-13 | End: 2024-12-13

## 2024-12-13 RX ORDER — SODIUM CHLORIDE 0.9 % (FLUSH) 0.9 %
5-40 SYRINGE (ML) INJECTION EVERY 12 HOURS SCHEDULED
Status: DISCONTINUED | OUTPATIENT
Start: 2024-12-13 | End: 2024-12-23 | Stop reason: HOSPADM

## 2024-12-13 RX ORDER — DIPHENHYDRAMINE HYDROCHLORIDE 50 MG/ML
12.5 INJECTION INTRAMUSCULAR; INTRAVENOUS
Status: DISCONTINUED | OUTPATIENT
Start: 2024-12-13 | End: 2024-12-13 | Stop reason: HOSPADM

## 2024-12-13 RX ORDER — ENOXAPARIN SODIUM 100 MG/ML
30 INJECTION SUBCUTANEOUS 2 TIMES DAILY
Status: DISCONTINUED | OUTPATIENT
Start: 2024-12-14 | End: 2024-12-23 | Stop reason: HOSPADM

## 2024-12-13 RX ORDER — FENTANYL CITRATE 50 UG/ML
INJECTION, SOLUTION INTRAMUSCULAR; INTRAVENOUS
Status: DISCONTINUED | OUTPATIENT
Start: 2024-12-13 | End: 2024-12-13 | Stop reason: SDUPTHER

## 2024-12-13 RX ORDER — NALOXONE HYDROCHLORIDE 0.4 MG/ML
INJECTION, SOLUTION INTRAMUSCULAR; INTRAVENOUS; SUBCUTANEOUS PRN
Status: DISCONTINUED | OUTPATIENT
Start: 2024-12-13 | End: 2024-12-13 | Stop reason: HOSPADM

## 2024-12-13 RX ORDER — GLYCOPYRROLATE 0.2 MG/ML
INJECTION INTRAMUSCULAR; INTRAVENOUS
Status: DISCONTINUED | OUTPATIENT
Start: 2024-12-13 | End: 2024-12-13 | Stop reason: SDUPTHER

## 2024-12-13 RX ORDER — MEMANTINE HYDROCHLORIDE 5 MG/1
10 TABLET ORAL 2 TIMES DAILY
Status: DISCONTINUED | OUTPATIENT
Start: 2024-12-13 | End: 2024-12-23 | Stop reason: HOSPADM

## 2024-12-13 RX ORDER — DEXMEDETOMIDINE HYDROCHLORIDE 100 UG/ML
INJECTION, SOLUTION INTRAVENOUS
Status: DISCONTINUED | OUTPATIENT
Start: 2024-12-13 | End: 2024-12-13 | Stop reason: SDUPTHER

## 2024-12-13 RX ORDER — OXYCODONE HYDROCHLORIDE 10 MG/1
10 TABLET ORAL EVERY 4 HOURS PRN
Status: DISCONTINUED | OUTPATIENT
Start: 2024-12-13 | End: 2024-12-23 | Stop reason: HOSPADM

## 2024-12-13 RX ORDER — PROPOFOL 10 MG/ML
INJECTION, EMULSION INTRAVENOUS
Status: DISCONTINUED | OUTPATIENT
Start: 2024-12-13 | End: 2024-12-13 | Stop reason: SDUPTHER

## 2024-12-13 RX ORDER — SODIUM CHLORIDE 0.9 % (FLUSH) 0.9 %
5-40 SYRINGE (ML) INJECTION EVERY 12 HOURS SCHEDULED
Status: DISCONTINUED | OUTPATIENT
Start: 2024-12-13 | End: 2024-12-13 | Stop reason: HOSPADM

## 2024-12-13 RX ORDER — ONDANSETRON 2 MG/ML
4 INJECTION INTRAMUSCULAR; INTRAVENOUS
Status: DISCONTINUED | OUTPATIENT
Start: 2024-12-13 | End: 2024-12-13 | Stop reason: HOSPADM

## 2024-12-13 RX ORDER — SODIUM CHLORIDE 450 MG/100ML
INJECTION, SOLUTION INTRAVENOUS CONTINUOUS
Status: DISCONTINUED | OUTPATIENT
Start: 2024-12-13 | End: 2024-12-14

## 2024-12-13 RX ORDER — TAMSULOSIN HYDROCHLORIDE 0.4 MG/1
0.4 CAPSULE ORAL DAILY
Status: DISCONTINUED | OUTPATIENT
Start: 2024-12-13 | End: 2024-12-23 | Stop reason: HOSPADM

## 2024-12-13 RX ORDER — ISOSORBIDE MONONITRATE 60 MG/1
60 TABLET, EXTENDED RELEASE ORAL NIGHTLY
Status: DISCONTINUED | OUTPATIENT
Start: 2024-12-13 | End: 2024-12-14

## 2024-12-13 RX ORDER — SODIUM CHLORIDE 0.9 % (FLUSH) 0.9 %
5-40 SYRINGE (ML) INJECTION PRN
Status: DISCONTINUED | OUTPATIENT
Start: 2024-12-13 | End: 2024-12-23 | Stop reason: HOSPADM

## 2024-12-13 RX ORDER — DEXAMETHASONE SODIUM PHOSPHATE 4 MG/ML
4 INJECTION, SOLUTION INTRA-ARTICULAR; INTRALESIONAL; INTRAMUSCULAR; INTRAVENOUS; SOFT TISSUE ONCE
Status: COMPLETED | OUTPATIENT
Start: 2024-12-13 | End: 2024-12-13

## 2024-12-13 RX ORDER — OXYCODONE HYDROCHLORIDE 10 MG/1
5 TABLET ORAL EVERY 4 HOURS PRN
Status: DISCONTINUED | OUTPATIENT
Start: 2024-12-13 | End: 2024-12-23 | Stop reason: HOSPADM

## 2024-12-13 RX ORDER — ROCURONIUM BROMIDE 10 MG/ML
INJECTION, SOLUTION INTRAVENOUS
Status: DISCONTINUED | OUTPATIENT
Start: 2024-12-13 | End: 2024-12-13 | Stop reason: SDUPTHER

## 2024-12-13 RX ORDER — EPHEDRINE SULFATE/0.9% NACL/PF 25 MG/5 ML
SYRINGE (ML) INTRAVENOUS
Status: DISCONTINUED | OUTPATIENT
Start: 2024-12-13 | End: 2024-12-13 | Stop reason: SDUPTHER

## 2024-12-13 RX ORDER — SODIUM CHLORIDE 9 MG/ML
INJECTION, SOLUTION INTRAVENOUS PRN
Status: DISCONTINUED | OUTPATIENT
Start: 2024-12-13 | End: 2024-12-23 | Stop reason: HOSPADM

## 2024-12-13 RX ORDER — OXYBUTYNIN CHLORIDE 5 MG/1
10 TABLET, EXTENDED RELEASE ORAL DAILY
Status: DISCONTINUED | OUTPATIENT
Start: 2024-12-13 | End: 2024-12-20

## 2024-12-13 RX ORDER — IPRATROPIUM BROMIDE AND ALBUTEROL SULFATE 2.5; .5 MG/3ML; MG/3ML
1 SOLUTION RESPIRATORY (INHALATION) ONCE
Status: COMPLETED | OUTPATIENT
Start: 2024-12-13 | End: 2024-12-13

## 2024-12-13 RX ORDER — DEXMEDETOMIDINE HYDROCHLORIDE 4 UG/ML
.1-1.5 INJECTION, SOLUTION INTRAVENOUS CONTINUOUS
Status: DISCONTINUED | OUTPATIENT
Start: 2024-12-13 | End: 2024-12-15

## 2024-12-13 RX ORDER — FENTANYL CITRATE 50 UG/ML
25 INJECTION, SOLUTION INTRAMUSCULAR; INTRAVENOUS EVERY 5 MIN PRN
Status: DISCONTINUED | OUTPATIENT
Start: 2024-12-13 | End: 2024-12-13 | Stop reason: HOSPADM

## 2024-12-13 RX ORDER — DEXAMETHASONE SODIUM PHOSPHATE 4 MG/ML
INJECTION, SOLUTION INTRA-ARTICULAR; INTRALESIONAL; INTRAMUSCULAR; INTRAVENOUS; SOFT TISSUE
Status: DISCONTINUED | OUTPATIENT
Start: 2024-12-13 | End: 2024-12-13 | Stop reason: SDUPTHER

## 2024-12-13 RX ORDER — METOPROLOL SUCCINATE 25 MG/1
25 TABLET, EXTENDED RELEASE ORAL 2 TIMES DAILY
Status: DISCONTINUED | OUTPATIENT
Start: 2024-12-13 | End: 2024-12-14

## 2024-12-13 RX ORDER — LIDOCAINE HYDROCHLORIDE 10 MG/ML
1 INJECTION, SOLUTION EPIDURAL; INFILTRATION; INTRACAUDAL; PERINEURAL
Status: DISCONTINUED | OUTPATIENT
Start: 2024-12-13 | End: 2024-12-13 | Stop reason: HOSPADM

## 2024-12-13 RX ORDER — LIDOCAINE HYDROCHLORIDE 10 MG/ML
INJECTION, SOLUTION INFILTRATION; PERINEURAL
Status: DISCONTINUED | OUTPATIENT
Start: 2024-12-13 | End: 2024-12-13 | Stop reason: SDUPTHER

## 2024-12-13 RX ORDER — BUPIVACAINE HYDROCHLORIDE 2.5 MG/ML
30 INJECTION, SOLUTION EPIDURAL; INFILTRATION; INTRACAUDAL ONCE
Status: DISCONTINUED | OUTPATIENT
Start: 2024-12-13 | End: 2024-12-13 | Stop reason: HOSPADM

## 2024-12-13 RX ORDER — FENTANYL CITRATE 50 UG/ML
50 INJECTION, SOLUTION INTRAMUSCULAR; INTRAVENOUS EVERY 5 MIN PRN
Status: DISCONTINUED | OUTPATIENT
Start: 2024-12-13 | End: 2024-12-13 | Stop reason: HOSPADM

## 2024-12-13 RX ORDER — ATORVASTATIN CALCIUM 40 MG/1
40 TABLET, FILM COATED ORAL NIGHTLY
Status: DISCONTINUED | OUTPATIENT
Start: 2024-12-13 | End: 2024-12-23 | Stop reason: HOSPADM

## 2024-12-13 RX ORDER — FUROSEMIDE 20 MG/1
20 TABLET ORAL PRN
Status: DISCONTINUED | OUTPATIENT
Start: 2024-12-13 | End: 2024-12-23 | Stop reason: HOSPADM

## 2024-12-13 RX ADMIN — ROCURONIUM BROMIDE 20 MG: 10 INJECTION, SOLUTION INTRAVENOUS at 14:00

## 2024-12-13 RX ADMIN — CEFAZOLIN 2 G: 1 INJECTION, POWDER, FOR SOLUTION INTRAMUSCULAR; INTRAVENOUS at 11:56

## 2024-12-13 RX ADMIN — HYDROMORPHONE HYDROCHLORIDE 1 MG: 1 INJECTION, SOLUTION INTRAMUSCULAR; INTRAVENOUS; SUBCUTANEOUS at 16:15

## 2024-12-13 RX ADMIN — MORPHINE SULFATE 2 MG: 2 INJECTION, SOLUTION INTRAMUSCULAR; INTRAVENOUS at 23:13

## 2024-12-13 RX ADMIN — FENTANYL CITRATE 50 MCG: 50 INJECTION INTRAMUSCULAR; INTRAVENOUS at 16:41

## 2024-12-13 RX ADMIN — PHENYLEPHRINE HYDROCHLORIDE 80 MCG: 10 INJECTION INTRAVENOUS at 13:15

## 2024-12-13 RX ADMIN — GLYCOPYRROLATE 0.2 MG: 0.2 INJECTION, SOLUTION INTRAMUSCULAR; INTRAVENOUS at 12:25

## 2024-12-13 RX ADMIN — METOPROLOL SUCCINATE 25 MG: 25 TABLET, EXTENDED RELEASE ORAL at 20:47

## 2024-12-13 RX ADMIN — FENTANYL CITRATE 50 MCG: 50 INJECTION INTRAMUSCULAR; INTRAVENOUS at 16:33

## 2024-12-13 RX ADMIN — ATORVASTATIN CALCIUM 40 MG: 40 TABLET, FILM COATED ORAL at 20:47

## 2024-12-13 RX ADMIN — LIDOCAINE HYDROCHLORIDE 50 MG: 10 INJECTION, SOLUTION INFILTRATION; PERINEURAL at 11:46

## 2024-12-13 RX ADMIN — FENTANYL CITRATE 100 MCG: 0.05 INJECTION, SOLUTION INTRAMUSCULAR; INTRAVENOUS at 11:46

## 2024-12-13 RX ADMIN — ROCURONIUM BROMIDE 20 MG: 10 INJECTION, SOLUTION INTRAVENOUS at 15:16

## 2024-12-13 RX ADMIN — OXYCODONE HYDROCHLORIDE 10 MG: 10 TABLET ORAL at 22:10

## 2024-12-13 RX ADMIN — PHENYLEPHRINE HYDROCHLORIDE 160 MCG: 10 INJECTION INTRAVENOUS at 13:28

## 2024-12-13 RX ADMIN — DEXMEDETOMIDINE HYDROCHLORIDE 0.2 MCG/KG/HR: 400 INJECTION, SOLUTION INTRAVENOUS at 20:27

## 2024-12-13 RX ADMIN — DEXMEDETOMIDINE HYDROCHLORIDE 20 MCG: 100 INJECTION, SOLUTION, CONCENTRATE INTRAVENOUS at 16:18

## 2024-12-13 RX ADMIN — DEXAMETHASONE SODIUM PHOSPHATE 4 MG: 4 INJECTION INTRA-ARTICULAR; INTRALESIONAL; INTRAMUSCULAR; INTRAVENOUS; SOFT TISSUE at 08:59

## 2024-12-13 RX ADMIN — OXYBUTYNIN CHLORIDE 10 MG: 5 TABLET, EXTENDED RELEASE ORAL at 20:46

## 2024-12-13 RX ADMIN — ROCURONIUM BROMIDE 20 MG: 10 INJECTION, SOLUTION INTRAVENOUS at 13:01

## 2024-12-13 RX ADMIN — PHENYLEPHRINE HYDROCHLORIDE 80 MCG: 10 INJECTION INTRAVENOUS at 14:12

## 2024-12-13 RX ADMIN — ROCURONIUM BROMIDE 20 MG: 10 INJECTION, SOLUTION INTRAVENOUS at 14:45

## 2024-12-13 RX ADMIN — SODIUM CHLORIDE, PRESERVATIVE FREE 10 ML: 5 INJECTION INTRAVENOUS at 20:47

## 2024-12-13 RX ADMIN — TAMSULOSIN HYDROCHLORIDE 0.4 MG: 0.4 CAPSULE ORAL at 20:47

## 2024-12-13 RX ADMIN — ISOSORBIDE MONONITRATE 60 MG: 60 TABLET, EXTENDED RELEASE ORAL at 20:47

## 2024-12-13 RX ADMIN — IPRATROPIUM BROMIDE AND ALBUTEROL SULFATE 1 DOSE: 2.5; .5 SOLUTION RESPIRATORY (INHALATION) at 08:58

## 2024-12-13 RX ADMIN — SUGAMMADEX 200 MG: 100 INJECTION, SOLUTION INTRAVENOUS at 15:43

## 2024-12-13 RX ADMIN — PHENYLEPHRINE HYDROCHLORIDE 80 MCG: 10 INJECTION INTRAVENOUS at 12:56

## 2024-12-13 RX ADMIN — PROPOFOL 150 MG: 10 INJECTION, EMULSION INTRAVENOUS at 11:46

## 2024-12-13 RX ADMIN — HYDROMORPHONE HYDROCHLORIDE 0.5 MG: 1 INJECTION, SOLUTION INTRAMUSCULAR; INTRAVENOUS; SUBCUTANEOUS at 14:19

## 2024-12-13 RX ADMIN — MORPHINE SULFATE 2 MG: 2 INJECTION, SOLUTION INTRAMUSCULAR; INTRAVENOUS at 19:04

## 2024-12-13 RX ADMIN — FAMOTIDINE 20 MG: 20 TABLET ORAL at 08:40

## 2024-12-13 RX ADMIN — OXYCODONE HYDROCHLORIDE 10 MG: 10 TABLET ORAL at 17:48

## 2024-12-13 RX ADMIN — HYDROMORPHONE HYDROCHLORIDE 0.5 MG: 1 INJECTION, SOLUTION INTRAMUSCULAR; INTRAVENOUS; SUBCUTANEOUS at 12:18

## 2024-12-13 RX ADMIN — ROCURONIUM BROMIDE 60 MG: 10 INJECTION, SOLUTION INTRAVENOUS at 11:46

## 2024-12-13 RX ADMIN — SODIUM CHLORIDE, SODIUM LACTATE, POTASSIUM CHLORIDE, AND CALCIUM CHLORIDE: 600; 310; 30; 20 INJECTION, SOLUTION INTRAVENOUS at 14:10

## 2024-12-13 RX ADMIN — SODIUM CHLORIDE: 4.5 INJECTION, SOLUTION INTRAVENOUS at 23:18

## 2024-12-13 RX ADMIN — DEXAMETHASONE SODIUM PHOSPHATE 10 MG: 4 INJECTION, SOLUTION INTRAMUSCULAR; INTRAVENOUS at 14:22

## 2024-12-13 RX ADMIN — MEMANTINE HYDROCHLORIDE 10 MG: 5 TABLET ORAL at 20:46

## 2024-12-13 RX ADMIN — PHENYLEPHRINE HYDROCHLORIDE 80 MCG: 10 INJECTION INTRAVENOUS at 13:13

## 2024-12-13 RX ADMIN — ROCURONIUM BROMIDE 40 MG: 10 INJECTION, SOLUTION INTRAVENOUS at 12:20

## 2024-12-13 RX ADMIN — PHENYLEPHRINE HYDROCHLORIDE 80 MCG: 10 INJECTION INTRAVENOUS at 14:13

## 2024-12-13 RX ADMIN — EPHEDRINE SULFATE 5 MG: 5 INJECTION INTRAVENOUS at 15:18

## 2024-12-13 RX ADMIN — PHENYLEPHRINE HYDROCHLORIDE 80 MCG: 10 INJECTION INTRAVENOUS at 14:03

## 2024-12-13 RX ADMIN — PHENYLEPHRINE HYDROCHLORIDE 80 MCG: 10 INJECTION INTRAVENOUS at 13:45

## 2024-12-13 RX ADMIN — SODIUM CHLORIDE, SODIUM LACTATE, POTASSIUM CHLORIDE, AND CALCIUM CHLORIDE: 600; 310; 30; 20 INJECTION, SOLUTION INTRAVENOUS at 08:38

## 2024-12-13 ASSESSMENT — PAIN SCALES - WONG BAKER
WONGBAKER_NUMERICALRESPONSE: HURTS A LITTLE BIT
WONGBAKER_NUMERICALRESPONSE: HURTS A LITTLE BIT

## 2024-12-13 ASSESSMENT — LIFESTYLE VARIABLES: SMOKING_STATUS: 1

## 2024-12-13 ASSESSMENT — PAIN DESCRIPTION - LOCATION
LOCATION: CHEST
LOCATION: INCISION

## 2024-12-13 ASSESSMENT — ENCOUNTER SYMPTOMS
DYSPNEA ACTIVITY LEVEL: AFTER AMBULATING 1 FLIGHT OF STAIRS
SHORTNESS OF BREATH: 1

## 2024-12-13 ASSESSMENT — COPD QUESTIONNAIRES: CAT_SEVERITY: MODERATE

## 2024-12-13 ASSESSMENT — PAIN SCALES - GENERAL
PAINLEVEL_OUTOF10: 9
PAINLEVEL_OUTOF10: 5
PAINLEVEL_OUTOF10: 8
PAINLEVEL_OUTOF10: 10
PAINLEVEL_OUTOF10: 9
PAINLEVEL_OUTOF10: 5
PAINLEVEL_OUTOF10: 9
PAINLEVEL_OUTOF10: 8

## 2024-12-13 ASSESSMENT — PAIN DESCRIPTION - DESCRIPTORS
DESCRIPTORS: SHARP
DESCRIPTORS: SHARP
DESCRIPTORS: DISCOMFORT;SHARP
DESCRIPTORS: SHARP

## 2024-12-13 ASSESSMENT — PAIN DESCRIPTION - ORIENTATION
ORIENTATION: RIGHT;LEFT;MID
ORIENTATION: LEFT;RIGHT;MID
ORIENTATION: MID;LOWER
ORIENTATION: MID
ORIENTATION: RIGHT;LEFT;MID
ORIENTATION: MID

## 2024-12-13 ASSESSMENT — PAIN - FUNCTIONAL ASSESSMENT
PAIN_FUNCTIONAL_ASSESSMENT: PREVENTS OR INTERFERES SOME ACTIVE ACTIVITIES AND ADLS
PAIN_FUNCTIONAL_ASSESSMENT: NONE - DENIES PAIN

## 2024-12-13 NOTE — ANESTHESIA PRE PROCEDURE
Department of Anesthesiology  Preprocedure Note       Name:  Evans Castrejon   Age:  74 y.o.  :  1950                                          MRN:  [id number]         Date:  2024      Surgeon: Surgeon(s):  Aden Randle MD    Procedure: Procedure(s):  ROBOTIC RIGHT LOWER LOBECTOMY & LEFT UPPER WEDGE RESECTION    Medications prior to admission:   Prior to Admission medications    Medication Sig Start Date End Date Taking? Authorizing Provider   donepezil (ARICEPT) 10 MG tablet Take 2 tablets by mouth daily   Yes Valentino Hopkins MD   memantine (NAMENDA) 10 MG tablet Take 1 tablet by mouth 2 times daily   Yes Valnetino Hopkins MD   metoprolol succinate (TOPROL XL) 25 MG extended release tablet Take 1 tablet by mouth 2 times daily   Yes Valentino Hopkins MD   polyethylene glycol (GLYCOLAX) 17 g packet Take 1 packet by mouth daily as needed for Constipation   Yes ProviderValentino MD   nitroGLYCERIN (NITROSTAT) 0.4 MG SL tablet Place 1 tablet under the tongue every 5 minutes as needed for Chest pain up to max of 3 total doses. If no relief after 1 dose, call 911. 10/10/24  Yes Angel Haji MD   primidone (MYSOLINE) 50 MG tablet Take 1 tablet by mouth nightly 1/2 tablet at bedtime for tremors  Patient taking differently: Take 0.5 tablets by mouth nightly 24  Yes Jamie Zimmerman MD   bisacodyl (DULCOLAX) 5 MG EC tablet Take 1 tablet by mouth daily as needed 5/10/23  Yes Valentino Hopkins MD   HYDROcodone-acetaminophen (NORCO)  MG per tablet Take 1 tablet by mouth every 6 hours as needed. 23  Yes Valentino Hopkins MD   oxybutynin (DITROPAN-XL) 10 MG extended release tablet Take 1 tablet by mouth daily 23  Yes Valentino Hopkins MD   atorvastatin (LIPITOR) 40 MG tablet Take 1 tablet by mouth nightly 23  Yes Kiki Silvestre APRN - CNP   diphenhydrAMINE (BENADRYL) 50 MG capsule Take 1 capsule by mouth at bedtime   Yes Valentino Hopkins

## 2024-12-13 NOTE — BRIEF OP NOTE
Findings:  Infection Present At Time Of Surgery (PATOS) (choose all levels that have infection present):  No infection present  Other Findings: RLLobectomy with frozen section confirming NSSca; regional LN dissection performed. NNEKA nodule wedge resection with regional LND.    Electronically signed by Aden Randle MD on 12/13/2024 at 3:40 PM

## 2024-12-13 NOTE — H&P
Update History & Physical    The patient's History and Physical of December 5, 2024 was reviewed with the patient and I examined the patient. There was no change. The surgical site was confirmed by the patient and me.       Plan: The risks, benefits, expected outcome, and alternative to the recommended procedure have been discussed with the patient. Patient understands and wants to proceed with the procedure.     Electronically signed by Aden Randle MD on 12/13/2024 at 11:05 AM

## 2024-12-13 NOTE — ANESTHESIA PROCEDURE NOTES
Arterial Line:    An arterial line was placed using ultrasound guidance, in the pre-op for the following indication(s): continuous blood pressure monitoring.    A 22 gauge (size), 1 and 1/4 inch (length), Arrow (type) catheter was placed, into the left radial artery, secured by Tegaderm.  Anesthesia type: Local  Local infiltration: Injection    Events:  patient tolerated procedure well with no complications.12/13/2024 11:18 AM12/13/2024 11:23 AM  Resident/CRNA: Nimco Mckeon APRN - CRNA  Performed: Other anesthesia staff   Preanesthetic Checklist  Completed: patient identified, IV checked, site marked, risks and benefits discussed, surgical/procedural consents, equipment checked, pre-op evaluation, timeout performed, anesthesia consent given, oxygen available, monitors applied/VS acknowledged, fire risk safety assessment completed and verbalized and blood product R/B/A discussed and consented

## 2024-12-13 NOTE — ANESTHESIA POSTPROCEDURE EVALUATION
Department of Anesthesiology  Postprocedure Note    Patient: Evans Castrejon  MRN: [id number]  YOB: 1950  Date of evaluation: 12/13/2024    Procedure Summary       Date: 12/13/24 Room / Location: [address]    Anesthesia Start: 1141 Anesthesia Stop:     Procedure: ROBOTIC RIGHT LOWER LOBECTOMY & LEFT UPPER WEDGE RESECTION (Bilateral) Diagnosis:       Lung mass      (Lung mass [R91.8])    Surgeons: Aden Randle MD Responsible Provider: Sha Rosen APRN - CRNA    Anesthesia Type: General ASA Status: 3            Anesthesia Type: General    Inderjit Phase I: Inderjit Score: 9    Inderjit Phase II:      Anesthesia Post Evaluation    Patient location during evaluation: PACU  Patient participation: complete - patient participated  Level of consciousness: awake and alert  Pain score: 0  Airway patency: patent  Nausea & Vomiting: no vomiting and no nausea  Cardiovascular status: blood pressure returned to baseline and hemodynamically stable  Respiratory status: spontaneous ventilation, nonlabored ventilation, acceptable and nasal cannula  Hydration status: euvolemic  Comments: BP (!) 118/105   Pulse 66   Temp 97.6 °F (36.4 °C) (Temporal)   Resp 15   Ht 1.88 m (6' 2\")   Wt 102.1 kg (225 lb)   SpO2 99%   BMI 28.89 kg/m²     Pain management: adequate    No notable events documented.

## 2024-12-14 ENCOUNTER — APPOINTMENT (OUTPATIENT)
Dept: GENERAL RADIOLOGY | Age: 74
DRG: 164 | End: 2024-12-14
Attending: SURGERY
Payer: OTHER GOVERNMENT

## 2024-12-14 LAB
ANION GAP SERPL CALCULATED.3IONS-SCNC: 13 MMOL/L (ref 7–19)
BUN SERPL-MCNC: 23 MG/DL (ref 8–23)
CALCIUM SERPL-MCNC: 8.6 MG/DL (ref 8.8–10.2)
CHLORIDE SERPL-SCNC: 103 MMOL/L (ref 98–111)
CO2 SERPL-SCNC: 20 MMOL/L (ref 22–29)
CREAT SERPL-MCNC: 1.1 MG/DL (ref 0.7–1.2)
ERYTHROCYTE [DISTWIDTH] IN BLOOD BY AUTOMATED COUNT: 12.9 % (ref 11.5–14.5)
GLUCOSE SERPL-MCNC: 158 MG/DL (ref 70–99)
HCT VFR BLD AUTO: 40.4 % (ref 42–52)
HGB BLD-MCNC: 13.3 G/DL (ref 14–18)
MCH RBC QN AUTO: 32.1 PG (ref 27–31)
MCHC RBC AUTO-ENTMCNC: 32.9 G/DL (ref 33–37)
MCV RBC AUTO: 97.6 FL (ref 80–94)
PLATELET # BLD AUTO: 173 K/UL (ref 130–400)
PMV BLD AUTO: 9.9 FL (ref 9.4–12.4)
POTASSIUM SERPL-SCNC: 4.5 MMOL/L (ref 3.5–5)
RBC # BLD AUTO: 4.14 M/UL (ref 4.7–6.1)
SODIUM SERPL-SCNC: 136 MMOL/L (ref 136–145)
WBC # BLD AUTO: 9.3 K/UL (ref 4.8–10.8)

## 2024-12-14 PROCEDURE — 85027 COMPLETE CBC AUTOMATED: CPT

## 2024-12-14 PROCEDURE — 71045 X-RAY EXAM CHEST 1 VIEW: CPT

## 2024-12-14 PROCEDURE — P9045 ALBUMIN (HUMAN), 5%, 250 ML: HCPCS | Performed by: SURGERY

## 2024-12-14 PROCEDURE — 6370000000 HC RX 637 (ALT 250 FOR IP): Performed by: SURGERY

## 2024-12-14 PROCEDURE — 94760 N-INVAS EAR/PLS OXIMETRY 1: CPT

## 2024-12-14 PROCEDURE — 2500000003 HC RX 250 WO HCPCS: Performed by: SURGERY

## 2024-12-14 PROCEDURE — 2580000003 HC RX 258: Performed by: SURGERY

## 2024-12-14 PROCEDURE — 80048 BASIC METABOLIC PNL TOTAL CA: CPT

## 2024-12-14 PROCEDURE — 2000000000 HC ICU R&B

## 2024-12-14 PROCEDURE — 6360000002 HC RX W HCPCS: Performed by: SURGERY

## 2024-12-14 PROCEDURE — 36415 COLL VENOUS BLD VENIPUNCTURE: CPT

## 2024-12-14 RX ORDER — NOREPINEPHRINE BITARTRATE 0.06 MG/ML
1-100 INJECTION, SOLUTION INTRAVENOUS CONTINUOUS
Status: DISCONTINUED | OUTPATIENT
Start: 2024-12-14 | End: 2024-12-15

## 2024-12-14 RX ORDER — ALBUMIN HUMAN 50 G/1000ML
25 SOLUTION INTRAVENOUS ONCE
Status: COMPLETED | OUTPATIENT
Start: 2024-12-14 | End: 2024-12-14

## 2024-12-14 RX ADMIN — OXYCODONE HYDROCHLORIDE 10 MG: 10 TABLET ORAL at 11:52

## 2024-12-14 RX ADMIN — DEXMEDETOMIDINE HYDROCHLORIDE 0.6 MCG/KG/HR: 400 INJECTION, SOLUTION INTRAVENOUS at 02:45

## 2024-12-14 RX ADMIN — OXYCODONE HYDROCHLORIDE 10 MG: 10 TABLET ORAL at 04:03

## 2024-12-14 RX ADMIN — MORPHINE SULFATE 2 MG: 2 INJECTION, SOLUTION INTRAMUSCULAR; INTRAVENOUS at 18:33

## 2024-12-14 RX ADMIN — MEMANTINE HYDROCHLORIDE 10 MG: 5 TABLET ORAL at 20:42

## 2024-12-14 RX ADMIN — OXYCODONE HYDROCHLORIDE 10 MG: 10 TABLET ORAL at 16:40

## 2024-12-14 RX ADMIN — MEMANTINE HYDROCHLORIDE 10 MG: 5 TABLET ORAL at 08:12

## 2024-12-14 RX ADMIN — SODIUM CHLORIDE, PRESERVATIVE FREE 10 ML: 5 INJECTION INTRAVENOUS at 08:12

## 2024-12-14 RX ADMIN — MORPHINE SULFATE 2 MG: 2 INJECTION, SOLUTION INTRAMUSCULAR; INTRAVENOUS at 13:24

## 2024-12-14 RX ADMIN — OXYCODONE HYDROCHLORIDE 10 MG: 10 TABLET ORAL at 08:14

## 2024-12-14 RX ADMIN — Medication 5 MCG/MIN: at 05:54

## 2024-12-14 RX ADMIN — MORPHINE SULFATE 2 MG: 2 INJECTION, SOLUTION INTRAMUSCULAR; INTRAVENOUS at 06:10

## 2024-12-14 RX ADMIN — ALBUMIN (HUMAN) 25 G: 12.5 INJECTION, SOLUTION INTRAVENOUS at 05:35

## 2024-12-14 RX ADMIN — ENOXAPARIN SODIUM 30 MG: 100 INJECTION SUBCUTANEOUS at 08:12

## 2024-12-14 RX ADMIN — ATORVASTATIN CALCIUM 40 MG: 40 TABLET, FILM COATED ORAL at 20:42

## 2024-12-14 RX ADMIN — OXYCODONE 5 MG: 5 TABLET ORAL at 20:49

## 2024-12-14 RX ADMIN — TAMSULOSIN HYDROCHLORIDE 0.4 MG: 0.4 CAPSULE ORAL at 20:42

## 2024-12-14 RX ADMIN — OXYBUTYNIN CHLORIDE 10 MG: 5 TABLET, EXTENDED RELEASE ORAL at 20:42

## 2024-12-14 RX ADMIN — MORPHINE SULFATE 2 MG: 2 INJECTION, SOLUTION INTRAMUSCULAR; INTRAVENOUS at 09:34

## 2024-12-14 RX ADMIN — SODIUM CHLORIDE, PRESERVATIVE FREE 10 ML: 5 INJECTION INTRAVENOUS at 20:42

## 2024-12-14 RX ADMIN — ENOXAPARIN SODIUM 30 MG: 100 INJECTION SUBCUTANEOUS at 20:41

## 2024-12-14 ASSESSMENT — PAIN SCALES - WONG BAKER
WONGBAKER_NUMERICALRESPONSE: HURTS A LITTLE BIT
WONGBAKER_NUMERICALRESPONSE: NO HURT
WONGBAKER_NUMERICALRESPONSE: HURTS A LITTLE BIT

## 2024-12-14 ASSESSMENT — PAIN DESCRIPTION - ORIENTATION
ORIENTATION: MID
ORIENTATION: RIGHT

## 2024-12-14 ASSESSMENT — PAIN SCALES - GENERAL
PAINLEVEL_OUTOF10: 10
PAINLEVEL_OUTOF10: 2
PAINLEVEL_OUTOF10: 7
PAINLEVEL_OUTOF10: 5
PAINLEVEL_OUTOF10: 10
PAINLEVEL_OUTOF10: 8
PAINLEVEL_OUTOF10: 10

## 2024-12-14 ASSESSMENT — PAIN DESCRIPTION - PAIN TYPE: TYPE: SURGICAL PAIN

## 2024-12-14 ASSESSMENT — PAIN DESCRIPTION - LOCATION
LOCATION: INCISION

## 2024-12-14 ASSESSMENT — PAIN DESCRIPTION - ONSET: ONSET: AWAKENED FROM SLEEP

## 2024-12-14 ASSESSMENT — PAIN - FUNCTIONAL ASSESSMENT: PAIN_FUNCTIONAL_ASSESSMENT: PREVENTS OR INTERFERES SOME ACTIVE ACTIVITIES AND ADLS

## 2024-12-14 ASSESSMENT — PAIN DESCRIPTION - FREQUENCY: FREQUENCY: INTERMITTENT

## 2024-12-14 ASSESSMENT — PAIN DESCRIPTION - DESCRIPTORS
DESCRIPTORS: SHARP
DESCRIPTORS: SHARP;SORE;SQUEEZING
DESCRIPTORS: SHARP
DESCRIPTORS: SHARP;SHOOTING;DISCOMFORT

## 2024-12-14 NOTE — OP NOTE
[id number] [place] [address]                            OPERATIVE REPORT      PATIENT NAME: MAKAYLA MCCLOUD       : 1950  MED REC NO: [id number]                          ROOM: 0144  ACCOUNT NO: [id number]                       ADMIT DATE: 2024  PROVIDER: Aden Randle MD      DATE OF PROCEDURE:  2024    SURGEON:  Aden Randle MD    PREOPERATIVE DIAGNOSIS:  Right lower lobe and left upper lobe nodules that are showing increased uptake on a PET scan.    POSTOPERATIVE DIAGNOSIS:  Right lower lobe squamous cell carcinoma, left upper lobe nodule.    ASSISTANT:  Mireille Murphy.    ESTIMATED BLOOD LOSS:  50 mL.    HISTORY:  The patient is a 74-year-old gentleman who had a chest CT showing nodules in the right lower lobe and left upper lobe.  Serial PET CT scans have shown increased size and uptake of the radioactive tracer in both nodules, particularly the right lower lobe.  The patient was, therefore, indicated for excisional biopsy and with the right lower lobe proceeding with a lobectomy if the diagnosis of cancer was confirmed.  The patient understood the risks and benefits of the procedure and agreed to proceed.    DESCRIPTION OF PROCEDURE:  The patient underwent a double-lumen endotracheal tube placement, which he tolerated without any difficulty.  The right lower lobe was deflated and the patient tolerated this without any difficulty.  He was placed in the right side up thoracotomy position.  The chest was prepped and draped in usual sterile fashion.  A time-out was performed.  We then initiated the operation by placing ports in the 8th intercostal space, a total of 4 ranging from the posterior axillary line to the anterior axillary line.  A separate working port was placed through the 12th intercostal space for the assistant to use.  The robot was docked to the 4 ports and then the surgeon went to the console.  At the console,

## 2024-12-15 ENCOUNTER — APPOINTMENT (OUTPATIENT)
Dept: GENERAL RADIOLOGY | Age: 74
DRG: 164 | End: 2024-12-15
Attending: SURGERY
Payer: OTHER GOVERNMENT

## 2024-12-15 LAB
ANION GAP SERPL CALCULATED.3IONS-SCNC: 11 MMOL/L (ref 7–19)
BUN SERPL-MCNC: 21 MG/DL (ref 8–23)
CALCIUM SERPL-MCNC: 8.4 MG/DL (ref 8.8–10.2)
CHLORIDE SERPL-SCNC: 100 MMOL/L (ref 98–111)
CO2 SERPL-SCNC: 25 MMOL/L (ref 22–29)
CREAT SERPL-MCNC: 1.1 MG/DL (ref 0.7–1.2)
ERYTHROCYTE [DISTWIDTH] IN BLOOD BY AUTOMATED COUNT: 12.9 % (ref 11.5–14.5)
GLUCOSE SERPL-MCNC: 92 MG/DL (ref 70–99)
HCT VFR BLD AUTO: 38.2 % (ref 42–52)
HGB BLD-MCNC: 12.7 G/DL (ref 14–18)
MAGNESIUM SERPL-MCNC: 2 MG/DL (ref 1.6–2.4)
MCH RBC QN AUTO: 32.4 PG (ref 27–31)
MCHC RBC AUTO-ENTMCNC: 33.2 G/DL (ref 33–37)
MCV RBC AUTO: 97.4 FL (ref 80–94)
PLATELET # BLD AUTO: 154 K/UL (ref 130–400)
PMV BLD AUTO: 9.9 FL (ref 9.4–12.4)
POTASSIUM SERPL-SCNC: 4.5 MMOL/L (ref 3.5–5)
RBC # BLD AUTO: 3.92 M/UL (ref 4.7–6.1)
SODIUM SERPL-SCNC: 136 MMOL/L (ref 136–145)
T4 FREE SERPL-MCNC: 1.75 NG/DL (ref 0.93–1.7)
TSH SERPL DL<=0.005 MIU/L-ACNC: 1.22 UIU/ML (ref 0.27–4.2)
WBC # BLD AUTO: 8.1 K/UL (ref 4.8–10.8)

## 2024-12-15 PROCEDURE — 85027 COMPLETE CBC AUTOMATED: CPT

## 2024-12-15 PROCEDURE — 94760 N-INVAS EAR/PLS OXIMETRY 1: CPT

## 2024-12-15 PROCEDURE — 93005 ELECTROCARDIOGRAM TRACING: CPT | Performed by: SURGERY

## 2024-12-15 PROCEDURE — 83735 ASSAY OF MAGNESIUM: CPT

## 2024-12-15 PROCEDURE — 71045 X-RAY EXAM CHEST 1 VIEW: CPT

## 2024-12-15 PROCEDURE — 99024 POSTOP FOLLOW-UP VISIT: CPT | Performed by: SURGERY

## 2024-12-15 PROCEDURE — 2500000003 HC RX 250 WO HCPCS: Performed by: SURGERY

## 2024-12-15 PROCEDURE — 84439 ASSAY OF FREE THYROXINE: CPT

## 2024-12-15 PROCEDURE — 6370000000 HC RX 637 (ALT 250 FOR IP): Performed by: SURGERY

## 2024-12-15 PROCEDURE — 6360000002 HC RX W HCPCS: Performed by: SURGERY

## 2024-12-15 PROCEDURE — 36415 COLL VENOUS BLD VENIPUNCTURE: CPT

## 2024-12-15 PROCEDURE — 2140000000 HC CCU INTERMEDIATE R&B

## 2024-12-15 PROCEDURE — 80048 BASIC METABOLIC PNL TOTAL CA: CPT

## 2024-12-15 PROCEDURE — 84443 ASSAY THYROID STIM HORMONE: CPT

## 2024-12-15 PROCEDURE — 2700000000 HC OXYGEN THERAPY PER DAY

## 2024-12-15 PROCEDURE — 2580000003 HC RX 258: Performed by: SURGERY

## 2024-12-15 PROCEDURE — 51702 INSERT TEMP BLADDER CATH: CPT

## 2024-12-15 RX ORDER — MECOBALAMIN 5000 MCG
5 TABLET,DISINTEGRATING ORAL NIGHTLY
Status: DISCONTINUED | OUTPATIENT
Start: 2024-12-15 | End: 2024-12-18

## 2024-12-15 RX ADMIN — AMIODARONE HYDROCHLORIDE 0.5 MG/MIN: 1.8 INJECTION, SOLUTION INTRAVENOUS at 23:30

## 2024-12-15 RX ADMIN — ENOXAPARIN SODIUM 30 MG: 100 INJECTION SUBCUTANEOUS at 19:47

## 2024-12-15 RX ADMIN — AMIODARONE HYDROCHLORIDE 150 MG: 1.5 INJECTION, SOLUTION INTRAVENOUS at 17:42

## 2024-12-15 RX ADMIN — MEMANTINE HYDROCHLORIDE 10 MG: 5 TABLET ORAL at 07:49

## 2024-12-15 RX ADMIN — MEMANTINE HYDROCHLORIDE 10 MG: 5 TABLET ORAL at 19:47

## 2024-12-15 RX ADMIN — OXYBUTYNIN CHLORIDE 10 MG: 5 TABLET, EXTENDED RELEASE ORAL at 19:47

## 2024-12-15 RX ADMIN — ATORVASTATIN CALCIUM 40 MG: 40 TABLET, FILM COATED ORAL at 19:47

## 2024-12-15 RX ADMIN — AMIODARONE HYDROCHLORIDE 1 MG/MIN: 1.8 INJECTION, SOLUTION INTRAVENOUS at 17:53

## 2024-12-15 RX ADMIN — MORPHINE SULFATE 2 MG: 2 INJECTION, SOLUTION INTRAMUSCULAR; INTRAVENOUS at 08:17

## 2024-12-15 RX ADMIN — OXYCODONE HYDROCHLORIDE 10 MG: 10 TABLET ORAL at 21:03

## 2024-12-15 RX ADMIN — Medication 5 MG: at 23:36

## 2024-12-15 RX ADMIN — TAMSULOSIN HYDROCHLORIDE 0.4 MG: 0.4 CAPSULE ORAL at 19:47

## 2024-12-15 RX ADMIN — OXYCODONE 5 MG: 5 TABLET ORAL at 07:49

## 2024-12-15 RX ADMIN — DEXMEDETOMIDINE HYDROCHLORIDE 0.2 MCG/KG/HR: 400 INJECTION, SOLUTION INTRAVENOUS at 00:03

## 2024-12-15 RX ADMIN — MORPHINE SULFATE 2 MG: 2 INJECTION, SOLUTION INTRAMUSCULAR; INTRAVENOUS at 18:39

## 2024-12-15 RX ADMIN — MORPHINE SULFATE 2 MG: 2 INJECTION, SOLUTION INTRAMUSCULAR; INTRAVENOUS at 13:25

## 2024-12-15 RX ADMIN — ENOXAPARIN SODIUM 30 MG: 100 INJECTION SUBCUTANEOUS at 07:49

## 2024-12-15 RX ADMIN — SODIUM CHLORIDE, PRESERVATIVE FREE 10 ML: 5 INJECTION INTRAVENOUS at 19:47

## 2024-12-15 ASSESSMENT — PAIN DESCRIPTION - ORIENTATION
ORIENTATION: LEFT
ORIENTATION: LEFT;RIGHT
ORIENTATION: UPPER;RIGHT
ORIENTATION: RIGHT

## 2024-12-15 ASSESSMENT — PAIN SCALES - GENERAL
PAINLEVEL_OUTOF10: 6
PAINLEVEL_OUTOF10: 3
PAINLEVEL_OUTOF10: 8
PAINLEVEL_OUTOF10: 8
PAINLEVEL_OUTOF10: 10
PAINLEVEL_OUTOF10: 0
PAINLEVEL_OUTOF10: 0

## 2024-12-15 ASSESSMENT — PAIN DESCRIPTION - DESCRIPTORS
DESCRIPTORS: DISCOMFORT
DESCRIPTORS: ACHING
DESCRIPTORS: ACHING

## 2024-12-15 ASSESSMENT — PAIN - FUNCTIONAL ASSESSMENT
PAIN_FUNCTIONAL_ASSESSMENT: PREVENTS OR INTERFERES SOME ACTIVE ACTIVITIES AND ADLS
PAIN_FUNCTIONAL_ASSESSMENT: PREVENTS OR INTERFERES SOME ACTIVE ACTIVITIES AND ADLS

## 2024-12-15 ASSESSMENT — PAIN DESCRIPTION - LOCATION
LOCATION: INCISION
LOCATION: SHOULDER;CHEST;INCISION
LOCATION: INCISION
LOCATION: INCISION

## 2024-12-15 ASSESSMENT — PAIN SCALES - WONG BAKER: WONGBAKER_NUMERICALRESPONSE: HURTS EVEN MORE

## 2024-12-15 NOTE — SIGNIFICANT EVENT
Notified from telemetry that patient's HR was running high. Upon patient assessment he is dizzy and diaphoretic. Patient appears to be in afib RVR with a rate in the 170's. Patient assisted back to bed and attending MD notified. Amiodarone bolus and gtt started while RN stays at the bedside with the patient. Will obtain EKG once patient's HR is lowered. Stat labs ordered. Will obtain new IV access and monitor patient's HR and BP in response to amiodarone initiation.

## 2024-12-16 ENCOUNTER — APPOINTMENT (OUTPATIENT)
Dept: GENERAL RADIOLOGY | Age: 74
DRG: 164 | End: 2024-12-16
Attending: SURGERY
Payer: OTHER GOVERNMENT

## 2024-12-16 PROCEDURE — 94640 AIRWAY INHALATION TREATMENT: CPT

## 2024-12-16 PROCEDURE — 2700000000 HC OXYGEN THERAPY PER DAY

## 2024-12-16 PROCEDURE — 6360000002 HC RX W HCPCS: Performed by: SURGERY

## 2024-12-16 PROCEDURE — 6370000000 HC RX 637 (ALT 250 FOR IP): Performed by: SURGERY

## 2024-12-16 PROCEDURE — 2580000003 HC RX 258: Performed by: SURGERY

## 2024-12-16 PROCEDURE — 2140000000 HC CCU INTERMEDIATE R&B

## 2024-12-16 PROCEDURE — 71045 X-RAY EXAM CHEST 1 VIEW: CPT

## 2024-12-16 PROCEDURE — 51702 INSERT TEMP BLADDER CATH: CPT

## 2024-12-16 PROCEDURE — 94760 N-INVAS EAR/PLS OXIMETRY 1: CPT

## 2024-12-16 RX ORDER — ALBUTEROL SULFATE 90 UG/1
2 INHALANT RESPIRATORY (INHALATION) EVERY 4 HOURS PRN
Status: DISCONTINUED | OUTPATIENT
Start: 2024-12-16 | End: 2024-12-23 | Stop reason: HOSPADM

## 2024-12-16 RX ORDER — AMIODARONE HYDROCHLORIDE 200 MG/1
200 TABLET ORAL 2 TIMES DAILY
Status: DISCONTINUED | OUTPATIENT
Start: 2024-12-16 | End: 2024-12-23 | Stop reason: HOSPADM

## 2024-12-16 RX ORDER — AMIODARONE HYDROCHLORIDE 200 MG/1
200 TABLET ORAL 2 TIMES DAILY
Status: DISCONTINUED | OUTPATIENT
Start: 2024-12-16 | End: 2024-12-16

## 2024-12-16 RX ADMIN — SODIUM CHLORIDE, PRESERVATIVE FREE 10 ML: 5 INJECTION INTRAVENOUS at 09:15

## 2024-12-16 RX ADMIN — ENOXAPARIN SODIUM 30 MG: 100 INJECTION SUBCUTANEOUS at 21:39

## 2024-12-16 RX ADMIN — MORPHINE SULFATE 2 MG: 2 INJECTION, SOLUTION INTRAMUSCULAR; INTRAVENOUS at 13:38

## 2024-12-16 RX ADMIN — MEMANTINE HYDROCHLORIDE 10 MG: 5 TABLET ORAL at 09:08

## 2024-12-16 RX ADMIN — AMIODARONE HYDROCHLORIDE 200 MG: 200 TABLET ORAL at 15:52

## 2024-12-16 RX ADMIN — Medication 5 MG: at 21:39

## 2024-12-16 RX ADMIN — OXYBUTYNIN CHLORIDE 10 MG: 5 TABLET, EXTENDED RELEASE ORAL at 21:38

## 2024-12-16 RX ADMIN — ATORVASTATIN CALCIUM 40 MG: 40 TABLET, FILM COATED ORAL at 21:38

## 2024-12-16 RX ADMIN — Medication 2 PUFF: at 15:46

## 2024-12-16 RX ADMIN — SODIUM CHLORIDE, PRESERVATIVE FREE 10 ML: 5 INJECTION INTRAVENOUS at 21:39

## 2024-12-16 RX ADMIN — ENOXAPARIN SODIUM 30 MG: 100 INJECTION SUBCUTANEOUS at 09:09

## 2024-12-16 RX ADMIN — OXYCODONE HYDROCHLORIDE 10 MG: 10 TABLET ORAL at 09:08

## 2024-12-16 RX ADMIN — OXYCODONE HYDROCHLORIDE 10 MG: 10 TABLET ORAL at 21:38

## 2024-12-16 RX ADMIN — TAMSULOSIN HYDROCHLORIDE 0.4 MG: 0.4 CAPSULE ORAL at 21:39

## 2024-12-16 RX ADMIN — OXYCODONE HYDROCHLORIDE 10 MG: 10 TABLET ORAL at 03:33

## 2024-12-16 RX ADMIN — MEMANTINE HYDROCHLORIDE 10 MG: 5 TABLET ORAL at 21:38

## 2024-12-16 RX ADMIN — ALBUTEROL SULFATE 2 PUFF: 90 AEROSOL, METERED RESPIRATORY (INHALATION) at 15:46

## 2024-12-16 ASSESSMENT — PAIN SCALES - GENERAL
PAINLEVEL_OUTOF10: 8
PAINLEVEL_OUTOF10: 5
PAINLEVEL_OUTOF10: 6
PAINLEVEL_OUTOF10: 8

## 2024-12-16 ASSESSMENT — PAIN DESCRIPTION - DESCRIPTORS
DESCRIPTORS: ACHING
DESCRIPTORS: ACHING
DESCRIPTORS: SHARP

## 2024-12-16 ASSESSMENT — PAIN DESCRIPTION - ORIENTATION
ORIENTATION: LEFT

## 2024-12-16 ASSESSMENT — PAIN DESCRIPTION - LOCATION
LOCATION: INCISION;SHOULDER;CHEST
LOCATION: RIB CAGE

## 2024-12-16 NOTE — CARE COORDINATION
Case Management Assessment  Initial Evaluation    Date/Time of Evaluation: 12/16/2024 9:44 AM  Assessment Completed by: BRIANNA MANCILLA    If patient is discharged prior to next notation, then this note serves as note for discharge by case management.    Patient Name: Evans Castrejon                   YOB: 1950  Diagnosis: Lung mass [R91.8]  Right lower lobe lung mass [R91.8]                   Date / Time: 12/13/2024  8:17 AM    Patient Admission Status: Inpatient   Readmission Risk (Low < 19, Mod (19-27), High > 27): Readmission Risk Score: 10.2    Current PCP: Darrell Hammond MD  PCP verified by CM? (P) Yes    Chart Reviewed: Yes      History Provided by: (P) Patient  Patient Orientation: (P) Alert and Oriented, Place, Person, Situation, Self    Patient Cognition:      Hospitalization in the last 30 days (Readmission):  No    If yes, Readmission Assessment in  Navigator will be completed.    Advance Directives:      Code Status: Full Code   Patient's Primary Decision Maker is: (P) Named in Scanned ACP Document      Discharge Planning:    Patient lives with: (P) Spouse/Significant Other Type of Home: (P) House  Primary Care Giver: (P) Self  Patient Support Systems include: (P) Spouse/Significant Other   Current Financial resources: (P) Medicare, [place] ([facility])  Current community resources: (P) None  Current services prior to admission: (P) [facility]            Current DME:              Type of Home Care services:  (P) PT, OT, Aide Services    ADLS  Prior functional level: (P) Independent in ADLs/IADLs  Current functional level: (P) Assistance with the following:    PT AM-PAC:   /24  OT AM-PAC:   /24    Family can provide assistance at DC: (P) Yes  Would you like Case Management to discuss the discharge plan with any other family members/significant others, and if so, who? (P) Yes  Plans to Return to Present Housing: (P) Yes  Other Identified Issues/Barriers to RETURNING to current housing: medical

## 2024-12-17 ENCOUNTER — APPOINTMENT (OUTPATIENT)
Dept: GENERAL RADIOLOGY | Age: 74
DRG: 164 | End: 2024-12-17
Attending: SURGERY
Payer: OTHER GOVERNMENT

## 2024-12-17 LAB
EKG P AXIS: NORMAL DEGREES
EKG P-R INTERVAL: NORMAL MS
EKG Q-T INTERVAL: 298 MS
EKG QRS DURATION: 120 MS
EKG QTC CALCULATION (BAZETT): 425 MS
EKG T AXIS: 96 DEGREES

## 2024-12-17 PROCEDURE — 94760 N-INVAS EAR/PLS OXIMETRY 1: CPT

## 2024-12-17 PROCEDURE — 93010 ELECTROCARDIOGRAM REPORT: CPT | Performed by: INTERNAL MEDICINE

## 2024-12-17 PROCEDURE — 6360000002 HC RX W HCPCS: Performed by: SURGERY

## 2024-12-17 PROCEDURE — 71045 X-RAY EXAM CHEST 1 VIEW: CPT

## 2024-12-17 PROCEDURE — 6370000000 HC RX 637 (ALT 250 FOR IP): Performed by: SURGERY

## 2024-12-17 PROCEDURE — 2700000000 HC OXYGEN THERAPY PER DAY

## 2024-12-17 PROCEDURE — 94640 AIRWAY INHALATION TREATMENT: CPT

## 2024-12-17 PROCEDURE — 2580000003 HC RX 258: Performed by: SURGERY

## 2024-12-17 PROCEDURE — 6370000000 HC RX 637 (ALT 250 FOR IP): Performed by: PHYSICIAN ASSISTANT

## 2024-12-17 PROCEDURE — 2140000000 HC CCU INTERMEDIATE R&B

## 2024-12-17 PROCEDURE — 2500000003 HC RX 250 WO HCPCS: Performed by: SURGERY

## 2024-12-17 RX ORDER — POLYETHYLENE GLYCOL 3350 17 G/17G
17 POWDER, FOR SOLUTION ORAL DAILY
Status: DISCONTINUED | OUTPATIENT
Start: 2024-12-17 | End: 2024-12-23 | Stop reason: HOSPADM

## 2024-12-17 RX ORDER — DOCUSATE SODIUM 100 MG/1
100 CAPSULE, LIQUID FILLED ORAL 2 TIMES DAILY
Status: DISCONTINUED | OUTPATIENT
Start: 2024-12-17 | End: 2024-12-23 | Stop reason: HOSPADM

## 2024-12-17 RX ADMIN — DOCUSATE SODIUM 100 MG: 100 CAPSULE, LIQUID FILLED ORAL at 08:58

## 2024-12-17 RX ADMIN — Medication 5 MG: at 19:43

## 2024-12-17 RX ADMIN — ATORVASTATIN CALCIUM 40 MG: 40 TABLET, FILM COATED ORAL at 19:43

## 2024-12-17 RX ADMIN — POLYETHYLENE GLYCOL (3350) 17 G: 17 POWDER, FOR SOLUTION ORAL at 08:58

## 2024-12-17 RX ADMIN — SODIUM CHLORIDE, PRESERVATIVE FREE 10 ML: 5 INJECTION INTRAVENOUS at 19:42

## 2024-12-17 RX ADMIN — Medication 2 PUFF: at 06:37

## 2024-12-17 RX ADMIN — OXYCODONE HYDROCHLORIDE 10 MG: 10 TABLET ORAL at 11:45

## 2024-12-17 RX ADMIN — TAMSULOSIN HYDROCHLORIDE 0.4 MG: 0.4 CAPSULE ORAL at 19:43

## 2024-12-17 RX ADMIN — ENOXAPARIN SODIUM 30 MG: 100 INJECTION SUBCUTANEOUS at 08:58

## 2024-12-17 RX ADMIN — OXYBUTYNIN CHLORIDE 10 MG: 5 TABLET, EXTENDED RELEASE ORAL at 19:42

## 2024-12-17 RX ADMIN — MEMANTINE HYDROCHLORIDE 10 MG: 5 TABLET ORAL at 08:58

## 2024-12-17 RX ADMIN — AMIODARONE HYDROCHLORIDE 200 MG: 200 TABLET ORAL at 19:42

## 2024-12-17 RX ADMIN — SODIUM CHLORIDE, PRESERVATIVE FREE 10 ML: 5 INJECTION INTRAVENOUS at 08:58

## 2024-12-17 RX ADMIN — ENOXAPARIN SODIUM 30 MG: 100 INJECTION SUBCUTANEOUS at 19:42

## 2024-12-17 RX ADMIN — MEMANTINE HYDROCHLORIDE 10 MG: 5 TABLET ORAL at 19:43

## 2024-12-17 RX ADMIN — AMIODARONE HYDROCHLORIDE 200 MG: 200 TABLET ORAL at 08:58

## 2024-12-17 RX ADMIN — DOCUSATE SODIUM 100 MG: 100 CAPSULE, LIQUID FILLED ORAL at 19:43

## 2024-12-17 RX ADMIN — OXYCODONE HYDROCHLORIDE 10 MG: 10 TABLET ORAL at 19:43

## 2024-12-17 ASSESSMENT — PAIN DESCRIPTION - ORIENTATION
ORIENTATION: RIGHT
ORIENTATION: MID;LEFT

## 2024-12-17 ASSESSMENT — PAIN DESCRIPTION - LOCATION: LOCATION: STERNUM

## 2024-12-17 ASSESSMENT — PAIN SCALES - GENERAL
PAINLEVEL_OUTOF10: 8
PAINLEVEL_OUTOF10: 8
PAINLEVEL_OUTOF10: 4
PAINLEVEL_OUTOF10: 2

## 2024-12-17 ASSESSMENT — PAIN DESCRIPTION - DESCRIPTORS
DESCRIPTORS: SHARP
DESCRIPTORS: ACHING

## 2024-12-17 ASSESSMENT — PAIN - FUNCTIONAL ASSESSMENT: PAIN_FUNCTIONAL_ASSESSMENT: PREVENTS OR INTERFERES SOME ACTIVE ACTIVITIES AND ADLS

## 2024-12-18 ENCOUNTER — APPOINTMENT (OUTPATIENT)
Dept: GENERAL RADIOLOGY | Age: 74
DRG: 164 | End: 2024-12-18
Attending: SURGERY
Payer: OTHER GOVERNMENT

## 2024-12-18 LAB
ALBUMIN SERPL-MCNC: 3.3 G/DL (ref 3.5–5.2)
ALP SERPL-CCNC: 70 U/L (ref 40–129)
ALT SERPL-CCNC: 17 U/L (ref 5–41)
AMMONIA PLAS-SCNC: 20 UMOL/L (ref 16–60)
ANION GAP SERPL CALCULATED.3IONS-SCNC: 14 MMOL/L (ref 7–19)
AST SERPL-CCNC: 36 U/L (ref 5–40)
BACTERIA URNS QL MICRO: NEGATIVE /HPF
BASOPHILS # BLD: 0 K/UL (ref 0–0.2)
BASOPHILS NFR BLD: 0.2 % (ref 0–1)
BILIRUB SERPL-MCNC: 0.9 MG/DL (ref 0.2–1.2)
BILIRUB UR QL STRIP: NEGATIVE
BUN SERPL-MCNC: 15 MG/DL (ref 8–23)
CALCIUM SERPL-MCNC: 8.7 MG/DL (ref 8.8–10.2)
CHLORIDE SERPL-SCNC: 99 MMOL/L (ref 98–111)
CLARITY UR: CLEAR
CO2 SERPL-SCNC: 25 MMOL/L (ref 22–29)
COLOR UR: YELLOW
CREAT SERPL-MCNC: 0.9 MG/DL (ref 0.7–1.2)
CRYSTALS URNS MICRO: ABNORMAL /HPF
EOSINOPHIL # BLD: 0.1 K/UL (ref 0–0.6)
EOSINOPHIL NFR BLD: 0.8 % (ref 0–5)
EPI CELLS #/AREA URNS AUTO: 0 /HPF (ref 0–5)
ERYTHROCYTE [DISTWIDTH] IN BLOOD BY AUTOMATED COUNT: 12.5 % (ref 11.5–14.5)
GLUCOSE BLD-MCNC: 118 MG/DL (ref 70–99)
GLUCOSE SERPL-MCNC: 120 MG/DL (ref 70–99)
GLUCOSE UR STRIP.AUTO-MCNC: NEGATIVE MG/DL
HCT VFR BLD AUTO: 40.5 % (ref 42–52)
HGB BLD-MCNC: 13.6 G/DL (ref 14–18)
HGB UR STRIP.AUTO-MCNC: ABNORMAL MG/L
HYALINE CASTS #/AREA URNS AUTO: 1 /HPF (ref 0–8)
IMM GRANULOCYTES # BLD: 0 K/UL
KETONES UR STRIP.AUTO-MCNC: NEGATIVE MG/DL
LEUKOCYTE ESTERASE UR QL STRIP.AUTO: ABNORMAL
LYMPHOCYTES # BLD: 0.6 K/UL (ref 1.1–4.5)
LYMPHOCYTES NFR BLD: 9.7 % (ref 20–40)
MCH RBC QN AUTO: 32.2 PG (ref 27–31)
MCHC RBC AUTO-ENTMCNC: 33.6 G/DL (ref 33–37)
MCV RBC AUTO: 96 FL (ref 80–94)
MONOCYTES # BLD: 0.7 K/UL (ref 0–0.9)
MONOCYTES NFR BLD: 12 % (ref 0–10)
NEUTROPHILS # BLD: 4.6 K/UL (ref 1.5–7.5)
NEUTS SEG NFR BLD: 76.6 % (ref 50–65)
NITRITE UR QL STRIP.AUTO: NEGATIVE
PERFORMED ON: ABNORMAL
PH UR STRIP.AUTO: 7.5 [PH] (ref 5–8)
PLATELET # BLD AUTO: 184 K/UL (ref 130–400)
PMV BLD AUTO: 10.1 FL (ref 9.4–12.4)
POTASSIUM SERPL-SCNC: 4 MMOL/L (ref 3.5–5)
PROT SERPL-MCNC: 6 G/DL (ref 6.4–8.3)
PROT UR STRIP.AUTO-MCNC: 30 MG/DL
RBC # BLD AUTO: 4.22 M/UL (ref 4.7–6.1)
RBC #/AREA URNS AUTO: 34 /HPF (ref 0–4)
SODIUM SERPL-SCNC: 138 MMOL/L (ref 136–145)
SP GR UR STRIP.AUTO: 1.02 (ref 1–1.03)
UROBILINOGEN UR STRIP.AUTO-MCNC: 1 E.U./DL
WBC # BLD AUTO: 6 K/UL (ref 4.8–10.8)
WBC #/AREA URNS AUTO: 5 /HPF (ref 0–5)

## 2024-12-18 PROCEDURE — 6360000002 HC RX W HCPCS: Performed by: SURGERY

## 2024-12-18 PROCEDURE — 81001 URINALYSIS AUTO W/SCOPE: CPT

## 2024-12-18 PROCEDURE — 82962 GLUCOSE BLOOD TEST: CPT

## 2024-12-18 PROCEDURE — 2140000000 HC CCU INTERMEDIATE R&B

## 2024-12-18 PROCEDURE — 6370000000 HC RX 637 (ALT 250 FOR IP): Performed by: PSYCHIATRY & NEUROLOGY

## 2024-12-18 PROCEDURE — 94640 AIRWAY INHALATION TREATMENT: CPT

## 2024-12-18 PROCEDURE — 6360000002 HC RX W HCPCS: Performed by: HOSPITALIST

## 2024-12-18 PROCEDURE — 85025 COMPLETE CBC W/AUTO DIFF WBC: CPT

## 2024-12-18 PROCEDURE — 6360000002 HC RX W HCPCS: Performed by: PHYSICIAN ASSISTANT

## 2024-12-18 PROCEDURE — 6370000000 HC RX 637 (ALT 250 FOR IP): Performed by: SURGERY

## 2024-12-18 PROCEDURE — 2580000003 HC RX 258: Performed by: PSYCHIATRY & NEUROLOGY

## 2024-12-18 PROCEDURE — 2500000003 HC RX 250 WO HCPCS: Performed by: PSYCHIATRY & NEUROLOGY

## 2024-12-18 PROCEDURE — 6370000000 HC RX 637 (ALT 250 FOR IP): Performed by: PHYSICIAN ASSISTANT

## 2024-12-18 PROCEDURE — 93005 ELECTROCARDIOGRAM TRACING: CPT | Performed by: HOSPITALIST

## 2024-12-18 PROCEDURE — 6360000002 HC RX W HCPCS: Performed by: STUDENT IN AN ORGANIZED HEALTH CARE EDUCATION/TRAINING PROGRAM

## 2024-12-18 PROCEDURE — 2500000003 HC RX 250 WO HCPCS: Performed by: SURGERY

## 2024-12-18 PROCEDURE — 71045 X-RAY EXAM CHEST 1 VIEW: CPT

## 2024-12-18 PROCEDURE — 82140 ASSAY OF AMMONIA: CPT

## 2024-12-18 PROCEDURE — 99222 1ST HOSP IP/OBS MODERATE 55: CPT | Performed by: PSYCHIATRY & NEUROLOGY

## 2024-12-18 PROCEDURE — 80053 COMPREHEN METABOLIC PANEL: CPT

## 2024-12-18 PROCEDURE — 94760 N-INVAS EAR/PLS OXIMETRY 1: CPT

## 2024-12-18 PROCEDURE — 36415 COLL VENOUS BLD VENIPUNCTURE: CPT

## 2024-12-18 RX ORDER — HALOPERIDOL 5 MG/ML
5 INJECTION INTRAMUSCULAR ONCE
Status: COMPLETED | OUTPATIENT
Start: 2024-12-18 | End: 2024-12-18

## 2024-12-18 RX ORDER — LORAZEPAM 2 MG/ML
2 INJECTION INTRAMUSCULAR ONCE
Status: COMPLETED | OUTPATIENT
Start: 2024-12-18 | End: 2024-12-18

## 2024-12-18 RX ORDER — OLANZAPINE 5 MG/1
5 TABLET ORAL ONCE
Status: COMPLETED | OUTPATIENT
Start: 2024-12-18 | End: 2024-12-18

## 2024-12-18 RX ORDER — TRAZODONE HYDROCHLORIDE 100 MG/1
200 TABLET ORAL ONCE
Status: COMPLETED | OUTPATIENT
Start: 2024-12-18 | End: 2024-12-18

## 2024-12-18 RX ORDER — MECOBALAMIN 5000 MCG
10 TABLET,DISINTEGRATING ORAL NIGHTLY
Status: DISCONTINUED | OUTPATIENT
Start: 2024-12-18 | End: 2024-12-23 | Stop reason: HOSPADM

## 2024-12-18 RX ORDER — DIPHENHYDRAMINE HYDROCHLORIDE 50 MG/ML
50 INJECTION INTRAMUSCULAR; INTRAVENOUS ONCE
Status: COMPLETED | OUTPATIENT
Start: 2024-12-18 | End: 2024-12-18

## 2024-12-18 RX ORDER — MIDAZOLAM HYDROCHLORIDE 5 MG/ML
4 INJECTION, SOLUTION INTRAMUSCULAR; INTRAVENOUS ONCE
Status: COMPLETED | OUTPATIENT
Start: 2024-12-18 | End: 2024-12-18

## 2024-12-18 RX ORDER — TRAZODONE HYDROCHLORIDE 50 MG/1
50 TABLET, FILM COATED ORAL ONCE
Status: COMPLETED | OUTPATIENT
Start: 2024-12-18 | End: 2024-12-18

## 2024-12-18 RX ORDER — LORAZEPAM 2 MG/ML
2 INJECTION INTRAMUSCULAR EVERY 6 HOURS PRN
Status: DISCONTINUED | OUTPATIENT
Start: 2024-12-18 | End: 2024-12-18

## 2024-12-18 RX ORDER — DONEPEZIL HYDROCHLORIDE 10 MG/1
10 TABLET, FILM COATED ORAL 2 TIMES DAILY
Status: DISCONTINUED | OUTPATIENT
Start: 2024-12-18 | End: 2024-12-23 | Stop reason: HOSPADM

## 2024-12-18 RX ADMIN — SODIUM CHLORIDE, PRESERVATIVE FREE 10 ML: 5 INJECTION INTRAVENOUS at 22:26

## 2024-12-18 RX ADMIN — TRAZODONE HYDROCHLORIDE 200 MG: 100 TABLET ORAL at 16:55

## 2024-12-18 RX ADMIN — OLANZAPINE 5 MG: 5 TABLET, FILM COATED ORAL at 10:16

## 2024-12-18 RX ADMIN — AMIODARONE HYDROCHLORIDE 200 MG: 200 TABLET ORAL at 09:00

## 2024-12-18 RX ADMIN — DIPHENHYDRAMINE HYDROCHLORIDE 50 MG: 50 INJECTION INTRAMUSCULAR; INTRAVENOUS at 20:31

## 2024-12-18 RX ADMIN — SODIUM CHLORIDE 1500 MG: 9 INJECTION, SOLUTION INTRAVENOUS at 22:26

## 2024-12-18 RX ADMIN — LORAZEPAM 2 MG: 2 INJECTION INTRAMUSCULAR; INTRAVENOUS at 20:32

## 2024-12-18 RX ADMIN — DONEPEZIL HYDROCHLORIDE 10 MG: 10 TABLET, FILM COATED ORAL at 09:00

## 2024-12-18 RX ADMIN — HALOPERIDOL LACTATE 5 MG: 5 INJECTION, SOLUTION INTRAMUSCULAR at 20:31

## 2024-12-18 RX ADMIN — TRAZODONE HYDROCHLORIDE 50 MG: 50 TABLET ORAL at 03:08

## 2024-12-18 RX ADMIN — POLYETHYLENE GLYCOL (3350) 17 G: 17 POWDER, FOR SOLUTION ORAL at 09:00

## 2024-12-18 RX ADMIN — MEMANTINE HYDROCHLORIDE 10 MG: 5 TABLET ORAL at 09:00

## 2024-12-18 RX ADMIN — Medication 2 PUFF: at 06:44

## 2024-12-18 RX ADMIN — ENOXAPARIN SODIUM 30 MG: 100 INJECTION SUBCUTANEOUS at 22:26

## 2024-12-18 RX ADMIN — Medication 10 MG: at 16:54

## 2024-12-18 RX ADMIN — ENOXAPARIN SODIUM 30 MG: 100 INJECTION SUBCUTANEOUS at 09:00

## 2024-12-18 RX ADMIN — LORAZEPAM 2 MG: 2 INJECTION INTRAMUSCULAR; INTRAVENOUS at 11:18

## 2024-12-18 RX ADMIN — DOCUSATE SODIUM 100 MG: 100 CAPSULE, LIQUID FILLED ORAL at 09:00

## 2024-12-18 RX ADMIN — MIDAZOLAM 4 MG: 5 INJECTION INTRAMUSCULAR; INTRAVENOUS at 21:06

## 2024-12-18 RX ADMIN — SODIUM CHLORIDE, PRESERVATIVE FREE 10 ML: 5 INJECTION INTRAVENOUS at 09:02

## 2024-12-18 NOTE — FLOWSHEET NOTE
12/18/24 0302   Treatment Team Notification   Reason for Communication Medication concern;Review case   Name of Team Member Notified Dr. Randle   Treatment Team Role Attending Provider   Method of Communication Call   Response See orders   Notification Time 0258     Pt moved closer to nurses station earlier in the shift due to safety/fall concerns. Pt was repeatedly sitting up to the side of the bed. Reinforced multiple times to notify staff if wanting to sit up. Personal tag alarm also applied. Sitter was then obtained after moving pt closer for attempting to stand after sitting on the side of the bed. Pt not recalling education on need to call staff for assistance. Pt has since started having visual hallucinations. Is reporting that the wall behind his bed is moving and is seeing objects floating across the ceiling. Dr. Randle notified, reported pts vital signs, and that pt has slept very little the last 2 nights. Reported speaking with the wife earlier in the shift and asked if the pt had a history of dementia. Wife stated that he was boarderline.  Orders obtained. Electronically signed by Michelle Bennett RN on 12/18/2024 at 3:22 AM

## 2024-12-18 NOTE — CONSULTS
[facility] Consult  ?  ?  Patient: Evans Castrejon  MR#: [id number]  Account Number: [id number]   Room: 29/729-02   YOB: 1950  Date of Progress Note: 12/18/2024  Time of Note 1:25 PM  Attending Physician: Aden Randle MD  Consulting Physician: Jamie Zimmerman M.D.  ?  ?  CHIEF COMPLAINT: Confusion and insomnia  ?  HISTORY OF PRESENT ILLNESS:   This 74 y.o. male who was admitted last week for a left upper lobe wedge resection.  Has a history of known right lower lobe squamous cell carcinoma.  Has a chest tube.  According to his wife at the bedside he has not really slept in several nights at this point.  He was given a low-dose of trazodone last night.  Has been getting oxycodone intermittently.  Became agitated and trying to get out of bed and leave this morning and was given Zyprexa with no improvement.  Later on given Ativan 2 mg IV and has been delirious since then lying in bed picking at things that are not there and seemingly talking to himself at times.  I see him in the office for dementia and he takes Aricept 20 mg a day and Namenda.  Aricept has been on hold the last few days but was resumed this morning.  REVIEW OF SYSTEMS:  Constitutional - No fever or chills. No diaphoresis or significant fatigue.  HENT - No tinnitus or significant hearing loss.  Eyes - no sudden vision change or eye pain  Respiratory - no significant shortness of breath or cough  Cardiovascular - no chest pain No palpitations or significant leg swelling  Gastrointestinal - no abdominal swelling or pain.   Genitourinary - No difficulty urinating, dysuria  Musculoskeletal - no back pain or myalgia.  Skin - no color change or rash  Neurologic - No seizures. No lateralizing weakness.  Hematologic - no easy bruising or excessive bleeding.  Psychiatric - no severe anxiety or nervousness.   All other review of systems are negative.  ?  Past Medical History:      Diagnosis Date    Allergic rhinitis     Anemia

## 2024-12-19 ENCOUNTER — APPOINTMENT (OUTPATIENT)
Dept: GENERAL RADIOLOGY | Age: 74
DRG: 164 | End: 2024-12-19
Attending: SURGERY
Payer: OTHER GOVERNMENT

## 2024-12-19 LAB
ALBUMIN SERPL-MCNC: 3.3 G/DL (ref 3.5–5.2)
ALP SERPL-CCNC: 72 U/L (ref 40–129)
ALT SERPL-CCNC: 18 U/L (ref 5–41)
AMMONIA PLAS-SCNC: 40 UMOL/L (ref 16–60)
ANION GAP SERPL CALCULATED.3IONS-SCNC: 12 MMOL/L (ref 7–19)
AST SERPL-CCNC: 34 U/L (ref 5–40)
BASOPHILS # BLD: 0 K/UL (ref 0–0.2)
BASOPHILS NFR BLD: 0.3 % (ref 0–1)
BILIRUB SERPL-MCNC: 0.7 MG/DL (ref 0.2–1.2)
BUN SERPL-MCNC: 14 MG/DL (ref 8–23)
CALCIUM SERPL-MCNC: 8.7 MG/DL (ref 8.8–10.2)
CHLORIDE SERPL-SCNC: 99 MMOL/L (ref 98–111)
CO2 SERPL-SCNC: 26 MMOL/L (ref 22–29)
CREAT SERPL-MCNC: 0.9 MG/DL (ref 0.7–1.2)
EKG P AXIS: 33 DEGREES
EKG P-R INTERVAL: 140 MS
EKG Q-T INTERVAL: 374 MS
EKG QRS DURATION: 126 MS
EKG QTC CALCULATION (BAZETT): 435 MS
EKG T AXIS: -37 DEGREES
EOSINOPHIL # BLD: 0.1 K/UL (ref 0–0.6)
EOSINOPHIL NFR BLD: 1.6 % (ref 0–5)
ERYTHROCYTE [DISTWIDTH] IN BLOOD BY AUTOMATED COUNT: 12.5 % (ref 11.5–14.5)
GLUCOSE SERPL-MCNC: 118 MG/DL (ref 70–99)
HCT VFR BLD AUTO: 40.5 % (ref 42–52)
HGB BLD-MCNC: 13.6 G/DL (ref 14–18)
IMM GRANULOCYTES # BLD: 0 K/UL
LACTATE BLDV-SCNC: 1.2 MMOL/L (ref 0.5–1.9)
LYMPHOCYTES # BLD: 0.8 K/UL (ref 1.1–4.5)
LYMPHOCYTES NFR BLD: 10.8 % (ref 20–40)
MCH RBC QN AUTO: 32.2 PG (ref 27–31)
MCHC RBC AUTO-ENTMCNC: 33.6 G/DL (ref 33–37)
MCV RBC AUTO: 96 FL (ref 80–94)
MONOCYTES # BLD: 0.7 K/UL (ref 0–0.9)
MONOCYTES NFR BLD: 10.4 % (ref 0–10)
NEUTROPHILS # BLD: 5.3 K/UL (ref 1.5–7.5)
NEUTS SEG NFR BLD: 76.5 % (ref 50–65)
PHOSPHATE SERPL-MCNC: 3.6 MG/DL (ref 2.5–4.5)
PLATELET # BLD AUTO: 228 K/UL (ref 130–400)
PMV BLD AUTO: 10.8 FL (ref 9.4–12.4)
POTASSIUM SERPL-SCNC: 4.3 MMOL/L (ref 3.5–5)
PROT SERPL-MCNC: 6.2 G/DL (ref 6.4–8.3)
RBC # BLD AUTO: 4.22 M/UL (ref 4.7–6.1)
SODIUM SERPL-SCNC: 137 MMOL/L (ref 136–145)
WBC # BLD AUTO: 7 K/UL (ref 4.8–10.8)

## 2024-12-19 PROCEDURE — 2140000000 HC CCU INTERMEDIATE R&B

## 2024-12-19 PROCEDURE — 6370000000 HC RX 637 (ALT 250 FOR IP): Performed by: PSYCHIATRY & NEUROLOGY

## 2024-12-19 PROCEDURE — 99232 SBSQ HOSP IP/OBS MODERATE 35: CPT | Performed by: PSYCHIATRY & NEUROLOGY

## 2024-12-19 PROCEDURE — 36415 COLL VENOUS BLD VENIPUNCTURE: CPT

## 2024-12-19 PROCEDURE — 83605 ASSAY OF LACTIC ACID: CPT

## 2024-12-19 PROCEDURE — 82140 ASSAY OF AMMONIA: CPT

## 2024-12-19 PROCEDURE — 6370000000 HC RX 637 (ALT 250 FOR IP): Performed by: SURGERY

## 2024-12-19 PROCEDURE — 84100 ASSAY OF PHOSPHORUS: CPT

## 2024-12-19 PROCEDURE — 2700000000 HC OXYGEN THERAPY PER DAY

## 2024-12-19 PROCEDURE — 85025 COMPLETE CBC W/AUTO DIFF WBC: CPT

## 2024-12-19 PROCEDURE — 71045 X-RAY EXAM CHEST 1 VIEW: CPT

## 2024-12-19 PROCEDURE — 93010 ELECTROCARDIOGRAM REPORT: CPT | Performed by: INTERNAL MEDICINE

## 2024-12-19 PROCEDURE — 2500000003 HC RX 250 WO HCPCS: Performed by: SURGERY

## 2024-12-19 PROCEDURE — 6370000000 HC RX 637 (ALT 250 FOR IP): Performed by: PHYSICIAN ASSISTANT

## 2024-12-19 PROCEDURE — 80053 COMPREHEN METABOLIC PANEL: CPT

## 2024-12-19 PROCEDURE — 6360000002 HC RX W HCPCS: Performed by: STUDENT IN AN ORGANIZED HEALTH CARE EDUCATION/TRAINING PROGRAM

## 2024-12-19 PROCEDURE — 94640 AIRWAY INHALATION TREATMENT: CPT

## 2024-12-19 PROCEDURE — 2580000003 HC RX 258: Performed by: STUDENT IN AN ORGANIZED HEALTH CARE EDUCATION/TRAINING PROGRAM

## 2024-12-19 PROCEDURE — 94760 N-INVAS EAR/PLS OXIMETRY 1: CPT

## 2024-12-19 RX ORDER — BISACODYL 10 MG
10 SUPPOSITORY, RECTAL RECTAL DAILY PRN
Status: DISCONTINUED | OUTPATIENT
Start: 2024-12-19 | End: 2024-12-23 | Stop reason: HOSPADM

## 2024-12-19 RX ADMIN — MEMANTINE HYDROCHLORIDE 10 MG: 5 TABLET ORAL at 22:52

## 2024-12-19 RX ADMIN — AMIODARONE HYDROCHLORIDE 200 MG: 200 TABLET ORAL at 11:56

## 2024-12-19 RX ADMIN — TAMSULOSIN HYDROCHLORIDE 0.4 MG: 0.4 CAPSULE ORAL at 22:53

## 2024-12-19 RX ADMIN — SODIUM CHLORIDE, PRESERVATIVE FREE 10 ML: 5 INJECTION INTRAVENOUS at 22:53

## 2024-12-19 RX ADMIN — FUROSEMIDE 20 MG: 20 TABLET ORAL at 11:56

## 2024-12-19 RX ADMIN — OXYBUTYNIN CHLORIDE 10 MG: 5 TABLET, EXTENDED RELEASE ORAL at 22:53

## 2024-12-19 RX ADMIN — DOCUSATE SODIUM 100 MG: 100 CAPSULE, LIQUID FILLED ORAL at 11:56

## 2024-12-19 RX ADMIN — Medication 10 MG: at 22:53

## 2024-12-19 RX ADMIN — DONEPEZIL HYDROCHLORIDE 10 MG: 10 TABLET, FILM COATED ORAL at 22:53

## 2024-12-19 RX ADMIN — AMIODARONE HYDROCHLORIDE 200 MG: 200 TABLET ORAL at 22:52

## 2024-12-19 RX ADMIN — ATORVASTATIN CALCIUM 40 MG: 40 TABLET, FILM COATED ORAL at 22:53

## 2024-12-19 RX ADMIN — POLYETHYLENE GLYCOL (3350) 17 G: 17 POWDER, FOR SOLUTION ORAL at 11:45

## 2024-12-19 RX ADMIN — DOCUSATE SODIUM 100 MG: 100 CAPSULE, LIQUID FILLED ORAL at 22:53

## 2024-12-19 RX ADMIN — Medication 2 PUFF: at 06:58

## 2024-12-19 RX ADMIN — SODIUM CHLORIDE, PRESERVATIVE FREE 80 MG: 5 INJECTION INTRAVENOUS at 05:58

## 2024-12-19 RX ADMIN — MEMANTINE HYDROCHLORIDE 10 MG: 5 TABLET ORAL at 11:56

## 2024-12-19 NOTE — SIGNIFICANT EVENT
Received call that patient was no longer agitated but now lethargic and having jerking movements concerning for seizure.  I went to bedside and evaluated patient and appears to be having dystonic reaction to benadryl vs seizure.  I gave the patient 4 mg versed at bedside with almost complete resolution of symptoms but still intermittently having small jerks.  I asked nursing staff to reach out to Neurologist on the case and they suggested starting Depacon 1500 mg IV now.  Will monitor closely.  If the patient continues to have seizure like activity may need to transfer to ICU and monitor closely and possible intubation if appears to be status epilepticus.  If dystonic reaction is the cause then discontinuation of benadryl should be sufficient with intermittent use of benzodiazepines.  The reaction is rare with benadryl as benadryl or benztropine are often the treatment for dystonic reactions.  In this patient he was given Haldol 5 mg, Benadryl 50 mg, and Ativan 2 mg.  Although Haldol is often a known cause of dystonic reaction the use of benadryl with it should prevent that so in this patient I would be more concerned for the more rare Benadryl as a cause.      Will defer further work up to day time hospitalist/neurology/CT surgery.    Dr. Ye [facility]  Hospitalist, [facility].

## 2024-12-19 NOTE — CONSULTS
Hospitalist: Consultation     Date: 2024  Time: 6:50 PM    Name: Evans Castrejon   MRN: [id number]  : 1950    Code Status: Full Code   PCP: Darrell Hammond MD    Consulting Physician: Dr. Napier/Aden Osorio, APRN - CNP  Consult requested by: Dr. Randle  Reason for consult: Confusion and agitation    HPI: Pt is a 74 yr old male 5 days post op following robotic right lower lobectomy and left upper wed resection. Pt has history of dementia, copd, cad, chf, hypertension and hyperlipidemia.    Pt is confused unable to provide history. Pt's nurse relates that patient developed problems with confusion last night and hasn't been sleeping much. He has had continued problems with confusion today and has been somewhat agitated as well throwing food earlier. He was given trazodone last night as well as zyprexa and ativan without much improvement.     Past Medical History:   Diagnosis Date    Allergic rhinitis     Anemia     Asthma     CAD (coronary artery disease) 2014    sees dr. taylor    Chest tightness, discomfort, or pressure 2013  lexiscan Positive for inferior myocardial ischemia, EF 47%, 9% ischemic myocardium on stress, intermediate risk findings, AUC indication 16, AUC score 7     CHF (congestive heart failure) ([facility])     Chronic back pain     COPD (chronic obstructive pulmonary disease) ([facility])     Ex-cigarette smoker 10/27/2015    Family history of early CAD 2013    GERD (gastroesophageal reflux disease)     Heart attack (HCC) 2023    History of hernia repair     Hyperlipidemia     [facility] cholesterol    Hypertension     Memory changes     takes meds x 2    Multiple lung nodules     Neuropathy     Nicotine abuse     Osteoarthritis     Peripheral vascular disease (HCC)     Restless legs syndrome     SOB (shortness of breath)     Thyroid nodule     Unspecified sleep apnea     can't wear cpap     Past Surgical History:   Procedure Laterality Date    APPENDECTOMY

## 2024-12-20 ENCOUNTER — TELEPHONE (OUTPATIENT)
Dept: OTHER | Age: 74
End: 2024-12-20

## 2024-12-20 ENCOUNTER — APPOINTMENT (OUTPATIENT)
Dept: GENERAL RADIOLOGY | Age: 74
DRG: 164 | End: 2024-12-20
Attending: SURGERY
Payer: OTHER GOVERNMENT

## 2024-12-20 LAB
ANION GAP SERPL CALCULATED.3IONS-SCNC: 13 MMOL/L (ref 7–19)
BASOPHILS # BLD: 0 K/UL (ref 0–0.2)
BASOPHILS NFR BLD: 0.4 % (ref 0–1)
BUN SERPL-MCNC: 19 MG/DL (ref 8–23)
CALCIUM SERPL-MCNC: 8.7 MG/DL (ref 8.8–10.2)
CHLORIDE SERPL-SCNC: 99 MMOL/L (ref 98–111)
CO2 SERPL-SCNC: 24 MMOL/L (ref 22–29)
CREAT SERPL-MCNC: 0.9 MG/DL (ref 0.7–1.2)
EOSINOPHIL # BLD: 0.2 K/UL (ref 0–0.6)
EOSINOPHIL NFR BLD: 2.4 % (ref 0–5)
ERYTHROCYTE [DISTWIDTH] IN BLOOD BY AUTOMATED COUNT: 12.4 % (ref 11.5–14.5)
GLUCOSE SERPL-MCNC: 97 MG/DL (ref 70–99)
HCT VFR BLD AUTO: 38.4 % (ref 42–52)
HGB BLD-MCNC: 12.9 G/DL (ref 14–18)
IMM GRANULOCYTES # BLD: 0.1 K/UL
LYMPHOCYTES # BLD: 1.1 K/UL (ref 1.1–4.5)
LYMPHOCYTES NFR BLD: 16.7 % (ref 20–40)
MCH RBC QN AUTO: 32.2 PG (ref 27–31)
MCHC RBC AUTO-ENTMCNC: 33.6 G/DL (ref 33–37)
MCV RBC AUTO: 95.8 FL (ref 80–94)
MONOCYTES # BLD: 0.8 K/UL (ref 0–0.9)
MONOCYTES NFR BLD: 11.8 % (ref 0–10)
NEUTROPHILS # BLD: 4.6 K/UL (ref 1.5–7.5)
NEUTS SEG NFR BLD: 68 % (ref 50–65)
PLATELET # BLD AUTO: 233 K/UL (ref 130–400)
PMV BLD AUTO: 10.6 FL (ref 9.4–12.4)
POTASSIUM SERPL-SCNC: 4.2 MMOL/L (ref 3.5–5)
RBC # BLD AUTO: 4.01 M/UL (ref 4.7–6.1)
SODIUM SERPL-SCNC: 136 MMOL/L (ref 136–145)
WBC # BLD AUTO: 6.8 K/UL (ref 4.8–10.8)

## 2024-12-20 PROCEDURE — 71045 X-RAY EXAM CHEST 1 VIEW: CPT

## 2024-12-20 PROCEDURE — 2580000003 HC RX 258: Performed by: PSYCHIATRY & NEUROLOGY

## 2024-12-20 PROCEDURE — 2500000003 HC RX 250 WO HCPCS: Performed by: SURGERY

## 2024-12-20 PROCEDURE — 6360000002 HC RX W HCPCS: Performed by: PSYCHIATRY & NEUROLOGY

## 2024-12-20 PROCEDURE — 94760 N-INVAS EAR/PLS OXIMETRY 1: CPT

## 2024-12-20 PROCEDURE — 6370000000 HC RX 637 (ALT 250 FOR IP): Performed by: SURGERY

## 2024-12-20 PROCEDURE — 97530 THERAPEUTIC ACTIVITIES: CPT

## 2024-12-20 PROCEDURE — 95819 EEG AWAKE AND ASLEEP: CPT | Performed by: PSYCHIATRY & NEUROLOGY

## 2024-12-20 PROCEDURE — 6370000000 HC RX 637 (ALT 250 FOR IP): Performed by: PSYCHIATRY & NEUROLOGY

## 2024-12-20 PROCEDURE — 97110 THERAPEUTIC EXERCISES: CPT

## 2024-12-20 PROCEDURE — 2500000003 HC RX 250 WO HCPCS: Performed by: PSYCHIATRY & NEUROLOGY

## 2024-12-20 PROCEDURE — 80048 BASIC METABOLIC PNL TOTAL CA: CPT

## 2024-12-20 PROCEDURE — 99232 SBSQ HOSP IP/OBS MODERATE 35: CPT | Performed by: PSYCHIATRY & NEUROLOGY

## 2024-12-20 PROCEDURE — 2700000000 HC OXYGEN THERAPY PER DAY

## 2024-12-20 PROCEDURE — 85025 COMPLETE CBC W/AUTO DIFF WBC: CPT

## 2024-12-20 PROCEDURE — 6360000002 HC RX W HCPCS: Performed by: STUDENT IN AN ORGANIZED HEALTH CARE EDUCATION/TRAINING PROGRAM

## 2024-12-20 PROCEDURE — 94640 AIRWAY INHALATION TREATMENT: CPT

## 2024-12-20 PROCEDURE — 97166 OT EVAL MOD COMPLEX 45 MIN: CPT

## 2024-12-20 PROCEDURE — 95816 EEG AWAKE AND DROWSY: CPT

## 2024-12-20 PROCEDURE — 6370000000 HC RX 637 (ALT 250 FOR IP): Performed by: PHYSICIAN ASSISTANT

## 2024-12-20 PROCEDURE — 36415 COLL VENOUS BLD VENIPUNCTURE: CPT

## 2024-12-20 PROCEDURE — 97162 PT EVAL MOD COMPLEX 30 MIN: CPT

## 2024-12-20 PROCEDURE — 2140000000 HC CCU INTERMEDIATE R&B

## 2024-12-20 RX ORDER — HALOPERIDOL 5 MG/ML
5 INJECTION INTRAMUSCULAR ONCE
Status: COMPLETED | OUTPATIENT
Start: 2024-12-20 | End: 2024-12-20

## 2024-12-20 RX ORDER — LORAZEPAM 2 MG/ML
2 INJECTION INTRAMUSCULAR ONCE
Status: COMPLETED | OUTPATIENT
Start: 2024-12-20 | End: 2024-12-20

## 2024-12-20 RX ADMIN — DONEPEZIL HYDROCHLORIDE 10 MG: 10 TABLET, FILM COATED ORAL at 21:03

## 2024-12-20 RX ADMIN — HALOPERIDOL LACTATE 5 MG: 5 INJECTION, SOLUTION INTRAMUSCULAR at 21:03

## 2024-12-20 RX ADMIN — SODIUM CHLORIDE, PRESERVATIVE FREE 10 ML: 5 INJECTION INTRAVENOUS at 21:03

## 2024-12-20 RX ADMIN — TAMSULOSIN HYDROCHLORIDE 0.4 MG: 0.4 CAPSULE ORAL at 21:03

## 2024-12-20 RX ADMIN — SODIUM CHLORIDE, PRESERVATIVE FREE 10 ML: 5 INJECTION INTRAVENOUS at 10:08

## 2024-12-20 RX ADMIN — AMIODARONE HYDROCHLORIDE 200 MG: 200 TABLET ORAL at 21:03

## 2024-12-20 RX ADMIN — Medication 2 PUFF: at 06:53

## 2024-12-20 RX ADMIN — DONEPEZIL HYDROCHLORIDE 10 MG: 10 TABLET, FILM COATED ORAL at 10:02

## 2024-12-20 RX ADMIN — DOCUSATE SODIUM 100 MG: 100 CAPSULE, LIQUID FILLED ORAL at 10:02

## 2024-12-20 RX ADMIN — ATORVASTATIN CALCIUM 40 MG: 40 TABLET, FILM COATED ORAL at 21:03

## 2024-12-20 RX ADMIN — DOCUSATE SODIUM 100 MG: 100 CAPSULE, LIQUID FILLED ORAL at 21:03

## 2024-12-20 RX ADMIN — LORAZEPAM 2 MG: 2 INJECTION INTRAMUSCULAR; INTRAVENOUS at 03:20

## 2024-12-20 RX ADMIN — AMIODARONE HYDROCHLORIDE 200 MG: 200 TABLET ORAL at 10:02

## 2024-12-20 RX ADMIN — SODIUM CHLORIDE 1000 MG: 9 INJECTION, SOLUTION INTRAVENOUS at 09:59

## 2024-12-20 RX ADMIN — MEMANTINE HYDROCHLORIDE 10 MG: 5 TABLET ORAL at 21:03

## 2024-12-20 RX ADMIN — Medication 10 MG: at 21:03

## 2024-12-20 RX ADMIN — HALOPERIDOL LACTATE 5 MG: 5 INJECTION, SOLUTION INTRAMUSCULAR at 04:26

## 2024-12-20 RX ADMIN — MEMANTINE HYDROCHLORIDE 10 MG: 5 TABLET ORAL at 10:02

## 2024-12-21 LAB
GLUCOSE BLD-MCNC: 129 MG/DL (ref 70–99)
PERFORMED ON: ABNORMAL

## 2024-12-21 PROCEDURE — 2140000000 HC CCU INTERMEDIATE R&B

## 2024-12-21 PROCEDURE — 99232 SBSQ HOSP IP/OBS MODERATE 35: CPT | Performed by: PSYCHIATRY & NEUROLOGY

## 2024-12-21 PROCEDURE — 99024 POSTOP FOLLOW-UP VISIT: CPT | Performed by: SURGERY

## 2024-12-21 PROCEDURE — 2500000003 HC RX 250 WO HCPCS: Performed by: SURGERY

## 2024-12-21 PROCEDURE — 2700000000 HC OXYGEN THERAPY PER DAY

## 2024-12-21 PROCEDURE — 82962 GLUCOSE BLOOD TEST: CPT

## 2024-12-21 PROCEDURE — 94760 N-INVAS EAR/PLS OXIMETRY 1: CPT

## 2024-12-21 PROCEDURE — 6370000000 HC RX 637 (ALT 250 FOR IP): Performed by: SURGERY

## 2024-12-21 PROCEDURE — 94640 AIRWAY INHALATION TREATMENT: CPT

## 2024-12-21 PROCEDURE — 6370000000 HC RX 637 (ALT 250 FOR IP): Performed by: PSYCHIATRY & NEUROLOGY

## 2024-12-21 PROCEDURE — 6370000000 HC RX 637 (ALT 250 FOR IP): Performed by: PHYSICIAN ASSISTANT

## 2024-12-21 PROCEDURE — 97530 THERAPEUTIC ACTIVITIES: CPT

## 2024-12-21 RX ADMIN — DOCUSATE SODIUM 100 MG: 100 CAPSULE, LIQUID FILLED ORAL at 20:58

## 2024-12-21 RX ADMIN — DONEPEZIL HYDROCHLORIDE 10 MG: 10 TABLET, FILM COATED ORAL at 09:00

## 2024-12-21 RX ADMIN — POLYETHYLENE GLYCOL (3350) 17 G: 17 POWDER, FOR SOLUTION ORAL at 09:00

## 2024-12-21 RX ADMIN — ALBUTEROL SULFATE 2 PUFF: 90 AEROSOL, METERED RESPIRATORY (INHALATION) at 19:47

## 2024-12-21 RX ADMIN — Medication 2 PUFF: at 06:50

## 2024-12-21 RX ADMIN — AMIODARONE HYDROCHLORIDE 200 MG: 200 TABLET ORAL at 20:58

## 2024-12-21 RX ADMIN — DONEPEZIL HYDROCHLORIDE 10 MG: 10 TABLET, FILM COATED ORAL at 20:58

## 2024-12-21 RX ADMIN — AMIODARONE HYDROCHLORIDE 200 MG: 200 TABLET ORAL at 08:59

## 2024-12-21 RX ADMIN — Medication 10 MG: at 20:57

## 2024-12-21 RX ADMIN — TAMSULOSIN HYDROCHLORIDE 0.4 MG: 0.4 CAPSULE ORAL at 20:58

## 2024-12-21 RX ADMIN — ATORVASTATIN CALCIUM 40 MG: 40 TABLET, FILM COATED ORAL at 20:58

## 2024-12-21 RX ADMIN — DOCUSATE SODIUM 100 MG: 100 CAPSULE, LIQUID FILLED ORAL at 08:59

## 2024-12-21 RX ADMIN — MEMANTINE HYDROCHLORIDE 10 MG: 5 TABLET ORAL at 08:59

## 2024-12-21 RX ADMIN — MEMANTINE HYDROCHLORIDE 10 MG: 5 TABLET ORAL at 20:58

## 2024-12-21 RX ADMIN — SODIUM CHLORIDE, PRESERVATIVE FREE 10 ML: 5 INJECTION INTRAVENOUS at 20:58

## 2024-12-21 NOTE — PROCEDURES
[id number] [place] [address]                       ELECTROENCEPHALOGRAM REPORT      PATIENT NAME: MAKAYLA MCCLOUD       : 1950  MED REC NO: [id number]                          ROOM: 0729  ACCOUNT NO: [id number]                       ADMIT DATE: 2024  PROVIDER: Jesus Zimmerman MD      DATE OF SERVICE:  2024    REFERRING PHYSICIAN:  Aden Randle MD    INDICATION FOR TEST:  Altered mental status.    DESCRIPTION:  The waking background consists of a rhythmic and symmetric 7 to 8 hertz activity.  Intermittent stage 2 sleep is seen characterized by vertex sharp transients and sleep spindles.  Photic stimulation produced no abnormalities.  No epileptiform activity nor any focal lateralizing slowing was noted.    IMPRESSION:  Normal awake and drowsy appearing study.  Correlate clinically.          JESUS ZIMMERMAN MD      D:  2024 10:29:55     T:  2024 10:39:00     ABRAHAN/JESSICA  Job #:  [id number]     Doc#:  [id number]

## 2024-12-22 ENCOUNTER — APPOINTMENT (OUTPATIENT)
Dept: GENERAL RADIOLOGY | Age: 74
DRG: 164 | End: 2024-12-22
Attending: SURGERY
Payer: OTHER GOVERNMENT

## 2024-12-22 PROCEDURE — 6370000000 HC RX 637 (ALT 250 FOR IP): Performed by: PHYSICIAN ASSISTANT

## 2024-12-22 PROCEDURE — 94640 AIRWAY INHALATION TREATMENT: CPT

## 2024-12-22 PROCEDURE — 94760 N-INVAS EAR/PLS OXIMETRY 1: CPT

## 2024-12-22 PROCEDURE — 99232 SBSQ HOSP IP/OBS MODERATE 35: CPT | Performed by: PSYCHIATRY & NEUROLOGY

## 2024-12-22 PROCEDURE — 71045 X-RAY EXAM CHEST 1 VIEW: CPT

## 2024-12-22 PROCEDURE — 2140000000 HC CCU INTERMEDIATE R&B

## 2024-12-22 PROCEDURE — 99024 POSTOP FOLLOW-UP VISIT: CPT | Performed by: SURGERY

## 2024-12-22 PROCEDURE — 97530 THERAPEUTIC ACTIVITIES: CPT

## 2024-12-22 PROCEDURE — 6370000000 HC RX 637 (ALT 250 FOR IP): Performed by: PSYCHIATRY & NEUROLOGY

## 2024-12-22 PROCEDURE — 2500000003 HC RX 250 WO HCPCS: Performed by: SURGERY

## 2024-12-22 PROCEDURE — 6370000000 HC RX 637 (ALT 250 FOR IP): Performed by: SURGERY

## 2024-12-22 RX ADMIN — DONEPEZIL HYDROCHLORIDE 10 MG: 10 TABLET, FILM COATED ORAL at 21:23

## 2024-12-22 RX ADMIN — SODIUM CHLORIDE, PRESERVATIVE FREE 10 ML: 5 INJECTION INTRAVENOUS at 09:27

## 2024-12-22 RX ADMIN — TAMSULOSIN HYDROCHLORIDE 0.4 MG: 0.4 CAPSULE ORAL at 21:23

## 2024-12-22 RX ADMIN — SODIUM CHLORIDE, PRESERVATIVE FREE 10 ML: 5 INJECTION INTRAVENOUS at 21:27

## 2024-12-22 RX ADMIN — DOCUSATE SODIUM 100 MG: 100 CAPSULE, LIQUID FILLED ORAL at 21:23

## 2024-12-22 RX ADMIN — POLYETHYLENE GLYCOL (3350) 17 G: 17 POWDER, FOR SOLUTION ORAL at 09:27

## 2024-12-22 RX ADMIN — Medication 10 MG: at 21:23

## 2024-12-22 RX ADMIN — AMIODARONE HYDROCHLORIDE 200 MG: 200 TABLET ORAL at 21:22

## 2024-12-22 RX ADMIN — DOCUSATE SODIUM 100 MG: 100 CAPSULE, LIQUID FILLED ORAL at 09:27

## 2024-12-22 RX ADMIN — MEMANTINE HYDROCHLORIDE 10 MG: 5 TABLET ORAL at 09:27

## 2024-12-22 RX ADMIN — ATORVASTATIN CALCIUM 40 MG: 40 TABLET, FILM COATED ORAL at 21:23

## 2024-12-22 RX ADMIN — Medication 2 PUFF: at 07:40

## 2024-12-22 RX ADMIN — MEMANTINE HYDROCHLORIDE 10 MG: 5 TABLET ORAL at 21:23

## 2024-12-22 RX ADMIN — DONEPEZIL HYDROCHLORIDE 10 MG: 10 TABLET, FILM COATED ORAL at 09:26

## 2024-12-22 RX ADMIN — AMIODARONE HYDROCHLORIDE 200 MG: 200 TABLET ORAL at 09:26

## 2024-12-23 ENCOUNTER — APPOINTMENT (OUTPATIENT)
Dept: GENERAL RADIOLOGY | Age: 74
DRG: 164 | End: 2024-12-23
Attending: SURGERY
Payer: OTHER GOVERNMENT

## 2024-12-23 VITALS
BODY MASS INDEX: 28.1 KG/M2 | TEMPERATURE: 98.2 F | DIASTOLIC BLOOD PRESSURE: 84 MMHG | WEIGHT: 226 LBS | SYSTOLIC BLOOD PRESSURE: 121 MMHG | OXYGEN SATURATION: 95 % | HEIGHT: 75 IN | HEART RATE: 72 BPM | RESPIRATION RATE: 20 BRPM

## 2024-12-23 PROCEDURE — 97116 GAIT TRAINING THERAPY: CPT

## 2024-12-23 PROCEDURE — 6370000000 HC RX 637 (ALT 250 FOR IP): Performed by: PHYSICIAN ASSISTANT

## 2024-12-23 PROCEDURE — 6370000000 HC RX 637 (ALT 250 FOR IP): Performed by: SURGERY

## 2024-12-23 PROCEDURE — 99024 POSTOP FOLLOW-UP VISIT: CPT | Performed by: SURGERY

## 2024-12-23 PROCEDURE — 71045 X-RAY EXAM CHEST 1 VIEW: CPT

## 2024-12-23 PROCEDURE — 94760 N-INVAS EAR/PLS OXIMETRY 1: CPT

## 2024-12-23 PROCEDURE — 94640 AIRWAY INHALATION TREATMENT: CPT

## 2024-12-23 PROCEDURE — 97530 THERAPEUTIC ACTIVITIES: CPT

## 2024-12-23 PROCEDURE — 2500000003 HC RX 250 WO HCPCS: Performed by: SURGERY

## 2024-12-23 RX ORDER — OXYCODONE HYDROCHLORIDE 5 MG/1
5 TABLET ORAL EVERY 4 HOURS PRN
Qty: 30 TABLET | Refills: 0 | Status: SHIPPED | OUTPATIENT
Start: 2024-12-23 | End: 2024-12-30

## 2024-12-23 RX ORDER — AMIODARONE HYDROCHLORIDE 200 MG/1
200 TABLET ORAL 2 TIMES DAILY
Qty: 60 TABLET | Refills: 0 | Status: SHIPPED | OUTPATIENT
Start: 2024-12-23

## 2024-12-23 RX ADMIN — MEMANTINE HYDROCHLORIDE 10 MG: 5 TABLET ORAL at 08:35

## 2024-12-23 RX ADMIN — SODIUM CHLORIDE, PRESERVATIVE FREE 10 ML: 5 INJECTION INTRAVENOUS at 08:43

## 2024-12-23 RX ADMIN — Medication 2 PUFF: at 06:52

## 2024-12-23 RX ADMIN — DOCUSATE SODIUM 100 MG: 100 CAPSULE, LIQUID FILLED ORAL at 08:35

## 2024-12-23 RX ADMIN — DONEPEZIL HYDROCHLORIDE 10 MG: 10 TABLET, FILM COATED ORAL at 08:35

## 2024-12-23 RX ADMIN — AMIODARONE HYDROCHLORIDE 200 MG: 200 TABLET ORAL at 08:35

## 2024-12-23 NOTE — PROGRESS NOTES
DIS Nurse Note  SUBJECTIVE: Patient assessed secondary to elevated Deterioration Index Score.      Deterioration Index Score:  Predictive Model Details          50  Factor Value    Calculated 12/17/2024 21:19 23% Age 74 years old    Deterioration Index Model 22% Supplemental oxygen Oxygen     18% Neurological exam X     13% Charlie coma scale 14     8% Cardiac rhythm Atrial fib;Multiform PVCs     6% Potassium 4.5 mmol/L     5% Respiratory rate 18     2% Sodium 136 mmol/L     2% Hematocrit abnormal (38.2 %)     1% Pulse 88     0% WBC count 8.1 K/uL     0% Pulse oximetry 98 %     0% Temperature 97.3 °F (36.3 °C)     0% Systolic 110        Vital Signs:  Vitals:    12/17/24 1648 12/17/24 1953 12/17/24 2007 12/17/24 2013   BP: (!) 115/58  110/74    Pulse: (!) 34 90 88    Resp: 18  18 18   Temp: 98.4 °F (36.9 °C)  97.3 °F (36.3 °C)    TempSrc:   Temporal    SpO2: 96%  98%    Weight:       Height:            Provider Notified: Not Indicated    ASSESSMENT:  See previous assessment    Plan of Care: Continue current plan of care    Electronically signed by Kim Haider RN   
  [facility]      Patient:  Evans Castrejon  YOB: 1950  Date of Service: 12/22/2024  MRN: [id number]   Acct: [id number]   Primary Care Physician: Darrell Hammond MD  Advance Directive: Full Code  Admit Date: 12/13/2024       Hospital Day: 9    Chief Complaint: No chief complaint on file.      Subjective: No complaints    Objective:   VITALS:  /71   Pulse 83   Temp 96.9 °F (36.1 °C) (Temporal)   Resp 20   Ht 1.905 m (6' 3\")   Wt 102.5 kg (226 lb)   SpO2 97%   BMI 28.25 kg/m²   24HR INTAKE/OUTPUT:    Intake/Output Summary (Last 24 hours) at 12/22/2024 1126  Last data filed at 12/22/2024 0534  Gross per 24 hour   Intake 360 ml   Output 1100 ml   Net -740 ml     General: Alert, no acute distress, no family at bedside, working with therapy  Respiratory: Normal respiratory effort, CT L noted      Medications:      amiodarone Stopped (12/16/24 1108)    sodium chloride        ziprasidone (GEODON) 20 mg in sterile water 1 mL injection  20 mg IntraMUSCular Once    donepezil  10 mg Oral BID    melatonin  10 mg Oral Nightly    docusate sodium  100 mg Oral BID    polyethylene glycol  17 g Oral Daily    tiotropium-olodaterol  2 puff Inhalation Daily    amiodarone  200 mg Oral BID    atorvastatin  40 mg Oral Nightly    memantine  10 mg Oral BID    tamsulosin  0.4 mg Oral Daily    sodium chloride flush  5-40 mL IntraVENous 2 times per day    [Held by provider] enoxaparin  30 mg SubCUTAneous BID     bisacodyl, albuterol sulfate HFA, Benzocaine-Menthol, furosemide, sodium chloride flush, sodium chloride, [Held by provider] morphine, magnesium hydroxide, [Held by provider] oxyCODONE **OR** [Held by provider] oxyCODONE  ADULT DIET; Regular  ADULT ORAL NUTRITION SUPPLEMENT; Breakfast, Lunch, Dinner; Standard High Calorie/High Protein Oral Supplement         Lab and other Data:     Recent Labs     12/20/24  0131   WBC 6.8   HGB 12.9*        Recent Labs     12/20/24  0131      K 4.2 
  [facility]      Patient:  Evans Castrejon  YOB: 1950  Date of Service: 12/19/2024  MRN: [id number]   Acct: [id number]   Primary Care Physician: Darrell Hammond MD  Advance Directive: Full Code  Admit Date: 12/13/2024       Hospital Day: 6    Chief Complaint: No chief complaint on file.      Subjective:sleeping    Objective:   VITALS:  /88   Pulse 82   Temp 97.8 °F (36.6 °C) (Temporal)   Resp 16   Ht 1.905 m (6' 3\")   Wt 102.5 kg (226 lb)   SpO2 96%   BMI 28.25 kg/m²   24HR INTAKE/OUTPUT:    Intake/Output Summary (Last 24 hours) at 12/19/2024 0908  Last data filed at 12/19/2024 0618  Gross per 24 hour   Intake 110 ml   Output 2300 ml   Net -2190 ml     General: sleeping, no acute distress  HEENT: Normal except: None  Cardiac: S1-S2 regular rhythm rate WNL  Respiratory: No respiratory effort, +rales b/l, CT L noted  Abdomen: Soft nontender nondistended bowel sounds present  Extremities: no edema  Skin: Warm dry intact, +chest crepitus      Medications:      amiodarone Stopped (12/16/24 1108)    sodium chloride        donepezil  10 mg Oral BID    melatonin  10 mg Oral Nightly    docusate sodium  100 mg Oral BID    polyethylene glycol  17 g Oral Daily    tiotropium-olodaterol  2 puff Inhalation Daily    amiodarone  200 mg Oral BID    atorvastatin  40 mg Oral Nightly    memantine  10 mg Oral BID    oxyBUTYnin  10 mg Oral Daily    tamsulosin  0.4 mg Oral Daily    sodium chloride flush  5-40 mL IntraVENous 2 times per day    [Held by provider] enoxaparin  30 mg SubCUTAneous BID     bisacodyl, albuterol sulfate HFA, Benzocaine-Menthol, furosemide, sodium chloride flush, sodium chloride, [Held by provider] morphine, magnesium hydroxide, [Held by provider] oxyCODONE **OR** [Held by provider] oxyCODONE  ADULT DIET; Regular         Lab and other Data:     Recent Labs     12/18/24  0518 12/19/24  0559   WBC 6.0 7.0   HGB 13.6* 13.6*    228     Recent Labs     12/18/24  0518 
  Progress Note    12/15/2024 6:46 AM          POD#   2    Subjective:  Mr. Castrejon has required morphine 8 mg and oxycodone 3 doses for pain in the last day. No toradol due to NSAID allergy.  PULSE OXIMETRY RANGE: SpO2  Av %  Min: 80 %  Max: 98 %  SUPPLEMENTAL O2: O2 Flow Rate (L/min): 2 L/min   Vital Signs: /60   Pulse 72   Temp 98.5 °F (36.9 °C) (Temporal)   Resp 17   Ht 1.88 m (6' 2\")   Wt 105.1 kg (231 lb 9.6 oz)   SpO2 (!) 84%   BMI 29.74 kg/m²    Temperature Range:   Temp: 98.5 °F (36.9 °C)   Temp  Av.1 °F (36.7 °C)  Min: 96.9 °F (36.1 °C)  Max: 98.8 °F (37.1 °C)                   Rhythm: normal sinus rhythm    Labs:   ABG:  No results found for: \"PHART\", \"PO2ART\", \"JLN9THZ\", \"H2XXFCKW\", \"XNI4LVM\", \"THGBART\", \"ENW8HZA\", \"BEART\"  CBC:   Recent Labs     24  0011 12/15/24  0439   WBC 9.3 8.1   HGB 13.3* 12.7*   HCT 40.4* 38.2*   MCV 97.6* 97.4*    154     BMP:   Recent Labs     24  0011 12/15/24  0439    136   K 4.5 4.5    100   CO2 20* 25   BUN 23 21   CREATININE 1.1 1.1     PT/INR: No results for input(s): \"PROTIME\", \"INR\" in the last 72 hours.  APTT: No results for input(s): \"APTT\" in the last 72 hours.  Chest X-Ray:  Left and right lung well expanded, no ptx or effusion  CT:  I/O last 3 completed shifts:  In: 4823.8 [P.O.:2180; I.V.:2143.8; IV Piggyback:500]  Out: 3401 [Urine:2843; Chest Tube:558]      Air Leak:  occasional but improving air leak  I/O last 3 completed shifts:  In: 4823.8 [P.O.:2180; I.V.:2143.8; IV Piggyback:500]  Out: 3401 [Urine:2843; Chest Tube:558]  Scheduled Meds:    atorvastatin  40 mg Oral Nightly    memantine  10 mg Oral BID    oxyBUTYnin  10 mg Oral Daily    tamsulosin  0.4 mg Oral Daily    sodium chloride flush  5-40 mL IntraVENous 2 times per day    enoxaparin  30 mg SubCUTAneous BID     Continuous Infusions:    norepinephrine Stopped (24 1130)    sodium chloride      dexmedeTOMIDine HCl in NaCl Stopped (12/15/24 9557) 
  Progress Note    2024 7:30 AM          POD#   1    Subjective:  Mr. Castrejon has pain under better control - Precedex has been d/c'ed. Required morphine 6 mg and oxycodone 3 doses overnight.  PULSE OXIMETRY RANGE: SpO2  Av.6 %  Min: 87 %  Max: 100 %  SUPPLEMENTAL O2: O2 Flow Rate (L/min): 2 L/min   Vital Signs: BP (!) 101/51   Pulse 55   Temp 99.5 °F (37.5 °C) (Rectal)   Resp 11   Ht 1.88 m (6' 2\")   Wt 105.1 kg (231 lb 9.6 oz)   SpO2 95%   BMI 29.74 kg/m²    Temperature Range:   Temp: 99.5 °F (37.5 °C)   Temp  Av.6 °F (36.4 °C)  Min: 96 °F (35.6 °C)  Max: 99.5 °F (37.5 °C)                   Rhythm: normal sinus rhythm, jayashree    Labs:   ABG:  No results found for: \"PHART\", \"PO2ART\", \"UST9ECZ\", \"H2TAWSLP\", \"XTF0DBS\", \"THGBART\", \"FMU4BVQ\", \"BEART\"  CBC:   Recent Labs     24  0011   WBC 9.3   HGB 13.3*   HCT 40.4*   MCV 97.6*        BMP:   Recent Labs     24  0011      K 4.5      CO2 20*   BUN 23   CREATININE 1.1     PT/INR: No results for input(s): \"PROTIME\", \"INR\" in the last 72 hours.  APTT: No results for input(s): \"APTT\" in the last 72 hours.  Chest X-Ray:  Left lung well expanded, no effusion or ptx bilaterally   CT:  I/O last 3 completed shifts:  In: 3123.1 [P.O.:640; I.V.:; IV Piggyback:500]  Out: 1283 [Urine:968; Chest Tube:315]    Minimal output since insertion  Air Leak:  small left sided air leak  I/O last 3 completed shifts:  In: 3123.1 [P.O.:640; I.V.:1983.1; IV Piggyback:500]  Out: 1283 [Urine:968; Chest Tube:315]  Scheduled Meds:    atorvastatin  40 mg Oral Nightly    isosorbide mononitrate  60 mg Oral Nightly    memantine  10 mg Oral BID    metoprolol succinate  25 mg Oral BID    oxyBUTYnin  10 mg Oral Daily    tamsulosin  0.4 mg Oral Daily    sodium chloride flush  5-40 mL IntraVENous 2 times per day    enoxaparin  30 mg SubCUTAneous BID     Continuous Infusions:    norepinephrine 4 mcg/min (24 0630)    sodium chloride 75 mL/hr at 
 Occupational Therapy Initial Assessment  Date: 2024   Patient Name: Evans Castrejon  MRN: [id number]     : 1950    Date of Service: 2024    Discharge Recommendations:  Continue to assess pending progress, 24 hour supervision or assist, Patient would benefit from continued therapy after discharge       Assessment   Performance deficits / Impairments: Decreased functional mobility ;Decreased ADL status;Decreased ROM;Decreased strength;Decreased safe awareness;Decreased balance;Decreased posture;Decreased endurance  Assessment: Evaluation completed and tx initiated.  The patient would benefit from further skilled therapy to upgrade safety and functional independence. Pt was willing to participate with therapy. Pt had intermittent confusion and was impuslive at times. Pt inconsitently followed commands throughout. Pt t/fered from bed to BSC with min Ax2. Pt attempted to havbe BM but was only able to pass gas. Pt required assistance for hygiene. Pt would require 24/ care at this time.  Treatment Diagnosis: lung mass  Prognosis: Fair  REQUIRES OT FOLLOW-UP: Yes  Activity Tolerance  Activity Tolerance: Patient Tolerated treatment well              Patient Diagnosis(es): The encounter diagnosis was Lung mass.    Past Medical History:   Past Medical History:   Diagnosis Date    Allergic rhinitis     Anemia     Asthma     CAD (coronary artery disease) 2014    sees dr. taylor    Chest tightness, discomfort, or pressure 2013  lexiscan Positive for inferior myocardial ischemia, EF 47%, 9% ischemic myocardium on stress, intermediate risk findings, AUC indication 16, AUC score 7     CHF (congestive heart failure) ([facility])     Chronic back pain     COPD (chronic obstructive pulmonary disease) ([facility])     Ex-cigarette smoker 10/27/2015    Family history of early CAD 2013    GERD (gastroesophageal reflux disease)     Heart attack ([facility]) 2023    History of hernia repair     
....Patient:   Evans Castrejon  MR#:    [id number]   Room:    29/729-02   YOB: 1950  Date of Progress Note: 12/19/2024  Time of Note                           7:40 AM  Consulting Physician:   Jamie Zimmerman M.D.  Attending Physician:  Aden Randle MD     Chief complaint: Insomnia and confusion    S: Patient given trazodone and melatonin yesterday around 6.  I ordered it to be given around 2 PM.  It apparently was ineffective and hospitalist was consulted for this same problem and the patient was given Haldol, Benadryl and Ativan.  May have had a dystonic reaction.  Depacon 1500 mg IV given.  He appears to have been sleeping throughout the night and is sleeping this morning.  Sitter is in the room.    REVIEW OF SYSTEMS:  Unable to obtain as patient is sleeping      PHYSICAL EXAM:  /77   Pulse 81   Temp 98.2 °F (36.8 °C) (Temporal)   Resp 18   Ht 1.905 m (6' 3\")   Wt 102.5 kg (226 lb)   SpO2 96%   BMI 28.25 kg/m²     Constitutional: he appears well-developed and well-nourished.   Eyes - conjunctiva normal.  Pupils react to light  Ear, nose, throat -hearing intact to voice. No scars, masses, or lesions over external nose or ears, no atrophy of tongue  Neck-symmetric, no masses noted, no jugular vein distension  Respiration- chest wall appears symmetric, good expansion,   normal effort without use of accessory muscles  Cardiovascular- RRR  Musculoskeletal - no significant wasting of muscles noted, no bony deformities, gait no gross ataxia  Extremities-no clubbing, cyanosis or edema  Skin - warm, dry, and intact.  No rash, erythema, or pallor.  Psychiatric - mood, affect, and behavior appear normal.      Neurology  NEUROLOGICAL EXAM:      Mental status   Sleeping restfully     Cranial Nerves   CN II- Visual fields grossly unremarkable  CN III, IV,VI-EOMI, No nystagmus, conjugate eye movements, no ptosis  CN VII-no facial asymmetry  CN VIII-Hearing intact    Motor function  Antigravity 
....Patient:   Evans Castrejon  MR#:    [id number]   Room:    29/729-02   YOB: 1950  Date of Progress Note: 12/21/2024  Time of Note                           9:26 AM  Consulting Physician:   Jamie Zimmerman M.D.  Attending Physician:  Aden Randle MD     Chief complaint: Insomnia and confusion    S: Presumably did well overnight.  He is sitting up in the bed talking and eating this morning.  Did not need Geodon.    REVIEW OF SYSTEMS:    Constitutional: No fevers No chills  Neck:No stiffness  Respiratory: No shortness of breath  Cardiovascular: No chest pain No palpitations  Gastrointestinal: No abdominal pain    Genitourinary: No Dysuria  Neurological: No headache, mild confusion       PHYSICAL EXAM:  /63   Pulse 85   Temp 97.3 °F (36.3 °C) (Temporal)   Resp 18   Ht 1.905 m (6' 3\")   Wt 102.5 kg (226 lb)   SpO2 94%   BMI 28.25 kg/m²     Constitutional: he appears well-developed and well-nourished.   Eyes - conjunctiva normal.  Pupils react to light  Ear, nose, throat -hearing intact to voice. No scars, masses, or lesions over external nose or ears, no atrophy of tongue  Neck-symmetric, no masses noted, no jugular vein distension  Respiration- chest wall appears symmetric, good expansion,   normal effort without use of accessory muscles  Cardiovascular- RRR  Musculoskeletal - no significant wasting of muscles noted, no bony deformities, gait no gross ataxia  Extremities-no clubbing, cyanosis or edema  Skin - warm, dry, and intact.  No rash, erythema, or pallor.  Psychiatric - mood, affect, and behavior appear normal.      Neurology  NEUROLOGICAL EXAM:      Mental status   Awake and alert and interactive.  Had some trouble remembering me from the office first.  He is oriented to place and time.     Cranial Nerves   CN II- Visual fields grossly unremarkable  CN III, IV,VI-EOMI, No nystagmus, conjugate eye movements, no ptosis  CN VII-no facial asymmetry  CN VIII-Hearing intact  
....Patient:   Evans Castrejon  MR#:    [id number]   Room:    29/729-02   YOB: 1950  Date of Progress Note: 12/22/2024  Time of Note                           10:20 AM  Consulting Physician:   Jamie Zimmerman M.D.  Attending Physician:  Aden Randle MD     Chief complaint: Insomnia and confusion    S: Did well overnight.  No complaints today.  Going to go home later today.      REVIEW OF SYSTEMS:    Constitutional: No fevers No chills  Neck:No stiffness  Respiratory: No shortness of breath  Cardiovascular: No chest pain No palpitations  Gastrointestinal: No abdominal pain    Genitourinary: No Dysuria  Neurological: No headache, no confusion       PHYSICAL EXAM:  /71   Pulse 83   Temp 96.9 °F (36.1 °C) (Temporal)   Resp 20   Ht 1.905 m (6' 3\")   Wt 102.5 kg (226 lb)   SpO2 97%   BMI 28.25 kg/m²     Constitutional: he appears well-developed and well-nourished.   Eyes - conjunctiva normal.  Pupils react to light  Ear, nose, throat -hearing intact to voice. No scars, masses, or lesions over external nose or ears, no atrophy of tongue  Neck-symmetric, no masses noted, no jugular vein distension  Respiration- chest wall appears symmetric, good expansion,   normal effort without use of accessory muscles  Cardiovascular- RRR  Musculoskeletal - no significant wasting of muscles noted, no bony deformities, gait no gross ataxia  Extremities-no clubbing, cyanosis or edema  Skin - warm, dry, and intact.  No rash, erythema, or pallor.  Psychiatric - mood, affect, and behavior appear normal.      Neurology  NEUROLOGICAL EXAM:      Mental status   Awake and alert.  Oriented and conversant.  Following complex commands.     Cranial Nerves   CN II- Visual fields grossly unremarkable  CN III, IV,VI-EOMI, No nystagmus, conjugate eye movements, no ptosis  CN VII-no facial asymmetry  CN VIII-Hearing intact    Motor function  Antigravity x 4         Nursing/pcp notes, imaging,labs and vitals reviewed. 
4 Eyes Skin Assessment     NAME:  Evans Castrejon  YOB: 1950  MEDICAL RECORD NUMBER:  [id number]    The patient is being assessed for  Admission    I agree that at least one RN has performed a thorough Head to Toe Skin Assessment on the patient. ALL assessment sites listed below have been assessed.      Areas assessed by both nurses:    Head, Face, Ears, Shoulders, Back, Chest, Arms, Elbows, Hands, Sacrum. Buttock, Coccyx, Ischium, Legs. Feet and Heels, and Under Medical Devices         Does the Patient have a Wound? No noted wound(s)       Mike Prevention initiated by RN: No  Wound Care Orders initiated by RN: No    Pressure Injury (Stage 3,4, Unstageable, DTI, NWPT, and Complex wounds) if present, place Wound referral order by RN under : No    New Ostomies, if present place, Ostomy referral order under : No     Nurse 1 eSignature: Electronically signed by Pat Jordan RN on 12/13/24 at 6:07 PM CST    **SHARE this note so that the co-signing nurse can place an eSignature**    Nurse 2 eSignature: Electronically signed by Riya Gonsalves RN on 12/13/24 at 6:09 PM CST   
4 Eyes Skin Assessment     NAME:  Evans Castrejon  YOB: 1950  MEDICAL RECORD NUMBER:  [id number]    The patient is being assessed for  Transfer to New Unit    I agree that at least one RN has performed a thorough Head to Toe Skin Assessment on the patient. ALL assessment sites listed below have been assessed.      Areas assessed by both nurses:    Head, Face, Ears, Shoulders, Back, Chest, Arms, Elbows, Hands, Sacrum. Buttock, Coccyx, Ischium, Legs. Feet and Heels, and Under Medical Devices         Does the Patient have a Wound? No noted wound(s)       Mike Prevention initiated by RN: Yes  Wound Care Orders initiated by RN: No    Pressure Injury (Stage 3,4, Unstageable, DTI, NWPT, and Complex wounds) if present, place Wound referral order by RN under : No    New Ostomies, if present place, Ostomy referral order under : No     Nurse 1 eSignature: Electronically signed by Justyna Samayoa RN on 12/15/24 at 4:46 PM CST    **SHARE this note so that the co-signing nurse can place an eSignature**    Nurse 2 eSignature: {Esignature:[id number]}   
Attempted to complete Evaluation; however, advised by nursing to complete at another time. Pt has been sedated due to agitation. Will complete at another date.        Electronically signed by Judi Samuel OT on 12/18/2024 at 1:25 PM   
Comprehensive Nutrition Assessment    Type and Reason for Visit:  LOS    Nutrition Recommendations/Plan:   Start Ensure Plus High Protein w/meals     Malnutrition Assessment:  Malnutrition Status:  At risk for malnutrition (12/20/24 1356)    Context:  Acute Illness     Findings of the 6 clinical characteristics of malnutrition:  Energy Intake:  50% or less of estimated energy requirements for 5 or more days  Weight Loss:  No weight loss     Body Fat Loss:  No body fat loss     Muscle Mass Loss:  No muscle mass loss    Fluid Accumulation:  No fluid accumulation     Strength:  Not Performed    Nutrition Assessment:    Pt screened for LOS day 7. Pt noted to have very little intake reported since admission. No noted significant weight changes from UBW. Pt is agreeable to ONS. Recommend Ensure Plus High Protein w/meals.    Nutrition Related Findings:    BM 12-19, Glu  Wound Type: Surgical Incision       Current Nutrition Intake & Therapies:    Average Meal Intake: 1-25%  Average Supplements Intake: None Ordered  ADULT DIET; Regular    Anthropometric Measures:  Height: 190.5 cm (6' 3\")  Ideal Body Weight (IBW): 196 lbs (89 kg)    Admission Body Weight: 102.1 kg (225 lb 1.4 oz)  Current Body Weight: 102.5 kg (225 lb 15.5 oz), 115.3 % IBW. Weight Source: Standing scale  Current BMI (kg/m2): 28.2  Usual Body Weight: 102.8 kg (226 lb 10.1 oz) (Per encounter on 11-12-24)   % Weight Change (Calculated): -0.3  Weight Adjustment For: No Adjustment   BMI Categories: Overweight (BMI 25.0-29.9)    Estimated Daily Nutrient Needs:  Energy Requirements Based On: Kcal/kg  Weight Used for Energy Requirements: Current  Energy (kcal/day): 6266-8596 (20-25/kg)  Weight Used for Protein Requirements: Ideal  Protein (g/day): 116-178  Method Used for Fluid Requirements: 1 ml/kcal  Fluid (ml/day): 2050-2563 (20-25/kg)    Nutrition Diagnosis:   in context of acute illness or injury related to inadequate protein-energy intake as 
Evans Castrejon received from 144 to room # 724 .  Mental Status: Patient is alert, coherent, logical, thought processes intact, and able to concentrate and follow conversation.   Vitals:    12/15/24 1642   BP: (!) 155/87   Pulse: 100   Resp: 17   Temp: 98.2 °F (36.8 °C)   SpO2: 92%     Placed on cardiac monitor: Yes. Box # 724 .  Belongings: Glasses, Dentures upper and lower, clothing  with patient at bedside .  Family at bedside No.  Oriented Patient to room.  Call light within reach. Yes.  Transfer was: Well tolerated by patient..    Electronically signed by Justyna Samayoa RN on 12/15/2024 at 4:45 PM     
Lg GRANADOS per Ramila ROYAL. Will monitor for pt to void post removal.   
Mental status improving. Neuro consult appreciated. EEG normal. Likely represents postop delirium and will resolve. Preop psychoactive meds resumed.  Pulmonary status is good. Oxygenating well on room air. Left sided chest tube placed on waterseal yesterday morning. Will check CXR now and remove tube if no ptx.  Continue PT support of ambulation. Stable for home discharge once he ambulates >150 ft.    ADDENDUM: CXR shows small left apical ptx. Uncertain if this was present before. Will keep tube in one more day and remove tomorrow morning if clinical and X-ray status unchanged.  
Occupational Therapy     12/23/24 1100   Subjective   Subjective Pt in bed upon arrival for therapy. Pt agreeable to participate.   Pain Assessment   Pain Assessment None - Denies Pain   Cognition   Overall Cognitive Status WFL   Orientation   Overall Orientation Status WNL   Bed Mobility Training   Bed Mobility Training Yes   Overall Level of Assistance Stand-by assistance   Transfer Training   Transfer Training Yes   Overall Level of Assistance Contact-guard assistance;Stand-by assistance   Interventions Verbal cues;Tactile cues   Balance   Sitting Intact   Standing With support  (RW)   ADL   Feeding Modified independent ;Setup   Grooming Contact guard assistance   UE Bathing Stand by assistance;Supervision   LE Bathing Contact guard assistance   UE Dressing Supervision   LE Dressing Contact guard assistance;Stand by assistance   Putting On/Taking Off Footwear Stand by assistance   Toileting Contact guard assistance;Stand by assistance   Functional Mobility Contact guard assistance;Stand by assistance   Additional Comments Per clinical observation this date.   Assessment   Assessment Tx focused on functional mobility at RW level in hallway and in room. Pt returns to recliner.   Activity Tolerance Patient tolerated treatment well   Discharge Recommendations Home with assist PRN   Occupational Therapy Plan   Times Per Week 3-5   Times Per Day Once a day   OT Plan of Care   Monday X     Electronically signed by BROOKLYN Denton on 12/23/2024 at 11:12 AM    
Occupational Therapy  Name: Evans Castrejon  MRN:  [id number]  Date of service:  12/19/2024    Pt is too lethargic to work with therapy at this time, per SHEY Dias. Will follow up at a later time.     Electronically signed by Katerin Wagner OT on 12/19/2024 at 12:05 PM   
POD#8  Less agitated. Not requiring geodon.  NSR on oral amiodarone.  No pain meds required in several days.  Chest tube with water seal trial yesterday - increased subQ air noted. Placed back on suction. Will follow.  Pulmonary status is good. Oxygenating well on room air.  Appreciate neurology input. Will continue current strategy for delirium.  
POST OP CARDIOTHORACIC SURGERY PROGRESS NOTE    Post op day: 3    SUBJECTIVE:  Mr. Castrejon is resting in bed oxygenating well on 2L NC. He states he is having discomfort to his left chest and back for the subQ air. No BM yet. Has been up walking in the room some.     /81   Pulse 92   Temp 97.5 °F (36.4 °C)   Resp 16   Ht 1.905 m (6' 3\")   Wt 102.5 kg (226 lb)   SpO2 96%   BMI 28.25 kg/m²   Average, Min, and Max for last 24 hours Vitals:  TEMPERATURE:  Temp  Av.6 °F (36.4 °C)  Min: 97 °F (36.1 °C)  Max: 98.5 °F (36.9 °C)  RESPIRATIONS RANGE: Resp  Av.4  Min: 15  Max: 30  PULSE RANGE: Pulse  Av.7  Min: 74  Max: 137  BLOOD PRESSURE RANGE:  Systolic (24hrs), Av , Min:111 , Max:155   ; Diastolic (24hrs), Av, Min:67, Max:89    PULSE OXIMETRY RANGE: SpO2  Av.4 %  Min: 91 %  Max: 100 %    I/O last 3 completed shifts:  In: 1786.1 [P.O.:1760; I.V.:26.1]  Out: 5805 [Urine:5350; Chest Tube:455]    CHEST: clear bilaterally. subQ air noted mostly to L chest and into neck and cheek slightly.     CARDIOVASCULAR: currently with RRR    INCISION: CDI    DRAINS:   Left chest tube in place to 20cm suction with 60mL/12hr serosanguineous fluid. +continuous air leak.   Right chest tube in place to water-seal with 180mL/12hr serosanguineous fluid. No air leak.    LABS:  CBC:   Lab Results   Component Value Date/Time    WBC 8.1 12/15/2024 04:39 AM    RBC 3.92 12/15/2024 04:39 AM    HGB 12.7 12/15/2024 04:39 AM    HCT 38.2 12/15/2024 04:39 AM    MCV 97.4 12/15/2024 04:39 AM    MCH 32.4 12/15/2024 04:39 AM    MCHC 33.2 12/15/2024 04:39 AM    RDW 12.9 12/15/2024 04:39 AM     12/15/2024 04:39 AM    MPV 9.9 12/15/2024 04:39 AM     BMP:    Lab Results   Component Value Date/Time     12/15/2024 04:39 AM    K 4.5 12/15/2024 04:39 AM     12/15/2024 04:39 AM    CO2 25 12/15/2024 04:39 AM    BUN 21 12/15/2024 04:39 AM    CREATININE 1.1 12/15/2024 04:39 AM    CALCIUM 8.4 12/15/2024 04:39 AM    
POST OP CARDIOTHORACIC SURGERY PROGRESS NOTE    Post op day: 4    SUBJECTIVE:  Mr. Castrejon is resting in bed appearing comfortable oxygenating well on 1L NC. He states he is feeling more comfortable today, except muhammad is uncomfortable. States he has not had BM yet and that subQ air is about the same.    /69   Pulse 87   Temp 97.3 °F (36.3 °C) (Temporal)   Resp 16   Ht 1.905 m (6' 3\")   Wt 102.5 kg (226 lb)   SpO2 97%   BMI 28.25 kg/m²   Average, Min, and Max for last 24 hours Vitals:  TEMPERATURE:  Temp  Av.4 °F (36.3 °C)  Min: 96.3 °F (35.7 °C)  Max: 98.1 °F (36.7 °C)  RESPIRATIONS RANGE: Resp  Av  Min: 16  Max: 18  PULSE RANGE: Pulse  Av.7  Min: 76  Max: 94  BLOOD PRESSURE RANGE:  Systolic (24hrs), Av , Min:90 , Max:123   ; Diastolic (24hrs), Av, Min:60, Max:86    PULSE OXIMETRY RANGE: SpO2  Av.5 %  Min: 95 %  Max: 98 %    I/O last 3 completed shifts:  In: 128 [P.O.:120; I.V.:8]  Out: 2950 [Urine:2600; Chest Tube:350]    CHEST: clear bilaterally. Stable subQ air, does appear slightly better.     CARDIOVASCULAR: RRR    INCISION: CDI    DRAINS:   Left chest tube in place to 10cm suction with 170mL/24hr serosanguineous drainage. +moderate air leak with tidaling.       CHEST XRAY: stable CXR with overlying subQ air.     ASSESSMENT:   Right lower lobe squamous cell carcinoma, left upper lobe nodule s/p RLLobectomy and NNEKA wedge resection with regional LND- pathology pending.   Hx of tobacco abuse  BPH  Chronic systolic heart failure   HTN  HLD  Vascular dementia without behavioral disturbances    PLAN:   Bowel regimen   Up and ambulate as able  Continue chest tube at 10cm suction for now.   Continue muhammad for now as well     Electronically signed by Ramila Perry PA-C on 24 at 7:28 AM CST      
POST OP CARDIOTHORACIC SURGERY PROGRESS NOTE    Post op day: 6    SUBJECTIVE:  Mr. Castrejon is sleeping soundly.     /77   Pulse 81   Temp 98.2 °F (36.8 °C) (Temporal)   Resp 18   Ht 1.905 m (6' 3\")   Wt 102.5 kg (226 lb)   SpO2 96%   BMI 28.25 kg/m²   Average, Min, and Max for last 24 hours Vitals:  TEMPERATURE:  Temp  Av.3 °F (36.8 °C)  Min: 97.9 °F (36.6 °C)  Max: 98.6 °F (37 °C)  RESPIRATIONS RANGE: Resp  Av.3  Min: 16  Max: 20  PULSE RANGE: Pulse  Av.3  Min: 81  Max: 111  BLOOD PRESSURE RANGE:  Systolic (24hrs), Av , Min:114 , Max:144   ; Diastolic (24hrs), Av, Min:70, Max:98    PULSE OXIMETRY RANGE: SpO2  Av.1 %  Min: 90 %  Max: 100 %    I/O last 3 completed shifts:  In: 110 [P.O.:100; I.V.:10]  Out: 3880 [Urine:3650; Chest Tube:230]    CHEST: persistent subQ air. Pt snoring and difficult to access lung sounds.    CARDIOVASCULAR: RRR    INCISION: CDI    DRAINS:   Left chest tube in place to 10cm suction with 200mL/24hr serosanguineous drainage. Continued +moderate air leak with tidaling.     LABS:  CBC:   Lab Results   Component Value Date/Time    WBC 7.0 2024 05:59 AM    RBC 4.22 2024 05:59 AM    HGB 13.6 2024 05:59 AM    HCT 40.5 2024 05:59 AM    MCV 96.0 2024 05:59 AM    MCH 32.2 2024 05:59 AM    MCHC 33.6 2024 05:59 AM    RDW 12.5 2024 05:59 AM     2024 05:59 AM    MPV 10.8 2024 05:59 AM     BMP:    Lab Results   Component Value Date/Time     2024 05:59 AM    K 4.3 2024 05:59 AM    CL 99 2024 05:59 AM    CO2 26 2024 05:59 AM    BUN 14 2024 05:59 AM    CREATININE 0.9 2024 05:59 AM    CALCIUM 8.7 2024 05:59 AM    LABGLOM >90 2024 05:59 AM    GLUCOSE 118 2024 05:59 AM       CHEST XRAY:  Apical airspaces noted and continued subQ air.     ASSESSMENT:   Right lower lobe squamous cell carcinoma, left upper lobe nodule s/p RLLobectomy and NNEKA wedge 
POST OP CARDIOTHORACIC SURGERY PROGRESS NOTE    Post op day: 7    SUBJECTIVE:  Mr. Castrejon is laying flat in bed appearing confused but calm this morning. Does not appear to be having any active hallucinations during assessment. Wife and sitter at bedside.     BP (!) 141/74   Pulse 83   Temp 98.2 °F (36.8 °C) (Temporal)   Resp 16   Ht 1.905 m (6' 3\")   Wt 102.5 kg (226 lb)   SpO2 96%   BMI 28.25 kg/m²   Average, Min, and Max for last 24 hours Vitals:  TEMPERATURE:  Temp  Av.9 °F (36.6 °C)  Min: 97.6 °F (36.4 °C)  Max: 98.2 °F (36.8 °C)  RESPIRATIONS RANGE: Resp  Av.3  Min: 16  Max: 18  PULSE RANGE: Pulse  Av  Min: 72  Max: 98  BLOOD PRESSURE RANGE:  Systolic (24hrs), Av , Min:115 , Max:141   ; Diastolic (24hrs), Av, Min:70, Max:85    PULSE OXIMETRY RANGE: SpO2  Av.8 %  Min: 96 %  Max: 100 %    I/O last 3 completed shifts:  In: 20 [I.V.:20]  Out: 2670 [Urine:2550; Chest Tube:120]    CHEST: clear bilaterally. Improved subQ air    CARDIOVASCULAR: RRR     INCISION: CDI    DRAINS:   Left chest tube in place 70mL/24hr serosanguineous drainage with small air leak.     LABS:  CBC:   Lab Results   Component Value Date/Time    WBC 6.8 2024 01:31 AM    RBC 4.01 2024 01:31 AM    HGB 12.9 2024 01:31 AM    HCT 38.4 2024 01:31 AM    MCV 95.8 2024 01:31 AM    MCH 32.2 2024 01:31 AM    MCHC 33.6 2024 01:31 AM    RDW 12.4 2024 01:31 AM     2024 01:31 AM    MPV 10.6 2024 01:31 AM     BMP:    Lab Results   Component Value Date/Time     2024 01:31 AM    K 4.2 2024 01:31 AM    K 4.3 2024 05:59 AM    CL 99 2024 01:31 AM    CO2 24 2024 01:31 AM    BUN 19 2024 01:31 AM    CREATININE 0.9 2024 01:31 AM    CALCIUM 8.7 2024 01:31 AM    GFRAA >59 2022 03:49 AM    LABGLOM >90 2024 01:31 AM    LABGLOM >60 2023 06:01 AM    GLUCOSE 97 2024 01:31 AM       ASSESSMENT: 
Physical Therapy     12/21/24 1400   Restrictions/Precautions   Restrictions/Precautions Fall Risk   Activity Level Up with Assist   General   Diagnosis RLL squamous cell carcinoma, NNEKA nodule   Subjective   Subjective pt agreeable to tx; wife present to assist   Pain   Pre-Pain 0   Post-Pain 0   Bed mobility   Rolling to Left Contact guard assistance;Minimal assistance   Rolling to Right Contact guard assistance;Minimal assistance   Supine to Sit Minimal assistance   Sit to Supine Minimal assistance;Contact guard assistance   Transfers   Sit to Stand Contact guard assistance   Stand to Sit Contact guard assistance   Ambulation   Surface Level tile   Device Rolling Walker   Assistance Minimal assistance   Quality of Gait unsteady   Gait Deviations Slow Kinga;Decreased step length;Decreased step height   Distance 4-5 small bilat shuffling steps EOB x 2 marching in place for 30 seconds.   Comments no true LOB but notable increase in pt overall shaking/unsteadiness as time in standing increased. quick to fatigue. would need chair follow for attempted further distance. two seated rest breaks in between for recovery.   Short Term Goals   Time Frame for Short Term Goals 2 wks   Short Term Goal 1 supine to sit indep   Short Term Goal 2 sit to stand indep   Short Term Goal 3 amb. 300' indep   Short Term Goal 4 bed to chair SBA   Activity Tolerance   Activity Tolerance Patient limited by fatigue;Patient limited by endurance   Assessment   Assessment pt able to tolerate multiple small steps EOB with RW Phillip but quick to fatigue. positioned in bed for comfort with all needs in reach. would need chair follow for attempted further distance.   PT Plan of Care   Saturday X   Safety Devices   Type of Devices Call light within reach;Gait belt;Heels elevated for pressure relief;Left in bed;Bed alarm in place;Sitter present     Electronically signed by Erik Ladd PTA on 12/21/2024 at 2:39 PM     
Physical Therapy     12/22/24 1100   Restrictions/Precautions   Restrictions/Precautions Fall Risk   Activity Level Up with Assist   General   Diagnosis RLL squamous cell carcinoma, NNEAK nodule   Subjective   Subjective pt agreeable to tx; pt eager to DC home   Observation/Palpation   Observation CT, muhammad   Bed mobility   Supine to Sit Modified independent   Sit to Supine Modified independent   Scooting Supervision   Bed Mobility Comments pt able to scoot self towards HOB   Transfers   Sit to Stand Contact guard assistance   Stand to Sit Contact guard assistance   Bed to Chair Contact guard assistance   Comment required cues each time for STS from EOB<>RW f   Ambulation   Surface Level tile   Device Rolling Walker   Assistance Contact guard assistance   Quality of Gait steady   Gait Deviations Slow Kinga;Decreased step length;Decreased step height   Distance bilat steps EOB for up to 1 min x 2 with one sitting rest break   PT Exercises   Exercise Treatment marching in place/static standing for 5-6min EOB at RW CGA before needing a sitting rest break.   Short Term Goals   Time Frame for Short Term Goals 2 wks   Short Term Goal 1 supine to sit indep   Short Term Goal 2 sit to stand indep   Short Term Goal 3 amb. 300' indep   Short Term Goal 4 bed to chair SBA   Activity Tolerance   Activity Tolerance Patient tolerated treatment well   Assessment   Assessment pt standing balance more steady this date and not as quick to fatigue. would be best to have assist of two for attempted further distance for chair follow/CT management. returned to bed in tall sitting HOB elevated as high as possible (pt declined sitting up in chair), bed alarm on, and all needs in reach.   PT Plan of Care   Sunday X   Safety Devices   Type of Devices Call light within reach;Bed alarm in place;Gait belt;Left in bed;Nurse notified     Electronically signed by Erik Ladd PTA on 12/22/2024 at 11:35 AM     
Physical Therapy    Eval attempted. Nsg requests to hold for today due to cognitive status. Will cont to follow.    Electronically signed by Katerin Anne PT on 12/18/2024 at 1:59 PM    
Physical Therapy  Facility/Department: [facility] PROGRESSIVE CARE  Physical Therapy Initial Assessment    Name: Evans Castrejon  : 1950  MRN: [id number]  Date of Service: 2024    Discharge Recommendations:  Continue to assess pending progress, 24 hour supervision or assist, Patient would benefit from continued therapy after discharge          Patient Diagnosis(es): The encounter diagnosis was Lung mass.  Past Medical History:  has a past medical history of Allergic rhinitis, Anemia, Asthma, CAD (coronary artery disease), Chest tightness, discomfort, or pressure, CHF (congestive heart failure) (HCC), Chronic back pain, COPD (chronic obstructive pulmonary disease) (HCC), Ex-cigarette smoker, Family history of early CAD, GERD (gastroesophageal reflux disease), Heart attack (HCC), History of hernia repair, Hyperlipidemia, Hypertension, Memory changes, Multiple lung nodules, Neuropathy, Nicotine abuse, Osteoarthritis, Peripheral vascular disease (HCC), Restless legs syndrome, SOB (shortness of breath), Thyroid nodule, and Unspecified sleep apnea.  Past Surgical History:  has a past surgical history that includes Appendectomy; Colonoscopy (20 yrs ago); hernia repair; Cardiac catheterization (2013  JDT); Upper gastrointestinal endoscopy; Upper gastrointestinal endoscopy (2013); Thyroidectomy; Cardiac catheterization (10/27/15  JDT); Eye surgery; vascular surgery (2017); vascular surgery (Left, 2017); Colonoscopy (2014); pr colsc flx w/removal lesion by hot bx forceps (2017); pr egd transoral biopsy single/multiple (N/A, 10/19/2017); Cardiac catheterization (2018); eye muscle surgery; Bladder surgery; Prostate surgery; Total hip arthroplasty (Right, 2022); Upper gastrointestinal endoscopy (N/A, 2023); Colonoscopy (N/A, 2023); Esophagus dilation (2023); Cardiac procedure (N/A, 10/29/2024); and Thoracoscopy (Bilateral, 2024).    Assessment  Body 
Physical Therapy  Name: Evans Castrejon  MRN:  [id number]  Date of service:  12/23/2024 12/23/24 1053   Restrictions/Precautions   Restrictions/Precautions Fall Risk   Activity Level Up with Assist   General   Chart Reviewed Yes   Family/Caregiver Present No   Referring Practitioner Aden Randle MD   Subjective   Subjective pt reports he is going home today   Pain Assessment   Pain Assessment None - Denies Pain   Oxygen Therapy   O2 Device None (Room air)   Orientation   Overall Orientation Status WNL   Bed Mobility   Supine to Sit Stand by assistance   Scooting Stand by assistance   Transfers   Sit to Stand Contact guard assistance   Stand to Sit Contact guard assistance   Bed to Chair Contact guard assistance   Comment needs cues for safety technqiue   Ambulation   Surface Level tile   Device Rolling Walker   Assistance Contact guard assistance   Quality of Gait leans left, decreased bal with turning, needs cues for safety awareness   Gait Deviations Slow Kinga;Decreased step length;Decreased step height   Distance 100'   Comments pt unsteady, left lean needs safety cues   Patient Goals    Patient Goals  go home   Short Term Goals   Time Frame for Short Term Goals 2 wks   Short Term Goal 1 supine to sit indep   Short Term Goal 2 sit to stand indep   Short Term Goal 3 amb. 300' indep   Short Term Goal 4 bed to chair SBA   Conditions Requiring Skilled Therapeutic Intervention   Body Structures, Functions, Activity Limitations Requiring Skilled Therapeutic Intervention Decreased functional mobility ;Decreased ADL status;Decreased safe awareness;Decreased cognition;Decreased strength;Decreased balance;Decreased endurance;Decreased posture   Assessment pt tfers, gt with fww, cga and cues needed for safety technique , pt with left lean with gt with fatigue observed   Discharge Recommendations Continue to assess pending progress;24 hour supervision or assist;Patient would benefit from continued therapy 
Physical Therapy  Name: Evans Castrejon  MRN:  [id number]  Date of service:  12/19/2024    Pt. too lethargic to work with therapy, per SHEY Dias. Will f/u at a later time.    Electronically signed by Donita You, PT on 12/19/2024 at 11:30 AM      
Wife notifed and updated on pts increased confusion and hallucinations. Wife states she's ok with restraints if needed. States that Dr Zimmerman is his neurologist. States that she will be here in the am. Electronically signed by Michelle Bennett RN on 12/18/2024 at 4:03 AM    
12.9 (L) 12/20/2024    HCT 38.4 (L) 12/20/2024    MCV 95.8 (H) 12/20/2024     12/20/2024     Lab Results   Component Value Date     12/20/2024    K 4.2 12/20/2024    CL 99 12/20/2024    CO2 24 12/20/2024    BUN 19 12/20/2024    CREATININE 0.9 12/20/2024    GLUCOSE 97 12/20/2024    CALCIUM 8.7 (L) 12/20/2024    BILITOT 0.7 12/19/2024    ALKPHOS 72 12/19/2024    AST 34 12/19/2024    ALT 18 12/19/2024    LABGLOM >90 12/20/2024    GFRAA >59 03/05/2022    GLOB 2.3 09/29/2016     Lab Results   Component Value Date    INR 0.99 12/10/2024    INR 0.99 08/16/2023    INR 0.95 02/10/2022     Lab Results   Component Value Date    CRP 0.20 10/31/2019       PT,OT and/or speech notes reviewed:      RECORD REVIEW: Previous medical records, medications were reviewed at today's visit    IMPRESSION: Delirium.  Likely due to sleeplessness and/or medications.  Urinalysis relatively unremarkable.  Normal ammonia level.  Would avoid Ativan and Haldol.  We will try 1 dose of Geodon this morning if okay to give since he is on amiodarone.  Will run that by primary team first.  Add 1 dose of Depacon.  Check EEG.         
12/18/2024 05:18 AM     CHEST XRAY: stable postop CXR    ASSESSMENT:   Right lower lobe squamous cell carcinoma, left upper lobe nodule s/p RLLobectomy and NNEKA wedge resection with regional LND- pathology pending.   Hx of tobacco abuse  BPH  Chronic systolic heart failure   HTN  HLD  Vascular dementia without behavioral disturbances    PLAN:   Restart home Aricept   Continue chest tube on 10cm suction   Bowel regimen   Ambulate in room     ADDENDUM: SHEY Jasso notified this PA-C of patient's increased agitation and trying to climb out of bed. Olanzapine 5mg ordered X1. RN to monitor pt response and notify provider of effectiveness.    Electronically signed by Ramila Perry PA-C on 12/18/24 at 7:23 AM CST     
endotracheal tube has been placed.  Its tip overlies the left main stem bronchus   A right chest tube, and left chest tube had been placed.  Subcutaneous emphysema is noted at the chest tube insertion site   No significant free pleural fluid or pneumothorax is appreciated                   ______________________________________    Electronically signed by: ROGERS MARCELO M.D.   Date:     12/13/2024   Time:    16:24         Micro: No results found for: \"BC\", No components found for: \"LABURINE\"  Patient Active Problem List    Diagnosis Date Noted    Non-cardiac chest pain 06/17/2013     Priority: High             Hyperlipidemia      Priority: High    Right lower lobe lung mass 12/13/2024    Lung mass 12/05/2024    Primary osteoarthritis of both knees 11/12/2024    Chronic pain of both knees 11/12/2024    RUSSO (dyspnea on exertion) 10/10/2024    Chest discomfort 10/10/2024    Substernal chest pain relieved by nitroglycerin 08/16/2023    Primary osteoarthritis of right hip 03/04/2022    Chest pain at rest 02/22/2022     History of resolved      Abnormal EKG 02/22/2022     Junctional rhythm, no clinical significance      Cigarette nicotine dependence without complication 11/18/2019    Smoker 09/11/2018    Atypical chest pain 05/29/2018    Internal hemorrhoids 09/25/2017    Rectal bleeding 08/29/2017    Altered bowel function 08/29/2017     Updating Deprecated Diagnoses      Unexplained weight loss 08/29/2017    Generalized abdominal pain 08/29/2017    Excessive gas 08/29/2017    History of adenomatous polyp of colon 08/29/2017    Anemia 08/29/2017     Updating Deprecated Diagnoses      Varicose veins of bilateral lower extremities with pain 07/11/2017    Venous insufficiency of both lower extremities 07/11/2017    Swelling of both lower extremities 07/11/2017    Chest pressure     Ex-cigarette smoker 10/27/2015    Change in bowel habits 08/08/2014    Fecal incontinence 08/08/2014    Rectal pain 08/08/2014    Pelvic pain 
results found for: \"BC\", No components found for: \"LABURINE\"  Patient Active Problem List    Diagnosis Date Noted    Non-cardiac chest pain 06/17/2013     Priority: High             Hyperlipidemia      Priority: High    Right lower lobe lung mass 12/13/2024    Lung mass 12/05/2024    Primary osteoarthritis of both knees 11/12/2024    Chronic pain of both knees 11/12/2024    RUSSO (dyspnea on exertion) 10/10/2024    Chest discomfort 10/10/2024    Substernal chest pain relieved by nitroglycerin 08/16/2023    Primary osteoarthritis of right hip 03/04/2022    Chest pain at rest 02/22/2022     History of resolved      Abnormal EKG 02/22/2022     Junctional rhythm, no clinical significance      Cigarette nicotine dependence without complication 11/18/2019    Smoker 09/11/2018    Atypical chest pain 05/29/2018    Internal hemorrhoids 09/25/2017    Rectal bleeding 08/29/2017    Altered bowel function 08/29/2017     Updating Deprecated Diagnoses      Unexplained weight loss 08/29/2017    Generalized abdominal pain 08/29/2017    Excessive gas 08/29/2017    History of adenomatous polyp of colon 08/29/2017    Anemia 08/29/2017     Updating Deprecated Diagnoses      Varicose veins of bilateral lower extremities with pain 07/11/2017    Venous insufficiency of both lower extremities 07/11/2017    Swelling of both lower extremities 07/11/2017    Chest pressure     Ex-cigarette smoker 10/27/2015    Change in bowel habits 08/08/2014    Fecal incontinence 08/08/2014    Rectal pain 08/08/2014    Pelvic pain in male 08/08/2014    Family history of colon cancer 08/08/2014    Mild CAD 04/11/2014 6/17/2013  lexiscan  Positive for inferior myocardial ischemia, EF 47%, 9% ischemia at risk  6/24/2013  Cath  100% diag with left to left collaterals, anterior lateral hypo, EF 50%  10/26/2015  DSE Positive for clinical myocardial ischemia, discordant risk findings, AUC indication 20, AUC score 7  10/27/15  Cath  100% diag with left to left

## 2024-12-23 NOTE — PLAN OF CARE
Problem: Chronic Conditions and Co-morbidities  Goal: Patient's chronic conditions and co-morbidity symptoms are monitored and maintained or improved  12/17/2024 2034 by Kim Haider RN  Outcome: Progressing  12/17/2024 1037 by Mary Nelson RN  Outcome: Progressing  Flowsheets (Taken 12/17/2024 0854)  Care Plan - Patient's Chronic Conditions and Co-Morbidity Symptoms are Monitored and Maintained or Improved: Monitor and assess patient's chronic conditions and comorbid symptoms for stability, deterioration, or improvement     Problem: Discharge Planning  Goal: Discharge to home or other facility with appropriate resources  12/17/2024 2034 by Kim Haider RN  Outcome: Progressing  12/17/2024 1037 by Mary Nelson RN  Outcome: Progressing  Flowsheets (Taken 12/17/2024 0854)  Discharge to home or other facility with appropriate resources: Identify barriers to discharge with patient and caregiver     Problem: Pain  Goal: Verbalizes/displays adequate comfort level or baseline comfort level  12/17/2024 2034 by Kmi Haider RN  Outcome: Progressing  12/17/2024 1037 by Mary Nelson RN  Outcome: Progressing     Problem: Safety - Adult  Goal: Free from fall injury  12/17/2024 2034 by Kim Haider RN  Outcome: Progressing  12/17/2024 1037 by Mary Nelson RN  Outcome: Progressing     Problem: ABCDS Injury Assessment  Goal: Absence of physical injury  12/17/2024 2034 by Kim Haider RN  Outcome: Progressing  12/17/2024 1037 by Mary Nelson RN  Outcome: Progressing     Problem: Skin/Tissue Integrity  Goal: Absence of new skin breakdown  Description: 1.  Monitor for areas of redness and/or skin breakdown  2.  Assess vascular access sites hourly  3.  Every 4-6 hours minimum:  Change oxygen saturation probe site  4.  Every 4-6 hours:  If on nasal continuous positive airway pressure, respiratory therapy assess nares and determine need for appliance change or resting period.  12/17/2024 2034 by 
  Problem: Chronic Conditions and Co-morbidities  Goal: Patient's chronic conditions and co-morbidity symptoms are monitored and maintained or improved  12/19/2024 0218 by Danielle Varma RN  Outcome: Progressing  12/18/2024 1610 by Louisa Palacios RN  Outcome: Progressing  Flowsheets (Taken 12/18/2024 0907)  Care Plan - Patient's Chronic Conditions and Co-Morbidity Symptoms are Monitored and Maintained or Improved: Monitor and assess patient's chronic conditions and comorbid symptoms for stability, deterioration, or improvement     Problem: Discharge Planning  Goal: Discharge to home or other facility with appropriate resources  12/19/2024 0218 by Danielle Varma RN  Outcome: Progressing  12/18/2024 1610 by Louisa Palacios RN  Outcome: Progressing  Flowsheets (Taken 12/18/2024 0907)  Discharge to home or other facility with appropriate resources: Identify barriers to discharge with patient and caregiver     Problem: Pain  Goal: Verbalizes/displays adequate comfort level or baseline comfort level  12/19/2024 0218 by Danielle Varma RN  Outcome: Progressing  12/18/2024 1610 by Louisa Palacios RN  Outcome: Progressing     Problem: Safety - Adult  Goal: Free from fall injury  12/19/2024 0218 by Danielle Varma RN  Outcome: Progressing  12/18/2024 1610 by Louisa Palacios RN  Outcome: Progressing     Problem: ABCDS Injury Assessment  Goal: Absence of physical injury  12/19/2024 0218 by Danielle Varma RN  Outcome: Progressing  12/18/2024 1610 by Louisa Palacios RN  Outcome: Progressing     Problem: Skin/Tissue Integrity  Goal: Absence of new skin breakdown  12/19/2024 0218 by Danielle Varma RN  Outcome: Progressing  12/18/2024 1610 by Louisa Palacios RN  Outcome: Progressing     
  Problem: Chronic Conditions and Co-morbidities  Goal: Patient's chronic conditions and co-morbidity symptoms are monitored and maintained or improved  12/21/2024 1152 by Melissa Garcia RN  Outcome: Progressing  12/20/2024 2245 by Danielle Varma RN  Outcome: Progressing     Problem: Discharge Planning  Goal: Discharge to home or other facility with appropriate resources  12/21/2024 1152 by Melissa Garcia RN  Outcome: Progressing  12/20/2024 2245 by Danielle Varma RN  Outcome: Progressing     Problem: Pain  Goal: Verbalizes/displays adequate comfort level or baseline comfort level  12/21/2024 1152 by Melissa Garcia RN  Outcome: Progressing  12/20/2024 2245 by Danielle Varma RN  Outcome: Progressing     Problem: Safety - Adult  Goal: Free from fall injury  12/21/2024 1152 by Melissa Garcia RN  Outcome: Progressing  12/20/2024 2245 by Danielle Varma RN  Outcome: Progressing     Problem: ABCDS Injury Assessment  Goal: Absence of physical injury  12/21/2024 1152 by Melissa Garcia RN  Outcome: Progressing  12/20/2024 2245 by Danielle Varma RN  Outcome: Progressing     Problem: Skin/Tissue Integrity  Goal: Absence of new skin breakdown  Description: 1.  Monitor for areas of redness and/or skin breakdown  2.  Assess vascular access sites hourly  3.  Every 4-6 hours minimum:  Change oxygen saturation probe site  4.  Every 4-6 hours:  If on nasal continuous positive airway pressure, respiratory therapy assess nares and determine need for appliance change or resting period.  12/21/2024 1152 by Melissa Garcia RN  Outcome: Progressing  12/20/2024 2245 by Danielle Varma RN  Outcome: Progressing     Problem: Nutrition Deficit:  Goal: Optimize nutritional status  12/21/2024 1152 by Melissa Garcia RN  Outcome: Progressing  12/20/2024 2245 by Danielle Varma RN  Outcome: Progressing     
  Problem: Chronic Conditions and Co-morbidities  Goal: Patient's chronic conditions and co-morbidity symptoms are monitored and maintained or improved  12/21/2024 2140 by Randa Pérez RN  Outcome: Progressing     Problem: Discharge Planning  Goal: Discharge to home or other facility with appropriate resources  12/21/2024 2140 by Randa Pérez RN  Outcome: Progressing     Problem: Pain  Goal: Verbalizes/displays adequate comfort level or baseline comfort level  12/21/2024 2140 by Randa Pérez RN  Outcome: Progressing     Problem: Safety - Adult  Goal: Free from fall injury  12/21/2024 2140 by Randa Pérez RN  Outcome: Progressing     Problem: ABCDS Injury Assessment  Goal: Absence of physical injury  12/21/2024 2140 by Randa Pérez RN  Outcome: Progressing     Problem: Skin/Tissue Integrity  Goal: Absence of new skin breakdown  Description: 1.  Monitor for areas of redness and/or skin breakdown  2.  Assess vascular access sites hourly  3.  Every 4-6 hours minimum:  Change oxygen saturation probe site  4.  Every 4-6 hours:  If on nasal continuous positive airway pressure, respiratory therapy assess nares and determine need for appliance change or resting period.  12/21/2024 2140 by Randa Pérez RN  Outcome: Progressing     Problem: Nutrition Deficit:  Goal: Optimize nutritional status  12/21/2024 2140 by Randa Pérez RN  Outcome: Progressing     
  Problem: Chronic Conditions and Co-morbidities  Goal: Patient's chronic conditions and co-morbidity symptoms are monitored and maintained or improved  12/22/2024 1057 by Melissa Garcia RN  Outcome: Progressing  12/21/2024 2140 by Randa Pérez RN  Outcome: Progressing     Problem: Discharge Planning  Goal: Discharge to home or other facility with appropriate resources  12/22/2024 1057 by Melissa Garcia RN  Outcome: Progressing  12/21/2024 2140 by Randa Pérez RN  Outcome: Progressing     Problem: Pain  Goal: Verbalizes/displays adequate comfort level or baseline comfort level  12/22/2024 1057 by Melissa Garcia RN  Outcome: Progressing  12/21/2024 2140 by Randa Pérez RN  Outcome: Progressing     Problem: Safety - Adult  Goal: Free from fall injury  12/22/2024 1057 by Melissa Garcia RN  Outcome: Progressing  12/21/2024 2140 by Randa Pérez RN  Outcome: Progressing     Problem: ABCDS Injury Assessment  Goal: Absence of physical injury  12/22/2024 1057 by Melissa Garcia RN  Outcome: Progressing  12/21/2024 2140 by Randa Pérez RN  Outcome: Progressing     Problem: Skin/Tissue Integrity  Goal: Absence of new skin breakdown  Description: 1.  Monitor for areas of redness and/or skin breakdown  2.  Assess vascular access sites hourly  3.  Every 4-6 hours minimum:  Change oxygen saturation probe site  4.  Every 4-6 hours:  If on nasal continuous positive airway pressure, respiratory therapy assess nares and determine need for appliance change or resting period.  12/22/2024 1057 by Melissa Garcia RN  Outcome: Progressing  12/21/2024 2140 by Randa Pérez RN  Outcome: Progressing     Problem: Nutrition Deficit:  Goal: Optimize nutritional status  12/22/2024 1057 by Melissa Garcia RN  Outcome: Progressing  12/21/2024 2140 by Randa Pérez RN  Outcome: Progressing     
  Problem: Chronic Conditions and Co-morbidities  Goal: Patient's chronic conditions and co-morbidity symptoms are monitored and maintained or improved  12/22/2024 2319 by Randa Pérez RN  Outcome: Progressing     Problem: Discharge Planning  Goal: Discharge to home or other facility with appropriate resources  12/22/2024 2319 by Randa Pérez RN  Outcome: Progressing     Problem: Pain  Goal: Verbalizes/displays adequate comfort level or baseline comfort level  12/22/2024 2319 by Randa Pérez RN  Outcome: Progressing     Problem: Safety - Adult  Goal: Free from fall injury  12/22/2024 2319 by Randa Pérez RN  Outcome: Progressing     Problem: ABCDS Injury Assessment  Goal: Absence of physical injury  12/22/2024 2319 by Randa Pérez RN  Outcome: Progressing     Problem: Skin/Tissue Integrity  Goal: Absence of new skin breakdown  Description: 1.  Monitor for areas of redness and/or skin breakdown  2.  Assess vascular access sites hourly  3.  Every 4-6 hours minimum:  Change oxygen saturation probe site  4.  Every 4-6 hours:  If on nasal continuous positive airway pressure, respiratory therapy assess nares and determine need for appliance change or resting period.  12/22/2024 2319 by Randa Pérez RN  Outcome: Progressing     Problem: Nutrition Deficit:  Goal: Optimize nutritional status  12/22/2024 2319 by Randa Pérez RN  Outcome: Progressing     
  Problem: Chronic Conditions and Co-morbidities  Goal: Patient's chronic conditions and co-morbidity symptoms are monitored and maintained or improved  Outcome: Progressing     Problem: Discharge Planning  Goal: Discharge to home or other facility with appropriate resources  Outcome: Progressing     Problem: Pain  Goal: Verbalizes/displays adequate comfort level or baseline comfort level  Outcome: Progressing     Problem: Safety - Adult  Goal: Free from fall injury  Outcome: Progressing     Problem: ABCDS Injury Assessment  Goal: Absence of physical injury  Outcome: Progressing     Problem: Skin/Tissue Integrity  Goal: Absence of new skin breakdown  Description: 1.  Monitor for areas of redness and/or skin breakdown  2.  Assess vascular access sites hourly  3.  Every 4-6 hours minimum:  Change oxygen saturation probe site  4.  Every 4-6 hours:  If on nasal continuous positive airway pressure, respiratory therapy assess nares and determine need for appliance change or resting period.  Outcome: Progressing     
  Problem: Chronic Conditions and Co-morbidities  Goal: Patient's chronic conditions and co-morbidity symptoms are monitored and maintained or improved  Outcome: Progressing     Problem: Discharge Planning  Goal: Discharge to home or other facility with appropriate resources  Outcome: Progressing     Problem: Pain  Goal: Verbalizes/displays adequate comfort level or baseline comfort level  Outcome: Progressing     Problem: Safety - Adult  Goal: Free from fall injury  Outcome: Progressing     Problem: ABCDS Injury Assessment  Goal: Absence of physical injury  Outcome: Progressing     Problem: Skin/Tissue Integrity  Goal: Absence of new skin breakdown  Outcome: Progressing     
  Problem: Chronic Conditions and Co-morbidities  Goal: Patient's chronic conditions and co-morbidity symptoms are monitored and maintained or improved  Outcome: Progressing     Problem: Discharge Planning  Goal: Discharge to home or other facility with appropriate resources  Outcome: Progressing     Problem: Pain  Goal: Verbalizes/displays adequate comfort level or baseline comfort level  Outcome: Progressing     Problem: Safety - Adult  Goal: Free from fall injury  Outcome: Progressing     Problem: ABCDS Injury Assessment  Goal: Absence of physical injury  Outcome: Progressing     Problem: Skin/Tissue Integrity  Goal: Absence of new skin breakdown  Outcome: Progressing     Problem: Nutrition Deficit:  Goal: Optimize nutritional status  Outcome: Progressing     
  Problem: Chronic Conditions and Co-morbidities  Goal: Patient's chronic conditions and co-morbidity symptoms are monitored and maintained or improved  Outcome: Progressing  Flowsheets (Taken 12/17/2024 0854)  Care Plan - Patient's Chronic Conditions and Co-Morbidity Symptoms are Monitored and Maintained or Improved: Monitor and assess patient's chronic conditions and comorbid symptoms for stability, deterioration, or improvement     Problem: Discharge Planning  Goal: Discharge to home or other facility with appropriate resources  Outcome: Progressing  Flowsheets (Taken 12/17/2024 0854)  Discharge to home or other facility with appropriate resources: Identify barriers to discharge with patient and caregiver     Problem: Pain  Goal: Verbalizes/displays adequate comfort level or baseline comfort level  Outcome: Progressing     Problem: Safety - Adult  Goal: Free from fall injury  Outcome: Progressing     Problem: ABCDS Injury Assessment  Goal: Absence of physical injury  Outcome: Progressing     Problem: Skin/Tissue Integrity  Goal: Absence of new skin breakdown  Description: 1.  Monitor for areas of redness and/or skin breakdown  2.  Assess vascular access sites hourly  3.  Every 4-6 hours minimum:  Change oxygen saturation probe site  4.  Every 4-6 hours:  If on nasal continuous positive airway pressure, respiratory therapy assess nares and determine need for appliance change or resting period.  Outcome: Progressing     
  Problem: Chronic Conditions and Co-morbidities  Goal: Patient's chronic conditions and co-morbidity symptoms are monitored and maintained or improved  Outcome: Progressing  Flowsheets (Taken 12/18/2024 0907)  Care Plan - Patient's Chronic Conditions and Co-Morbidity Symptoms are Monitored and Maintained or Improved: Monitor and assess patient's chronic conditions and comorbid symptoms for stability, deterioration, or improvement     Problem: Discharge Planning  Goal: Discharge to home or other facility with appropriate resources  Outcome: Progressing  Flowsheets (Taken 12/18/2024 0907)  Discharge to home or other facility with appropriate resources: Identify barriers to discharge with patient and caregiver     Problem: Pain  Goal: Verbalizes/displays adequate comfort level or baseline comfort level  Outcome: Progressing     Problem: Safety - Adult  Goal: Free from fall injury  Outcome: Progressing     Problem: ABCDS Injury Assessment  Goal: Absence of physical injury  Outcome: Progressing     Problem: Skin/Tissue Integrity  Goal: Absence of new skin breakdown  Description: 1.  Monitor for areas of redness and/or skin breakdown  2.  Assess vascular access sites hourly  3.  Every 4-6 hours minimum:  Change oxygen saturation probe site  4.  Every 4-6 hours:  If on nasal continuous positive airway pressure, respiratory therapy assess nares and determine need for appliance change or resting period.  Outcome: Progressing     
Mary Nelson, RN  Outcome: Progressing  12/15/2024 2156 by Kim Haider, RN  Outcome: Progressing

## 2024-12-23 NOTE — CARE COORDINATION
12/23/24 0845   IMM Letter   IMM Letter given to Patient/Family/Significant other/Guardian/POA/by: Dulce Nichols   IMM Letter date given: 12/23/24   IMM Letter time given: 0819     Patient declined waiting 4 hr period prior to discharge.  Second IMM given to patient and explained with patient verbalizing understanding.  All questions and concerns addressed     Signed letter placed in pt soft chart  Electronically signed by BRIANNA MANCILLA on 12/23/2024 at 8:46 AM

## 2025-01-09 ENCOUNTER — OFFICE VISIT (OUTPATIENT)
Dept: CARDIOLOGY CLINIC | Age: 75
End: 2025-01-09
Payer: OTHER GOVERNMENT

## 2025-01-09 ENCOUNTER — HOSPITAL ENCOUNTER (OUTPATIENT)
Dept: LAB | Age: 75
Discharge: HOME | End: 2025-01-09
Payer: OTHER GOVERNMENT

## 2025-01-09 ENCOUNTER — HOSPITAL ENCOUNTER (OUTPATIENT)
Dept: GENERAL RADIOLOGY | Age: 75
Discharge: HOME | End: 2025-01-09
Payer: OTHER GOVERNMENT

## 2025-01-09 VITALS
WEIGHT: 220 LBS | DIASTOLIC BLOOD PRESSURE: 68 MMHG | SYSTOLIC BLOOD PRESSURE: 118 MMHG | HEART RATE: 67 BPM | BODY MASS INDEX: 27.35 KG/M2 | HEIGHT: 75 IN

## 2025-01-09 DIAGNOSIS — I25.10 CORONARY ARTERY DISEASE INVOLVING NATIVE CORONARY ARTERY OF NATIVE HEART WITHOUT ANGINA PECTORIS: ICD-10-CM

## 2025-01-09 DIAGNOSIS — I25.10 CORONARY ARTERY DISEASE INVOLVING NATIVE CORONARY ARTERY OF NATIVE HEART WITHOUT ANGINA PECTORIS: Primary | ICD-10-CM

## 2025-01-09 DIAGNOSIS — I47.20 VENTRICULAR TACHYCARDIA (HCC): ICD-10-CM

## 2025-01-09 LAB
ALBUMIN SERPL-MCNC: 3.6 G/DL (ref 3.5–5.2)
ALP SERPL-CCNC: 85 U/L (ref 40–129)
ALT SERPL-CCNC: 11 U/L (ref 5–41)
ANION GAP SERPL CALCULATED.3IONS-SCNC: 10 MMOL/L (ref 7–19)
AST SERPL-CCNC: 14 U/L (ref 5–40)
BILIRUB SERPL-MCNC: 0.3 MG/DL (ref 0.2–1.2)
BUN SERPL-MCNC: 12 MG/DL (ref 8–23)
CALCIUM SERPL-MCNC: 8.8 MG/DL (ref 8.8–10.2)
CHLORIDE SERPL-SCNC: 101 MMOL/L (ref 98–111)
CO2 SERPL-SCNC: 25 MMOL/L (ref 22–29)
CREAT SERPL-MCNC: 1.1 MG/DL (ref 0.7–1.2)
ERYTHROCYTE [DISTWIDTH] IN BLOOD BY AUTOMATED COUNT: 12.7 % (ref 11.5–14.5)
GLUCOSE SERPL-MCNC: 94 MG/DL (ref 70–99)
HCT VFR BLD AUTO: 40.3 % (ref 42–52)
HGB BLD-MCNC: 13.2 G/DL (ref 14–18)
MCH RBC QN AUTO: 31.4 PG (ref 27–31)
MCHC RBC AUTO-ENTMCNC: 32.8 G/DL (ref 33–37)
MCV RBC AUTO: 95.7 FL (ref 80–94)
PLATELET # BLD AUTO: 245 K/UL (ref 130–400)
PMV BLD AUTO: 9.6 FL (ref 9.4–12.4)
POTASSIUM SERPL-SCNC: 4.6 MMOL/L (ref 3.5–5)
PROT SERPL-MCNC: 6.2 G/DL (ref 6.4–8.3)
RBC # BLD AUTO: 4.21 M/UL (ref 4.7–6.1)
SODIUM SERPL-SCNC: 136 MMOL/L (ref 136–145)
T3FREE SERPL-MCNC: 2.1 PG/ML (ref 2–4.4)
T4 FREE SERPL-MCNC: 1.56 NG/DL (ref 0.93–1.7)
TSH SERPL DL<=0.005 MIU/L-ACNC: 1.79 UIU/ML (ref 0.27–4.2)
WBC # BLD AUTO: 5.3 K/UL (ref 4.8–10.8)

## 2025-01-09 PROCEDURE — 99214 OFFICE O/P EST MOD 30 MIN: CPT | Performed by: INTERNAL MEDICINE

## 2025-01-09 PROCEDURE — 3074F SYST BP LT 130 MM HG: CPT | Performed by: INTERNAL MEDICINE

## 2025-01-09 PROCEDURE — 1159F MED LIST DOCD IN RCRD: CPT | Performed by: INTERNAL MEDICINE

## 2025-01-09 PROCEDURE — 1123F ACP DISCUSS/DSCN MKR DOCD: CPT | Performed by: INTERNAL MEDICINE

## 2025-01-09 PROCEDURE — 71046 X-RAY EXAM CHEST 2 VIEWS: CPT

## 2025-01-09 PROCEDURE — 3078F DIAST BP <80 MM HG: CPT | Performed by: INTERNAL MEDICINE

## 2025-01-09 ASSESSMENT — ENCOUNTER SYMPTOMS
RESPIRATORY NEGATIVE: 1
DIARRHEA: 0
NAUSEA: 0
GASTROINTESTINAL NEGATIVE: 1
VOMITING: 0
SHORTNESS OF BREATH: 0
EYES NEGATIVE: 1

## 2025-01-09 NOTE — PROGRESS NOTES
[facility] Progress Note                            Date:  1/9/2025  Patient: Evans Castrejon  Age:  74 y.o., 1950      Reason for evaluation:         SUBJECTIVE:    Returns today follow-up assessment coronary artery disease paroxysmal atrial fibrillation antiarrhythmic drug usage hypertension hyperlipidemia overall doing well.  Denies palpitations denies anginal chest pain dyspnea or other complaints.  On amiodarone 200 twice daily will reduce to once a day routine labs as ordered.  No other complaints or issues reported.  Blood pressure 118/68 heart 67 recent catheterization 10/29/2024 reviewed normal left ventricular function mild nonobstructive disease.  Cardiac event monitor from January 23 reviewed did have brief nonsustained ventricular tachycardia but no atrial fibrillation.  No other complaints or issues reported.     Review of Systems   Constitutional: Negative.  Negative for chills, fever and unexpected weight change.   HENT: Negative.     Eyes: Negative.    Respiratory: Negative.  Negative for shortness of breath.    Cardiovascular: Negative.  Negative for chest pain.   Gastrointestinal: Negative.  Negative for diarrhea, nausea and vomiting.   Endocrine: Negative.    Genitourinary: Negative.    Musculoskeletal: Negative.    Skin: Negative.    Neurological: Negative.    All other systems reviewed and are negative.        OBJECTIVE:    /68   Pulse 67   Ht 1.905 m (6' 3\")   Wt 99.8 kg (220 lb)   BMI 27.50 kg/m²     Labs:   CBC: No results for input(s): \"WBC\", \"HGB\", \"HCT\", \"PLT\" in the last 72 hours.  BMP:No results for input(s): \"NA\", \"K\", \"CO2\", \"BUN\", \"CREATININE\", \"LABGLOM\", \"GLUCOSE\" in the last 72 hours.  BNP: No results for input(s): \"BNP\" in the last 72 hours.  PT/INR: No results for input(s): \"PROTIME\", \"INR\" in the last 72 hours.  APTT:No results for input(s): \"APTT\" in the last 72 hours.  CARDIAC ENZYMES:No results for input(s): \"CKTOTAL\", \"CKMB\",

## 2025-01-13 ENCOUNTER — HOSPITAL ENCOUNTER (OUTPATIENT)
Dept: PULMONOLOGY | Age: 75
Discharge: HOME | End: 2025-01-13
Attending: INTERNAL MEDICINE

## 2025-01-13 RX ORDER — ALBUTEROL SULFATE 0.83 MG/ML
2.5 SOLUTION RESPIRATORY (INHALATION) 2 TIMES DAILY PRN
Status: DISCONTINUED | OUTPATIENT
Start: 2025-01-13 | End: 2025-01-15 | Stop reason: HOSPADM

## 2025-01-13 NOTE — PROGRESS NOTES
Patient came and let me know that he recently had a wedge and lobectomy 12/13/24. Patient still has wound drains on both sides. He states that he will see Dr. Randle one 1/16/25. Patient states he will ask Dr. Randle how long after the drains are removed before the test can be completed. After visit summary will be printed and given to patient with my number to directly let me know when we can perform the test.

## 2025-01-15 ENCOUNTER — OFFICE VISIT (OUTPATIENT)
Dept: NEUROLOGY | Age: 75
End: 2025-01-15
Payer: MEDICARE

## 2025-01-15 VITALS
HEIGHT: 75 IN | BODY MASS INDEX: 27.35 KG/M2 | HEART RATE: 69 BPM | WEIGHT: 220 LBS | DIASTOLIC BLOOD PRESSURE: 80 MMHG | RESPIRATION RATE: 16 BRPM | OXYGEN SATURATION: 98 % | SYSTOLIC BLOOD PRESSURE: 124 MMHG

## 2025-01-15 DIAGNOSIS — G25.0 ESSENTIAL TREMOR: ICD-10-CM

## 2025-01-15 DIAGNOSIS — R41.3 MEMORY LOSS: Primary | ICD-10-CM

## 2025-01-15 DIAGNOSIS — G62.9 NEUROPATHY: ICD-10-CM

## 2025-01-15 PROCEDURE — 3074F SYST BP LT 130 MM HG: CPT | Performed by: PSYCHIATRY & NEUROLOGY

## 2025-01-15 PROCEDURE — 3017F COLORECTAL CA SCREEN DOC REV: CPT | Performed by: PSYCHIATRY & NEUROLOGY

## 2025-01-15 PROCEDURE — 1111F DSCHRG MED/CURRENT MED MERGE: CPT | Performed by: PSYCHIATRY & NEUROLOGY

## 2025-01-15 PROCEDURE — 1123F ACP DISCUSS/DSCN MKR DOCD: CPT | Performed by: PSYCHIATRY & NEUROLOGY

## 2025-01-15 PROCEDURE — 99213 OFFICE O/P EST LOW 20 MIN: CPT | Performed by: PSYCHIATRY & NEUROLOGY

## 2025-01-15 PROCEDURE — G8427 DOCREV CUR MEDS BY ELIG CLIN: HCPCS | Performed by: PSYCHIATRY & NEUROLOGY

## 2025-01-15 PROCEDURE — 3079F DIAST BP 80-89 MM HG: CPT | Performed by: PSYCHIATRY & NEUROLOGY

## 2025-01-15 PROCEDURE — G8417 CALC BMI ABV UP PARAM F/U: HCPCS | Performed by: PSYCHIATRY & NEUROLOGY

## 2025-01-15 PROCEDURE — 4004F PT TOBACCO SCREEN RCVD TLK: CPT | Performed by: PSYCHIATRY & NEUROLOGY

## 2025-01-15 PROCEDURE — 1159F MED LIST DOCD IN RCRD: CPT | Performed by: PSYCHIATRY & NEUROLOGY

## 2025-01-15 RX ORDER — DONEPEZIL HYDROCHLORIDE 10 MG/1
10 TABLET, FILM COATED ORAL 2 TIMES DAILY
Qty: 60 TABLET | Refills: 5 | Status: SHIPPED | OUTPATIENT
Start: 2025-01-15

## 2025-01-15 RX ORDER — MEMANTINE HYDROCHLORIDE 10 MG/1
10 TABLET ORAL 2 TIMES DAILY
Qty: 60 TABLET | Refills: 5 | Status: SHIPPED | OUTPATIENT
Start: 2025-01-15

## 2025-01-15 NOTE — PROGRESS NOTES
Chief Complaint   Patient presents with    Memory Loss     Patient states he had cancer removed from lungs here. Patient states memory loss is about the same       Evans Castrejon is a 74 y.o. year old male who is seen for evaluation of tremors and poor memory.  For about a year or so he has had a postural tremor in the right upper extremity.  For several years he is complained of poor short-term memory.  He has a history of exposure to agent orange, Paraquat and many other chemicals.  He has dropped 50 pounds over the past year.  He had been on Topamax and gabapentin for years for migraines.  He had a negative Zio patch for 2 weeks over a year ago.  Otherwise denies any focal neurological difficulties.  Records are reviewed.  Also takes Mysoline 25 mg at bedtime for his tremor.  Takes metoprolol 25 mg a day as well..  Recently found to have a low B12 level and he is taking oral replacement.  TSH was normal.  He is still forgetful.  Loses his keys sometimes.  He is raising a son with autism.  Tremors are mildly bothersome.    carotid Doppler study was normal 9/23.    The patient does have some anxiety as well.  Had a normal MRI of the brain 10/23.  It appears he has ongoing significant anxiety and claustrophobia.  Overall stable since his last visit.  He remains very active physically.  He was hospitalized 12/24 for a right lower lobectomy and a left upper lobe wedge resection.  His PET scan was positive and biopsies were consistent with lung cancer..  His Aricept was held and he developed a significant delirium.  Sleeplessness was felt to be playing a role as well.  Symptoms resolved prior to discharge home.  He has been stable neurologically since getting out of the hospital.  Active Ambulatory Problems     Diagnosis Date Noted    Abdominal pain, other specified site 01/25/2013    Nausea 01/25/2013    Dysphagia 01/25/2013    Weight loss 01/25/2013    Fatigue 01/25/2013    Heartburn 01/25/2013    Hoarseness

## 2025-01-16 ENCOUNTER — OFFICE VISIT (OUTPATIENT)
Dept: CARDIOTHORACIC SURGERY | Age: 75
End: 2025-01-16

## 2025-01-16 VITALS
OXYGEN SATURATION: 94 % | BODY MASS INDEX: 28.23 KG/M2 | DIASTOLIC BLOOD PRESSURE: 69 MMHG | HEART RATE: 63 BPM | SYSTOLIC BLOOD PRESSURE: 104 MMHG | HEIGHT: 74 IN | WEIGHT: 220 LBS

## 2025-01-16 DIAGNOSIS — R91.8 LUNG MASS: Primary | ICD-10-CM

## 2025-01-16 PROCEDURE — 99024 POSTOP FOLLOW-UP VISIT: CPT | Performed by: SURGERY

## 2025-01-16 NOTE — PROGRESS NOTES
Subjective   Patient ID: Evans Castrejon is a 74 y.o. male.    HPI Patient underwent RLLobectomy and NNEKA wedge resection 1 month ago. Case was discussed in tumor board due to +margin at the staple line of the RLL specimen.    Review of Systems   Constitutional:  Negative for activity change, appetite change, chills, diaphoresis, fatigue, fever and unexpected weight change.   HENT:  Negative for dental problem.    Eyes:  Negative for visual disturbance.   Respiratory:  Negative for cough, chest tightness and shortness of breath.         No hoarseness. No hemoptysis.   Cardiovascular:  Negative for chest pain, palpitations and leg swelling.        No orthopnea or PND.   Gastrointestinal:  Negative for abdominal pain, constipation, diarrhea, nausea and vomiting.   Genitourinary:  Negative for dysuria, frequency, hematuria and urgency.   Musculoskeletal:  Negative for back pain and joint swelling.   Skin: Negative.    Neurological:  Negative for dizziness, seizures, syncope, light-headedness and headaches.   Psychiatric/Behavioral:  Negative for confusion and hallucinations. The patient is not nervous/anxious.           Objective   Physical Exam  Constitutional:       Appearance: Normal appearance.   HENT:      Head: Atraumatic.      Mouth/Throat:      Mouth: Mucous membranes are moist.   Eyes:      Pupils: Pupils are equal, round, and reactive to light.   Cardiovascular:      Rate and Rhythm: Normal rate and regular rhythm.      Heart sounds: Normal heart sounds.   Pulmonary:      Effort: Pulmonary effort is normal.      Breath sounds: Normal breath sounds.   Abdominal:      General: Abdomen is flat.      Palpations: Abdomen is soft.   Musculoskeletal:         General: Normal range of motion.      Right lower leg: No edema.      Left lower leg: No edema.   Skin:     General: Skin is warm.      Capillary Refill: Capillary refill takes less than 2 seconds.   Neurological:      General: No focal deficit

## 2025-01-21 ENCOUNTER — TELEPHONE (OUTPATIENT)
Dept: CARDIAC SURGERY | Facility: CLINIC | Age: 75
End: 2025-01-21
Payer: MEDICARE

## 2025-01-21 NOTE — TELEPHONE ENCOUNTER
Received a message that pt will be due for [facility] authorization update. Upon review of pt's chart, he is now seeing Dr. Briscoe. Called pt to discuss. Pt confirms he is now seeing Dr. Briscoe and surgery has been completed by him. Pt confirms he will not need [facility] services moving forward. Canceled upcoming appointment.

## 2025-01-22 ENCOUNTER — TELEPHONE (OUTPATIENT)
Dept: CARDIOLOGY CLINIC | Age: 75
End: 2025-01-22

## 2025-01-22 NOTE — TELEPHONE ENCOUNTER
Received a call from Ramila at Dr. Mills's to advise they did receive the referral on the patient however he is a [facility] patient and they can not schedule him until they have an authorization

## 2025-01-24 NOTE — TELEPHONE ENCOUNTER
Office has received their authorization for services on 01.22.2025 with the begin date of 01.29.2025

## 2025-01-24 NOTE — TELEPHONE ENCOUNTER
It has been cleared noted in the referral that [facility] authorization request has been submitted on behalf of the patient for authorization of treatment from Dr. Mills's office.  Authorization would be sent directly to their office.

## 2025-01-27 ENCOUNTER — TELEPHONE (OUTPATIENT)
Dept: HEMATOLOGY | Age: 75
End: 2025-01-27

## 2025-01-27 NOTE — TELEPHONE ENCOUNTER
I called patient and left detailed voicemail about their appointment on 1/29/25. I made patient aware not to arrive any earlier than the appointment time and to come at the time of the follow up not the time of the lab appointment if it is different than the follow up appt time. I also made patient aware to eat and drink plenty of water to hydrate properly before coming to these appointments because this will make their lab draw much easier.  Made patient aware that we are now located at the [facility] at [address]. Located between [facility] and the [facility]. Front entrance faces [facility].

## 2025-01-27 NOTE — PROGRESS NOTES
seen, examined and reviewed this patient medication list, appropriate labs and imaging studies. I reviewed relevant medical records and others physician’s notes. I discussed the plans of care with the patient. I answered all the questions to the patient’s satisfaction. I have also reviewed the chief complaint (CC) and part of the history (History of Present Illness (HPI), Past Family Social History (PFSH), or Review of Systems (ROS) and made changes when appropriated.       (Please note that portions of this note were completed with a voice recognition program. Efforts were made to edit the dictations but occasionally words are mis-transcribed.)    Electronically signed by Tylor Looney MD on 1/29/2025 at 1:52 PM      The total time, 48 minutes I spent to see the patient today includes at least one or more of the following: preparing to see the patient by reviewing prior tests, prior notes or other relevant information, performing appropriate independent examination and evaluation, counseling, ordering of medications, tests or procedures,documenting clinic information in the electronic medical record or other health records, independently interpreting results of tests, managing test results and communicating the results to the patient/family or caregiver.

## 2025-01-28 ENCOUNTER — TELEPHONE (OUTPATIENT)
Dept: PULMONOLOGY | Age: 75
End: 2025-01-28

## 2025-01-28 NOTE — TELEPHONE ENCOUNTER
Patients wife called and stated that she needed to cancel patients PFT for 1/31/25. She stated that Dr. Randle did not want patient having test right now due to health concerns. I stated my understanding and canceled the appointment.

## 2025-01-29 ENCOUNTER — OFFICE VISIT (OUTPATIENT)
Dept: HEMATOLOGY | Age: 75
End: 2025-01-29
Payer: OTHER GOVERNMENT

## 2025-01-29 ENCOUNTER — HOSPITAL ENCOUNTER (OUTPATIENT)
Dept: INFUSION THERAPY | Age: 75
Discharge: HOME | End: 2025-01-29
Payer: OTHER GOVERNMENT

## 2025-01-29 VITALS
BODY MASS INDEX: 28.3 KG/M2 | HEIGHT: 74 IN | TEMPERATURE: 97.7 F | SYSTOLIC BLOOD PRESSURE: 122 MMHG | WEIGHT: 220.5 LBS | DIASTOLIC BLOOD PRESSURE: 70 MMHG | HEART RATE: 55 BPM | OXYGEN SATURATION: 96 %

## 2025-01-29 DIAGNOSIS — Z71.89 CARE PLAN DISCUSSED WITH PATIENT: ICD-10-CM

## 2025-01-29 DIAGNOSIS — C34.91 NSCLC OF RIGHT LUNG (HCC): ICD-10-CM

## 2025-01-29 DIAGNOSIS — C44.92 SQUAMOUS CELL CARCINOMA, KERATINIZING: ICD-10-CM

## 2025-01-29 DIAGNOSIS — C34.92 NSCLC OF LEFT LUNG (HCC): ICD-10-CM

## 2025-01-29 DIAGNOSIS — Z78.9 NEED FOR FOLLOW-UP BY SOCIAL WORKER: Primary | ICD-10-CM

## 2025-01-29 PROCEDURE — 1123F ACP DISCUSS/DSCN MKR DOCD: CPT | Performed by: INTERNAL MEDICINE

## 2025-01-29 PROCEDURE — 99213 OFFICE O/P EST LOW 20 MIN: CPT

## 2025-01-29 PROCEDURE — 99204 OFFICE O/P NEW MOD 45 MIN: CPT | Performed by: INTERNAL MEDICINE

## 2025-01-29 PROCEDURE — G2211 COMPLEX E/M VISIT ADD ON: HCPCS | Performed by: INTERNAL MEDICINE

## 2025-01-29 PROCEDURE — 3078F DIAST BP <80 MM HG: CPT | Performed by: INTERNAL MEDICINE

## 2025-01-29 PROCEDURE — 3074F SYST BP LT 130 MM HG: CPT | Performed by: INTERNAL MEDICINE

## 2025-02-10 ENCOUNTER — FOLLOWUP TELEPHONE ENCOUNTER (OUTPATIENT)
Dept: HEMATOLOGY | Age: 75
End: 2025-02-10

## 2025-02-10 NOTE — TELEPHONE ENCOUNTER
JESSICA Aguilar called patient to follow up on Distress screening. Patient did not answer this SW call. SW left message.     “Marge moore is Lucy the Oncology Social Worker with [facility]. No reason for alarm or concern regarding this call. I am calling to introduce myself, my role, and sources of support, as well as follow up on the distress screening completed at your initial visit with us. Please give me a call back at your convenience at [phone]”.

## 2025-02-13 ENCOUNTER — OFFICE VISIT (OUTPATIENT)
Age: 75
End: 2025-02-13

## 2025-02-13 VITALS — BODY MASS INDEX: 29.16 KG/M2 | HEIGHT: 73 IN | WEIGHT: 220 LBS

## 2025-02-13 DIAGNOSIS — G89.29 CHRONIC PAIN OF BOTH KNEES: ICD-10-CM

## 2025-02-13 DIAGNOSIS — M17.0 PRIMARY OSTEOARTHRITIS OF BOTH KNEES: Primary | ICD-10-CM

## 2025-02-13 DIAGNOSIS — M25.562 CHRONIC PAIN OF BOTH KNEES: ICD-10-CM

## 2025-02-13 DIAGNOSIS — M25.561 CHRONIC PAIN OF BOTH KNEES: ICD-10-CM

## 2025-02-13 RX ORDER — BETAMETHASONE SODIUM PHOSPHATE AND BETAMETHASONE ACETATE 3; 3 MG/ML; MG/ML
12 INJECTION, SUSPENSION INTRA-ARTICULAR; INTRALESIONAL; INTRAMUSCULAR; SOFT TISSUE ONCE
Status: COMPLETED | OUTPATIENT
Start: 2025-02-13 | End: 2025-02-13

## 2025-02-13 RX ORDER — TROSPIUM CHLORIDE 20 MG/1
20 TABLET, FILM COATED ORAL
COMMUNITY
Start: 2024-12-04

## 2025-02-13 RX ORDER — LIDOCAINE HYDROCHLORIDE 10 MG/ML
2.5 INJECTION, SOLUTION INFILTRATION; PERINEURAL ONCE
Status: COMPLETED | OUTPATIENT
Start: 2025-02-13 | End: 2025-02-13

## 2025-02-13 RX ORDER — BUPIVACAINE HYDROCHLORIDE 5 MG/ML
5 INJECTION, SOLUTION PERINEURAL ONCE
Status: COMPLETED | OUTPATIENT
Start: 2025-02-13 | End: 2025-02-13

## 2025-02-13 RX ADMIN — BUPIVACAINE HYDROCHLORIDE 25 MG: 5 INJECTION, SOLUTION PERINEURAL at 09:35

## 2025-02-13 RX ADMIN — BETAMETHASONE SODIUM PHOSPHATE AND BETAMETHASONE ACETATE 12 MG: 3; 3 INJECTION, SUSPENSION INTRA-ARTICULAR; INTRALESIONAL; INTRAMUSCULAR; SOFT TISSUE at 09:34

## 2025-02-13 RX ADMIN — LIDOCAINE HYDROCHLORIDE 2.5 ML: 10 INJECTION, SOLUTION INFILTRATION; PERINEURAL at 09:36

## 2025-02-13 RX ADMIN — BETAMETHASONE SODIUM PHOSPHATE AND BETAMETHASONE ACETATE 12 MG: 3; 3 INJECTION, SUSPENSION INTRA-ARTICULAR; INTRALESIONAL; INTRAMUSCULAR; SOFT TISSUE at 09:32

## 2025-02-13 RX ADMIN — BUPIVACAINE HYDROCHLORIDE 25 MG: 5 INJECTION, SOLUTION PERINEURAL at 09:36

## 2025-02-13 RX ADMIN — LIDOCAINE HYDROCHLORIDE 2.5 ML: 10 INJECTION, SOLUTION INFILTRATION; PERINEURAL at 09:37

## 2025-02-13 NOTE — PROGRESS NOTES
clinic for future injections. All questions were answered.      Electronically signed by Cullen Ceron PA-C on 2/14/2025 at 8:42 AM

## 2025-02-20 ENCOUNTER — OFFICE VISIT (OUTPATIENT)
Dept: CARDIOTHORACIC SURGERY | Age: 75
End: 2025-02-20

## 2025-02-20 VITALS
WEIGHT: 220 LBS | SYSTOLIC BLOOD PRESSURE: 128 MMHG | HEIGHT: 73 IN | BODY MASS INDEX: 29.16 KG/M2 | DIASTOLIC BLOOD PRESSURE: 78 MMHG | HEART RATE: 86 BPM | OXYGEN SATURATION: 96 %

## 2025-02-20 DIAGNOSIS — L76.82 PAIN AT SURGICAL INCISION: Primary | ICD-10-CM

## 2025-02-20 PROCEDURE — 99024 POSTOP FOLLOW-UP VISIT: CPT | Performed by: SURGERY

## 2025-02-20 RX ORDER — GABAPENTIN 300 MG/1
300 CAPSULE ORAL 2 TIMES DAILY
Qty: 90 CAPSULE | Refills: 1 | Status: SHIPPED | OUTPATIENT
Start: 2025-02-20 | End: 2025-05-21

## 2025-02-20 ASSESSMENT — ENCOUNTER SYMPTOMS
COUGH: 0
NAUSEA: 0
CHEST TIGHTNESS: 0
VOMITING: 0
BACK PAIN: 0
CONSTIPATION: 0
SHORTNESS OF BREATH: 0
ABDOMINAL PAIN: 0
DIARRHEA: 0

## 2025-02-20 NOTE — PROGRESS NOTES
Subjective   Patient ID: Evans Castrejon is a 74 y.o. male.    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 Scheduled to see rad onc based on tumor board recommendations.    Review of Systems   Constitutional:  Negative for activity change, appetite change, chills, diaphoresis, fatigue, fever and unexpected weight change.   HENT:  Negative for dental problem.    Eyes:  Negative for visual disturbance.   Respiratory:  Negative for cough, chest tightness and shortness of breath.         No hoarseness. No hemoptysis.   Cardiovascular:  Negative for chest pain, palpitations and leg swelling.        No orthopnea or PND.   Gastrointestinal:  Negative for abdominal pain, constipation, diarrhea, nausea and vomiting.   Genitourinary:  Negative for dysuria, frequency, hematuria and urgency.   Musculoskeletal:  Negative for back pain and joint swelling.   Skin: Negative.    Neurological:  Negative for dizziness, seizures, syncope, light-headedness and headaches.   Psychiatric/Behavioral:  Negative for confusion and hallucinations. The patient is not nervous/anxious.           Objective   Physical Exam  Constitutional:       Appearance: Normal appearance.   HENT:      Head: Atraumatic.      Mouth/Throat:      Mouth: Mucous membranes are moist.   Eyes:      Pupils: Pupils are equal, round, and reactive to light.   Cardiovascular:      Rate and Rhythm: Normal rate and regular rhythm.      Heart sounds: Normal heart sounds.   Pulmonary:      Effort: Pulmonary effort is normal.      Breath sounds: Normal breath sounds.   Abdominal:      General: Abdomen is flat.      Palpations: Abdomen is soft.   Musculoskeletal:         General: Normal range of motion.      Right lower leg: No edema.      Left lower leg: No edema.   Skin:     General: Skin is warm.      Capillary Refill: Capillary refill takes less than 2 seconds.   Neurological:      General: No focal deficit present.      Mental Status: He is

## 2025-03-04 PROBLEM — Z87.891 FORMER SMOKER: Status: ACTIVE | Noted: 2025-03-04

## 2025-03-04 PROBLEM — Z90.2 S/P LOBECTOMY OF LUNG: Status: ACTIVE | Noted: 2025-03-04

## 2025-03-04 NOTE — PROGRESS NOTES
[facility]  Radiation Oncology Clinic   Tab Wallace MD, FACR  Kwadwo TOBIAS  _______________________________________________  [facility]  Department of Radiation Oncology  [address] [place] [id number]  Office: [phone]  Fax: [phone]    DATE: 03/07/2025  PATIENT: Luis Kam  1950                         MEDICAL RECORD #: [id number]    REASON FOR VISIT:    Chief Complaint   Patient presents with    Lung Cancer     1. Malignant neoplasm of lower lobe of right lung    2. S/P lobectomy of lung    3. Former smoker                                            REASON FOR VISIT:    Chief Complaint   Patient presents with    Lung Cancer     Luis Kam is a 74 y.o. male with a history of bilateral stage I lung cancers status post surgical resection that has been referred to our clinic to be evaluated for consideration of postoperative radiation therapy to the left lower lung for  carcinoma extended into a stapled parenchymal surgical excision margin.  Today the patient states that he is doing well since surgery but continues to have postoperative pain in the right chest.  Otherwise he has a history of lots, status post multiple TURPs with no diagnosis of prostate cancer.         HISTORY OF PRESENT ILLNESS    06/15/2020 - PET Scan:  No neoplastic FDG activity is seen.  Since the lung nodules in question are of borderline size regarding PET/CT detection follow-up chest CT is recommended in 6 months to document stability.    06/20/2022 - PET Scan:  12 x 10 mm pulmonary nodule in the peripheral LEFT upper lobe has mild FDG uptake slightly below physiologic background. Given increase in size compared to a study from 6/15/2020, this nodule is concerning for an indolent slow-growing neoplasm. Recommend continued clinical and imaging follow-up.  No abnormal hypermetabolic activity in the neck, abdomen, or pelvis.  Subacute RIGHT  anterior fifth and sixth rib fractures with hypermetabolic activity.    07/13/2022 - Appointment with Dr. Aceves:  I discussed the patients findings and my recommendations with patient.  We discussed the differential diagnoses possible with malignancy intermediate or high in the differential.  We discussed the importance of staging such that best therapy can be selected and portend prognosis accurately.  We discussed options for care including continued surveillance verses efforts towards diagnosis and treatment.  We discussed options for diagnosis including bronchoscopy, CT-guided needle biopsy, or surgical biopsy with either cervical mediastinoscopy or thoracoscopy with resection.We discussed the value of clinical staging with a CT/PET scan.  The pros and cons of each option were discussed at length.  I believe the best option for treatment may be Left thoracoscopic wedge resection, possible lobectomy with mediastinal lymph node dissection.  The risks of the procedure were discussed to included but not limited to bleeding, infection, stroke, heart attack, anesthesia risks, need for additional procedures, great vessel injury, injury to nerve with resultant hoarseness of voice, mechanical ventilation, ICU stay, chronic pain syndromes, need for thoracotomy, need for prolonged chest tube use, and/or death.  Additionally an assessment of lung function status is warranted to evaluate his candidacy for a resection. If lung function is adequate a stress test will be needed.  All questions have been answered to the best of my ability and he is agreeable to the aforementioned plan.  A return to clinic appointment has been set a couple weeks with PFT's.      07/15/2022 - Pulmonary Function Tests:  Spirometry is consistent with a mild obstructive ventilatory defect manifested by decrease in midflows.  There is an improvement in midflows postbronchodilator but actually slight worsening of spirometry postbronchodilator  otherwise.  As a result postbronchodilator spirometry is most consistent with a moderate obstructive ventilatory defect although the decrease in the patient's FEV1 is just into the moderate range at 76% of predicted.  Lung volumes reveal hyperinflation.  There is a mild bordering on moderate diffusion impairment which when corrected for alveolar volume is still a mild diffusion impairment.  There is some flattening of the inspiratory limb of the flow volume loop which could be seen with a variable extrathoracic upper airway obstruction, clinical correlation is advised.  Arterial blood gases reveal a low normal PO2 on room air and otherwise are within normal limits.  When current studies are compared to studies from December 2, 2020, there has been slight improvement in the patient's FEV1 and no change in the patient's FVC on current prebronchodilator studies compared to previous prebronchodilator studies.  There has been a drop in both his FVC and FEV1 on current postbronchodilator studies compared to previous postbronchodilator studies.  When corrected for alveolar volume, there has been a slight drop in diffusion capacity compared to previous.    08/23/2022 - Appointment with Dr. Aceves:  Given increasing in size lung nodule and suitable lung function test for resection we will proceed with remainder of his work-up.  He does have dyspnea and known coronary disease and long-acting nitrates.  We will obtain a stress test.   Upon completion of his work-up and satisfactory risk we can proceed with wedge resection and if a lung cancer lobectomy.    I discussed the operative conduct expected postoperative course of thoracoscopy possible thoracotomy for performance of wedge resection with subsequent lobectomy with mediastinal lymph node dissection if lung cancer.    Risks, benefits, and alternatives were discussed in detail.  All questions been answered to the best my ability and he is agreeable to proceed  forward.    09/30/2022 - CT Chest with contrast:  A left upper lobe nodule has decreased in size and inflammatory disease is favored.  Benign-appearing nodules are otherwise stable. No malignant features.  Consider annual low-dose chest CT follow-up.     10/25/2022 - CT Chest:  Interval decrease in size of the subpleural pulmonary nodule in the left upper lobe now measuring 8 x 7 mm, previously 15 x 11 mm. Continued follow-up is recommended.  Stable size of pulmonary nodules an intrapulmonary lymph nodes  Emphysema. No focal consolidation. No large lymphadenopathy in the chest.  Sequela of old granulomatous disease  Healing fractures of the anterior right 5th and 6th ribs    01/18/2023 - CT Chest with contrast:  Stable bilateral pulmonary nodules. This includes two 9 mm left upper lobe juxtapleural nodules, both of which are stable. There are no new or enlarging pulmonary nodules. No suspicious adenopathy.  Lung emphysema.  1.2 cm exophytic left inferior renal pole hemorrhagic cyst versus small solid enhancing mass. Renal ultrasound recommended for further evaluation.    01/18/2023 - Appointment with :  At this time, I have recommended repeat imaging in 6 months with a new CT scan of the chest, this can be without contrast. It is noted he has lost 9 pounds unintentional since last office visit; no obvious etiology to this. Asked him to just keep track of this and if it continues to progress, he will inform me prior to the next visit. Reviewed signs and symptoms which would suggest an early assessment including new onset hemoptysis, changes in chest pain that he does already experience, recurrent or persistent pneumonia, or lastly a cough that is different or persistent. He verbalized understanding. We did discuss the value of smoking cessation for a total time of 4 minutes. He reports this is tough to quit smoking, I agreed with that, but tried to provide supportive encouragement that he do accomplish that  "goal over time. He is trying.   All questions were answered to the best of my ability and he is agreeable to proceed with imaging with active surveillance, but he does also ask \"why we can not just cut all of these nodules out.\" I discussed that while from a surgeon point of view, that proceeding with operation is gratifying, we like to proceed forward with appropriate indications. At this time with stable nodules that have waxed and waned in size, infectious etiologies more favored, preferred treatment for that would not be surgery in fact. He understands this. I will continue to work with him and see him back in 6 months.     03/06/2023 - CT Chest:  Continued interval decrease in size of the left upper lobe pulmonary nodule now measuring 6 x 5 mm, previously 8 x 7 mm on 10/25/2022 and 1.4 x 1.2 cm on 6/8/2022. Continued follow-up surveillance with chest CT in 1 year is recommended.  Stable additional noncalcified pulmonary nodules in the lower lobes measuring to 5 mm. Attention at follow-up is recommended.  Benign calcified granulomas in the right lower lobe and benign calcified right hilar lymph nodes.  New exophytic lesion in the left lateral interpolar region kidney measuring 1.5 cm. Further characterization with a renal-protocol CT or MRI of the abdomen with and without contrast is recommended to exclude a primary left renal neoplasm.    08/09/2023 - CT Chest without contrast:  No change in multiple bilateral pulmonary nodules measuring up to 9 mm.    08/16/2023 - Appointment with :  At this time, I recommend ongoing active surveillance of his lung nodules. He has previously been made aware of signs and symptoms that would prompt an earlier assessment. With that being said, given the ongoing stability, I would recommend an additional 6 months for follow-up with the intent to have him see JATINDER Ugalde in 6 months with a CT scan of the chest without contrast.  He is a non-smoker at this time and " has been congratulated on this. He is now free from smoking for 1 month. I encouraged him to speak with Dr. Patel if he does continue to have a productive cough.  Discussed that this is not unexpected after recent smoking cessation to have increasing secretions early on.  Many thanks again for the opportunity to aid in the care of your patient. I will continue to keep you apprised of care as it ensues.    02/20/2024 - CT Chest without contrast:  Single new 3 mm right lower lobe nodule. Remaining pulmonary nodules are stable measuring up to 9 mm.    09/19/2024 - PET Scan:  The PET CT examination demonstrates a low level of activity in the 9.9 mm smoothly marginated noncalcified subpleural lateral left upper lobe pulmonary nodule seen previously.  The SUV max 1.07 is consistent with low likelihood of malignancy.  Note is made however of a small paraspinous lesion situated in the infrahilar region on the right with SUV max of 2.56 which is consistent with an increased likelihood of malignancy based on level of activity.  The examination otherwise demonstrates a generally physiologic distribution of activity elsewhere in the thorax, as described in the report.   The PET CT examination demonstrates a generally stable, physiologic distribution of activity in the abdomen and pelvis, as described in the report.   The PET CT examination demonstrates a generally stable, physiologic distribution of activity in the included portions of the head and neck, as described in the report.   The PET CT examination demonstrates a physiologic distribution of activity in the included portions of the skeleton and extremities,  as described in the report.     11/26/2024 - PET Scan:  The PET CT examination demonstrates an interval increase in the size and level of activity in the subpleural nodule seen in the lateral left upper lobe from SUV max of 1.07 to 2.44 and in the spiculated nodule seen in the right azygo-esophageal recess from SUV  max of 2.59-5.82, both consistent with an increased likelihood of malignancy.  The examination otherwise demonstrates a  a generally physiologic distribution of activity in the thorax, as described in the report.   The PET CT examination demonstrates a physiologic distribution of activity in the abdomen and pelvis, as described in the report.   The PET CT examination demonstrates a physiologic distribution of activity in the included portions of the head and neck, as described in the report.   The PET CT examination demonstrates a physiologic distribution of activity in the included portions of the skeleton and extremities,  as described in the report.     12/08/2024 - Appointment with :  73 yo smoker (recently quit) who presents now with suspicious lung masses in the RAFAEL and RLL. Both have grown in size and SUV measurements over serial assessments.   The RLL nodule is particularly worrisome given the increased size and SUV readings compared to the left side. Will therefore proceed with a RLL wedge resection and then follow with a RLLobectomy if the frozen section confirms cancer. Will then proceed with RAFAEL excisional biopsy. The patient is a low risk candidate for surgery given no evidence of significant cardiac or pulmonary disease.   Will proceed next week using robotic techniques. Patient was explained the risks and benefits of the proposed plan and agreed to proceed.    12/13/2024 - Right lower lobe lobectomy and left upper lobe wedge resection with negative (0/22) lymph node dissection per Dr.Robert Briscoe/[facility] Surgery:  Lymph node, level 8 lymph node biopsy:    One lymph node, negative for evidence of malignancy.   Lymph node, level 9 lymph node biopsy:    Three lymph nodes, negative for evidence of malignancy.  Lymph node, level 11 intralobar lymph node biopsy:    Four lymph nodes, negative for evidence of malignancy.   Lymph node, level 10 posterior hilar lymph node biopsy:    At least 2 lymph  nodes, negative for evidence of malignancy.   Lymph node, level 7 carinal lymph node biopsy:    Three lymph nodes, negative for evidence of malignancy.   Lymph node, level 10 anterior hilar lymph node biopsy:    Four lymph nodes, negative for evidence of malignancy.  Lymph node, Left lower lobe peribronchial lymph node biopsy:    One lymph node, negative for evidence of malignancy.   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   Carcinoma measures 1.5 cm in greatest dimension.   Carcinoma extends into a stapled parenchymal surgical excision margin.   Negative for evidence of pleural invasion.   Bronchial and vascular surgical excision margins are negative for evidence of malignancy.   Emphysematous change identified involving nonneoplastic lung parenchyma.   Lung, left lower lobe peribronchial calcified nodule biopsy:   Calcified granuloma, negative for evidence of malignancy.   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   Carcinoma measures 1.3 cm in greatest dimension.   Negative for evidence of pleural invasion.   Tumor is located 0.1 cm from the nearest surgical excision margin.   Lymph node, level 6 lymph node biopsy:    One lymph node, negative for evidence of malignancy.   Lymph node, level 5 lymph node biopsy:    Three lymph nodes, negative for evidence of malignancy.   AJCC STAGE:  pT1b, pN0, pMx     01/16/2025 - Appointment with :  Assessment   Stage I lung cancer in the RLL and RAFAEL. S/p right lower lobectomy with +staple line margin. S/p RAFAEL wedge resection. Case discussed at our last tumor board meeting. The consensus was that rad/onc consult is required to evaluate for radiation treatment of staple line.  Plan   Referral for rad/onc consultation    01/29/2025 - Appointment with Dr. Barrientos:  Assessment & Plan  Non-small cell lung cancer SCC NSCLC-bilateral lung, stage I  The  pathology report indicates a right lower lobe lobectomy with a tumor size of 1.5 cm. The lymph nodes were negative, suggesting two primary tumors. The cancer was near the parenchymal surgical margin, but there was no evidence of pleural effusion. A biopsy of the left lower lobe and a wedge resection of the left upper lobe revealed squamous cell carcinoma, 0.1 cm near the surgical margin, with three negative lymph nodes. The PET scan did not show any additional concerning findings beyond the two identified spots. Given the stage I diagnosis, chemotherapy or immunotherapy is not recommended unless the tumor exceeds 4 cm in size. The tumor on the right side was 1.5 cm, and the one on the left was 1.3 cm, both of which are small and do not currently qualify for chemo or immunotherapy. A referral to Dr. Quick for a formal recommendation regarding the need for radiation therapy to reduce the risk of recurrence at the stapled line. A CAT scan with contrast will be scheduled in late May 2025 or early June 2025, to be repeated every 6 months for 3 years, and then annually thereafter. Steroids and Benadryl will be prescribed before the CAT scan to prevent any allergic reactions to the contrast.  Essentially, stage I bilateral non-small cell lung cancer, squamous cell subtype  I do not recommend adjuvant chemotherapy or immunotherapy  Cancer on the right side extending to the stapled line.  Referred to Dr. Tab Quick for discussion of RT due to involvement of the staple line  Recommend clinical radiologic surveillance otherwise  PLAN:  RTC with MD AFTER CT chest end of May 2025  Recommend CT chest for surveillance every 6 months, next due end of May 2025  Proceed with evaluation by Dr Tab Quick/[facility] Radiation Oncology  Continue follow-up with Dr Chandra Briscoe/[facility] CT Surgery, 2/20/25  Follow Up:   Return in about 4 months (around 6/2/2025) for NO LABS, Appointment with Dr. Barrientos AFTER CT CHEST.  CT chest end of May  "     History obtained from  PATIENT, FAMILY, and CHART    PAST MEDICAL HISTORY  Past Medical History:   Diagnosis Date    Arthritis     COPD (chronic obstructive pulmonary disease)     Coronary artery disease     Early-onset Parkinson's disease     INCREASED WHITE MATTER    Enlarged prostate     Full dentures     GERD (gastroesophageal reflux disease)     Hearing loss     Heart abnormality     PARTIAL BLOCKAGE CLOSE TO \" MAKER\"    Hyperlipidemia     Hypertension     Incontinence of urine     Lung cancer     Lung nodule     3 IN 1 LUNG AND 4 IN THE OTHER AND STATES IT IS SLOW GROWING    Migraine     Neuropathic pain     Sleep apnea     DOES NOT WEAR C PAP      PAST SURGICAL HISTORY  Past Surgical History:   Procedure Laterality Date    APPENDECTOMY      CATARACT EXTRACTION      CYSTOSCOPY TRANSURETHRAL RESECTION OF PROSTATE N/A 2016    Procedure: CYSTOSCOPY TRANSURETHRAL RESECTION OF PROSTATE;  Surgeon: Brad Bal MD;  Location: [facility] OR;  Service:     HIP ARTHROPLASTY Right     LUNG SURGERY      SINUS SURGERY      THYROIDECTOMY        FAMILY HISTORY  family history includes No Known Problems in his father and mother.    SOCIAL HISTORY  Social History     Tobacco Use    Smoking status: Former     Current packs/day: 0.00     Average packs/day: 0.7 packs/day for 57.5 years (40.2 ttl pk-yrs)     Types: Cigarettes     Start date:      Quit date: 2023     Years since quittin.6     Passive exposure: Current    Smokeless tobacco: Never    Tobacco comments:     \"14 Cigarettes per day, but it's been a week since I last smoked\"   Substance Use Topics    Alcohol use: Not Currently     Comment: Quit drinking     Drug use: No     ALLERGIES  Bactrim [sulfamethoxazole-trimethoprim], Betadine [povidone iodine], Iodinated contrast media, Nsaids, Succinylcholine, and Sulfa antibiotics     MEDICATIONS    Current Outpatient Medications:     albuterol sulfate  (90 Base) MCG/ACT inhaler, " Inhale 2 puffs Every 4 (Four) Hours As Needed for Shortness of Air., Disp: , Rfl:     aspirin 325 MG tablet, Take 325 mg by mouth daily, Disp: , Rfl:     atorvastatin (LIPITOR) 40 MG tablet, Take 1 tablet by mouth daily, Disp: , Rfl:     diclofenac (VOLTAREN) 75 MG EC tablet, Take 1 tablet by mouth Daily As Needed., Disp: , Rfl:     donepezil (ARICEPT) 5 MG tablet, Take 1 tablet by mouth Every Night., Disp: , Rfl:     gabapentin (NEURONTIN) 300 MG capsule, Take 1 capsule by mouth 2 (Two) Times a Day., Disp: , Rfl:     isosorbide mononitrate (IMDUR) 60 MG 24 hr tablet, Take 1 tablet by mouth daily, Disp: , Rfl:     memantine (NAMENDA) 10 MG tablet, Take 1 tablet by mouth Daily., Disp: , Rfl:     Mirabegron ER (Myrbetriq) 50 MG tablet sustained-release 24 hour 24 hr tablet, Take 50 mg by mouth Daily., Disp: 90 tablet, Rfl: 3    nitroglycerin (NITROSTAT) 0.4 MG SL tablet, nitroglycerin 0.4 mg sublingual tablet, Disp: , Rfl:     oxybutynin XL (DITROPAN-XL) 5 MG 24 hr tablet, Take 1 tablet by mouth Daily., Disp: 90 tablet, Rfl: 3    oxyCODONE (ROXICODONE) 5 MG immediate release tablet, Take 1 tablet by mouth Every 4 (Four) Hours As Needed., Disp: , Rfl:     polyethylene glycol (MIRALAX) 17 g packet, Take 17 g by mouth Daily., Disp: , Rfl:     primidone (MYSOLINE) 50 MG tablet, Take 1 tablet by mouth Every Night., Disp: , Rfl:     tiotropium (SPIRIVA) 18 MCG per inhalation capsule, Place 1 capsule into inhaler and inhale., Disp: , Rfl:     esomeprazole (nexIUM) 40 MG capsule, Take 1 capsule by mouth As Needed., Disp: , Rfl:     The following portions of the patient's history were reviewed and updated as appropriate: allergies, current medications, past family history, past medical history, past social history, past surgical history and problem list.    REVIEW OF SYSTEMS  Review of Systems   Constitutional:  Negative for appetite change, chills, fatigue and unexpected weight change.   HENT:  Negative.     Eyes: Negative.   "  Respiratory:  Positive for shortness of breath (with exertion). Negative for hemoptysis.    Cardiovascular: Negative.    Gastrointestinal: Negative.    Endocrine: Negative.    Genitourinary:  Positive for frequency.    Musculoskeletal:  Positive for arthralgias.   Skin: Negative.    Neurological:  Positive for numbness (bilateral feet r/t neuropathy).        Hx: Parkinson's   Hematological: Negative.    Psychiatric/Behavioral: Negative.       Implant: NO    PHYSICAL EXAM  VITAL SIGNS:   Vitals:    03/07/25 0943   BP: 118/78   Pulse: 63   SpO2: 99%  Comment: room air   Weight: 106 kg (233 lb)   Height: 188 cm (74\")   PainSc: 0-No pain   PainLoc: Chest     Physical Exam  Vitals and nursing note reviewed.   Constitutional:       General: He is not in acute distress.     Appearance: Normal appearance. He is not ill-appearing.   Cardiovascular:      Rate and Rhythm: Normal rate and regular rhythm.   Pulmonary:      Effort: Pulmonary effort is normal.      Breath sounds: Normal breath sounds.   Abdominal:      General: There is no distension.      Palpations: Abdomen is soft. There is no mass.   Musculoskeletal:      Cervical back: Normal range of motion.      Right lower leg: No edema.      Left lower leg: No edema.   Lymphadenopathy:      Cervical: No cervical adenopathy.      Upper Body:      Right upper body: No supraclavicular or axillary adenopathy.      Left upper body: No supraclavicular or axillary adenopathy.   Neurological:      General: No focal deficit present.      Mental Status: He is alert and oriented to person, place, and time.   Psychiatric:         Mood and Affect: Mood normal.         Behavior: Behavior normal.         Thought Content: Thought content normal.         Judgment: Judgment normal.          Performance Status: ECOG (1) Restricted in physically strenuous activity, ambulatory and able to do work of light nature    Clinical Quality Measures  - Pain Documented by Standardized Tool, FPS " "Luis Kam reports a pain score of 0. Given his pain assessment as noted, treatment options were discussed and the following options were decided upon as a follow-up plan to address the patient's pain:  No pain, no plan given .  Pain Medications               aspirin 325 MG tablet Take 325 mg by mouth daily    diclofenac (VOLTAREN) 75 MG EC tablet Take 1 tablet by mouth Daily As Needed.    gabapentin (NEURONTIN) 300 MG capsule Take 1 capsule by mouth 2 (Two) Times a Day.    oxyCODONE (ROXICODONE) 5 MG immediate release tablet Take 1 tablet by mouth Every 4 (Four) Hours As Needed.    primidone (MYSOLINE) 50 MG tablet Take 1 tablet by mouth Every Night.          - Body Mass Index Screening and Follow-Up Plan  BMI is >= 25 and <30. (Overweight) The following options were offered after discussion;: none (medical contraindication)    - Tobacco Use: Screening and Cessation Intervention  Social History    Tobacco Use      Smoking status: Former        Packs/day: 0.00        Years: 0.7 packs/day for 57.5 years (40.2 ttl pk-yrs)        Types: Cigarettes        Start date:         Quit date: 2023        Years since quittin.6        Passive exposure: Current      Smokeless tobacco: Never      Tobacco comments: \"14 Cigarettes per day, but it's been a week since I last smoked\"    - Advanced Care Planning Advance Care Planning   ACP discussion was held with the patient during this visit. Patient does not have an advance directive, information provided.    - PHQ-2 Depression Screening:  Little interest or pleasure in doing things? Not at all   Feeling down, depressed, or hopeless? Not at all   PHQ-2 Total Score 0     ASSESSMENT AND PLAN  1. Malignant neoplasm of lower lobe of right lung    2. S/P lobectomy of lung    3. Former smoker      Orders Placed This Encounter   Procedures    NM PET/CT Skull Base to Mid Thigh     RECOMMENDATIONS: Luis Kam is a 74-year-old male diagnosed with Stage I lung cancer in " the RLL and RAFAEL. S/p right lower lobectomy with +staple line margin. S/p RAFAEL wedge resection with margins of resection that approach 1 mm.  The patient is at risk at both sites for disease progression but has no clinical signs of recurrence or progression at this time.  We discussed the risk and benefits of close and active surveillance versus postoperative radiation therapy to 1 or both sites at this time.    The indications and rationale of lung radiation therapy has been discussed today. I have extensively reviewed the risks, benefits and alternatives of therapy with this diagnosis. The risks of radiation therapy includes but is not limited to radiation induced pulmonary fibrosis, progression of disease in spite of therapy with either local or systemic failure. I have seen, examined and reviewed this patient's medication list, appropriate labs and imaging studies as well as other physician notes. We discussed the goals and plans of care with the patient and family and answered all questions.     Pulmonary function tests will be obtained prior to initiation of radiation and are pending.      Following this discussion and in consideration of the diagnostic data/evaluation of the patient, I recommended a  we first start with a restaging PET/CT scan that may assist in medical decision making regarding immediate treatment versus surveillance . Continue ongoing management per primary care physician and other specialists. Thank you for allowing me to assist in this patients care.     Patient Instructions   Follow-up with PET/CT and PFTs as ordered.    Return in about 17 days (around 3/24/2025) for Office Visit to discuss PET/CT results and treatment decisions.     Time Spent: I spent 60 minutes caring for Luis on this date of service. This time includes time spent by me in the following activities: preparing for the visit, reviewing tests, obtaining and/or reviewing a separately obtained history, performing a  medically appropriate examination and/or evaluation, counseling and educating the patient/family/caregiver, ordering medications, tests, or procedures, documenting information in the medical record, independently interpreting results and communicating that information with the patient/family/caregiver, and care coordination.   Aleshia Vazquez LPN  03/07/2025

## 2025-03-06 ENCOUNTER — HOSPITAL ENCOUNTER (OUTPATIENT)
Dept: RADIATION ONCOLOGY | Facility: HOSPITAL | Age: 75
Setting detail: RADIATION/ONCOLOGY SERIES
End: 2025-03-06
Payer: OTHER GOVERNMENT

## 2025-03-07 ENCOUNTER — CONSULT (OUTPATIENT)
Age: 75
End: 2025-03-07
Payer: OTHER GOVERNMENT

## 2025-03-07 VITALS
HEART RATE: 63 BPM | BODY MASS INDEX: 29.9 KG/M2 | OXYGEN SATURATION: 99 % | SYSTOLIC BLOOD PRESSURE: 118 MMHG | WEIGHT: 233 LBS | DIASTOLIC BLOOD PRESSURE: 78 MMHG | HEIGHT: 74 IN

## 2025-03-07 DIAGNOSIS — Z87.891 FORMER SMOKER: ICD-10-CM

## 2025-03-07 DIAGNOSIS — C34.31 MALIGNANT NEOPLASM OF LOWER LOBE OF RIGHT LUNG: Primary | ICD-10-CM

## 2025-03-07 DIAGNOSIS — Z90.2 S/P LOBECTOMY OF LUNG: ICD-10-CM

## 2025-03-07 PROCEDURE — G0463 HOSPITAL OUTPT CLINIC VISIT: HCPCS | Performed by: RADIOLOGY

## 2025-03-07 RX ORDER — GABAPENTIN 300 MG/1
300 CAPSULE ORAL 2 TIMES DAILY
COMMUNITY

## 2025-03-07 RX ORDER — OXYCODONE HYDROCHLORIDE 5 MG/1
5 TABLET ORAL EVERY 4 HOURS PRN
COMMUNITY
Start: 2024-12-23

## 2025-03-07 RX ORDER — POLYETHYLENE GLYCOL 3350 17 G/17G
17 POWDER, FOR SOLUTION ORAL DAILY
COMMUNITY

## 2025-03-20 ENCOUNTER — HOSPITAL ENCOUNTER (OUTPATIENT)
Dept: CT IMAGING | Facility: HOSPITAL | Age: 75
Discharge: HOME | End: 2025-03-20
Payer: OTHER GOVERNMENT

## 2025-03-20 DIAGNOSIS — Z90.2 S/P LOBECTOMY OF LUNG: ICD-10-CM

## 2025-03-20 DIAGNOSIS — Z87.891 FORMER SMOKER: ICD-10-CM

## 2025-03-20 DIAGNOSIS — C34.31 MALIGNANT NEOPLASM OF LOWER LOBE OF RIGHT LUNG: ICD-10-CM

## 2025-03-20 PROCEDURE — 34310000005 FLUDEOXYGLUCOSE F18 SOLUTION: Performed by: RADIOLOGY

## 2025-03-20 PROCEDURE — A9552 F18 FDG: HCPCS | Performed by: RADIOLOGY

## 2025-03-20 PROCEDURE — 78815 PET IMAGE W/CT SKULL-THIGH: CPT

## 2025-03-20 RX ADMIN — FLUDEOXYGLUCOSE F18 1 DOSE: 300 INJECTION INTRAVENOUS at 08:15

## 2025-03-20 NOTE — PROGRESS NOTES
[facility]  Radiation Oncology Clinic   Tab Wallace MD, FACR  Kwadworamon Quintana JATINDER  _______________________________________________  [facility]  Department of Radiation Oncology  [address] [place] [id number]  Office: [phone]  Fax: [phone]    DATE: 03/25/2025  PATIENT: Luis Kam  1950                         MEDICAL RECORD #: [id number]    1. Malignant neoplasm of lower lobe of right lung    2. S/P lobectomy of lung    3. Former smoker                                         REASON FOR VISIT:    Chief Complaint   Patient presents with    Lung Cancer     Reason for Visit: Luis Kam is a 74 y.o. male that returns to the clinic today to review PET scan and for further radiotherapy recommendations regarding Stage I lung cancer in the RLL and RAFAEL.     History of Present Illness:  Diagnosed with Stage I lung cancer in the RLL and RAFAEL. S/p right lower lobectomy with +staple line margin. S/p RAFAEL wedge resection with margins of resection that approach 1 mm.      06/15/2020 - PET Scan:  No neoplastic FDG activity is seen.  Since the lung nodules in question are of borderline size regarding PET/CT detection follow-up chest CT is recommended in 6 months to document stability.    06/20/2022 - PET Scan:  12 x 10 mm pulmonary nodule in the peripheral LEFT upper lobe has mild FDG uptake slightly below physiologic background. Given increase in size compared to a study from 6/15/2020, this nodule is concerning for an indolent slow-growing neoplasm. Recommend continued clinical and imaging follow-up.  No abnormal hypermetabolic activity in the neck, abdomen, or pelvis.  Subacute RIGHT anterior fifth and sixth rib fractures with hypermetabolic activity.    07/13/2022 - Appointment with Dr. Aceves:  I discussed the patients findings and my recommendations with patient.  We discussed the differential diagnoses possible with malignancy  intermediate or high in the differential.  We discussed the importance of staging such that best therapy can be selected and portend prognosis accurately.  We discussed options for care including continued surveillance verses efforts towards diagnosis and treatment.  We discussed options for diagnosis including bronchoscopy, CT-guided needle biopsy, or surgical biopsy with either cervical mediastinoscopy or thoracoscopy with resection.We discussed the value of clinical staging with a CT/PET scan.  The pros and cons of each option were discussed at length.  I believe the best option for treatment may be Left thoracoscopic wedge resection, possible lobectomy with mediastinal lymph node dissection.  The risks of the procedure were discussed to included but not limited to bleeding, infection, stroke, heart attack, anesthesia risks, need for additional procedures, great vessel injury, injury to nerve with resultant hoarseness of voice, mechanical ventilation, ICU stay, chronic pain syndromes, need for thoracotomy, need for prolonged chest tube use, and/or death.  Additionally an assessment of lung function status is warranted to evaluate his candidacy for a resection. If lung function is adequate a stress test will be needed.  All questions have been answered to the best of my ability and he is agreeable to the aforementioned plan.  A return to clinic appointment has been set a couple weeks with PFT's.      07/15/2022 - Pulmonary Function Tests:  Spirometry is consistent with a mild obstructive ventilatory defect manifested by decrease in midflows.  There is an improvement in midflows postbronchodilator but actually slight worsening of spirometry postbronchodilator otherwise.  As a result postbronchodilator spirometry is most consistent with a moderate obstructive ventilatory defect although the decrease in the patient's FEV1 is just into the moderate range at 76% of predicted.  Lung volumes reveal hyperinflation.  There  is a mild bordering on moderate diffusion impairment which when corrected for alveolar volume is still a mild diffusion impairment.  There is some flattening of the inspiratory limb of the flow volume loop which could be seen with a variable extrathoracic upper airway obstruction, clinical correlation is advised.  Arterial blood gases reveal a low normal PO2 on room air and otherwise are within normal limits.  When current studies are compared to studies from December 2, 2020, there has been slight improvement in the patient's FEV1 and no change in the patient's FVC on current prebronchodilator studies compared to previous prebronchodilator studies.  There has been a drop in both his FVC and FEV1 on current postbronchodilator studies compared to previous postbronchodilator studies.  When corrected for alveolar volume, there has been a slight drop in diffusion capacity compared to previous.    08/23/2022 - Appointment with Dr. Aceves:  Given increasing in size lung nodule and suitable lung function test for resection we will proceed with remainder of his work-up.  He does have dyspnea and known coronary disease and long-acting nitrates.  We will obtain a stress test.   Upon completion of his work-up and satisfactory risk we can proceed with wedge resection and if a lung cancer lobectomy.    I discussed the operative conduct expected postoperative course of thoracoscopy possible thoracotomy for performance of wedge resection with subsequent lobectomy with mediastinal lymph node dissection if lung cancer.    Risks, benefits, and alternatives were discussed in detail.  All questions been answered to the best my ability and he is agreeable to proceed forward.    09/30/2022 - CT Chest with contrast:  A left upper lobe nodule has decreased in size and inflammatory disease is favored.  Benign-appearing nodules are otherwise stable. No malignant features.  Consider annual low-dose chest CT follow-up.     10/25/2022 - CT  "Chest:  Interval decrease in size of the subpleural pulmonary nodule in the left upper lobe now measuring 8 x 7 mm, previously 15 x 11 mm. Continued follow-up is recommended.  Stable size of pulmonary nodules an intrapulmonary lymph nodes  Emphysema. No focal consolidation. No large lymphadenopathy in the chest.  Sequela of old granulomatous disease  Healing fractures of the anterior right 5th and 6th ribs    01/18/2023 - CT Chest with contrast:  Stable bilateral pulmonary nodules. This includes two 9 mm left upper lobe juxtapleural nodules, both of which are stable. There are no new or enlarging pulmonary nodules. No suspicious adenopathy.  Lung emphysema.  1.2 cm exophytic left inferior renal pole hemorrhagic cyst versus small solid enhancing mass. Renal ultrasound recommended for further evaluation.    01/18/2023 - Appointment with :  At this time, I have recommended repeat imaging in 6 months with a new CT scan of the chest, this can be without contrast. It is noted he has lost 9 pounds unintentional since last office visit; no obvious etiology to this. Asked him to just keep track of this and if it continues to progress, he will inform me prior to the next visit. Reviewed signs and symptoms which would suggest an early assessment including new onset hemoptysis, changes in chest pain that he does already experience, recurrent or persistent pneumonia, or lastly a cough that is different or persistent. He verbalized understanding. We did discuss the value of smoking cessation for a total time of 4 minutes. He reports this is tough to quit smoking, I agreed with that, but tried to provide supportive encouragement that he do accomplish that goal over time. He is trying.   All questions were answered to the best of my ability and he is agreeable to proceed with imaging with active surveillance, but he does also ask \"why we can not just cut all of these nodules out.\" I discussed that while from a surgeon point " of view, that proceeding with operation is gratifying, we like to proceed forward with appropriate indications. At this time with stable nodules that have waxed and waned in size, infectious etiologies more favored, preferred treatment for that would not be surgery in fact. He understands this. I will continue to work with him and see him back in 6 months.     03/06/2023 - CT Chest:  Continued interval decrease in size of the left upper lobe pulmonary nodule now measuring 6 x 5 mm, previously 8 x 7 mm on 10/25/2022 and 1.4 x 1.2 cm on 6/8/2022. Continued follow-up surveillance with chest CT in 1 year is recommended.  Stable additional noncalcified pulmonary nodules in the lower lobes measuring to 5 mm. Attention at follow-up is recommended.  Benign calcified granulomas in the right lower lobe and benign calcified right hilar lymph nodes.  New exophytic lesion in the left lateral interpolar region kidney measuring 1.5 cm. Further characterization with a renal-protocol CT or MRI of the abdomen with and without contrast is recommended to exclude a primary left renal neoplasm.    08/09/2023 - CT Chest without contrast:  No change in multiple bilateral pulmonary nodules measuring up to 9 mm.    08/16/2023 - Appointment with :  At this time, I recommend ongoing active surveillance of his lung nodules. He has previously been made aware of signs and symptoms that would prompt an earlier assessment. With that being said, given the ongoing stability, I would recommend an additional 6 months for follow-up with the intent to have him see JATINDER Ugalde in 6 months with a CT scan of the chest without contrast.  He is a non-smoker at this time and has been congratulated on this. He is now free from smoking for 1 month. I encouraged him to speak with Dr. Patel if he does continue to have a productive cough.  Discussed that this is not unexpected after recent smoking cessation to have increasing secretions early  on.  Many thanks again for the opportunity to aid in the care of your patient. I will continue to keep you apprised of care as it ensues.    02/20/2024 - CT Chest without contrast:  Single new 3 mm right lower lobe nodule. Remaining pulmonary nodules are stable measuring up to 9 mm.    09/19/2024 - PET Scan:  The PET CT examination demonstrates a low level of activity in the 9.9 mm smoothly marginated noncalcified subpleural lateral left upper lobe pulmonary nodule seen previously.  The SUV max 1.07 is consistent with low likelihood of malignancy.  Note is made however of a small paraspinous lesion situated in the infrahilar region on the right with SUV max of 2.56 which is consistent with an increased likelihood of malignancy based on level of activity.  The examination otherwise demonstrates a generally physiologic distribution of activity elsewhere in the thorax, as described in the report.   The PET CT examination demonstrates a generally stable, physiologic distribution of activity in the abdomen and pelvis, as described in the report.   The PET CT examination demonstrates a generally stable, physiologic distribution of activity in the included portions of the head and neck, as described in the report.   The PET CT examination demonstrates a physiologic distribution of activity in the included portions of the skeleton and extremities,  as described in the report.     11/26/2024 - PET Scan:  The PET CT examination demonstrates an interval increase in the size and level of activity in the subpleural nodule seen in the lateral left upper lobe from SUV max of 1.07 to 2.44 and in the spiculated nodule seen in the right azygo-esophageal recess from SUV max of 2.59-5.82, both consistent with an increased likelihood of malignancy.  The examination otherwise demonstrates a  a generally physiologic distribution of activity in the thorax, as described in the report.   The PET CT examination demonstrates a physiologic  distribution of activity in the abdomen and pelvis, as described in the report.   The PET CT examination demonstrates a physiologic distribution of activity in the included portions of the head and neck, as described in the report.   The PET CT examination demonstrates a physiologic distribution of activity in the included portions of the skeleton and extremities,  as described in the report.     12/08/2024 - Appointment with :  75 yo smoker (recently quit) who presents now with suspicious lung masses in the RAFAEL and RLL. Both have grown in size and SUV measurements over serial assessments.   The RLL nodule is particularly worrisome given the increased size and SUV readings compared to the left side. Will therefore proceed with a RLL wedge resection and then follow with a RLLobectomy if the frozen section confirms cancer. Will then proceed with RAFAEL excisional biopsy. The patient is a low risk candidate for surgery given no evidence of significant cardiac or pulmonary disease.   Will proceed next week using robotic techniques. Patient was explained the risks and benefits of the proposed plan and agreed to proceed.    12/13/2024 - Right lower lobe lobectomy and left upper lobe wedge resection with negative (0/22) lymph node dissection per Dr.Robert Briscoe/[facility]:  Lymph node, level 8 lymph node biopsy:    One lymph node, negative for evidence of malignancy.   Lymph node, level 9 lymph node biopsy:    Three lymph nodes, negative for evidence of malignancy.  Lymph node, level 11 intralobar lymph node biopsy:    Four lymph nodes, negative for evidence of malignancy.   Lymph node, level 10 posterior hilar lymph node biopsy:    At least 2 lymph nodes, negative for evidence of malignancy.   Lymph node, level 7 carinal lymph node biopsy:    Three lymph nodes, negative for evidence of malignancy.   Lymph node, level 10 anterior hilar lymph node biopsy:    Four lymph nodes, negative for evidence of  malignancy.  Lymph node, Left lower lobe peribronchial lymph node biopsy:    One lymph node, negative for evidence of malignancy.   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   Carcinoma measures 1.5 cm in greatest dimension.   Carcinoma extends into a stapled parenchymal surgical excision margin.   Negative for evidence of pleural invasion.   Bronchial and vascular surgical excision margins are negative for evidence of malignancy.   Emphysematous change identified involving nonneoplastic lung parenchyma.   Lung, left lower lobe peribronchial calcified nodule biopsy:   Calcified granuloma, negative for evidence of malignancy.   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   Carcinoma measures 1.3 cm in greatest dimension.   Negative for evidence of pleural invasion.   Tumor is located 0.1 cm from the nearest surgical excision margin.   Lymph node, level 6 lymph node biopsy:    One lymph node, negative for evidence of malignancy.   Lymph node, level 5 lymph node biopsy:    Three lymph nodes, negative for evidence of malignancy.   AJCC STAGE:  pT1b, pN0, pMx     01/16/2025 - Appointment with :  Assessment   Stage I lung cancer in the RLL and RAFAEL. S/p right lower lobectomy with +staple line margin. S/p RAFAEL wedge resection. Case discussed at our last tumor board meeting. The consensus was that rad/onc consult is required to evaluate for radiation treatment of staple line.  Plan   Referral for rad/onc consultation    01/29/2025 - Appointment with Dr. Barrientos:  Assessment & Plan  Non-small cell lung cancer SCC NSCLC-bilateral lung, stage I  The pathology report indicates a right lower lobe lobectomy with a tumor size of 1.5 cm. The lymph nodes were negative, suggesting two primary tumors. The cancer was near the parenchymal surgical margin, but there was no evidence of pleural effusion. A biopsy of the  left lower lobe and a wedge resection of the left upper lobe revealed squamous cell carcinoma, 0.1 cm near the surgical margin, with three negative lymph nodes. The PET scan did not show any additional concerning findings beyond the two identified spots. Given the stage I diagnosis, chemotherapy or immunotherapy is not recommended unless the tumor exceeds 4 cm in size. The tumor on the right side was 1.5 cm, and the one on the left was 1.3 cm, both of which are small and do not currently qualify for chemo or immunotherapy. A referral to Dr. Quick for a formal recommendation regarding the need for radiation therapy to reduce the risk of recurrence at the stapled line. A CAT scan with contrast will be scheduled in late May 2025 or early June 2025, to be repeated every 6 months for 3 years, and then annually thereafter. Steroids and Benadryl will be prescribed before the CAT scan to prevent any allergic reactions to the contrast.  Essentially, stage I bilateral non-small cell lung cancer, squamous cell subtype  I do not recommend adjuvant chemotherapy or immunotherapy  Cancer on the right side extending to the stapled line.  Referred to Dr. Tab Quick for discussion of RT due to involvement of the staple line  Recommend clinical radiologic surveillance otherwise  PLAN:  RTC with MD AFTER CT chest end of May 2025  Recommend CT chest for surveillance every 6 months, next due end of May 2025  Proceed with evaluation by Dr Tab Quick/[facility]iation [facility]  Continue follow-up with Dr Chandra Briscoe/[facility], 2/20/25  Follow Up:   Return in about 4 months (around 6/2/2025) for NO LABS, Appointment with Dr. Barrientos AFTER CT CHEST.  CT chest end of May 2025     03/07/2025 - Consult with :  Following this discussion and in consideration of the diagnostic data/evaluation of the patient, I recommended a  we first start with a restaging PET/CT scan that may assist in medical decision making regarding immediate  "treatment versus surveillance . Continue ongoing management per primary care physician and other specialists.   Plan:  Follow-up with PET/CT and PFTs as ordered.   Return in about 17 days (around 3/24/2025) for Office Visit to discuss PET/CT results and treatment decisions.     03/20/2025 - PET Scan:  Prior right lower lobectomy. Prior wedge resection left upper lobe. No abnormal PET activity is seen in the chest. There is an 8 mm left upper lobe pulmonary nodule adjacent to the aortic arch that does not demonstrate any abnormal PET activity.  Circular uptake in the anus likely within the wall of the anus. The SUV max is 2.9. Occult anal neoplasm is not ruled out. Correlate clinically.  Nonacute CT findings, as discussed in the body of the report.     History obtained from  PATIENT, FAMILY, and CHART    PAST MEDICAL HISTORY  Past Medical History:   Diagnosis Date    Arthritis     COPD (chronic obstructive pulmonary disease)     Coronary artery disease     Early-onset Parkinson's disease     INCREASED WHITE MATTER    Enlarged prostate     Full dentures     GERD (gastroesophageal reflux disease)     Hearing loss     Heart abnormality     PARTIAL BLOCKAGE CLOSE TO \" MAKER\"    Hyperlipidemia     Hypertension     Incontinence of urine     Lung cancer     Lung nodule     3 IN 1 LUNG AND 4 IN THE OTHER AND STATES IT IS SLOW GROWING    Migraine     Neuropathic pain     Sleep apnea     DOES NOT WEAR C PAP      PAST SURGICAL HISTORY  Past Surgical History:   Procedure Laterality Date    APPENDECTOMY      CATARACT EXTRACTION      CYSTOSCOPY TRANSURETHRAL RESECTION OF PROSTATE N/A 11/08/2016    Procedure: CYSTOSCOPY TRANSURETHRAL RESECTION OF PROSTATE;  Surgeon: Brad Bal MD;  Location: [facility] OR;  Service:     HIP ARTHROPLASTY Right     LUNG SURGERY      SINUS SURGERY      THYROIDECTOMY        FAMILY HISTORY  family history includes No Known Problems in his father and mother.    SOCIAL HISTORY  Social History "     Tobacco Use    Smoking status: Former     Average packs/day: 0.7 packs/day for 58.9 years (41.3 ttl pk-yrs)     Types: Cigarettes     Start date: 1966     Passive exposure: Current    Smokeless tobacco: Never   Substance Use Topics    Alcohol use: Not Currently     Comment: Quit drinking 2016    Drug use: No     ALLERGIES  Bactrim [sulfamethoxazole-trimethoprim], Betadine [povidone iodine], Iodinated contrast media, Nsaids, Succinylcholine, and Sulfa antibiotics     MEDICATIONS    Current Outpatient Medications:     albuterol sulfate  (90 Base) MCG/ACT inhaler, Inhale 2 puffs Every 4 (Four) Hours As Needed for Shortness of Air., Disp: , Rfl:     aspirin 325 MG tablet, Take 325 mg by mouth daily, Disp: , Rfl:     atorvastatin (LIPITOR) 40 MG tablet, Take 1 tablet by mouth daily, Disp: , Rfl:     donepezil (ARICEPT) 5 MG tablet, Take 1 tablet by mouth Every Night., Disp: , Rfl:     esomeprazole (nexIUM) 40 MG capsule, Take 1 capsule by mouth As Needed., Disp: , Rfl:     gabapentin (NEURONTIN) 300 MG capsule, Take 1 capsule by mouth 2 (Two) Times a Day., Disp: , Rfl:     HYDROcodone-acetaminophen (NORCO)  MG per tablet, Take 1 tablet by mouth Every 6 (Six) Hours As Needed., Disp: , Rfl:     isosorbide mononitrate (IMDUR) 60 MG 24 hr tablet, Take 1 tablet by mouth daily, Disp: , Rfl:     memantine (NAMENDA) 10 MG tablet, Take 1 tablet by mouth Daily., Disp: , Rfl:     Mirabegron ER (Myrbetriq) 50 MG tablet sustained-release 24 hour 24 hr tablet, Take 50 mg by mouth Daily., Disp: 90 tablet, Rfl: 3    nitroglycerin (NITROSTAT) 0.4 MG SL tablet, nitroglycerin 0.4 mg sublingual tablet, Disp: , Rfl:     polyethylene glycol (MIRALAX) 17 g packet, Take 17 g by mouth Daily., Disp: , Rfl:     primidone (MYSOLINE) 50 MG tablet, Take 1 tablet by mouth Every Night., Disp: , Rfl:     tiotropium (SPIRIVA) 18 MCG per inhalation capsule, Place 1 capsule into inhaler and inhale., Disp: , Rfl:     diclofenac (VOLTAREN)  "75 MG EC tablet, Take 1 tablet by mouth Daily As Needed., Disp: , Rfl:     oxybutynin XL (DITROPAN-XL) 5 MG 24 hr tablet, Take 1 tablet by mouth Daily., Disp: 90 tablet, Rfl: 3    oxyCODONE (ROXICODONE) 5 MG immediate release tablet, Take 1 tablet by mouth Every 4 (Four) Hours As Needed., Disp: , Rfl:     The following portions of the patient's history were reviewed and updated as appropriate: allergies, current medications, past family history, past medical history, past social history, past surgical history and problem list.    Current outpatient and discharge medications have been reconciled for the patient.  Reviewed by: Fredi Wallace MD    REVIEW OF SYSTEMS  No changes      PHYSICAL EXAM  VITAL SIGNS:   Vitals:    03/25/25 1000   BP: 110/67   Pulse: 77   Resp: 18   SpO2: 98%   Weight: 104 kg (229 lb)   Height: 188 cm (74\")   PainSc: 4    PainLoc: Chest     Physical Exam    Performance Status: ECOG (1) Restricted in physically strenuous activity, ambulatory and able to do work of light nature    Clinical Quality Measures  - Pain Documented by Standardized Tool, FPS Luis Kam reports a pain score of 4. Given his pain assessment as noted, treatment options were discussed and the following options were decided upon as a follow-up plan to address the patient's pain: continuation of current treatment plan for pain and use of non-medical modalities (ice, heat, stretching and/or behavior modifications).  Pain Medications              aspirin 325 MG tablet Take 325 mg by mouth daily    gabapentin (NEURONTIN) 300 MG capsule Take 1 capsule by mouth 2 (Two) Times a Day.    HYDROcodone-acetaminophen (NORCO)  MG per tablet Take 1 tablet by mouth Every 6 (Six) Hours As Needed.    primidone (MYSOLINE) 50 MG tablet Take 1 tablet by mouth Every Night.    diclofenac (VOLTAREN) 75 MG EC tablet Take 1 tablet by mouth Daily As Needed.    oxyCODONE (ROXICODONE) 5 MG immediate release tablet Take 1 tablet by mouth Every " 4 (Four) Hours As Needed.          - Body Mass Index Screening and Follow-Up Plan  BMI is >= 25 and <30. (Overweight) The following options were offered after discussion;: none (medical contraindication)    - Tobacco Use: Screening and Cessation Intervention  Social History    Tobacco Use      Smoking status: Former        Years: 0.7 packs/day for 58.9 years (41.3 ttl pk-yrs)        Types: Cigarettes        Start date: 1966        Passive exposure: Current      Smokeless tobacco: Never    - Advanced Care Planning Advance Care Planning  ACP discussion was held with the patient during this visit. Patient does not have an advance directive, information provided.    - PHQ-2 Depression Screening:  Little interest or pleasure in doing things? Not at all   Feeling down, depressed, or hopeless? Not at all   PHQ-2 Total Score 0     ASSESSMENT AND PLAN  1. Malignant neoplasm of lower lobe of right lung    2. S/P lobectomy of lung    3. Former smoker      Orders Placed This Encounter   Procedures    CT Chest Without Contrast     RECOMMENDATIONS:  Luis Kam returns to the clinic today to review PET/CT scan and for further radiotherapy recommendations regarding Stage I lung cancer in the RLL and RAFAEL.     PET scan completed on 03/20/2025 revealed prior right lower lobectomy. Prior wedge resection left upper lobe. No abnormal PET activity is seen in the chest. There is an 8 mm left upper lobe pulmonary nodule adjacent to the aortic arch that does not demonstrate any abnormal PET activity. Circular uptake in the anus likely within the wall of the anus. The SUV max is 2.9. Occult anal neoplasm is not ruled out. Correlate clinically. Nonacute CT findings, as discussed in the body of the report.    We will continue routine follow-up/surveillance as discussed in 6 months with follow up CT scan before visit and I have instructed him to continue to see the other health care providers as per their scheduling.      Return in about 6  months (around 10/2/2025) for Office Visit with Dr. Wallace.    Time Spent: I spent 20 minutes caring for Luis on this date of service. This time includes time spent by me in the following activities: preparing for the visit, reviewing tests, obtaining and/or reviewing a separately obtained history, performing a medically appropriate examination and/or evaluation, counseling and educating the patient/family/caregiver, documenting information in the medical record, independently interpreting results and communicating that information with the patient/family/caregiver, and care coordination.   Fredi Wallace MD  03/25/2025

## 2025-03-25 ENCOUNTER — OFFICE VISIT (OUTPATIENT)
Age: 75
End: 2025-03-25
Payer: OTHER GOVERNMENT

## 2025-03-25 VITALS
OXYGEN SATURATION: 98 % | DIASTOLIC BLOOD PRESSURE: 67 MMHG | RESPIRATION RATE: 18 BRPM | HEART RATE: 77 BPM | SYSTOLIC BLOOD PRESSURE: 110 MMHG | BODY MASS INDEX: 29.39 KG/M2 | WEIGHT: 229 LBS | HEIGHT: 74 IN

## 2025-03-25 DIAGNOSIS — Z87.891 FORMER SMOKER: ICD-10-CM

## 2025-03-25 DIAGNOSIS — C34.31 MALIGNANT NEOPLASM OF LOWER LOBE OF RIGHT LUNG: Primary | ICD-10-CM

## 2025-03-25 DIAGNOSIS — Z90.2 S/P LOBECTOMY OF LUNG: ICD-10-CM

## 2025-03-25 PROCEDURE — G0463 HOSPITAL OUTPT CLINIC VISIT: HCPCS | Performed by: RADIOLOGY

## 2025-03-25 RX ORDER — HYDROCODONE BITARTRATE AND ACETAMINOPHEN 10; 325 MG/1; MG/1
1 TABLET ORAL EVERY 6 HOURS PRN
COMMUNITY
Start: 2025-03-12

## 2025-05-14 ENCOUNTER — OFFICE VISIT (OUTPATIENT)
Age: 75
End: 2025-05-14

## 2025-05-14 VITALS — WEIGHT: 213 LBS | BODY MASS INDEX: 28.23 KG/M2 | HEIGHT: 73 IN

## 2025-05-14 DIAGNOSIS — M25.561 CHRONIC PAIN OF BOTH KNEES: ICD-10-CM

## 2025-05-14 DIAGNOSIS — M17.0 PRIMARY OSTEOARTHRITIS OF BOTH KNEES: Primary | ICD-10-CM

## 2025-05-14 DIAGNOSIS — M25.562 CHRONIC PAIN OF BOTH KNEES: ICD-10-CM

## 2025-05-14 DIAGNOSIS — G89.29 CHRONIC PAIN OF BOTH KNEES: ICD-10-CM

## 2025-05-14 RX ORDER — BUPIVACAINE HYDROCHLORIDE 5 MG/ML
5 INJECTION, SOLUTION PERINEURAL ONCE
Status: COMPLETED | OUTPATIENT
Start: 2025-05-14 | End: 2025-05-14

## 2025-05-14 RX ORDER — BETAMETHASONE SODIUM PHOSPHATE AND BETAMETHASONE ACETATE 3; 3 MG/ML; MG/ML
12 INJECTION, SUSPENSION INTRA-ARTICULAR; INTRALESIONAL; INTRAMUSCULAR; SOFT TISSUE ONCE
Status: COMPLETED | OUTPATIENT
Start: 2025-05-14 | End: 2025-05-14

## 2025-05-14 RX ORDER — LIDOCAINE HYDROCHLORIDE 10 MG/ML
2.5 INJECTION, SOLUTION INFILTRATION; PERINEURAL ONCE
Status: COMPLETED | OUTPATIENT
Start: 2025-05-14 | End: 2025-05-14

## 2025-05-14 RX ADMIN — BUPIVACAINE HYDROCHLORIDE 25 MG: 5 INJECTION, SOLUTION PERINEURAL at 09:37

## 2025-05-14 RX ADMIN — BETAMETHASONE SODIUM PHOSPHATE AND BETAMETHASONE ACETATE 12 MG: 3; 3 INJECTION, SUSPENSION INTRA-ARTICULAR; INTRALESIONAL; INTRAMUSCULAR; SOFT TISSUE at 09:36

## 2025-05-14 RX ADMIN — LIDOCAINE HYDROCHLORIDE 2.5 ML: 10 INJECTION, SOLUTION INFILTRATION; PERINEURAL at 09:38

## 2025-05-14 RX ADMIN — LIDOCAINE HYDROCHLORIDE 2.5 ML: 10 INJECTION, SOLUTION INFILTRATION; PERINEURAL at 09:39

## 2025-05-14 RX ADMIN — BETAMETHASONE SODIUM PHOSPHATE AND BETAMETHASONE ACETATE 12 MG: 3; 3 INJECTION, SUSPENSION INTRA-ARTICULAR; INTRALESIONAL; INTRAMUSCULAR; SOFT TISSUE at 09:37

## 2025-05-14 NOTE — PROGRESS NOTES
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, Mod diverticulosis left colon, int hem Gr 2 wo bleeding-likely source of recent, 3 year colon recall    ESOPHAGEAL DILATATION  05/17/2023    [facility] 54 fr bougie dilation    EYE MUSCLE SURGERY      EYE SURGERY      [facility]    HERNIA REPAIR      with mesh-Dr Navarro    PA COLSC FLX W/REMOVAL LESION BY HOT BX FORCEPS  09/21/2017    Dr Shelton-internal hemorrhoids, tubular AP (-) dysplasia--5 yr recall    PA EGD TRANSORAL BIOPSY SINGLE/MULTIPLE N/A 10/19/2017    Dr Shelton-normal-Esme (-) chronic nonspecific inflammation    PROSTATE SURGERY      THORACOSCOPY Bilateral 12/13/2024    ROBOTIC RIGHT LOWER LOBECTOMY & LEFT UPPER WEDGE RESECTION performed by Aden Randle MD at [facility] OR    THYROIDECTOMY      TOTAL HIP ARTHROPLASTY Right 03/04/2022    RIGHT TOTAL HIP ARTHROPLASTY performed by Joe Carney MD at [facility] OR    UPPER GASTROINTESTINAL ENDOSCOPY      thinks so, but a long time ago if so    UPPER GASTROINTESTINAL ENDOSCOPY  02/11/2013    Cat: Grade A esophagitis and esophageal stricture. Dilated 54fr    UPPER GASTROINTESTINAL ENDOSCOPY N/A 05/17/2023    [facility] 54 fr dil, Essentially benign findings,  (-)Obvious barretts like changes, mucosal changes and erythema likely med related gastritis,    VASCULAR SURGERY  07/11/2017    [facility].Right IJV US 9f sheath.    VASCULAR SURGERY Left 08/17/2017    [facility]-L GSV RFA       Current Medications:   Prior to Admission medications    Medication Sig Start Date End Date Taking? Authorizing Provider   gabapentin (NEURONTIN) 300 MG capsule Take 1 capsule by mouth 2 times daily for 90 days. Max Daily Amount: 600 mg 2/20/25 5/21/25  Aden Randle MD

## 2025-05-23 ENCOUNTER — TELEPHONE (OUTPATIENT)
Dept: HEMATOLOGY | Age: 75
End: 2025-05-23

## 2025-05-23 NOTE — TELEPHONE ENCOUNTER
Patient's wife called to cancel appointment. Stated \"Dr. Looney's services are no longer needed.\" Appointment cancelled per Madeleine Garcia. md

## 2025-05-30 DIAGNOSIS — L76.82 PAIN AT SURGICAL INCISION: ICD-10-CM

## 2025-06-02 RX ORDER — GABAPENTIN 300 MG/1
300 CAPSULE ORAL 2 TIMES DAILY
Qty: 60 CAPSULE | Refills: 2 | OUTPATIENT
Start: 2025-06-02

## 2025-07-16 ENCOUNTER — OFFICE VISIT (OUTPATIENT)
Dept: NEUROLOGY | Age: 75
End: 2025-07-16
Payer: MEDICARE

## 2025-07-16 VITALS
SYSTOLIC BLOOD PRESSURE: 100 MMHG | DIASTOLIC BLOOD PRESSURE: 70 MMHG | BODY MASS INDEX: 28.23 KG/M2 | WEIGHT: 213 LBS | HEIGHT: 73 IN

## 2025-07-16 DIAGNOSIS — G25.0 ESSENTIAL TREMOR: ICD-10-CM

## 2025-07-16 DIAGNOSIS — G62.9 NEUROPATHY: ICD-10-CM

## 2025-07-16 DIAGNOSIS — R41.3 MEMORY LOSS: Primary | ICD-10-CM

## 2025-07-16 PROCEDURE — G8417 CALC BMI ABV UP PARAM F/U: HCPCS | Performed by: PSYCHIATRY & NEUROLOGY

## 2025-07-16 PROCEDURE — 1036F TOBACCO NON-USER: CPT | Performed by: PSYCHIATRY & NEUROLOGY

## 2025-07-16 PROCEDURE — 1159F MED LIST DOCD IN RCRD: CPT | Performed by: PSYCHIATRY & NEUROLOGY

## 2025-07-16 PROCEDURE — 99213 OFFICE O/P EST LOW 20 MIN: CPT | Performed by: PSYCHIATRY & NEUROLOGY

## 2025-07-16 PROCEDURE — G8427 DOCREV CUR MEDS BY ELIG CLIN: HCPCS | Performed by: PSYCHIATRY & NEUROLOGY

## 2025-07-16 PROCEDURE — 1123F ACP DISCUSS/DSCN MKR DOCD: CPT | Performed by: PSYCHIATRY & NEUROLOGY

## 2025-07-16 PROCEDURE — 3078F DIAST BP <80 MM HG: CPT | Performed by: PSYCHIATRY & NEUROLOGY

## 2025-07-16 PROCEDURE — 3074F SYST BP LT 130 MM HG: CPT | Performed by: PSYCHIATRY & NEUROLOGY

## 2025-07-16 PROCEDURE — 3017F COLORECTAL CA SCREEN DOC REV: CPT | Performed by: PSYCHIATRY & NEUROLOGY

## 2025-07-16 NOTE — PROGRESS NOTES
Review of Systems    Constitutional - No fever or chills.  No diaphoresis or significant fatigue.  HENT -  No tinnitus or significant hearing loss.  Eyes - no sudden vision change or eye pain  Respiratory - yes significant shortness of breath or cough  Cardiovascular - no chest pain No palpitations or significant leg swelling  Gastrointestinal - no abdominal swelling or pain.    Genitourinary - No difficulty urinating, dysuria  Musculoskeletal - no back pain or myalgia.  Skin - no color change or rash  Neurologic - No seizures.  No lateralizing weakness.  Hematologic - yes easy bruising or excessive bleeding.  Psychiatric - no severe anxiety or nervousness.   All other review of systems are negative.    
  Social History Narrative    Not on file     Social Drivers of Health     Financial Resource Strain: Not on file   Food Insecurity: Not on file   Transportation Needs: Not on file   Physical Activity: Not on file   Stress: Not on file   Social Connections: Unknown (10/10/2023)    [facility]    Family and Community Support     Help with Day-to-Day Activities: Not on file     Lonely or Isolated: Not on file   Intimate Partner Violence: Not At Risk (3/25/2025)    Received from [facility]    Abuse Screen     Feels Unsafe at Home or Work/School: no     Feels Threatened by Someone: no     Does Anyone Try to Keep You From Having Contact with Others or Doing Things Outside Your Home?: no     Physical Signs of Abuse Present: no   Housing Stability: Unknown (10/10/2023)    [facility] from [facility], [facility]    Housing Stability     Current Living Arrangements: Not on file     Potentially Unsafe Housing Conditions: Not on file        Review of systems:    Constitutional - No fever or chills.  No diaphoresis or significant fatigue.  HENT -  No tinnitus or significant hearing loss.  Eyes - no sudden vision change or eye pain  Respiratory - yes significant shortness of breath or cough  Cardiovascular - no chest pain No palpitations or significant leg swelling  Gastrointestinal - no abdominal swelling or pain.    Genitourinary - No difficulty urinating, dysuria  Musculoskeletal - no back pain or myalgia.  Skin - no color change or rash  Neurologic - No seizures.  No lateralizing weakness.  Hematologic - yes easy bruising or excessive bleeding.  Psychiatric - no severe anxiety or nervousness.   All other review of systems are negative.          Current Outpatient Medications   Medication Sig Dispense Refill    mometasone (ASMANEX) 220 MCG/ACT AEPB inhaler Inhale into the lungs      trospium (SANCTURA) 20 MG tablet Take 1 tablet

## 2025-08-11 ENCOUNTER — OFFICE VISIT (OUTPATIENT)
Age: 75
End: 2025-08-11

## 2025-08-11 VITALS — WEIGHT: 208 LBS | HEIGHT: 73 IN | BODY MASS INDEX: 27.57 KG/M2

## 2025-08-11 DIAGNOSIS — M25.562 CHRONIC PAIN OF BOTH KNEES: ICD-10-CM

## 2025-08-11 DIAGNOSIS — G89.29 CHRONIC PAIN OF BOTH KNEES: ICD-10-CM

## 2025-08-11 DIAGNOSIS — M25.561 CHRONIC PAIN OF BOTH KNEES: ICD-10-CM

## 2025-08-11 DIAGNOSIS — M17.0 PRIMARY OSTEOARTHRITIS OF BOTH KNEES: Primary | ICD-10-CM

## 2025-08-11 RX ORDER — LIDOCAINE HYDROCHLORIDE 10 MG/ML
2.5 INJECTION, SOLUTION INFILTRATION; PERINEURAL ONCE
Status: COMPLETED | OUTPATIENT
Start: 2025-08-11 | End: 2025-08-11

## 2025-08-11 RX ORDER — ROPIVACAINE HYDROCHLORIDE 5 MG/ML
5 INJECTION, SOLUTION EPIDURAL; INFILTRATION; PERINEURAL ONCE
Status: COMPLETED | OUTPATIENT
Start: 2025-08-11 | End: 2025-08-11

## 2025-08-11 RX ORDER — BETAMETHASONE SODIUM PHOSPHATE AND BETAMETHASONE ACETATE 3; 3 MG/ML; MG/ML
12 INJECTION, SUSPENSION INTRA-ARTICULAR; INTRALESIONAL; INTRAMUSCULAR; SOFT TISSUE ONCE
Status: COMPLETED | OUTPATIENT
Start: 2025-08-11 | End: 2025-08-11

## 2025-08-11 RX ADMIN — BETAMETHASONE SODIUM PHOSPHATE AND BETAMETHASONE ACETATE 12 MG: 3; 3 INJECTION, SUSPENSION INTRA-ARTICULAR; INTRALESIONAL; INTRAMUSCULAR; SOFT TISSUE at 09:34

## 2025-08-11 RX ADMIN — LIDOCAINE HYDROCHLORIDE 2.5 ML: 10 INJECTION, SOLUTION INFILTRATION; PERINEURAL at 09:36

## 2025-08-11 RX ADMIN — ROPIVACAINE HYDROCHLORIDE 5 ML: 5 INJECTION, SOLUTION EPIDURAL; INFILTRATION; PERINEURAL at 09:38

## 2025-08-11 RX ADMIN — LIDOCAINE HYDROCHLORIDE 2.5 ML: 10 INJECTION, SOLUTION INFILTRATION; PERINEURAL at 09:35

## 2025-08-11 RX ADMIN — ROPIVACAINE HYDROCHLORIDE 5 ML: 5 INJECTION, SOLUTION EPIDURAL; INFILTRATION; PERINEURAL at 09:37

## (undated) DEVICE — ARM DRAPE

## (undated) DEVICE — AGENT HEMSTAT W4XL4IN OXIDIZED REGENERATED CELOS STRUCTURED

## (undated) DEVICE — TRAY EPI 25GA L3.5IN 0.75% BIPIVCAIN 8.25% D CONTAIN BPA

## (undated) DEVICE — SOLUTION IV 1000ML 0.9% SOD CHL FOR IRRIG PLAS CONT

## (undated) DEVICE — SUTURE ABSRB BRAID COAT UD CP NO 2 27IN VCRL J195H

## (undated) DEVICE — KIT MFLD ISOLATN NACL CNTRST PRT TBNG SPIK W/ PRSS TRNSDUC

## (undated) DEVICE — SYSTEM VENT MED AD NASAL PAP SUPERNO2VA

## (undated) DEVICE — DRAIN SURG SGL COLL PT TB FOR ATS BG OASIS

## (undated) DEVICE — SUTURE MONOCRYL SZ 4-0 L18IN ABSRB UD L19MM PS-2 3/8 CIR PRIM Y496G

## (undated) DEVICE — SUTURE VICRYL SZ 0 L36IN ABSRB UD L36MM CT-1 1/2 CIR J946H

## (undated) DEVICE — TR BAND RADIAL ARTERY COMPRESSION DEVICE: Brand: TR BAND

## (undated) DEVICE — SOLUTION IV 250ML 0.9% SOD CHL PH 5 INJ USP VIAFLX PLAS

## (undated) DEVICE — CATHETER DIAG 6FR L100CM LUMN ID0.056IN JR4 CRV 0 SIDE H

## (undated) DEVICE — GLOVE SURG SZ 85 L12IN FNGR THK79MIL GRN LTX FREE

## (undated) DEVICE — SUREFORM 45 RELOAD BLACK: Brand: SUREFORM

## (undated) DEVICE — SINGLE PORT MANIFOLD: Brand: NEPTUNE 2

## (undated) DEVICE — ENDO KIT,LOURDES HOSPITAL: Brand: MEDLINE INDUSTRIES, INC.

## (undated) DEVICE — GLOVE SURG 7.5 PF POLYMER WHT STRL SIGN LTX ESSENTIAL LTX

## (undated) DEVICE — BIPOLAR CAUTERY CORD

## (undated) DEVICE — SUCTION IRRIGATOR: Brand: ENDOWRIST

## (undated) DEVICE — BLANKET WRM W40.2XL55.9IN IORT LO BODY + MISTRAL AIR

## (undated) DEVICE — UNDERGLOVE SURG SZ 8 FNGR THK0.21MIL GRN LTX BEAD CUF

## (undated) DEVICE — CHLORAPREP 26ML ORANGE

## (undated) DEVICE — GOWN,PRECEPT,XLNG/XXLARGE,STRL: Brand: MEDLINE

## (undated) DEVICE — SUREFORM 45 CURVED-TIP: Brand: SUREFORM

## (undated) DEVICE — SOLUTION IV IRRIG POUR BRL 0.9% SODIUM CHL 2F7124

## (undated) DEVICE — PIN FIX L229MM DIA4.8MM S STL STYL 6 DMND 1 END THRD STNMN
Type: IMPLANTABLE DEVICE | Site: HIP | Status: NON-FUNCTIONAL
Removed: 2022-03-04

## (undated) DEVICE — LIGHT SUCT UNTETHERED SCINTILLANT

## (undated) DEVICE — SOLUTION IRRIG 1000ML STRL H2O USP PLAS POUR BTL

## (undated) DEVICE — NEPTUNE E-SEP SMOKE EVACUATION PENCIL, COATED, 70MM BLADE, ROCKER SWITCH: Brand: NEPTUNE E-SEP

## (undated) DEVICE — DRAIN SURG L3/8-1/2IN DIA3/16IN SIL CARD CONN 1:1 BLAK

## (undated) DEVICE — Device

## (undated) DEVICE — PACK,UNIVERSAL,NO GOWNS: Brand: MEDLINE

## (undated) DEVICE — NON-WOVEN ADHESIVE WOUND DRESSING: Brand: PRIMAPORE ADHESIVE DRESSING 30*10CM

## (undated) DEVICE — DUAL CUT SAGITTAL BLADE

## (undated) DEVICE — FORCEPS BX L240CM DIA2.4MM L NDL RAD JAW 4 133340

## (undated) DEVICE — TOTAL TRAY, 16FR 10ML SIL FOLEY, URN: Brand: MEDLINE

## (undated) DEVICE — GLOVE SURG SZ 85 CRM LTX FREE POLYISOPRENE POLYMER BEAD ANTI

## (undated) DEVICE — LOOP VES W1.3MM THK0.9MM MINI WHT SIL FLD REPELLENT

## (undated) DEVICE — KIT ANGIO W/ AT P65 PREM HND CTRL FOR CNTRST DEL ANGIOTOUCH

## (undated) DEVICE — SYSTEM SKIN CLSR 22CM DERMBND PRINEO

## (undated) DEVICE — SUTURE PERMAHAND SZ 2-0 L30IN NONABSORBABLE BLK SH L26MM C016D

## (undated) DEVICE — TBG INSUFFLATION W/PUSH ON CON: Brand: MEDLINE INDUSTRIES, INC.

## (undated) DEVICE — SUTURE VCRL SZ 0 L27IN ABSRB UD L36MM CT-1 1/2 CIR J260H

## (undated) DEVICE — SEALANT TISS GLUE 4ML PLEUR AIR LEAK PROGEL

## (undated) DEVICE — DRAPE,UTILITY,XL,4/PK,STERILE: Brand: MEDLINE

## (undated) DEVICE — SOLUTION IRRIG 3000ML 0.9% SOD CHL USP UROMATIC PLAS CONT

## (undated) DEVICE — SUREFORM 45 RELOAD GREEN: Brand: SUREFORM

## (undated) DEVICE — SEAL

## (undated) DEVICE — ENDO KIT: Brand: MEDLINE INDUSTRIES, INC.

## (undated) DEVICE — SUTURE VICRYL SZ 2-0 L36IN ABSRB UD L36MM CT-1 1/2 CIR J945H

## (undated) DEVICE — BIPOLAR SEALER 23-112-1 AQM 6.0: Brand: AQUAMANTYS ®

## (undated) DEVICE — DRAIN,WOUND,ROUND,24FR,5/16",FULL-FLUTED: Brand: MEDLINE

## (undated) DEVICE — CURAVIEW LED LARYNGOSCOPE BLADE & HANDLE,DISPOSABLE,MAC 4: Brand: CURAPLEX

## (undated) DEVICE — COLUMN DRAPE

## (undated) DEVICE — 1LYRTR 16FR10ML100%SIL UMS SNP: Brand: MEDLINE INDUSTRIES, INC.

## (undated) DEVICE — GUIDEWIRE VASC L260CM DIA0038IN TIP L3MM PTFE J TIP FIX COR

## (undated) DEVICE — POUCH SPEC RETRV SHFT 15MM 13X23CM GRN POLYUR DBL RNG HNDL

## (undated) DEVICE — MONOPOLAR CAUTERY CORD

## (undated) DEVICE — PACK ANT HIP CDS

## (undated) DEVICE — SUTURE VCRL SZ 3-0 L27IN ABSRB UD L26MM SH 1/2 CIR J416H

## (undated) DEVICE — TOWEL,OR,DSP,ST,BLUE,DLX,4/PK,20PK/CS: Brand: MEDLINE

## (undated) DEVICE — BAG SPEC REM 224ML W4XL6IN DIA10MM 1 HND GYN DISP ENDOPCH

## (undated) DEVICE — TROCARS: Brand: KII® OPTICAL ACCESS SYSTEM

## (undated) DEVICE — SUTURE VCRL SZ 2-0 L36IN ABSRB UD L36MM CT-1 1/2 CIR J945H

## (undated) DEVICE — SOLUTION ANTIFOG VIS SYS CLEARIFY LAPSCP

## (undated) DEVICE — CATHETER COR DIAG JUDKINS L 3.5 CRV 6FR 100CM 0 SIDE H

## (undated) DEVICE — SUREFORM 45 RELOAD WHITE: Brand: SUREFORM

## (undated) DEVICE — GLOVE SURG SZ 75 CRM LTX FREE POLYISOPRENE POLYMER BEAD ANTI

## (undated) DEVICE — FORCEPS BX 240CM 2.4MM L NDL RAD JAW 4 M00513334

## (undated) DEVICE — MINOR CDS: Brand: MEDLINE INDUSTRIES, INC.

## (undated) DEVICE — TIP APPL L29CM TISS GLUE EXT SPR FOR PLEUR AIR LEAK PROGEL

## (undated) DEVICE — ADHESIVE SKIN CLOSURE WND 8.661X1.5 IN 22 CM LIQUIBAND SECUR

## (undated) DEVICE — VIVASIGHT 2 DLT KIT 41 FR - LEFT: Brand: VIVASIGHT™ 2 DLT KIT 41 FR - LEFT

## (undated) DEVICE — PAD,ARMBOARD,CONV,FOAM,2X8X20",12PR/CS: Brand: MEDLINE

## (undated) DEVICE — BITE BLOCK ENDOSCP AD 60 FR W/ ADJ STRP PLAS GRN BLOX

## (undated) DEVICE — TIP COVER ACCESSORY

## (undated) DEVICE — GLIDESHEATH SLENDER STAINLESS STEEL KIT: Brand: GLIDESHEATH SLENDER

## (undated) DEVICE — 3M™ IOBAN™ 2 ANTIMICROBIAL INCISE DRAPE 6650EZ: Brand: IOBAN™ 2

## (undated) DEVICE — FAN SPRAY KIT: Brand: PULSAVAC®

## (undated) DEVICE — 6.3MM ROUND FAST CUTTING BUR

## (undated) DEVICE — COVER POS PERINL POST NS 082501

## (undated) DEVICE — CANNULA NSL AD L7FT DIV O2 CO2 W/ M LUERLOCK TRMPT CONN

## (undated) DEVICE — GLOVE ORTHO 8   MSG9480